# Patient Record
Sex: MALE | Race: WHITE | NOT HISPANIC OR LATINO
[De-identification: names, ages, dates, MRNs, and addresses within clinical notes are randomized per-mention and may not be internally consistent; named-entity substitution may affect disease eponyms.]

---

## 2017-07-13 ENCOUNTER — RX RENEWAL (OUTPATIENT)
Age: 57
End: 2017-07-13

## 2017-10-17 ENCOUNTER — APPOINTMENT (OUTPATIENT)
Dept: ENDOCRINOLOGY | Facility: CLINIC | Age: 57
End: 2017-10-17
Payer: MEDICAID

## 2017-10-17 VITALS
HEIGHT: 65.25 IN | HEART RATE: 68 BPM | DIASTOLIC BLOOD PRESSURE: 80 MMHG | OXYGEN SATURATION: 98 % | BODY MASS INDEX: 28.31 KG/M2 | SYSTOLIC BLOOD PRESSURE: 118 MMHG | WEIGHT: 172 LBS

## 2017-10-17 PROCEDURE — 99214 OFFICE O/P EST MOD 30 MIN: CPT | Mod: 25

## 2017-10-17 PROCEDURE — 77080 DXA BONE DENSITY AXIAL: CPT

## 2018-08-22 ENCOUNTER — MEDICATION RENEWAL (OUTPATIENT)
Age: 58
End: 2018-08-22

## 2018-10-08 ENCOUNTER — APPOINTMENT (OUTPATIENT)
Dept: ENDOCRINOLOGY | Facility: CLINIC | Age: 58
End: 2018-10-08

## 2018-10-15 ENCOUNTER — APPOINTMENT (OUTPATIENT)
Dept: ENDOCRINOLOGY | Facility: CLINIC | Age: 58
End: 2018-10-15
Payer: MEDICAID

## 2018-10-15 ENCOUNTER — LABORATORY RESULT (OUTPATIENT)
Age: 58
End: 2018-10-15

## 2018-10-15 VITALS
SYSTOLIC BLOOD PRESSURE: 120 MMHG | BODY MASS INDEX: 32.89 KG/M2 | HEIGHT: 62.25 IN | WEIGHT: 181 LBS | DIASTOLIC BLOOD PRESSURE: 66 MMHG | HEART RATE: 69 BPM | OXYGEN SATURATION: 97 %

## 2018-10-15 PROCEDURE — 99214 OFFICE O/P EST MOD 30 MIN: CPT

## 2018-10-17 LAB
T3RU NFR SERPL: 1.03 INDEX
T4 SERPL-MCNC: 7.6 UG/DL
THYROGLOB AB SERPL-ACNC: <20 IU/ML
THYROGLOB SERPL-MCNC: <0.2 NG/ML
TSH SERPL-ACNC: 1.93 UIU/ML

## 2019-10-10 ENCOUNTER — APPOINTMENT (OUTPATIENT)
Dept: ENDOCRINOLOGY | Facility: CLINIC | Age: 59
End: 2019-10-10
Payer: MEDICAID

## 2019-10-10 VITALS — OXYGEN SATURATION: 98 % | HEART RATE: 68 BPM | SYSTOLIC BLOOD PRESSURE: 132 MMHG | DIASTOLIC BLOOD PRESSURE: 76 MMHG

## 2019-10-10 VITALS — HEIGHT: 64.9 IN | WEIGHT: 177 LBS | BODY MASS INDEX: 29.49 KG/M2

## 2019-10-10 PROCEDURE — ZZZZZ: CPT

## 2019-10-10 PROCEDURE — 99214 OFFICE O/P EST MOD 30 MIN: CPT | Mod: 25

## 2019-10-10 PROCEDURE — 77080 DXA BONE DENSITY AXIAL: CPT

## 2019-10-10 NOTE — PHYSICAL EXAM
[Alert] : alert [No Acute Distress] : no acute distress [Well Nourished] : well nourished [Normal Sclera/Conjunctiva] : normal sclera/conjunctiva [Well Developed] : well developed [No Neck Mass] : no neck mass was observed [No Proptosis] : no proptosis [No LAD] : no lymphadenopathy [Clear to Auscultation] : lungs were clear to auscultation bilaterally [Well Healed Scar] : well healed scar [No Respiratory Distress] : no respiratory distress [No Edema] : there was no peripheral edema [Regular Rhythm] : with a regular rhythm [Normal S1, S2] : normal S1 and S2 [Not Tender] : non-tender [Normal Bowel Sounds] : normal bowel sounds [Soft] : abdomen soft [Normal Reflexes] : deep tendon reflexes were 2+ and symmetric [No Spinal Tenderness] : no spinal tenderness [Normal Strength/Tone] : muscle strength and tone were normal [Normal Mood] : the mood was normal [No Tremors] : no tremors [Normal Affect] : the affect was normal

## 2019-10-10 NOTE — ASSESSMENT
[FreeTextEntry1] : 59 year old man with papillary thyroid cancer (well differentiated, 1.6 cm, isthmus, with positive margins) post thyroidectomy in 2010 and radioactive iodine in 2011, with excellent response to tx.\par   -TSH at goal, check  Tg; continue currentt levothyroxine dose for now\par   -  Thyroid US showed no evidence of disease in 7/2018. \par \par  Osteopenia - \par   - DXA performed today, no decrease,(increase at hip, with artifactual elevations in spine, borderline low) continue to monitor, repeat in 2 years.  \par  - Continue calcium and vitamin D supplementation.\par \par \par Prediabetes -hba1c has remained normal, encouraged pt to continue with diet/exercise\par \par Hypogonadism - does not want testosterone tx\par \par f/u  1 year

## 2019-10-10 NOTE — DATA REVIEWED
[FreeTextEntry1] : DXa today: lowest T scire -2.19/2019\par cr 0.92\par LDL 78\par FT4 1.3\par TSH 1.03\par Tg abs neg\par Hba1c 5.5\par vit d 36\par \par \par 7/2018\par \par thyroid ultrasound - benign appearing lymph nodes\par \par 8/2018\par TSH 0.21\par Hba1c 5.4\par VIt D 36\par \par DEXA 10/31/16\par Lumbar Spine T Score -2.2 osteopenia (-1.4% decrease from 2014)\par Femoral Neck  T Score -2.3 osteopenia (-7.3% decrease from 2014)\par Total Hip T Score -1.9 osteopenia (-4.0% decrease from 2014)\par \par \par 9/28/16\par \par Ca 9.8\par Albumin 5.0\par Cr 0.99\par TSH QNS\par FT4 1.4\par TT4 9.7\par T3 uptake 32%\par TG 0.1\par TG Abs <1\par HbA1c 5.5\par 25OH Vit D 35

## 2019-10-10 NOTE — HISTORY OF PRESENT ILLNESS
[FreeTextEntry1] : cc: thyroid cancer\par \par 59 year old man with papillary thyroid cancer (well differentiated, 1.6 cm, isthmus, with positive margins) post thyroidectomy in 2010 and radioactive iodine in 2011, on levothyroxine. He has been feeling well.   He reports no change in his neck; no dyspnea or dysphagia.  Taking levothyroxine\par \par \par Osteopenia:  No recent falls or fractures, taking supplemental calcium and vitamin D\par \par Also with hypogonadism, has declined testosterone replacement in the past\par \par Prediabetes: - hba1c has remained  normal range; has with exercise, lost some weight\par \par \par \par \par \par

## 2019-10-11 LAB
THYROGLOB AB SERPL-ACNC: <20 IU/ML
THYROGLOB SERPL-MCNC: <0.2 NG/ML

## 2020-10-05 ENCOUNTER — APPOINTMENT (OUTPATIENT)
Dept: ENDOCRINOLOGY | Facility: CLINIC | Age: 60
End: 2020-10-05

## 2020-10-12 ENCOUNTER — APPOINTMENT (OUTPATIENT)
Dept: ENDOCRINOLOGY | Facility: CLINIC | Age: 60
End: 2020-10-12

## 2020-11-02 ENCOUNTER — APPOINTMENT (OUTPATIENT)
Dept: ENDOCRINOLOGY | Facility: CLINIC | Age: 60
End: 2020-11-02
Payer: MEDICAID

## 2020-11-02 VITALS
OXYGEN SATURATION: 97 % | DIASTOLIC BLOOD PRESSURE: 70 MMHG | HEART RATE: 90 BPM | WEIGHT: 192 LBS | TEMPERATURE: 98 F | BODY MASS INDEX: 31.99 KG/M2 | SYSTOLIC BLOOD PRESSURE: 128 MMHG | HEIGHT: 64.9 IN

## 2020-11-02 PROCEDURE — 99072 ADDL SUPL MATRL&STAF TM PHE: CPT

## 2020-11-02 PROCEDURE — 99214 OFFICE O/P EST MOD 30 MIN: CPT | Mod: 25

## 2020-11-02 NOTE — PHYSICAL EXAM
[Alert] : alert [Healthy Appearance] : healthy appearance [No Acute Distress] : no acute distress [Normal Sclera/Conjunctiva] : normal sclera/conjunctiva [No Proptosis] : no proptosis [No Neck Mass] : no neck mass was observed [No LAD] : no lymphadenopathy [Well Healed Scar] : well healed scar [No Respiratory Distress] : no respiratory distress [Clear to Auscultation] : lungs were clear to auscultation bilaterally [Normal S1, S2] : normal S1 and S2 [Regular Rhythm] : with a regular rhythm [No Edema] : no peripheral edema [Normal Bowel Sounds] : normal bowel sounds [Not Tender] : non-tender [Soft] : abdomen soft [No Spinal Tenderness] : no spinal tenderness [Spine Straight] : spine straight [No Involuntary Movements] : no involuntary movements were seen [Normal Strength/Tone] : muscle strength and tone were normal [Normal Reflexes] : deep tendon reflexes were 2+ and symmetric [No Tremors] : no tremors [Normal Affect] : the affect was normal [Normal Mood] : the mood was normal

## 2020-11-02 NOTE — DATA REVIEWED
[FreeTextEntry1] : DXa today: lowest T score -2.19 - 2019\par cr 0.92\par LDL 78\par FT4 1.3\par TSH 1.03\par Tg abs neg\par Hba1c 5.5\par vit d 36\par \par \par 7/2018\par \par thyroid ultrasound - benign appearing lymph nodes\par \par 8/2018\par TSH 0.21\par Hba1c 5.4\par VIt D 36\par \par DEXA 10/31/16\par Lumbar Spine T Score -2.2 osteopenia (-1.4% decrease from 2014)\par Femoral Neck  T Score -2.3 osteopenia (-7.3% decrease from 2014)\par Total Hip T Score -1.9 osteopenia (-4.0% decrease from 2014)\par \par \par 9/28/16\par \par Ca 9.8\par Albumin 5.0\par Cr 0.99\par TSH QNS\par FT4 1.4\par TT4 9.7\par T3 uptake 32%\par TG 0.1\par TG Abs <1\par HbA1c 5.5\par 25OH Vit D 35

## 2020-11-02 NOTE — HISTORY OF PRESENT ILLNESS
[FreeTextEntry1] : cc: thyroid cancer\par \par 60 year old man with papillary thyroid cancer (well differentiated, 1.6 cm, isthmus, with positive margins) post thyroidectomy in 2010 and radioactive iodine in 2011, with excellent response to tx. He has been feeling well.   He has not noted any change in his neck; reports no dyspnea or dysphagia.  Taking levothyroxine daily separate from food and vitamins.  He has been feeling a little down due to Covid-related situations (no SI, HI, reports symptoms are mild)\par \par Osteopenia:  No recent falls or fractures, he takes supplemental calcium and vitamin D.  \par \par Had lower back pain, muscle spasms in June/July.  Had xrays with no findings.  Pain relieved with steroid injection.  September hurt back while working on lawn, has had mild pain, mostly at night, interfering with sleep, has appt schedule.\par \par Also with hypogonadism, has declined testosterone replacement \par \par Prediabetes: - Has gained weight, now feels motivated to start exercising more.  Has been riding bicycle.  Not monitoring diet.\par \par \par \par \par \par

## 2020-11-02 NOTE — ASSESSMENT
[FreeTextEntry1] : 60 year old man with papillary thyroid cancer (well differentiated, 1.6 cm, isthmus, with positive margins) post thyroidectomy in 2010 and radioactive iodine in 2011, with excellent response to tx.\par    - check TFTs, tg, adjust  levothyroxine dose as needed\par   - Last  Thyroid US showed no evidence of disease. \par \par  Osteopenia - \par   - DXA stable one year ago repeat in 1-2 years.  \par  - Continue calcium and vitamin D supplementation.\par \par \par Prediabetes - Hba1c has increased, pt intends to resume diet and increase exercise\par \par Hypogonadism - does not want testosterone tx\par \par f/u  1 year

## 2020-11-03 LAB
THYROGLOB AB SERPL-ACNC: <20 IU/ML
THYROGLOB SERPL-MCNC: 0.75 NG/ML
TSH SERPL-ACNC: 1.6 UIU/ML

## 2020-11-04 ENCOUNTER — NON-APPOINTMENT (OUTPATIENT)
Age: 60
End: 2020-11-04

## 2021-03-22 ENCOUNTER — APPOINTMENT (OUTPATIENT)
Dept: NUCLEAR MEDICINE | Facility: HOSPITAL | Age: 61
End: 2021-03-22

## 2021-03-22 ENCOUNTER — OUTPATIENT (OUTPATIENT)
Dept: OUTPATIENT SERVICES | Facility: HOSPITAL | Age: 61
LOS: 1 days | End: 2021-03-22
Payer: MEDICAID

## 2021-03-22 DIAGNOSIS — C73 MALIGNANT NEOPLASM OF THYROID GLAND: ICD-10-CM

## 2021-03-23 ENCOUNTER — APPOINTMENT (OUTPATIENT)
Dept: NUCLEAR MEDICINE | Facility: HOSPITAL | Age: 61
End: 2021-03-23

## 2021-03-24 ENCOUNTER — APPOINTMENT (OUTPATIENT)
Dept: NUCLEAR MEDICINE | Facility: HOSPITAL | Age: 61
End: 2021-03-24

## 2021-03-24 ENCOUNTER — RESULT REVIEW (OUTPATIENT)
Age: 61
End: 2021-03-24

## 2021-03-25 ENCOUNTER — TRANSCRIPTION ENCOUNTER (OUTPATIENT)
Age: 61
End: 2021-03-25

## 2021-03-26 ENCOUNTER — RESULT REVIEW (OUTPATIENT)
Age: 61
End: 2021-03-26

## 2021-03-26 ENCOUNTER — APPOINTMENT (OUTPATIENT)
Dept: NUCLEAR MEDICINE | Facility: HOSPITAL | Age: 61
End: 2021-03-26

## 2021-03-26 PROCEDURE — 78018 THYROID MET IMAGING BODY: CPT

## 2021-03-26 PROCEDURE — 78020 THYROID MET UPTAKE: CPT | Mod: 26

## 2021-03-26 PROCEDURE — 96372 THER/PROPH/DIAG INJ SC/IM: CPT

## 2021-03-26 PROCEDURE — 78018 THYROID MET IMAGING BODY: CPT | Mod: 26

## 2021-03-26 PROCEDURE — A9528: CPT

## 2021-03-26 PROCEDURE — 78020 THYROID MET UPTAKE: CPT

## 2021-05-12 ENCOUNTER — NON-APPOINTMENT (OUTPATIENT)
Age: 61
End: 2021-05-12

## 2021-05-12 ENCOUNTER — TRANSCRIPTION ENCOUNTER (OUTPATIENT)
Age: 61
End: 2021-05-12

## 2021-05-13 ENCOUNTER — APPOINTMENT (OUTPATIENT)
Dept: NEUROSURGERY | Facility: CLINIC | Age: 61
End: 2021-05-13
Payer: MEDICAID

## 2021-05-13 ENCOUNTER — LABORATORY RESULT (OUTPATIENT)
Age: 61
End: 2021-05-13

## 2021-05-13 ENCOUNTER — RESULT REVIEW (OUTPATIENT)
Age: 61
End: 2021-05-13

## 2021-05-13 ENCOUNTER — APPOINTMENT (OUTPATIENT)
Dept: NEUROSURGERY | Facility: CLINIC | Age: 61
End: 2021-05-13

## 2021-05-13 VITALS
HEIGHT: 64 IN | WEIGHT: 175 LBS | DIASTOLIC BLOOD PRESSURE: 84 MMHG | HEART RATE: 77 BPM | SYSTOLIC BLOOD PRESSURE: 161 MMHG | OXYGEN SATURATION: 96 % | TEMPERATURE: 98.2 F | BODY MASS INDEX: 29.88 KG/M2

## 2021-05-13 VITALS — DIASTOLIC BLOOD PRESSURE: 84 MMHG | SYSTOLIC BLOOD PRESSURE: 150 MMHG

## 2021-05-13 DIAGNOSIS — Z80.0 FAMILY HISTORY OF MALIGNANT NEOPLASM OF DIGESTIVE ORGANS: ICD-10-CM

## 2021-05-13 DIAGNOSIS — Z82.49 FAMILY HISTORY OF ISCHEMIC HEART DISEASE AND OTHER DISEASES OF THE CIRCULATORY SYSTEM: ICD-10-CM

## 2021-05-13 DIAGNOSIS — Z83.1 FAMILY HISTORY OF OTHER INFECTIOUS AND PARASITIC DISEASES: ICD-10-CM

## 2021-05-13 DIAGNOSIS — Z78.9 OTHER SPECIFIED HEALTH STATUS: ICD-10-CM

## 2021-05-13 PROCEDURE — 99204 OFFICE O/P NEW MOD 45 MIN: CPT

## 2021-05-13 NOTE — REVIEW OF SYSTEMS
[Negative] : Heme/Lymph [Constipation] : constipation [Testicular Pain] : testicular pain [de-identified] : lower back pain [FreeTextEntry9] : lower back pain

## 2021-05-13 NOTE — REASON FOR VISIT
[New Patient Visit] : a new patient visit [Referred By: _________] : Patient was referred by [facility] [Other: _____] : [unfilled]

## 2021-05-13 NOTE — ASSESSMENT
[FreeTextEntry1] : IMPRESSION:\par 1. Sacral mass with extension into the presacral space. \par \par \par \par PLAN:\par 1. Explained to patient that sexual, bowel, bladder function as well as mobility in lower extremities will be affected with surgery.  Explained possibility of urine or bladder diversion pre or post surgery.\par 2. CT CAP w/wo IV and PO contrast.\par 3. CT of lumbar spine w/o contrast.\par 4. CT chest w/wo contrast.\par 5. CT guided sacral tumor biopsy.\par 6. Explained that spinal instability post surgery will possibly need spinal fusion surgery.\par 7. Explained that due to the location of surgical incision that Plastic Surgery will be involved in surgery for wound closure to optimize wound healing.\par 8. Risks related to surgery discussed to include but are not limited to infection, vascular damage, hardware failure, wound breakdown, tumor recurrence, among others.

## 2021-05-13 NOTE — HISTORY OF PRESENT ILLNESS
[> 3 months] : more  than 3 months [FreeTextEntry1] : "Sacral tumor" [de-identified] : Luis Jackson is a pleasant right handed 61 year old gentleman who presents for consultation regarding recently diagnosed sacral tumor.  Pt has a long standing history of lower back pain that started approximately 8/2020 after raking his lawn.  He was treated by his PCP Dr. Octaviano Hernandez, [address] [place] [address], [place] [address], \par , with oral steroids and muscle relaxants.  He reports that he  had some relief however pain lingered. He was referred to an Orthopedic Surgeon Dr. Arpit Bowen, [address] [place] [id number], [phone] who ordered MRI LS which revealed a sacral tumor.\par \par Pain is 2-3/10 in lower back and is present mostly at night time and when right leg is in certain positions.  Pain is worsened with heat application and improved with increased physical activity and Aleve prn.\par \par Reports that he suffers from constipation, urine stream is weaker than before, right buttock and right scrotal pain with some numbness. Pt denies problems walking. \par \par Pt is single and lives with his father.  He has a history of thyroid cancer with total thyroidectomy in 2010. He takes Levothyroxine 125 mcg daily.  He is under the care of Endocrinologist Dr. Kayla Neely, [facility], [phone]. Respiratory

## 2021-05-13 NOTE — PHYSICAL EXAM
[General Appearance - Alert] : alert [General Appearance - In No Acute Distress] : in no acute distress [General Appearance - Well Nourished] : well nourished [General Appearance - Well Developed] : well developed [General Appearance - Well-Appearing] : healthy appearing [Oriented To Time, Place, And Person] : oriented to person, place, and time [Impaired Insight] : insight and judgment were intact [Affect] : the affect was normal [Memory Recent] : recent memory was not impaired [Person] : oriented to person [Place] : oriented to place [Time] : oriented to time [Cranial Nerves Optic (II)] : visual acuity intact bilaterally,  pupils equal round and reactive to light [Cranial Nerves Oculomotor (III)] : extraocular motion intact [Cranial Nerves Trigeminal (V)] : facial sensation intact symmetrically [Cranial Nerves Facial (VII)] : face symmetrical [Cranial Nerves Vestibulocochlear (VIII)] : hearing was intact bilaterally [Cranial Nerves Glossopharyngeal (IX)] : tongue and palate midline [Cranial Nerves Accessory (XI - Cranial And Spinal)] : head turning and shoulder shrug symmetric [Cranial Nerves Hypoglossal (XII)] : there was no tongue deviation with protrusion [Motor Handedness Right-Handed] : the patient is right hand dominant [Balance] : balance was intact [Sclera] : the sclera and conjunctiva were normal [PERRL With Normal Accommodation] : pupils were equal in size, round, reactive to light, with normal accommodation [Extraocular Movements] : extraocular movements were intact [Outer Ear] : the ears and nose were normal in appearance [Hearing Threshold Finger Rub Not Storey] : hearing was normal [Oropharynx] : the oropharynx was normal [Neck Appearance] : the appearance of the neck was normal [Respiration, Rhythm And Depth] : normal respiratory rhythm and effort [Exaggerated Use Of Accessory Muscles For Inspiration] : no accessory muscle use [Abnormal Walk] : normal gait [Involuntary Movements] : no involuntary movements were seen [Motor Tone] : muscle strength and tone were normal [Skin Color & Pigmentation] : normal skin color and pigmentation [] : no rash [5] : S1 toe walking 5/5 [FreeTextEntry8] : Pt is able to tandem walk

## 2021-05-14 ENCOUNTER — TRANSCRIPTION ENCOUNTER (OUTPATIENT)
Age: 61
End: 2021-05-14

## 2021-05-14 LAB
ALBUMIN SERPL ELPH-MCNC: 4.9 G/DL
ALP BLD-CCNC: 62 U/L
ALT SERPL-CCNC: 18 U/L
ANION GAP SERPL CALC-SCNC: 11 MMOL/L
APTT BLD: 26.7 SEC
AST SERPL-CCNC: 19 U/L
BASOPHILS # BLD AUTO: 0.08 K/UL
BASOPHILS NFR BLD AUTO: 0.7 %
BILIRUB SERPL-MCNC: 0.3 MG/DL
BUN SERPL-MCNC: 21 MG/DL
CALCIUM SERPL-MCNC: 10 MG/DL
CHLORIDE SERPL-SCNC: 101 MMOL/L
CO2 SERPL-SCNC: 27 MMOL/L
CREAT SERPL-MCNC: 0.97 MG/DL
EOSINOPHIL # BLD AUTO: 0.14 K/UL
EOSINOPHIL NFR BLD AUTO: 1.2 %
GLUCOSE SERPL-MCNC: 89 MG/DL
HCT VFR BLD CALC: 48.9 %
HGB BLD-MCNC: 15.6 G/DL
IMM GRANULOCYTES NFR BLD AUTO: 0.3 %
LYMPHOCYTES # BLD AUTO: 2.68 K/UL
LYMPHOCYTES NFR BLD AUTO: 22.3 %
MAN DIFF?: NORMAL
MCHC RBC-ENTMCNC: 27.6 PG
MCHC RBC-ENTMCNC: 31.9 GM/DL
MCV RBC AUTO: 86.4 FL
MONOCYTES # BLD AUTO: 1.07 K/UL
MONOCYTES NFR BLD AUTO: 8.9 %
NEUTROPHILS # BLD AUTO: 7.99 K/UL
NEUTROPHILS NFR BLD AUTO: 66.6 %
PLATELET # BLD AUTO: 309 K/UL
POTASSIUM SERPL-SCNC: 4.3 MMOL/L
PROT SERPL-MCNC: 7.5 G/DL
RBC # BLD: 5.66 M/UL
RBC # FLD: 13.1 %
SODIUM SERPL-SCNC: 140 MMOL/L
WBC # FLD AUTO: 12 K/UL

## 2021-05-17 PROBLEM — R93.89 ABNORMAL FINDING ON IMAGING: Status: ACTIVE | Noted: 2021-05-17

## 2021-05-18 ENCOUNTER — APPOINTMENT (OUTPATIENT)
Dept: INTERVENTIONAL RADIOLOGY/VASCULAR | Facility: CLINIC | Age: 61
End: 2021-05-18
Payer: MEDICAID

## 2021-05-18 VITALS — HEIGHT: 64 IN | WEIGHT: 175 LBS | BODY MASS INDEX: 29.88 KG/M2

## 2021-05-18 DIAGNOSIS — R93.89 ABNORMAL FINDINGS ON DIAGNOSTIC IMAGING OF OTHER SPECIFIED BODY STRUCTURES: ICD-10-CM

## 2021-05-18 DIAGNOSIS — H35.713 CENTRAL SEROUS CHORIORETINOPATHY, BILATERAL: ICD-10-CM

## 2021-05-18 DIAGNOSIS — Z56.0 UNEMPLOYMENT, UNSPECIFIED: ICD-10-CM

## 2021-05-18 DIAGNOSIS — Z92.29 PERSONAL HISTORY OF OTHER DRUG THERAPY: ICD-10-CM

## 2021-05-18 DIAGNOSIS — K59.00 CONSTIPATION, UNSPECIFIED: ICD-10-CM

## 2021-05-18 PROCEDURE — 99204 OFFICE O/P NEW MOD 45 MIN: CPT | Mod: 95

## 2021-05-18 SDOH — ECONOMIC STABILITY - INCOME SECURITY: UNEMPLOYMENT, UNSPECIFIED: Z56.0

## 2021-05-22 ENCOUNTER — OUTPATIENT (OUTPATIENT)
Dept: OUTPATIENT SERVICES | Facility: HOSPITAL | Age: 61
LOS: 1 days | End: 2021-05-22
Payer: MEDICAID

## 2021-05-22 DIAGNOSIS — Z11.52 ENCOUNTER FOR SCREENING FOR COVID-19: ICD-10-CM

## 2021-05-22 LAB — SARS-COV-2 RNA SPEC QL NAA+PROBE: SIGNIFICANT CHANGE UP

## 2021-05-22 PROCEDURE — U0003: CPT

## 2021-05-22 PROCEDURE — C9803: CPT

## 2021-05-22 PROCEDURE — U0005: CPT

## 2021-05-24 ENCOUNTER — RESULT REVIEW (OUTPATIENT)
Age: 61
End: 2021-05-24

## 2021-05-24 ENCOUNTER — OUTPATIENT (OUTPATIENT)
Dept: OUTPATIENT SERVICES | Facility: HOSPITAL | Age: 61
LOS: 1 days | End: 2021-05-24
Payer: MEDICAID

## 2021-05-24 VITALS
HEIGHT: 64 IN | DIASTOLIC BLOOD PRESSURE: 76 MMHG | RESPIRATION RATE: 16 BRPM | OXYGEN SATURATION: 98 % | WEIGHT: 175.05 LBS | SYSTOLIC BLOOD PRESSURE: 142 MMHG

## 2021-05-24 VITALS
TEMPERATURE: 98 F | SYSTOLIC BLOOD PRESSURE: 132 MMHG | DIASTOLIC BLOOD PRESSURE: 68 MMHG | OXYGEN SATURATION: 99 % | HEART RATE: 66 BPM | RESPIRATION RATE: 18 BRPM

## 2021-05-24 DIAGNOSIS — M54.5 LOW BACK PAIN: ICD-10-CM

## 2021-05-24 PROCEDURE — 77012 CT SCAN FOR NEEDLE BIOPSY: CPT | Mod: 26

## 2021-05-24 PROCEDURE — 88341 IMHCHEM/IMCYTCHM EA ADD ANTB: CPT

## 2021-05-24 PROCEDURE — 88305 TISSUE EXAM BY PATHOLOGIST: CPT

## 2021-05-24 PROCEDURE — 88173 CYTOPATH EVAL FNA REPORT: CPT

## 2021-05-24 PROCEDURE — 88173 CYTOPATH EVAL FNA REPORT: CPT | Mod: 26

## 2021-05-24 PROCEDURE — 88172 CYTP DX EVAL FNA 1ST EA SITE: CPT

## 2021-05-24 PROCEDURE — 88342 IMHCHEM/IMCYTCHM 1ST ANTB: CPT

## 2021-05-24 PROCEDURE — 77012 CT SCAN FOR NEEDLE BIOPSY: CPT

## 2021-05-24 PROCEDURE — 88342 IMHCHEM/IMCYTCHM 1ST ANTB: CPT | Mod: 26

## 2021-05-24 PROCEDURE — 20225 BONE BIOPSY TROCAR/NDL DEEP: CPT

## 2021-05-24 PROCEDURE — 88305 TISSUE EXAM BY PATHOLOGIST: CPT | Mod: 26

## 2021-05-24 PROCEDURE — 88341 IMHCHEM/IMCYTCHM EA ADD ANTB: CPT | Mod: 26

## 2021-05-24 RX ORDER — FENTANYL CITRATE 50 UG/ML
25 INJECTION INTRAVENOUS
Refills: 0 | Status: DISCONTINUED | OUTPATIENT
Start: 2021-05-24 | End: 2021-05-24

## 2021-05-24 RX ORDER — ONDANSETRON 8 MG/1
4 TABLET, FILM COATED ORAL ONCE
Refills: 0 | Status: DISCONTINUED | OUTPATIENT
Start: 2021-05-24 | End: 2021-06-07

## 2021-05-24 NOTE — PRE PROCEDURE NOTE - PRE PROCEDURE EVALUATION
Pt seen and evaluated in holding area. Consent obtained. Sacral mass to be biopsied w CT guidance via midline approach Interventional Radiology Pre-Procedure Note    Procedure: Sacral ladarius bx    HPI: 60yo Male with sacral mass presents for bx    Exam:  T(F): 97.9, Max: 97.9 (05-24-21)  HR: 66 (61 - 68)  BP: 132/68 (125/79 - 142/76)  RR: 18  SpO2: 99%  General: AO x 3, NAD, Well- Groomed, Well- Nourished.  Lungs: CTA Bilaterally.  Cardiovascular: Regular, S1, S2.  Abdomen:  Soft, NT/ND    Imaging:      Pertinent Imaging Reviewed    Plan:   - 60yo Male presents for sacral mass bx  - Risks/Benefits/Alternatives explained with the patient and/or healthcare proxy and witnessed informed consent obtained.  - Mass to be biopsied w CT guidance via midline approach

## 2021-05-24 NOTE — PRE PROCEDURE NOTE - NSPROCASSESMENT_GEN_ALL_CORE
Pt seen and evaluated in holding area. Consent obtained. Sacral mass to be biopsied w CT guidance via midline approach

## 2021-05-24 NOTE — PROGRESS NOTE ADULT - SUBJECTIVE AND OBJECTIVE BOX
IR Post Procedure Note    Diagnosis: Sacral Mass    Procedure: Sacral mass bx    : Ronak Tineo MD    Contrast: None    Anesthesia: 1% Lidocaine Subcutaneous, Sedation administered by Anesthesiology    Estimated Blood Loss: Less than 10cc    Specimens: Identified, Labeled, Confirmed and Sent to Lab. Adequacy of Specimen Confirmed by Cytopathology    Complications: No Immediate Complications    Anticoagulation: Resume in 24 Hours    Findings & Plan: 3 18g core bx of sacral mass obtained w CT guidance via paramidline approach. Pt tolerated procedure well. Adequac confirmed w cytopathology      Please call Interventional Radiology with any questions, concerns, or issues.

## 2021-06-03 ENCOUNTER — APPOINTMENT (OUTPATIENT)
Dept: CT IMAGING | Facility: CLINIC | Age: 61
End: 2021-06-03
Payer: MEDICAID

## 2021-06-03 ENCOUNTER — OUTPATIENT (OUTPATIENT)
Dept: OUTPATIENT SERVICES | Facility: HOSPITAL | Age: 61
LOS: 1 days | End: 2021-06-03
Payer: MEDICAID

## 2021-06-03 DIAGNOSIS — Z00.8 ENCOUNTER FOR OTHER GENERAL EXAMINATION: ICD-10-CM

## 2021-06-03 PROCEDURE — 71260 CT THORAX DX C+: CPT | Mod: 26

## 2021-06-03 PROCEDURE — 74177 CT ABD & PELVIS W/CONTRAST: CPT

## 2021-06-03 PROCEDURE — 71260 CT THORAX DX C+: CPT

## 2021-06-03 PROCEDURE — 74177 CT ABD & PELVIS W/CONTRAST: CPT | Mod: 26

## 2021-06-03 PROCEDURE — 72131 CT LUMBAR SPINE W/O DYE: CPT | Mod: 26

## 2021-06-03 PROCEDURE — 72131 CT LUMBAR SPINE W/O DYE: CPT

## 2021-06-04 LAB — NON-GYNECOLOGICAL CYTOLOGY STUDY: SIGNIFICANT CHANGE UP

## 2021-06-07 DIAGNOSIS — M89.9 DISORDER OF BONE, UNSPECIFIED: ICD-10-CM

## 2021-06-10 ENCOUNTER — TRANSCRIPTION ENCOUNTER (OUTPATIENT)
Age: 61
End: 2021-06-10

## 2021-06-21 ENCOUNTER — APPOINTMENT (OUTPATIENT)
Dept: NEUROSURGERY | Facility: CLINIC | Age: 61
End: 2021-06-21
Payer: MEDICAID

## 2021-06-21 VITALS
WEIGHT: 175 LBS | BODY MASS INDEX: 29.88 KG/M2 | HEIGHT: 64 IN | HEART RATE: 84 BPM | SYSTOLIC BLOOD PRESSURE: 157 MMHG | OXYGEN SATURATION: 97 % | DIASTOLIC BLOOD PRESSURE: 81 MMHG | TEMPERATURE: 98.2 F

## 2021-06-21 PROCEDURE — 99215 OFFICE O/P EST HI 40 MIN: CPT

## 2021-06-21 NOTE — RESULTS/DATA
[FreeTextEntry1] : EXAM: CT LUMBAR SPINE\par \par \par PROCEDURE DATE: 06/03/2021\par \par \par \par INTERPRETATION: CT LUMBAR SPINE dated 6/3/2021 4:23 PM\par \par INDICATION: Preoperative evaluation. Sacral mass. Pain with walking\par \par COMPARISON: Lumbar spine MRI dated 5/6/2021 from an outside institution\par \par TECHNIQUE: CT imaging of the lumbosacral spine was performed with contrast. The data was reformatted in the axial, coronal, and sagittal planes.\par Contrast: Omnipaque 300. Administered: 90 cc. Discarded: 10 cc.\par \par FINDINGS:\par \par DISC LEVEL EVALUATION:\par \par T12/L1: No central canal or foraminal narrowing.\par L1/L2: No central canal or foraminal narrowing.\par L2/L3: No central canal or foraminal narrowing.\par L3/L4: Mild disc bulging effacing the ventral thecal sac. No central canal or foraminal narrowing.\par L4/L5: No central canal or foraminal narrowing.\par L5/S1: Central disc osteophyte complex effaces the ventral thecal sac. No central canal or foraminal narrowing.\par \par SPINAL ALIGNMENT: Mild straightening and levocurvature of the lumbar spine. Mild varying levels of disc space narrowing.\par SI JOINTS: No articular abnormality.\par OSSEOUS STRUCTURES: There is a destructive/lytic mass involving the sacrum. The mass is centered in the S2-3 vertebra with extension towards the right sacroiliac joint. There is extension of the mass beyond the vertebral confines and into the central spinal canal posteriorly and anteriorly into the pelvis. The mass measures approximately 8.4 x 7.3 x 6.2 cm. There are calcification/focal areas of contrast enhancement centered in the mass. The mass extends into the right S1-S3 sacral foramen compressing the exiting nerves within this region and within the pelvis.\par PARASPINAL MUSCLE AND SOFT TISSUES: Symmetric appearance of paraspinal musculature.\par INTRAABDOMINAL/INTRAPELVIC SOFT TISSUES: No abnormality.\par \par \par IMPRESSION:\par \par Destructive mass centered around the sacrum with extension into the presacral fat and epidural space similar in appearance to prior MRI given differences in technique.\par \par \par

## 2021-06-21 NOTE — REVIEW OF SYSTEMS
[Numbness] : numbness [Constipation] : constipation [As Noted in HPI] : as noted in HPI [Hesitancy] : urinary hesitancy [de-identified] : low back pain

## 2021-06-21 NOTE — REASON FOR VISIT
[Follow-Up: _____] : a [unfilled] follow-up visit [FreeTextEntry1] : Duration of Symptom: more than 3 months \par Luis Jackson is a pleasant right handed 61 year old gentleman who presents for consultation regarding recently diagnosed sacral tumor. Pt has a long standing history of lower back pain that started approximately 8/2020 after raking his lawn. He was treated by his PCP Dr. Octaviano Hernandez, [address] [place] [address], [place] [address], \par , with oral steroids and muscle relaxants. He reports that he had some relief however pain lingered. He was referred to an Orthopedic Surgeon Dr. Arpit Bowen, [address] [place] [address], [place] [address], [phone] who ordered MRI LS which revealed a sacral tumor.\par \par Pain is 2-3/10 in lower back and is present mostly at night time and when right leg is in certain positions. Pain is worsened with heat application and improved with increased physical activity and Aleve prn.\par \par Reports that he suffers from constipation, urine stream is weaker than before, right buttock and right scrotal pain with some numbness. Pt denies problems walking. \par \par Pt is single and lives with his father. He has a history of thyroid cancer with total thyroidectomy in 2010. He takes Levothyroxine 125 mcg daily. He is under the care of Endocrinologist Dr. Kayla Neely, [facility], [phone]. \par \par Patient presents today with his father to review the pathology report. Today he has no new complaints.\par

## 2021-06-21 NOTE — PHYSICAL EXAM
[General Appearance - Alert] : alert [General Appearance - In No Acute Distress] : in no acute distress [Person] : oriented to person [Place] : oriented to place [Time] : oriented to time [Abnormal Walk] : normal gait [Balance] : balance was intact [FreeTextEntry6] : Right: L4/5 heel walking 5/5, S1 toe walking 5/5. \par Left: L4/5 heel walking 5/5, S1 toe walking 5/5.

## 2021-06-21 NOTE — ASSESSMENT
[FreeTextEntry1] : IMPRESSION:\par 1. Sacral mass with extension into the presacral space. \par 2. 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 Chest abdomen pelvic CT shows no other lesions\par \par PLAN:\par 1. Explained to patient that sexual, bowel, bladder function as well as mobility in lower extremities will be affected with surgery. Explained possibility of urine or bladder diversion pre or post surgery. \par 2. Recommend anterior and posterior approach\par 3. Explained that spinal instability post surgery will possibly need spinal fusion surgery.\par 4. Explained that due to the location of surgical incision that Plastic Surgery will be involved in surgery for wound closure to optimize wound healing.\par 5. Risks related to surgery discussed to include but are not limited to infection, vascular damage, hardware failure, wound breakdown, tumor recurrence, among others.

## 2021-06-22 ENCOUNTER — TRANSCRIPTION ENCOUNTER (OUTPATIENT)
Age: 61
End: 2021-06-22

## 2021-06-23 ENCOUNTER — TRANSCRIPTION ENCOUNTER (OUTPATIENT)
Age: 61
End: 2021-06-23

## 2021-06-25 ENCOUNTER — NON-APPOINTMENT (OUTPATIENT)
Age: 61
End: 2021-06-25

## 2021-06-25 ENCOUNTER — OUTPATIENT (OUTPATIENT)
Dept: OUTPATIENT SERVICES | Facility: HOSPITAL | Age: 61
LOS: 1 days | End: 2021-06-25
Payer: COMMERCIAL

## 2021-06-25 VITALS
RESPIRATION RATE: 18 BRPM | TEMPERATURE: 98 F | HEART RATE: 79 BPM | HEIGHT: 65 IN | SYSTOLIC BLOOD PRESSURE: 136 MMHG | DIASTOLIC BLOOD PRESSURE: 73 MMHG | OXYGEN SATURATION: 98 % | WEIGHT: 179.02 LBS

## 2021-06-25 DIAGNOSIS — C41.2 MALIGNANT NEOPLASM OF VERTEBRAL COLUMN: ICD-10-CM

## 2021-06-25 DIAGNOSIS — Z29.9 ENCOUNTER FOR PROPHYLACTIC MEASURES, UNSPECIFIED: ICD-10-CM

## 2021-06-25 DIAGNOSIS — Z98.890 OTHER SPECIFIED POSTPROCEDURAL STATES: Chronic | ICD-10-CM

## 2021-06-25 DIAGNOSIS — Z01.818 ENCOUNTER FOR OTHER PREPROCEDURAL EXAMINATION: ICD-10-CM

## 2021-06-25 DIAGNOSIS — E89.0 POSTPROCEDURAL HYPOTHYROIDISM: Chronic | ICD-10-CM

## 2021-06-25 LAB
ANION GAP SERPL CALC-SCNC: 16 MMOL/L — SIGNIFICANT CHANGE UP (ref 5–17)
BLD GP AB SCN SERPL QL: NEGATIVE — SIGNIFICANT CHANGE UP
BUN SERPL-MCNC: 16 MG/DL — SIGNIFICANT CHANGE UP (ref 7–23)
CALCIUM SERPL-MCNC: 9.6 MG/DL — SIGNIFICANT CHANGE UP (ref 8.4–10.5)
CHLORIDE SERPL-SCNC: 103 MMOL/L — SIGNIFICANT CHANGE UP (ref 96–108)
CO2 SERPL-SCNC: 20 MMOL/L — LOW (ref 22–31)
CREAT SERPL-MCNC: 0.84 MG/DL — SIGNIFICANT CHANGE UP (ref 0.5–1.3)
GLUCOSE SERPL-MCNC: 63 MG/DL — LOW (ref 70–99)
HCT VFR BLD CALC: 45.2 % — SIGNIFICANT CHANGE UP (ref 39–50)
HGB BLD-MCNC: 14.9 G/DL — SIGNIFICANT CHANGE UP (ref 13–17)
MCHC RBC-ENTMCNC: 28.5 PG — SIGNIFICANT CHANGE UP (ref 27–34)
MCHC RBC-ENTMCNC: 33 GM/DL — SIGNIFICANT CHANGE UP (ref 32–36)
MCV RBC AUTO: 86.4 FL — SIGNIFICANT CHANGE UP (ref 80–100)
NRBC # BLD: 0 /100 WBCS — SIGNIFICANT CHANGE UP (ref 0–0)
PLATELET # BLD AUTO: 289 K/UL — SIGNIFICANT CHANGE UP (ref 150–400)
POTASSIUM SERPL-MCNC: 4.2 MMOL/L — SIGNIFICANT CHANGE UP (ref 3.5–5.3)
POTASSIUM SERPL-SCNC: 4.2 MMOL/L — SIGNIFICANT CHANGE UP (ref 3.5–5.3)
RBC # BLD: 5.23 M/UL — SIGNIFICANT CHANGE UP (ref 4.2–5.8)
RBC # FLD: 13.1 % — SIGNIFICANT CHANGE UP (ref 10.3–14.5)
RH IG SCN BLD-IMP: POSITIVE — SIGNIFICANT CHANGE UP
SODIUM SERPL-SCNC: 139 MMOL/L — SIGNIFICANT CHANGE UP (ref 135–145)
WBC # BLD: 10.79 K/UL — HIGH (ref 3.8–10.5)
WBC # FLD AUTO: 10.79 K/UL — HIGH (ref 3.8–10.5)

## 2021-06-25 PROCEDURE — 86900 BLOOD TYPING SEROLOGIC ABO: CPT

## 2021-06-25 PROCEDURE — 80048 BASIC METABOLIC PNL TOTAL CA: CPT

## 2021-06-25 PROCEDURE — 85027 COMPLETE CBC AUTOMATED: CPT

## 2021-06-25 PROCEDURE — G0463: CPT

## 2021-06-25 PROCEDURE — 86850 RBC ANTIBODY SCREEN: CPT

## 2021-06-25 PROCEDURE — 86901 BLOOD TYPING SEROLOGIC RH(D): CPT

## 2021-06-25 RX ORDER — CEFAZOLIN SODIUM 1 G
2000 VIAL (EA) INJECTION ONCE
Refills: 0 | Status: DISCONTINUED | OUTPATIENT
Start: 2021-07-07 | End: 2021-07-09

## 2021-06-25 NOTE — H&P PST ADULT - MUSCULOSKELETAL
detailed exam ROM intact/no joint swelling/no joint warmth/no calf tenderness/normal strength details…

## 2021-06-25 NOTE — H&P PST ADULT - OTHER CARE PROVIDERS
[facility] [phone]// Endocrinologist--dr. marco leija, [address], [phone]

## 2021-06-25 NOTE — H&P PST ADULT - HISTORY OF PRESENT ILLNESS
61 year old male with PMH of Thyroid Cancer, s/p Total Thyroidectomy 2010 and Radioactive Iodine 2011, Hypogonadism, Vitamin D Deficiency, Osteopenia with recently diagnosed Sacral Tumor. Patient endorses back pain that started in August 2020, he reports that he was treat by PCP with steroids and muscle relaxants. He reports that he had some relief, however the pain lingered. He was subsequently referred to an Orthopedic Specialist who ordered an MRI of Lumbar Spine, which revealed a Sacral Tumor with extension into the Presacral space. CT guided Sacral Mass Biopsy was performed in May 2021; pathology confirmed Chordoma. He presents to PST today for evaluation prior to scheduled Removal of Sacral Tumor on 7/6/2021.    fully covid vaccinated; proof of vaccination placed on chart 61 year old male with PMH of Thyroid Cancer, s/p Total Thyroidectomy 2010 and Radioactive Iodine 2011, Hypogonadism, Vitamin D Deficiency, Osteopenia with recently diagnosed Sacral Tumor. Patient endorses back pain that started in August 2020, he reports that he was treat by PCP with steroids and muscle relaxants. He reports that he had some relief, however the pain lingered. He was subsequently referred to an Orthopedic Specialist who ordered an MRI of Lumbar Spine, which revealed a Sacral Tumor with extension into the Presacral space. 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 Patient reports that he suffers from chronic constipation, he has noted that his urine stream is weaker than before, he reports right buttock and right scrotal pain and numbness. He denies decreased strength or problems walking. He presents to PST today for evaluation prior to scheduled Removal of Sacral Tumor on 7/6/2021.    fully covid vaccinated; proof of vaccination placed on chart

## 2021-06-25 NOTE — H&P PST ADULT - NSICDXPASTMEDICALHX_GEN_ALL_CORE_FT
PAST MEDICAL HISTORY:  H/O hypogonadism     H/O osteopenia     H/O vitamin D deficiency     History of central serous retinopathy     HLD (hyperlipidemia)     Thyroid cancer 2010

## 2021-06-25 NOTE — H&P PST ADULT - CONSTITUTIONAL DETAILS
"Care Transition Initial Assessment -   Reason For Consult: discharge planning  Met with: Patient called daughter.  Active Problems:    Pancytopenia (H)    Generalized weakness    Diarrhea       DATA  Lives With: alone  Living Arrangements: house  Description of Support System: Supportive, Involved  Who is your support system?: Children  Support Assessment: Lacks adequate physical care, Lacks necessary supervision and assistance, Caregiver experiencing high stress, Caregiver difficulty providing physical care/safety. PT is fearful of losing her independence.   Identified issues/concerns regarding health management: Pt admitted due to increases weakness and falls.       Resources List: Skilled Nursing Facility  Recommendation are for TCU vs LTC.    SWS will discuss TCU and with family perhaps [facility] for pt following.      Quality Of Family Relationships: supportive  Transportation Available: family or friend will provide    ASSESSMENT  Cognitive Status:  awake and alert- pt is tearful about her situation. She is concerned about her cat while she is gone.. Pt is afraid if she agrees to \" one of those TCU\" that she will not be allowed to return to home.   Concerns to be addressed: had long conversation with pt to address need to be independent with her mobility. Pt is an assist of 1 with the stacey steady at this time  Pt does have infusion at [facility] for Pancytopenia.  PT believes she was due to one and has this done EOW  called daughter and spoke with her spouse. Daughter will be here later today seeing pt.  .  Daughter helps with cleaning, shopping, driving and meals.   Pt has a life line and uses a walker at home.    PLAN  Will try and speak with daughter about TCU options for dc planning.       CM:   Met with pt's daughter Jazmine about dc planning. She has been trying to keep pt in her home as long. They have been working with pt' KYRA Adams [phone] about looking into support at the evening for ptGraciela Ferris and her " daughter are both focused on trying to get pt home if possible.   SWS will call [facility] and speak with her about plan for her infusion. PT did not need her last infusion with her HGB level above 8.  Will send referrals for TCU for private room for placement.   . [facility] as they have a friend who live , ZANDER Otero and [facility]  PT was approved for 6 hours week for PCA support.    well-groomed/no distress

## 2021-06-25 NOTE — H&P PST ADULT - NEGATIVE NEUROLOGICAL SYMPTOMS
no transient paralysis/no weakness/no paresthesias/no generalized seizures/no focal seizures/no syncope/no vertigo

## 2021-06-25 NOTE — H&P PST ADULT - NSICDXPROBLEM_GEN_ALL_CORE_FT
PROBLEM DIAGNOSES  Problem: Malignant neoplasm of vertebral column  Assessment and Plan: Removal of Sacral Tumor  -cbc, bmp, type and screen done PST  -medical clearance scheduled 6/30  -preop instructions  -ABO DOS    Problem: Need for prophylactic measure  Assessment and Plan: .The Caprini score indicates that this patient is at high risk for a VTE event (score 6 or greater). Surgical patients in this group will benefit from both pharmacologic prophylaxis and intermittent compression devices.  The surgical team will determine the balance between VTE risk and bleeding risk, and other clinical considerations

## 2021-06-25 NOTE — H&P PST ADULT - ASSESSMENT
KARLOI VTE 2.0 SCORE [CLOT updated 2019]    AGE RELATED RISK FACTORS                                                       MOBILITY RELATED FACTORS  [x] Age 41-60 years                                            (1 Point)                    [ ] Bed rest                                                        (1 Point)  [ ] Age: 61-74 years                                           (2 Points)                  [ ] Plaster cast                                                   (2 Points)  [ ] Age= 75 years                                              (3 Points)                    [ ] Bed bound for more than 72 hours                 (2 Points)    DISEASE RELATED RISK FACTORS                                               GENDER SPECIFIC FACTORS  [ ] Edema in the lower extremities                       (1 Point)              [ ] Pregnancy                                                     (1 Point)  [ ] Varicose veins                                               (1 Point)                     [ ] Post-partum < 6 weeks                                   (1 Point)             [x] BMI > 25 Kg/m2                                            (1 Point)                     [ ] Hormonal therapy  or oral contraception          (1 Point)                 [ ] Sepsis (in the previous month)                        (1 Point)               [ ] History of pregnancy complications                 (1 point)  [ ] Pneumonia or serious lung disease                                               [ ] Unexplained or recurrent                     (1 Point)           (in the previous month)                               (1 Point)  [ ] Abnormal pulmonary function test                     (1 Point)                 SURGERY RELATED RISK FACTORS  [ ] Acute myocardial infarction                              (1 Point)               [ ]  Section                                             (1 Point)  [ ] Congestive heart failure (in the previous month)  (1 Point)      [ ] Minor surgery                                                  (1 Point)   [ ] Inflammatory bowel disease                             (1 Point)               [ ] Arthroscopic surgery                                        (2 Points)  [ ] Central venous access                                      (2 Points)                [x] General surgery lasting more than 45 minutes (2 points)  [x] Malignancy- Present or previous                   (2 Points)                [ ] Elective arthroplasty                                         (5 points)    [ ] Stroke (in the previous month)                          (5 Points)                                                                                                                                                           HEMATOLOGY RELATED FACTORS                                                 TRAUMA RELATED RISK FACTORS  [ ] Prior episodes of VTE                                     (3 Points)                [ ] Fracture of the hip, pelvis, or leg                       (5 Points)  [ ] Positive family history for VTE                         (3 Points)             [ ] Acute spinal cord injury (in the previous month)  (5 Points)  [ ] Prothrombin 63748 A                                     (3 Points)               [ ] Paralysis  (less than 1 month)                             (5 Points)  [ ] Factor V Leiden                                             (3 Points)                  [ ] Multiple Trauma within 1 month                        (5 Points)  [ ] Lupus anticoagulants                                     (3 Points)                                                           [ ] Anticardiolipin antibodies                               (3 Points)                                                       [ ] High homocysteine in the blood                      (3 Points)                                             [ ] Other congenital or acquired thrombophilia      (3 Points)                                                [ ] Heparin induced thrombocytopenia                  (3 Points)                                     Total Score [6]

## 2021-06-26 PROBLEM — Z86.39 PERSONAL HISTORY OF OTHER ENDOCRINE, NUTRITIONAL AND METABOLIC DISEASE: Chronic | Status: ACTIVE | Noted: 2021-06-25

## 2021-06-26 PROBLEM — C73 MALIGNANT NEOPLASM OF THYROID GLAND: Chronic | Status: ACTIVE | Noted: 2021-06-25

## 2021-06-26 PROBLEM — Z87.39 PERSONAL HISTORY OF OTHER DISEASES OF THE MUSCULOSKELETAL SYSTEM AND CONNECTIVE TISSUE: Chronic | Status: ACTIVE | Noted: 2021-06-25

## 2021-06-26 PROBLEM — E78.5 HYPERLIPIDEMIA, UNSPECIFIED: Chronic | Status: ACTIVE | Noted: 2021-06-25

## 2021-06-26 PROBLEM — Z86.69 PERSONAL HISTORY OF OTHER DISEASES OF THE NERVOUS SYSTEM AND SENSE ORGANS: Chronic | Status: ACTIVE | Noted: 2021-06-25

## 2021-07-02 ENCOUNTER — APPOINTMENT (OUTPATIENT)
Dept: GERIATRICS | Facility: CLINIC | Age: 61
End: 2021-07-02
Payer: MEDICAID

## 2021-07-02 VITALS
SYSTOLIC BLOOD PRESSURE: 120 MMHG | RESPIRATION RATE: 15 BRPM | HEIGHT: 64 IN | OXYGEN SATURATION: 97 % | DIASTOLIC BLOOD PRESSURE: 80 MMHG | HEART RATE: 86 BPM | BODY MASS INDEX: 31.65 KG/M2 | WEIGHT: 185.38 LBS

## 2021-07-02 DIAGNOSIS — F41.8 OTHER SPECIFIED ANXIETY DISORDERS: ICD-10-CM

## 2021-07-02 DIAGNOSIS — M54.5 LOW BACK PAIN: ICD-10-CM

## 2021-07-02 DIAGNOSIS — Z51.5 ENCOUNTER FOR PALLIATIVE CARE: ICD-10-CM

## 2021-07-02 PROCEDURE — 99205 OFFICE O/P NEW HI 60 MIN: CPT

## 2021-07-06 ENCOUNTER — TRANSCRIPTION ENCOUNTER (OUTPATIENT)
Age: 61
End: 2021-07-06

## 2021-07-06 PROBLEM — M54.5 LOWER BACK PAIN: Status: ACTIVE | Noted: 2021-05-13

## 2021-07-06 PROBLEM — Z51.5 ENCOUNTER FOR PALLIATIVE CARE: Status: ACTIVE | Noted: 2021-07-06

## 2021-07-06 PROBLEM — F41.8 ANXIETY ABOUT HEALTH: Status: ACTIVE | Noted: 2021-07-06

## 2021-07-06 NOTE — PHYSICAL EXAM
[General Appearance - Alert] : alert [General Appearance - In No Acute Distress] : in no acute distress [Sclera] : the sclera and conjunctiva were normal [Normal Oral Mucosa] : normal oral mucosa [Neck Appearance] : the appearance of the neck was normal [] : no respiratory distress [Heart Sounds] : normal S1 and S2 [Edema] : there was no peripheral edema [Bowel Sounds] : normal bowel sounds [Involuntary Movements] : no involuntary movements were seen [Skin Color & Pigmentation] : normal skin color and pigmentation [No Focal Deficits] : no focal deficits [Oriented To Time, Place, And Person] : oriented to person, place, and time [Affect] : the affect was normal [Mood] : the mood was normal [FreeTextEntry1] : tearful

## 2021-07-06 NOTE — HISTORY OF PRESENT ILLNESS
[FreeTextEntry1] : Patient is a 62 y/o M w/ sig hx of papillary thyroid ca papillary thyroid cancer post thyroidectomy in 2010 and radioactive iodine in 2011, with excellent response to tx, new dx of chondroma/ sacral tumor here to establish care with palliative medicine prior to surgical treatment. \par \par Patient stated he started having back pain last year. He encountered failed steriod injections and finally through endocrine surveillance, it was noted he had inc thyroglobulin levels. Through imaging it was discovered patient has S2/S3 tumor involvement. Symptoms that accompany this have been:\par \par #Back Pain\par tolerable pain \par improves with physical activity and Aleve prn \par \par # Right buttock and groin pain \par neuropathic/ numbness \par \par #Anxiety/ panic attacks\par d/t anticipation of what life will be like post surgery \par has joined FB groups and has peer guides on other patients w/ same diagnosis post surgery \par understands surgery may take away his ability to walk, urinate, and have normal BM and that it would be a life style adj to self care for these functions. \par worries for his fathers well being \par Refused benzo when offered by PCP\par takes walks and meditates on walks which he stated help \par \par Sohx:\par unmarries, no kids, youngest of 3 children \par has 2 brothers (1 passed from [facility], other resides in [place] and is sick w/ DM and cataracts ([facility]))\par HCP/NOK is father whom is 92 years old and apart of the [facility] [place] [phone]/ [phone]\par

## 2021-07-07 ENCOUNTER — APPOINTMENT (OUTPATIENT)
Dept: SURGICAL ONCOLOGY | Facility: HOSPITAL | Age: 61
End: 2021-07-07

## 2021-07-07 ENCOUNTER — INPATIENT (INPATIENT)
Facility: HOSPITAL | Age: 61
LOS: 25 days | Discharge: NURSING, REHAB OR LONG-TERM CARE | DRG: 459 | End: 2021-08-02
Attending: NEUROLOGICAL SURGERY | Admitting: NEUROLOGICAL SURGERY
Payer: MEDICAID

## 2021-07-07 ENCOUNTER — APPOINTMENT (OUTPATIENT)
Dept: NEUROSURGERY | Facility: CLINIC | Age: 61
End: 2021-07-07
Payer: MEDICAID

## 2021-07-07 ENCOUNTER — TRANSCRIPTION ENCOUNTER (OUTPATIENT)
Age: 61
End: 2021-07-07

## 2021-07-07 ENCOUNTER — APPOINTMENT (OUTPATIENT)
Dept: NEUROSURGERY | Facility: HOSPITAL | Age: 61
End: 2021-07-07
Payer: MEDICAID

## 2021-07-07 ENCOUNTER — APPOINTMENT (OUTPATIENT)
Dept: PLASTIC SURGERY | Facility: HOSPITAL | Age: 61
End: 2021-07-07
Payer: MEDICAID

## 2021-07-07 VITALS
SYSTOLIC BLOOD PRESSURE: 144 MMHG | HEIGHT: 65 IN | TEMPERATURE: 98 F | OXYGEN SATURATION: 96 % | DIASTOLIC BLOOD PRESSURE: 80 MMHG | RESPIRATION RATE: 16 BRPM | HEART RATE: 70 BPM | WEIGHT: 181 LBS

## 2021-07-07 DIAGNOSIS — C41.2 MALIGNANT NEOPLASM OF VERTEBRAL COLUMN: ICD-10-CM

## 2021-07-07 DIAGNOSIS — E89.0 POSTPROCEDURAL HYPOTHYROIDISM: Chronic | ICD-10-CM

## 2021-07-07 DIAGNOSIS — Z98.890 OTHER SPECIFIED POSTPROCEDURAL STATES: Chronic | ICD-10-CM

## 2021-07-07 LAB
ANION GAP SERPL CALC-SCNC: 17 MMOL/L — SIGNIFICANT CHANGE UP (ref 5–17)
APTT BLD: 18.8 SEC — LOW (ref 27.5–35.5)
BASOPHILS # BLD AUTO: 0 K/UL — SIGNIFICANT CHANGE UP (ref 0–0.2)
BASOPHILS NFR BLD AUTO: 0 % — SIGNIFICANT CHANGE UP (ref 0–2)
BUN SERPL-MCNC: 21 MG/DL — SIGNIFICANT CHANGE UP (ref 7–23)
CALCIUM SERPL-MCNC: 8.3 MG/DL — LOW (ref 8.4–10.5)
CHLORIDE SERPL-SCNC: 103 MMOL/L — SIGNIFICANT CHANGE UP (ref 96–108)
CO2 SERPL-SCNC: 19 MMOL/L — LOW (ref 22–31)
CREAT SERPL-MCNC: 0.88 MG/DL — SIGNIFICANT CHANGE UP (ref 0.5–1.3)
EOSINOPHIL # BLD AUTO: 0 K/UL — SIGNIFICANT CHANGE UP (ref 0–0.5)
EOSINOPHIL NFR BLD AUTO: 0 % — SIGNIFICANT CHANGE UP (ref 0–6)
GAS PNL BLDA: SIGNIFICANT CHANGE UP
GAS PNL BLDA: SIGNIFICANT CHANGE UP
GLUCOSE SERPL-MCNC: 190 MG/DL — HIGH (ref 70–99)
HCT VFR BLD CALC: 43.2 % — SIGNIFICANT CHANGE UP (ref 39–50)
HGB BLD-MCNC: 14 G/DL — SIGNIFICANT CHANGE UP (ref 13–17)
INR BLD: 1.04 RATIO — SIGNIFICANT CHANGE UP (ref 0.88–1.16)
LYMPHOCYTES # BLD AUTO: 0.87 K/UL — LOW (ref 1–3.3)
LYMPHOCYTES # BLD AUTO: 4.3 % — LOW (ref 13–44)
MAGNESIUM SERPL-MCNC: 2 MG/DL — SIGNIFICANT CHANGE UP (ref 1.6–2.6)
MCHC RBC-ENTMCNC: 28.5 PG — SIGNIFICANT CHANGE UP (ref 27–34)
MCHC RBC-ENTMCNC: 32.4 GM/DL — SIGNIFICANT CHANGE UP (ref 32–36)
MCV RBC AUTO: 87.8 FL — SIGNIFICANT CHANGE UP (ref 80–100)
MONOCYTES # BLD AUTO: 1.58 K/UL — HIGH (ref 0–0.9)
MONOCYTES NFR BLD AUTO: 7.8 % — SIGNIFICANT CHANGE UP (ref 2–14)
NEUTROPHILS # BLD AUTO: 17.57 K/UL — HIGH (ref 1.8–7.4)
NEUTROPHILS NFR BLD AUTO: 83.5 % — HIGH (ref 43–77)
PHOSPHATE SERPL-MCNC: 4.6 MG/DL — HIGH (ref 2.5–4.5)
PLATELET # BLD AUTO: 259 K/UL — SIGNIFICANT CHANGE UP (ref 150–400)
POTASSIUM SERPL-MCNC: 4.6 MMOL/L — SIGNIFICANT CHANGE UP (ref 3.5–5.3)
POTASSIUM SERPL-SCNC: 4.6 MMOL/L — SIGNIFICANT CHANGE UP (ref 3.5–5.3)
PROTHROM AB SERPL-ACNC: 12.4 SEC — SIGNIFICANT CHANGE UP (ref 10.6–13.6)
RBC # BLD: 4.92 M/UL — SIGNIFICANT CHANGE UP (ref 4.2–5.8)
RBC # FLD: 13.3 % — SIGNIFICANT CHANGE UP (ref 10.3–14.5)
SARS-COV-2 RNA SPEC QL NAA+PROBE: SIGNIFICANT CHANGE UP
SODIUM SERPL-SCNC: 139 MMOL/L — SIGNIFICANT CHANGE UP (ref 135–145)
WBC # BLD: 20.2 K/UL — HIGH (ref 3.8–10.5)
WBC # FLD AUTO: 20.2 K/UL — HIGH (ref 3.8–10.5)

## 2021-07-07 PROCEDURE — 22612 ARTHRD PST TQ 1NTRSPC LUMBAR: CPT | Mod: 82

## 2021-07-07 PROCEDURE — 22848 INSERT PELV FIXATION DEVICE: CPT | Mod: 59

## 2021-07-07 PROCEDURE — 64999H: CUSTOM

## 2021-07-07 PROCEDURE — 99291 CRITICAL CARE FIRST HOUR: CPT

## 2021-07-07 PROCEDURE — 22848 INSERT PELV FIXATION DEVICE: CPT | Mod: 82,59

## 2021-07-07 PROCEDURE — 99291 CRITICAL CARE FIRST HOUR: CPT | Mod: 95

## 2021-07-07 PROCEDURE — 22842 INSERT SPINE FIXATION DEVICE: CPT

## 2021-07-07 PROCEDURE — 64999H: CUSTOM | Mod: 82

## 2021-07-07 PROCEDURE — 61783 SCAN PROC SPINAL: CPT | Mod: 82,59

## 2021-07-07 PROCEDURE — 61783 SCAN PROC SPINAL: CPT | Mod: 59

## 2021-07-07 PROCEDURE — 22614 ARTHRD PST TQ 1NTRSPC EA ADD: CPT

## 2021-07-07 PROCEDURE — 27280 ARTHR SI JT OPN B1GRF INSTRM: CPT | Mod: 50

## 2021-07-07 PROCEDURE — 15734 MUSCLE-SKIN GRAFT TRUNK: CPT

## 2021-07-07 PROCEDURE — 44050 REDUCE BOWEL OBSTRUCTION: CPT

## 2021-07-07 PROCEDURE — 50715 RELEASE OF URETER: CPT

## 2021-07-07 PROCEDURE — 22842 INSERT SPINE FIXATION DEVICE: CPT | Mod: 82

## 2021-07-07 PROCEDURE — 49999 UNLISTED PX ABD PERTM&OMN: CPT

## 2021-07-07 PROCEDURE — 22614 ARTHRD PST TQ 1NTRSPC EA ADD: CPT | Mod: 82

## 2021-07-07 PROCEDURE — 27280 ARTHR SI JT OPN B1GRF INSTRM: CPT | Mod: 82,50

## 2021-07-07 PROCEDURE — 72020 X-RAY EXAM OF SPINE 1 VIEW: CPT | Mod: 26

## 2021-07-07 PROCEDURE — 22612 ARTHRD PST TQ 1NTRSPC LUMBAR: CPT

## 2021-07-07 RX ORDER — CHLORHEXIDINE GLUCONATE 213 G/1000ML
1 SOLUTION TOPICAL
Refills: 0 | Status: DISCONTINUED | OUTPATIENT
Start: 2021-07-07 | End: 2021-07-08

## 2021-07-07 RX ORDER — HYDROMORPHONE HYDROCHLORIDE 2 MG/ML
1 INJECTION INTRAMUSCULAR; INTRAVENOUS; SUBCUTANEOUS
Refills: 0 | Status: DISCONTINUED | OUTPATIENT
Start: 2021-07-07 | End: 2021-07-08

## 2021-07-07 RX ORDER — ONDANSETRON 8 MG/1
4 TABLET, FILM COATED ORAL ONCE
Refills: 0 | Status: DISCONTINUED | OUTPATIENT
Start: 2021-07-07 | End: 2021-07-08

## 2021-07-07 RX ORDER — SENNA PLUS 8.6 MG/1
2 TABLET ORAL AT BEDTIME
Refills: 0 | Status: DISCONTINUED | OUTPATIENT
Start: 2021-07-07 | End: 2021-07-08

## 2021-07-07 RX ORDER — HYDROMORPHONE HYDROCHLORIDE 2 MG/ML
1 INJECTION INTRAMUSCULAR; INTRAVENOUS; SUBCUTANEOUS ONCE
Refills: 0 | Status: DISCONTINUED | OUTPATIENT
Start: 2021-07-07 | End: 2021-07-07

## 2021-07-07 RX ORDER — ACETAMINOPHEN 500 MG
650 TABLET ORAL EVERY 6 HOURS
Refills: 0 | Status: DISCONTINUED | OUTPATIENT
Start: 2021-07-07 | End: 2021-07-08

## 2021-07-07 RX ORDER — CHOLECALCIFEROL (VITAMIN D3) 125 MCG
2000 CAPSULE ORAL DAILY
Refills: 0 | Status: DISCONTINUED | OUTPATIENT
Start: 2021-07-07 | End: 2021-07-08

## 2021-07-07 RX ORDER — ONDANSETRON 8 MG/1
4 TABLET, FILM COATED ORAL EVERY 6 HOURS
Refills: 0 | Status: DISCONTINUED | OUTPATIENT
Start: 2021-07-07 | End: 2021-07-07

## 2021-07-07 RX ORDER — HYDROMORPHONE HYDROCHLORIDE 2 MG/ML
0.5 INJECTION INTRAMUSCULAR; INTRAVENOUS; SUBCUTANEOUS
Refills: 0 | Status: DISCONTINUED | OUTPATIENT
Start: 2021-07-07 | End: 2021-07-08

## 2021-07-07 RX ORDER — LEVOTHYROXINE SODIUM 125 MCG
125 TABLET ORAL DAILY
Refills: 0 | Status: DISCONTINUED | OUTPATIENT
Start: 2021-07-07 | End: 2021-07-08

## 2021-07-07 RX ORDER — HYDROMORPHONE HYDROCHLORIDE 2 MG/ML
30 INJECTION INTRAMUSCULAR; INTRAVENOUS; SUBCUTANEOUS
Refills: 0 | Status: DISCONTINUED | OUTPATIENT
Start: 2021-07-07 | End: 2021-07-08

## 2021-07-07 RX ORDER — CEFAZOLIN SODIUM 1 G
2000 VIAL (EA) INJECTION EVERY 8 HOURS
Refills: 0 | Status: COMPLETED | OUTPATIENT
Start: 2021-07-07 | End: 2021-07-08

## 2021-07-07 RX ORDER — FENOFIBRATE,MICRONIZED 130 MG
145 CAPSULE ORAL DAILY
Refills: 0 | Status: DISCONTINUED | OUTPATIENT
Start: 2021-07-07 | End: 2021-07-08

## 2021-07-07 RX ORDER — MAGNESIUM HYDROXIDE 400 MG/1
30 TABLET, CHEWABLE ORAL EVERY 12 HOURS
Refills: 0 | Status: DISCONTINUED | OUTPATIENT
Start: 2021-07-07 | End: 2021-07-08

## 2021-07-07 RX ORDER — LIDOCAINE HCL 20 MG/ML
0.2 VIAL (ML) INJECTION ONCE
Refills: 0 | Status: DISCONTINUED | OUTPATIENT
Start: 2021-07-07 | End: 2021-07-07

## 2021-07-07 RX ORDER — ONDANSETRON 8 MG/1
4 TABLET, FILM COATED ORAL EVERY 6 HOURS
Refills: 0 | Status: DISCONTINUED | OUTPATIENT
Start: 2021-07-07 | End: 2021-07-08

## 2021-07-07 RX ORDER — SODIUM CHLORIDE 9 MG/ML
1000 INJECTION INTRAMUSCULAR; INTRAVENOUS; SUBCUTANEOUS
Refills: 0 | Status: DISCONTINUED | OUTPATIENT
Start: 2021-07-07 | End: 2021-07-08

## 2021-07-07 RX ORDER — ACETAMINOPHEN 500 MG
1000 TABLET ORAL
Refills: 0 | Status: COMPLETED | OUTPATIENT
Start: 2021-07-07 | End: 2021-07-07

## 2021-07-07 RX ORDER — DEXTROSE MONOHYDRATE, SODIUM CHLORIDE, AND POTASSIUM CHLORIDE 50; .745; 4.5 G/1000ML; G/1000ML; G/1000ML
1000 INJECTION, SOLUTION INTRAVENOUS
Refills: 0 | Status: DISCONTINUED | OUTPATIENT
Start: 2021-07-07 | End: 2021-07-07

## 2021-07-07 RX ORDER — NALOXONE HYDROCHLORIDE 4 MG/.1ML
0.1 SPRAY NASAL
Refills: 0 | Status: DISCONTINUED | OUTPATIENT
Start: 2021-07-07 | End: 2021-07-08

## 2021-07-07 RX ORDER — CHLORHEXIDINE GLUCONATE 213 G/1000ML
1 SOLUTION TOPICAL ONCE
Refills: 0 | Status: DISCONTINUED | OUTPATIENT
Start: 2021-07-07 | End: 2021-07-07

## 2021-07-07 RX ORDER — SODIUM CHLORIDE 9 MG/ML
3 INJECTION INTRAMUSCULAR; INTRAVENOUS; SUBCUTANEOUS EVERY 8 HOURS
Refills: 0 | Status: DISCONTINUED | OUTPATIENT
Start: 2021-07-07 | End: 2021-07-07

## 2021-07-07 RX ADMIN — HYDROMORPHONE HYDROCHLORIDE 1 MILLIGRAM(S): 2 INJECTION INTRAMUSCULAR; INTRAVENOUS; SUBCUTANEOUS at 17:45

## 2021-07-07 RX ADMIN — SODIUM CHLORIDE 75 MILLILITER(S): 9 INJECTION INTRAMUSCULAR; INTRAVENOUS; SUBCUTANEOUS at 19:20

## 2021-07-07 RX ADMIN — HYDROMORPHONE HYDROCHLORIDE 0.5 MILLIGRAM(S): 2 INJECTION INTRAMUSCULAR; INTRAVENOUS; SUBCUTANEOUS at 17:03

## 2021-07-07 RX ADMIN — Medication 100 MILLIGRAM(S): at 22:41

## 2021-07-07 RX ADMIN — HYDROMORPHONE HYDROCHLORIDE 0.5 MILLIGRAM(S): 2 INJECTION INTRAMUSCULAR; INTRAVENOUS; SUBCUTANEOUS at 17:23

## 2021-07-07 RX ADMIN — Medication 400 MILLIGRAM(S): at 19:19

## 2021-07-07 RX ADMIN — HYDROMORPHONE HYDROCHLORIDE 0.5 MILLIGRAM(S): 2 INJECTION INTRAMUSCULAR; INTRAVENOUS; SUBCUTANEOUS at 17:29

## 2021-07-07 RX ADMIN — Medication 1000 MILLIGRAM(S): at 19:19

## 2021-07-07 RX ADMIN — CHLORHEXIDINE GLUCONATE 1 APPLICATION(S): 213 SOLUTION TOPICAL at 22:49

## 2021-07-07 RX ADMIN — HYDROMORPHONE HYDROCHLORIDE 1 MILLIGRAM(S): 2 INJECTION INTRAMUSCULAR; INTRAVENOUS; SUBCUTANEOUS at 17:31

## 2021-07-07 RX ADMIN — HYDROMORPHONE HYDROCHLORIDE 30 MILLILITER(S): 2 INJECTION INTRAMUSCULAR; INTRAVENOUS; SUBCUTANEOUS at 23:15

## 2021-07-07 NOTE — CHART NOTE - NSCHARTNOTEFT_GEN_A_CORE
Post Operative Note  Patient: LISBET DEGROOT 61y (1960) Male   MRN: [id number]  Location: [facility] PACU 14  Visit: 07-07-21 Inpatient  Date: 07-07-21 @ 20:22    Procedure: S/P Reconstruction of abdominoperineal defect using vertical rectus abdominis myocutaneous (VRAM) flap    Subjective:   Seen and examined 4 hrs postop. Reportedly, had severe pain immediately postop for which received IV dilaudid x3. No other acute events. Sleepy, but arousable and responsive.     Objective:  Vitals: T(F): 98.1 (07-07-21 @ 20:00), Max: 98.1 (07-07-21 @ 20:00)  HR: 80 (07-07-21 @ 19:45)  BP: 143/79 (07-07-21 @ 20:00) (111/68 - 148/78)  RR: 16 (07-07-21 @ 20:00)  SpO2: 100% (07-07-21 @ 20:00)  Vent Settings:     In:   07-07-21 @ 07:01  -  07-07-21 @ 20:22  --------------------------------------------------------  IN: 225 mL      IV Fluids: calcium carbonate 1250 mG  + Vitamin D (OsCal 500 + D) 1 Tablet(s) Oral daily  cholecalciferol 2000 Unit(s) Oral daily  sodium chloride 0.9%. 1000 milliLiter(s) (75 mL/Hr) IV Continuous <Continuous>      Out:   07-07-21 @ 07:01  -  07-07-21 @ 20:22  --------------------------------------------------------  OUT: 260 mL      EBL: 50cc    Voided Urine:   07-07-21 @ 07:01  -  07-07-21 @ 20:22  --------------------------------------------------------  OUT: 260 mL      Barnett Catheter: yes    Drains:   SEDRICK:   07-07-21 @ 07:01  -  07-07-21 @ 20:22  --------------------------------------------------------  OUT: 30 mL      Physical Examination:  General: NAD, resting comfortably in bed  HEENT: Normocephalic atraumatic  Respiratory: Nonlabored respirations, normal CW expansion.  Cardio: regular rate and rhythm.  Abdomen: softly distended, appropriately tender, surgical incisions are c/d/i. SEDRICK w/ minimal dark red output   : Barnett with clear, yellow urine   Vascular: extremities are warm and well perfused.     Imaging:  No post-op imaging studies    Assessment:  61yMale patient S/P Reconstruction of abdominoperineal defect using vertical rectus abdominis myocutaneous (VRAM) flap for chordoma    Plan:  - IV Abx: periop  - Pain control PRN  - Diet: NPO, except meds  - Activity: as tolerated   - DVT ppx: SCDs     Date/Time: 07-07-21 @ 20:22    [facility]  [id number]

## 2021-07-07 NOTE — PROGRESS NOTE ADULT - ASSESSMENT
ASSESSMENT/PLAN: Sacral chordoma, status post Stage 1 VRAM harvest and iliac artery and vein ligation    NEURO:  Pain control; start PCA  OR in AM for en bloc sacrectomy  Detailed neuro exam once pain better controlled  Activity: [] mobilize as tolerated [x] Bedrest [] PT [] OT [] PMNR    PULM:  Incentive spirometry    CV:  SBP goal 100-140mmHg; MAP >65mmHg  Continue home fenofibrate    RENAL:  Fluids: IVF as NPO  Barnett for expected urinary retenion    GI:  Diet: NPO for OR  Bowel regimen; monitor for ileus    ENDO:   Goal euglycemia (-180)  Acquired hypothyroidism: levothyroxine  Osteopenia/vitamin D deficiency: vit D, calcium    HEME/ONC:  VTE prophylaxis: [x] SCDs [] chemoprophylaxis [x] hold chemoprophylaxis due to: OR in AM [x] high risk of DVT/PE on admission due to: malignancy     ID:  Aurelia-op antibiotics     SOCIAL/FAMILY:  [] awaiting [] updated at bedside [] family meeting    CODE STATUS:  [x] Full Code [] DNR [] DNI [] Palliative/Comfort Care    DISPOSITION:  [x] ICU/PACU ok per Dr. Watters [] Stroke Unit [] Floor [] EMU [] RCU [] PCU    [x] Patient is at high risk of neurologic deterioration/death due to: acute blood loss anemia, severe pain     Contact: [phone]

## 2021-07-07 NOTE — PROGRESS NOTE ADULT - SUBJECTIVE AND OBJECTIVE BOX
INTERVAL HISTORY: HPI:  61 year old male with PMH of Thyroid Cancer, s/p Total Thyroidectomy 2010 and Radioactive Iodine 2011, Hypogonadism, Vitamin D Deficiency, Osteopenia with recently diagnosed Sacral Tumor. Patient endorses back pain that started in August 2020, he reports that he was treat by PCP with steroids and muscle relaxants. He reports that he had some relief, however the pain lingered. He was subsequently referred to an Orthopedic Specialist who ordered an MRI of Lumbar Spine, which revealed a Sacral Tumor with extension into the Presacral space. 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 Patient reports that he suffers from chronic constipation, he has noted that his urine stream is weaker than before, he reports right buttock and right scrotal pain and numbness. He denies decreased strength or problems walking. He presents to PST today for evaluation prior to scheduled Removal of Sacral Tumor on 7/6/2021.    fully covid vaccinated; proof of vaccination placed on chart (25 Jun 2021 11:47)      MEDICATIONS  (STANDING):  calcium carbonate 1250 mG  + Vitamin D (OsCal 500 + D) 1 Tablet(s) Oral daily  ceFAZolin   IVPB 2000 milliGRAM(s) IV Intermittent once  ceFAZolin   IVPB 2000 milliGRAM(s) IV Intermittent every 8 hours  chlorhexidine 4% Liquid 1 Application(s) Topical <User Schedule>  cholecalciferol 2000 Unit(s) Oral daily  fenofibrate Tablet 145 milliGRAM(s) Oral daily  HYDROmorphone  Injectable 1 milliGRAM(s) IV Push once  HYDROmorphone PCA (1 mG/mL) 30 milliLiter(s) PCA Continuous PCA Continuous  levothyroxine 125 MICROGram(s) Oral daily  senna 2 Tablet(s) Oral at bedtime  sodium chloride 0.9%. 1000 milliLiter(s) (75 mL/Hr) IV Continuous <Continuous>    MEDICATIONS  (PRN):  acetaminophen   Tablet .. 650 milliGRAM(s) Oral every 6 hours PRN Temp greater or equal to 38C (100.4F), Mild Pain (1 - 3)  HYDROmorphone  Injectable 0.5 milliGRAM(s) IV Push every 10 minutes PRN Moderate Pain (4 - 6)  HYDROmorphone  Injectable 1 milliGRAM(s) IV Push every 10 minutes PRN Severe Pain (7 - 10)  HYDROmorphone PCA (1 mG/mL) Rescue Clinician Bolus 0.5 milliGRAM(s) IV Push every 15 minutes PRN for Pain Scale GREATER THAN 6  magnesium hydroxide Suspension 30 milliLiter(s) Oral every 12 hours PRN Constipation  naloxone Injectable 0.1 milliGRAM(s) IV Push every 3 minutes PRN For ANY of the following changes in patient status:  A. RR LESS THAN 10 breaths per minute, B. Oxygen saturation LESS THAN 90%, C. Sedation score of 6  ondansetron Injectable 4 milliGRAM(s) IV Push every 6 hours PRN Nausea  ondansetron Injectable 4 milliGRAM(s) IV Push once PRN Nausea and/or Vomiting      Drug Dosing Weight  Height (cm): 165.1 (07 Jul 2021 07:10)  Weight (kg): 82.1 (07 Jul 2021 07:10)  BMI (kg/m2): 30.1 (07 Jul 2021 07:10)  BSA (m2): 1.9 (07 Jul 2021 07:10)    PAST MEDICAL & SURGICAL HISTORY:  HLD (hyperlipidemia)    Thyroid cancer  2010    History of central serous retinopathy    H/O hypogonadism    H/O vitamin D deficiency    H/O osteopenia    H/O thyroidectomy  Total --2010    H/O colonoscopy        REVIEW OF SYSTEMS: [ ] Unable to Assess due to neurologic exam   [x ] All ROS addressed below are non-contributory, except:  Neuro: [ ] Headache [ ] Back pain [ ] Numbness [ ] Weakness [ ] Ataxia [ ] Dizziness [ ] Aphasia [ ] Dysarthria [ ] Visual disturbance  Resp: [ ] Shortness of breath/dyspnea, [ ] Orthopnea [ ] Cough  CV: [ ] Chest pain [ ] Palpitation [ ] Lightheadedness [ ] Syncope  Renal: [ ] Thirst [ ] Edema  GI: [ ] Nausea [ ] Emesis [ ] Abdominal pain [ ] Constipation [ ] Diarrhea  Hem: [ ] Hematemesis [ ] bright red blood per rectum  ID: [ ] Fever [ ] Chills [ ] Dysuria  ENT: [ ] Rhinorrhea    PHYSICAL EXAM:    General: No Acute Distress     Neurological: Awake, alert oriented to person, place and time, Following Commands, PERRL, EOMI, Face Symmetrical, Speech Fluent, Moving all extremities, Muscle Strength normal in all four extremities, No Drift, Sensation to Light Touch Intact    Pulmonary: Clear to Auscultation, No Rales, No Rhonchi, No Wheezes     Cardiovascular: S1, S2, Regular Rate and Rhythm     Gastrointestinal: Soft, Nontender, Nondistended     Extremities: No calf tenderness     Incision:     ICU Vital Signs Last 24 Hrs  T(C): 36.7 (07 Jul 2021 20:00), Max: 36.7 (07 Jul 2021 20:00)  T(F): 98.1 (07 Jul 2021 20:00), Max: 98.1 (07 Jul 2021 20:00)  HR: 88 (07 Jul 2021 21:00) (70 - 88)  BP: 147/73 (07 Jul 2021 21:00) (111/68 - 148/78)  BP(mean): 102 (07 Jul 2021 21:00) (84 - 111)  ABP: 108/77 (07 Jul 2021 21:00) (94/67 - 193/179)  ABP(mean): 91 (07 Jul 2021 21:00) (79 - 182)  RR: 14 (07 Jul 2021 21:00) (13 - 18)  SpO2: 100% (07 Jul 2021 21:00) (93% - 100%)    ABG - ( 07 Jul 2021 14:02 )  pH, Arterial: 7.36  pH, Blood: x     /  pCO2: 39    /  pO2: 222   / HCO3: 22    / Base Excess: -2.9  /  SaO2: 100               I&O's Detail    07 Jul 2021 07:01  -  07 Jul 2021 23:02  --------------------------------------------------------  IN:    sodium chloride 0.9%: 450 mL  Total IN: 450 mL    OUT:    Bulb (mL): 30 mL    Indwelling Catheter - Urethral (mL): 430 mL  Total OUT: 460 mL    Total NET: -10 mL              LABS:  CBC Full  -  ( 07 Jul 2021 17:01 )  WBC Count : 20.20 K/uL  RBC Count : 4.92 M/uL  Hemoglobin : 14.0 g/dL  Hematocrit : 43.2 %  Platelet Count - Automated : 259 K/uL  Mean Cell Volume : 87.8 fl  Mean Cell Hemoglobin : 28.5 pg  Mean Cell Hemoglobin Concentration : 32.4 gm/dL  Auto Neutrophil # : 17.57 K/uL  Auto Lymphocyte # : 0.87 K/uL  Auto Monocyte # : 1.58 K/uL  Auto Eosinophil # : 0.00 K/uL  Auto Basophil # : 0.00 K/uL  Auto Neutrophil % : 83.5 %  Auto Lymphocyte % : 4.3 %  Auto Monocyte % : 7.8 %  Auto Eosinophil % : 0.0 %  Auto Basophil % : 0.0 %    07-07    139  |  103  |  21  ----------------------------<  190<H>  4.6   |  19<L>  |  0.88    Ca    8.3<L>      07 Jul 2021 17:01  Phos  4.6     07-07  Mg     2.0     07-07      PT/INR - ( 07 Jul 2021 17:37 )   PT: 12.4 sec;   INR: 1.04 ratio         PTT - ( 07 Jul 2021 17:37 )  PTT:18.8 sec      RADIOLOGY & ADDITIONAL STUDIES:   INTERVAL HISTORY: HPI:  61 year old male with PMH of Thyroid Cancer, s/p Total Thyroidectomy 2010 and Radioactive Iodine 2011, Hypogonadism, Vitamin D Deficiency, Osteopenia with recently diagnosed Sacral Tumor. Patient endorses back pain that started in August 2020, he reports that he was treat by PCP with steroids and muscle relaxants. He reports that he had some relief, however the pain lingered. He was subsequently referred to an Orthopedic Specialist who ordered an MRI of Lumbar Spine, which revealed a Sacral Tumor with extension into the Presacral space. 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 Patient reports that he suffers from chronic constipation, he has noted that his urine stream is weaker than before, he reports right buttock and right scrotal pain and numbness. He denies decreased strength or problems walking. He presents to PST today for evaluation prior to scheduled Removal of Sacral Tumor on 7/6/2021.    fully covid vaccinated; proof of vaccination placed on chart (25 Jun 2021 11:47)      MEDICATIONS  (STANDING):  calcium carbonate 1250 mG  + Vitamin D (OsCal 500 + D) 1 Tablet(s) Oral daily  ceFAZolin   IVPB 2000 milliGRAM(s) IV Intermittent once  ceFAZolin   IVPB 2000 milliGRAM(s) IV Intermittent every 8 hours  chlorhexidine 4% Liquid 1 Application(s) Topical <User Schedule>  cholecalciferol 2000 Unit(s) Oral daily  fenofibrate Tablet 145 milliGRAM(s) Oral daily  HYDROmorphone  Injectable 1 milliGRAM(s) IV Push once  HYDROmorphone PCA (1 mG/mL) 30 milliLiter(s) PCA Continuous PCA Continuous  levothyroxine 125 MICROGram(s) Oral daily  senna 2 Tablet(s) Oral at bedtime  sodium chloride 0.9%. 1000 milliLiter(s) (75 mL/Hr) IV Continuous <Continuous>    MEDICATIONS  (PRN):  acetaminophen   Tablet .. 650 milliGRAM(s) Oral every 6 hours PRN Temp greater or equal to 38C (100.4F), Mild Pain (1 - 3)  HYDROmorphone  Injectable 0.5 milliGRAM(s) IV Push every 10 minutes PRN Moderate Pain (4 - 6)  HYDROmorphone  Injectable 1 milliGRAM(s) IV Push every 10 minutes PRN Severe Pain (7 - 10)  HYDROmorphone PCA (1 mG/mL) Rescue Clinician Bolus 0.5 milliGRAM(s) IV Push every 15 minutes PRN for Pain Scale GREATER THAN 6  magnesium hydroxide Suspension 30 milliLiter(s) Oral every 12 hours PRN Constipation  naloxone Injectable 0.1 milliGRAM(s) IV Push every 3 minutes PRN For ANY of the following changes in patient status:  A. RR LESS THAN 10 breaths per minute, B. Oxygen saturation LESS THAN 90%, C. Sedation score of 6  ondansetron Injectable 4 milliGRAM(s) IV Push every 6 hours PRN Nausea  ondansetron Injectable 4 milliGRAM(s) IV Push once PRN Nausea and/or Vomiting      Drug Dosing Weight  Height (cm): 165.1 (07 Jul 2021 07:10)  Weight (kg): 82.1 (07 Jul 2021 07:10)  BMI (kg/m2): 30.1 (07 Jul 2021 07:10)  BSA (m2): 1.9 (07 Jul 2021 07:10)    PAST MEDICAL & SURGICAL HISTORY:  HLD (hyperlipidemia)  Thyroid cancer  2010  History of central serous retinopathy  H/O hypogonadism  H/O vitamin D deficiency  H/O osteopenia  H/O thyroidectomy  Total --2010  H/O colonoscopy        REVIEW OF SYSTEMS: [ ] Unable to Assess due to neurologic exam   [x ] All ROS addressed below are non-contributory, except:  Neuro: [ ] Headache [x ] Back pain [ ] Numbness [ ] Weakness [ ] Ataxia [ ] Dizziness [ ] Aphasia [ ] Dysarthria [ ] Visual disturbance  Resp: [ ] Shortness of breath/dyspnea, [ ] Orthopnea [ ] Cough  CV: [ ] Chest pain [ ] Palpitation [ ] Lightheadedness [ ] Syncope  Renal: [ ] Thirst [ ] Edema  GI: [ ] Nausea [ ] Emesis [ ] Abdominal pain [ ] Constipation [ ] Diarrhea  Hem: [ ] Hematemesis [ ] bright red blood per rectum  ID: [ ] Fever [ ] Chills [ ] Dysuria  ENT: [ ] Rhinorrhea    PHYSICAL EXAM:    General: No Acute Distress     Neurological: Awake, alert oriented to person, place and time, Following Commands, PERRL, EOMI, Face Symmetrical, Speech Fluent, Moving all extremities, Muscle Strength normal in all four extremities, No Drift, Sensation to Light Touch Intact  Pulmonary: Clear to Auscultation, No Rales, No Rhonchi, No Wheezes   Cardiovascular: S1, S2, Regular Rate and Rhythm   Gastrointestinal: Soft, Nontender, Nondistended   Extremities: No calf tenderness   Incision: CDI    ICU Vital Signs Last 24 Hrs  T(C): 36.7 (07 Jul 2021 20:00), Max: 36.7 (07 Jul 2021 20:00)  T(F): 98.1 (07 Jul 2021 20:00), Max: 98.1 (07 Jul 2021 20:00)  HR: 88 (07 Jul 2021 21:00) (70 - 88)  BP: 147/73 (07 Jul 2021 21:00) (111/68 - 148/78)  BP(mean): 102 (07 Jul 2021 21:00) (84 - 111)  ABP: 108/77 (07 Jul 2021 21:00) (94/67 - 193/179)  ABP(mean): 91 (07 Jul 2021 21:00) (79 - 182)  RR: 14 (07 Jul 2021 21:00) (13 - 18)  SpO2: 100% (07 Jul 2021 21:00) (93% - 100%)    ABG - ( 07 Jul 2021 14:02 )  pH, Arterial: 7.36  pH, Blood: x     /  pCO2: 39    /  pO2: 222   / HCO3: 22    / Base Excess: -2.9  /  SaO2: 100               I&O's Detail    07 Jul 2021 07:01  -  07 Jul 2021 23:02  --------------------------------------------------------  IN:    sodium chloride 0.9%: 450 mL  Total IN: 450 mL    OUT:    Bulb (mL): 30 mL    Indwelling Catheter - Urethral (mL): 430 mL  Total OUT: 460 mL    Total NET: -10 mL    LABS:  CBC Full  -  ( 07 Jul 2021 17:01 )  WBC Count : 20.20 K/uL  RBC Count : 4.92 M/uL  Hemoglobin : 14.0 g/dL  Hematocrit : 43.2 %  Platelet Count - Automated : 259 K/uL  Mean Cell Volume : 87.8 fl  Mean Cell Hemoglobin : 28.5 pg  Mean Cell Hemoglobin Concentration : 32.4 gm/dL  Auto Neutrophil # : 17.57 K/uL  Auto Lymphocyte # : 0.87 K/uL  Auto Monocyte # : 1.58 K/uL  Auto Eosinophil # : 0.00 K/uL  Auto Basophil # : 0.00 K/uL  Auto Neutrophil % : 83.5 %  Auto Lymphocyte % : 4.3 %  Auto Monocyte % : 7.8 %  Auto Eosinophil % : 0.0 %  Auto Basophil % : 0.0 %    07-07    139  |  103  |  21  ----------------------------<  190<H>  4.6   |  19<L>  |  0.88    Ca    8.3<L>      07 Jul 2021 17:01  Phos  4.6     07-07  Mg     2.0     07-07      PT/INR - ( 07 Jul 2021 17:37 )   PT: 12.4 sec;   INR: 1.04 ratio         PTT - ( 07 Jul 2021 17:37 )  PTT:18.8 sec      RADIOLOGY & ADDITIONAL STUDIES:

## 2021-07-07 NOTE — PROGRESS NOTE ADULT - ASSESSMENT
61M Thyroid Cancer, s/p Total Thyroidectomy 2010 and Radioactive Iodine 2011, Hypogonadism, Vitamin D Deficiency, Osteopenia status post Stage 1 VRAM harvest and iliac artery and vein ligation    -c/w pain management w/ PCA pump   -SBP goal 100-160  -c/w chavo c/w kemi   -NPO after midnight for stage 2   -c/w IVF while NPO   -DVT ppx on hold in setting of recent operation  61M Thyroid Cancer, s/p Total Thyroidectomy 2010 and Radioactive Iodine 2011, Hypogonadism, Vitamin D Deficiency, Osteopenia status post Stage 1 VRAM harvest and iliac artery and vein ligation    -c/w pain management w/ PCA pump   -SBP goal 100-160  -c/w chavo c/w kemi   -NPO after midnight for stage 2 sacrectomy   -c/w IVF while NPO   -DVT ppx on hold in setting of recent operation

## 2021-07-07 NOTE — PROGRESS NOTE ADULT - SUBJECTIVE AND OBJECTIVE BOX
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 The chordoma resulted in perineal numbness and overflow incontinence and he was admitted 7/7/21 for staged resection. On 7/7, he underwent Plastic Surgery and General Surgery assisted vertical rectus abdominal myocutaneous flap harvest with transperitoneal ligation of internal iliac artery and vein in preparation for en bloc sacrectomy on 7/8.     HOSPITAL COURSE:  7/7: Stage 1 - VRAM harvest, iliac artery and vein ligation    24 HOUR EVENTS:  Post-op pain. Given breakthrough IV analgesia and ordered for PCA.    REVIEW OF SYSTEMS: [] Unable to Assess due to neurologic exam   [x] All ROS addressed below are non-contributory, except:  Neuro: [ ] Headache [ ] Back pain [ ] Numbness [ ] Weakness [ ] Ataxia [ ] Dizziness [ ] Aphasia [ ] Dysarthria [ ] Visual disturbance  Resp: [ ] Shortness of breath/dyspnea [ ] Orthopnea [ ] Cough  CV: [ ] Chest pain [ ] Palpitation [ ] Lightheadedness [ ] Syncope  Renal: [ ] Thirst [ ] Edema  GI: [ ] Nausea [ ] Emesis [x] Abdominal/incisional pain [ ] Constipation [ ] Diarrhea  Hem: [ ] Hematemesis [ ] Bright red blood per rectum  ID: [ ] Fever [ ] Chills [ ] Dysuria  ENT: [ ] Rhinorrhea    VITALS/DATA/ORDER: [x] Reviewed    EXAMINATION: limited to due pain  Gen: Moderate distress from pain - IV breath through medication requested  HEENT: Moist mucosa  CV: Mild tachy but regular  Lungs: Decreased BS at bases but good air entry, no stridor  Abd: Incision dressed, c/d/i - no erythema or discharge  Ext: No overt edema  Neuro: Awake, alert, follows, pain limited exam but does not endorse weakness and is at least 4+/5 throughout; rest of exam deferred given significant pain

## 2021-07-07 NOTE — CHART NOTE - NSCHARTNOTEFT_GEN_A_CORE
CAPRINI SCORE [CLOT] Score on Admission for     AGE RELATED RISK FACTORS                                                       MOBILITY RELATED FACTORS  [x ] Age 41-60 years                                            (1 Point)                  [ ] Bed rest                                                        (1 Point)  [ ] Age: 61-74 years                                           (2 Points)                 [ ] Plaster cast                                                   (2 Points)  [ ] Age= 75 years                                              (3 Points)                 [ ] Bed bound for more than 72 hours                 (2 Points)    DISEASE RELATED RISK FACTORS                                               GENDER SPECIFIC FACTORS  [ ] Edema in the lower extremities                       (1 Point)                  [ ] Pregnancy                                                     (1 Point)  [ ] Varicose veins                                               (1 Point)                  [ ] Post-partum < 6 weeks                                   (1 Point)             [x ] BMI > 25 Kg/m2                                            (1 Point)                  [ ] Hormonal therapy  or oral contraception          (1 Point)                 [ ] Sepsis (in the previous month)                        (1 Point)                  [ ] History of pregnancy complications                 (1 point)  [ ] Pneumonia or serious lung disease                                               [ ] Unexplained or recurrent                     (1 Point)           (in the previous month)                               (1 Point)  [ ] Abnormal pulmonary function test                     (1 Point)                 SURGERY RELATED RISK FACTORS (include planned surgeries)  [ ] Acute myocardial infarction                              (1 Point)                 [ ]  Section                                             (1 Point)  [ ] Congestive heart failure (in the previous month)  (1 Point)         [ ] Minor surgery                                                  (1 Point)   [ ] Inflammatory bowel disease                             (1 Point)                 [ ] Arthroscopic surgery                                        (2 Points)  [ ] Central venous access                                      (2 Points)                [ x] General surgery lasting more than 45 minutes   (2 Points)       [ ] Stroke (in the previous month)                          (5 Points)               [ ] Elective arthroplasty                                         (5 Points)            [x ] current or past malignancy                              (2 Points)                                                                                                       HEMATOLOGY RELATED FACTORS                                                 TRAUMA RELATED RISK FACTORS  [ ] Prior episodes of VTE                                     (3 Points)                [ ] Fracture of the hip, pelvis, or leg                       (5 Points)  [ ] Positive family history for VTE                         (3 Points)                 [ ] Acute spinal cord injury (in the previous month)  (5 Points)  [ ] Prothrombin 22651 A                                     (3 Points)                 [ ] Paralysis  (less than 1 month)                             (5 Points)  [ ] Factor V Leiden                                             (3 Points)                  [ ] Multiple Trauma within 1 month                        (5 Points)  [ ] Lupus anticoagulants                                     (3 Points)                                                           [ ] Anticardiolipin antibodies                               (3 Points)                                                       [ ] High homocysteine in the blood                      (3 Points)                                             [ ] Other congenital or acquired thrombophilia      (3 Points)                                                [ ] Heparin induced thrombocytopenia                  (3 Points)                                          Total Score [       6   ]    Risk:  Very low 0   Low 1 to 2   Moderate 3 to 4   High =5       VTE Prophylasix Recommednations:  [x ] mechanical pneumatic compression devices                                      [ ] contraindicated: _____________________  [ ] chemo prophylasix                                                                                   [ x] contraindicated _____________________    **** HIGH LIKELIHOOD DVT PRESENT ON ADMISSION  [ x] (please order LE dopplers within 24 hours of admission)

## 2021-07-07 NOTE — PRE-ANESTHESIA EVALUATION ADULT - NSANTHVITALSIGNSFT_GEN_ALL_CORE
Vital Signs Last 24 Hrs  T(C): 36.6 (07 Jul 2021 06:45), Max: 36.6 (07 Jul 2021 06:45)  T(F): 97.9 (07 Jul 2021 06:45), Max: 97.9 (07 Jul 2021 06:45)  HR: 70 (07 Jul 2021 06:45) (70 - 70)  BP: 144/80 (07 Jul 2021 06:45) (144/80 - 144/80)  BP(mean): --  RR: 16 (07 Jul 2021 06:45) (16 - 16)  SpO2: 96% (07 Jul 2021 06:45) (96% - 96%)

## 2021-07-08 ENCOUNTER — APPOINTMENT (OUTPATIENT)
Dept: NEUROSURGERY | Facility: HOSPITAL | Age: 61
End: 2021-07-08
Payer: MEDICAID

## 2021-07-08 ENCOUNTER — APPOINTMENT (OUTPATIENT)
Dept: PLASTIC SURGERY | Facility: HOSPITAL | Age: 61
End: 2021-07-08
Payer: MEDICAID

## 2021-07-08 ENCOUNTER — RESULT REVIEW (OUTPATIENT)
Age: 61
End: 2021-07-08

## 2021-07-08 ENCOUNTER — APPOINTMENT (OUTPATIENT)
Dept: NEUROSURGERY | Facility: CLINIC | Age: 61
End: 2021-07-08
Payer: MEDICAID

## 2021-07-08 LAB
ANION GAP SERPL CALC-SCNC: 14 MMOL/L — SIGNIFICANT CHANGE UP (ref 5–17)
ANION GAP SERPL CALC-SCNC: 17 MMOL/L — SIGNIFICANT CHANGE UP (ref 5–17)
BASE EXCESS BLDV CALC-SCNC: -13.1 MMOL/L — LOW (ref -2–2)
BASE EXCESS BLDV CALC-SCNC: -5.1 MMOL/L — LOW (ref -2–2)
BLOOD GAS VENOUS - CREATININE: SIGNIFICANT CHANGE UP MG/DL (ref 0.5–1.3)
BLOOD GAS VENOUS - CREATININE: SIGNIFICANT CHANGE UP MG/DL (ref 0.5–1.3)
BUN SERPL-MCNC: 19 MG/DL — SIGNIFICANT CHANGE UP (ref 7–23)
BUN SERPL-MCNC: 23 MG/DL — SIGNIFICANT CHANGE UP (ref 7–23)
CA-I SERPL-SCNC: 0.63 MMOL/L — CRITICAL LOW (ref 1.12–1.3)
CA-I SERPL-SCNC: 1.1 MMOL/L — LOW (ref 1.12–1.3)
CALCIUM SERPL-MCNC: 8.4 MG/DL — SIGNIFICANT CHANGE UP (ref 8.4–10.5)
CALCIUM SERPL-MCNC: 8.5 MG/DL — SIGNIFICANT CHANGE UP (ref 8.4–10.5)
CHLORIDE BLDV-SCNC: SIGNIFICANT CHANGE UP MMOL/L (ref 96–108)
CHLORIDE BLDV-SCNC: SIGNIFICANT CHANGE UP MMOL/L (ref 96–108)
CHLORIDE SERPL-SCNC: 106 MMOL/L — SIGNIFICANT CHANGE UP (ref 96–108)
CHLORIDE SERPL-SCNC: 110 MMOL/L — HIGH (ref 96–108)
CO2 SERPL-SCNC: 17 MMOL/L — LOW (ref 22–31)
CO2 SERPL-SCNC: 17 MMOL/L — LOW (ref 22–31)
COVID-19 SPIKE DOMAIN AB INTERP: POSITIVE
COVID-19 SPIKE DOMAIN ANTIBODY RESULT: >250 U/ML — HIGH
CREAT SERPL-MCNC: 0.75 MG/DL — SIGNIFICANT CHANGE UP (ref 0.5–1.3)
CREAT SERPL-MCNC: 0.76 MG/DL — SIGNIFICANT CHANGE UP (ref 0.5–1.3)
GAS PNL BLDA: SIGNIFICANT CHANGE UP
GAS PNL BLDV: 129 MMOL/L — LOW (ref 135–145)
GAS PNL BLDV: 138 MMOL/L — SIGNIFICANT CHANGE UP (ref 135–145)
GAS PNL BLDV: SIGNIFICANT CHANGE UP
GLUCOSE BLDV-MCNC: 134 MG/DL — HIGH (ref 70–99)
GLUCOSE BLDV-MCNC: 77 MG/DL — SIGNIFICANT CHANGE UP (ref 70–99)
GLUCOSE SERPL-MCNC: 128 MG/DL — HIGH (ref 70–99)
GLUCOSE SERPL-MCNC: 134 MG/DL — HIGH (ref 70–99)
HCO3 BLDV-SCNC: 13 MMOL/L — LOW (ref 21–29)
HCO3 BLDV-SCNC: 23 MMOL/L — SIGNIFICANT CHANGE UP (ref 21–29)
HCT VFR BLD CALC: 35.7 % — LOW (ref 39–50)
HCT VFR BLD CALC: 40.7 % — SIGNIFICANT CHANGE UP (ref 39–50)
HCT VFR BLDA CALC: 23 % — LOW (ref 39–50)
HCT VFR BLDA CALC: 37 % — LOW (ref 39–50)
HGB BLD CALC-MCNC: 11.9 G/DL — LOW (ref 13–17)
HGB BLD CALC-MCNC: 7.2 G/DL — LOW (ref 13–17)
HGB BLD-MCNC: 12.1 G/DL — LOW (ref 13–17)
HGB BLD-MCNC: 13.5 G/DL — SIGNIFICANT CHANGE UP (ref 13–17)
HOROWITZ INDEX BLDV+IHG-RTO: 0 — SIGNIFICANT CHANGE UP
HOROWITZ INDEX BLDV+IHG-RTO: 0 — SIGNIFICANT CHANGE UP
LACTATE BLDV-MCNC: 2.1 MMOL/L — HIGH (ref 0.7–2)
LACTATE BLDV-MCNC: 3.7 MMOL/L — HIGH (ref 0.7–2)
LACTATE SERPL-SCNC: 5 MMOL/L — CRITICAL HIGH (ref 0.7–2)
MAGNESIUM SERPL-MCNC: 2 MG/DL — SIGNIFICANT CHANGE UP (ref 1.6–2.6)
MAGNESIUM SERPL-MCNC: 2 MG/DL — SIGNIFICANT CHANGE UP (ref 1.6–2.6)
MCHC RBC-ENTMCNC: 29.5 PG — SIGNIFICANT CHANGE UP (ref 27–34)
MCHC RBC-ENTMCNC: 30.2 PG — SIGNIFICANT CHANGE UP (ref 27–34)
MCHC RBC-ENTMCNC: 33.2 GM/DL — SIGNIFICANT CHANGE UP (ref 32–36)
MCHC RBC-ENTMCNC: 33.9 GM/DL — SIGNIFICANT CHANGE UP (ref 32–36)
MCV RBC AUTO: 88.9 FL — SIGNIFICANT CHANGE UP (ref 80–100)
MCV RBC AUTO: 89 FL — SIGNIFICANT CHANGE UP (ref 80–100)
MRSA PCR RESULT.: DETECTED
NRBC # BLD: 0 /100 WBCS — SIGNIFICANT CHANGE UP (ref 0–0)
NRBC # BLD: 0 /100 WBCS — SIGNIFICANT CHANGE UP (ref 0–0)
PCO2 BLDV: 35 MMHG — SIGNIFICANT CHANGE UP (ref 35–50)
PCO2 BLDV: 57 MMHG — HIGH (ref 35–50)
PH BLDV: 7.21 — LOW (ref 7.35–7.45)
PH BLDV: 7.22 — LOW (ref 7.35–7.45)
PHOSPHATE SERPL-MCNC: 3.9 MG/DL — SIGNIFICANT CHANGE UP (ref 2.5–4.5)
PHOSPHATE SERPL-MCNC: 4.3 MG/DL — SIGNIFICANT CHANGE UP (ref 2.5–4.5)
PLATELET # BLD AUTO: 237 K/UL — SIGNIFICANT CHANGE UP (ref 150–400)
PO2 BLDV: 20 MMHG — LOW (ref 25–45)
PO2 BLDV: 31 MMHG — SIGNIFICANT CHANGE UP (ref 25–45)
POTASSIUM BLDV-SCNC: 4.3 MMOL/L — SIGNIFICANT CHANGE UP (ref 3.5–5)
POTASSIUM BLDV-SCNC: 4.8 MMOL/L — SIGNIFICANT CHANGE UP (ref 3.5–5)
POTASSIUM SERPL-MCNC: 4.5 MMOL/L — SIGNIFICANT CHANGE UP (ref 3.5–5.3)
POTASSIUM SERPL-MCNC: 4.6 MMOL/L — SIGNIFICANT CHANGE UP (ref 3.5–5.3)
POTASSIUM SERPL-SCNC: 4.5 MMOL/L — SIGNIFICANT CHANGE UP (ref 3.5–5.3)
POTASSIUM SERPL-SCNC: 4.6 MMOL/L — SIGNIFICANT CHANGE UP (ref 3.5–5.3)
RBC # BLD: 4.01 M/UL — LOW (ref 4.2–5.8)
RBC # BLD: 4.58 M/UL — SIGNIFICANT CHANGE UP (ref 4.2–5.8)
RBC # FLD: 13.3 % — SIGNIFICANT CHANGE UP (ref 10.3–14.5)
RBC # FLD: 14.2 % — SIGNIFICANT CHANGE UP (ref 10.3–14.5)
S AUREUS DNA NOSE QL NAA+PROBE: DETECTED
SAO2 % BLDV: 21 % — LOW (ref 67–88)
SAO2 % BLDV: 56 % — LOW (ref 67–88)
SARS-COV-2 IGG+IGM SERPL QL IA: >250 U/ML — HIGH
SARS-COV-2 IGG+IGM SERPL QL IA: POSITIVE
SODIUM SERPL-SCNC: 140 MMOL/L — SIGNIFICANT CHANGE UP (ref 135–145)
SODIUM SERPL-SCNC: 141 MMOL/L — SIGNIFICANT CHANGE UP (ref 135–145)
WBC # BLD: 10.62 K/UL — HIGH (ref 3.8–10.5)
WBC # BLD: 16.48 K/UL — HIGH (ref 3.8–10.5)
WBC # FLD AUTO: 10.62 K/UL — HIGH (ref 3.8–10.5)
WBC # FLD AUTO: 16.48 K/UL — HIGH (ref 3.8–10.5)

## 2021-07-08 PROCEDURE — 22614 ARTHRD PST TQ 1NTRSPC EA ADD: CPT | Mod: 82

## 2021-07-08 PROCEDURE — 27280 ARTHR SI JT OPN B1GRF INSTRM: CPT | Mod: 82,50

## 2021-07-08 PROCEDURE — 22612 ARTHRD PST TQ 1NTRSPC LUMBAR: CPT

## 2021-07-08 PROCEDURE — 64999H: CUSTOM | Mod: 82

## 2021-07-08 PROCEDURE — 22842 INSERT SPINE FIXATION DEVICE: CPT

## 2021-07-08 PROCEDURE — 14301 TIS TRNFR ANY 30.1-60 SQ CM: CPT | Mod: 58

## 2021-07-08 PROCEDURE — 15734 MUSCLE-SKIN GRAFT TRUNK: CPT | Mod: 58,59

## 2021-07-08 PROCEDURE — 88304 TISSUE EXAM BY PATHOLOGIST: CPT | Mod: 26

## 2021-07-08 PROCEDURE — 22842 INSERT SPINE FIXATION DEVICE: CPT | Mod: 82

## 2021-07-08 PROCEDURE — 49999 UNLISTED PX ABD PERTM&OMN: CPT | Mod: 58

## 2021-07-08 PROCEDURE — 72020 X-RAY EXAM OF SPINE 1 VIEW: CPT | Mod: 26,77

## 2021-07-08 PROCEDURE — 22612 ARTHRD PST TQ 1NTRSPC LUMBAR: CPT | Mod: 82

## 2021-07-08 PROCEDURE — ZZZZZ: CPT

## 2021-07-08 PROCEDURE — 72020 X-RAY EXAM OF SPINE 1 VIEW: CPT | Mod: 26

## 2021-07-08 PROCEDURE — 22848 INSERT PELV FIXATION DEVICE: CPT | Mod: 82,59

## 2021-07-08 PROCEDURE — 71045 X-RAY EXAM CHEST 1 VIEW: CPT | Mod: 26

## 2021-07-08 PROCEDURE — 61783 SCAN PROC SPINAL: CPT | Mod: 59

## 2021-07-08 PROCEDURE — 99291 CRITICAL CARE FIRST HOUR: CPT

## 2021-07-08 PROCEDURE — 14302 TIS TRNFR ADDL 30 SQ CM: CPT | Mod: 59

## 2021-07-08 PROCEDURE — 64999X: CUSTOM | Mod: NC

## 2021-07-08 PROCEDURE — 14302 TIS TRNFR ADDL 30 SQ CM: CPT

## 2021-07-08 PROCEDURE — 64999H: CUSTOM

## 2021-07-08 PROCEDURE — 22848 INSERT PELV FIXATION DEVICE: CPT | Mod: 59

## 2021-07-08 PROCEDURE — 22614 ARTHRD PST TQ 1NTRSPC EA ADD: CPT

## 2021-07-08 PROCEDURE — 27280 ARTHR SI JT OPN B1GRF INSTRM: CPT | Mod: 50

## 2021-07-08 PROCEDURE — 61783 SCAN PROC SPINAL: CPT | Mod: 82,59

## 2021-07-08 RX ORDER — ACETAMINOPHEN 500 MG
650 TABLET ORAL EVERY 6 HOURS
Refills: 0 | Status: DISCONTINUED | OUTPATIENT
Start: 2021-07-08 | End: 2021-07-23

## 2021-07-08 RX ORDER — PROPOFOL 10 MG/ML
10 INJECTION, EMULSION INTRAVENOUS
Qty: 1000 | Refills: 0 | Status: DISCONTINUED | OUTPATIENT
Start: 2021-07-08 | End: 2021-07-09

## 2021-07-08 RX ORDER — SODIUM CHLORIDE 9 MG/ML
1000 INJECTION INTRAMUSCULAR; INTRAVENOUS; SUBCUTANEOUS
Refills: 0 | Status: DISCONTINUED | OUTPATIENT
Start: 2021-07-08 | End: 2021-07-09

## 2021-07-08 RX ORDER — CEFAZOLIN SODIUM 1 G
2000 VIAL (EA) INJECTION EVERY 8 HOURS
Refills: 0 | Status: COMPLETED | OUTPATIENT
Start: 2021-07-08 | End: 2021-07-09

## 2021-07-08 RX ORDER — DEXMEDETOMIDINE HYDROCHLORIDE IN 0.9% SODIUM CHLORIDE 4 UG/ML
0.2 INJECTION INTRAVENOUS
Qty: 200 | Refills: 0 | Status: DISCONTINUED | OUTPATIENT
Start: 2021-07-08 | End: 2021-07-09

## 2021-07-08 RX ORDER — SODIUM CHLORIDE 9 MG/ML
1000 INJECTION, SOLUTION INTRAVENOUS ONCE
Refills: 0 | Status: COMPLETED | OUTPATIENT
Start: 2021-07-08 | End: 2021-07-08

## 2021-07-08 RX ORDER — DEXTROSE MONOHYDRATE, SODIUM CHLORIDE, AND POTASSIUM CHLORIDE 50; .745; 4.5 G/1000ML; G/1000ML; G/1000ML
1000 INJECTION, SOLUTION INTRAVENOUS
Refills: 0 | Status: DISCONTINUED | OUTPATIENT
Start: 2021-07-08 | End: 2021-07-08

## 2021-07-08 RX ORDER — CHLORHEXIDINE GLUCONATE 213 G/1000ML
15 SOLUTION TOPICAL EVERY 12 HOURS
Refills: 0 | Status: DISCONTINUED | OUTPATIENT
Start: 2021-07-08 | End: 2021-07-09

## 2021-07-08 RX ORDER — SENNA PLUS 8.6 MG/1
2 TABLET ORAL AT BEDTIME
Refills: 0 | Status: DISCONTINUED | OUTPATIENT
Start: 2021-07-08 | End: 2021-07-23

## 2021-07-08 RX ORDER — LEVOTHYROXINE SODIUM 125 MCG
60 TABLET ORAL AT BEDTIME
Refills: 0 | Status: DISCONTINUED | OUTPATIENT
Start: 2021-07-09 | End: 2021-07-09

## 2021-07-08 RX ORDER — SODIUM CHLORIDE 9 MG/ML
500 INJECTION, SOLUTION INTRAVENOUS ONCE
Refills: 0 | Status: DISCONTINUED | OUTPATIENT
Start: 2021-07-08 | End: 2021-07-08

## 2021-07-08 RX ORDER — FENTANYL CITRATE 50 UG/ML
0.5 INJECTION INTRAVENOUS
Qty: 2500 | Refills: 0 | Status: DISCONTINUED | OUTPATIENT
Start: 2021-07-08 | End: 2021-07-08

## 2021-07-08 RX ORDER — ONDANSETRON 8 MG/1
4 TABLET, FILM COATED ORAL EVERY 6 HOURS
Refills: 0 | Status: DISCONTINUED | OUTPATIENT
Start: 2021-07-08 | End: 2021-07-23

## 2021-07-08 RX ORDER — MAGNESIUM HYDROXIDE 400 MG/1
30 TABLET, CHEWABLE ORAL EVERY 12 HOURS
Refills: 0 | Status: DISCONTINUED | OUTPATIENT
Start: 2021-07-08 | End: 2021-07-09

## 2021-07-08 RX ORDER — FENTANYL CITRATE 50 UG/ML
50 INJECTION INTRAVENOUS ONCE
Refills: 0 | Status: DISCONTINUED | OUTPATIENT
Start: 2021-07-08 | End: 2021-07-08

## 2021-07-08 RX ORDER — FENTANYL CITRATE 50 UG/ML
1 INJECTION INTRAVENOUS
Qty: 5000 | Refills: 0 | Status: DISCONTINUED | OUTPATIENT
Start: 2021-07-08 | End: 2021-07-09

## 2021-07-08 RX ADMIN — Medication 100 MILLIGRAM(S): at 22:46

## 2021-07-08 RX ADMIN — Medication 125 MICROGRAM(S): at 05:48

## 2021-07-08 RX ADMIN — DEXMEDETOMIDINE HYDROCHLORIDE IN 0.9% SODIUM CHLORIDE 4.11 MICROGRAM(S)/KG/HR: 4 INJECTION INTRAVENOUS at 22:45

## 2021-07-08 RX ADMIN — SODIUM CHLORIDE 75 MILLILITER(S): 9 INJECTION INTRAMUSCULAR; INTRAVENOUS; SUBCUTANEOUS at 01:09

## 2021-07-08 RX ADMIN — PROPOFOL 4.93 MICROGRAM(S)/KG/MIN: 10 INJECTION, EMULSION INTRAVENOUS at 22:45

## 2021-07-08 RX ADMIN — FENTANYL CITRATE 50 MICROGRAM(S): 50 INJECTION INTRAVENOUS at 22:25

## 2021-07-08 RX ADMIN — FENTANYL CITRATE 50 MICROGRAM(S): 50 INJECTION INTRAVENOUS at 22:40

## 2021-07-08 RX ADMIN — SODIUM CHLORIDE 1000 MILLILITER(S): 9 INJECTION, SOLUTION INTRAVENOUS at 22:47

## 2021-07-08 RX ADMIN — HYDROMORPHONE HYDROCHLORIDE 30 MILLILITER(S): 2 INJECTION INTRAMUSCULAR; INTRAVENOUS; SUBCUTANEOUS at 07:10

## 2021-07-08 RX ADMIN — FENTANYL CITRATE 4.11 MICROGRAM(S)/KG/HR: 50 INJECTION INTRAVENOUS at 22:45

## 2021-07-08 RX ADMIN — ONDANSETRON 4 MILLIGRAM(S): 8 TABLET, FILM COATED ORAL at 01:00

## 2021-07-08 RX ADMIN — Medication 100 MILLIGRAM(S): at 05:49

## 2021-07-08 RX ADMIN — HYDROMORPHONE HYDROCHLORIDE 30 MILLILITER(S): 2 INJECTION INTRAMUSCULAR; INTRAVENOUS; SUBCUTANEOUS at 01:05

## 2021-07-08 RX ADMIN — SODIUM CHLORIDE 100 MILLILITER(S): 9 INJECTION INTRAMUSCULAR; INTRAVENOUS; SUBCUTANEOUS at 22:55

## 2021-07-08 NOTE — PROGRESS NOTE ADULT - SUBJECTIVE AND OBJECTIVE BOX
Day 1 of Anesthesia Pain Management Service    SUBJECTIVE: The medicine is helping    Pain Scale Score:	[X] Refer to charted pain scores    THERAPY:    [ ] IV PCA Morphine		[ ] 5 mg/mL	[ ] 1 mg/mL  [X] IV PCA Hydromorphone	[ ] 5 mg/mL	[X] 1 mg/mL  [ ] IV PCA Fentanyl		[ ] 50 micrograms/mL    Demand dose: 0.2 mg     Lockout: 6 minutes   Continuous Rate: 0 mg/hr  4 Hour Limit: 4 mg    MEDICATIONS  (STANDING):  ceFAZolin   IVPB 2000 milliGRAM(s) IV Intermittent once    MEDICATIONS  (PRN):      OBJECTIVE:    Sedation Score:	[ X] Alert 	[ ] Drowsy 	[ ] Arousable	[ ] Asleep	[ ] Unresponsive    Side Effects:	[ ] None	[ ] Nausea	[ ] Vomiting	[X ] Pruritus  		[ ] Other:    Vital Signs Last 24 Hrs  T(C): 36.5 (08 Jul 2021 08:08), Max: 36.7 (07 Jul 2021 20:00)  T(F): 97.7 (08 Jul 2021 07:00), Max: 98.1 (07 Jul 2021 20:00)  HR: 89 (08 Jul 2021 08:08) (78 - 89)  BP: 118/74 (08 Jul 2021 02:00) (111/68 - 148/78)  BP(mean): 84 (08 Jul 2021 02:00) (84 - 111)  RR: 21 (08 Jul 2021 08:08) (12 - 23)  SpO2: 95% (08 Jul 2021 08:08) (93% - 100%)    ASSESSMENT/ PLAN    Therapy to  be:               [X] Continued   [ ] Discontinued   [ ] Changed to PRN Analgesics    Documentation and Verification of current medications:   [X] Done	[ ] Not done, not eligible    Comments: PCA to be d\c'd prior to OR

## 2021-07-08 NOTE — PROGRESS NOTE ADULT - SUBJECTIVE AND OBJECTIVE BOX
Plastic Surgery Progress Note    Subjective:     Patient seen and examined at bedside. No acute events overnight. Patient lying in bed, without complaints.  Denies N/V/F/C.     OBJECTIVE:     T(C): 36.5 (07-08-21 @ 07:00), Max: 36.7 (07-07-21 @ 20:00)  HR: 82 (07-08-21 @ 07:00) (78 - 88)  BP: 118/74 (07-08-21 @ 02:00) (111/68 - 148/78)  RR: 14 (07-08-21 @ 07:00) (12 - 23)  SpO2: 96% (07-08-21 @ 07:00) (93% - 100%)  Wt(kg): --    I&O's Detail    07 Jul 2021 07:01  -  08 Jul 2021 07:00  --------------------------------------------------------  IN:    sodium chloride 0.9%: 1050 mL  Total IN: 1050 mL    OUT:    Bulb (mL): 35 mL    Indwelling Catheter - Urethral (mL): 860 mL  Total OUT: 895 mL    Total NET: 155 mL      PHYSICAL EXAM:    GENERAL: NAD, lying in bed   HEAD:  Atraumatic, Normocephalic  CHEST/LUNG: Unlabored respirations  ABDOMEN: Soft, tender, moderately distended, LLQ with fullness, midline incision w dressing in place clean and dry, drain with 30cc SS output  NERVOUS SYSTEM: Alert, Oriented X3, speech clear.   SKIN: No rashes or lesions    MEDICATIONS  (STANDING):  calcium carbonate 1250 mG  + Vitamin D (OsCal 500 + D) 1 Tablet(s) Oral daily  ceFAZolin   IVPB 2000 milliGRAM(s) IV Intermittent once  chlorhexidine 4% Liquid 1 Application(s) Topical <User Schedule>  cholecalciferol 2000 Unit(s) Oral daily  fenofibrate Tablet 145 milliGRAM(s) Oral daily  HYDROmorphone PCA (1 mG/mL) 30 milliLiter(s) PCA Continuous PCA Continuous  levothyroxine 125 MICROGram(s) Oral daily  senna 2 Tablet(s) Oral at bedtime  sodium chloride 0.9%. 1000 milliLiter(s) (75 mL/Hr) IV Continuous <Continuous>    MEDICATIONS  (PRN):  acetaminophen   Tablet .. 650 milliGRAM(s) Oral every 6 hours PRN Temp greater or equal to 38C (100.4F), Mild Pain (1 - 3)  HYDROmorphone PCA (1 mG/mL) Rescue Clinician Bolus 0.5 milliGRAM(s) IV Push every 15 minutes PRN for Pain Scale GREATER THAN 6  magnesium hydroxide Suspension 30 milliLiter(s) Oral every 12 hours PRN Constipation  naloxone Injectable 0.1 milliGRAM(s) IV Push every 3 minutes PRN For ANY of the following changes in patient status:  A. RR LESS THAN 10 breaths per minute, B. Oxygen saturation LESS THAN 90%, C. Sedation score of 6  ondansetron Injectable 4 milliGRAM(s) IV Push every 6 hours PRN Nausea      LABS:                          13.5   16.48 )-----------( 237      ( 08 Jul 2021 05:10 )             40.7     07-08    140  |  106  |  23  ----------------------------<  134<H>  4.6   |  17<L>  |  0.76    Ca    8.5      08 Jul 2021 05:12  Phos  4.3     07-08  Mg     2.0     07-08      PT/INR - ( 07 Jul 2021 17:37 )   PT: 12.4 sec;   INR: 1.04 ratio         PTT - ( 07 Jul 2021 17:37 )  PTT:18.8 sec

## 2021-07-08 NOTE — PROGRESS NOTE ADULT - ASSESSMENT
61M hx thyroid CA s/p thyroidectomy & radioactive iodine tx, hypogonadism, admitted for sacral mass s/p en bloc sacrectomy w/ L4-pelvis fusion w/ plastics closure.     Plan:  - keep sedated overnight, wean propofol transition to Dexmedetomidine & fent ggt  -obtain CXR to verify ET tube placement & obtain ABG; titrate vent settings accordingly   -c/w IVF hydration will increase to 100cc/hr; give additional 1L NS as pt w/ lactate of 5.1 (will repeat ABG and trend)   -monitor drain output, urine output  -change synthroid from PO to IV   -hypothermic on arrival body temp 90F; start warming blanket, likely in setting of acute blood loss anemia and anesthesia. goal normothermia  -NPO for now given abdomen exposure may restart tomorrow w/ residual checks  -trend CBC q8 as pt w/ 2L EBL now s/p 8U PRBC, 6U FFP & 1U platelets  -SCDs for DVT ppx, plan to resume chemical ppx 24hrs post op

## 2021-07-08 NOTE — PROGRESS NOTE ADULT - SUBJECTIVE AND OBJECTIVE BOX
SURGERY PROGRESS NOTE    SUBJECTIVE / 24H EVENTS:  Patient seen and examined on morning rounds. No acute events overnight.       OBJECTIVE:  VITAL SIGNS:  T(C): 36.5 (07-08-21 @ 08:08), Max: 36.7 (07-07-21 @ 20:00)  HR: 89 (07-08-21 @ 08:08) (78 - 89)  BP: 118/74 (07-08-21 @ 02:00) (111/68 - 148/78)  RR: 21 (07-08-21 @ 08:08) (12 - 23)  SpO2: 95% (07-08-21 @ 08:08) (93% - 100%)  Daily Height in cm: 165.1 (08 Jul 2021 08:08)    Daily       PHYSICAL EXAM:  General: NAD, resting comfortably in bed  Respiratory: Nonlabored respirations on RA  Cardio: regular rate and rhythm.  Abdomen: softly distended, appropriately tender, surgical incisions dressed with aquacel c/d/i. SEDRICK w/ minimal dark red output   : Barnett with clear, yellow urine   Vascular: extremities are warm and well perfused.       07-07-21 @ 07:01  -  07-08-21 @ 07:00  --------------------------------------------------------  IN:    sodium chloride 0.9%: 1050 mL  Total IN: 1050 mL    OUT:    Bulb (mL): 35 mL    Indwelling Catheter - Urethral (mL): 860 mL  Total OUT: 895 mL    Total NET: 155 mL      07-08-21 @ 07:01  -  07-08-21 @ 08:29  --------------------------------------------------------  IN:    sodium chloride 0.9%: 75 mL  Total IN: 75 mL    OUT:    Indwelling Catheter - Urethral (mL): 45 mL  Total OUT: 45 mL    Total NET: 30 mL          LAB VALUES:  07-08    140  |  106  |  23  ----------------------------<  134<H>  4.6   |  17<L>  |  0.76    Ca    8.5      08 Jul 2021 05:12  Phos  4.3     07-08  Mg     2.0     07-08                                 13.5   16.48 )-----------( 237      ( 08 Jul 2021 05:10 )             40.7       PT/INR - ( 07 Jul 2021 17:37 )   PT: 12.4 sec;   INR: 1.04 ratio         PTT - ( 07 Jul 2021 17:37 )  PTT:18.8 sec  ABG - ( 07 Jul 2021 14:02 )  pH, Arterial: 7.36  pH, Blood: x     /  pCO2: 39    /  pO2: 222   / HCO3: 22    / Base Excess: -2.9  /  SaO2: 100                       MICROBIOLOGY:      RADIOLOGY:  PACS Image: Image(s) Available (07-07-21 @ 16:24)  PACS Image: Image(s) Available (07-07-21 @ 15:57)        MEDICATIONS  (STANDING):  ceFAZolin   IVPB 2000 milliGRAM(s) IV Intermittent once    MEDICATIONS  (PRN):

## 2021-07-08 NOTE — PROGRESS NOTE ADULT - ASSESSMENT
Mr. Jackson is a 60 yo M with PMHx of thyroid cancer s/p total thyroidectomy (2010), HLD, Vit D deficiency, Osteopenia, and chordoma, who is now s/p Stage 1 VRAM flap harvest (07/07). Patient is POD 1, currently being managed in NSICU, with scheduled RTOR for Stage 2 Sacral sacrectomy today (07/08).     -abdominal fullness on PE AM of 07/08 likely due to skin laxity and patient positioning   -plan to RTOR today    Jomar Mendez MD   General Surgery Resident, PGY1  # [id number]

## 2021-07-08 NOTE — PROGRESS NOTE ADULT - ASSESSMENT
ASSESSMENT/PLAN: Sacral chordoma, status post Stage 1 VRAM harvest and iliac artery and vein ligation; awaiting Stage 2 surgery/en block sacrectomy      NEURO:  Pain control; PCA  OR for en bloc sacrectomy  Activity: [] mobilize as tolerated [x] Bedrest [] PT [] OT [] PMNR    PULM:  Incentive spirometry    CV:  SBP goal 100-140mmHg; MAP >65mmHg  Continue home fenofibrate    RENAL:  Fluids: IVF as NPO  Barnett for expected urinary retenion    GI:  Diet: NPO for OR  Bowel regimen; monitor for ileus    ENDO:   Goal euglycemia (-180)  Acquired hypothyroidism: levothyroxine  Osteopenia/vitamin D deficiency: vit D, calcium    HEME/ONC:  VTE prophylaxis: [x] SCDs [] chemoprophylaxis [x] hold chemoprophylaxis due to: OR in AM [x] high risk of DVT/PE on admission due to: malignancy     ID:  Aurelia-op antibiotics     SOCIAL/FAMILY:  [x] awaiting [] updated at bedside [] family meeting    CODE STATUS:  [x] Full Code [] DNR [] DNI [] Palliative/Comfort Care    DISPOSITION:  [x] ICU/ [] Stroke Unit [] Floor [] EMU [] RCU [] PCU    [x] Patient is at high risk of neurologic deterioration/death due to: acute blood loss anemia, severe pain     Contact: [phone]

## 2021-07-08 NOTE — PROGRESS NOTE ADULT - SUBJECTIVE AND OBJECTIVE BOX
Patient seen and examined s/p VRAM Flap harvest.     Doing well. Awake, alert, oriented x 3. ALARCON strongly. SILT. mild parasthesias 1st-3rd digit left hand    T(C): 36.7 (07-08-21 @ 00:30), Max: 36.7 (07-07-21 @ 20:00)  HR: 80 (07-08-21 @ 02:00) (70 - 88)  BP: 118/74 (07-08-21 @ 02:00) (111/68 - 148/78)  RR: 12 (07-08-21 @ 02:00) (12 - 18)  SpO2: 96% (07-08-21 @ 02:00) (93% - 100%)                          14.0   20.20 )-----------( 259      ( 07 Jul 2021 17:01 )             43.2     07-07    139  |  103  |  21  ----------------------------<  190<H>  4.6   |  19<L>  |  0.88    Ca    8.3<L>      07 Jul 2021 17:01  Phos  4.6     07-07  Mg     2.0     07-07      PT/INR - ( 07 Jul 2021 17:37 )   PT: 12.4 sec;   INR: 1.04 ratio         PTT - ( 07 Jul 2021 17:37 )  PTT:18.8 sec        CAPILLARY BLOOD GLUCOSE

## 2021-07-08 NOTE — PROGRESS NOTE ADULT - ASSESSMENT
Assessment:  61yMale patient S/P Reconstruction of abdominoperineal defect using vertical rectus abdominis myocutaneous (VRAM) flap for chordoma on 07/07    Plan:  - RTOR for stage 2 of operation  - Care per NSGY primary  - Please re-page if clinical condition warrants    Red Surgery  [id number]

## 2021-07-08 NOTE — PROGRESS NOTE ADULT - SUBJECTIVE AND OBJECTIVE BOX
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 The chordoma resulted in perineal numbness and overflow incontinence and he was admitted 7/7/21 for staged resection. On 7/7, he underwent Plastic Surgery and General Surgery assisted vertical rectus abdominal myocutaneous flap harvest with transperitoneal ligation of internal iliac artery and vein in preparation for en bloc sacrectomy on 7/8.     HOSPITAL COURSE:  7/7: Stage 1 - VRAM harvest, iliac artery and vein ligation    24 HOUR EVENTS:  Pain better controlled on PCA.     REVIEW OF SYSTEMS: [] Unable to Assess due to neurologic exam   [x] All ROS addressed below are non-contributory, except:  Neuro: [ ] Headache [ ] Back pain [ ] Numbness [ ] Weakness [ ] Ataxia [ ] Dizziness [ ] Aphasia [ ] Dysarthria [ ] Visual disturbance  Resp: [ ] Shortness of breath/dyspnea [ ] Orthopnea [ ] Cough  CV: [ ] Chest pain [ ] Palpitation [ ] Lightheadedness [ ] Syncope  Renal: [ ] Thirst [ ] Edema  GI: [ ] Nausea [ ] Emesis [x] Abdominal/incisional pain [ ] Constipation [ ] Diarrhea  Hem: [ ] Hematemesis [ ] Bright red blood per rectum  ID: [ ] Fever [ ] Chills [ ] Dysuria  ENT: [ ] Rhinorrhea    VITALS/DATA/ORDER: [x] Reviewed    EXAMINATION: limited to due pain  Gen: Cooperative, pleasant, anxious - requested Team pray for him; we assured him we would  HEENT: Moist mucosa  CV: Mild tachy but regular  Lungs: Decreased BS at bases but good air entry, no stridor  Abd: Incision dressed, c/d/i - no erythema or discharge  Ext: No overt edema  Neuro: Awake, alert, follows, no drift, full strength

## 2021-07-08 NOTE — PROGRESS NOTE ADULT - SUBJECTIVE AND OBJECTIVE BOX
INTERVAL HISTORY: HPI:  61 year old male with PMH of Thyroid Cancer, s/p Total Thyroidectomy 2010 and Radioactive Iodine 2011, Hypogonadism, Vitamin D Deficiency, Osteopenia with recently diagnosed Sacral Tumor. Patient endorses back pain that started in August 2020, he reports that he was treat by PCP with steroids and muscle relaxants. He reports that he had some relief, however the pain lingered. He was subsequently referred to an Orthopedic Specialist who ordered an MRI of Lumbar Spine, which revealed a Sacral Tumor with extension into the Presacral space. 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 Patient reports that he suffers from chronic constipation, he has noted that his urine stream is weaker than before, he reports right buttock and right scrotal pain and numbness. He denies decreased strength or problems walking. He presents to PST today for evaluation prior to scheduled Removal of Sacral Tumor on 7/6/2021.    fully covid vaccinated; proof of vaccination placed on chart (25 Jun 2021 11:47)      MEDICATIONS  (STANDING):  ceFAZolin   IVPB 2000 milliGRAM(s) IV Intermittent once  ceFAZolin   IVPB 2000 milliGRAM(s) IV Intermittent every 8 hours  chlorhexidine 0.12% Liquid 15 milliLiter(s) Oral Mucosa every 12 hours  dexMEDEtomidine Infusion 0.2 MICROgram(s)/kG/Hr (4.11 mL/Hr) IV Continuous <Continuous>  fentaNYL   Infusion... 1 MICROgram(s)/kG/Hr (4.11 mL/Hr) IV Continuous <Continuous>  levothyroxine Injectable 60 MICROGram(s) IV Push at bedtime  propofol Infusion 10 MICROgram(s)/kG/Min (4.93 mL/Hr) IV Continuous <Continuous>  senna 2 Tablet(s) Oral at bedtime  sodium chloride 0.9%. 1000 milliLiter(s) (100 mL/Hr) IV Continuous <Continuous>    MEDICATIONS  (PRN):  acetaminophen   Tablet .. 650 milliGRAM(s) Oral every 6 hours PRN Temp greater or equal to 38C (100.4F), Mild Pain (1 - 3)  magnesium hydroxide Suspension 30 milliLiter(s) Oral every 12 hours PRN Constipation  ondansetron Injectable 4 milliGRAM(s) IV Push every 6 hours PRN Nausea      Drug Dosing Weight  Height (cm): 165.1 (08 Jul 2021 08:08)  Weight (kg): 82.1 (08 Jul 2021 08:08)  BMI (kg/m2): 30.1 (08 Jul 2021 08:08)  BSA (m2): 1.9 (08 Jul 2021 08:08)    PAST MEDICAL & SURGICAL HISTORY:  HLD (hyperlipidemia)    Thyroid cancer  2010    History of central serous retinopathy    H/O hypogonadism    H/O vitamin D deficiency    H/O osteopenia    H/O thyroidectomy  Total --2010    H/O colonoscopy        REVIEW OF SYSTEMS: [x] Unable to Assess due to neurologic exam   [ ] All ROS addressed below are non-contributory, except:  Neuro: [ ] Headache [ ] Back pain [ ] Numbness [ ] Weakness [ ] Ataxia [ ] Dizziness [ ] Aphasia [ ] Dysarthria [ ] Visual disturbance  Resp: [ ] Shortness of breath/dyspnea, [ ] Orthopnea [ ] Cough  CV: [ ] Chest pain [ ] Palpitation [ ] Lightheadedness [ ] Syncope  Renal: [ ] Thirst [ ] Edema  GI: [ ] Nausea [ ] Emesis [ ] Abdominal pain [ ] Constipation [ ] Diarrhea  Hem: [ ] Hematemesis [ ] bright red blood per rectum  ID: [ ] Fever [ ] Chills [ ] Dysuria  ENT: [ ] Rhinorrhea    PHYSICAL EXAM:  General: sedated, intubated  Neurological: sedated, pupils 3mm reactive b/l, +cough +gag, +dolls,  Pulmonary: Clear to Auscultation, No Rales, No Rhonchi, No Wheezes   Cardiovascular: S1, S2, Regular Rate and Rhythm   Gastrointestinal: abdomen dressing, rectus muscle displacement   Extremities: No calf tenderness   Incision: CDI 3 drains in place    ICU Vital Signs Last 24 Hrs  T(C): 37.7 (08 Jul 2021 23:00), Max: 37.7 (08 Jul 2021 23:00)  T(F): 99.9 (08 Jul 2021 23:00), Max: 99.9 (08 Jul 2021 23:00)  HR: 91 (08 Jul 2021 23:00) (78 - 100)  BP: 118/74 (08 Jul 2021 02:00) (118/74 - 123/78)  BP(mean): 84 (08 Jul 2021 02:00) (84 - 96)  ABP: 160/70 (08 Jul 2021 23:00) (104/72 - 160/70)  ABP(mean): 95 (08 Jul 2021 23:00) (71 - 112)  RR: 14 (08 Jul 2021 23:00) (12 - 23)  SpO2: 98% (08 Jul 2021 23:00) (95% - 100%)    ABG - ( 08 Jul 2021 22:31 )  pH, Arterial: 7.32  pH, Blood: x     /  pCO2: 40    /  pO2: 142   / HCO3: 20    / Base Excess: -5.3  /  SaO2: 99                I&O's Detail    07 Jul 2021 07:01  -  08 Jul 2021 07:00  --------------------------------------------------------  IN:    sodium chloride 0.9%: 1050 mL  Total IN: 1050 mL    OUT:    Bulb (mL): 35 mL    Indwelling Catheter - Urethral (mL): 860 mL  Total OUT: 895 mL    Total NET: 155 mL      08 Jul 2021 07:01  -  08 Jul 2021 23:14  --------------------------------------------------------  IN:    sodium chloride 0.9%: 75 mL  Total IN: 75 mL    OUT:    Indwelling Catheter - Urethral (mL): 45 mL  Total OUT: 45 mL    Total NET: 30 mL    Mode: AC/ CMV (Assist Control/ Continuous Mandatory Ventilation)  RR (machine): 16  TV (machine): 500  FiO2: 50  PEEP: 5  ITime: 1  MAP: 10  PIP: 37    LABS:  CBC Full  -  ( 08 Jul 2021 05:10 )  WBC Count : 16.48 K/uL  RBC Count : 4.58 M/uL  Hemoglobin : 13.5 g/dL  Hematocrit : 40.7 %  Platelet Count - Automated : 237 K/uL  Mean Cell Volume : 88.9 fl  Mean Cell Hemoglobin : 29.5 pg  Mean Cell Hemoglobin Concentration : 33.2 gm/dL  Auto Neutrophil # : x  Auto Lymphocyte # : x  Auto Monocyte # : x  Auto Eosinophil # : x  Auto Basophil # : x  Auto Neutrophil % : x  Auto Lymphocyte % : x  Auto Monocyte % : x  Auto Eosinophil % : x  Auto Basophil % : x    07-08    140  |  106  |  23  ----------------------------<  134<H>  4.6   |  17<L>  |  0.76    Ca    8.5      08 Jul 2021 05:12  Phos  4.3     07-08  Mg     2.0     07-08      PT/INR - ( 07 Jul 2021 17:37 )   PT: 12.4 sec;   INR: 1.04 ratio         PTT - ( 07 Jul 2021 17:37 )  PTT:18.8 sec      RADIOLOGY & ADDITIONAL STUDIES:

## 2021-07-08 NOTE — PROGRESS NOTE ADULT - ASSESSMENT
Patient seen and examined at bedside s/p VRAM flap harvest. Doing well. Awake, alert, oriented x 3. ALARCON strongly. SILT. mild parasthesias 1st-3rd digit left hand  -NSCU pending sacrectomy  -neurochecks  -vitals  -Pain control  -NPO  -PT/OT  -Preop for AM

## 2021-07-09 LAB
ANION GAP SERPL CALC-SCNC: 13 MMOL/L — SIGNIFICANT CHANGE UP (ref 5–17)
BUN SERPL-MCNC: 15 MG/DL — SIGNIFICANT CHANGE UP (ref 7–23)
CALCIUM SERPL-MCNC: 8.1 MG/DL — LOW (ref 8.4–10.5)
CHLORIDE SERPL-SCNC: 108 MMOL/L — SIGNIFICANT CHANGE UP (ref 96–108)
CO2 SERPL-SCNC: 21 MMOL/L — LOW (ref 22–31)
CREAT SERPL-MCNC: 0.59 MG/DL — SIGNIFICANT CHANGE UP (ref 0.5–1.3)
GAS PNL BLDA: SIGNIFICANT CHANGE UP
GLUCOSE SERPL-MCNC: 143 MG/DL — HIGH (ref 70–99)
HCT VFR BLD CALC: 28.5 % — LOW (ref 39–50)
HCT VFR BLD CALC: 29.3 % — LOW (ref 39–50)
HCT VFR BLD CALC: 29.7 % — LOW (ref 39–50)
HCT VFR BLD CALC: 31.4 % — LOW (ref 39–50)
HGB BLD-MCNC: 10.1 G/DL — LOW (ref 13–17)
HGB BLD-MCNC: 10.1 G/DL — LOW (ref 13–17)
HGB BLD-MCNC: 10.6 G/DL — LOW (ref 13–17)
HGB BLD-MCNC: 9.6 G/DL — LOW (ref 13–17)
LACTATE SERPL-SCNC: 1.5 MMOL/L — SIGNIFICANT CHANGE UP (ref 0.7–2)
LACTATE SERPL-SCNC: 1.9 MMOL/L — SIGNIFICANT CHANGE UP (ref 0.7–2)
LACTATE SERPL-SCNC: 1.9 MMOL/L — SIGNIFICANT CHANGE UP (ref 0.7–2)
MAGNESIUM SERPL-MCNC: 1.9 MG/DL — SIGNIFICANT CHANGE UP (ref 1.6–2.6)
MCHC RBC-ENTMCNC: 29.4 PG — SIGNIFICANT CHANGE UP (ref 27–34)
MCHC RBC-ENTMCNC: 29.4 PG — SIGNIFICANT CHANGE UP (ref 27–34)
MCHC RBC-ENTMCNC: 29.7 PG — SIGNIFICANT CHANGE UP (ref 27–34)
MCHC RBC-ENTMCNC: 30 PG — SIGNIFICANT CHANGE UP (ref 27–34)
MCHC RBC-ENTMCNC: 33.7 GM/DL — SIGNIFICANT CHANGE UP (ref 32–36)
MCHC RBC-ENTMCNC: 33.8 GM/DL — SIGNIFICANT CHANGE UP (ref 32–36)
MCHC RBC-ENTMCNC: 34 GM/DL — SIGNIFICANT CHANGE UP (ref 32–36)
MCHC RBC-ENTMCNC: 34.5 GM/DL — SIGNIFICANT CHANGE UP (ref 32–36)
MCV RBC AUTO: 86.6 FL — SIGNIFICANT CHANGE UP (ref 80–100)
MCV RBC AUTO: 86.9 FL — SIGNIFICANT CHANGE UP (ref 80–100)
MCV RBC AUTO: 87.4 FL — SIGNIFICANT CHANGE UP (ref 80–100)
MCV RBC AUTO: 88 FL — SIGNIFICANT CHANGE UP (ref 80–100)
NRBC # BLD: 0 /100 WBCS — SIGNIFICANT CHANGE UP (ref 0–0)
PHOSPHATE SERPL-MCNC: 1.9 MG/DL — LOW (ref 2.5–4.5)
PLATELET # BLD AUTO: 102 K/UL — LOW (ref 150–400)
PLATELET # BLD AUTO: 104 K/UL — LOW (ref 150–400)
PLATELET # BLD AUTO: 118 K/UL — LOW (ref 150–400)
PLATELET # BLD AUTO: 130 K/UL — LOW (ref 150–400)
PLATELET # BLD AUTO: 89 K/UL — LOW (ref 150–400)
POTASSIUM SERPL-MCNC: 3.7 MMOL/L — SIGNIFICANT CHANGE UP (ref 3.5–5.3)
POTASSIUM SERPL-SCNC: 3.7 MMOL/L — SIGNIFICANT CHANGE UP (ref 3.5–5.3)
RBC # BLD: 3.26 M/UL — LOW (ref 4.2–5.8)
RBC # BLD: 3.37 M/UL — LOW (ref 4.2–5.8)
RBC # BLD: 3.43 M/UL — LOW (ref 4.2–5.8)
RBC # BLD: 3.57 M/UL — LOW (ref 4.2–5.8)
RBC # FLD: 14.1 % — SIGNIFICANT CHANGE UP (ref 10.3–14.5)
RBC # FLD: 14.3 % — SIGNIFICANT CHANGE UP (ref 10.3–14.5)
RBC # FLD: 14.4 % — SIGNIFICANT CHANGE UP (ref 10.3–14.5)
RBC # FLD: 14.5 % — SIGNIFICANT CHANGE UP (ref 10.3–14.5)
SODIUM SERPL-SCNC: 142 MMOL/L — SIGNIFICANT CHANGE UP (ref 135–145)
WBC # BLD: 10.5 K/UL — SIGNIFICANT CHANGE UP (ref 3.8–10.5)
WBC # BLD: 12.16 K/UL — HIGH (ref 3.8–10.5)
WBC # BLD: 13.56 K/UL — HIGH (ref 3.8–10.5)
WBC # BLD: 15.08 K/UL — HIGH (ref 3.8–10.5)
WBC # FLD AUTO: 10.5 K/UL — SIGNIFICANT CHANGE UP (ref 3.8–10.5)
WBC # FLD AUTO: 12.16 K/UL — HIGH (ref 3.8–10.5)
WBC # FLD AUTO: 13.56 K/UL — HIGH (ref 3.8–10.5)
WBC # FLD AUTO: 15.08 K/UL — HIGH (ref 3.8–10.5)

## 2021-07-09 PROCEDURE — 71045 X-RAY EXAM CHEST 1 VIEW: CPT | Mod: 26

## 2021-07-09 PROCEDURE — 99291 CRITICAL CARE FIRST HOUR: CPT

## 2021-07-09 PROCEDURE — 72131 CT LUMBAR SPINE W/O DYE: CPT | Mod: 26

## 2021-07-09 PROCEDURE — 72192 CT PELVIS W/O DYE: CPT | Mod: 26

## 2021-07-09 RX ORDER — CALCIUM GLUCONATE 100 MG/ML
2 VIAL (ML) INTRAVENOUS ONCE
Refills: 0 | Status: COMPLETED | OUTPATIENT
Start: 2021-07-09 | End: 2021-07-09

## 2021-07-09 RX ORDER — FENOFIBRATE,MICRONIZED 130 MG
145 CAPSULE ORAL DAILY
Refills: 0 | Status: DISCONTINUED | OUTPATIENT
Start: 2021-07-09 | End: 2021-07-11

## 2021-07-09 RX ORDER — NALOXONE HYDROCHLORIDE 4 MG/.1ML
0.1 SPRAY NASAL
Refills: 0 | Status: DISCONTINUED | OUTPATIENT
Start: 2021-07-09 | End: 2021-07-19

## 2021-07-09 RX ORDER — SODIUM CHLORIDE 9 MG/ML
500 INJECTION, SOLUTION INTRAVENOUS ONCE
Refills: 0 | Status: COMPLETED | OUTPATIENT
Start: 2021-07-09 | End: 2021-07-09

## 2021-07-09 RX ORDER — FUROSEMIDE 40 MG
10 TABLET ORAL ONCE
Refills: 0 | Status: COMPLETED | OUTPATIENT
Start: 2021-07-09 | End: 2021-07-09

## 2021-07-09 RX ORDER — LEVOTHYROXINE SODIUM 125 MCG
125 TABLET ORAL DAILY
Refills: 0 | Status: DISCONTINUED | OUTPATIENT
Start: 2021-07-09 | End: 2021-07-11

## 2021-07-09 RX ORDER — HYDROMORPHONE HYDROCHLORIDE 2 MG/ML
0.5 INJECTION INTRAMUSCULAR; INTRAVENOUS; SUBCUTANEOUS
Refills: 0 | Status: DISCONTINUED | OUTPATIENT
Start: 2021-07-09 | End: 2021-07-11

## 2021-07-09 RX ORDER — POLYETHYLENE GLYCOL 3350 17 G/17G
17 POWDER, FOR SOLUTION ORAL EVERY 12 HOURS
Refills: 0 | Status: DISCONTINUED | OUTPATIENT
Start: 2021-07-09 | End: 2021-07-23

## 2021-07-09 RX ORDER — CHLORHEXIDINE GLUCONATE 213 G/1000ML
1 SOLUTION TOPICAL
Refills: 0 | Status: DISCONTINUED | OUTPATIENT
Start: 2021-07-09 | End: 2021-07-20

## 2021-07-09 RX ORDER — HYDROMORPHONE HYDROCHLORIDE 2 MG/ML
30 INJECTION INTRAMUSCULAR; INTRAVENOUS; SUBCUTANEOUS
Refills: 0 | Status: DISCONTINUED | OUTPATIENT
Start: 2021-07-09 | End: 2021-07-11

## 2021-07-09 RX ORDER — ENOXAPARIN SODIUM 100 MG/ML
40 INJECTION SUBCUTANEOUS
Refills: 0 | Status: DISCONTINUED | OUTPATIENT
Start: 2021-07-09 | End: 2021-07-10

## 2021-07-09 RX ADMIN — CHLORHEXIDINE GLUCONATE 1 APPLICATION(S): 213 SOLUTION TOPICAL at 22:19

## 2021-07-09 RX ADMIN — SENNA PLUS 2 TABLET(S): 8.6 TABLET ORAL at 21:40

## 2021-07-09 RX ADMIN — Medication 200 GRAM(S): at 11:53

## 2021-07-09 RX ADMIN — ENOXAPARIN SODIUM 40 MILLIGRAM(S): 100 INJECTION SUBCUTANEOUS at 18:26

## 2021-07-09 RX ADMIN — Medication 10 MILLIGRAM(S): at 17:15

## 2021-07-09 RX ADMIN — POLYETHYLENE GLYCOL 3350 17 GRAM(S): 17 POWDER, FOR SOLUTION ORAL at 17:08

## 2021-07-09 RX ADMIN — SODIUM CHLORIDE 100 MILLILITER(S): 9 INJECTION INTRAMUSCULAR; INTRAVENOUS; SUBCUTANEOUS at 09:59

## 2021-07-09 RX ADMIN — HYDROMORPHONE HYDROCHLORIDE 30 MILLILITER(S): 2 INJECTION INTRAMUSCULAR; INTRAVENOUS; SUBCUTANEOUS at 13:40

## 2021-07-09 RX ADMIN — Medication 125 MICROGRAM(S): at 11:53

## 2021-07-09 RX ADMIN — Medication 100 MILLIGRAM(S): at 05:18

## 2021-07-09 RX ADMIN — Medication 30 MILLILITER(S): at 22:19

## 2021-07-09 RX ADMIN — SODIUM CHLORIDE 500 MILLILITER(S): 9 INJECTION, SOLUTION INTRAVENOUS at 01:00

## 2021-07-09 RX ADMIN — CHLORHEXIDINE GLUCONATE 15 MILLILITER(S): 213 SOLUTION TOPICAL at 05:18

## 2021-07-09 RX ADMIN — Medication 5 MILLIGRAM(S): at 21:40

## 2021-07-09 RX ADMIN — HYDROMORPHONE HYDROCHLORIDE 30 MILLILITER(S): 2 INJECTION INTRAMUSCULAR; INTRAVENOUS; SUBCUTANEOUS at 16:26

## 2021-07-09 RX ADMIN — FENTANYL CITRATE 4.11 MICROGRAM(S)/KG/HR: 50 INJECTION INTRAVENOUS at 10:12

## 2021-07-09 RX ADMIN — DEXMEDETOMIDINE HYDROCHLORIDE IN 0.9% SODIUM CHLORIDE 4.11 MICROGRAM(S)/KG/HR: 4 INJECTION INTRAVENOUS at 08:00

## 2021-07-09 NOTE — OCCUPATIONAL THERAPY INITIAL EVALUATION ADULT - LIVES WITH, PROFILE
Lives in house with father, 4 steps to enter with bilateral handrail, bed down two steps with rail +walk in shower stall/spouse

## 2021-07-09 NOTE — PHYSICAL THERAPY INITIAL EVALUATION ADULT - DID THE PATIENT HAVE SURGERY?
s/p Trans-peritoneal anterior exposure, Ligation of R hypogastric artery, Mobilization of rectum, [place] of vertical rectus abdominis myocutaneous flap with transperitoneal ligation of internal iliac artery& vein 7/7. s/p Reconstruction of abdominoperineal defect using vertical rectus abdominis myocutaneous (VRAM) flap & en bloc sacrectomy for resection of chordoma with L4-pelvis instrumentation and fusion and plastics closure 7/8/yes

## 2021-07-09 NOTE — OCCUPATIONAL THERAPY INITIAL EVALUATION ADULT - PERTINENT HX OF CURRENT PROBLEM, REHAB EVAL
Pt c/o back pain that started in August 2020.  MRI of Lumbar Spine: Sacral Tumor with extension into the Presacral space. s/p CT guided Sacral Mass Biopsy May 2021; pathology: Chordoma. Pt suffers from chronic constipation, his urine stream is weaker than before, c/o R buttock & R scrotal pain & numbness. c

## 2021-07-09 NOTE — PHYSICAL THERAPY INITIAL EVALUATION ADULT - ADDITIONAL COMMENTS
lives with dad in private house with 4 steps to enter with bilateral rails, right hand dominant, wears glasses, was driving, not working, small shower stall without grab bars, pt with delay in answering questions and at times looking to dad for the answers

## 2021-07-09 NOTE — AIRWAY REMOVAL NOTE  ADULT & PEDS - ARTIFICAL AIRWAY REMOVAL COMMENTS
Written order for extubation verified. The patient was identified by full name and birth date compared to the identification band. Present during the procedure was  Dr Ventura.

## 2021-07-09 NOTE — PHYSICAL THERAPY INITIAL EVALUATION ADULT - PLANNED THERAPY INTERVENTIONS, PT EVAL
stair training: GOAL: (to be met in 4 wks) negotiate 1 flight of stairs with 1 rail and appropriate assistive device with step to step pattern independently/balance training/bed mobility training/gait training/strengthening/transfer training

## 2021-07-09 NOTE — PHYSICAL THERAPY INITIAL EVALUATION ADULT - PERTINENT HX OF CURRENT PROBLEM, REHAB EVAL
PMHx: Thyroid Ca, s/p Total Thyroidectomy 2010 & Radioactive Iodine 2011, Hypogonadism, Vitamin D Deficiency, Osteopenia with recently diagnosed Sacral Tumor. Pt c/o back pain that started in August 2020.  MRI of Lumbar Spine: Sacral Tumor with extension into the Presacral space. s/p CT guided Sacral Mass Biopsy May 2021; pathology: Chordoma. Pt suffers from chronic constipation, his urine stream is weaker than before, c/o R buttock & R scrotal pain & numbness. cont....

## 2021-07-09 NOTE — PHYSICAL THERAPY INITIAL EVALUATION ADULT - GENERAL OBSERVATIONS, REHAB EVAL
received supine with head of bed elevated +hmv x2, +abdullahi, +O2 via nc, +tony, +a-line, +iv, +pca, +bilateral sequential compression devices, father at bedside

## 2021-07-09 NOTE — OCCUPATIONAL THERAPY INITIAL EVALUATION ADULT - PRECAUTIONS/LIMITATIONS, REHAB EVAL
s/p Trans-peritoneal anterior exposure, Ligation of R hypogastric artery, Mobilization of rectum, [place] of vertical rectus abdominis myocutaneous flap with transperitoneal ligation of internal iliac artery& vein 7/7. s/p Reconstruction of abdominoperineal defect using vertical rectus abdominis myocutaneous (VRAM) flap & en bloc sacrectomy for resection of chordoma with L4-pelvis instrumentation and fusion and plastics closure 7/8/fall precautions/spinal precautions

## 2021-07-09 NOTE — PROGRESS NOTE ADULT - ASSESSMENT
Patient seen and examined status post en bloc sacrectomy, Stage 2 w/ L4-pelvix fusion w/ plastic closure.   Exam:  Intubated, pupils 3R b/l, minimal movement R foot to stim.   Plan:  -Admit NSCU  -Q1 hour neurochecks  -Post-op imaging: CT pelvis  -Drain management  -Pain control  -Post-op Abx  -continue tony  -Wean to extubate  -Wean sedation for exam

## 2021-07-09 NOTE — PROGRESS NOTE ADULT - SUBJECTIVE AND OBJECTIVE BOX
INTERVAL HISTORY: HPI:  61 year old male with PMH of Thyroid Cancer, s/p Total Thyroidectomy 2010 and Radioactive Iodine 2011, Hypogonadism, Vitamin D Deficiency, Osteopenia with recently diagnosed Sacral Tumor. Patient endorses back pain that started in August 2020, he reports that he was treat by PCP with steroids and muscle relaxants. He reports that he had some relief, however the pain lingered. He was subsequently referred to an Orthopedic Specialist who ordered an MRI of Lumbar Spine, which revealed a Sacral Tumor with extension into the Presacral space. 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 Patient reports that he suffers from chronic constipation, he has noted that his urine stream is weaker than before, he reports right buttock and right scrotal pain and numbness. He denies decreased strength or problems walking. He presents to PST today for evaluation prior to scheduled Removal of Sacral Tumor on 7/6/2021.    fully covid vaccinated; proof of vaccination placed on chart (25 Jun 2021 11:47)      MEDICATIONS  (STANDING):  ceFAZolin   IVPB 2000 milliGRAM(s) IV Intermittent once  ceFAZolin   IVPB 2000 milliGRAM(s) IV Intermittent every 8 hours  chlorhexidine 0.12% Liquid 15 milliLiter(s) Oral Mucosa every 12 hours  dexMEDEtomidine Infusion 0.2 MICROgram(s)/kG/Hr (4.11 mL/Hr) IV Continuous <Continuous>  fentaNYL   Infusion... 1 MICROgram(s)/kG/Hr (4.11 mL/Hr) IV Continuous <Continuous>  levothyroxine Injectable 60 MICROGram(s) IV Push at bedtime  propofol Infusion 10 MICROgram(s)/kG/Min (4.93 mL/Hr) IV Continuous <Continuous>  senna 2 Tablet(s) Oral at bedtime  sodium chloride 0.9%. 1000 milliLiter(s) (100 mL/Hr) IV Continuous <Continuous>    MEDICATIONS  (PRN):  acetaminophen   Tablet .. 650 milliGRAM(s) Oral every 6 hours PRN Temp greater or equal to 38C (100.4F), Mild Pain (1 - 3)  magnesium hydroxide Suspension 30 milliLiter(s) Oral every 12 hours PRN Constipation  ondansetron Injectable 4 milliGRAM(s) IV Push every 6 hours PRN Nausea      Drug Dosing Weight  Height (cm): 165.1 (08 Jul 2021 08:08)  Weight (kg): 82.1 (08 Jul 2021 08:08)  BMI (kg/m2): 30.1 (08 Jul 2021 08:08)  BSA (m2): 1.9 (08 Jul 2021 08:08)    PAST MEDICAL & SURGICAL HISTORY:  HLD (hyperlipidemia)    Thyroid cancer  2010    History of central serous retinopathy    H/O hypogonadism    H/O vitamin D deficiency    H/O osteopenia    H/O thyroidectomy  Total --2010    H/O colonoscopy        REVIEW OF SYSTEMS: [x] Unable to Assess due to neurologic exam   [ ] All ROS addressed below are non-contributory, except:  Neuro: [ ] Headache [ ] Back pain [ ] Numbness [ ] Weakness [ ] Ataxia [ ] Dizziness [ ] Aphasia [ ] Dysarthria [ ] Visual disturbance  Resp: [ ] Shortness of breath/dyspnea, [ ] Orthopnea [ ] Cough  CV: [ ] Chest pain [ ] Palpitation [ ] Lightheadedness [ ] Syncope  Renal: [ ] Thirst [ ] Edema  GI: [ ] Nausea [ ] Emesis [ ] Abdominal pain [ ] Constipation [ ] Diarrhea  Hem: [ ] Hematemesis [ ] bright red blood per rectum  ID: [ ] Fever [ ] Chills [ ] Dysuria  ENT: [ ] Rhinorrhea    PHYSICAL EXAM:  General: sedated, intubated  Neurological: sedated, pupils 3mm reactive b/l, +cough +gag, +dolls,  Pulmonary: Clear to Auscultation, No Rales, No Rhonchi, No Wheezes   Cardiovascular: S1, S2, Regular Rate and Rhythm   Gastrointestinal: abdomen dressing, rectus muscle displacement   Extremities: No calf tenderness   Incision: CDI 3 drains in place    ICU Vital Signs Last 24 Hrs  T(C): 37.7 (08 Jul 2021 23:00), Max: 37.7 (08 Jul 2021 23:00)  T(F): 99.9 (08 Jul 2021 23:00), Max: 99.9 (08 Jul 2021 23:00)  HR: 91 (08 Jul 2021 23:00) (78 - 100)  BP: 118/74 (08 Jul 2021 02:00) (118/74 - 123/78)  BP(mean): 84 (08 Jul 2021 02:00) (84 - 96)  ABP: 160/70 (08 Jul 2021 23:00) (104/72 - 160/70)  ABP(mean): 95 (08 Jul 2021 23:00) (71 - 112)  RR: 14 (08 Jul 2021 23:00) (12 - 23)  SpO2: 98% (08 Jul 2021 23:00) (95% - 100%)    ABG - ( 08 Jul 2021 22:31 )  pH, Arterial: 7.32  pH, Blood: x     /  pCO2: 40    /  pO2: 142   / HCO3: 20    / Base Excess: -5.3  /  SaO2: 99                I&O's Detail    07 Jul 2021 07:01  -  08 Jul 2021 07:00  --------------------------------------------------------  IN:    sodium chloride 0.9%: 1050 mL  Total IN: 1050 mL    OUT:    Bulb (mL): 35 mL    Indwelling Catheter - Urethral (mL): 860 mL  Total OUT: 895 mL    Total NET: 155 mL      08 Jul 2021 07:01  -  08 Jul 2021 23:14  --------------------------------------------------------  IN:    sodium chloride 0.9%: 75 mL  Total IN: 75 mL    OUT:    Indwelling Catheter - Urethral (mL): 45 mL  Total OUT: 45 mL    Total NET: 30 mL    Mode: AC/ CMV (Assist Control/ Continuous Mandatory Ventilation)  RR (machine): 16  TV (machine): 500  FiO2: 50  PEEP: 5  ITime: 1  MAP: 10  PIP: 37    LABS:  CBC Full  -  ( 08 Jul 2021 05:10 )  WBC Count : 16.48 K/uL  RBC Count : 4.58 M/uL  Hemoglobin : 13.5 g/dL  Hematocrit : 40.7 %  Platelet Count - Automated : 237 K/uL  Mean Cell Volume : 88.9 fl  Mean Cell Hemoglobin : 29.5 pg  Mean Cell Hemoglobin Concentration : 33.2 gm/dL  Auto Neutrophil # : x  Auto Lymphocyte # : x  Auto Monocyte # : x  Auto Eosinophil # : x  Auto Basophil # : x  Auto Neutrophil % : x  Auto Lymphocyte % : x  Auto Monocyte % : x  Auto Eosinophil % : x  Auto Basophil % : x    07-08    140  |  106  |  23  ----------------------------<  134<H>  4.6   |  17<L>  |  0.76    Ca    8.5      08 Jul 2021 05:12  Phos  4.3     07-08  Mg     2.0     07-08      PT/INR - ( 07 Jul 2021 17:37 )   PT: 12.4 sec;   INR: 1.04 ratio         PTT - ( 07 Jul 2021 17:37 )  PTT:18.8 sec      RADIOLOGY & ADDITIONAL STUDIES:   INTERVAL HISTORY: HPI:  61 year old male with PMH of Thyroid Cancer, s/p Total Thyroidectomy 2010 and Radioactive Iodine 2011, Hypogonadism, Vitamin D Deficiency, Osteopenia with recently diagnosed Sacral Tumor. Patient endorses back pain that started in August 2020, he reports that he was treat by PCP with steroids and muscle relaxants. He reports that he had some relief, however the pain lingered. He was subsequently referred to an Orthopedic Specialist who ordered an MRI of Lumbar Spine, which revealed a Sacral Tumor with extension into the Presacral space. 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 Patient reports that he suffers from chronic constipation, he has noted that his urine stream is weaker than before, he reports right buttock and right scrotal pain and numbness. He denies decreased strength or problems walking. He presents to PST today for evaluation prior to scheduled Removal of Sacral Tumor on 7/6/2021.  fully covid vaccinated; proof of vaccination placed on chart (25 Jun 2021 11:47)      MEDICATIONS  (STANDING):  ceFAZolin   IVPB 2000 milliGRAM(s) IV Intermittent once  ceFAZolin   IVPB 2000 milliGRAM(s) IV Intermittent every 8 hours  chlorhexidine 0.12% Liquid 15 milliLiter(s) Oral Mucosa every 12 hours  dexMEDEtomidine Infusion 0.2 MICROgram(s)/kG/Hr (4.11 mL/Hr) IV Continuous <Continuous>  fentaNYL   Infusion... 1 MICROgram(s)/kG/Hr (4.11 mL/Hr) IV Continuous <Continuous>  levothyroxine Injectable 60 MICROGram(s) IV Push at bedtime  propofol Infusion 10 MICROgram(s)/kG/Min (4.93 mL/Hr) IV Continuous <Continuous>  senna 2 Tablet(s) Oral at bedtime  sodium chloride 0.9%. 1000 milliLiter(s) (100 mL/Hr) IV Continuous <Continuous>    MEDICATIONS  (PRN):  acetaminophen   Tablet .. 650 milliGRAM(s) Oral every 6 hours PRN Temp greater or equal to 38C (100.4F), Mild Pain (1 - 3)  magnesium hydroxide Suspension 30 milliLiter(s) Oral every 12 hours PRN Constipation  ondansetron Injectable 4 milliGRAM(s) IV Push every 6 hours PRN Nausea      Drug Dosing Weight  Height (cm): 165.1 (08 Jul 2021 08:08)  Weight (kg): 82.1 (08 Jul 2021 08:08)  BMI (kg/m2): 30.1 (08 Jul 2021 08:08)  BSA (m2): 1.9 (08 Jul 2021 08:08)    PAST MEDICAL & SURGICAL HISTORY:  HLD (hyperlipidemia)  Thyroid cancer  2010  History of central serous retinopathy  H/O hypogonadism  H/O vitamin D deficiency  H/O osteopenia  H/O thyroidectomy Total --2010  H/O colonoscopy        REVIEW OF SYSTEMS: [x] Unable to Assess due to neurologic exam   [ ] All ROS addressed below are non-contributory, except:  Neuro: [ ] Headache [ ] Back pain [ ] Numbness [ ] Weakness [ ] Ataxia [ ] Dizziness [ ] Aphasia [ ] Dysarthria [ ] Visual disturbance  Resp: [ ] Shortness of breath/dyspnea, [ ] Orthopnea [ ] Cough  CV: [ ] Chest pain [ ] Palpitation [ ] Lightheadedness [ ] Syncope  Renal: [ ] Thirst [ ] Edema  GI: [ ] Nausea [ ] Emesis [ ] Abdominal pain [ ] Constipation [ ] Diarrhea  Hem: [ ] Hematemesis [ ] bright red blood per rectum  ID: [ ] Fever [ ] Chills [ ] Dysuria  ENT: [ ] Rhinorrhea    PHYSICAL EXAM:  General: sedated, intubated  Neurological: sedated, pupils 3mm reactive b/l, +cough +gag, +dolls,  Pulmonary: Clear to Auscultation, No Rales, No Rhonchi, No Wheezes   Cardiovascular: S1, S2, Regular Rate and Rhythm   Gastrointestinal: abdomen dressing, rectus muscle displacement, +bowel sounds   Extremities: No calf tenderness   Incision: CDI 3 drains in place    ICU Vital Signs Last 24 Hrs  T(C): 37.7 (08 Jul 2021 23:00), Max: 37.7 (08 Jul 2021 23:00)  T(F): 99.9 (08 Jul 2021 23:00), Max: 99.9 (08 Jul 2021 23:00)  HR: 91 (08 Jul 2021 23:00) (78 - 100)  BP: 118/74 (08 Jul 2021 02:00) (118/74 - 123/78)  BP(mean): 84 (08 Jul 2021 02:00) (84 - 96)  ABP: 160/70 (08 Jul 2021 23:00) (104/72 - 160/70)  ABP(mean): 95 (08 Jul 2021 23:00) (71 - 112)  RR: 14 (08 Jul 2021 23:00) (12 - 23)  SpO2: 98% (08 Jul 2021 23:00) (95% - 100%)    ABG - ( 08 Jul 2021 22:31 )  pH, Arterial: 7.32  pH, Blood: x     /  pCO2: 40    /  pO2: 142   / HCO3: 20    / Base Excess: -5.3  /  SaO2: 99        I&O's Detail    07 Jul 2021 07:01  -  08 Jul 2021 07:00  --------------------------------------------------------  IN:    sodium chloride 0.9%: 1050 mL  Total IN: 1050 mL    OUT:    Bulb (mL): 35 mL    Indwelling Catheter - Urethral (mL): 860 mL  Total OUT: 895 mL    Total NET: 155 mL      08 Jul 2021 07:01  -  08 Jul 2021 23:14  --------------------------------------------------------  IN:    sodium chloride 0.9%: 75 mL  Total IN: 75 mL    OUT:    Indwelling Catheter - Urethral (mL): 45 mL  Total OUT: 45 mL    Total NET: 30 mL    Mode: AC/ CMV (Assist Control/ Continuous Mandatory Ventilation)  RR (machine): 16  TV (machine): 500  FiO2: 50  PEEP: 5  ITime: 1  MAP: 10  PIP: 37    LABS:  CBC Full  -  ( 08 Jul 2021 05:10 )  WBC Count : 16.48 K/uL  RBC Count : 4.58 M/uL  Hemoglobin : 13.5 g/dL  Hematocrit : 40.7 %  Platelet Count - Automated : 237 K/uL  Mean Cell Volume : 88.9 fl  Mean Cell Hemoglobin : 29.5 pg  Mean Cell Hemoglobin Concentration : 33.2 gm/dL  Auto Neutrophil # : x  Auto Lymphocyte # : x  Auto Monocyte # : x  Auto Eosinophil # : x  Auto Basophil # : x  Auto Neutrophil % : x  Auto Lymphocyte % : x  Auto Monocyte % : x  Auto Eosinophil % : x  Auto Basophil % : x    07-08    140  |  106  |  23  ----------------------------<  134<H>  4.6   |  17<L>  |  0.76    Ca    8.5      08 Jul 2021 05:12  Phos  4.3     07-08  Mg     2.0     07-08      PT/INR - ( 07 Jul 2021 17:37 )   PT: 12.4 sec;   INR: 1.04 ratio         PTT - ( 07 Jul 2021 17:37 )  PTT:18.8 sec      RADIOLOGY & ADDITIONAL STUDIES:

## 2021-07-09 NOTE — OCCUPATIONAL THERAPY INITIAL EVALUATION ADULT - PATIENT PROFILE REVIEW, REHAB EVAL
s/p Trans-peritoneal anterior exposure, Ligation of R hypogastric artery, Mobilization of rectum, [place] of vertical rectus abdominis myocutaneous flap with transperitoneal ligation of internal iliac artery& vein 7/7. s/p Reconstruction of abdominoperineal defect using vertical rectus abdominis myocutaneous (VRAM) flap & en bloc sacrectomy for resection of chordoma with L4-pelvis instrumentation and fusion and plastics closure 7/8/yes

## 2021-07-09 NOTE — PHYSICAL THERAPY INITIAL EVALUATION ADULT - PRECAUTIONS/LIMITATIONS, REHAB EVAL
s/p Trans-peritoneal anterior exposure, Ligation of R hypogastric artery, Mobilization of rectum, [place] of vertical rectus abdominis myocutaneous flap with transperitoneal ligation of internal iliac artery& vein 7/7. s/p Reconstruction of abdominoperineal defect using vertical rectus abdominis myocutaneous (VRAM) flap & en bloc sacrectomy for resection of chordoma with L4-pelvis instrumentation and fusion and plastics closure 7/8

## 2021-07-09 NOTE — PROGRESS NOTE ADULT - SUBJECTIVE AND OBJECTIVE BOX
Patient seen and examined at bedside.    --Anticoagulation--    T(C): 37.7 (07-08-21 @ 23:00), Max: 37.7 (07-08-21 @ 23:00)  HR: 84 (07-09-21 @ 00:00) (78 - 100)  BP: 118/74 (07-08-21 @ 02:00) (118/74 - 118/74)  RR: 16 (07-09-21 @ 00:00) (12 - 23)  SpO2: 96% (07-09-21 @ 00:00) (95% - 100%)  Wt(kg): --    Exam:  Intubated, pupils 3R b/l, minimal movement R foot to stim.  Patient seen and examined at bedside.    --Anticoagulation--    T(C): 37.7 (07-08-21 @ 23:00), Max: 37.7 (07-08-21 @ 23:00)  HR: 84 (07-09-21 @ 00:00) (78 - 100)  BP: 118/74 (07-08-21 @ 02:00) (118/74 - 118/74)  RR: 16 (07-09-21 @ 00:00) (12 - 23)  SpO2: 96% (07-09-21 @ 00:00) (95% - 100%)  Wt(kg): --    Exam:  Intubated, pupils 3R b/l, minimal movement R foot to stim.     On re-eval, opens eyes, FC x 4 extremities, nods

## 2021-07-09 NOTE — PROGRESS NOTE ADULT - SUBJECTIVE AND OBJECTIVE BOX
Plastic Surgery Progress Note    Subjective:     Patient seen and examined at bedside. No acute events overnight. Patient intubated and lying in bed, unable to verbalize any complaints but denies acute complaints by nodding/shaking head when asked.  Denies N/V/F/C.     OBJECTIVE:     T(C): 37.7 (07-09-21 @ 07:00), Max: 37.9 (07-09-21 @ 03:00)  HR: 91 (07-09-21 @ 07:05) (82 - 100)  BP: --  RR: 14 (07-09-21 @ 07:05) (13 - 19)  SpO2: 100% (07-09-21 @ 07:05) (96% - 100%)  Wt(kg): --    I&O's Detail    08 Jul 2021 07:01  -  09 Jul 2021 07:00  --------------------------------------------------------  IN:    Dexmedetomidine: 63.5 mL    FentaNYL: 74.1 mL    IV PiggyBack: 100 mL    Lactated Ringers Bolus: 1500 mL    Platelets - Single Donor: 225 mL    Propofol: 90.9 mL    sodium chloride 0.9%: 75 mL    sodium chloride 0.9%: 900 mL    sodium chloride 0.9% w/ Additives: 70 mL  Total IN: 3098.5 mL    OUT:    Bulb (mL): 550 mL    Drain (mL): 25 mL    Drain (mL): 400 mL    Indwelling Catheter - Urethral (mL): 685 mL  Total OUT: 1660 mL    Total NET: 1438.5 mL          PHYSICAL EXAM:    GENERAL: NAD, lying in bed   HEAD:  Atraumatic, Normocephalic  ENT: Moist mucous membranes, ET tube in place, facial edema   CHEST/LUNG: Unlabored respirations  BACK: Sterile dressing in place over sacral incision   ABDOMEN: Soft, tender, dressing over incision clean and dry, no discharge or surrounding erythema   NERVOUS SYSTEM:  Alert & Oriented X3, speech clear.   SKIN: No rashes or lesions    MEDICATIONS  (STANDING):  bisacodyl 5 milliGRAM(s) Oral at bedtime  chlorhexidine 0.12% Liquid 15 milliLiter(s) Oral Mucosa every 12 hours  chlorhexidine 4% Liquid 1 Application(s) Topical <User Schedule>  dexMEDEtomidine Infusion 0.2 MICROgram(s)/kG/Hr (4.11 mL/Hr) IV Continuous <Continuous>  fenofibrate Tablet 145 milliGRAM(s) Oral daily  fentaNYL   Infusion... 1 MICROgram(s)/kG/Hr (4.11 mL/Hr) IV Continuous <Continuous>  levothyroxine Injectable 60 MICROGram(s) IV Push at bedtime  polyethylene glycol 3350 17 Gram(s) Oral every 12 hours  senna 2 Tablet(s) Oral at bedtime  sodium chloride 0.9%. 1000 milliLiter(s) (100 mL/Hr) IV Continuous <Continuous>    MEDICATIONS  (PRN):  acetaminophen   Tablet .. 650 milliGRAM(s) Oral every 6 hours PRN Temp greater or equal to 38C (100.4F), Mild Pain (1 - 3)  bisacodyl Suppository 10 milliGRAM(s) Rectal daily PRN Constipation  ondansetron Injectable 4 milliGRAM(s) IV Push every 6 hours PRN Nausea      LABS:                          9.6    10.50 )-----------( 104      ( 09 Jul 2021 05:19 )             28.5     07-08    141  |  110<H>  |  19  ----------------------------<  128<H>  4.5   |  17<L>  |  0.75    Ca    8.4      08 Jul 2021 22:48  Phos  3.9     07-08  Mg     2.0     07-08      PT/INR - ( 07 Jul 2021 17:37 )   PT: 12.4 sec;   INR: 1.04 ratio         PTT - ( 07 Jul 2021 17:37 )  PTT:18.8 sec           Plastic Surgery Progress Note    Subjective:     Patient seen and examined at bedside. No acute events overnight. Patient intubated and lying in bed, unable to verbalize any complaints but denies acute complaints by nodding/shaking head when asked.  Denies N/V/F/C.     OBJECTIVE:     T(C): 37.7 (07-09-21 @ 07:00), Max: 37.9 (07-09-21 @ 03:00)  HR: 91 (07-09-21 @ 07:05) (82 - 100)  BP: --  RR: 14 (07-09-21 @ 07:05) (13 - 19)  SpO2: 100% (07-09-21 @ 07:05) (96% - 100%)  Wt(kg): --    I&O's Detail    08 Jul 2021 07:01  -  09 Jul 2021 07:00  --------------------------------------------------------  IN:    Dexmedetomidine: 63.5 mL    FentaNYL: 74.1 mL    IV PiggyBack: 100 mL    Lactated Ringers Bolus: 1500 mL    Platelets - Single Donor: 225 mL    Propofol: 90.9 mL    sodium chloride 0.9%: 75 mL    sodium chloride 0.9%: 900 mL    sodium chloride 0.9% w/ Additives: 70 mL  Total IN: 3098.5 mL    OUT:    Bulb (mL): 550 mL    Drain (mL): 25 mL    Drain (mL): 400 mL    Indwelling Catheter - Urethral (mL): 685 mL  Total OUT: 1660 mL    Total NET: 1438.5 mL          PHYSICAL EXAM:    GENERAL: NAD, lying in bed   HEAD:  Atraumatic, Normocephalic  ENT: Moist mucous membranes, ET tube in place, facial edema   CHEST/LUNG: Unlabored respirations  BACK: Sterile dressing in place, clean, dry, and intact, no palpable collections, drain w SS output   ABDOMEN: Soft, tender, dressing over incision clean and dry, no discharge or surrounding erythema   NERVOUS SYSTEM:  Alert & Oriented X3, speech clear.   SKIN: No rashes or lesions    MEDICATIONS  (STANDING):  bisacodyl 5 milliGRAM(s) Oral at bedtime  chlorhexidine 0.12% Liquid 15 milliLiter(s) Oral Mucosa every 12 hours  chlorhexidine 4% Liquid 1 Application(s) Topical <User Schedule>  dexMEDEtomidine Infusion 0.2 MICROgram(s)/kG/Hr (4.11 mL/Hr) IV Continuous <Continuous>  fenofibrate Tablet 145 milliGRAM(s) Oral daily  fentaNYL   Infusion... 1 MICROgram(s)/kG/Hr (4.11 mL/Hr) IV Continuous <Continuous>  levothyroxine Injectable 60 MICROGram(s) IV Push at bedtime  polyethylene glycol 3350 17 Gram(s) Oral every 12 hours  senna 2 Tablet(s) Oral at bedtime  sodium chloride 0.9%. 1000 milliLiter(s) (100 mL/Hr) IV Continuous <Continuous>    MEDICATIONS  (PRN):  acetaminophen   Tablet .. 650 milliGRAM(s) Oral every 6 hours PRN Temp greater or equal to 38C (100.4F), Mild Pain (1 - 3)  bisacodyl Suppository 10 milliGRAM(s) Rectal daily PRN Constipation  ondansetron Injectable 4 milliGRAM(s) IV Push every 6 hours PRN Nausea      LABS:                          9.6    10.50 )-----------( 104      ( 09 Jul 2021 05:19 )             28.5     07-08    141  |  110<H>  |  19  ----------------------------<  128<H>  4.5   |  17<L>  |  0.75    Ca    8.4      08 Jul 2021 22:48  Phos  3.9     07-08  Mg     2.0     07-08      PT/INR - ( 07 Jul 2021 17:37 )   PT: 12.4 sec;   INR: 1.04 ratio         PTT - ( 07 Jul 2021 17:37 )  PTT:18.8 sec

## 2021-07-09 NOTE — PROGRESS NOTE ADULT - SUBJECTIVE AND OBJECTIVE BOX
HPI:  61 year old male with PMH of Thyroid Cancer, s/p Total Thyroidectomy 2010 and Radioactive Iodine 2011, Hypogonadism, Vitamin D Deficiency, Osteopenia with recently diagnosed Sacral Tumor. Patient endorses back pain that started in August 2020, he reports that he was treat by PCP with steroids and muscle relaxants. He reports that he had some relief, however the pain lingered. He was subsequently referred to an Orthopedic Specialist who ordered an MRI of Lumbar Spine, which revealed a Sacral Tumor with extension into the Presacral space. 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 Patient reports that he suffers from chronic constipation, he has noted that his urine stream is weaker than before, he reports right buttock and right scrotal pain and numbness. He denies decreased strength or problems walking. He presents to PST today for evaluation prior to scheduled Removal of Sacral Tumor on 7/6/2021.    fully covid vaccinated; proof of vaccination placed on chart (25 Jun 2021 11:47)    SURGERY: Reconstruction of abdominoperineal defect using vertical rectus abdominis myocutaneous (VRAM) flap    Sacrectomy          EVENTS:   remained intubated       ICU Vital Signs Last 24 Hrs  T(C): 37.7 (09 Jul 2021 07:00), Max: 37.9 (09 Jul 2021 03:00)  T(F): 99.9 (09 Jul 2021 07:00), Max: 100.2 (09 Jul 2021 03:00)  HR: 97 (09 Jul 2021 09:00) (82 - 100)  BP: --  BP(mean): --  ABP: 143/67 (09 Jul 2021 09:00) (110/59 - 160/72)  ABP(mean): 90 (09 Jul 2021 09:00) (71 - 99)  RR: 24 (09 Jul 2021 09:00) (13 - 24)  SpO2: 92% (09 Jul 2021 09:00) (92% - 100%)     07-08 @ 07:01  -  07-09 @ 07:00  --------------------------------------------------------  IN: 3098.5 mL / OUT: 1760 mL / NET: 1338.5 mL    07-09 @ 07:01  -  07-09 @ 11:11  --------------------------------------------------------  IN: 204.2 mL / OUT: 100 mL / NET: 104.2 mL                             9.6    10.50 )-----------( 104      ( 09 Jul 2021 05:19 )             28.5    07-08    141  |  110<H>  |  19  ----------------------------<  128<H>  4.5   |  17<L>  |  0.75    Ca    8.4      08 Jul 2021 22:48  Phos  3.9     07-08  Mg     2.0     07-08     ABG - ( 09 Jul 2021 06:04 )  pH, Arterial: 7.37  pH, Blood: x     /  pCO2: 40    /  pO2: 110   / HCO3: 22    / Base Excess: -2.0  /  SaO2: 98                       PHYSICAL EXAM:    General: No Acute Distress     Neurological: Awake, alert oriented to person, place and time, Following Commands, PERRL, EOMI, Face Symmetrical, Speech Fluent, Moving all extremities, Muscle Strength normal in UE, slightly weaker in the right LE, pain limited, left LE 5/5   Pulmonary: Clear to Auscultation, No Rales, No Rhonchi, No Wheezes     Cardiovascular: S1, S2, Regular Rate and Rhythm     Gastrointestinal: Soft, Nontender, Nondistended     Extremities: No calf tenderness     Incision:       MEDICATIONS:  Antibiotics:      Neurological:   acetaminophen   Tablet .. 650 milliGRAM(s) Oral every 6 hours PRN  dexMEDEtomidine Infusion 0.2 MICROgram(s)/kG/Hr IV Continuous <Continuous>  fentaNYL   Infusion... 1 MICROgram(s)/kG/Hr IV Continuous <Continuous>  ondansetron Injectable 4 milliGRAM(s) IV Push every 6 hours PRN    Cardiac:     Pulm:    Heme:     Other:   bisacodyl 5 milliGRAM(s) Oral at bedtime  bisacodyl Suppository 10 milliGRAM(s) Rectal daily PRN Constipation  chlorhexidine 4% Liquid 1 Application(s) Topical <User Schedule>  fenofibrate Tablet 145 milliGRAM(s) Oral daily  levothyroxine Injectable 60 MICROGram(s) IV Push at bedtime  polyethylene glycol 3350 17 Gram(s) Oral every 12 hours  senna 2 Tablet(s) Oral at bedtime  sodium chloride 0.9%. 1000 milliLiter(s) IV Continuous <Continuous>       DEVICES: [] Restraints [] SEDRICK/HMV []LD [] ET tube [] Trach [] Chest Tube [] A-line [] Barnett [] NGT [] Rectal Tube   Mode: CPAP with PS  FiO2: 40  PEEP: 5  PS: 5  MAP: 9  PIP: 12      A/P:  61 year old male with PMH of Thyroid Cancer, s/p Total Thyroidectomy 2010 and Radioactive Iodine 2011, Hypogonadism, Vitamin D Deficiency, Osteopenia with recently diagnosed Sacral Tumor. Reconstruction of abdominoperineal defect using vertical rectus abdominis myocutaneous (VRAM) flap  hemorrhagic shocks 5.5 L EBL, s/p 8 packed RBC , 6 FFP, 1 plt intra-op, 1 plt in the ICU       Neuro:   neuro checks q 4 hr   follow CT report   2 hemovac right 25 cc. left 400 cc   abd SEDRICK 550 cc output   d/c precedex   d/c fentanyl drip   now on PCA dilaudid pump   PT/OT/OBC   Respiratory: was placed today on PS, and was pulling good TV, was extubated safely  he is now NC   IS   CV: MAP> 65 mmhg, he is HD stable , not on pressers   has an a line   SBP> 100 mmhg   Endocrine: hypothyroidism on synthroid   Heme/Onc:   hemorrhagic shock post-op due to intra-op bleeding  now HD stable  will continue to trend cbc, cbc q 6 hr             DVT ppx: SCD, hold chemoprophylaxis until hgb stable    Renal:  ml/hr   ID: stephy-op ABX   GI: passed swallow eval, will start feeds   senna and miralax   Social/Family: updated   Discharge planning:     Code Status: [x] Full Code [] DNR [] DNI [] Goals of Care:   Disposition: [x] ICU [] Stroke Unit [] RCU []PCU []Floor [] Discharge Home     Patient at high risk for neurologic deterioration, critical care time, excluding procedures: 40 minutes

## 2021-07-09 NOTE — PROVIDER CONTACT NOTE (CHANGE IN STATUS NOTIFICATION) - ASSESSMENT
Pt. extubated in the AM to 40% face tent, then weaned to 2L nasal cannula, pt. now requiring 6L w/ an SpO2 92%. Pt. tachycardic

## 2021-07-09 NOTE — PHYSICAL THERAPY INITIAL EVALUATION ADULT - MD ORDER
Upon extubation, please place pt in lateral position and put as little pressure on Sacral wound as possible Per plastics: please place pt in lateral position and put as little pressure on Sacral wound as possible. off load pelvis when seated

## 2021-07-09 NOTE — PROGRESS NOTE ADULT - ASSESSMENT
61M hx thyroid CA s/p thyroidectomy & radioactive iodine tx, hypogonadism, admitted for sacral mass s/p en bloc sacrectomy w/ L4-pelvis fusion w/ plastics closure.     Plan:  -neurochecks q1  -extubated today, on 6L NC, concern for atelectasis vs effusion s/p IV lasix repeat ABG; low threshold for CTA r/o PE  -pain management w/ PCA pump ; bowel regimen  -Lovenox sq for DVT ppx  -keep tony   61M hx thyroid CA s/p thyroidectomy & radioactive iodine tx, hypogonadism, admitted for sacral mass s/p en bloc sacrectomy w/ L4-pelvis fusion w/ plastics closure.     Plan:  -neurochecks q4  -extubated today, on 6L NC, concern for atelectasis vs effusion s/p IV lasix repeat ABG; low threshold for CTA r/o PE; encourage incentive spirometer use likely splinting from abdomen tenderness.   -pain management w/ PCA pump; bowel regimen  -Lovenox sq for DVT ppx  -keep tony

## 2021-07-10 LAB
ANION GAP SERPL CALC-SCNC: 11 MMOL/L — SIGNIFICANT CHANGE UP (ref 5–17)
APPEARANCE UR: ABNORMAL
APTT BLD: 36.6 SEC — HIGH (ref 27.5–35.5)
BACTERIA # UR AUTO: NEGATIVE — SIGNIFICANT CHANGE UP
BILIRUB UR-MCNC: NEGATIVE — SIGNIFICANT CHANGE UP
BUN SERPL-MCNC: 21 MG/DL — SIGNIFICANT CHANGE UP (ref 7–23)
CALCIUM SERPL-MCNC: 8.6 MG/DL — SIGNIFICANT CHANGE UP (ref 8.4–10.5)
CHLORIDE SERPL-SCNC: 105 MMOL/L — SIGNIFICANT CHANGE UP (ref 96–108)
CO2 SERPL-SCNC: 24 MMOL/L — SIGNIFICANT CHANGE UP (ref 22–31)
COLOR SPEC: YELLOW — SIGNIFICANT CHANGE UP
COMMENT - URINE: SIGNIFICANT CHANGE UP
CREAT ?TM UR-MCNC: 191 MG/DL — SIGNIFICANT CHANGE UP
CREAT SERPL-MCNC: 0.59 MG/DL — SIGNIFICANT CHANGE UP (ref 0.5–1.3)
DIFF PNL FLD: ABNORMAL
EPI CELLS # UR: 1 /HPF — SIGNIFICANT CHANGE UP
GAS PNL BLDA: SIGNIFICANT CHANGE UP
GLUCOSE SERPL-MCNC: 160 MG/DL — HIGH (ref 70–99)
GLUCOSE UR QL: ABNORMAL
HCT VFR BLD CALC: 33.1 % — LOW (ref 39–50)
HGB BLD-MCNC: 11.1 G/DL — LOW (ref 13–17)
HYALINE CASTS # UR AUTO: 1 /LPF — SIGNIFICANT CHANGE UP (ref 0–2)
KETONES UR-MCNC: ABNORMAL
LACTATE SERPL-SCNC: 1.7 MMOL/L — SIGNIFICANT CHANGE UP (ref 0.7–2)
LEUKOCYTE ESTERASE UR-ACNC: NEGATIVE — SIGNIFICANT CHANGE UP
MAGNESIUM SERPL-MCNC: 2.1 MG/DL — SIGNIFICANT CHANGE UP (ref 1.6–2.6)
MCHC RBC-ENTMCNC: 29.7 PG — SIGNIFICANT CHANGE UP (ref 27–34)
MCHC RBC-ENTMCNC: 33.5 GM/DL — SIGNIFICANT CHANGE UP (ref 32–36)
MCV RBC AUTO: 88.5 FL — SIGNIFICANT CHANGE UP (ref 80–100)
NITRITE UR-MCNC: NEGATIVE — SIGNIFICANT CHANGE UP
NRBC # BLD: 0 /100 WBCS — SIGNIFICANT CHANGE UP (ref 0–0)
OSMOLALITY UR: 854 MOS/KG — SIGNIFICANT CHANGE UP (ref 300–900)
PH UR: 6 — SIGNIFICANT CHANGE UP (ref 5–8)
PHOSPHATE SERPL-MCNC: 2.2 MG/DL — LOW (ref 2.5–4.5)
PLATELET # BLD AUTO: 160 K/UL — SIGNIFICANT CHANGE UP (ref 150–400)
POTASSIUM SERPL-MCNC: 4 MMOL/L — SIGNIFICANT CHANGE UP (ref 3.5–5.3)
POTASSIUM SERPL-SCNC: 4 MMOL/L — SIGNIFICANT CHANGE UP (ref 3.5–5.3)
PROT UR-MCNC: ABNORMAL
RBC # BLD: 3.74 M/UL — LOW (ref 4.2–5.8)
RBC # FLD: 14.2 % — SIGNIFICANT CHANGE UP (ref 10.3–14.5)
RBC CASTS # UR COMP ASSIST: 233 /HPF — HIGH (ref 0–4)
SODIUM SERPL-SCNC: 140 MMOL/L — SIGNIFICANT CHANGE UP (ref 135–145)
SODIUM UR-SCNC: 10 MMOL/L — SIGNIFICANT CHANGE UP
SP GR SPEC: 1.03 — HIGH (ref 1.01–1.02)
TROPONIN T, HIGH SENSITIVITY RESULT: 15 NG/L — SIGNIFICANT CHANGE UP (ref 0–51)
UROBILINOGEN FLD QL: NEGATIVE — SIGNIFICANT CHANGE UP
UUN UR-MCNC: 1159 MG/DL — SIGNIFICANT CHANGE UP
WBC # BLD: 18.55 K/UL — HIGH (ref 3.8–10.5)
WBC # FLD AUTO: 18.55 K/UL — HIGH (ref 3.8–10.5)
WBC UR QL: 3 /HPF — SIGNIFICANT CHANGE UP (ref 0–5)

## 2021-07-10 PROCEDURE — 99291 CRITICAL CARE FIRST HOUR: CPT

## 2021-07-10 PROCEDURE — 71275 CT ANGIOGRAPHY CHEST: CPT | Mod: 26

## 2021-07-10 PROCEDURE — 93970 EXTREMITY STUDY: CPT | Mod: 26

## 2021-07-10 RX ORDER — DEXTROSE MONOHYDRATE, SODIUM CHLORIDE, AND POTASSIUM CHLORIDE 50; .745; 4.5 G/1000ML; G/1000ML; G/1000ML
1000 INJECTION, SOLUTION INTRAVENOUS
Refills: 0 | Status: DISCONTINUED | OUTPATIENT
Start: 2021-07-10 | End: 2021-07-10

## 2021-07-10 RX ORDER — LABETALOL HCL 100 MG
10 TABLET ORAL ONCE
Refills: 0 | Status: COMPLETED | OUTPATIENT
Start: 2021-07-10 | End: 2021-07-10

## 2021-07-10 RX ORDER — ACETAMINOPHEN 500 MG
1000 TABLET ORAL ONCE
Refills: 0 | Status: COMPLETED | OUTPATIENT
Start: 2021-07-10 | End: 2021-07-10

## 2021-07-10 RX ORDER — GABAPENTIN 400 MG/1
100 CAPSULE ORAL EVERY 8 HOURS
Refills: 0 | Status: DISCONTINUED | OUTPATIENT
Start: 2021-07-10 | End: 2021-07-11

## 2021-07-10 RX ORDER — CYCLOBENZAPRINE HYDROCHLORIDE 10 MG/1
5 TABLET, FILM COATED ORAL THREE TIMES A DAY
Refills: 0 | Status: DISCONTINUED | OUTPATIENT
Start: 2021-07-10 | End: 2021-07-11

## 2021-07-10 RX ORDER — LABETALOL HCL 100 MG
100 TABLET ORAL EVERY 8 HOURS
Refills: 0 | Status: DISCONTINUED | OUTPATIENT
Start: 2021-07-10 | End: 2021-07-20

## 2021-07-10 RX ORDER — PROCHLORPERAZINE MALEATE 5 MG
10 TABLET ORAL ONCE
Refills: 0 | Status: COMPLETED | OUTPATIENT
Start: 2021-07-10 | End: 2021-07-10

## 2021-07-10 RX ORDER — POTASSIUM PHOSPHATE, MONOBASIC POTASSIUM PHOSPHATE, DIBASIC 236; 224 MG/ML; MG/ML
15 INJECTION, SOLUTION INTRAVENOUS ONCE
Refills: 0 | Status: COMPLETED | OUTPATIENT
Start: 2021-07-10 | End: 2021-07-10

## 2021-07-10 RX ORDER — SODIUM,POTASSIUM PHOSPHATES 278-250MG
1 POWDER IN PACKET (EA) ORAL THREE TIMES A DAY
Refills: 0 | Status: COMPLETED | OUTPATIENT
Start: 2021-07-10 | End: 2021-07-10

## 2021-07-10 RX ORDER — SODIUM CHLORIDE 9 MG/ML
1000 INJECTION, SOLUTION INTRAVENOUS
Refills: 0 | Status: DISCONTINUED | OUTPATIENT
Start: 2021-07-10 | End: 2021-07-12

## 2021-07-10 RX ORDER — HEPARIN SODIUM 5000 [USP'U]/ML
10 INJECTION INTRAVENOUS; SUBCUTANEOUS
Qty: 25000 | Refills: 0 | Status: DISCONTINUED | OUTPATIENT
Start: 2021-07-10 | End: 2021-07-10

## 2021-07-10 RX ORDER — HEPARIN SODIUM 5000 [USP'U]/ML
1000 INJECTION INTRAVENOUS; SUBCUTANEOUS
Qty: 25000 | Refills: 0 | Status: DISCONTINUED | OUTPATIENT
Start: 2021-07-10 | End: 2021-07-23

## 2021-07-10 RX ORDER — CYCLOBENZAPRINE HYDROCHLORIDE 10 MG/1
5 TABLET, FILM COATED ORAL ONCE
Refills: 0 | Status: COMPLETED | OUTPATIENT
Start: 2021-07-10 | End: 2021-07-10

## 2021-07-10 RX ORDER — METOCLOPRAMIDE HCL 10 MG
10 TABLET ORAL ONCE
Refills: 0 | Status: COMPLETED | OUTPATIENT
Start: 2021-07-10 | End: 2021-07-10

## 2021-07-10 RX ORDER — SODIUM CHLORIDE 9 MG/ML
500 INJECTION, SOLUTION INTRAVENOUS ONCE
Refills: 0 | Status: COMPLETED | OUTPATIENT
Start: 2021-07-10 | End: 2021-07-10

## 2021-07-10 RX ORDER — ONDANSETRON 8 MG/1
4 TABLET, FILM COATED ORAL ONCE
Refills: 0 | Status: COMPLETED | OUTPATIENT
Start: 2021-07-10 | End: 2021-07-10

## 2021-07-10 RX ORDER — SODIUM CHLORIDE 9 MG/ML
500 INJECTION INTRAMUSCULAR; INTRAVENOUS; SUBCUTANEOUS ONCE
Refills: 0 | Status: COMPLETED | OUTPATIENT
Start: 2021-07-10 | End: 2021-07-10

## 2021-07-10 RX ADMIN — POTASSIUM PHOSPHATE, MONOBASIC POTASSIUM PHOSPHATE, DIBASIC 62.5 MILLIMOLE(S): 236; 224 INJECTION, SOLUTION INTRAVENOUS at 21:31

## 2021-07-10 RX ADMIN — Medication 5 MILLIGRAM(S): at 22:24

## 2021-07-10 RX ADMIN — ONDANSETRON 4 MILLIGRAM(S): 8 TABLET, FILM COATED ORAL at 12:43

## 2021-07-10 RX ADMIN — Medication 10 MILLIGRAM(S): at 13:04

## 2021-07-10 RX ADMIN — CYCLOBENZAPRINE HYDROCHLORIDE 5 MILLIGRAM(S): 10 TABLET, FILM COATED ORAL at 13:04

## 2021-07-10 RX ADMIN — ONDANSETRON 4 MILLIGRAM(S): 8 TABLET, FILM COATED ORAL at 02:35

## 2021-07-10 RX ADMIN — Medication 10 MILLIGRAM(S): at 14:00

## 2021-07-10 RX ADMIN — POLYETHYLENE GLYCOL 3350 17 GRAM(S): 17 POWDER, FOR SOLUTION ORAL at 17:22

## 2021-07-10 RX ADMIN — Medication 10 MILLIGRAM(S): at 17:05

## 2021-07-10 RX ADMIN — Medication 10 MILLIGRAM(S): at 22:23

## 2021-07-10 RX ADMIN — POLYETHYLENE GLYCOL 3350 17 GRAM(S): 17 POWDER, FOR SOLUTION ORAL at 05:15

## 2021-07-10 RX ADMIN — Medication 1 TABLET(S): at 17:22

## 2021-07-10 RX ADMIN — GABAPENTIN 100 MILLIGRAM(S): 400 CAPSULE ORAL at 10:58

## 2021-07-10 RX ADMIN — CHLORHEXIDINE GLUCONATE 1 APPLICATION(S): 213 SOLUTION TOPICAL at 22:25

## 2021-07-10 RX ADMIN — HYDROMORPHONE HYDROCHLORIDE 30 MILLILITER(S): 2 INJECTION INTRAMUSCULAR; INTRAVENOUS; SUBCUTANEOUS at 07:04

## 2021-07-10 RX ADMIN — CYCLOBENZAPRINE HYDROCHLORIDE 5 MILLIGRAM(S): 10 TABLET, FILM COATED ORAL at 22:24

## 2021-07-10 RX ADMIN — HYDROMORPHONE HYDROCHLORIDE 30 MILLILITER(S): 2 INJECTION INTRAMUSCULAR; INTRAVENOUS; SUBCUTANEOUS at 19:15

## 2021-07-10 RX ADMIN — Medication 1 TABLET(S): at 11:07

## 2021-07-10 RX ADMIN — GABAPENTIN 100 MILLIGRAM(S): 400 CAPSULE ORAL at 22:25

## 2021-07-10 RX ADMIN — SENNA PLUS 2 TABLET(S): 8.6 TABLET ORAL at 22:24

## 2021-07-10 RX ADMIN — Medication 400 MILLIGRAM(S): at 21:31

## 2021-07-10 RX ADMIN — DEXTROSE MONOHYDRATE, SODIUM CHLORIDE, AND POTASSIUM CHLORIDE 50 MILLILITER(S): 50; .745; 4.5 INJECTION, SOLUTION INTRAVENOUS at 15:22

## 2021-07-10 RX ADMIN — CYCLOBENZAPRINE HYDROCHLORIDE 5 MILLIGRAM(S): 10 TABLET, FILM COATED ORAL at 02:35

## 2021-07-10 RX ADMIN — SODIUM CHLORIDE 1000 MILLILITER(S): 9 INJECTION, SOLUTION INTRAVENOUS at 05:15

## 2021-07-10 RX ADMIN — Medication 125 MICROGRAM(S): at 05:14

## 2021-07-10 RX ADMIN — Medication 1000 MILLIGRAM(S): at 22:01

## 2021-07-10 RX ADMIN — Medication 100 MILLIGRAM(S): at 22:24

## 2021-07-10 RX ADMIN — HEPARIN SODIUM 10 UNIT(S)/HR: 5000 INJECTION INTRAVENOUS; SUBCUTANEOUS at 14:31

## 2021-07-10 RX ADMIN — Medication 1 TABLET(S): at 02:24

## 2021-07-10 RX ADMIN — Medication 100 MILLIGRAM(S): at 15:07

## 2021-07-10 RX ADMIN — SODIUM CHLORIDE 1000 MILLILITER(S): 9 INJECTION INTRAMUSCULAR; INTRAVENOUS; SUBCUTANEOUS at 02:00

## 2021-07-10 NOTE — PROGRESS NOTE ADULT - SUBJECTIVE AND OBJECTIVE BOX
Patient seen and examined at bedside.    --Anticoagulation--    T(C): 37.7 (07-08-21 @ 23:00), Max: 37.7 (07-08-21 @ 23:00)  HR: 84 (07-09-21 @ 00:00) (78 - 100)  BP: 118/74 (07-08-21 @ 02:00) (118/74 - 118/74)  RR: 16 (07-09-21 @ 00:00) (12 - 23)  SpO2: 96% (07-09-21 @ 00:00) (95% - 100%)  Wt(kg): --    Exam:  AAOx3, UE 5/5, LLE 5/5, RLE 5/5 except DF 4/5, has sensation to touch on inner thighs, no sensation when tony tugged on.

## 2021-07-10 NOTE — PROGRESS NOTE ADULT - ASSESSMENT
Patient seen and examined status post en bloc sacrectomy, Stage 2 w/ L4-pelvix fusion w/ plastic closure.   Exam:  AAOx3, UE 5/5, LLE 5/5, RLE 5/5 except DF 4/5, has sensation to touch on inner thighs, no sensation when tony tugged on.  Plan:  -Admit NSCU  -Post-op imaging: CT pelvis hardware in good position  -Drain management  -Pain control  -continue tony  -monitor Hb  -Monitor respiratory status  -Incentive spirometry  -Pressure offloading incision

## 2021-07-10 NOTE — PROGRESS NOTE ADULT - SUBJECTIVE AND OBJECTIVE BOX
INTERVAL HISTORY: HPI:  61 year old male with PMH of Thyroid Cancer, s/p Total Thyroidectomy 2010 and Radioactive Iodine 2011, Hypogonadism, Vitamin D Deficiency, Osteopenia with recently diagnosed Sacral Tumor. Patient endorses back pain that started in August 2020, he reports that he was treat by PCP with steroids and muscle relaxants. He reports that he had some relief, however the pain lingered. He was subsequently referred to an Orthopedic Specialist who ordered an MRI of Lumbar Spine, which revealed a Sacral Tumor with extension into the Presacral space. 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 Patient reports that he suffers from chronic constipation, he has noted that his urine stream is weaker than before, he reports right buttock and right scrotal pain and numbness. He denies decreased strength or problems walking. He presents to PST today for evaluation prior to scheduled Removal of Sacral Tumor on 7/6/2021.  fully covid vaccinated; proof of vaccination placed on chart (25 Jun 2021 11:47)      REVIEW OF SYSTEMS: [] Unable to Assess due to neurologic exam   [x ] All ROS addressed below are non-contributory, except:  Neuro: [ ] Headache [ x] Back pain [ ] Numbness [ ] Weakness [ ] Ataxia [ ] Dizziness [ ] Aphasia [ ] Dysarthria [ ] Visual disturbance  Resp: [x ] Shortness of breath/dyspnea, [ ] Orthopnea [ ] Cough  CV: [ ] Chest pain [ ] Palpitation [ ] Lightheadedness [ ] Syncope  Renal: [ ] Thirst [ ] Edema  GI: [ ] Nausea [ ] Emesis [ x] Abdominal pain [ ] Constipation [ ] Diarrhea  Hem: [ ] Hematemesis [ ] bright red blood per rectum  ID: [ ] Fever [ ] Chills [ ] Dysuria  ENT: [ ] Rhinorrhea    PHYSICAL EXAM:  General: NAD  Neurological:   Pulmonary: Clear to Auscultation, No Rales, No Rhonchi, No Wheezes   Cardiovascular: S1, S2, Regular Rate and Rhythm   Gastrointestinal: abdomen dressing, rectus muscle displacement, +bowel sounds   Extremities: No calf tenderness   Incision: CDI 3 drains in place      ICU Vital Signs Last 24 Hrs  T(C): 37.4 (10 Jul 2021 15:00), Max: 37.4 (10 Jul 2021 15:00)  T(F): 99.3 (10 Jul 2021 15:00), Max: 99.3 (10 Jul 2021 15:00)  HR: 89 (10 Jul 2021 18:00) (82 - 105)  BP: 161/85 (10 Jul 2021 18:00) (131/69 - 187/88)  BP(mean): 105 (10 Jul 2021 18:00) (89 - 123)  ABP: --  ABP(mean): --  RR: 18 (10 Jul 2021 18:00) (13 - 26)  SpO2: 94% (10 Jul 2021 18:00) (90% - 98%)      07-09-21 @ 07:01  -  07-10-21 @ 07:00  --------------------------------------------------------  IN: 2555 mL / OUT: 2553 mL / NET: 2 mL    07-10-21 @ 07:01  -  07-10-21 @ 19:11  --------------------------------------------------------  IN: 940 mL / OUT: 815 mL / NET: 125 mL    acetaminophen   Tablet .. 650 milliGRAM(s) Oral every 6 hours PRN  aluminum hydroxide/magnesium hydroxide/simethicone Suspension 30 milliLiter(s) Oral every 4 hours PRN  bisacodyl 5 milliGRAM(s) Oral at bedtime  bisacodyl Suppository 10 milliGRAM(s) Rectal daily PRN  calcium carbonate 1250 mG  + Vitamin D (OsCal 500 + D) 1 Tablet(s) Oral daily  chlorhexidine 4% Liquid 1 Application(s) Topical <User Schedule>  cyclobenzaprine 5 milliGRAM(s) Oral three times a day  fenofibrate Tablet 145 milliGRAM(s) Oral daily  gabapentin 100 milliGRAM(s) Oral every 8 hours  heparin  Infusion 1000 Unit(s)/Hr (10 mL/Hr) IV Continuous <Continuous>  HYDROmorphone PCA (1 mG/mL) 30 milliLiter(s) PCA Continuous PCA Continuous  HYDROmorphone PCA (1 mG/mL) Rescue Clinician Bolus 0.5 milliGRAM(s) IV Push every 15 minutes PRN  labetalol 100 milliGRAM(s) Oral every 8 hours  labetalol Injectable 10 milliGRAM(s) IV Push once  levothyroxine 125 MICROGram(s) Oral daily  naloxone Injectable 0.1 milliGRAM(s) IV Push every 3 minutes PRN  ondansetron Injectable 4 milliGRAM(s) IV Push every 6 hours PRN  polyethylene glycol 3350 17 Gram(s) Oral every 12 hours  senna 2 Tablet(s) Oral at bedtime  sodium chloride 0.9% with potassium chloride 20 mEq/L 1000 milliLiter(s) (50 mL/Hr) IV Continuous <Continuous>      LABS:  Na: 142 (07-09 @ 23:25), 141 (07-08 @ 22:48), 140 (07-08 @ 05:12)  K: 3.7 (07-09 @ 23:25), 4.5 (07-08 @ 22:48), 4.6 (07-08 @ 05:12)  Cl: 108 (07-09 @ 23:25), 110 (07-08 @ 22:48), 106 (07-08 @ 05:12)  CO2: 21 (07-09 @ 23:25), 17 (07-08 @ 22:48), 17 (07-08 @ 05:12)  BUN: 15 (07-09 @ 23:25), 19 (07-08 @ 22:48), 23 (07-08 @ 05:12)  Cr: 0.59 (07-09 @ 23:25), 0.75 (07-08 @ 22:48), 0.76 (07-08 @ 05:12)  Glu: 143(07-09 @ 23:25), 128(07-08 @ 22:48), 134(07-08 @ 05:12)    Hgb: 10.6 (07-09 @ 23:25), 10.1 (07-09 @ 17:25), 10.1 (07-09 @ 11:22), 9.6 (07-09 @ 05:19), 12.1 (07-08 @ 22:47), 13.5 (07-08 @ 05:10)  Hct: 31.4 (07-09 @ 23:25), 29.7 (07-09 @ 17:25), 29.3 (07-09 @ 11:22), 28.5 (07-09 @ 05:19), 35.7 (07-08 @ 22:47), 40.7 (07-08 @ 05:10)  WBC: 15.08 (07-09 @ 23:25), 13.56 (07-09 @ 17:25), 12.16 (07-09 @ 11:22), 10.50 (07-09 @ 05:19), 10.62 (07-08 @ 22:47), 16.48 (07-08 @ 05:10)  Plt: 130 (07-09 @ 23:25), 118 (07-09 @ 17:25), 102 (07-09 @ 11:22), 104 (07-09 @ 05:19), 89 (07-08 @ 22:47), 237 (07-08 @ 05:10      ABG - ( 10 Jul 2021 06:50 )  pH, Arterial: 7.48  pH, Blood: x     /  pCO2: 35    /  pO2: 71    / HCO3: 26    / Base Excess: 2.8   /  SaO2: 95                   INTERVAL HISTORY: HPI:  61 year old male with PMH of Thyroid Cancer, s/p Total Thyroidectomy 2010 and Radioactive Iodine 2011, Hypogonadism, Vitamin D Deficiency, Osteopenia with recently diagnosed Sacral Tumor. Patient endorses back pain that started in August 2020, he reports that he was treat by PCP with steroids and muscle relaxants. He reports that he had some relief, however the pain lingered. He was subsequently referred to an Orthopedic Specialist who ordered an MRI of Lumbar Spine, which revealed a Sacral Tumor with extension into the Presacral space. 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 Patient reports that he suffers from chronic constipation, he has noted that his urine stream is weaker than before, he reports right buttock and right scrotal pain and numbness. He denies decreased strength or problems walking. He presents to PST today for evaluation prior to scheduled Removal of Sacral Tumor on 7/6/2021.  fully covid vaccinated; proof of vaccination placed on chart (25 Jun 2021 11:47)      REVIEW OF SYSTEMS: [] Unable to Assess due to neurologic exam   [x ] All ROS addressed below are non-contributory, except:  Neuro: [ ] Headache [ x] Back pain [ ] Numbness [ ] Weakness [ ] Ataxia [ ] Dizziness [ ] Aphasia [ ] Dysarthria [ ] Visual disturbance  Resp: [x ] Shortness of breath/dyspnea, [ ] Orthopnea [ ] Cough  CV: [ ] Chest pain [ ] Palpitation [ ] Lightheadedness [ ] Syncope  Renal: [ ] Thirst [ ] Edema  GI: [ ] Nausea [ ] Emesis [ x] Abdominal pain [ ] Constipation [ ] Diarrhea  Hem: [ ] Hematemesis [ ] bright red blood per rectum  ID: [ ] Fever [ ] Chills [ ] Dysuria  ENT: [ ] Rhinorrhea    PHYSICAL EXAM:  General: NAD  Neurological: AOx3, b/u 5/5, RLE 4/5, LLE 5/5, sensation intact   Pulmonary: Clear to Auscultation, No Rales, No Rhonchi, No Wheezes   Cardiovascular: S1, S2, Regular Rate and Rhythm   Gastrointestinal: abdomen dressing, rectus muscle displacement, +bowel sounds   Extremities: No calf tenderness   Incision: CDI 3 drains in place      ICU Vital Signs Last 24 Hrs  T(C): 37.4 (10 Jul 2021 15:00), Max: 37.4 (10 Jul 2021 15:00)  T(F): 99.3 (10 Jul 2021 15:00), Max: 99.3 (10 Jul 2021 15:00)  HR: 89 (10 Jul 2021 18:00) (82 - 105)  BP: 161/85 (10 Jul 2021 18:00) (131/69 - 187/88)  BP(mean): 105 (10 Jul 2021 18:00) (89 - 123)  ABP: --  ABP(mean): --  RR: 18 (10 Jul 2021 18:00) (13 - 26)  SpO2: 94% (10 Jul 2021 18:00) (90% - 98%)      07-09-21 @ 07:01  -  07-10-21 @ 07:00  --------------------------------------------------------  IN: 2555 mL / OUT: 2553 mL / NET: 2 mL    07-10-21 @ 07:01  -  07-10-21 @ 19:11  --------------------------------------------------------  IN: 940 mL / OUT: 815 mL / NET: 125 mL    acetaminophen   Tablet .. 650 milliGRAM(s) Oral every 6 hours PRN  aluminum hydroxide/magnesium hydroxide/simethicone Suspension 30 milliLiter(s) Oral every 4 hours PRN  bisacodyl 5 milliGRAM(s) Oral at bedtime  bisacodyl Suppository 10 milliGRAM(s) Rectal daily PRN  calcium carbonate 1250 mG  + Vitamin D (OsCal 500 + D) 1 Tablet(s) Oral daily  chlorhexidine 4% Liquid 1 Application(s) Topical <User Schedule>  cyclobenzaprine 5 milliGRAM(s) Oral three times a day  fenofibrate Tablet 145 milliGRAM(s) Oral daily  gabapentin 100 milliGRAM(s) Oral every 8 hours  heparin  Infusion 1000 Unit(s)/Hr (10 mL/Hr) IV Continuous <Continuous>  HYDROmorphone PCA (1 mG/mL) 30 milliLiter(s) PCA Continuous PCA Continuous  HYDROmorphone PCA (1 mG/mL) Rescue Clinician Bolus 0.5 milliGRAM(s) IV Push every 15 minutes PRN  labetalol 100 milliGRAM(s) Oral every 8 hours  labetalol Injectable 10 milliGRAM(s) IV Push once  levothyroxine 125 MICROGram(s) Oral daily  naloxone Injectable 0.1 milliGRAM(s) IV Push every 3 minutes PRN  ondansetron Injectable 4 milliGRAM(s) IV Push every 6 hours PRN  polyethylene glycol 3350 17 Gram(s) Oral every 12 hours  senna 2 Tablet(s) Oral at bedtime  sodium chloride 0.9% with potassium chloride 20 mEq/L 1000 milliLiter(s) (50 mL/Hr) IV Continuous <Continuous>      LABS:  Na: 142 (07-09 @ 23:25), 141 (07-08 @ 22:48), 140 (07-08 @ 05:12)  K: 3.7 (07-09 @ 23:25), 4.5 (07-08 @ 22:48), 4.6 (07-08 @ 05:12)  Cl: 108 (07-09 @ 23:25), 110 (07-08 @ 22:48), 106 (07-08 @ 05:12)  CO2: 21 (07-09 @ 23:25), 17 (07-08 @ 22:48), 17 (07-08 @ 05:12)  BUN: 15 (07-09 @ 23:25), 19 (07-08 @ 22:48), 23 (07-08 @ 05:12)  Cr: 0.59 (07-09 @ 23:25), 0.75 (07-08 @ 22:48), 0.76 (07-08 @ 05:12)  Glu: 143(07-09 @ 23:25), 128(07-08 @ 22:48), 134(07-08 @ 05:12)    Hgb: 10.6 (07-09 @ 23:25), 10.1 (07-09 @ 17:25), 10.1 (07-09 @ 11:22), 9.6 (07-09 @ 05:19), 12.1 (07-08 @ 22:47), 13.5 (07-08 @ 05:10)  Hct: 31.4 (07-09 @ 23:25), 29.7 (07-09 @ 17:25), 29.3 (07-09 @ 11:22), 28.5 (07-09 @ 05:19), 35.7 (07-08 @ 22:47), 40.7 (07-08 @ 05:10)  WBC: 15.08 (07-09 @ 23:25), 13.56 (07-09 @ 17:25), 12.16 (07-09 @ 11:22), 10.50 (07-09 @ 05:19), 10.62 (07-08 @ 22:47), 16.48 (07-08 @ 05:10)  Plt: 130 (07-09 @ 23:25), 118 (07-09 @ 17:25), 102 (07-09 @ 11:22), 104 (07-09 @ 05:19), 89 (07-08 @ 22:47), 237 (07-08 @ 05:10      ABG - ( 10 Jul 2021 06:50 )  pH, Arterial: 7.48  pH, Blood: x     /  pCO2: 35    /  pO2: 71    / HCO3: 26    / Base Excess: 2.8   /  SaO2: 95

## 2021-07-10 NOTE — PROGRESS NOTE ADULT - SUBJECTIVE AND OBJECTIVE BOX
Day 2 of Anesthesia Pain Management Service    SUBJECTIVE: Patient is doing well with IV PCA    Pain Scale Score:	[X] Refer to charted pain scores    THERAPY:    [ ] IV PCA Morphine		[ ] 5 mg/mL	[ ] 1 mg/mL  [X] IV PCA Hydromorphone	[ ] 5 mg/mL	[X] 1 mg/mL  [ ] IV PCA Fentanyl		[ ] 50 micrograms/mL    Demand dose: 0.2 mg     Lockout: 6 minutes   Continuous Rate: 0 mg/hr  4 Hour Limit: 4 mg    MEDICATIONS  (STANDING):  bisacodyl 5 milliGRAM(s) Oral at bedtime  calcium carbonate 1250 mG  + Vitamin D (OsCal 500 + D) 1 Tablet(s) Oral daily  chlorhexidine 4% Liquid 1 Application(s) Topical <User Schedule>  cyclobenzaprine 5 milliGRAM(s) Oral three times a day  enoxaparin Injectable 40 milliGRAM(s) SubCutaneous <User Schedule>  fenofibrate Tablet 145 milliGRAM(s) Oral daily  gabapentin 100 milliGRAM(s) Oral every 8 hours  HYDROmorphone PCA (1 mG/mL) 30 milliLiter(s) PCA Continuous PCA Continuous  levothyroxine 125 MICROGram(s) Oral daily  polyethylene glycol 3350 17 Gram(s) Oral every 12 hours  potassium phosphate / sodium phosphate Tablet (K-PHOS No. 2) 1 Tablet(s) Oral three times a day  senna 2 Tablet(s) Oral at bedtime  sodium chloride 0.9% with potassium chloride 20 mEq/L 1000 milliLiter(s) (50 mL/Hr) IV Continuous <Continuous>    MEDICATIONS  (PRN):  acetaminophen   Tablet .. 650 milliGRAM(s) Oral every 6 hours PRN Temp greater or equal to 38C (100.4F), Mild Pain (1 - 3)  aluminum hydroxide/magnesium hydroxide/simethicone Suspension 30 milliLiter(s) Oral every 4 hours PRN Dyspepsia  bisacodyl Suppository 10 milliGRAM(s) Rectal daily PRN Constipation  HYDROmorphone PCA (1 mG/mL) Rescue Clinician Bolus 0.5 milliGRAM(s) IV Push every 15 minutes PRN for Pain Scale GREATER THAN 6  naloxone Injectable 0.1 milliGRAM(s) IV Push every 3 minutes PRN For ANY of the following changes in patient status:  A. RR LESS THAN 10 breaths per minute, B. Oxygen saturation LESS THAN 90%, C. Sedation score of 6  ondansetron Injectable 4 milliGRAM(s) IV Push every 6 hours PRN Nausea      OBJECTIVE:    Sedation Score:	[ X] Alert	[ ] Drowsy 	[ ] Arousable	[ ] Asleep	[ ] Unresponsive    Side Effects:	[X ] None	[ ] Nausea	[ ] Vomiting	[ ] Pruritus  		[ ] Other:    Vital Signs Last 24 Hrs  T(C): 37.1 (10 Jul 2021 11:00), Max: 37.1 (09 Jul 2021 15:00)  T(F): 98.8 (10 Jul 2021 11:00), Max: 98.8 (09 Jul 2021 15:00)  HR: 100 (10 Jul 2021 12:00) (88 - 112)  BP: 171/89 (10 Jul 2021 12:00) (131/69 - 178/89)  BP(mean): 106 (10 Jul 2021 12:00) (89 - 115)  RR: 23 (10 Jul 2021 12:00) (13 - 29)  SpO2: 94% (10 Jul 2021 12:00) (90% - 98%)    ASSESSMENT/ PLAN    Therapy to  be:               [X] Continued   [ ] Discontinued   [ ] Changed to PRN Analgesics    Documentation and Verification of current medications:   [X] Done	[ ] Not done, not eligible    Comments:

## 2021-07-10 NOTE — PROGRESS NOTE ADULT - SUBJECTIVE AND OBJECTIVE BOX
· Subjective and Objective:   HPI:  61 year old male with PMH of Thyroid Cancer, s/p Total Thyroidectomy 2010 and Radioactive Iodine 2011, Hypogonadism, Vitamin D Deficiency, Osteopenia with recently diagnosed Sacral Tumor. Patient endorses back pain that started in August 2020, he reports that he was treat by PCP with steroids and muscle relaxants. He reports that he had some relief, however the pain lingered. He was subsequently referred to an Orthopedic Specialist who ordered an MRI of Lumbar Spine, which revealed a Sacral Tumor with extension into the Presacral space. 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 Patient reports that he suffers from chronic constipation, he has noted that his urine stream is weaker than before, he reports right buttock and right scrotal pain and numbness. He denies decreased strength or problems walking. He presents to PST today for evaluation prior to scheduled Removal of Sacral Tumor on 7/6/2021.    fully covid vaccinated; proof of vaccination placed on chart (25 Jun 2021 11:47)    SURGERY: Reconstruction of abdominoperineal defect using vertical rectus abdominis myocutaneous (VRAM) flap    Sacrectomy          EVENTS:   remained intubated       ICU Vital Signs Last 24 Hrs  T(C): 37.7 (09 Jul 2021 07:00), Max: 37.9 (09 Jul 2021 03:00)  T(F): 99.9 (09 Jul 2021 07:00), Max: 100.2 (09 Jul 2021 03:00)  HR: 97 (09 Jul 2021 09:00) (82 - 100)  BP: --  BP(mean): --  ABP: 143/67 (09 Jul 2021 09:00) (110/59 - 160/72)  ABP(mean): 90 (09 Jul 2021 09:00) (71 - 99)  RR: 24 (09 Jul 2021 09:00) (13 - 24)  SpO2: 92% (09 Jul 2021 09:00) (92% - 100%)     07-08 @ 07:01  -  07-09 @ 07:00  --------------------------------------------------------  IN: 3098.5 mL / OUT: 1760 mL / NET: 1338.5 mL    07-09 @ 07:01  -  07-09 @ 11:11  --------------------------------------------------------  IN: 204.2 mL / OUT: 100 mL / NET: 104.2 mL                             9.6    10.50 )-----------( 104      ( 09 Jul 2021 05:19 )             28.5    07-08    141  |  110<H>  |  19  ----------------------------<  128<H>  4.5   |  17<L>  |  0.75    Ca    8.4      08 Jul 2021 22:48  Phos  3.9     07-08  Mg     2.0     07-08     ABG - ( 09 Jul 2021 06:04 )  pH, Arterial: 7.37  pH, Blood: x     /  pCO2: 40    /  pO2: 110   / HCO3: 22    / Base Excess: -2.0  /  SaO2: 98                       PHYSICAL EXAM:    General: No Acute Distress     Neurological: Awake, alert oriented to person, place and time, Following Commands, PERRL, EOMI, Face Symmetrical, Speech Fluent, Moving all extremities, Muscle Strength normal in UE, slightly weaker in the right LE, pain limited, left LE 5/5   Pulmonary: Clear to Auscultation, No Rales, No Rhonchi, No Wheezes     Cardiovascular: S1, S2, Regular Rate and Rhythm     Gastrointestinal: Soft, Nontender, Nondistended     Extremities: No calf tenderness     Incision:       MEDICATIONS:  Antibiotics:      Neurological:   acetaminophen   Tablet .. 650 milliGRAM(s) Oral every 6 hours PRN  dexMEDEtomidine Infusion 0.2 MICROgram(s)/kG/Hr IV Continuous <Continuous>  fentaNYL   Infusion... 1 MICROgram(s)/kG/Hr IV Continuous <Continuous>  ondansetron Injectable 4 milliGRAM(s) IV Push every 6 hours PRN    Cardiac:     Pulm:    Heme:     Other:   bisacodyl 5 milliGRAM(s) Oral at bedtime  bisacodyl Suppository 10 milliGRAM(s) Rectal daily PRN Constipation  chlorhexidine 4% Liquid 1 Application(s) Topical <User Schedule>  fenofibrate Tablet 145 milliGRAM(s) Oral daily  levothyroxine Injectable 60 MICROGram(s) IV Push at bedtime  polyethylene glycol 3350 17 Gram(s) Oral every 12 hours  senna 2 Tablet(s) Oral at bedtime  sodium chloride 0.9%. 1000 milliLiter(s) IV Continuous <Continuous>       DEVICES: [] Restraints [] SEDRICK/HMV []LD [] ET tube [] Trach [] Chest Tube [] A-line [] Barnett [] NGT [] Rectal Tube   Mode: CPAP with PS  FiO2: 40  PEEP: 5  PS: 5  MAP: 9  PIP: 12      A/P:  61 year old male with PMH of Thyroid Cancer, s/p Total Thyroidectomy 2010 and Radioactive Iodine 2011, Hypogonadism, Vitamin D Deficiency, Osteopenia with recently diagnosed Sacral Tumor. Reconstruction of abdominoperineal defect using vertical rectus abdominis myocutaneous (VRAM) flap  hemorrhagic shocks 5.5 L EBL, s/p 8 packed RBC , 6 FFP, 1 plt intra-op, 1 plt in the ICU       Neuro:   neuro checks q 4 hr   follow CT report   2 hemovac right 25 cc. left 400 cc   abd SEDRICK 550 cc output   d/c precedex   d/c fentanyl drip   now on PCA dilaudid pump   PT/OT/OBC   Respiratory: was placed today on PS, and was pulling good TV, was extubated safely  he is now NC   IS   CV: MAP> 65 mmhg, he is HD stable , not on pressers   has an a line   SBP> 100 mmhg   Endocrine: hypothyroidism on synthroid   Heme/Onc:   hemorrhagic shock post-op due to intra-op bleeding  now HD stable  will continue to trend cbc, cbc q 6 hr             DVT ppx: SCD, hold chemoprophylaxis until hgb stable    Renal:  ml/hr   ID: stephy-op ABX   GI: passed swallow eval, will start feeds   senna and miralax   Social/Family: updated   Discharge planning:     Code Status: [x] Full Code [] DNR [] DNI [] Goals of Care:   Disposition: [x] ICU [] Stroke Unit [] RCU []PCU []Floor [] Discharge Home     Patient at high risk for neurologic deterioration, critical care time, excluding procedures: 40 minutes                  · Subjective and Objective:   HPI:  61 year old male with PMH of Thyroid Cancer, s/p Total Thyroidectomy 2010 and Radioactive Iodine 2011, Hypogonadism, Vitamin D Deficiency, Osteopenia with recently diagnosed Sacral Tumor. Patient endorses back pain that started in August 2020, he reports that he was treat by PCP with steroids and muscle relaxants. He reports that he had some relief, however the pain lingered. He was subsequently referred to an Orthopedic Specialist who ordered an MRI of Lumbar Spine, which revealed a Sacral Tumor with extension into the Presacral space. 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 Patient reports that he suffers from chronic constipation, he has noted that his urine stream is weaker than before, he reports right buttock and right scrotal pain and numbness. He denies decreased strength or problems walking. He presents to PST today for evaluation prior to scheduled Removal of Sacral Tumor on 7/6/2021.    fully covid vaccinated; proof of vaccination placed on chart (25 Jun 2021 11:47)    SURGERY: Reconstruction of abdominoperineal defect using vertical rectus abdominis myocutaneous (VRAM) flap    Sacrectomy    PAST MEDICAL & SURGICAL HISTORY:  HLD (hyperlipidemia)    Thyroid cancer  2010    History of central serous retinopathy          REVIEW OF SYSTEMS: [ ] Unable to Assess due to neurologic exam   [ ] All ROS addressed below are non-contributory, except:  Neuro: [ ] Headache [x ] Back pain [ ] Numbness [ ] Weakness [ ] Ataxia [ ] Dizziness [ ] Aphasia [ ] Dysarthria [ ] Visual disturbance  Resp: [ ] Shortness of breath/dyspnea, [ ] Orthopnea [ ] Cough  CV: [ ] Chest pain [ ] Palpitation [ ] Lightheadedness [ ] Syncope  Renal: [ ] Thirst [ ] Edema  GI: [ ] Nausea [ ] Emesis [ ] Abdominal pain [ ] Constipation [ ] Diarrhea  Hem: [ ] Hematemesis [ ] bright red blood per rectum  ID: [ ] Fever [ ] Chills [ ] Dysuria  ENT: [ ] Rhinorrhea        H/O hypogonadism    H/O vitamin D deficiency    H/O osteopenia    H/O thyroidectomy  Total --2010    H/O colonoscopy    Allergies    No Known Allergies    Intolerances          EVENTS:   desaturation overnight, CPAP at night     T(C): 37 (07-10-21 @ 07:00), Max: 37.1 (07-09-21 @ 11:00)  HR: 91 (07-10-21 @ 09:53) (88 - 112)  BP: 154/77 (07-10-21 @ 09:00) (131/69 - 173/79)  RR: 13 (07-10-21 @ 09:00) (13 - 31)  SpO2: 96% (07-10-21 @ 09:53) (90% - 100%)  07-09-21 @ 07:01  -  07-10-21 @ 07:00  --------------------------------------------------------  IN: 2555 mL / OUT: 2553 mL / NET: 2 mL    07-10-21 @ 07:01  -  07-10-21 @ 09:59  --------------------------------------------------------  IN: 0 mL / OUT: 65 mL / NET: -65 mL    acetaminophen   Tablet .. 650 milliGRAM(s) Oral every 6 hours PRN  aluminum hydroxide/magnesium hydroxide/simethicone Suspension 30 milliLiter(s) Oral every 4 hours PRN  bisacodyl 5 milliGRAM(s) Oral at bedtime  bisacodyl Suppository 10 milliGRAM(s) Rectal daily PRN  calcium carbonate 1250 mG  + Vitamin D (OsCal 500 + D) 1 Tablet(s) Oral daily  chlorhexidine 4% Liquid 1 Application(s) Topical <User Schedule>  enoxaparin Injectable 40 milliGRAM(s) SubCutaneous <User Schedule>  fenofibrate Tablet 145 milliGRAM(s) Oral daily  HYDROmorphone PCA (1 mG/mL) 30 milliLiter(s) PCA Continuous PCA Continuous  HYDROmorphone PCA (1 mG/mL) Rescue Clinician Bolus 0.5 milliGRAM(s) IV Push every 15 minutes PRN  levothyroxine 125 MICROGram(s) Oral daily  naloxone Injectable 0.1 milliGRAM(s) IV Push every 3 minutes PRN  ondansetron Injectable 4 milliGRAM(s) IV Push every 6 hours PRN  polyethylene glycol 3350 17 Gram(s) Oral every 12 hours  potassium phosphate / sodium phosphate Tablet (K-PHOS No. 2) 1 Tablet(s) Oral three times a day  senna 2 Tablet(s) Oral at bedtime           PHYSICAL EXAM:    General: No Acute Distress     Neurological: Awake, alert oriented to person, place and time, Following Commands, PERRL, EOMI, Face Symmetrical, Speech Fluent, Moving all extremities, Muscle Strength normal in UE,  LE limited by pain, right LE 4/5, left LE 4+      Pulmonary: Clear to Auscultation, No Rales, No Rhonchi, No Wheezes     Cardiovascular: S1, S2, Regular Rate and Rhythm     Gastrointestinal: Soft, Nontender, Nondistended     Extremities: No calf tenderness     Incision:       LABS:  Na: 142 (07-09 @ 23:25), 141 (07-08 @ 22:48), 140 (07-08 @ 05:12), 139 (07-07 @ 17:01)  K: 3.7 (07-09 @ 23:25), 4.5 (07-08 @ 22:48), 4.6 (07-08 @ 05:12), 4.6 (07-07 @ 17:01)  Cl: 108 (07-09 @ 23:25), 110 (07-08 @ 22:48), 106 (07-08 @ 05:12), 103 (07-07 @ 17:01)  CO2: 21 (07-09 @ 23:25), 17 (07-08 @ 22:48), 17 (07-08 @ 05:12), 19 (07-07 @ 17:01)  BUN: 15 (07-09 @ 23:25), 19 (07-08 @ 22:48), 23 (07-08 @ 05:12), 21 (07-07 @ 17:01)  Cr: 0.59 (07-09 @ 23:25), 0.75 (07-08 @ 22:48), 0.76 (07-08 @ 05:12), 0.88 (07-07 @ 17:01)  Glu: 143(07-09 @ 23:25), 128(07-08 @ 22:48), 134(07-08 @ 05:12), 190(07-07 @ 17:01)    Hgb: 10.6 (07-09 @ 23:25), 10.1 (07-09 @ 17:25), 10.1 (07-09 @ 11:22), 9.6 (07-09 @ 05:19), 12.1 (07-08 @ 22:47), 13.5 (07-08 @ 05:10), 14.0 (07-07 @ 17:01)  Hct: 31.4 (07-09 @ 23:25), 29.7 (07-09 @ 17:25), 29.3 (07-09 @ 11:22), 28.5 (07-09 @ 05:19), 35.7 (07-08 @ 22:47), 40.7 (07-08 @ 05:10), 43.2 (07-07 @ 17:01)  WBC: 15.08 (07-09 @ 23:25), 13.56 (07-09 @ 17:25), 12.16 (07-09 @ 11:22), 10.50 (07-09 @ 05:19), 10.62 (07-08 @ 22:47), 16.48 (07-08 @ 05:10), 20.20 (07-07 @ 17:01)  Plt: 130 (07-09 @ 23:25), 118 (07-09 @ 17:25), 102 (07-09 @ 11:22), 104 (07-09 @ 05:19), 89 (07-08 @ 22:47), 237 (07-08 @ 05:10), 259 (07-07 @ 17:01)    INR: 1.04 07-07-21 @ 17:37  PTT: 18.8 07-07-21 @ 17:37            A/P:  61 year old male with  Sacral Tumor. status post en bloc sacrectomy, Stage 2 w/ L4-pelvix fusion w/ plastic closure.      Neuro:   neuro checks q 4 hr   CT lumbar and pelvis reviewed   2 hemovac right 50 cc. left 100 cc   abd SEDRICK 150 cc output    PCA dilaudid pump   flexeril   neurontin 100 mg q 8 hr   PT/OT/OBC   Respiratory: hypoxic , respiratory distress, desaturation CAPA at night, he is now on NC  CT chest PE protocol now  r/o PE, r/o atelectasis   encourage IS   CV: MAP> 65 mmhg, he is HD stable , not on pressers   has an a line   SBP> 100 mmhg   Endocrine: hypothyroidism on synthroid   Heme/Onc:   hemorrhagic shock post-op now stable, hgb stable    s/p 2 L NS bolus overnight for decreased urine output   lovenox 40 mg sc qhs  dopplers pending   phosph repleted   NS 50 ml/hr as he will get iodine contrast   tony   ID: stephy-op ABX   GI: regular diet   senna and miralax   no BM since yesterday   Social/Family: updated   Discharge planning:     Code Status: [x] Full Code [] DNR [] DNI [] Goals of Care:   Disposition: [x] ICU [] Stroke Unit [] RCU []PCU []Floor [] Discharge Home     Patient at high risk for neurologic deterioration, critical care time, excluding procedures: 40 minutes

## 2021-07-10 NOTE — PROGRESS NOTE ADULT - ASSESSMENT
61M hx thyroid CA s/p thyroidectomy & radioactive iodine tx, hypogonadism, admitted for sacral mass s/p en bloc sacrectomy w/ L4-pelvis fusion w/ plastics closure.     Plan:  -neurochecks q4  -b/l segmantal PE w/ atelectasis c/w heparin ggt goal PTT 50-70  -pain management w/ PCA pump; bowel regimen  -Lovenox sq for DVT ppx  -keep tony  -c/w IVF   -c/w labetalol 100mg qd 61M hx thyroid CA s/p thyroidectomy & radioactive iodine tx, hypogonadism, admitted for sacral mass s/p en bloc sacrectomy w/ L4-pelvis fusion w/ plastics closure.     Plan:  -neurochecks q4  -b/l segmental PE w/ atelectasis c/w heparin ggt goal PTT 50-70; repeat PPT q6 hrs,   -pain management w/ PCA pump; bowel regimen  -keep tony  -c/w IVF change to LR @75cc/hr   -c/w labetalol 100mg qd  -IV Tylenol   -will change room to one w/  to prevent delirium   -Zofran PRN nausea

## 2021-07-11 LAB
ALBUMIN SERPL ELPH-MCNC: 2.5 G/DL — LOW (ref 3.3–5)
ALP SERPL-CCNC: 61 U/L — SIGNIFICANT CHANGE UP (ref 40–120)
ALT FLD-CCNC: 37 U/L — SIGNIFICANT CHANGE UP (ref 10–45)
ANION GAP SERPL CALC-SCNC: 13 MMOL/L — SIGNIFICANT CHANGE UP (ref 5–17)
APTT BLD: 37.9 SEC — HIGH (ref 27.5–35.5)
APTT BLD: 41.3 SEC — HIGH (ref 27.5–35.5)
APTT BLD: 42.6 SEC — HIGH (ref 27.5–35.5)
APTT BLD: 46.2 SEC — HIGH (ref 27.5–35.5)
AST SERPL-CCNC: 50 U/L — HIGH (ref 10–40)
BILIRUB DIRECT SERPL-MCNC: 0.2 MG/DL — SIGNIFICANT CHANGE UP (ref 0–0.2)
BILIRUB INDIRECT FLD-MCNC: 0.3 MG/DL — SIGNIFICANT CHANGE UP (ref 0.2–1)
BILIRUB SERPL-MCNC: 0.5 MG/DL — SIGNIFICANT CHANGE UP (ref 0.2–1.2)
BUN SERPL-MCNC: 29 MG/DL — HIGH (ref 7–23)
CALCIUM SERPL-MCNC: 8.5 MG/DL — SIGNIFICANT CHANGE UP (ref 8.4–10.5)
CHLORIDE SERPL-SCNC: 102 MMOL/L — SIGNIFICANT CHANGE UP (ref 96–108)
CO2 SERPL-SCNC: 27 MMOL/L — SIGNIFICANT CHANGE UP (ref 22–31)
CREAT SERPL-MCNC: 0.67 MG/DL — SIGNIFICANT CHANGE UP (ref 0.5–1.3)
GAS PNL BLDA: SIGNIFICANT CHANGE UP
GLUCOSE SERPL-MCNC: 128 MG/DL — HIGH (ref 70–99)
HCT VFR BLD CALC: 30.6 % — LOW (ref 39–50)
HGB BLD-MCNC: 10.3 G/DL — LOW (ref 13–17)
LACTATE SERPL-SCNC: 1.5 MMOL/L — SIGNIFICANT CHANGE UP (ref 0.7–2)
LIDOCAIN IGE QN: 42 U/L — SIGNIFICANT CHANGE UP (ref 7–60)
LIDOCAIN IGE QN: 46 U/L — SIGNIFICANT CHANGE UP (ref 7–60)
MAGNESIUM SERPL-MCNC: 2 MG/DL — SIGNIFICANT CHANGE UP (ref 1.6–2.6)
MCHC RBC-ENTMCNC: 29.6 PG — SIGNIFICANT CHANGE UP (ref 27–34)
MCHC RBC-ENTMCNC: 33.7 GM/DL — SIGNIFICANT CHANGE UP (ref 32–36)
MCV RBC AUTO: 87.9 FL — SIGNIFICANT CHANGE UP (ref 80–100)
NRBC # BLD: 0 /100 WBCS — SIGNIFICANT CHANGE UP (ref 0–0)
PHOSPHATE SERPL-MCNC: 1.9 MG/DL — LOW (ref 2.5–4.5)
PLATELET # BLD AUTO: 183 K/UL — SIGNIFICANT CHANGE UP (ref 150–400)
POTASSIUM SERPL-MCNC: 3.6 MMOL/L — SIGNIFICANT CHANGE UP (ref 3.5–5.3)
POTASSIUM SERPL-SCNC: 3.6 MMOL/L — SIGNIFICANT CHANGE UP (ref 3.5–5.3)
PROT SERPL-MCNC: 5.8 G/DL — LOW (ref 6–8.3)
RBC # BLD: 3.48 M/UL — LOW (ref 4.2–5.8)
RBC # FLD: 14.2 % — SIGNIFICANT CHANGE UP (ref 10.3–14.5)
SODIUM SERPL-SCNC: 142 MMOL/L — SIGNIFICANT CHANGE UP (ref 135–145)
WBC # BLD: 10.67 K/UL — HIGH (ref 3.8–10.5)
WBC # FLD AUTO: 10.67 K/UL — HIGH (ref 3.8–10.5)

## 2021-07-11 PROCEDURE — 99232 SBSQ HOSP IP/OBS MODERATE 35: CPT

## 2021-07-11 PROCEDURE — 74018 RADEX ABDOMEN 1 VIEW: CPT | Mod: 26

## 2021-07-11 RX ORDER — POTASSIUM CHLORIDE 20 MEQ
10 PACKET (EA) ORAL
Refills: 0 | Status: COMPLETED | OUTPATIENT
Start: 2021-07-11 | End: 2021-07-12

## 2021-07-11 RX ORDER — ALBUMIN HUMAN 25 %
250 VIAL (ML) INTRAVENOUS ONCE
Refills: 0 | Status: COMPLETED | OUTPATIENT
Start: 2021-07-11 | End: 2021-07-11

## 2021-07-11 RX ORDER — DIAZEPAM 5 MG
2 TABLET ORAL EVERY 6 HOURS
Refills: 0 | Status: DISCONTINUED | OUTPATIENT
Start: 2021-07-11 | End: 2021-07-15

## 2021-07-11 RX ORDER — METOCLOPRAMIDE HCL 10 MG
10 TABLET ORAL ONCE
Refills: 0 | Status: COMPLETED | OUTPATIENT
Start: 2021-07-11 | End: 2021-07-11

## 2021-07-11 RX ORDER — CALCIUM GLUCONATE 100 MG/ML
1 VIAL (ML) INTRAVENOUS ONCE
Refills: 0 | Status: COMPLETED | OUTPATIENT
Start: 2021-07-11 | End: 2021-07-11

## 2021-07-11 RX ORDER — SODIUM CHLORIDE 9 MG/ML
1000 INJECTION, SOLUTION INTRAVENOUS
Refills: 0 | Status: DISCONTINUED | OUTPATIENT
Start: 2021-07-11 | End: 2021-07-11

## 2021-07-11 RX ORDER — METOCLOPRAMIDE HCL 10 MG
5 TABLET ORAL EVERY 8 HOURS
Refills: 0 | Status: DISCONTINUED | OUTPATIENT
Start: 2021-07-11 | End: 2021-07-12

## 2021-07-11 RX ORDER — SODIUM CHLORIDE 9 MG/ML
1000 INJECTION INTRAMUSCULAR; INTRAVENOUS; SUBCUTANEOUS ONCE
Refills: 0 | Status: DISCONTINUED | OUTPATIENT
Start: 2021-07-11 | End: 2021-07-11

## 2021-07-11 RX ORDER — POTASSIUM PHOSPHATE, MONOBASIC POTASSIUM PHOSPHATE, DIBASIC 236; 224 MG/ML; MG/ML
30 INJECTION, SOLUTION INTRAVENOUS ONCE
Refills: 0 | Status: COMPLETED | OUTPATIENT
Start: 2021-07-11 | End: 2021-07-11

## 2021-07-11 RX ORDER — LEVOTHYROXINE SODIUM 125 MCG
63 TABLET ORAL AT BEDTIME
Refills: 0 | Status: DISCONTINUED | OUTPATIENT
Start: 2021-07-11 | End: 2021-07-18

## 2021-07-11 RX ORDER — MULTIVIT WITH MIN/MFOLATE/K2 340-15/3 G
1 POWDER (GRAM) ORAL ONCE
Refills: 0 | Status: COMPLETED | OUTPATIENT
Start: 2021-07-11 | End: 2021-07-11

## 2021-07-11 RX ORDER — ACETAMINOPHEN 500 MG
1000 TABLET ORAL ONCE
Refills: 0 | Status: COMPLETED | OUTPATIENT
Start: 2021-07-11 | End: 2021-07-11

## 2021-07-11 RX ORDER — LABETALOL HCL 100 MG
10 TABLET ORAL ONCE
Refills: 0 | Status: COMPLETED | OUTPATIENT
Start: 2021-07-11 | End: 2021-07-11

## 2021-07-11 RX ORDER — GABAPENTIN 400 MG/1
200 CAPSULE ORAL EVERY 8 HOURS
Refills: 0 | Status: DISCONTINUED | OUTPATIENT
Start: 2021-07-11 | End: 2021-07-23

## 2021-07-11 RX ORDER — OXYCODONE HYDROCHLORIDE 5 MG/1
5 TABLET ORAL EVERY 6 HOURS
Refills: 0 | Status: DISCONTINUED | OUTPATIENT
Start: 2021-07-11 | End: 2021-07-11

## 2021-07-11 RX ORDER — OXYCODONE HYDROCHLORIDE 5 MG/1
10 TABLET ORAL EVERY 4 HOURS
Refills: 0 | Status: DISCONTINUED | OUTPATIENT
Start: 2021-07-11 | End: 2021-07-13

## 2021-07-11 RX ORDER — TETRACAINE/BENZOCAINE/BUTAMBEN 2%-14%-2%
1 OINTMENT (GRAM) TOPICAL THREE TIMES A DAY
Refills: 0 | Status: DISCONTINUED | OUTPATIENT
Start: 2021-07-11 | End: 2021-07-15

## 2021-07-11 RX ADMIN — Medication 63 MICROGRAM(S): at 21:21

## 2021-07-11 RX ADMIN — Medication 1 SPRAY(S): at 21:21

## 2021-07-11 RX ADMIN — Medication 100 MILLIGRAM(S): at 06:08

## 2021-07-11 RX ADMIN — Medication 10 MILLIGRAM(S): at 00:07

## 2021-07-11 RX ADMIN — Medication 125 MICROGRAM(S): at 06:09

## 2021-07-11 RX ADMIN — Medication 1000 MILLIGRAM(S): at 20:00

## 2021-07-11 RX ADMIN — SODIUM CHLORIDE 300 MILLILITER(S): 9 INJECTION, SOLUTION INTRAVENOUS at 10:06

## 2021-07-11 RX ADMIN — POTASSIUM PHOSPHATE, MONOBASIC POTASSIUM PHOSPHATE, DIBASIC 83.33 MILLIMOLE(S): 236; 224 INJECTION, SOLUTION INTRAVENOUS at 23:11

## 2021-07-11 RX ADMIN — Medication 100 MILLIEQUIVALENT(S): at 23:11

## 2021-07-11 RX ADMIN — HEPARIN SODIUM 14 UNIT(S)/HR: 5000 INJECTION INTRAVENOUS; SUBCUTANEOUS at 11:26

## 2021-07-11 RX ADMIN — ONDANSETRON 4 MILLIGRAM(S): 8 TABLET, FILM COATED ORAL at 17:52

## 2021-07-11 RX ADMIN — POLYETHYLENE GLYCOL 3350 17 GRAM(S): 17 POWDER, FOR SOLUTION ORAL at 06:08

## 2021-07-11 RX ADMIN — CYCLOBENZAPRINE HYDROCHLORIDE 5 MILLIGRAM(S): 10 TABLET, FILM COATED ORAL at 06:08

## 2021-07-11 RX ADMIN — Medication 10 MILLIGRAM(S): at 20:00

## 2021-07-11 RX ADMIN — SODIUM CHLORIDE 75 MILLILITER(S): 9 INJECTION, SOLUTION INTRAVENOUS at 18:06

## 2021-07-11 RX ADMIN — HEPARIN SODIUM 12 UNIT(S)/HR: 5000 INJECTION INTRAVENOUS; SUBCUTANEOUS at 06:07

## 2021-07-11 RX ADMIN — HYDROMORPHONE HYDROCHLORIDE 30 MILLILITER(S): 2 INJECTION INTRAMUSCULAR; INTRAVENOUS; SUBCUTANEOUS at 07:26

## 2021-07-11 RX ADMIN — Medication 1 TABLET(S): at 11:27

## 2021-07-11 RX ADMIN — Medication 100 GRAM(S): at 22:34

## 2021-07-11 RX ADMIN — CHLORHEXIDINE GLUCONATE 1 APPLICATION(S): 213 SOLUTION TOPICAL at 21:21

## 2021-07-11 RX ADMIN — ONDANSETRON 4 MILLIGRAM(S): 8 TABLET, FILM COATED ORAL at 06:08

## 2021-07-11 RX ADMIN — Medication 5 MILLIGRAM(S): at 17:52

## 2021-07-11 RX ADMIN — GABAPENTIN 100 MILLIGRAM(S): 400 CAPSULE ORAL at 06:08

## 2021-07-11 RX ADMIN — Medication 10 MILLIGRAM(S): at 13:10

## 2021-07-11 RX ADMIN — Medication 125 MILLILITER(S): at 20:20

## 2021-07-11 RX ADMIN — Medication 400 MILLIGRAM(S): at 19:30

## 2021-07-11 NOTE — PROGRESS NOTE ADULT - ASSESSMENT
A/P:  61 year old male with  Sacral Tumor. status post en bloc sacrectomy, Stage 2 w/ L4-pelvix fusion w/ plastic closure.    Neuro:   neuro checks q 4 hr   CT lumbar and pelvis reviewed   2 hemovac right 50 cc. left 100 cc   abd SEDRICK 150 cc output   PCA dilaudid pump   flexeril   neurontin 100 mg q 8 hr   PT/OT/OBC     Respiratory: hypoxic , respiratory distress, desaturation CAPA at night, he is now on NC  CT chest PE protocol now  r/o PE, r/o atelectasis   encourage IS     CV: MAP> 65 mmhg, he is HD stable , not on pressers   has an a line   SBP> 100 mmhg     Endocrine: hypothyroidism on synthroid     Heme/Onc:   hemorrhagic shock post-op now stable, hgb stable    s/p 2 L NS bolus overnight for decreased urine output   lovenox 40 mg sc qhs  dopplers pending   phosph repleted   NS 50 ml/hr as he will get iodine contrast   tony     ID: stephy-op ABX     GI: regular diet   senna and miralax   no BM since yesterday     Social/Family: updated   Discharge planning:     Code Status: [x] Full Code [] DNR [] DNI [] Goals of Care:   Disposition: [x] ICU [] Stroke Unit [] RCU []PCU []Floor [] Discharge Home     Patient at high risk for neurologic deterioration, critical care time, excluding procedures: 40 minutes                A/P:  61 year old male with  Sacral Tumor. status post en bloc sacrectomy, Stage 2 w/ L4-pelvix fusion w/ plastic closure.    Neuro:   neuro checks q 4 hr   2 hemovac right 125 cc. left  310 cc   abd SEDRICK 230 cc output   d/c  dilaudid pump , making him nauseous and is not pressing it  tylenol , oxy PRN   flexeril   increase neurontin 200 mg q 8 hr   PRN zofran for nausea   PT/OT/OBC     Respiratory: hypoxic ,  NC 5 L SAT 95   bilateral subsegmental PE and atelectasis on heparin drip, cleared by NS goal 50-70   encourage IS     CV: MAP> 65 mmhg, he is HD stable    SBP> 100 mmhg   no a line   HTN on labetolol     renal   has tony  urine output decreased   LR 75 ml/hr   plasmalyte bolus 1 L over 3 hrs    monitor urine output     Endocrine: hypothyroidism on synthroid   target euglycemia     Heme/Onc:   hemorrhagic shock resolved , hgb stable    heparin drip for PE   dopplers negative     ID: afebrile     GI: regular diet   senna and miralax   no BM and distended abdomen   mag citrate   zofran for nausea   LFT tonight , lipase     Social/Family: updated   Discharge planning:     Code Status: [x] Full Code [] DNR [] DNI [] Goals of Care:   Disposition: [x] ICU [] Stroke Unit [] RCU []PCU []Floor [] Discharge Home     moderate complex medical decision

## 2021-07-11 NOTE — PROGRESS NOTE ADULT - SUBJECTIVE AND OBJECTIVE BOX
SURGERY PROGRESS NOTE    SUBJECTIVE / 24H EVENTS:  Surgery re-paged by primary team due to concern for post-op ileus. Patient advanced to regular diet yesterday but has had nausea since advancement and had one episode of bilious emesis today. Previously tolerated sips of clears without issues. Patient has not had BM post-op, he is unsure if he has passed flatus. Endorses nausea but denies pain outside of incisional soreness.     OBJECTIVE:  VITAL SIGNS:  T(C): 36.8 (21 @ 15:00), Max: 37.4 (07-10-21 @ 19:00)  HR: 104 (21 @ 18:00) (79 - 104)  BP: 150/90 (21 @ 18:00) (135/80 - 170/93)  RR: 28 (21 @ 18:00) (13 - 28)  SpO2: 92% (21 @ 18:00) (90% - 100%)  Daily     Daily       PHYSICAL EXAM:  Gen: NAD  LS: Respirations unlabored, on HFNC  GI: Soft. Distended. Mildly tender to palpation in RLQ > LLQ. No rebound tenderness or guarding. Midline incision with Aquacel dressing c/d/i.  Ext: Warm, well perfused. Palpable PT and DP pulses bilaterally. 1+ pitting edema to midshins bilaterally      07-10-21 @ 07:  -  21 @ 07:00  --------------------------------------------------------  IN:    Heparin: 184 mL    IV PiggyBack: 350 mL    Lactated Ringers: 750 mL    Oral Fluid: 860 mL    sodium chloride 0.9% w/ Additives: 550 mL  Total IN: 2694 mL    OUT:    Bulb (mL): 230 mL    Drain (mL): 310 mL    Drain (mL): 125 mL    Indwelling Catheter - Urethral (mL): 905 mL  Total OUT: 1570 mL    Total NET: 1124 mL      21 @ 07:01  -  21 @ 18:05  --------------------------------------------------------  IN:    Heparin: 136 mL    Lactated Ringers: 750 mL    multiple electrolytes Injection Type 1.: 1000 mL    Oral Fluid: 270 mL  Total IN: 2156 mL    OUT:    Bulb (mL): 100 mL    Drain (mL): 100 mL    Drain (mL): 50 mL    Indwelling Catheter - Urethral (mL): 400 mL  Total OUT: 650 mL    Total NET: 1506 mL          LAB VALUES:  07-10    140  |  105  |  21  ----------------------------<  160<H>  4.0   |  24  |  0.59    Ca    8.6      10 Jul 2021 20:34  Phos  2.2     07-10  Mg     2.1     07-10    TPro  5.8<L>  /  Alb  2.5<L>  /  TBili  0.5  /  DBili  0.2  /  AST  50<H>  /  ALT  37  /  AlkPhos  61                                 11.1   18.55 )-----------( 160      ( 10 Jul 2021 20:34 )             33.1     LIVER FUNCTIONS - ( 2021 16:15 )  Alb: 2.5 g/dL / Pro: 5.8 g/dL / ALK PHOS: 61 U/L / ALT: 37 U/L / AST: 50 U/L / GGT: x           PTT - ( 2021 16:15 )  PTT:41.3 sec  ABG - ( 10 Jul 2021 06:50 )  pH, Arterial: 7.48  pH, Blood: x     /  pCO2: 35    /  pO2: 71    / HCO3: 26    / Base Excess: 2.8   /  SaO2: 95                    Urinalysis Basic - ( 10 Jul 2021 05:07 )    Color: Yellow / Appearance: Slightly Turbid / S.033 / pH: x  Gluc: x / Ketone: Small  / Bili: Negative / Urobili: Negative   Blood: x / Protein: 30 mg/dL / Nitrite: Negative   Leuk Esterase: Negative / RBC: 233 /hpf / WBC 3 /HPF   Sq Epi: x / Non Sq Epi: 1 /hpf / Bacteria: Negative        MICROBIOLOGY:      RADIOLOGY:        MEDICATIONS  (STANDING):  bisacodyl 5 milliGRAM(s) Oral at bedtime  calcium carbonate 1250 mG  + Vitamin D (OsCal 500 + D) 1 Tablet(s) Oral daily  chlorhexidine 4% Liquid 1 Application(s) Topical <User Schedule>  cyclobenzaprine 5 milliGRAM(s) Oral three times a day  gabapentin 200 milliGRAM(s) Oral every 8 hours  heparin  Infusion 1000 Unit(s)/Hr (16 mL/Hr) IV Continuous <Continuous>  labetalol 100 milliGRAM(s) Oral every 8 hours  lactated ringers. 1000 milliLiter(s) (75 mL/Hr) IV Continuous <Continuous>  levothyroxine 125 MICROGram(s) Oral daily  metoclopramide Injectable 5 milliGRAM(s) IV Push every 8 hours  multiple electrolytes Injection Type 1 1000 milliLiter(s) (300 mL/Hr) IV Continuous <Continuous>  polyethylene glycol 3350 17 Gram(s) Oral every 12 hours  senna 2 Tablet(s) Oral at bedtime    MEDICATIONS  (PRN):  acetaminophen   Tablet .. 650 milliGRAM(s) Oral every 6 hours PRN Temp greater or equal to 38C (100.4F), Mild Pain (1 - 3)  aluminum hydroxide/magnesium hydroxide/simethicone Suspension 30 milliLiter(s) Oral every 4 hours PRN Dyspepsia  bisacodyl Suppository 10 milliGRAM(s) Rectal daily PRN Constipation  naloxone Injectable 0.1 milliGRAM(s) IV Push every 3 minutes PRN For ANY of the following changes in patient status:  A. RR LESS THAN 10 breaths per minute, B. Oxygen saturation LESS THAN 90%, C. Sedation score of 6  ondansetron Injectable 4 milliGRAM(s) IV Push every 6 hours PRN Nausea  oxyCODONE    IR 5 milliGRAM(s) Oral every 6 hours PRN Mild Pain (1 - 3)  oxyCODONE    IR 10 milliGRAM(s) Oral every 4 hours PRN Moderate Pain (4 - 6)

## 2021-07-11 NOTE — PROGRESS NOTE ADULT - SUBJECTIVE AND OBJECTIVE BOX
Pain Management Attending Addendum    SUBJECTIVE: Patient doing well with IV PCA    Therapy:    [X] IV PCA         [ ] PRN Analgesics    OBJECTIVE:   [X] Pain appropriately controlled    [ ] Other:    Side Effects:  [X] None	             [ ] Nausea              [ ] Pruritis                	[ ] Other:    ASSESSMENT/PLAN:  Therapy changed to PRN analgesics

## 2021-07-11 NOTE — PROGRESS NOTE ADULT - ASSESSMENT
61yMale patient s/p en bloc sacrectomy for chordoma w/ L4-pelvis fusion w/ plastics closure vertical rectus abdominis myocutaneous (VRAM) flap for chordoma on 07/07 and 07/08, post-op course c/b bilateral PEs, now on hep gtt. Surgery reconsulted for concern for post-op ileus.     Plan:  - Strict NPO  - Consider NGT placement if further episodes of vomiting  - f/u AXR, consider CT scan if extremely dilated loops of bowel on XR  - Care per NSGY primary  - Will continue to follow     [facility] Surgery  [id number]

## 2021-07-11 NOTE — PROGRESS NOTE ADULT - SUBJECTIVE AND OBJECTIVE BOX
· Subjective and Objective:   HPI:  61 year old male with PMH of Thyroid Cancer, s/p Total Thyroidectomy 2010 and Radioactive Iodine 2011, Hypogonadism, Vitamin D Deficiency, Osteopenia with recently diagnosed Sacral Tumor. Patient endorses back pain that started in August 2020, he reports that he was treat by PCP with steroids and muscle relaxants. He reports that he had some relief, however the pain lingered. He was subsequently referred to an Orthopedic Specialist who ordered an MRI of Lumbar Spine, which revealed a Sacral Tumor with extension into the Presacral space. 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 Patient reports that he suffers from chronic constipation, he has noted that his urine stream is weaker than before, he reports right buttock and right scrotal pain and numbness. He denies decreased strength or problems walking. He presents to PST today for evaluation prior to scheduled Removal of Sacral Tumor on 7/6/2021.    fully covid vaccinated; proof of vaccination placed on chart (25 Jun 2021 11:47)    SURGERY: Reconstruction of abdominoperineal defect using vertical rectus abdominis myocutaneous (VRAM) flap    Sacrectomy    PAST MEDICAL & SURGICAL HISTORY:  HLD (hyperlipidemia)  Thyroid cancer  2010  History of central serous retinopathy      REVIEW OF SYSTEMS: [ ] Unable to Assess due to neurologic exam   [ ] All ROS addressed below are non-contributory, except:  Neuro: [ ] Headache [x ] Back pain [ ] Numbness [ ] Weakness [ ] Ataxia [ ] Dizziness [ ] Aphasia [ ] Dysarthria [ ] Visual disturbance  Resp: [ ] Shortness of breath/dyspnea, [ ] Orthopnea [ ] Cough  CV: [ ] Chest pain [ ] Palpitation [ ] Lightheadedness [ ] Syncope  Renal: [ ] Thirst [ ] Edema  GI: [ ] Nausea [ ] Emesis [ ] Abdominal pain [ ] Constipation [ ] Diarrhea  Hem: [ ] Hematemesis [ ] bright red blood per rectum  ID: [ ] Fever [ ] Chills [ ] Dysuria  ENT: [ ] Rhinorrhea    H/O hypogonadism  H/O vitamin D deficiency  H/O osteopenia  H/O thyroidectomy Total --2010  H/O colonoscopy    Allergies  No Known Allergies  Intolerances    EVENTS:   no acute events overnight     PHYSICAL EXAM:    General: No Acute Distress   Neurological: Awake, alert oriented to person, place and time, Following Commands, PERRL, EOMI, Face Symmetrical, Speech Fluent, Moving all extremities, Muscle Strength normal in UE,  LE limited by pain, right LE 4/5, left LE 4+   Pulmonary: Clear to Auscultation, No Rales, No Rhonchi, No Wheezes   Cardiovascular: S1, S2, Regular Rate and Rhythm   Gastrointestinal: Soft, Nontender, Nondistended   Extremities: No calf tenderness                · Subjective and Objective:   HPI:  61 year old male with PMH of Thyroid Cancer, s/p Total Thyroidectomy 2010 and Radioactive Iodine 2011, Hypogonadism, Vitamin D Deficiency, Osteopenia with recently diagnosed Sacral Tumor. Patient endorses back pain that started in August 2020, he reports that he was treat by PCP with steroids and muscle relaxants. He reports that he had some relief, however the pain lingered. He was subsequently referred to an Orthopedic Specialist who ordered an MRI of Lumbar Spine, which revealed a Sacral Tumor with extension into the Presacral space. 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 Patient reports that he suffers from chronic constipation, he has noted that his urine stream is weaker than before, he reports right buttock and right scrotal pain and numbness. He denies decreased strength or problems walking. He presents to PST today for evaluation prior to scheduled Removal of Sacral Tumor on 7/6/2021.    fully covid vaccinated; proof of vaccination placed on chart (25 Jun 2021 11:47)    SURGERY: Reconstruction of abdominoperineal defect using vertical rectus abdominis myocutaneous (VRAM) flap    Sacrectomy    PAST MEDICAL & SURGICAL HISTORY:  HLD (hyperlipidemia)  Thyroid cancer  2010  History of central serous retinopathy      REVIEW OF SYSTEMS: [ ] Unable to Assess due to neurologic exam   [ ] All ROS addressed below are non-contributory, except:  Neuro: [ ] Headache [x ] Back pain [ ] Numbness [ ] Weakness [ ] Ataxia [ ] Dizziness [ ] Aphasia [ ] Dysarthria [ ] Visual disturbance  Resp: [ ] Shortness of breath/dyspnea, [ ] Orthopnea [ ] Cough  CV: [ ] Chest pain [ ] Palpitation [ ] Lightheadedness [ ] Syncope  Renal: [ ] Thirst [ ] Edema  GI: [ ] Nausea [ ] Emesis [ ] Abdominal pain [ ] Constipation [ ] Diarrhea  Hem: [ ] Hematemesis [ ] bright red blood per rectum  ID: [ ] Fever [ ] Chills [ ] Dysuria  ENT: [ ] Rhinorrhea    H/O hypogonadism  H/O vitamin D deficiency  H/O osteopenia  H/O thyroidectomy Total --2010  H/O colonoscopy    Allergies  No Known Allergies  Intolerances    EVENTS:   nausea, vomited     T(C): 37 (07-11-21 @ 07:00), Max: 37.4 (07-10-21 @ 15:00)  HR: 90 (07-11-21 @ 08:00) (79 - 104)  BP: 158/104 (07-11-21 @ 08:00) (137/93 - 187/88)  RR: 17 (07-11-21 @ 08:00) (13 - 25)  SpO2: 98% (07-11-21 @ 08:00) (93% - 100%)  07-10-21 @ 07:01  -  07-11-21 @ 07:00  --------------------------------------------------------  IN: 2694 mL / OUT: 1570 mL / NET: 1124 mL    07-11-21 @ 07:01  -  07-11-21 @ 09:53  --------------------------------------------------------  IN: 174 mL / OUT: 25 mL / NET: 149 mL    acetaminophen   Tablet .. 650 milliGRAM(s) Oral every 6 hours PRN  aluminum hydroxide/magnesium hydroxide/simethicone Suspension 30 milliLiter(s) Oral every 4 hours PRN  bisacodyl 5 milliGRAM(s) Oral at bedtime  bisacodyl Suppository 10 milliGRAM(s) Rectal daily PRN  calcium carbonate 1250 mG  + Vitamin D (OsCal 500 + D) 1 Tablet(s) Oral daily  chlorhexidine 4% Liquid 1 Application(s) Topical <User Schedule>  cyclobenzaprine 5 milliGRAM(s) Oral three times a day  fenofibrate Tablet 145 milliGRAM(s) Oral daily  gabapentin 100 milliGRAM(s) Oral every 8 hours  heparin  Infusion 1000 Unit(s)/Hr IV Continuous <Continuous>  HYDROmorphone PCA (1 mG/mL) 30 milliLiter(s) PCA Continuous PCA Continuous  HYDROmorphone PCA (1 mG/mL) Rescue Clinician Bolus 0.5 milliGRAM(s) IV Push every 15 minutes PRN  labetalol 100 milliGRAM(s) Oral every 8 hours  lactated ringers. 1000 milliLiter(s) IV Continuous <Continuous>  levothyroxine 125 MICROGram(s) Oral daily  naloxone Injectable 0.1 milliGRAM(s) IV Push every 3 minutes PRN  ondansetron Injectable 4 milliGRAM(s) IV Push every 6 hours PRN  polyethylene glycol 3350 17 Gram(s) Oral every 12 hours  senna 2 Tablet(s) Oral at bedtime      PHYSICAL EXAM:    General: No Acute Distress   Neurological: Awake, alert oriented to person, place and time, Following Commands, PERRL, EOMI, Face Symmetrical, Speech Fluent, Moving all extremities, Muscle Strength normal in UE,  LE limited by pain, right LE 4/5, left LE 4+   Pulmonary: Clear to Auscultation, No Rales, No Rhonchi, No Wheezes   Cardiovascular: S1, S2, Regular Rate and Rhythm   Gastrointestinal: Soft, distended , tender  Extremities: No calf tenderness        LABS:  Na: 140 (07-10 @ 20:34), 142 (07-09 @ 23:25), 141 (07-08 @ 22:48)  K: 4.0 (07-10 @ 20:34), 3.7 (07-09 @ 23:25), 4.5 (07-08 @ 22:48)  Cl: 105 (07-10 @ 20:34), 108 (07-09 @ 23:25), 110 (07-08 @ 22:48)  CO2: 24 (07-10 @ 20:34), 21 (07-09 @ 23:25), 17 (07-08 @ 22:48)  BUN: 21 (07-10 @ 20:34), 15 (07-09 @ 23:25), 19 (07-08 @ 22:48)  Cr: 0.59 (07-10 @ 20:34), 0.59 (07-09 @ 23:25), 0.75 (07-08 @ 22:48)  Glu: 160(07-10 @ 20:34), 143(07-09 @ 23:25), 128(07-08 @ 22:48)    Hgb: 11.1 (07-10 @ 20:34), 10.6 (07-09 @ 23:25), 10.1 (07-09 @ 17:25), 10.1 (07-09 @ 11:22), 9.6 (07-09 @ 05:19), 12.1 (07-08 @ 22:47)  Hct: 33.1 (07-10 @ 20:34), 31.4 (07-09 @ 23:25), 29.7 (07-09 @ 17:25), 29.3 (07-09 @ 11:22), 28.5 (07-09 @ 05:19), 35.7 (07-08 @ 22:47)  WBC: 18.55 (07-10 @ 20:34), 15.08 (07-09 @ 23:25), 13.56 (07-09 @ 17:25), 12.16 (07-09 @ 11:22), 10.50 (07-09 @ 05:19), 10.62 (07-08 @ 22:47)  Plt: 160 (07-10 @ 20:34), 130 (07-09 @ 23:25), 118 (07-09 @ 17:25), 102 (07-09 @ 11:22), 104 (07-09 @ 05:19), 89 (07-08 @ 22:47)    INR:   PTT: 42.6 07-11-21 @ 08:28, 37.9 07-11-21 @ 02:54, 36.6 07-10-21 @ 20:34

## 2021-07-11 NOTE — PROGRESS NOTE ADULT - SUBJECTIVE AND OBJECTIVE BOX
Patient seen and examined    T(C): 37.4 (07-10-21 @ 19:00), Max: 37.4 (07-10-21 @ 15:00)  HR: 100 (07-11-21 @ 00:00) (82 - 105)  BP: 170/93 (07-11-21 @ 00:00) (131/69 - 187/88)  RR: 25 (07-11-21 @ 00:00) (13 - 26)  SpO2: 100% (07-11-21 @ 00:00) (92% - 100%)    Overnight events:  noted to have sub seg PEs on low dose heparin drip no bolus, SOB improving  Exam:  AOx3 pupils R b/l, FC, BUE 5/5, LLE 5/5, RLE 4+/5, groin sensation intact, tony sensation intact.

## 2021-07-12 LAB
ANION GAP SERPL CALC-SCNC: 14 MMOL/L — SIGNIFICANT CHANGE UP (ref 5–17)
ANION GAP SERPL CALC-SCNC: 14 MMOL/L — SIGNIFICANT CHANGE UP (ref 5–17)
APTT BLD: 56 SEC — HIGH (ref 27.5–35.5)
APTT BLD: 65.6 SEC — HIGH (ref 27.5–35.5)
APTT BLD: 66.4 SEC — HIGH (ref 27.5–35.5)
BUN SERPL-MCNC: 28 MG/DL — HIGH (ref 7–23)
BUN SERPL-MCNC: 28 MG/DL — HIGH (ref 7–23)
CALCIUM SERPL-MCNC: 8.8 MG/DL — SIGNIFICANT CHANGE UP (ref 8.4–10.5)
CALCIUM SERPL-MCNC: 8.8 MG/DL — SIGNIFICANT CHANGE UP (ref 8.4–10.5)
CHLORIDE SERPL-SCNC: 103 MMOL/L — SIGNIFICANT CHANGE UP (ref 96–108)
CHLORIDE SERPL-SCNC: 103 MMOL/L — SIGNIFICANT CHANGE UP (ref 96–108)
CO2 SERPL-SCNC: 26 MMOL/L — SIGNIFICANT CHANGE UP (ref 22–31)
CO2 SERPL-SCNC: 27 MMOL/L — SIGNIFICANT CHANGE UP (ref 22–31)
CREAT SERPL-MCNC: 0.73 MG/DL — SIGNIFICANT CHANGE UP (ref 0.5–1.3)
CREAT SERPL-MCNC: 0.75 MG/DL — SIGNIFICANT CHANGE UP (ref 0.5–1.3)
GAS PNL BLDA: SIGNIFICANT CHANGE UP
GLUCOSE SERPL-MCNC: 122 MG/DL — HIGH (ref 70–99)
GLUCOSE SERPL-MCNC: 127 MG/DL — HIGH (ref 70–99)
GRAM STN FLD: SIGNIFICANT CHANGE UP
MAGNESIUM SERPL-MCNC: 2 MG/DL — SIGNIFICANT CHANGE UP (ref 1.6–2.6)
MAGNESIUM SERPL-MCNC: 2.1 MG/DL — SIGNIFICANT CHANGE UP (ref 1.6–2.6)
PHOSPHATE SERPL-MCNC: 2.8 MG/DL — SIGNIFICANT CHANGE UP (ref 2.5–4.5)
PHOSPHATE SERPL-MCNC: 3.1 MG/DL — SIGNIFICANT CHANGE UP (ref 2.5–4.5)
POTASSIUM SERPL-MCNC: 3.2 MMOL/L — LOW (ref 3.5–5.3)
POTASSIUM SERPL-MCNC: 3.8 MMOL/L — SIGNIFICANT CHANGE UP (ref 3.5–5.3)
POTASSIUM SERPL-SCNC: 3.2 MMOL/L — LOW (ref 3.5–5.3)
POTASSIUM SERPL-SCNC: 3.8 MMOL/L — SIGNIFICANT CHANGE UP (ref 3.5–5.3)
SODIUM SERPL-SCNC: 143 MMOL/L — SIGNIFICANT CHANGE UP (ref 135–145)
SODIUM SERPL-SCNC: 144 MMOL/L — SIGNIFICANT CHANGE UP (ref 135–145)
SPECIMEN SOURCE: SIGNIFICANT CHANGE UP

## 2021-07-12 PROCEDURE — 99233 SBSQ HOSP IP/OBS HIGH 50: CPT

## 2021-07-12 PROCEDURE — 99291 CRITICAL CARE FIRST HOUR: CPT

## 2021-07-12 PROCEDURE — 99232 SBSQ HOSP IP/OBS MODERATE 35: CPT

## 2021-07-12 PROCEDURE — 71045 X-RAY EXAM CHEST 1 VIEW: CPT | Mod: 26

## 2021-07-12 PROCEDURE — 99222 1ST HOSP IP/OBS MODERATE 55: CPT

## 2021-07-12 RX ORDER — METOCLOPRAMIDE HCL 10 MG
10 TABLET ORAL EVERY 8 HOURS
Refills: 0 | Status: DISCONTINUED | OUTPATIENT
Start: 2021-07-12 | End: 2021-07-15

## 2021-07-12 RX ORDER — POTASSIUM CHLORIDE 20 MEQ
40 PACKET (EA) ORAL ONCE
Refills: 0 | Status: COMPLETED | OUTPATIENT
Start: 2021-07-12 | End: 2021-07-12

## 2021-07-12 RX ORDER — POTASSIUM PHOSPHATE, MONOBASIC POTASSIUM PHOSPHATE, DIBASIC 236; 224 MG/ML; MG/ML
30 INJECTION, SOLUTION INTRAVENOUS ONCE
Refills: 0 | Status: COMPLETED | OUTPATIENT
Start: 2021-07-12 | End: 2021-07-12

## 2021-07-12 RX ORDER — PANTOPRAZOLE SODIUM 20 MG/1
40 TABLET, DELAYED RELEASE ORAL DAILY
Refills: 0 | Status: DISCONTINUED | OUTPATIENT
Start: 2021-07-12 | End: 2021-07-19

## 2021-07-12 RX ORDER — ACETAMINOPHEN 500 MG
1000 TABLET ORAL ONCE
Refills: 0 | Status: COMPLETED | OUTPATIENT
Start: 2021-07-12 | End: 2021-07-12

## 2021-07-12 RX ORDER — POTASSIUM CHLORIDE 20 MEQ
10 PACKET (EA) ORAL
Refills: 0 | Status: COMPLETED | OUTPATIENT
Start: 2021-07-12 | End: 2021-07-12

## 2021-07-12 RX ORDER — CALCIUM GLUCONATE 100 MG/ML
2 VIAL (ML) INTRAVENOUS ONCE
Refills: 0 | Status: COMPLETED | OUTPATIENT
Start: 2021-07-12 | End: 2021-07-12

## 2021-07-12 RX ORDER — SODIUM CHLORIDE 9 MG/ML
1000 INJECTION, SOLUTION INTRAVENOUS
Refills: 0 | Status: DISCONTINUED | OUTPATIENT
Start: 2021-07-12 | End: 2021-07-12

## 2021-07-12 RX ORDER — METHYLNALTREXONE BROMIDE 12 MG/.6ML
12 INJECTION, SOLUTION SUBCUTANEOUS EVERY OTHER DAY
Refills: 0 | Status: DISCONTINUED | OUTPATIENT
Start: 2021-07-12 | End: 2021-07-15

## 2021-07-12 RX ORDER — SODIUM CHLORIDE 9 MG/ML
1000 INJECTION, SOLUTION INTRAVENOUS
Refills: 0 | Status: DISCONTINUED | OUTPATIENT
Start: 2021-07-12 | End: 2021-07-17

## 2021-07-12 RX ADMIN — PANTOPRAZOLE SODIUM 40 MILLIGRAM(S): 20 TABLET, DELAYED RELEASE ORAL at 18:07

## 2021-07-12 RX ADMIN — Medication 100 MILLIEQUIVALENT(S): at 01:52

## 2021-07-12 RX ADMIN — Medication 10 MILLIGRAM(S): at 10:20

## 2021-07-12 RX ADMIN — Medication 100 MILLIEQUIVALENT(S): at 00:16

## 2021-07-12 RX ADMIN — POTASSIUM PHOSPHATE, MONOBASIC POTASSIUM PHOSPHATE, DIBASIC 83.33 MILLIMOLE(S): 236; 224 INJECTION, SOLUTION INTRAVENOUS at 20:41

## 2021-07-12 RX ADMIN — CHLORHEXIDINE GLUCONATE 1 APPLICATION(S): 213 SOLUTION TOPICAL at 21:02

## 2021-07-12 RX ADMIN — Medication 100 MILLIEQUIVALENT(S): at 17:04

## 2021-07-12 RX ADMIN — Medication 100 MILLIGRAM(S): at 21:04

## 2021-07-12 RX ADMIN — Medication 200 GRAM(S): at 14:11

## 2021-07-12 RX ADMIN — Medication 100 MILLIEQUIVALENT(S): at 18:07

## 2021-07-12 RX ADMIN — Medication 1 ENEMA: at 01:15

## 2021-07-12 RX ADMIN — Medication 2 MILLIGRAM(S): at 05:03

## 2021-07-12 RX ADMIN — Medication 100 MILLIEQUIVALENT(S): at 18:46

## 2021-07-12 RX ADMIN — Medication 10 MILLIGRAM(S): at 13:54

## 2021-07-12 RX ADMIN — Medication 1 SPRAY(S): at 05:03

## 2021-07-12 RX ADMIN — Medication 400 MILLIGRAM(S): at 13:56

## 2021-07-12 RX ADMIN — HEPARIN SODIUM 18 UNIT(S)/HR: 5000 INJECTION INTRAVENOUS; SUBCUTANEOUS at 11:25

## 2021-07-12 RX ADMIN — Medication 5 MILLIGRAM(S): at 21:01

## 2021-07-12 RX ADMIN — Medication 1000 MILLIGRAM(S): at 13:45

## 2021-07-12 RX ADMIN — GABAPENTIN 200 MILLIGRAM(S): 400 CAPSULE ORAL at 21:02

## 2021-07-12 RX ADMIN — Medication 1 SPRAY(S): at 13:55

## 2021-07-12 RX ADMIN — Medication 1 SPRAY(S): at 23:00

## 2021-07-12 RX ADMIN — Medication 40 MILLIEQUIVALENT(S): at 21:01

## 2021-07-12 RX ADMIN — Medication 1 ENEMA: at 18:06

## 2021-07-12 RX ADMIN — SODIUM CHLORIDE 100 MILLILITER(S): 9 INJECTION, SOLUTION INTRAVENOUS at 13:55

## 2021-07-12 RX ADMIN — SENNA PLUS 2 TABLET(S): 8.6 TABLET ORAL at 21:01

## 2021-07-12 RX ADMIN — Medication 63 MICROGRAM(S): at 21:02

## 2021-07-12 RX ADMIN — METHYLNALTREXONE BROMIDE 12 MILLIGRAM(S): 12 INJECTION, SOLUTION SUBCUTANEOUS at 06:31

## 2021-07-12 RX ADMIN — Medication 10 MILLIGRAM(S): at 21:02

## 2021-07-12 RX ADMIN — Medication 5 MILLIGRAM(S): at 05:02

## 2021-07-12 NOTE — DIETITIAN INITIAL EVALUATION ADULT. - CHIEF COMPLAINT
Pt is a 60 yo M with PMH: thyroid cancer s/p total thyroidectomy 2010, radioactive treatment 2011, hypogonadism, vitamin D deficiency, osteopenia. 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 Admitted 7/7 for staged resection. On 7/7, pt underwent plastic surgery and general surgery assisted vertical rectus abdominal myocutaneous flap harvest with transperitoneal ligation of internal iliac artery and vein. He underwent en bloc sacrectomy on 7/8. Pt extubated 7/9. Hospital course c/b: CTA chest, b/c upper lobe segmental PEs; post-op ileus with NGT in place for low wall suction.

## 2021-07-12 NOTE — DIETITIAN INITIAL EVALUATION ADULT. - ADD RECOMMEND
1) Defer initiation of PO diet to medical team. 2) As able, recommend low fiber diet. Defer consistency to medical team, SLP. 3) Monitor wt trends/nutrition related labs/skin/hydration status/bowel regularity. 4) Consider multivitamin to aid in prevention of micronutrient deficiencies (if no medication contraindications noted). 5. Malnutrition sticker placed.

## 2021-07-12 NOTE — CONSULT NOTE ADULT - ASSESSMENT
61 year old male with PMH of Thyroid Cancer, s/p Total Thyroidectomy 2010 and Radioactive Iodine 2011, Hypogonadism, Vitamin D Deficiency, Osteopenia with recently diagnosed Sacral Tumor. Admitted on 7/7, he underwent Plastic Surgery and General Surgery assisted vertical rectus abdominal myocutaneous flap harvest with transperitoneal ligation of internal iliac artery and vein. He underwent en bloc sacrectomy on 7/8, with L4-pelvis fusion, transfused 8 units PRBC, 6 units FFP, 1 pack platelets and 5L crystalloid; post-op status post 1 pack platelets, CTA 7/10 with bilateral upper lobe segmental PEs,  now with post op ileus      #Abdominal Distension with post-op ileus. no cecal distension on imaging, ileus involving SB, no rectal distension on exam  -continue NGT decompression (LIWS)  -Dulcolax VT x 1 today  -Continue Reglan as ordered  -no GI objection to laxatives as ordered via NGT (clamp for 30 minutes after administration)  -OOB/PT; bed mobility/turning  -check TSH (s/p thyroidectomy on IV synthroid)  -monitor/replete lytes PRN  goal K 4-4.5 and Mg>2  -will add IV PPI for GI prophylaxis  -Surgery following    Further care per primary team  [place] [facility]    Connor Camp PA-C    [facility]  [phone]  After hours and weekend coverage [phone]

## 2021-07-12 NOTE — CONSULT NOTE ADULT - SUBJECTIVE AND OBJECTIVE BOX
HPI:  61 year old male with PMH of Thyroid Cancer, s/p Total Thyroidectomy 2010 and Radioactive Iodine 2011, Hypogonadism, Vitamin D Deficiency, Osteopenia with recently diagnosed Sacral Tumor. Patient endorses back pain that started in August 2020, he reports that he was treat by PCP with steroids and muscle relaxants. He reports that he had some relief, however the pain lingered. He was subsequently referred to an Orthopedic Specialist who ordered an MRI of Lumbar Spine, which revealed a Sacral Tumor with extension into the Presacral space. 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 Patient reports that he suffers from chronic constipation, he has noted that his urine stream is weaker than before, he reports right buttock and right scrotal pain and numbness. He denies decreased strength or problems walking. He presents to PST today for evaluation prior to scheduled Removal of Sacral Tumor on 7/6/2021.    fully covid vaccinated; proof of vaccination placed on chart (25 Jun 2021 11:47)    Patient was admitted on 7/7, he underwent Plastic Surgery and General Surgery assisted vertical rectus abdominal myocutaneous flap harvest with transperitoneal ligation of internal iliac artery and vein. He underwent en bloc sacrectomy on 7/8, with L4-pelvis fusion, transfused 8 units PRBC, 6 units FFP, 1 pack platelets and 5L crystalloid; post-op status post 1 pack platelets, CTA 7/10 with bilateral upper lobe segmental PEs, post op ileus/NGT. Patient seen today in NSCU, denies weakness, pain controlled.       REVIEW OF SYSTEMS  Constitutional - No fever, No weight loss, No fatigue  HEENT - No eye pain, No visual disturbances, No difficulty hearing, No tinnitus, No vertigo, No neck pain  Respiratory - No cough, No wheezing, No shortness of breath  Cardiovascular - No chest pain, No palpitations  Gastrointestinal - No abdominal pain, No nausea, No vomiting, No diarrhea, No constipation  Genitourinary - No dysuria, No frequency, No hematuria, No incontinence  Neurological - No headaches, No memory loss, No loss of strength, No numbness, No tremors  Psychiatric - No depression, No anxiety    VITALS  T(C): 37.7 (07-12-21 @ 07:00), Max: 37.8 (07-12-21 @ 03:00)  HR: 103 (07-12-21 @ 10:31) (86 - 135)  BP: 153/80 (07-12-21 @ 08:00) (124/69 - 166/97)  RR: 22 (07-12-21 @ 10:31) (16 - 38)  SpO2: 93% (07-12-21 @ 10:31) (84% - 98%)  Wt(kg): --    PAST MEDICAL & SURGICAL HISTORY  Hypothyroidism    HLD (hyperlipidemia)    Thyroid cancer    History of central serous retinopathy    H/O hypogonadism    H/O vitamin D deficiency    H/O osteopenia    H/O thyroidectomy    H/O colonoscopy        SOCIAL HISTORY  Smoking - Denied  EtOH - Denied   Drugs - Denied    FUNCTIONAL HISTORY  Lives with father, private home, 4 RODRIGO  Independent AMB and ADLs, PTA     CURRENT FUNCTIONAL STATUS  7/11 PT  bed mobility mod assist x 2  transfers mod assist x 2, RW  gait min assist x 2, 3 feet with RW    7/9 OT  bed mobility mod to max assist x2  transfers mod assist x 2      FAMILY HISTORY   no pertinent history in first degree relatives     RECENT LABS/IMAGING  CBC Full  -  ( 11 Jul 2021 21:58 )  WBC Count : 10.67 K/uL  RBC Count : 3.48 M/uL  Hemoglobin : 10.3 g/dL  Hematocrit : 30.6 %  Platelet Count - Automated : 183 K/uL  Mean Cell Volume : 87.9 fl  Mean Cell Hemoglobin : 29.6 pg  Mean Cell Hemoglobin Concentration : 33.7 gm/dL  Auto Neutrophil # : x  Auto Lymphocyte # : x  Auto Monocyte # : x  Auto Eosinophil # : x  Auto Basophil # : x  Auto Neutrophil % : x  Auto Lymphocyte % : x  Auto Monocyte % : x  Auto Eosinophil % : x  Auto Basophil % : x    07-12    143  |  103  |  28<H>  ----------------------------<  127<H>  3.8   |  26  |  0.73    Ca    8.8      12 Jul 2021 04:48  Phos  3.1     07-12  Mg     2.0     07-12    TPro  5.8<L>  /  Alb  2.5<L>  /  TBili  0.5  /  DBili  0.2  /  AST  50<H>  /  ALT  37  /  AlkPhos  61  07-11    < from: CT Angio Chest PE Protocol w/ IV Cont (07.10.21 @ 11:43) >    IMPRESSION:    Small bilateral upper lobe subsegmental pulmonary emboli. No right heart strain.    Bibasilar and left lingular consolidative opacities with associated volume loss likely represent atelectasis.      < end of copied text >    < from: CT Pelvis No Cont (07.09.21 @ 10:04) >    IMPRESSION:  1. Postsurgical changes are present from sacral resection and lumbo-iliac spinal fusion.  2. Fine linear hyperdensity is present extending along the right margin of the L4 vertebral body and slightly into the adjacent inferior vena cava, possibly reflecting surgical material or venous cement.      < end of copied text >      ALLERGIES  No Known Allergies      MEDICATIONS   acetaminophen   Tablet .. 650 milliGRAM(s) Oral every 6 hours PRN  aluminum hydroxide/magnesium hydroxide/simethicone Suspension 30 milliLiter(s) Oral every 4 hours PRN  bisacodyl 5 milliGRAM(s) Oral at bedtime  bisacodyl Suppository 10 milliGRAM(s) Rectal daily PRN  bisacodyl Suppository 10 milliGRAM(s) Rectal once  calcium carbonate 1250 mG  + Vitamin D (OsCal 500 + D) 1 Tablet(s) Oral daily  chlorhexidine 4% Liquid 1 Application(s) Topical <User Schedule>  diazepam  Injectable 2 milliGRAM(s) IV Push every 6 hours PRN  gabapentin 200 milliGRAM(s) Oral every 8 hours  heparin  Infusion 1000 Unit(s)/Hr IV Continuous <Continuous>  labetalol 100 milliGRAM(s) Oral every 8 hours  lactated ringers. 1000 milliLiter(s) IV Continuous <Continuous>  levothyroxine Injectable 63 MICROGram(s) IV Push at bedtime  methylnaltrexone Injectable 12 milliGRAM(s) SubCutaneous every other day  metoclopramide Injectable 10 milliGRAM(s) IV Push every 8 hours  naloxone Injectable 0.1 milliGRAM(s) IV Push every 3 minutes PRN  ondansetron Injectable 4 milliGRAM(s) IV Push every 6 hours PRN  oxyCODONE    IR 5 milliGRAM(s) Oral every 6 hours PRN  oxyCODONE    IR 10 milliGRAM(s) Oral every 4 hours PRN  polyethylene glycol 3350 17 Gram(s) Oral every 12 hours  senna 2 Tablet(s) Oral at bedtime  tetracaine/benzocaine/butamben Spray 1 Spray(s) Topical three times a day      ----------------------------------------------------------------------------------------  PHYSICAL EXAM  Constitutional - NAD, Comfortable, in bed, +drains    HEENT - NGT  Chest - Breathing comfortably, on hi-flow oxygen   Cardiovascular - S1S2   Abdomen - Soft   Extremities - No C/C/E, No calf tenderness   Neurologic Exam -                    Cognitive - Awake, Alert, AAO to self, place, date, year, situation     Communication - Fluent, No dysarthria     Cranial Nerves - CN 2-12 intact     Motor - No focal deficits                    LEFT    UE - ShAB 5/5, EF 5/5, EE 5/5, WE 5/5,  5/5                    RIGHT UE - ShAB 5/5, EF 5/5, EE 5/5, WE 5/5,  5/5                    LEFT    LE - HF 5/5, KE 5/5, DF 5/5, PF 5/5                    RIGHT LE - HF 5/5, KE 5/5, DF 5/5, PF 5/5        Sensory - Intact to LT     Psychiatric - Mood stable, Affect WNL  ----------------------------------------------------------------------------------------  ASSESSMENT/PLAN  61yMale with functional deficits after sacrectomy, L4-pelvis fusion for chordoma  post op PE on high flow, heparin drip   ileus, NPO with NGT to suction, reglan, GI, surgery consulted   urinary retention, tony  Pain - Tylenol, diazepam prn, oxycodone, gabapentin   DVT PPX - SCDs  Rehab - Will continue to follow for ongoing rehab needs and recommendations.   continue bedside PT/OT  out of bed to chair daily   Recommend ACUTE inpatient rehabilitation for the functional deficits consisting of 3 hours of therapy/day & 24 hour RN/daily PMR physician for comorbid medical management. Patient will be able to tolerate 3 hours a day.

## 2021-07-12 NOTE — DIETITIAN INITIAL EVALUATION ADULT. - SIGNS/SYMPTOMS
pt s/p sacrectomy pt NPO (day 2) with PO intake </= 50% of baseline since admission, 2+ dependent edema

## 2021-07-12 NOTE — DIETITIAN INITIAL EVALUATION ADULT. - PERTINENT MEDS FT
Heparin, Hibiclens, Lactated Ringers, Dulcolax, Relistor, Reglan, Valium, Neurontin, Synthroid, Oxycodone, Maalox,  Dulcolax, Calcium Carbonate + Vit D, Miralax, Zofran, Senna

## 2021-07-12 NOTE — PROGRESS NOTE ADULT - SUBJECTIVE AND OBJECTIVE BOX
Patient seen and examined    Vital Signs Last 24 Hrs  T(C): 37 (11 Jul 2021 19:00), Max: 37 (11 Jul 2021 07:00)  T(F): 98.6 (11 Jul 2021 19:00), Max: 98.6 (11 Jul 2021 07:00)  HR: 122 (11 Jul 2021 23:00) (82 - 135)  BP: 158/78 (11 Jul 2021 23:00) (124/69 - 166/97)  BP(mean): 102 (11 Jul 2021 23:00) (83 - 121)  RR: 37 (11 Jul 2021 23:00) (13 - 37)  SpO2: 92% (11 Jul 2021 23:00) (84% - 98%)    Overnight events:  NGT placed for vomiting 2/2 ileus, 1200 cc's removed from NGT on suction, on hep GTT  Exam:  AOx3 pupils R b/l, FC, BUE 5/5, LLE 5/5, RLE 4+/5, groin sensation intact, tony sensation intact today.

## 2021-07-12 NOTE — PROGRESS NOTE ADULT - SUBJECTIVE AND OBJECTIVE BOX
SURGERY PROGRESS NOTE    SUBJECTIVE / 24H EVENTS:  Patient seen and examined on morning rounds. AXR with dilated loops of small and large bowel, NGT placed by NSICU overnight. 2.2L output recorded in chart. Patient reports improved pain and nausea this morning,       OBJECTIVE:  VITAL SIGNS:  T(C): 37.7 (07-12-21 @ 07:00), Max: 37.8 (07-12-21 @ 03:00)  HR: 105 (07-12-21 @ 08:40) (86 - 135)  BP: 153/80 (07-12-21 @ 08:00) (124/69 - 166/97)  RR: 24 (07-12-21 @ 08:40) (16 - 38)  SpO2: 96% (07-12-21 @ 08:40) (84% - 98%)  Daily     Daily       PHYSICAL EXAM:  Gen: NAD  LS: Respirations unlabored, on HFNC  GI: NGT in place to High intermittent wall suction with dark brown/green output. Soft. Distended. Mildly tender to palpation in RLQ > LLQ. No rebound tenderness or guarding. Midline incision with Aquacel dressing c/d/i.      07-11-21 @ 07:01  -  07-12-21 @ 07:00  --------------------------------------------------------  IN:    Albumin 5%  - 250 mL: 250 mL    Heparin: 380 mL    IV PiggyBack: 649.8 mL    IV PiggyBack: 400 mL    Lactated Ringers: 1800 mL    multiple electrolytes Injection Type 1.: 1000 mL    Oral Fluid: 270 mL  Total IN: 4749.8 mL    OUT:    Bulb (mL): 205 mL    Drain (mL): 250 mL    Drain (mL): 350 mL    Indwelling Catheter - Urethral (mL): 1280 mL    Nasogastric/Oral tube (mL): 2200 mL  Total OUT: 4285 mL    Total NET: 464.8 mL      07-12-21 @ 07:01  -  07-12-21 @ 09:17  --------------------------------------------------------  IN:    Heparin: 18 mL    Lactated Ringers: 100 mL    Lactated Ringers: 75 mL  Total IN: 193 mL    OUT:    Indwelling Catheter - Urethral (mL): 35 mL  Total OUT: 35 mL    Total NET: 158 mL          LAB VALUES:  07-12    143  |  103  |  28<H>  ----------------------------<  127<H>  3.8   |  26  |  0.73    Ca    8.8      12 Jul 2021 04:48  Phos  3.1     07-12  Mg     2.0     07-12    TPro  5.8<L>  /  Alb  2.5<L>  /  TBili  0.5  /  DBili  0.2  /  AST  50<H>  /  ALT  37  /  AlkPhos  61  07-11                               10.3   10.67 )-----------( 183      ( 11 Jul 2021 21:58 )             30.6     LIVER FUNCTIONS - ( 11 Jul 2021 16:15 )  Alb: 2.5 g/dL / Pro: 5.8 g/dL / ALK PHOS: 61 U/L / ALT: 37 U/L / AST: 50 U/L / GGT: x           PTT - ( 12 Jul 2021 04:48 )  PTT:56.0 sec  ABG - ( 12 Jul 2021 01:51 )  pH, Arterial: 7.55  pH, Blood: x     /  pCO2: 36    /  pO2: 51    / HCO3: 32    / Base Excess: 9.0   /  SaO2: 89                        MICROBIOLOGY:      RADIOLOGY:  PACS Image: Image(s) Available (07-12-21 @ 02:50)  PACS Image: Image(s) Available (07-11-21 @ 19:12)        MEDICATIONS  (STANDING):  bisacodyl 5 milliGRAM(s) Oral at bedtime  calcium carbonate 1250 mG  + Vitamin D (OsCal 500 + D) 1 Tablet(s) Oral daily  chlorhexidine 4% Liquid 1 Application(s) Topical <User Schedule>  gabapentin 200 milliGRAM(s) Oral every 8 hours  heparin  Infusion 1000 Unit(s)/Hr (18 mL/Hr) IV Continuous <Continuous>  labetalol 100 milliGRAM(s) Oral every 8 hours  lactated ringers. 1000 milliLiter(s) (100 mL/Hr) IV Continuous <Continuous>  levothyroxine Injectable 63 MICROGram(s) IV Push at bedtime  methylnaltrexone Injectable 12 milliGRAM(s) SubCutaneous every other day  metoclopramide Injectable 5 milliGRAM(s) IV Push every 8 hours  polyethylene glycol 3350 17 Gram(s) Oral every 12 hours  senna 2 Tablet(s) Oral at bedtime  tetracaine/benzocaine/butamben Spray 1 Spray(s) Topical three times a day    MEDICATIONS  (PRN):  acetaminophen   Tablet .. 650 milliGRAM(s) Oral every 6 hours PRN Temp greater or equal to 38C (100.4F), Mild Pain (1 - 3)  aluminum hydroxide/magnesium hydroxide/simethicone Suspension 30 milliLiter(s) Oral every 4 hours PRN Dyspepsia  bisacodyl Suppository 10 milliGRAM(s) Rectal daily PRN Constipation  diazepam  Injectable 2 milliGRAM(s) IV Push every 6 hours PRN muscle spasm  naloxone Injectable 0.1 milliGRAM(s) IV Push every 3 minutes PRN For ANY of the following changes in patient status:  A. RR LESS THAN 10 breaths per minute, B. Oxygen saturation LESS THAN 90%, C. Sedation score of 6  ondansetron Injectable 4 milliGRAM(s) IV Push every 6 hours PRN Nausea  oxyCODONE    IR 5 milliGRAM(s) Oral every 6 hours PRN Mild Pain (1 - 3)  oxyCODONE    IR 10 milliGRAM(s) Oral every 4 hours PRN Moderate Pain (4 - 6)

## 2021-07-12 NOTE — PROGRESS NOTE ADULT - ASSESSMENT
61yMale patient s/p en bloc sacrectomy for chordoma w/ L4-pelvis fusion w/ plastics closure vertical rectus abdominis myocutaneous (VRAM) flap for chordoma on 07/07 and 07/08, post-op course c/b bilateral PEs, now on hep gtt. Surgery reconsulted for concern for post-op ileus.     Plan:  - Strict NPO  - Await return of bowel function (AROBF)  - Continue NGT to low continuous wall suction  - Consider 0.5:1 IVF replacement for NGT output >1L  - Consider protonix GI PPx for dark NGT output  - Would not recommend miralax, senna, or other promotility agents while AROBF  - Care per NSGY primary  - Will continue to follow     [facility]  [id number]

## 2021-07-12 NOTE — PROGRESS NOTE ADULT - ASSESSMENT
A/P: chordoma    Neuro:   monitor drain output  pain control with acetaminophen and prn diazepam  PT/OT/OBC as tolerated    Resp: hypoxic/multifactorial - PEs, atelectasis, splinting from pain, abdominal distension/ileus  bilateral subsegmental PE and atelectasis on heparin drip, cleared by NS goal 50-70   encourage IS   hi-bianca for PEEP effect and hypoxia    CV: MAP> 65 mmHg  SBP> 100 mmHg   HTN on labetolol - hold for hypotension/bradycardia    Renal:   has tony for urinary retension  urine output decreased - responded to fluid bolus  keep euvolemic; IVF and bolus as needed    GI: ileus - NPO  NGT to low wall suction, may need decompression from below  aggressive bowel regimen    Endo:   hypothyroidism on synthroid   target euglycemia     Heme/Onc:   hemorrhagic shock resolved , hgb stable    heparin drip for PE   dopplers negative     ID: afebrile     Code Status: [x] Full Code [] DNR [] DNI [] Goals of Care:     Disposition: [x] ICU [] Stroke Unit [] RCU []PCU []Floor [] Discharge Home     moderate complex medical decision

## 2021-07-12 NOTE — PROGRESS NOTE ADULT - SUBJECTIVE AND OBJECTIVE BOX
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 The chordoma resulted in perineal numbness and overflow incontinence and he was admitted 7/7/21 for staged resection. On 7/7, he underwent Plastic Surgery and General Surgery assisted vertical rectus abdominal myocutaneous flap harvest with transperitoneal ligation of internal iliac artery and vein. He underwent en bloc sacrectomy on 7/8.     HOSPITAL COURSE:  7/7: Stage 1 - VRAM harvest, iliac artery and vein ligation  7/8: Stage 2 - sacrectomy with L4-pelvis fusion; EBL 4.5L status post 8 units PRBC, 6 units FFP, 1 pack platelets and 5L crystalloid; post-op status post 1 pack platelets  7/10: CTA chest: b/l upper lobe segmental PEs  7/11: Nausea/vomiting. NGT placed to low wall suction with immediate 1.2L bilious output.    Hypoxia, acute respiratory distress, started on high flow   was started on heparin gtt yesterday, now with therapeutic PTT    REVIEW OF SYSTEMS: [] Unable to Assess due to neurologic exam   [x] All ROS addressed below are non-contributory, except:  Neuro: [ ] Headache [ ] Back pain [ ] Numbness [ ] Weakness [ ] Ataxia [ ] Dizziness [ ] Aphasia [ ] Dysarthria [ ] Visual disturbance  Resp: [x] Shortness of breath/dyspnea [ ] Orthopnea [ ] Cough  CV: [ ] Chest pain [ ] Palpitation [ ] Lightheadedness [ ] Syncope  Renal: [ ] Thirst [ ] Edema  GI: [ ] Nausea [ ] Emesis [x] Abdominal/incisional pain [ ] Constipation [ ] Diarrhea  Hem: [ ] Hematemesis [ ] Bright red blood per rectum  ID: [ ] Fever [ ] Chills [ ] Dysuria  ENT: [ ] Rhinorrhea    VITALS/DATA/ORDER: [x] Reviewed    EXAMINATION:   Gen: Cooperative  HEENT: Moist mucosa  CV: Mild tachy but regular  Lungs: Decreased BS at bases but good air entry, no stridor  Abd: hypoactive bowel sounds, distended  Ext: Diffuse edema; RLE edema>>>>LLE  Neuro: Awake, alert, fully oriented, follows, BUE no drift full strength, BLE full strength   SUMMARY:   61 year-old man with history of thyroid cancer status post total thyroidectomy in 2010 and radioactive iodine treatment in 2011, hypogonadism, vitamin D deficiency, and osteopenia diagnosed with sacral  mass found on work-up for back pain since August 2020 which was 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 The chordoma resulted in perineal numbness and overflow incontinence and he was admitted 7/7/21 for staged resection. On 7/7, he underwent Plastic Surgery and General Surgery assisted vertical rectus abdominal myocutaneous flap harvest with transperitoneal ligation of internal iliac artery and vein. He underwent en bloc sacrectomy on 7/8.     HOSPITAL COURSE:  7/7: Stage 1 - VRAM harvest, iliac artery and vein ligation  7/8: Stage 2 - sacrectomy with L4-pelvis fusion; EBL 4.5L status post 8 units PRBC, 6 units FFP, 1 pack platelets and 5L crystalloid; post-op status post 1 pack platelets  7/10: CTA chest: b/l upper lobe segmental PEs  7/11: Nausea/vomiting. NGT placed to low wall suction with immediate 1.2L bilious output.    Hypoxia, acute respiratory distress, started on high flow   was started on heparin gtt yesterday, now with therapeutic PTT    REVIEW OF SYSTEMS: [] Unable to Assess due to neurologic exam   [x] All ROS addressed below are non-contributory, except:  Neuro: [ ] Headache [ ] Back pain [ ] Numbness [ ] Weakness [ ] Ataxia [ ] Dizziness [ ] Aphasia [ ] Dysarthria [ ] Visual disturbance  Resp: [x] Shortness of breath/dyspnea [ ] Orthopnea [ ] Cough  CV: [ ] Chest pain [ ] Palpitation [ ] Lightheadedness [ ] Syncope  Renal: [ ] Thirst [ ] Edema  GI: [ ] Nausea [ ] Emesis [x] Abdominal/incisional pain [ ] Constipation [ ] Diarrhea  Hem: [ ] Hematemesis [ ] Bright red blood per rectum  ID: [ ] Fever [ ] Chills [ ] Dysuria  ENT: [ ] Rhinorrhea    VITALS/DATA/ORDER: [x] Reviewed    EXAMINATION:   Gen: Cooperative  HEENT: Moist mucosa  CV: Mild tachy but regular  Lungs: Decreased BS at bases but good air entry, no stridor  Abd: hypoactive bowel sounds, distended  Ext: Diffuse edema; RLE edema>>>>LLE  Neuro: Awake, alert, fully oriented, follows, BUE no drift full strength, BLE full strength except for R DF/PF 4+

## 2021-07-12 NOTE — DIETITIAN INITIAL EVALUATION ADULT. - OTHER INFO
Dosing wt (7/8): 180.6 lbs.     Pt currently NPO in context of post-op ileus. NGT placed for LWS. Output thus far: (7/12): 1200ml; (7/11): 1000ml noted. No documented BM's recorded thus far; on bowel regimen (Dulcolax, Miralax, senna).     Skin: surgical incision abdomen 7/7, surgical incision mid back.  Edema: 2+ dependent Dosing wt (7/8): 180.6 lbs. Per pt, UBW: 181 lbs. Appears consistent, will monitor.     Pt currently NPO in context of post-op ileus. NGT placed for LWS. Output thus far: (7/12): 1200ml; (7/11): 1000ml noted. No documented BM's recorded thus far; on bowel regimen (Dulcolax, Miralax, senna). In-house, pt endorsed decreased appetite/PO intake. States he last ate (at baseline) Monday night (7/5). Continues on IVF (Lactated Ringers) to aid in hydration.     Skin: surgical incision abdomen 7/7, surgical incision mid back.  Edema: 2+ dependent

## 2021-07-12 NOTE — CONSULT NOTE ADULT - SUBJECTIVE AND OBJECTIVE BOX
Patient is a 61y old  Male who presented with a chief complaint of Sacral Chordoma (12 Jul 2021 10:26)      HPI:  61 year old male with PMH of Thyroid Cancer, s/p Total Thyroidectomy 2010 and Radioactive Iodine 2011, Hypogonadism, Vitamin D Deficiency, Osteopenia with recently diagnosed Sacral Tumor. Patient endorses back pain that started in August 2020, he reports that he was treat by PCP with steroids and muscle relaxants. He reports that he had some relief, however the pain lingered. He was subsequently referred to an Orthopedic Specialist who ordered an MRI of Lumbar Spine, which revealed a Sacral Tumor with extension into the Presacral space. 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 Patient reports that he suffers from chronic constipation, he has noted that his urine stream is weaker than before, he reports right buttock and right scrotal pain and numbness. He denies decreased strength or problems walking. He presents to PST today for evaluation prior to scheduled Removal of Sacral Tumor on 7/6/2021.    fully covid vaccinated; proof of vaccination placed on chart (25 Jun 2021 11:47)      Patient was admitted on 7/7, he underwent Plastic Surgery and General Surgery assisted vertical rectus abdominal myocutaneous flap harvest with transperitoneal ligation of internal iliac artery and vein. He underwent en bloc sacrectomy on 7/8, with L4-pelvis fusion, transfused 8 units PRBC, 6 units FFP, 1 pack platelets and 5L crystalloid; post-op status post 1 pack platelets, CTA 7/10 with bilateral upper lobe segmental PEs, post op ileus/NGT. Patient seen today in NSCU, denies weakness, pain controlled.     Pt states last BM on day prior to surgery. Takes laxatives PRN at home prior to admission  episodes of emesis post-op, seen by Surgery team yesterday  Found to have ileus and NGT placed with >2L green bilious output reported    s/p Dulcolax supp yesterday x 1 - with no BM  decreased abdominal distension today and decreased nausea +NGT discomfort  c/o incisional pain    no prior abdominal surgery, no Hx ileus or SBO previously  no hx IBD or GI malignancy   last colonoscopy 2 years ago - negative per pt  +FH colon CA - brother      PAST MEDICAL & SURGICAL HISTORY:  HLD (hyperlipidemia)    Thyroid cancer  2010    History of central serous retinopathy    H/O hypogonadism    H/O vitamin D deficiency    H/O osteopenia    H/O thyroidectomy  Total --2010    H/O colonoscopy        Allergies  No Known Allergies      MEDICATIONS  (STANDING):  bisacodyl 5 milliGRAM(s) Oral at bedtime  calcium carbonate 1250 mG  + Vitamin D (OsCal 500 + D) 1 Tablet(s) Oral daily  calcium gluconate IVPB 2 Gram(s) IV Intermittent once  chlorhexidine 4% Liquid 1 Application(s) Topical <User Schedule>  gabapentin 200 milliGRAM(s) Oral every 8 hours  heparin  Infusion 1000 Unit(s)/Hr (18 mL/Hr) IV Continuous <Continuous>  labetalol 100 milliGRAM(s) Oral every 8 hours  lactated ringers. 1000 milliLiter(s) (100 mL/Hr) IV Continuous <Continuous>  levothyroxine Injectable 63 MICROGram(s) IV Push at bedtime  methylnaltrexone Injectable 12 milliGRAM(s) SubCutaneous every other day  metoclopramide Injectable 10 milliGRAM(s) IV Push every 8 hours  polyethylene glycol 3350 17 Gram(s) Oral every 12 hours  senna 2 Tablet(s) Oral at bedtime  tetracaine/benzocaine/butamben Spray 1 Spray(s) Topical three times a day    MEDICATIONS  (PRN):  acetaminophen   Tablet .. 650 milliGRAM(s) Oral every 6 hours PRN Temp greater or equal to 38C (100.4F), Mild Pain (1 - 3)  aluminum hydroxide/magnesium hydroxide/simethicone Suspension 30 milliLiter(s) Oral every 4 hours PRN Dyspepsia  bisacodyl Suppository 10 milliGRAM(s) Rectal daily PRN Constipation  diazepam  Injectable 2 milliGRAM(s) IV Push every 6 hours PRN muscle spasm  naloxone Injectable 0.1 milliGRAM(s) IV Push every 3 minutes PRN For ANY of the following changes in patient status:  A. RR LESS THAN 10 breaths per minute, B. Oxygen saturation LESS THAN 90%, C. Sedation score of 6  ondansetron Injectable 4 milliGRAM(s) IV Push every 6 hours PRN Nausea  oxyCODONE    IR 5 milliGRAM(s) Oral every 6 hours PRN Mild Pain (1 - 3)  oxyCODONE    IR 10 milliGRAM(s) Oral every 4 hours PRN Moderate Pain (4 - 6)      Social History:  lives with father  no tobacco, ETOH or Drug use      Family History   IBD (  ) Yes   (X  ) No  GI Malignancy ( X )  Yes BROTHER +COLON CA   (  ) No    Health Management     Last Colonoscopy - 2 years; negative per pt      Advanced Directives: ( X    ) None    (      ) DNR    (     ) DNI    (     ) Health Care Proxy:     Review of Systems:    General:  No wt loss, fevers, chills, night sweats,fatigue,   CV:  No pain, palpitations, hypo/hypertension  Resp:  No dyspnea, cough, tachypnea, wheezing  GI: see HPI  :  No pain, bleeding, incontinence, nocturia  Muscle:  +LE weakness  Neuro:  see HPI  Psych:  No fatigue, insomnia, mood problems, depression  Endocrine:  No polyuria, polydypsia, cold/heat intolerance  Heme:  No petechiae, ecchymosis, easy bruisability +hx thyroid CA s/p resection  Skin:  No rash, tattoos, scars, edema      Vital Signs Last 24 Hrs  T(C): 36.8 (12 Jul 2021 11:00), Max: 37.8 (12 Jul 2021 03:00)  T(F): 98.2 (12 Jul 2021 11:00), Max: 100 (12 Jul 2021 03:00)  HR: 88 (12 Jul 2021 12:33) (88 - 135)  BP: 125/99 (12 Jul 2021 11:00) (124/69 - 164/102)  BP(mean): 104 (12 Jul 2021 11:00) (83 - 119)  RR: 26 (12 Jul 2021 12:33) (22 - 38)  SpO2: 100% (12 Jul 2021 12:33) (84% - 100%)    PHYSICAL EXAM:    Constitutional: NAD, well-developed on high flow nasal cannula; alert and responsive   +NGT dark green bilious output  Neck: No LAD, supple WH surgical scar  Respiratory: clear b/l  Cardiovascular: S1 and S2, RRR, no M  Gastrointestinal: BS+, softly distended NT  +midline incision with aquacell dressing clean and dry  RLQ drain (bulb) site clean and dry  b/l Hemovac sites clean and dry  +tony clear  Rectal: decreased rectal tone small pieces of formed brown stool in vault. Dulcolax supp 10 mg x1 placed  rectal vault non distended  Extremities: No peripheral edema, neg clubing, cyanosis  Vascular: 2+ peripheral pulses  Neurological: A/O x 3, decreased sensation medial aspect of left calf  Psychiatric: Normal mood, normal affect  Skin: No rashes        LABS:                        10.3   10.67 )-----------( 183      ( 11 Jul 2021 21:58 )             30.6     07-12    143  |  103  |  28<H>  ----------------------------<  127<H>  3.8   |  26  |  0.73    Ca    8.8      12 Jul 2021 04:48  Phos  3.1     07-12  Mg     2.0     07-12    TPro  5.8<L>  /  Alb  2.5<L>  /  TBili  0.5  /  DBili  0.2  /  AST  50<H>  /  ALT  37  /  AlkPhos  61  07-11    PTT - ( 12 Jul 2021 11:48 )  PTT:66.4 sec    Blood Gas Arterial, Lactate: 1.8 mmol/L (07.12.21 @ 13:41)   Lipase, Serum: 46 U/L (07.11.21 @ 21:58)       RADIOLOGY & ADDITIONAL TESTS:  EXAM:  XR ABDOMEN PORTABLE ROUTINE 1V                        PROCEDURE DATE:  07/11/2021        INTERPRETATION:  A single x-ray of the abdomen was obtained on July 11, 2021.    INDICATION: Abdominal distention.    IMPRESSION:    Multiple loops of small bowel appear to be distended. Air is seen in the transverse colon and the left colon changes compatible with ileus and or partial small bowel obstruction. Orthopedic hardware is seen in the lower lumbar spine.    --- End of Report ---      EXAM:  CT [facility] CHEST [facility]                        PROCEDURE DATE:  07/10/2021        INTERPRETATION:  CLINICAL INFORMATION: Hypoxia.    COMPARISON: Chest CT from 6/3/2021.    CONTRAST/COMPLICATIONS:  IV Contrast: Omnipaque 350  68 cc administered   32 cc discarded  Oral Contrast: None  Complications: None    PROCEDURE:  CT Angiography of the Chest.  Sagittal and coronal reformats were performed as well as 3D (MIP) reconstructions.    FINDINGS:    LUNGS AND AIRWAYS: Patent central airways.  Left lingular and bibasilar consolidative opacities with associated volume loss.  PLEURA: Trace bilateral pleural effusion.  MEDIASTINUM AND BATSHEVA: 1.7 x 1.5 cm right perihilar lymph node.  VESSELS: Coronary arterial calcifications. Small bilateral upper lobe subsegmental pulmonary emboli: Left upper lobe (6, 153) right upper lobe (6, 173)  HEART: Heart size is normal. No pericardial effusion.  CHEST WALL AND LOWER NECK: Within normal limits.  VISUALIZED UPPER ABDOMEN: Gallbladder stones and or debris  BONES: Degenerative changes of the visualized spine.    IMPRESSION:    Small bilateral upper lobe subsegmental pulmonary emboli. No right heart strain.    Bibasilar and left lingular consolidative opacities with associated volume loss likely represent atelectasis.    --- End of Report ---

## 2021-07-12 NOTE — DIETITIAN INITIAL EVALUATION ADULT. - ETIOLOGY
increased demand for nutrient in the setting of neurosurgical intervention inadequate energy/protein intake in context of altered GI function (post-op ileus)

## 2021-07-12 NOTE — PROGRESS NOTE ADULT - SUBJECTIVE AND OBJECTIVE BOX
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 The chordoma resulted in perineal numbness and overflow incontinence and he was admitted 7/7/21 for staged resection. On 7/7, he underwent Plastic Surgery and General Surgery assisted vertical rectus abdominal myocutaneous flap harvest with transperitoneal ligation of internal iliac artery and vein. He underwent en bloc sacrectomy on 7/8.     HOSPITAL COURSE:  7/7: Stage 1 - VRAM harvest, iliac artery and vein ligation  7/8: Stage 2 - sacrectomy with L4-pelvis fusion; EBL 4.5L status post 8 units PRBC, 6 units FFP, 1 pack platelets and 5L crystalloid; post-op status post 1 pack platelets  7/10: CTA chest: b/l upper lobe segmental PEs    24 HOUR EVENTS:  Nausea/vomiting. NGT placed to low wall suction with immediate 1.2L bilious output.    REVIEW OF SYSTEMS: [] Unable to Assess due to neurologic exam   [x] All ROS addressed below are non-contributory, except:  Neuro: [ ] Headache [ ] Back pain [ ] Numbness [ ] Weakness [ ] Ataxia [ ] Dizziness [ ] Aphasia [ ] Dysarthria [ ] Visual disturbance  Resp: [x] Shortness of breath/dyspnea [ ] Orthopnea [ ] Cough  CV: [ ] Chest pain [ ] Palpitation [ ] Lightheadedness [ ] Syncope  Renal: [ ] Thirst [ ] Edema  GI: [ ] Nausea [ ] Emesis [x] Abdominal/incisional pain [ ] Constipation [ ] Diarrhea  Hem: [ ] Hematemesis [ ] Bright red blood per rectum  ID: [ ] Fever [ ] Chills [ ] Dysuria  ENT: [ ] Rhinorrhea    VITALS/DATA/ORDER: [x] Reviewed    EXAMINATION: limited to due pain  Gen: Cooperative  HEENT: Moist mucosa  CV: Mild tachy but regular  Lungs: Decreased BS at bases but good air entry, no stridor  Abd: hypoactive bowel sounds, distended  Ext: Diffuse edema  Neuro: Awake, alert, follows, no drift, good strength (pain limited)

## 2021-07-12 NOTE — PROGRESS NOTE ADULT - SUBJECTIVE AND OBJECTIVE BOX
O: OVERNIGHT NG TUBE PLACED , WITH HIGH OUTPUT     T(C): 37.7 (07-12-21 @ 07:00), Max: 37.8 (07-12-21 @ 03:00)  HR: 105 (07-12-21 @ 08:40) (86 - 135)  BP: 153/80 (07-12-21 @ 08:00) (124/69 - 166/97)  RR: 24 (07-12-21 @ 08:40) (16 - 38)  SpO2: 96% (07-12-21 @ 08:40) (84% - 98%)  07-11-21 @ 07:01  -  07-12-21 @ 07:00  --------------------------------------------------------  IN: 4749.8 mL / OUT: 4285 mL / NET: 464.8 mL    07-12-21 @ 07:01  -  07-12-21 @ 10:13  --------------------------------------------------------  IN: 193 mL / OUT: 35 mL / NET: 158 mL    acetaminophen   Tablet .. 650 milliGRAM(s) Oral every 6 hours PRN  aluminum hydroxide/magnesium hydroxide/simethicone Suspension 30 milliLiter(s) Oral every 4 hours PRN  bisacodyl 5 milliGRAM(s) Oral at bedtime  bisacodyl Suppository 10 milliGRAM(s) Rectal daily PRN  bisacodyl Suppository 10 milliGRAM(s) Rectal once  calcium carbonate 1250 mG  + Vitamin D (OsCal 500 + D) 1 Tablet(s) Oral daily  chlorhexidine 4% Liquid 1 Application(s) Topical <User Schedule>  diazepam  Injectable 2 milliGRAM(s) IV Push every 6 hours PRN  gabapentin 200 milliGRAM(s) Oral every 8 hours  heparin  Infusion 1000 Unit(s)/Hr IV Continuous <Continuous>  labetalol 100 milliGRAM(s) Oral every 8 hours  lactated ringers. 1000 milliLiter(s) IV Continuous <Continuous>  levothyroxine Injectable 63 MICROGram(s) IV Push at bedtime  methylnaltrexone Injectable 12 milliGRAM(s) SubCutaneous every other day  metoclopramide Injectable 5 milliGRAM(s) IV Push every 8 hours  naloxone Injectable 0.1 milliGRAM(s) IV Push every 3 minutes PRN  ondansetron Injectable 4 milliGRAM(s) IV Push every 6 hours PRN  oxyCODONE    IR 5 milliGRAM(s) Oral every 6 hours PRN  oxyCODONE    IR 10 milliGRAM(s) Oral every 4 hours PRN  polyethylene glycol 3350 17 Gram(s) Oral every 12 hours  senna 2 Tablet(s) Oral at bedtime  tetracaine/benzocaine/butamben Spray 1 Spray(s) Topical three times a day        Exam stable  Neurological: Awake, alert oriented to person, place and time, Following Commands, PERRL, EOMI, Face Symmetrical, Speech Fluent, Moving all extremities, Muscle Strength normal in UE,  LE limited by pain, right LE 4/5, left LE 4+   Pulmonary: crackles   Cardiovascular: S1, S2, Regular Rate and Rhythm   Gastrointestinal: Soft, distended , not tender  Extremities: No calf tenderness          LABS:  Na: 143 (07-12 @ 04:48), 142 (07-11 @ 21:58), 140 (07-10 @ 20:34), 142 (07-09 @ 23:25)  K: 3.8 (07-12 @ 04:48), 3.6 (07-11 @ 21:58), 4.0 (07-10 @ 20:34), 3.7 (07-09 @ 23:25)  Cl: 103 (07-12 @ 04:48), 102 (07-11 @ 21:58), 105 (07-10 @ 20:34), 108 (07-09 @ 23:25)  CO2: 26 (07-12 @ 04:48), 27 (07-11 @ 21:58), 24 (07-10 @ 20:34), 21 (07-09 @ 23:25)  BUN: 28 (07-12 @ 04:48), 29 (07-11 @ 21:58), 21 (07-10 @ 20:34), 15 (07-09 @ 23:25)  Cr: 0.73 (07-12 @ 04:48), 0.67 (07-11 @ 21:58), 0.59 (07-10 @ 20:34), 0.59 (07-09 @ 23:25)  Glu: 127(07-12 @ 04:48), 128(07-11 @ 21:58), 160(07-10 @ 20:34), 143(07-09 @ 23:25)    Hgb: 10.3 (07-11 @ 21:58), 11.1 (07-10 @ 20:34), 10.6 (07-09 @ 23:25), 10.1 (07-09 @ 17:25), 10.1 (07-09 @ 11:22)  Hct: 30.6 (07-11 @ 21:58), 33.1 (07-10 @ 20:34), 31.4 (07-09 @ 23:25), 29.7 (07-09 @ 17:25), 29.3 (07-09 @ 11:22)  WBC: 10.67 (07-11 @ 21:58), 18.55 (07-10 @ 20:34), 15.08 (07-09 @ 23:25), 13.56 (07-09 @ 17:25), 12.16 (07-09 @ 11:22)  Plt: 183 (07-11 @ 21:58), 160 (07-10 @ 20:34), 130 (07-09 @ 23:25), 118 (07-09 @ 17:25), 102 (07-09 @ 11:22)    INR:   PTT: 56.0 07-12-21 @ 04:48, 46.2 07-11-21 @ 21:58, 41.3 07-11-21 @ 16:15, 42.6 07-11-21 @ 08:28, 37.9 07-11-21 @ 02:54, 36.6 07-10-21 @ 20:34        61 year old male with  Sacral Tumor. status post en bloc sacrectomy, Stage 2 w/ L4-pelvix fusion w/ plastic closure.  Plan  neuro checks q 4hr   pain control  PT/OT  3 drains, monitor output 200 cc/300 cc output   acute hypoxic respiratory failure on high flow 80 %, LPM 40   atelectasis and PE on heparin drip   coughing , thin secretions  IS every hour   might  change high flow to  BIPAP  given high oxygen requirement and shallow breathing and atelectasis   will get ABG   chest xray atelectasis   cardiology qtc 385 , SBP goal 100-160 mmhg   GI: abdominal distention ileus   keep NG tube   keep NPO   increase reglan to 10 mg q 8 hr   s/p methylnaltrexone  glycerine suppository  no BM since a day before surgery    gen surgery consulted yesterday   GI consulted   renal: increase LR to 100 ml/hr as he is NPO and dehydrated   tony   endo: synthroid for hypothyroidism  hem: heparin drip, hgb  stable     30 critical care time, hypoxic respiratory failure requiring non invasive ventilation , high risk for getting intubated     Deloris Conklin NSCU attending

## 2021-07-12 NOTE — DIETITIAN INITIAL EVALUATION ADULT. - ORAL INTAKE PTA/DIET HISTORY
Per discussion with pt, reports good appetite PTA. Denies food allergies, denies intolerance to chewing/swallowing. Reports occasionally he would increase fiber intake to aid in bowel regularity. Pt reports hx of taking vitamin D, calcium, synthroid. Pt endorsed feeling hungry in context of NPO status. Aware of post-op ileus.

## 2021-07-12 NOTE — PROGRESS NOTE ADULT - ASSESSMENT
A/P: chordoma  s/p en bloc sacrectomy and L4-pelvis fusion POD 5 from stage 1 and POD 4 from stage 2    Neuro:   monitor drain output  pain control with acetaminophen and prn diazepam  PT/OT/OBC as tolerated    Resp: hypoxic/multifactorial - PEs, atelectasis, splinting from pain, abdominal distension/ileus  bilateral subsegmental PE and atelectasis on heparin drip, cleared by NS goal 50-70   encourage IS, Acapella device use  hi-bianca for PEEP effect and hypoxia-->now FiO2 to 60%, wean as able    CV: MAP> 65 mmHg  -160   HTN on labetolol restarted 100Q8    Renal:   has tony for urinary retension  urine output decreased - responded to fluid bolus  keep euvolemic; IVF     GI: ileus - NPO  NGT to low wall suction  GI following  aggressive bowel regimen    Endo:   hypothyroidism on synthroid   target euglycemia     Heme/Onc:   hemorrhagic shock resolved , hgb stable    heparin drip for PE; monitor H/H  dopplers negative on 7/10; monitor RLE edema    ID: afebrile     Code Status: [x] Full Code [] DNR [] DNI [] Goals of Care:     Disposition: [x] ICU [] Stroke Unit [] RCU []PCU []Floor [] Discharge Home

## 2021-07-12 NOTE — PROGRESS NOTE ADULT - ASSESSMENT
Patient seen and examined at bedside.  AOx3 pupils R b/l, FC, BUE 5/5, LLE 5/5, RLE 4+/5, groin sensation intact, tony sensation intact today  heparin drip low dose  monitor drains  Keep tony in  Monitor PTT  PT   NGT to low wall suction  High flow

## 2021-07-13 LAB
ANION GAP SERPL CALC-SCNC: 11 MMOL/L — SIGNIFICANT CHANGE UP (ref 5–17)
APTT BLD: 51.2 SEC — HIGH (ref 27.5–35.5)
BUN SERPL-MCNC: 28 MG/DL — HIGH (ref 7–23)
CALCIUM SERPL-MCNC: 8.5 MG/DL — SIGNIFICANT CHANGE UP (ref 8.4–10.5)
CHLORIDE SERPL-SCNC: 106 MMOL/L — SIGNIFICANT CHANGE UP (ref 96–108)
CO2 SERPL-SCNC: 26 MMOL/L — SIGNIFICANT CHANGE UP (ref 22–31)
CREAT SERPL-MCNC: 0.69 MG/DL — SIGNIFICANT CHANGE UP (ref 0.5–1.3)
GLUCOSE SERPL-MCNC: 98 MG/DL — SIGNIFICANT CHANGE UP (ref 70–99)
HCT VFR BLD CALC: 26.3 % — LOW (ref 39–50)
HGB BLD-MCNC: 8.3 G/DL — LOW (ref 13–17)
MAGNESIUM SERPL-MCNC: 2.1 MG/DL — SIGNIFICANT CHANGE UP (ref 1.6–2.6)
MCHC RBC-ENTMCNC: 29 PG — SIGNIFICANT CHANGE UP (ref 27–34)
MCHC RBC-ENTMCNC: 31.6 GM/DL — LOW (ref 32–36)
MCV RBC AUTO: 92 FL — SIGNIFICANT CHANGE UP (ref 80–100)
NRBC # BLD: 0 /100 WBCS — SIGNIFICANT CHANGE UP (ref 0–0)
PHOSPHATE SERPL-MCNC: 3.4 MG/DL — SIGNIFICANT CHANGE UP (ref 2.5–4.5)
PLATELET # BLD AUTO: 162 K/UL — SIGNIFICANT CHANGE UP (ref 150–400)
POTASSIUM SERPL-MCNC: 3.7 MMOL/L — SIGNIFICANT CHANGE UP (ref 3.5–5.3)
POTASSIUM SERPL-SCNC: 3.7 MMOL/L — SIGNIFICANT CHANGE UP (ref 3.5–5.3)
RBC # BLD: 2.86 M/UL — LOW (ref 4.2–5.8)
RBC # FLD: 14.5 % — SIGNIFICANT CHANGE UP (ref 10.3–14.5)
SODIUM SERPL-SCNC: 143 MMOL/L — SIGNIFICANT CHANGE UP (ref 135–145)
WBC # BLD: 14.25 K/UL — HIGH (ref 3.8–10.5)
WBC # FLD AUTO: 14.25 K/UL — HIGH (ref 3.8–10.5)

## 2021-07-13 PROCEDURE — 99291 CRITICAL CARE FIRST HOUR: CPT

## 2021-07-13 RX ORDER — POTASSIUM CHLORIDE 20 MEQ
10 PACKET (EA) ORAL
Refills: 0 | Status: COMPLETED | OUTPATIENT
Start: 2021-07-13 | End: 2021-07-13

## 2021-07-13 RX ADMIN — Medication 10 MILLIGRAM(S): at 05:44

## 2021-07-13 RX ADMIN — Medication 1 SPRAY(S): at 21:14

## 2021-07-13 RX ADMIN — Medication 63 MICROGRAM(S): at 21:12

## 2021-07-13 RX ADMIN — POLYETHYLENE GLYCOL 3350 17 GRAM(S): 17 POWDER, FOR SOLUTION ORAL at 17:32

## 2021-07-13 RX ADMIN — Medication 5 MILLIGRAM(S): at 21:13

## 2021-07-13 RX ADMIN — Medication 100 MILLIGRAM(S): at 05:43

## 2021-07-13 RX ADMIN — Medication 100 MILLIEQUIVALENT(S): at 11:05

## 2021-07-13 RX ADMIN — Medication 10 MILLIGRAM(S): at 21:13

## 2021-07-13 RX ADMIN — OXYCODONE HYDROCHLORIDE 10 MILLIGRAM(S): 5 TABLET ORAL at 13:52

## 2021-07-13 RX ADMIN — SENNA PLUS 2 TABLET(S): 8.6 TABLET ORAL at 21:13

## 2021-07-13 RX ADMIN — Medication 100 MILLIGRAM(S): at 21:12

## 2021-07-13 RX ADMIN — GABAPENTIN 200 MILLIGRAM(S): 400 CAPSULE ORAL at 05:46

## 2021-07-13 RX ADMIN — HEPARIN SODIUM 18 UNIT(S)/HR: 5000 INJECTION INTRAVENOUS; SUBCUTANEOUS at 17:30

## 2021-07-13 RX ADMIN — GABAPENTIN 200 MILLIGRAM(S): 400 CAPSULE ORAL at 21:12

## 2021-07-13 RX ADMIN — SODIUM CHLORIDE 75 MILLILITER(S): 9 INJECTION, SOLUTION INTRAVENOUS at 17:30

## 2021-07-13 RX ADMIN — CHLORHEXIDINE GLUCONATE 1 APPLICATION(S): 213 SOLUTION TOPICAL at 21:14

## 2021-07-13 RX ADMIN — Medication 1 SPRAY(S): at 05:44

## 2021-07-13 RX ADMIN — GABAPENTIN 200 MILLIGRAM(S): 400 CAPSULE ORAL at 13:51

## 2021-07-13 RX ADMIN — Medication 1 TABLET(S): at 11:05

## 2021-07-13 RX ADMIN — Medication 100 MILLIEQUIVALENT(S): at 05:46

## 2021-07-13 RX ADMIN — POLYETHYLENE GLYCOL 3350 17 GRAM(S): 17 POWDER, FOR SOLUTION ORAL at 05:43

## 2021-07-13 RX ADMIN — Medication 1 SPRAY(S): at 13:52

## 2021-07-13 RX ADMIN — OXYCODONE HYDROCHLORIDE 10 MILLIGRAM(S): 5 TABLET ORAL at 13:50

## 2021-07-13 RX ADMIN — Medication 10 MILLIGRAM(S): at 13:51

## 2021-07-13 RX ADMIN — PANTOPRAZOLE SODIUM 40 MILLIGRAM(S): 20 TABLET, DELAYED RELEASE ORAL at 11:05

## 2021-07-13 RX ADMIN — Medication 100 MILLIGRAM(S): at 13:51

## 2021-07-13 RX ADMIN — SODIUM CHLORIDE 75 MILLILITER(S): 9 INJECTION, SOLUTION INTRAVENOUS at 19:27

## 2021-07-13 NOTE — PROGRESS NOTE ADULT - ASSESSMENT
61 year old male with PMH of Thyroid Cancer, s/p Total Thyroidectomy 2010 and Radioactive Iodine 2011, Hypogonadism, Vitamin D Deficiency, Osteopenia with recently diagnosed Sacral Tumor. Admitted on 7/7, he underwent Plastic Surgery and General Surgery assisted vertical rectus abdominal myocutaneous flap harvest with transperitoneal ligation of internal iliac artery and vein. He underwent en bloc sacrectomy on 7/8, with L4-pelvis fusion, transfused 8 units PRBC, 6 units FFP, 1 pack platelets and 5L crystalloid; post-op status post 1 pack platelets, CTA 7/10 with bilateral upper lobe segmental PEs,  now with post op ileus      #Abdominal Distension (improving) with post-op ileus. no cecal distension on imaging, ileus involving SB, no rectal distension on exam  -consider NGT clamp trial later today (d/w Surgery team; will keep NGT to LIWS and possible clamp trial if ok with Surgery team re-assessment on PM rounds)  -Continue Reglan as ordered  -no GI objection to laxatives as ordered via NGT (clamp for 30 minutes after administration)  -OOB/PT; bed mobility/turning  -check TSH (s/p thyroidectomy on IV synthroid)  -monitor/replete lytes PRN  goal K 4-4.5 and Mg>2  -IV PPI for GI prophylaxis  -Surgery following    Low grade Fever with +sputum gram stain  -defer management to NSCU team    Further care per primary team  Discussed with NSCU team    Connor Camp PA-C    [facility]  [phone]  After hours and weekend coverage [phone]

## 2021-07-13 NOTE — PROGRESS NOTE ADULT - SUBJECTIVE AND OBJECTIVE BOX
Patient seen and examined    Vital Signs Last 24 Hrs  T(C): 36.7 (12 Jul 2021 23:00), Max: 37.8 (12 Jul 2021 03:00)  T(F): 98.1 (12 Jul 2021 23:00), Max: 100 (12 Jul 2021 03:00)  HR: 90 (12 Jul 2021 23:00) (84 - 114)  BP: 142/77 (12 Jul 2021 23:00) (125/99 - 167/84)  BP(mean): 95 (12 Jul 2021 23:00) (91 - 114)  RR: 19 (12 Jul 2021 23:00) (13 - 38)  SpO2: 100% (12 Jul 2021 23:00) (87% - 100%)    Overnight events:  Exam:  AOx3 pupils R b/l, FC, BUE 5/5, LLE 5/5, RLE 4+/5, groin sensation intact, no tony sensation today

## 2021-07-13 NOTE — PROGRESS NOTE ADULT - SUBJECTIVE AND OBJECTIVE BOX
HPI:  61 year old male with PMH of Thyroid Cancer, s/p Total Thyroidectomy 2010 and Radioactive Iodine 2011, Hypogonadism, Vitamin D Deficiency, Osteopenia with recently diagnosed Sacral Tumor. Patient endorses back pain that started in August 2020, he reports that he was treat by PCP with steroids and muscle relaxants. He reports that he had some relief, however the pain lingered. He was subsequently referred to an Orthopedic Specialist who ordered an MRI of Lumbar Spine, which revealed a Sacral Tumor with extension into the Presacral space. 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 Patient reports that he suffers from chronic constipation, he has noted that his urine stream is weaker than before, he reports right buttock and right scrotal pain and numbness. He denies decreased strength or problems walking. He presents to PST today for evaluation prior to scheduled Removal of Sacral Tumor on 7/6/2021.    fully covid vaccinated; proof of vaccination placed on chart (25 Jun 2021 11:47)    OVERNIGHT EVENTS:   Hi-flow improved    VITALS:  T(C): , Max: 38 (07-13-21 @ 07:00)  HR:  (78 - 108)  BP:  (125/99 - 167/84)  ABP: --  RR:  (13 - 35)  SpO2:  (91% - 100%)  Wt(kg): --      07-12-21 @ 07:01  -  07-13-21 @ 07:00  --------------------------------------------------------  IN: 3556.8 mL / OUT: 3075 mL / NET: 481.8 mL      LABS:  Na: 143 (07-13 @ 04:20), 144 (07-12 @ 13:50), 143 (07-12 @ 04:48), 142 (07-11 @ 21:58), 140 (07-10 @ 20:34)  K: 3.7 (07-13 @ 04:20), 3.2 (07-12 @ 13:50), 3.8 (07-12 @ 04:48), 3.6 (07-11 @ 21:58), 4.0 (07-10 @ 20:34)  Cl: 106 (07-13 @ 04:20), 103 (07-12 @ 13:50), 103 (07-12 @ 04:48), 102 (07-11 @ 21:58), 105 (07-10 @ 20:34)  CO2: 26 (07-13 @ 04:20), 27 (07-12 @ 13:50), 26 (07-12 @ 04:48), 27 (07-11 @ 21:58), 24 (07-10 @ 20:34)  BUN: 28 (07-13 @ 04:20), 28 (07-12 @ 13:50), 28 (07-12 @ 04:48), 29 (07-11 @ 21:58), 21 (07-10 @ 20:34)  Cr: 0.69 (07-13 @ 04:20), 0.75 (07-12 @ 13:50), 0.73 (07-12 @ 04:48), 0.67 (07-11 @ 21:58), 0.59 (07-10 @ 20:34)  Glu: 98(07-13 @ 04:20), 122(07-12 @ 13:50), 127(07-12 @ 04:48), 128(07-11 @ 21:58), 160(07-10 @ 20:34)    Hgb: 8.3 (07-13 @ 04:20), 10.3 (07-11 @ 21:58), 11.1 (07-10 @ 20:34)  Hct: 26.3 (07-13 @ 04:20), 30.6 (07-11 @ 21:58), 33.1 (07-10 @ 20:34)  WBC: 14.25 (07-13 @ 04:20), 10.67 (07-11 @ 21:58), 18.55 (07-10 @ 20:34)  Plt: 162 (07-13 @ 04:20), 183 (07-11 @ 21:58), 160 (07-10 @ 20:34)    INR:   PTT: 65.6 07-12-21 @ 18:31, 66.4 07-12-21 @ 11:48, 56.0 07-12-21 @ 04:48, 46.2 07-11-21 @ 21:58, 41.3 07-11-21 @ 16:15, 42.6 07-11-21 @ 08:28, 37.9 07-11-21 @ 02:54, 36.6 07-10-21 @ 20:34    IMAGING:   Recent imaging studies were reviewed.    MEDICATIONS:  acetaminophen   Tablet .. 650 milliGRAM(s) Oral every 6 hours PRN  aluminum hydroxide/magnesium hydroxide/simethicone Suspension 30 milliLiter(s) Oral every 4 hours PRN  bisacodyl 5 milliGRAM(s) Oral at bedtime  bisacodyl Suppository 10 milliGRAM(s) Rectal daily PRN  calcium carbonate 1250 mG  + Vitamin D (OsCal 500 + D) 1 Tablet(s) Oral daily  chlorhexidine 4% Liquid 1 Application(s) Topical <User Schedule>  diazepam  Injectable 2 milliGRAM(s) IV Push every 6 hours PRN  gabapentin 200 milliGRAM(s) Oral every 8 hours  heparin  Infusion 1000 Unit(s)/Hr IV Continuous <Continuous>  labetalol 100 milliGRAM(s) Oral every 8 hours  lactated ringers. 1000 milliLiter(s) IV Continuous <Continuous>  levothyroxine Injectable 63 MICROGram(s) IV Push at bedtime  methylnaltrexone Injectable 12 milliGRAM(s) SubCutaneous every other day  metoclopramide Injectable 10 milliGRAM(s) IV Push every 8 hours  naloxone Injectable 0.1 milliGRAM(s) IV Push every 3 minutes PRN  ondansetron Injectable 4 milliGRAM(s) IV Push every 6 hours PRN  oxyCODONE    IR 10 milliGRAM(s) Oral every 4 hours PRN  oxyCODONE    IR 5 milliGRAM(s) Oral every 6 hours PRN  pantoprazole  Injectable 40 milliGRAM(s) IV Push daily  polyethylene glycol 3350 17 Gram(s) Oral every 12 hours  potassium chloride  10 mEq/100 mL IVPB 10 milliEquivalent(s) IV Intermittent every 2 hours  senna 2 Tablet(s) Oral at bedtime  tetracaine/benzocaine/butamben Spray 1 Spray(s) Topical three times a day    EXAMINATION:  General:  calm  HEENT:  MMM  Neuro:  awake, alert, oriented x 3, follows commands, moves all extremities  Cards:  RRR  Respiratory:  no respiratory distress  Adomen:  soft  Extremities:  no edema  Skin:  warm/dry

## 2021-07-13 NOTE — PROGRESS NOTE ADULT - ASSESSMENT
61yMale patient s/p en bloc sacrectomy for chordoma w/ L4-pelvis fusion w/ plastics closure vertical rectus abdominis myocutaneous (VRAM) flap for chordoma on 07/07 and 07/08, post-op course c/b bilateral PEs, now on hep gtt. Surgery reconsulted for concern for post-op ileus.     Plan:  - Strict NPO  - Recommend adding D5 to IVF while NPO  - Await return of bowel function (AROBF)  - Continue NGT to low continuous wall suction  - Consider 0.5:1 IVF replacement for NGT output >1L  - Consider protonix GI PPx for dark NGT output  - Would not recommend miralax, senna, or other promotility agents while AROBF  - Care per NSGY primary  - Will continue to follow     [facility]  [id number]

## 2021-07-13 NOTE — PROGRESS NOTE ADULT - SUBJECTIVE AND OBJECTIVE BOX
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 The chordoma resulted in perineal numbness and overflow incontinence and he was admitted 7/7/21 for staged resection. On 7/7, he underwent Plastic Surgery and General Surgery assisted vertical rectus abdominal myocutaneous flap harvest with transperitoneal ligation of internal iliac artery and vein. He underwent en bloc sacrectomy on 7/8.     HOSPITAL COURSE:  7/7: Stage 1 - VRAM harvest, iliac artery and vein ligation  7/8: Stage 2 - sacrectomy with L4-pelvis fusion; EBL 4.5L status post 8 units PRBC, 6 units FFP, 1 pack platelets and 5L crystalloid; post-op status post 1 pack platelets  7/10: CTA chest: b/l upper lobe segmental PEs  7/11: Nausea/vomiting. NGT placed to low wall suction with immediate 1.2L bilious output.    weaned off high flow  acutely downtrending H/H    REVIEW OF SYSTEMS: [] Unable to Assess due to neurologic exam   [x] All ROS addressed below are non-contributory, except:  Neuro: [ ] Headache [ x Back pain [ ] Numbness [ ] Weakness [ ] Ataxia [ ] Dizziness [ ] Aphasia [ ] Dysarthria [ ] Visual disturbance  Resp: [x] Shortness of breath/dyspnea [ ] Orthopnea [ ] Cough  CV: [ ] Chest pain [ ] Palpitation [ ] Lightheadedness [ ] Syncope  Renal: [ ] Thirst [ ] Edema  GI: [ ] Nausea [ ] Emesis [x] Abdominal/incisional pain [ ] Constipation [ ] Diarrhea  Hem: [ ] Hematemesis [ ] Bright red blood per rectum  ID: [ ] Fever [ ] Chills [ ] Dysuria  ENT: [ ] Rhinorrhea    VITALS/DATA/ORDER: [x] Reviewed    EXAMINATION:   Gen: Cooperative  HEENT: Moist mucosa  CV: Mild tachy but regular  Lungs: Decreased BS at bases but good air entry, no stridor  Abd: hypoactive bowel sounds, distended  Ext: Diffuse edema; RLE edema>>>>LLE  Neuro: Awake, alert, fully oriented, follows, BUE no drift full strength, BLE full strength   SUMMARY:   61 year-old man with history of thyroid cancer status post total thyroidectomy in 2010 and radioactive iodine treatment in 2011, hypogonadism, vitamin D deficiency, and osteopenia diagnosed with sacral  mass found on work-up for back pain since August 2020 which was 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 The chordoma resulted in perineal numbness and overflow incontinence and he was admitted 7/7/21 for staged resection. On 7/7, he underwent Plastic Surgery and General Surgery assisted vertical rectus abdominal myocutaneous flap harvest with transperitoneal ligation of internal iliac artery and vein. He underwent en bloc sacrectomy on 7/8.     HOSPITAL COURSE:  7/7: Stage 1 - VRAM harvest, iliac artery and vein ligation  7/8: Stage 2 - sacrectomy with L4-pelvis fusion; EBL 4.5L status post 8 units PRBC, 6 units FFP, 1 pack platelets and 5L crystalloid; post-op status post 1 pack platelets  7/10: CTA chest: b/l upper lobe segmental PEs  7/11: Nausea/vomiting. NGT placed to low wall suction with immediate 1.2L bilious output.    weaned off high flow  acutely downtrending H/H    REVIEW OF SYSTEMS: [] Unable to Assess due to neurologic exam   [x] All ROS addressed below are non-contributory, except:  Neuro: [ ] Headache [ x Back pain [ ] Numbness [ ] Weakness [ ] Ataxia [ ] Dizziness [ ] Aphasia [ ] Dysarthria [ ] Visual disturbance  Resp: [x] Shortness of breath/dyspnea [ ] Orthopnea [ ] Cough  CV: [ ] Chest pain [ ] Palpitation [ ] Lightheadedness [ ] Syncope  Renal: [ ] Thirst [ ] Edema  GI: [ ] Nausea [ ] Emesis [x] Abdominal/incisional pain [ ] Constipation [ ] Diarrhea  Hem: [ ] Hematemesis [ ] Bright red blood per rectum  ID: [ ] Fever [ ] Chills [ ] Dysuria  ENT: [ ] Rhinorrhea    VITALS/DATA/ORDER: [x] Reviewed    EXAMINATION:   Gen: Cooperative  HEENT: Moist mucosa  CV: Mild tachy but regular  Lungs: Decreased BS at bases but good air entry, no stridor  Abd: hypoactive bowel sounds, distended  Ext: Diffuse edema; RLE edema>>>>LLE  Neuro: Awake, alert, fully oriented, follows, BUE no drift full strength, BLE full strength except for DF/PF on R 4+/5

## 2021-07-13 NOTE — PROGRESS NOTE ADULT - SUBJECTIVE AND OBJECTIVE BOX
SURGERY PROGRESS NOTE    SUBJECTIVE / 24H EVENTS:  Patient seen and examined on morning rounds. No acute events overnight. Patient with "smear of BM" overnight, does not remember passing flatus.       OBJECTIVE:  VITAL SIGNS:  T(C): 38 (07-13-21 @ 07:00), Max: 38 (07-13-21 @ 07:00)  HR: 80 (07-13-21 @ 10:00) (78 - 108)  BP: 148/79 (07-13-21 @ 10:00) (131/88 - 167/84)  RR: 23 (07-13-21 @ 10:00) (13 - 33)  SpO2: 100% (07-13-21 @ 10:00) (93% - 100%)  Daily     Daily       PHYSICAL EXAM:  Gen: NAD  LS: Respirations unlabored, on HFNC  GI: NGT in place to low continuous wall suction with dark brown/green output. Soft. Distended. Mild tenderness to palpation in bilateral lower quadrants, improved from prior exam. No rebound tenderness or guarding. Midline incision with Aquacel dressing c/d/i.      07-12-21 @ 07:01  -  07-13-21 @ 07:00  --------------------------------------------------------  IN:    Enteral Tube Flush: 100 mL    Heparin: 432 mL    IV PiggyBack: 899.8 mL    Lactated Ringers: 75 mL    Lactated Ringers: 1300 mL    Lactated Ringers: 750 mL  Total IN: 3556.8 mL    OUT:    Bulb (mL): 150 mL    Drain (mL): 120 mL    Drain (mL): 135 mL    Indwelling Catheter - Urethral (mL): 1870 mL    Nasogastric/Oral tube (mL): 800 mL  Total OUT: 3075 mL    Total NET: 481.8 mL          LAB VALUES:  07-13    143  |  106  |  28<H>  ----------------------------<  98  3.7   |  26  |  0.69    Ca    8.5      13 Jul 2021 04:20  Phos  3.4     07-13  Mg     2.1     07-13    TPro  5.8<L>  /  Alb  2.5<L>  /  TBili  0.5  /  DBili  0.2  /  AST  50<H>  /  ALT  37  /  AlkPhos  61  07-11                               8.3    14.25 )-----------( 162      ( 13 Jul 2021 04:20 )             26.3     LIVER FUNCTIONS - ( 11 Jul 2021 16:15 )  Alb: 2.5 g/dL / Pro: 5.8 g/dL / ALK PHOS: 61 U/L / ALT: 37 U/L / AST: 50 U/L / GGT: x           PTT - ( 12 Jul 2021 18:31 )  PTT:65.6 sec  ABG - ( 12 Jul 2021 13:41 )  pH, Arterial: 7.61  pH, Blood: x     /  pCO2: 31    /  pO2: 80    / HCO3: 31    / Base Excess: 9.2   /  SaO2: 97                        MICROBIOLOGY:    Culture - Sputum (collected 12 Jul 2021 21:06)  Source: .Sputum Sputum  Gram Stain (12 Jul 2021 22:39):    Numerous polymorphonuclear leukocytes per low power field    No Squamous epithelial cells per low power field    Numerous Gram Negative Rods per oil power field    Moderate Gram positive cocci in pairs, chains and clusters per oil power    field        RADIOLOGY:        MEDICATIONS  (STANDING):  bisacodyl 5 milliGRAM(s) Oral at bedtime  calcium carbonate 1250 mG  + Vitamin D (OsCal 500 + D) 1 Tablet(s) Oral daily  chlorhexidine 4% Liquid 1 Application(s) Topical <User Schedule>  gabapentin 200 milliGRAM(s) Oral every 8 hours  heparin  Infusion 1000 Unit(s)/Hr (18 mL/Hr) IV Continuous <Continuous>  labetalol 100 milliGRAM(s) Oral every 8 hours  lactated ringers. 1000 milliLiter(s) (75 mL/Hr) IV Continuous <Continuous>  levothyroxine Injectable 63 MICROGram(s) IV Push at bedtime  methylnaltrexone Injectable 12 milliGRAM(s) SubCutaneous every other day  metoclopramide Injectable 10 milliGRAM(s) IV Push every 8 hours  pantoprazole  Injectable 40 milliGRAM(s) IV Push daily  polyethylene glycol 3350 17 Gram(s) Oral every 12 hours  senna 2 Tablet(s) Oral at bedtime  tetracaine/benzocaine/butamben Spray 1 Spray(s) Topical three times a day    MEDICATIONS  (PRN):  acetaminophen   Tablet .. 650 milliGRAM(s) Oral every 6 hours PRN Temp greater or equal to 38C (100.4F), Mild Pain (1 - 3)  aluminum hydroxide/magnesium hydroxide/simethicone Suspension 30 milliLiter(s) Oral every 4 hours PRN Dyspepsia  bisacodyl Suppository 10 milliGRAM(s) Rectal daily PRN Constipation  diazepam  Injectable 2 milliGRAM(s) IV Push every 6 hours PRN muscle spasm  naloxone Injectable 0.1 milliGRAM(s) IV Push every 3 minutes PRN For ANY of the following changes in patient status:  A. RR LESS THAN 10 breaths per minute, B. Oxygen saturation LESS THAN 90%, C. Sedation score of 6  ondansetron Injectable 4 milliGRAM(s) IV Push every 6 hours PRN Nausea  oxyCODONE    IR 5 milliGRAM(s) Oral every 6 hours PRN Mild Pain (1 - 3)  oxyCODONE    IR 10 milliGRAM(s) Oral every 4 hours PRN Moderate Pain (4 - 6)

## 2021-07-13 NOTE — PROGRESS NOTE ADULT - SUBJECTIVE AND OBJECTIVE BOX
Patient is a 61y old  Male who presented with a chief complaint of Sacral Chordoma (12 Jul 2021 10:26)      INTERVAL HPI/OVERNIGHT EVENTS:  smear BM reported  minimal NGT output since yesterday PM  feeling "better"  improving abdominal distension  worked with PT yesterday  feeling thirsty and hungry    MEDICATIONS  (STANDING):  bisacodyl 5 milliGRAM(s) Oral at bedtime  calcium carbonate 1250 mG  + Vitamin D (OsCal 500 + D) 1 Tablet(s) Oral daily  chlorhexidine 4% Liquid 1 Application(s) Topical <User Schedule>  gabapentin 200 milliGRAM(s) Oral every 8 hours  heparin  Infusion 1000 Unit(s)/Hr (18 mL/Hr) IV Continuous <Continuous>  labetalol 100 milliGRAM(s) Oral every 8 hours  lactated ringers. 1000 milliLiter(s) (75 mL/Hr) IV Continuous <Continuous>  levothyroxine Injectable 63 MICROGram(s) IV Push at bedtime  methylnaltrexone Injectable 12 milliGRAM(s) SubCutaneous every other day  metoclopramide Injectable 10 milliGRAM(s) IV Push every 8 hours  pantoprazole  Injectable 40 milliGRAM(s) IV Push daily  polyethylene glycol 3350 17 Gram(s) Oral every 12 hours  senna 2 Tablet(s) Oral at bedtime  tetracaine/benzocaine/butamben Spray 1 Spray(s) Topical three times a day    MEDICATIONS  (PRN):  acetaminophen   Tablet .. 650 milliGRAM(s) Oral every 6 hours PRN Temp greater or equal to 38C (100.4F), Mild Pain (1 - 3)  aluminum hydroxide/magnesium hydroxide/simethicone Suspension 30 milliLiter(s) Oral every 4 hours PRN Dyspepsia  bisacodyl Suppository 10 milliGRAM(s) Rectal daily PRN Constipation  diazepam  Injectable 2 milliGRAM(s) IV Push every 6 hours PRN muscle spasm  naloxone Injectable 0.1 milliGRAM(s) IV Push every 3 minutes PRN For ANY of the following changes in patient status:  A. RR LESS THAN 10 breaths per minute, B. Oxygen saturation LESS THAN 90%, C. Sedation score of 6  ondansetron Injectable 4 milliGRAM(s) IV Push every 6 hours PRN Nausea  oxyCODONE    IR 5 milliGRAM(s) Oral every 6 hours PRN Mild Pain (1 - 3)  oxyCODONE    IR 10 milliGRAM(s) Oral every 4 hours PRN Moderate Pain (4 - 6)      Allergies  No Known Allergies      Review of Systems:  General:  No wt loss, fevers, chills, night sweats,fatigue,   CV:  No pain, palpitations, hypo/hypertension  Resp:  No dyspnea, cough, tachypnea, wheezing  GI: see HPI  :  No pain, bleeding, incontinence, nocturia  Muscle:  +LE weakness  Neuro:  see HPI  Psych:  No fatigue, insomnia, mood problems, depression  Endocrine:  No polyuria, polydypsia, cold/heat intolerance  Heme:  No petechiae, ecchymosis, easy bruisability +hx thyroid CA s/p resection  Skin:  No rash, tattoos, scars, edema      Vital Signs Last 24 Hrs  T(C): 38 (13 Jul 2021 07:00), Max: 38 (13 Jul 2021 07:00)  T(F): 100.4 (13 Jul 2021 07:00), Max: 100.4 (13 Jul 2021 07:00)  HR: 90 (13 Jul 2021 14:00) (78 - 100)  BP: 155/76 (13 Jul 2021 14:00) (131/88 - 166/78)  BP(mean): 97 (13 Jul 2021 14:00) (94 - 108)  RR: 22 (13 Jul 2021 14:00) (17 - 33)  SpO2: 95% (13 Jul 2021 14:00) (95% - 100%)    PHYSICAL EXAM:  Constitutional: NAD, well-developed on high flow nasal cannula; alert and responsive   +NGT scant light bilious output in tubing, nothing in canister  Neck: No LAD, supple WH surgical scar  Respiratory: clear b/l  Cardiovascular: S1 and S2, RRR, no M  Gastrointestinal: BS+, softly distended (improved from previous exam, now able to see wrinkle/folds in aquacell dressing) NT  +midline incision with aquacell dressing  clean and dry  RLQ drain (bulb) site clean and dry  b/l Hemovac sites clean and dry  +tony clear  Extremities: No peripheral edema, neg clubbing, cyanosis  Vascular: 2+ peripheral pulses  Neurological: A/O x 3, decreased sensation medial aspect of left calf  Psychiatric: Normal mood, normal affect  Skin: No rashes      LABS:                        8.3    14.25 )-----------( 162      ( 13 Jul 2021 04:20 )             26.3     07-13    143  |  106  |  28<H>  ----------------------------<  98  3.7   |  26  |  0.69    Ca    8.5      13 Jul 2021 04:20  Phos  3.4     07-13  Mg     2.1     07-13    TPro  5.8<L>  /  Alb  2.5<L>  /  TBili  0.5  /  DBili  0.2  /  AST  50<H>  /  ALT  37  /  AlkPhos  61  07-11    PTT - ( 12 Jul 2021 18:31 )  PTT:65.6 sec    Culture - Sputum . (07.12.21 @ 21:06)   Gram Stain:   Numerous polymorphonuclear leukocytes per low power field   No Squamous epithelial cells per low power field   Numerous Gram Negative Rods per oil power field   Moderate Gram positive cocci in pairs, chains and clusters per oil power   field   Specimen Source: .Sputum Sputum         RADIOLOGY & ADDITIONAL TESTS:

## 2021-07-13 NOTE — PROGRESS NOTE ADULT - ASSESSMENT
61 year old male with  Sacral Tumor. status post en bloc sacrectomy, Stage 2 w/ L4-pelvix fusion w/ plastic closure.  Plan  neuro checks q 4hr   pain control  PT/OT  3 drains, monitor output 200 cc/300 cc output   acute hypoxic respiratory failure on high flow 40 %, LPM 50   atelectasis and PE on heparin drip   coughing , thin secretions  IS every hour   might  change high flow to  BIPAP  given high oxygen requirement and shallow breathing and atelectasis   chest xray atelectasis   SBP goal 100-160 mmhg   GI: abdominal distention improved  keep NG tube   keep NPO   reglan to 10 mg q 8 hr   s/p methylnaltrexone  glycerine suppository  no BM since a day before surgery    gen surgery following  GI following  renal: LR @ 100  tony   endo: synthroid for hypothyroidism  hem: heparin drip, hgb  stable

## 2021-07-13 NOTE — PROGRESS NOTE ADULT - ASSESSMENT
A/P: chordoma  s/p en bloc sacrectomy and L4-pelvis fusion POD 5 from stage 1 and POD 4 from stage 2    Neuro:   monitor drain output  pain control with acetaminophen and prn diazepam  PT/OT/OBC as tolerated    Resp: hypoxic/multifactorial - PEs, atelectasis, splinting from pain, abdominal distension/ileus  bilateral subsegmental PE and atelectasis on heparin drip, cleared by NS goal 50-70   encourage IS, Acapella device use  weaned off high flow, check ABG    CV: MAP> 65 mmHg  -160   HTN on labetolol restarted 100Q8    Renal:   has tony for urinary retension  urine output decreased - responded to fluid bolus  keep euvolemic; IVF     GI: ileus - NPO  NGT to low wall suction  GI following  aggressive bowel regimen    Endo:   hypothyroidism on synthroid   target euglycemia     Heme/Onc:   hemorrhagic shock resolved, downtrending H/H, significant drain output, check now, transfuse hgb<8, plt<100  heparin drip for PE; monitor H/H  dopplers negative on 7/10; monitor RLE edema    ID: afebrile     Code Status: [x] Full Code [] DNR [] DNI [] Goals of Care:     Disposition: [x] ICU [] Stroke Unit [] RCU []PCU []Floor [] Discharge Home                     A/P: chordoma  s/p en bloc sacrectomy and L4-pelvis fusion POD 5 from stage 1 and POD 4 from stage 2    Neuro:   monitor drain output  pain control with acetaminophen and prn diazepam  PT/OT/OBC as tolerated    Resp: hypoxic/multifactorial - PEs, atelectasis, splinting from pain, abdominal distension/ileus  bilateral subsegmental PE and atelectasis on heparin drip, cleared by NS goal 50-70   encourage IS, Acapella device use  weaned off high flow, check ABG    CV: MAP> 65 mmHg  -160   HTN on labetolol restarted 100Q8    Renal:   has tony for urinary retension  urine output decreased - responded to fluid bolus  keep euvolemic; IVF     GI: ileus - NPO  NGT to low wall suction  GI and gen surg following  aggressive bowel regimen    Endo:   hypothyroidism on synthroid   target euglycemia     Heme/Onc:   hemorrhagic shock resolved, downtrending H/H, significant drain output, check now, transfuse hgb<8, plt<100  heparin drip for PE; monitor H/H  dopplers negative on 7/10; monitor RLE edema    ID: afebrile     Code Status: [x] Full Code [] DNR [] DNI [] Goals of Care:     Disposition: [x] ICU [] Stroke Unit [] RCU []PCU []Floor [] Discharge Home

## 2021-07-13 NOTE — PROGRESS NOTE ADULT - ASSESSMENT
Patient seen and examined at bedside.  AOx3 pupils R b/l, FC, BUE 5/5, LLE 5/5, RLE 4+/5, groin sensation intact, tony sensation intact today    On highflow, NGT to low wall suction   Try to keep pressure off incision  Keep tony in  LED - P (RLE swelling)

## 2021-07-14 LAB
-  AMIKACIN: SIGNIFICANT CHANGE UP
-  AMOXICILLIN/CLAVULANIC ACID: SIGNIFICANT CHANGE UP
-  AMPICILLIN/SULBACTAM: SIGNIFICANT CHANGE UP
-  AMPICILLIN: SIGNIFICANT CHANGE UP
-  AZTREONAM: SIGNIFICANT CHANGE UP
-  CEFAZOLIN: SIGNIFICANT CHANGE UP
-  CEFEPIME: SIGNIFICANT CHANGE UP
-  CEFOXITIN: SIGNIFICANT CHANGE UP
-  CEFTRIAXONE: SIGNIFICANT CHANGE UP
-  CIPROFLOXACIN: SIGNIFICANT CHANGE UP
-  ERTAPENEM: SIGNIFICANT CHANGE UP
-  GENTAMICIN: SIGNIFICANT CHANGE UP
-  IMIPENEM: SIGNIFICANT CHANGE UP
-  LEVOFLOXACIN: SIGNIFICANT CHANGE UP
-  MEROPENEM: SIGNIFICANT CHANGE UP
-  PIPERACILLIN/TAZOBACTAM: SIGNIFICANT CHANGE UP
-  TOBRAMYCIN: SIGNIFICANT CHANGE UP
-  TRIMETHOPRIM/SULFAMETHOXAZOLE: SIGNIFICANT CHANGE UP
ANION GAP SERPL CALC-SCNC: 12 MMOL/L — SIGNIFICANT CHANGE UP (ref 5–17)
ANION GAP SERPL CALC-SCNC: 13 MMOL/L — SIGNIFICANT CHANGE UP (ref 5–17)
APTT BLD: 53.4 SEC — HIGH (ref 27.5–35.5)
BLD GP AB SCN SERPL QL: NEGATIVE — SIGNIFICANT CHANGE UP
BUN SERPL-MCNC: 23 MG/DL — SIGNIFICANT CHANGE UP (ref 7–23)
BUN SERPL-MCNC: 26 MG/DL — HIGH (ref 7–23)
CALCIUM SERPL-MCNC: 8.2 MG/DL — LOW (ref 8.4–10.5)
CALCIUM SERPL-MCNC: 8.4 MG/DL — SIGNIFICANT CHANGE UP (ref 8.4–10.5)
CHLORIDE SERPL-SCNC: 108 MMOL/L — SIGNIFICANT CHANGE UP (ref 96–108)
CHLORIDE SERPL-SCNC: 108 MMOL/L — SIGNIFICANT CHANGE UP (ref 96–108)
CO2 SERPL-SCNC: 20 MMOL/L — LOW (ref 22–31)
CO2 SERPL-SCNC: 24 MMOL/L — SIGNIFICANT CHANGE UP (ref 22–31)
CREAT SERPL-MCNC: 0.67 MG/DL — SIGNIFICANT CHANGE UP (ref 0.5–1.3)
CREAT SERPL-MCNC: 0.68 MG/DL — SIGNIFICANT CHANGE UP (ref 0.5–1.3)
CULTURE RESULTS: SIGNIFICANT CHANGE UP
GAS PNL BLDA: SIGNIFICANT CHANGE UP
GLUCOSE SERPL-MCNC: 78 MG/DL — SIGNIFICANT CHANGE UP (ref 70–99)
GLUCOSE SERPL-MCNC: 85 MG/DL — SIGNIFICANT CHANGE UP (ref 70–99)
HCT VFR BLD CALC: 24.8 % — LOW (ref 39–50)
HCT VFR BLD CALC: 28.2 % — LOW (ref 39–50)
HGB BLD-MCNC: 7.8 G/DL — LOW (ref 13–17)
HGB BLD-MCNC: 9.1 G/DL — LOW (ref 13–17)
MAGNESIUM SERPL-MCNC: 1.9 MG/DL — SIGNIFICANT CHANGE UP (ref 1.6–2.6)
MAGNESIUM SERPL-MCNC: 2.1 MG/DL — SIGNIFICANT CHANGE UP (ref 1.6–2.6)
MCHC RBC-ENTMCNC: 29 PG — SIGNIFICANT CHANGE UP (ref 27–34)
MCHC RBC-ENTMCNC: 29.2 PG — SIGNIFICANT CHANGE UP (ref 27–34)
MCHC RBC-ENTMCNC: 31.5 GM/DL — LOW (ref 32–36)
MCHC RBC-ENTMCNC: 32.3 GM/DL — SIGNIFICANT CHANGE UP (ref 32–36)
MCV RBC AUTO: 89.8 FL — SIGNIFICANT CHANGE UP (ref 80–100)
MCV RBC AUTO: 92.9 FL — SIGNIFICANT CHANGE UP (ref 80–100)
METHOD TYPE: SIGNIFICANT CHANGE UP
NRBC # BLD: 0 /100 WBCS — SIGNIFICANT CHANGE UP (ref 0–0)
NRBC # BLD: 0 /100 WBCS — SIGNIFICANT CHANGE UP (ref 0–0)
ORGANISM # SPEC MICROSCOPIC CNT: SIGNIFICANT CHANGE UP
ORGANISM # SPEC MICROSCOPIC CNT: SIGNIFICANT CHANGE UP
PHOSPHATE SERPL-MCNC: 3 MG/DL — SIGNIFICANT CHANGE UP (ref 2.5–4.5)
PHOSPHATE SERPL-MCNC: 3.3 MG/DL — SIGNIFICANT CHANGE UP (ref 2.5–4.5)
PLATELET # BLD AUTO: 197 K/UL — SIGNIFICANT CHANGE UP (ref 150–400)
PLATELET # BLD AUTO: 207 K/UL — SIGNIFICANT CHANGE UP (ref 150–400)
POTASSIUM SERPL-MCNC: 3.6 MMOL/L — SIGNIFICANT CHANGE UP (ref 3.5–5.3)
POTASSIUM SERPL-MCNC: 3.8 MMOL/L — SIGNIFICANT CHANGE UP (ref 3.5–5.3)
POTASSIUM SERPL-SCNC: 3.6 MMOL/L — SIGNIFICANT CHANGE UP (ref 3.5–5.3)
POTASSIUM SERPL-SCNC: 3.8 MMOL/L — SIGNIFICANT CHANGE UP (ref 3.5–5.3)
RBC # BLD: 2.67 M/UL — LOW (ref 4.2–5.8)
RBC # BLD: 3.14 M/UL — LOW (ref 4.2–5.8)
RBC # FLD: 14.4 % — SIGNIFICANT CHANGE UP (ref 10.3–14.5)
RBC # FLD: 15.4 % — HIGH (ref 10.3–14.5)
RH IG SCN BLD-IMP: POSITIVE — SIGNIFICANT CHANGE UP
SODIUM SERPL-SCNC: 141 MMOL/L — SIGNIFICANT CHANGE UP (ref 135–145)
SODIUM SERPL-SCNC: 144 MMOL/L — SIGNIFICANT CHANGE UP (ref 135–145)
SPECIMEN SOURCE: SIGNIFICANT CHANGE UP
WBC # BLD: 13.91 K/UL — HIGH (ref 3.8–10.5)
WBC # BLD: 14.08 K/UL — HIGH (ref 3.8–10.5)
WBC # FLD AUTO: 13.91 K/UL — HIGH (ref 3.8–10.5)
WBC # FLD AUTO: 14.08 K/UL — HIGH (ref 3.8–10.5)

## 2021-07-14 PROCEDURE — 99291 CRITICAL CARE FIRST HOUR: CPT

## 2021-07-14 PROCEDURE — 99232 SBSQ HOSP IP/OBS MODERATE 35: CPT

## 2021-07-14 PROCEDURE — 74018 RADEX ABDOMEN 1 VIEW: CPT | Mod: 26

## 2021-07-14 RX ORDER — POTASSIUM CHLORIDE 20 MEQ
40 PACKET (EA) ORAL ONCE
Refills: 0 | Status: COMPLETED | OUTPATIENT
Start: 2021-07-14 | End: 2021-07-14

## 2021-07-14 RX ADMIN — Medication 1 TABLET(S): at 12:12

## 2021-07-14 RX ADMIN — METHYLNALTREXONE BROMIDE 12 MILLIGRAM(S): 12 INJECTION, SOLUTION SUBCUTANEOUS at 15:01

## 2021-07-14 RX ADMIN — Medication 1 SPRAY(S): at 21:27

## 2021-07-14 RX ADMIN — Medication 10 MILLIGRAM(S): at 05:49

## 2021-07-14 RX ADMIN — SODIUM CHLORIDE 75 MILLILITER(S): 9 INJECTION, SOLUTION INTRAVENOUS at 12:13

## 2021-07-14 RX ADMIN — HEPARIN SODIUM 18 UNIT(S)/HR: 5000 INJECTION INTRAVENOUS; SUBCUTANEOUS at 12:13

## 2021-07-14 RX ADMIN — Medication 5 MILLIGRAM(S): at 21:26

## 2021-07-14 RX ADMIN — Medication 10 MILLIGRAM(S): at 21:26

## 2021-07-14 RX ADMIN — Medication 63 MICROGRAM(S): at 21:26

## 2021-07-14 RX ADMIN — SODIUM CHLORIDE 75 MILLILITER(S): 9 INJECTION, SOLUTION INTRAVENOUS at 19:39

## 2021-07-14 RX ADMIN — GABAPENTIN 200 MILLIGRAM(S): 400 CAPSULE ORAL at 05:48

## 2021-07-14 RX ADMIN — Medication 100 MILLIGRAM(S): at 21:26

## 2021-07-14 RX ADMIN — Medication 40 MILLIEQUIVALENT(S): at 01:46

## 2021-07-14 RX ADMIN — CHLORHEXIDINE GLUCONATE 1 APPLICATION(S): 213 SOLUTION TOPICAL at 21:27

## 2021-07-14 RX ADMIN — POLYETHYLENE GLYCOL 3350 17 GRAM(S): 17 POWDER, FOR SOLUTION ORAL at 17:21

## 2021-07-14 RX ADMIN — GABAPENTIN 200 MILLIGRAM(S): 400 CAPSULE ORAL at 21:25

## 2021-07-14 RX ADMIN — Medication 10 MILLIGRAM(S): at 15:01

## 2021-07-14 RX ADMIN — Medication 1 SPRAY(S): at 05:49

## 2021-07-14 RX ADMIN — SENNA PLUS 2 TABLET(S): 8.6 TABLET ORAL at 21:26

## 2021-07-14 RX ADMIN — POLYETHYLENE GLYCOL 3350 17 GRAM(S): 17 POWDER, FOR SOLUTION ORAL at 05:48

## 2021-07-14 RX ADMIN — Medication 100 MILLIGRAM(S): at 15:01

## 2021-07-14 RX ADMIN — Medication 100 MILLIGRAM(S): at 05:49

## 2021-07-14 RX ADMIN — GABAPENTIN 200 MILLIGRAM(S): 400 CAPSULE ORAL at 15:00

## 2021-07-14 RX ADMIN — PANTOPRAZOLE SODIUM 40 MILLIGRAM(S): 20 TABLET, DELAYED RELEASE ORAL at 12:13

## 2021-07-14 RX ADMIN — Medication 1 ENEMA: at 16:49

## 2021-07-14 NOTE — PROGRESS NOTE ADULT - SUBJECTIVE AND OBJECTIVE BOX
SURGERY PROGRESS NOTE    SUBJECTIVE / 24H EVENTS:  Patient seen and examined on morning rounds. No acute events overnight. RN reports another "smear" BM. Patient reporting improved abdominal pain, denies flatus      OBJECTIVE:  VITAL SIGNS:  T(C): 36.9 (07-14-21 @ 11:00), Max: 37.4 (07-14-21 @ 03:00)  HR: 72 (07-14-21 @ 11:00) (72 - 90)  BP: 158/76 (07-14-21 @ 11:00) (116/57 - 164/76)  RR: 22 (07-14-21 @ 11:00) (16 - 31)  SpO2: 97% (07-14-21 @ 11:00) (86% - 100%)  Daily     Daily       PHYSICAL EXAM:  Gen: NAD  LS: Respirations unlabored, on HFNC  GI: NGT in place to low continuous wall suction with minimal output. Soft. Distended. Nontender. improved from prior exam. No rebound tenderness or guarding. Midline incision with Aquacel dressing c/d/i.      07-13-21 @ 07:01  -  07-14-21 @ 07:00  --------------------------------------------------------  IN:    Enteral Tube Flush: 240 mL    Heparin: 432 mL    Lactated Ringers: 1800 mL    PRBCs (Packed Red Blood Cells): 300 mL  Total IN: 2772 mL    OUT:    Bulb (mL): 220 mL    Drain (mL): 170 mL    Drain (mL): 130 mL    Indwelling Catheter - Urethral (mL): 1885 mL    Nasogastric/Oral tube (mL): 5 mL  Total OUT: 2410 mL    Total NET: 362 mL      07-14-21 @ 07:01  -  07-14-21 @ 11:31  --------------------------------------------------------  IN:    Heparin: 90 mL    Lactated Ringers: 375 mL  Total IN: 465 mL    OUT:    Indwelling Catheter - Urethral (mL): 250 mL  Total OUT: 250 mL    Total NET: 215 mL          LAB VALUES:  07-14    144  |  108  |  26<H>  ----------------------------<  85  3.6   |  24  |  0.68    Ca    8.2<L>      14 Jul 2021 00:09  Phos  3.0     07-14  Mg     2.1     07-14                                 9.1    14.08 )-----------( 207      ( 14 Jul 2021 10:52 )             28.2       PTT - ( 14 Jul 2021 10:52 )  PTT:53.4 sec  ABG - ( 14 Jul 2021 00:05 )  pH, Arterial: 7.51  pH, Blood: x     /  pCO2: 36    /  pO2: 55    / HCO3: 28    / Base Excess: 5.3   /  SaO2: 90                        MICROBIOLOGY:    Culture - Sputum (collected 12 Jul 2021 21:06)  Source: .Sputum Sputum  Gram Stain (12 Jul 2021 22:39):    Numerous polymorphonuclear leukocytes per low power field    No Squamous epithelial cells per low power field    Numerous Gram Negative Rods per oil power field    Moderate Gram positive cocci in pairs, chains and clusters per oil power    field  Preliminary Report (13 Jul 2021 17:05):    Numerous Enterobacter aerogenes    Normal Respiratory Carlie present        RADIOLOGY:        MEDICATIONS  (STANDING):  bisacodyl 5 milliGRAM(s) Oral at bedtime  calcium carbonate 1250 mG  + Vitamin D (OsCal 500 + D) 1 Tablet(s) Oral daily  chlorhexidine 4% Liquid 1 Application(s) Topical <User Schedule>  gabapentin 200 milliGRAM(s) Oral every 8 hours  heparin  Infusion 1000 Unit(s)/Hr (18 mL/Hr) IV Continuous <Continuous>  labetalol 100 milliGRAM(s) Oral every 8 hours  lactated ringers. 1000 milliLiter(s) (75 mL/Hr) IV Continuous <Continuous>  levothyroxine Injectable 63 MICROGram(s) IV Push at bedtime  methylnaltrexone Injectable 12 milliGRAM(s) SubCutaneous every other day  metoclopramide Injectable 10 milliGRAM(s) IV Push every 8 hours  pantoprazole  Injectable 40 milliGRAM(s) IV Push daily  polyethylene glycol 3350 17 Gram(s) Oral every 12 hours  senna 2 Tablet(s) Oral at bedtime  tetracaine/benzocaine/butamben Spray 1 Spray(s) Topical three times a day    MEDICATIONS  (PRN):  acetaminophen   Tablet .. 650 milliGRAM(s) Oral every 6 hours PRN Temp greater or equal to 38C (100.4F), Mild Pain (1 - 3)  aluminum hydroxide/magnesium hydroxide/simethicone Suspension 30 milliLiter(s) Oral every 4 hours PRN Dyspepsia  bisacodyl Suppository 10 milliGRAM(s) Rectal daily PRN Constipation  diazepam  Injectable 2 milliGRAM(s) IV Push every 6 hours PRN muscle spasm  naloxone Injectable 0.1 milliGRAM(s) IV Push every 3 minutes PRN For ANY of the following changes in patient status:  A. RR LESS THAN 10 breaths per minute, B. Oxygen saturation LESS THAN 90%, C. Sedation score of 6  ondansetron Injectable 4 milliGRAM(s) IV Push every 6 hours PRN Nausea  oxyCODONE    IR 5 milliGRAM(s) Oral every 6 hours PRN Mild Pain (1 - 3)  oxyCODONE    IR 10 milliGRAM(s) Oral every 4 hours PRN Moderate Pain (4 - 6)

## 2021-07-14 NOTE — PROGRESS NOTE ADULT - ASSESSMENT
61 year old male with  Sacral Tumor. status post en bloc sacrectomy, Stage 2 w/ L4-pelvix fusion w/ plastic closure.  Plan  neuro checks q 4hr   pain control  PT/OT  3 drains, monitor output  acute hypoxic respiratory failure on high flow 40 %, LPM 50   atelectasis and PE on heparin drip   coughing , thin secretions  IS every hour   chest xray atelectasis   SBP goal 100-160 mmhg   GI: abdominal distention improved  keep NG tube   keep NPO   reglan to 10 mg q 8 hr   s/p methylnaltrexone  glycerine suppository  no BM since a day before surgery    gen surgery following  GI following  renal: LR @ 100  tony   endo: synthroid for hypothyroidism  hem: f/u post-transfusion CBC

## 2021-07-14 NOTE — PROGRESS NOTE ADULT - ASSESSMENT
Patient seen and examined at bedside.  AOx3 pupils R b/l, FC, BUE 5/5, LLE 5/5, RLE 4+/5, groin sensation intact, tony sensation intact today    Hi flow, NGTclamped  heparin GTT until drains out  Try to keep pressure off incision  Keep tony in  LED - P (RLE swelling)  Patient seen and examined at bedside.  AOx3 pupils R b/l, FC, BUE 5/5, LLE 5/5, RLE 4+/5, groin sensation intact, tony sensation intact today    Hi flow, NGTclamped  heparin GTT until drains out  Try to keep pressure off incision  Keep tony in  LED - P (RLE swelling)   1U PRBC given for drop in H&H

## 2021-07-14 NOTE — PROGRESS NOTE ADULT - ASSESSMENT
A/P: chordoma  s/p en bloc sacrectomy and L4-pelvis fusion POD 7 from stage 1 and POD 6 from stage 2    Neuro:   monitor drain output  pain control with acetaminophen and prn diazepam  PT/OT/OBC as tolerated    Resp: hypoxic/multifactorial - PEs, atelectasis, splinting from pain, abdominal distension/ileus  bilateral subsegmental PE and atelectasis on heparin drip, cleared by NS goal 50-70   encourage IS, Acapella device use  high flow, wean as able  concern for PNA, start levaquin x7days    CV: MAP> 65 mmHg  -160   HTN on labetolol restarted 100Q8    Renal:   has tony for urinary retension  urine output decreased - responded to fluid bolus  keep euvolemic; IVF     GI: ileus   NGT clamped  start clears as tolerated  GI and gen surg following  aggressive bowel regimen    Endo:   hypothyroidism on synthroid   target euglycemia     Heme/Onc:   hemorrhagic shock resolved, transfuse hgb<8, plt<100  heparin drip for PE; monitor H/H  dopplers negative on 7/10; monitor RLE edema    ID: sputum culture growing enterobacter  +secretions, hypoxia  treat for pna with levaquin x7days    Code Status: [x] Full Code [] DNR [] DNI [] Goals of Care:     Disposition: [x] ICU [] Stroke Unit [] RCU []PCU []Floor [] Discharge Home

## 2021-07-14 NOTE — PROVIDER CONTACT NOTE (OTHER) - ACTION/TREATMENT ORDERED:
Provider ordered high flow and respiratory put patient on high flow nasal canula at 40L. Patient sating at %. continues to use incentive spirometer.

## 2021-07-14 NOTE — PROGRESS NOTE ADULT - ASSESSMENT
61 year old male with PMH of Thyroid Cancer, s/p Total Thyroidectomy 2010 and Radioactive Iodine 2011, Hypogonadism, Vitamin D Deficiency, Osteopenia with recently diagnosed Sacral Tumor. Admitted on 7/7, he underwent Plastic Surgery and General Surgery assisted vertical rectus abdominal myocutaneous flap harvest with transperitoneal ligation of internal iliac artery and vein. He underwent en bloc sacrectomy on 7/8, with L4-pelvis fusion, transfused 8 units PRBC, 6 units FFP, 1 pack platelets and 5L crystalloid; post-op status post 1 pack platelets, CTA 7/10 with bilateral upper lobe segmental PEs,  now with post op ileus      #Abdominal Distension (improving) with post-op ileus. no cecal distension on imaging, ileus involving SB, no rectal distension on exam  -d/w Surgery team; will check AXR today to evaluate ileus  -continue NGT for now; will re-assess after AXR  -Continue Reglan as ordered  -no GI objection to laxatives as ordered via NGT (clamp for 30 minutes after administration). Will require chronic laxatives for bowel regimen (may require supp or enemas regularly) given decreased rectal tone and sensation post-sacrectomy  -OOB/PT; bed mobility/turning  -check TSH (s/p thyroidectomy on IV synthroid)  -monitor/replete lytes PRN  goal K 4-4.5 and Mg>2  -IV PPI for GI prophylaxis  -Surgery following    Further care per primary team  [facility]    Connor Camp PA-C    [facility]  [phone]  After hours and weekend coverage [phone]   61 year old male with PMH of Thyroid Cancer, s/p Total Thyroidectomy 2010 and Radioactive Iodine 2011, Hypogonadism, Vitamin D Deficiency, Osteopenia with recently diagnosed Sacral Tumor. Admitted on 7/7, he underwent Plastic Surgery and General Surgery assisted vertical rectus abdominal myocutaneous flap harvest with transperitoneal ligation of internal iliac artery and vein. He underwent en bloc sacrectomy on 7/8, with L4-pelvis fusion, transfused 8 units PRBC, 6 units FFP, 1 pack platelets and 5L crystalloid; post-op status post 1 pack platelets, CTA 7/10 with bilateral upper lobe segmental PEs,  now with post op ileus      #Abdominal Distension (improving) with post-op ileus. no cecal distension on imaging, ileus involving SB, no rectal distension on exam  -d/w Surgery team; will check AXR today to evaluate ileus  -continue NGT for now; will re-assess after AXR  -Continue Reglan as ordered, no obstruction seen  -no GI objection to laxatives as ordered via NGT (clamp for 30 minutes after administration). Will require chronic laxatives for bowel regimen (may require supp or enemas regularly) given decreased rectal tone and sensation post-sacrectomy  -OOB/PT; bed mobility/turning  -check TSH (s/p thyroidectomy on IV synthroid)  -monitor/replete lytes PRN  goal K 4-4.5 and Mg>2  -IV PPI for GI prophylaxis  -Surgery following    Further care per primary team  [facility]    Connor Camp PA-C    [facility]  [phone]  After hours and weekend coverage [phone]

## 2021-07-14 NOTE — PROGRESS NOTE ADULT - SUBJECTIVE AND OBJECTIVE BOX
Patient is a 61y old  Male who presented with a chief complaint of Sacral tumor removal (14 Jul 2021 07:23)      INTERVAL HPI/OVERNIGHT EVENTS:  no nausea or vomiting  no reported BM, pt cannot feel passage of flatus or BM  NGT in place - no significant output    MEDICATIONS  (STANDING):  bisacodyl 5 milliGRAM(s) Oral at bedtime  calcium carbonate 1250 mG  + Vitamin D (OsCal 500 + D) 1 Tablet(s) Oral daily  chlorhexidine 4% Liquid 1 Application(s) Topical <User Schedule>  gabapentin 200 milliGRAM(s) Oral every 8 hours  heparin  Infusion 1000 Unit(s)/Hr (18 mL/Hr) IV Continuous <Continuous>  labetalol 100 milliGRAM(s) Oral every 8 hours  lactated ringers. 1000 milliLiter(s) (75 mL/Hr) IV Continuous <Continuous>  levothyroxine Injectable 63 MICROGram(s) IV Push at bedtime  methylnaltrexone Injectable 12 milliGRAM(s) SubCutaneous every other day  metoclopramide Injectable 10 milliGRAM(s) IV Push every 8 hours  pantoprazole  Injectable 40 milliGRAM(s) IV Push daily  polyethylene glycol 3350 17 Gram(s) Oral every 12 hours  senna 2 Tablet(s) Oral at bedtime  tetracaine/benzocaine/butamben Spray 1 Spray(s) Topical three times a day    MEDICATIONS  (PRN):  acetaminophen   Tablet .. 650 milliGRAM(s) Oral every 6 hours PRN Temp greater or equal to 38C (100.4F), Mild Pain (1 - 3)  aluminum hydroxide/magnesium hydroxide/simethicone Suspension 30 milliLiter(s) Oral every 4 hours PRN Dyspepsia  bisacodyl Suppository 10 milliGRAM(s) Rectal daily PRN Constipation  diazepam  Injectable 2 milliGRAM(s) IV Push every 6 hours PRN muscle spasm  naloxone Injectable 0.1 milliGRAM(s) IV Push every 3 minutes PRN For ANY of the following changes in patient status:  A. RR LESS THAN 10 breaths per minute, B. Oxygen saturation LESS THAN 90%, C. Sedation score of 6  ondansetron Injectable 4 milliGRAM(s) IV Push every 6 hours PRN Nausea  oxyCODONE    IR 5 milliGRAM(s) Oral every 6 hours PRN Mild Pain (1 - 3)  oxyCODONE    IR 10 milliGRAM(s) Oral every 4 hours PRN Moderate Pain (4 - 6)      Allergies  No Known Allergies      Review of Systems:  General:  No wt loss, fevers, chills, night sweats,fatigue,   CV:  No pain, palpitations, hypo/hypertension  Resp:  No dyspnea, cough, tachypnea, wheezing +PEs  GI: see HPI  :  No pain, bleeding, incontinence, nocturia  Muscle:  +LE weakness  Neuro:  see HPI  Psych:  No fatigue, insomnia, mood problems, depression  Endocrine:  No polyuria, polydypsia, cold/heat intolerance  Heme:  No petechiae, ecchymosis, easy bruisability +hx thyroid CA s/p resection  Skin:  No rash, tattoos, scars, edema        Vital Signs Last 24 Hrs  T(C): 36.8 (14 Jul 2021 07:00), Max: 37.4 (14 Jul 2021 03:00)  T(F): 98.2 (14 Jul 2021 07:00), Max: 99.4 (14 Jul 2021 03:00)  HR: 72 (14 Jul 2021 09:21) (72 - 90)  BP: 139/71 (14 Jul 2021 07:00) (116/57 - 164/81)  BP(mean): 92 (14 Jul 2021 07:00) (72 - 111)  RR: 22 (14 Jul 2021 09:21) (16 - 31)  SpO2: 93% (14 Jul 2021 09:21) (86% - 100%)    PHYSICAL EXAM:  Constitutional: NAD, well-developed on high flow nasal cannula; alert and responsive   +NGT, nothing in canister  Neck: No LAD, supple WH surgical scar  Respiratory: clear b/l  Cardiovascular: S1 and S2, RRR, no M  Gastrointestinal: BS+, softly distended ( able to see wrinkle/folds in aquacell dressing) NT  +midline incision with aquacell dressing  clean and dry  RLQ drain (bulb) site clean and dry  b/l Hemovac sites clean and dry  +tony clear  Extremities: No peripheral edema, neg clubbing, cyanosis  Vascular: 2+ peripheral pulses  Neurological: A/O x 3, decreased sensation medial aspect of left calf  Psychiatric: Normal mood, normal affect  Skin: No rashes      LABS:                        7.8    13.91 )-----------( 197      ( 14 Jul 2021 00:09 )             24.8     07-14    144  |  108  |  26<H>  ----------------------------<  85  3.6   |  24  |  0.68    Ca    8.2<L>      14 Jul 2021 00:09  Phos  3.0     07-14  Mg     2.1     07-14      PTT - ( 13 Jul 2021 17:30 )  PTT:51.2 sec    LIVER FUNCTIONS - ( 11 Jul 2021 16:15 )  Alb: 2.5 g/dL / Pro: 5.8 g/dL / ALK PHOS: 61 U/L / ALT: 37 U/L / AST: 50 U/L / GGT: x             RADIOLOGY & ADDITIONAL TESTS:

## 2021-07-14 NOTE — PROGRESS NOTE ADULT - SUBJECTIVE AND OBJECTIVE BOX
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 The chordoma resulted in perineal numbness and overflow incontinence and he was admitted 7/7/21 for staged resection. On 7/7, he underwent Plastic Surgery and General Surgery assisted vertical rectus abdominal myocutaneous flap harvest with transperitoneal ligation of internal iliac artery and vein. He underwent en bloc sacrectomy on 7/8.     HOSPITAL COURSE:  7/7: Stage 1 - VRAM harvest, iliac artery and vein ligation  7/8: Stage 2 - sacrectomy with L4-pelvis fusion; EBL 4.5L status post 8 units PRBC, 6 units FFP, 1 pack platelets and 5L crystalloid; post-op status post 1 pack platelets  7/10: CTA chest: b/l upper lobe segmental PEs  7/11: Nausea/vomiting. NGT placed to low wall suction with immediate 1.2L bilious output.    hypoxic overnight, back on high flow  1U pRBC transfusion overnight  sputum cx +enterobacter    REVIEW OF SYSTEMS: [] Unable to Assess due to neurologic exam   [x] All ROS addressed below are non-contributory, except:  Neuro: [ ] Headache [ x Back pain [ ] Numbness [ ] Weakness [ ] Ataxia [ ] Dizziness [ ] Aphasia [ ] Dysarthria [ ] Visual disturbance  Resp: [x] Shortness of breath/dyspnea [ ] Orthopnea [ ] Cough  CV: [ ] Chest pain [ ] Palpitation [ ] Lightheadedness [ ] Syncope  Renal: [ ] Thirst [ ] Edema  GI: [ ] Nausea [ ] Emesis [x] Abdominal/incisional pain [ ] Constipation [ ] Diarrhea  Hem: [ ] Hematemesis [ ] Bright red blood per rectum  ID: [ ] Fever [ ] Chills [ ] Dysuria  ENT: [ ] Rhinorrhea    VITALS/DATA/ORDER: [x] Reviewed    EXAMINATION:   Gen: Cooperative  HEENT: Moist mucosa  CV: Mild tachy but regular  Lungs: Decreased BS at bases but good air entry, no stridor  Abd: hypoactive bowel sounds, distended  Ext: Diffuse edema; RLE edema>LLE  Neuro: Awake, alert, fully oriented, follows, BUE no drift full strength, BLE full strength except for DF/PF on R 4+/5

## 2021-07-14 NOTE — PROGRESS NOTE ADULT - SUBJECTIVE AND OBJECTIVE BOX
Plastic Surgery Progress Note    Subjective:     Patient seen and examined at bedside. Overnight, pt transfused 1U PRBCs i/s/o a drop in H/H 8.3/26.3-->7.8/24/8. Patient without complaints this AM. Abdominal and Sacral dressings changed by PRSJeannette Coleman N/V/F/C.     OBJECTIVE:     T(C): 37.4 (07-14-21 @ 03:00), Max: 37.4 (07-14-21 @ 03:00)  HR: 79 (07-14-21 @ 06:00) (74 - 90)  BP: 152/74 (07-14-21 @ 06:00) (116/57 - 164/81)  RR: 22 (07-14-21 @ 06:00) (16 - 31)  SpO2: 91% (07-14-21 @ 06:00) (86% - 100%)  Wt(kg): --    I&O's Detail    13 Jul 2021 07:01  -  14 Jul 2021 07:00  --------------------------------------------------------  IN:    Enteral Tube Flush: 240 mL    Heparin: 414 mL    Lactated Ringers: 1725 mL    PRBCs (Packed Red Blood Cells): 300 mL  Total IN: 2679 mL    OUT:    Bulb (mL): 220 mL    Drain (mL): 170 mL    Drain (mL): 130 mL    Indwelling Catheter - Urethral (mL): 1855 mL    Nasogastric/Oral tube (mL): 5 mL  Total OUT: 2380 mL    Total NET: 299 mL      PHYSICAL EXAM:    GENERAL: NAD, lying in bed comfortably  HEAD:  Atraumatic, Normocephalic  ENT: NC, NGT  CHEST/LUNG: Unlabored respirations  ABDOMEN: Soft, midline Aquacel dressing CDI, underlying midline incision CDI, RJP drain w SS output  BACK: Midline incision CDI, surrounding skin soft with no palpable fluid collections   NERVOUS SYSTEM:  Alert & Oriented X3, speech clear.   SKIN: No rashes or lesions    MEDICATIONS  (STANDING):  bisacodyl 5 milliGRAM(s) Oral at bedtime  calcium carbonate 1250 mG  + Vitamin D (OsCal 500 + D) 1 Tablet(s) Oral daily  chlorhexidine 4% Liquid 1 Application(s) Topical <User Schedule>  gabapentin 200 milliGRAM(s) Oral every 8 hours  heparin  Infusion 1000 Unit(s)/Hr (18 mL/Hr) IV Continuous <Continuous>  labetalol 100 milliGRAM(s) Oral every 8 hours  lactated ringers. 1000 milliLiter(s) (75 mL/Hr) IV Continuous <Continuous>  levothyroxine Injectable 63 MICROGram(s) IV Push at bedtime  methylnaltrexone Injectable 12 milliGRAM(s) SubCutaneous every other day  metoclopramide Injectable 10 milliGRAM(s) IV Push every 8 hours  pantoprazole  Injectable 40 milliGRAM(s) IV Push daily  polyethylene glycol 3350 17 Gram(s) Oral every 12 hours  senna 2 Tablet(s) Oral at bedtime  tetracaine/benzocaine/butamben Spray 1 Spray(s) Topical three times a day    MEDICATIONS  (PRN):  acetaminophen   Tablet .. 650 milliGRAM(s) Oral every 6 hours PRN Temp greater or equal to 38C (100.4F), Mild Pain (1 - 3)  aluminum hydroxide/magnesium hydroxide/simethicone Suspension 30 milliLiter(s) Oral every 4 hours PRN Dyspepsia  bisacodyl Suppository 10 milliGRAM(s) Rectal daily PRN Constipation  diazepam  Injectable 2 milliGRAM(s) IV Push every 6 hours PRN muscle spasm  naloxone Injectable 0.1 milliGRAM(s) IV Push every 3 minutes PRN For ANY of the following changes in patient status:  A. RR LESS THAN 10 breaths per minute, B. Oxygen saturation LESS THAN 90%, C. Sedation score of 6  ondansetron Injectable 4 milliGRAM(s) IV Push every 6 hours PRN Nausea  oxyCODONE    IR 5 milliGRAM(s) Oral every 6 hours PRN Mild Pain (1 - 3)  oxyCODONE    IR 10 milliGRAM(s) Oral every 4 hours PRN Moderate Pain (4 - 6)      PTT - ( 13 Jul 2021 17:30 )  PTT:51.2 sec

## 2021-07-14 NOTE — PROVIDER CONTACT NOTE (OTHER) - ASSESSMENT
Patient alert and oriented, no weakness of dizziness reported, no bleeding noted. Vitals within defined limits

## 2021-07-14 NOTE — PROGRESS NOTE ADULT - SUBJECTIVE AND OBJECTIVE BOX
Patient seen and examined    Vital Signs Last 24 Hrs  T(C): 37.1 (13 Jul 2021 23:00), Max: 38 (13 Jul 2021 07:00)  T(F): 98.8 (13 Jul 2021 23:00), Max: 100.4 (13 Jul 2021 07:00)  HR: 78 (13 Jul 2021 23:00) (78 - 93)  BP: 125/63 (13 Jul 2021 23:00) (116/57 - 164/81)  BP(mean): 81 (13 Jul 2021 23:00) (72 - 111)  RR: 25 (13 Jul 2021 23:00) (16 - 33)  SpO2: 95% (13 Jul 2021 23:00) (86% - 100%)    Overnight events:  Exam:  AOx3 pupils R b/l, FC, BUE 5/5, LLE 5/5, RLE 4+/5, groin sensation intact, no tony sensation

## 2021-07-14 NOTE — PROGRESS NOTE ADULT - ASSESSMENT
Mr. Jackson is a 62 yo M with PMHx of thyroid cancer s/p total thyroidectomy (2010), HLD, Vit D deficiency, Osteopenia, and chordoma, who is now s/p Sacral sacrectomy and VRAM flap harvest (07/07, 07/08). Currently being managed in NSICU.     -Please place pt in lateral position and put as little pressure on Sacral wound as possible   -Keep superficial drain in place until output is <30cc for 24 hr, prior to d/c hemovac will be switched to regular bulb   -Abdominal and sacral dressings changed by PRS AM of 07/14  -L Hemovac clogged, PRS will replace later today   -rest of care per primary team   -PRS will continue to follow       Jomar Mendez MD   General Surgery Resident, PGY1  # [id number]

## 2021-07-14 NOTE — PROGRESS NOTE ADULT - SUBJECTIVE AND OBJECTIVE BOX
HPI:  61 year old male with PMH of Thyroid Cancer, s/p Total Thyroidectomy 2010 and Radioactive Iodine 2011, Hypogonadism, Vitamin D Deficiency, Osteopenia with recently diagnosed Sacral Tumor. Patient endorses back pain that started in August 2020, he reports that he was treat by PCP with steroids and muscle relaxants. He reports that he had some relief, however the pain lingered. He was subsequently referred to an Orthopedic Specialist who ordered an MRI of Lumbar Spine, which revealed a Sacral Tumor with extension into the Presacral space. 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 Patient reports that he suffers from chronic constipation, he has noted that his urine stream is weaker than before, he reports right buttock and right scrotal pain and numbness. He denies decreased strength or problems walking. He presents to PST today for evaluation prior to scheduled Removal of Sacral Tumor on 7/6/2021.    fully covid vaccinated; proof of vaccination placed on chart (25 Jun 2021 11:47)    OVERNIGHT EVENTS:   1U PRBC    VITALS:  T(C): , Max: 37.4 (07-14-21 @ 03:00)  HR:  (72 - 90)  BP:  (116/57 - 164/81)  ABP: --  RR:  (16 - 31)  SpO2:  (86% - 100%)  Wt(kg): --      07-13-21 @ 07:01  -  07-14-21 @ 07:00  --------------------------------------------------------  IN: 2772 mL / OUT: 2410 mL / NET: 362 mL    07-14-21 @ 07:01  -  07-14-21 @ 10:05  --------------------------------------------------------  IN: 93 mL / OUT: 0 mL / NET: 93 mL      LABS:  Na: 144 (07-14 @ 00:09), 143 (07-13 @ 04:20), 144 (07-12 @ 13:50), 143 (07-12 @ 04:48), 142 (07-11 @ 21:58)  K: 3.6 (07-14 @ 00:09), 3.7 (07-13 @ 04:20), 3.2 (07-12 @ 13:50), 3.8 (07-12 @ 04:48), 3.6 (07-11 @ 21:58)  Cl: 108 (07-14 @ 00:09), 106 (07-13 @ 04:20), 103 (07-12 @ 13:50), 103 (07-12 @ 04:48), 102 (07-11 @ 21:58)  CO2: 24 (07-14 @ 00:09), 26 (07-13 @ 04:20), 27 (07-12 @ 13:50), 26 (07-12 @ 04:48), 27 (07-11 @ 21:58)  BUN: 26 (07-14 @ 00:09), 28 (07-13 @ 04:20), 28 (07-12 @ 13:50), 28 (07-12 @ 04:48), 29 (07-11 @ 21:58)  Cr: 0.68 (07-14 @ 00:09), 0.69 (07-13 @ 04:20), 0.75 (07-12 @ 13:50), 0.73 (07-12 @ 04:48), 0.67 (07-11 @ 21:58)  Glu: 85(07-14 @ 00:09), 98(07-13 @ 04:20), 122(07-12 @ 13:50), 127(07-12 @ 04:48), 128(07-11 @ 21:58)    Hgb: 7.8 (07-14 @ 00:09), 8.3 (07-13 @ 04:20), 10.3 (07-11 @ 21:58)  Hct: 24.8 (07-14 @ 00:09), 26.3 (07-13 @ 04:20), 30.6 (07-11 @ 21:58)  WBC: 13.91 (07-14 @ 00:09), 14.25 (07-13 @ 04:20), 10.67 (07-11 @ 21:58)  Plt: 197 (07-14 @ 00:09), 162 (07-13 @ 04:20), 183 (07-11 @ 21:58)    INR:   PTT: 51.2 07-13-21 @ 17:30, 65.6 07-12-21 @ 18:31, 66.4 07-12-21 @ 11:48, 56.0 07-12-21 @ 04:48, 46.2 07-11-21 @ 21:58, 41.3 07-11-21 @ 16:15    IMAGING:   Recent imaging studies were reviewed.    MEDICATIONS:  acetaminophen   Tablet .. 650 milliGRAM(s) Oral every 6 hours PRN  aluminum hydroxide/magnesium hydroxide/simethicone Suspension 30 milliLiter(s) Oral every 4 hours PRN  bisacodyl 5 milliGRAM(s) Oral at bedtime  bisacodyl Suppository 10 milliGRAM(s) Rectal daily PRN  calcium carbonate 1250 mG  + Vitamin D (OsCal 500 + D) 1 Tablet(s) Oral daily  chlorhexidine 4% Liquid 1 Application(s) Topical <User Schedule>  diazepam  Injectable 2 milliGRAM(s) IV Push every 6 hours PRN  gabapentin 200 milliGRAM(s) Oral every 8 hours  heparin  Infusion 1000 Unit(s)/Hr IV Continuous <Continuous>  labetalol 100 milliGRAM(s) Oral every 8 hours  lactated ringers. 1000 milliLiter(s) IV Continuous <Continuous>  levothyroxine Injectable 63 MICROGram(s) IV Push at bedtime  methylnaltrexone Injectable 12 milliGRAM(s) SubCutaneous every other day  metoclopramide Injectable 10 milliGRAM(s) IV Push every 8 hours  naloxone Injectable 0.1 milliGRAM(s) IV Push every 3 minutes PRN  ondansetron Injectable 4 milliGRAM(s) IV Push every 6 hours PRN  oxyCODONE    IR 5 milliGRAM(s) Oral every 6 hours PRN  oxyCODONE    IR 10 milliGRAM(s) Oral every 4 hours PRN  pantoprazole  Injectable 40 milliGRAM(s) IV Push daily  polyethylene glycol 3350 17 Gram(s) Oral every 12 hours  senna 2 Tablet(s) Oral at bedtime  tetracaine/benzocaine/butamben Spray 1 Spray(s) Topical three times a day    EXAMINATION:  General:  calm  HEENT:  MMM  Neuro:  awake, alert, oriented x 3, follows commands, moves all extremities  Cards:  RRR  Respiratory:  no respiratory distress  Adomen:  soft  Extremities:  no edema  Skin:  warm/dry

## 2021-07-15 LAB
APTT BLD: 55.6 SEC — HIGH (ref 27.5–35.5)
TSH SERPL-MCNC: 4.73 UIU/ML — HIGH (ref 0.27–4.2)

## 2021-07-15 PROCEDURE — 99233 SBSQ HOSP IP/OBS HIGH 50: CPT

## 2021-07-15 PROCEDURE — 99291 CRITICAL CARE FIRST HOUR: CPT

## 2021-07-15 RX ORDER — MULTIVIT WITH MIN/MFOLATE/K2 340-15/3 G
1 POWDER (GRAM) ORAL ONCE
Refills: 0 | Status: DISCONTINUED | OUTPATIENT
Start: 2021-07-15 | End: 2021-07-15

## 2021-07-15 RX ORDER — LABETALOL HCL 100 MG
10 TABLET ORAL ONCE
Refills: 0 | Status: COMPLETED | OUTPATIENT
Start: 2021-07-15 | End: 2021-07-15

## 2021-07-15 RX ADMIN — Medication 63 MICROGRAM(S): at 21:58

## 2021-07-15 RX ADMIN — Medication 100 MILLIGRAM(S): at 21:58

## 2021-07-15 RX ADMIN — Medication 10 MILLIGRAM(S): at 05:37

## 2021-07-15 RX ADMIN — Medication 10 MILLIGRAM(S): at 01:26

## 2021-07-15 RX ADMIN — Medication 1 TABLET(S): at 13:08

## 2021-07-15 RX ADMIN — Medication 100 MILLIGRAM(S): at 05:36

## 2021-07-15 RX ADMIN — Medication 5 MILLIGRAM(S): at 21:59

## 2021-07-15 RX ADMIN — Medication 1 SPRAY(S): at 13:08

## 2021-07-15 RX ADMIN — GABAPENTIN 200 MILLIGRAM(S): 400 CAPSULE ORAL at 05:36

## 2021-07-15 RX ADMIN — POLYETHYLENE GLYCOL 3350 17 GRAM(S): 17 POWDER, FOR SOLUTION ORAL at 05:36

## 2021-07-15 RX ADMIN — Medication 10 MILLIGRAM(S): at 13:09

## 2021-07-15 RX ADMIN — Medication 100 MILLIGRAM(S): at 13:08

## 2021-07-15 RX ADMIN — GABAPENTIN 200 MILLIGRAM(S): 400 CAPSULE ORAL at 13:09

## 2021-07-15 RX ADMIN — Medication 1 TABLET(S): at 13:09

## 2021-07-15 RX ADMIN — Medication 1 SPRAY(S): at 05:37

## 2021-07-15 RX ADMIN — PANTOPRAZOLE SODIUM 40 MILLIGRAM(S): 20 TABLET, DELAYED RELEASE ORAL at 13:09

## 2021-07-15 RX ADMIN — GABAPENTIN 200 MILLIGRAM(S): 400 CAPSULE ORAL at 21:59

## 2021-07-15 NOTE — PROGRESS NOTE ADULT - SUBJECTIVE AND OBJECTIVE BOX
HPI:  61 year old male with PMH of Thyroid Cancer, s/p Total Thyroidectomy 2010 and Radioactive Iodine 2011, Hypogonadism, Vitamin D Deficiency, Osteopenia with recently diagnosed Sacral Tumor. Patient endorses back pain that started in August 2020, he reports that he was treat by PCP with steroids and muscle relaxants. He reports that he had some relief, however the pain lingered. He was subsequently referred to an Orthopedic Specialist who ordered an MRI of Lumbar Spine, which revealed a Sacral Tumor with extension into the Presacral space. 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 Patient reports that he suffers from chronic constipation, he has noted that his urine stream is weaker than before, he reports right buttock and right scrotal pain and numbness. He denies decreased strength or problems walking. He presents to PST today for evaluation prior to scheduled Removal of Sacral Tumor on 7/6/2021.    fully covid vaccinated; proof of vaccination placed on chart (25 Jun 2021 11:47)    OVERNIGHT EVENTS:   No acute events overnight.    VITALS:  T(C): , Max: 37.4 (07-14-21 @ 15:00)  HR:  (66 - 81)  BP:  (138/82 - 171/83)  ABP: --  RR:  (19 - 38)  SpO2:  (88% - 98%)  Wt(kg): --      07-14-21 @ 07:01  -  07-15-21 @ 07:00  --------------------------------------------------------  IN: 2292 mL / OUT: 2245 mL / NET: 47 mL    07-15-21 @ 07:01  -  07-15-21 @ 11:35  --------------------------------------------------------  IN: 372 mL / OUT: 240 mL / NET: 132 mL      LABS:  Na: 141 (07-14 @ 22:00), 144 (07-14 @ 00:09), 143 (07-13 @ 04:20), 144 (07-12 @ 13:50)  K: 3.8 (07-14 @ 22:00), 3.6 (07-14 @ 00:09), 3.7 (07-13 @ 04:20), 3.2 (07-12 @ 13:50)  Cl: 108 (07-14 @ 22:00), 108 (07-14 @ 00:09), 106 (07-13 @ 04:20), 103 (07-12 @ 13:50)  CO2: 20 (07-14 @ 22:00), 24 (07-14 @ 00:09), 26 (07-13 @ 04:20), 27 (07-12 @ 13:50)  BUN: 23 (07-14 @ 22:00), 26 (07-14 @ 00:09), 28 (07-13 @ 04:20), 28 (07-12 @ 13:50)  Cr: 0.67 (07-14 @ 22:00), 0.68 (07-14 @ 00:09), 0.69 (07-13 @ 04:20), 0.75 (07-12 @ 13:50)  Glu: 78(07-14 @ 22:00), 85(07-14 @ 00:09), 98(07-13 @ 04:20), 122(07-12 @ 13:50)    Hgb: 9.1 (07-14 @ 10:52), 7.8 (07-14 @ 00:09), 8.3 (07-13 @ 04:20)  Hct: 28.2 (07-14 @ 10:52), 24.8 (07-14 @ 00:09), 26.3 (07-13 @ 04:20)  WBC: 14.08 (07-14 @ 10:52), 13.91 (07-14 @ 00:09), 14.25 (07-13 @ 04:20)  Plt: 207 (07-14 @ 10:52), 197 (07-14 @ 00:09), 162 (07-13 @ 04:20)    INR:   PTT: 53.4 07-14-21 @ 10:52, 51.2 07-13-21 @ 17:30, 65.6 07-12-21 @ 18:31, 66.4 07-12-21 @ 11:48    IMAGING:   Recent imaging studies were reviewed.    MEDICATIONS:  acetaminophen   Tablet .. 650 milliGRAM(s) Oral every 6 hours PRN  aluminum hydroxide/magnesium hydroxide/simethicone Suspension 30 milliLiter(s) Oral every 4 hours PRN  bisacodyl 5 milliGRAM(s) Oral at bedtime  bisacodyl Suppository 10 milliGRAM(s) Rectal daily PRN  calcium carbonate 1250 mG  + Vitamin D (OsCal 500 + D) 1 Tablet(s) Oral daily  chlorhexidine 4% Liquid 1 Application(s) Topical <User Schedule>  gabapentin 200 milliGRAM(s) Oral every 8 hours  heparin  Infusion 1000 Unit(s)/Hr IV Continuous <Continuous>  labetalol 100 milliGRAM(s) Oral every 8 hours  lactated ringers. 1000 milliLiter(s) IV Continuous <Continuous>  levoFLOXacin  Tablet 750 milliGRAM(s) Oral every 24 hours  levothyroxine Injectable 63 MICROGram(s) IV Push at bedtime  metoclopramide Injectable 10 milliGRAM(s) IV Push every 8 hours  multivitamin 1 Tablet(s) Oral daily  naloxone Injectable 0.1 milliGRAM(s) IV Push every 3 minutes PRN  ondansetron Injectable 4 milliGRAM(s) IV Push every 6 hours PRN  oxyCODONE    IR 5 milliGRAM(s) Oral every 6 hours PRN  oxyCODONE    IR 10 milliGRAM(s) Oral every 4 hours PRN  pantoprazole  Injectable 40 milliGRAM(s) IV Push daily  polyethylene glycol 3350 17 Gram(s) Oral every 12 hours  senna 2 Tablet(s) Oral at bedtime  tetracaine/benzocaine/butamben Spray 1 Spray(s) Topical three times a day    EXAMINATION:  General:  calm  HEENT:  MMM  Neuro:  awake, alert, oriented x 3, follows commands, moves all extremities  Cards:  RRR  Respiratory:  no respiratory distress  Adomen:  soft  Extremities:  no edema  Skin:  warm/dry

## 2021-07-15 NOTE — CHART NOTE - NSCHARTNOTEFT_GEN_A_CORE
Nutrition Follow Up Note  Patient seen for: Malnutrition follow up    Chart reviewed, events noted. Pt is a 60 yo M with PMH: thyroid cancer s/p total thyroidectomy 2010, radioactive treatment 2011, hypogonadism, vitamin D deficiency, osteopenia. 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 Admitted 7/7 for staged resection. On 7/7, pt underwent plastic surgery and general surgery assisted vertical rectus abdominal myocutaneous flap harvest with transperitoneal ligation of internal iliac artery and vein. He underwent en bloc sacrectomy on 7/8. Pt extubated 7/9. Hospital course c/b: CTA chest, b/c upper lobe segmental PEs; post-op ileus with NGT in place for low wall suction. Interim events: NGT now clamped, PO (clear liquid diet) started. GI an surgery team following.     Source: [x] Patient       [x] EMR        [x] RN        [] Family at bedside       [x] Other: interdisciplinary medical team    Diet Order:   Diet, Clear Liquid (07-15-21)    Is current diet order appropriate/adequate? [] Yes  [x]  No:     Nutrition-related concerns:  -Diet advanced  from NPO to clear liquid diet this AM. Had previously been NPO since 7/11 (4 days). Diagnosed with protein-calorie malnutrition in context of inadequate energy-protein intake since admission (>1 week).  -NGT clamped; previously noted with output: (7/14): 0ml; (7/13): 5ml; (7/12): 2L; (7/11): 1L  -R abdominal SEDRICK drain output: (7/15): 75ml (thus far); (7/14): 80ml; (7/13): 210ml.   -R hemovac output: (7/15): 30ml (thus far); (7/14): 120ml; (7/13): 110ml   -L hemovac output: (7/15): 90ml (thus far); (7/14): 150ml; (7/13): 110ml   -Stool (7/15): x 1; (7/14): x 1. On bowel regimen as ordered (bisacodyl, senna, Miralax).   -Continues on OsCal 500 + D as ordered.     Weights:   Daily     MEDICATIONS  (STANDING):  bisacodyl  calcium carbonate 1250 mG  + Vitamin D (OsCal 500 + D)  labetalol  lactated ringers.  levoFLOXacin  Tablet  levothyroxine Injectable  methylnaltrexone Injectable  pantoprazole  Injectable  polyethylene glycol 3350  senna    Pertinent Labs: 07-14 @ 22:00: Na 141, BUN 23, Cr 0.67, BG 78, K+ 3.8, Phos 3.3, Mg 1.9, Alk Phos --, ALT/SGPT --, AST/SGOT --, HbA1c --    Finger Sticks:    Skin per nursing documentation:   Edema:     (based on dosing wt 81.6kg):   Estimated Energy Needs: (25-30kcal/kg): 2040-2448kcal  Estimated Protein Needs: (1.2-1.4g protein/kg): 98-114g protein    Previous Nutrition Diagnosis: 1) increased nutrient needs 2) malnutrition (moderate)  Nutrition Diagnosis is: [x] ongoing    Nutrition Care Plan:  [x] In Progress  [] Achieved  [] Not applicable    Nutrition Interventions:     Education Provided:       [] Yes:  [x] No:        Recommendations:          Monitoring and Evaluation:   Continue to monitor nutritional intake, tolerance to diet prescription, weights, labs, skin integrity      RD remains available upon request and will follow up per protocol Nutrition Follow Up Note  Patient seen for: Malnutrition follow up    Chart reviewed, events noted. Pt is a 62 yo M with PMH: thyroid cancer s/p total thyroidectomy 2010, radioactive treatment 2011, hypogonadism, vitamin D deficiency, osteopenia. 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 Admitted 7/7 for staged resection. On 7/7, pt underwent plastic surgery and general surgery assisted vertical rectus abdominal myocutaneous flap harvest with transperitoneal ligation of internal iliac artery and vein. He underwent en bloc sacrectomy on 7/8. Pt extubated 7/9. Hospital course c/b: CTA chest, b/c upper lobe segmental PEs; post-op ileus with NGT in place for low wall suction. Interim events: NGT now clamped, PO (clear liquid diet) started. GI an surgery team following.     Source: [x] Patient       [x] EMR        [x] RN        [] Family at bedside       [x] Other: interdisciplinary medical team    Diet Order:   Diet, Clear Liquid (07-15-21)    Is current diet order appropriate/adequate? [] Yes  [x]  No:     Nutrition-related concerns:  -Diet advanced  from NPO to clear liquid diet this AM. Had previously been NPO since 7/11 (4 days). Diagnosed with protein-calorie malnutrition in context of inadequate energy-protein intake since admission (>1 week). Pt observed with breakfast tray; consumed a few bites of jello and a few sips of Gingerale thus far. Pt aware to increase quantity of PO diet slowly in order to monitor for signs of altered GI tolerance. Pt denies feeling nausea at this time.    -NGT clamped; previously noted with output: (7/14): 0ml; (7/13): 5ml; (7/12): 2L; (7/11): 1L  -R abdominal SEDRICK drain output: (7/15): 75ml (thus far); (7/14): 80ml; (7/13): 210ml.   -R hemovac output: (7/15): 30ml (thus far); (7/14): 120ml; (7/13): 110ml   -L hemovac output: (7/15): 90ml (thus far); (7/14): 150ml; (7/13): 110ml   -Stool (7/15): x 1; (7/14): x 1. On bowel regimen as ordered (bisacodyl, senna, Miralax).   -Continues on OsCal 500 + D as ordered.     Weights:   Daily     MEDICATIONS  (STANDING):  bisacodyl  calcium carbonate 1250 mG  + Vitamin D (OsCal 500 + D)  labetalol  lactated ringers.  levoFLOXacin  Tablet  levothyroxine Injectable  methylnaltrexone Injectable  pantoprazole  Injectable  polyethylene glycol 3350  senna    Pertinent Labs: 07-14 @ 22:00: Na 141, BUN 23, Cr 0.67, BG 78, K+ 3.8, Phos 3.3, Mg 1.9, Alk Phos --, ALT/SGPT --, AST/SGOT --, HbA1c --    Finger Sticks:    Skin per nursing documentation:   Edema:     (based on dosing wt 81.6kg):   Estimated Energy Needs: (25-30kcal/kg): 2040-2448kcal  Estimated Protein Needs: (1.2-1.4g protein/kg): 98-114g protein    Previous Nutrition Diagnosis: 1) increased nutrient needs 2) malnutrition (moderate)  Nutrition Diagnosis is: [x] ongoing    Nutrition Care Plan:  [x] In Progress  [] Achieved  [] Not applicable    Nutrition Interventions:     Education Provided:       [] Yes:  [x] No:        Recommendations:      1. Defer advancement of PO diet to medical team. As able, advance to full liquids --> low fiber diet. Defer consistency to medical team, SLP.   2. Monitor and encourage PO intake as able. Monitor GI symptoms.   3. Monitor wt trends, nutrition related labs, skin integrity, hydration status and bowel regularity.   4. Consider multivitamin to aid in prevention of micronutrient deficiencies.     Monitoring and Evaluation:   Continue to monitor nutritional intake, tolerance to diet prescription, weights, labs, skin integrity      RD remains available upon request and will follow up per protocol Nutrition Follow Up Note  Patient seen for: Malnutrition follow up    Chart reviewed, events noted. Pt is a 60 yo M with PMH: thyroid cancer s/p total thyroidectomy 2010, radioactive treatment 2011, hypogonadism, vitamin D deficiency, osteopenia. 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 Admitted 7/7 for staged resection. On 7/7, pt underwent plastic surgery and general surgery assisted vertical rectus abdominal myocutaneous flap harvest with transperitoneal ligation of internal iliac artery and vein. He underwent en bloc sacrectomy on 7/8. Pt extubated 7/9. Hospital course c/b: CTA chest, b/c upper lobe segmental PEs; post-op ileus with NGT in place for low wall suction. Interim events: NGT now clamped, PO (clear liquid diet) started. GI an surgery team following.     Source: [x] Patient       [x] EMR        [x] RN        [] Family at bedside       [x] Other: interdisciplinary medical team    Diet Order:   Diet, Clear Liquid (07-15-21)    Is current diet order appropriate/adequate? [] Yes  [x]  No:     Nutrition-related concerns:  -Diet advanced  from NPO to clear liquid diet this AM. Had previously been NPO since 7/11 (4 days). Diagnosed with protein-calorie malnutrition in context of inadequate energy-protein intake since admission (>1 week). Pt observed with breakfast tray; consumed a few bites of jello and a few sips of Gingerale thus far. Pt aware to increase quantity of PO diet slowly in order to monitor for signs of altered GI tolerance. Pt denies feeling nausea at this time.    -NGT clamped; previously noted with output: (7/14): 0ml; (7/13): 5ml; (7/12): 2L; (7/11): 1L  -R abdominal SEDRICK drain output: (7/15): 75ml (thus far); (7/14): 80ml; (7/13): 210ml.   -R hemovac output: (7/15): 30ml (thus far); (7/14): 120ml; (7/13): 110ml   -L hemovac output: (7/15): 90ml (thus far); (7/14): 150ml; (7/13): 110ml   -Stool (7/15): x 1; (7/14): x 1. On bowel regimen as ordered (bisacodyl, senna, Miralax).   -Continues on OsCal 500 + D as ordered.     Weights:   Daily     MEDICATIONS  (STANDING):  bisacodyl  calcium carbonate 1250 mG  + Vitamin D (OsCal 500 + D)  labetalol  lactated ringers.  levoFLOXacin  Tablet  levothyroxine Injectable  methylnaltrexone Injectable  pantoprazole  Injectable  polyethylene glycol 3350  senna    Pertinent Labs: 07-14 @ 22:00: Na 141, BUN 23, Cr 0.67, BG 78, K+ 3.8, Phos 3.3, Mg 1.9, Alk Phos --, ALT/SGPT --, AST/SGOT --, HbA1c --    Finger Sticks:    Skin per nursing documentation: surgical incision abdomen 7/7/21, midback  Edema: 1+ left foot; right foot; left hand; right hand    (based on dosing wt 81.6kg):   Estimated Energy Needs: (25-30kcal/kg): 2040-2448kcal  Estimated Protein Needs: (1.2-1.4g protein/kg): 98-114g protein    Previous Nutrition Diagnosis: 1) increased nutrient needs 2) malnutrition (moderate)  Nutrition Diagnosis is: [x] ongoing    Nutrition Care Plan:  [x] In Progress  [] Achieved  [] Not applicable    Nutrition Interventions:     Education Provided:       [] Yes:  [x] No:        Recommendations:      1. Defer advancement of PO diet to medical team. As able, advance to full liquids --> low fiber diet. Defer consistency to medical team, SLP.   2. Monitor and encourage PO intake as able. Monitor GI symptoms.   3. Monitor wt trends, nutrition related labs, skin integrity, hydration status and bowel regularity.   4. Consider multivitamin to aid in prevention of micronutrient deficiencies.     Monitoring and Evaluation:   Continue to monitor nutritional intake, tolerance to diet prescription, weights, labs, skin integrity      RD remains available upon request and will follow up per protocol

## 2021-07-15 NOTE — PROGRESS NOTE ADULT - ASSESSMENT
61 year old male with PMH of Thyroid Cancer, s/p Total Thyroidectomy 2010 and Radioactive Iodine 2011, Hypogonadism, Vitamin D Deficiency, Osteopenia with recently diagnosed Sacral Tumor. Admitted on 7/7, he underwent Plastic Surgery and General Surgery assisted vertical rectus abdominal myocutaneous flap harvest with transperitoneal ligation of internal iliac artery and vein. He underwent en bloc sacrectomy on 7/8, with L4-pelvis fusion, transfused 8 units PRBC, 6 units FFP, 1 pack platelets and 5L crystalloid; post-op status post 1 pack platelets, CTA 7/10 with bilateral upper lobe segmental PEs,  now with post op ileus      #Ileus - IMPROVED  -Ok for PO clears; if tolerates can advance as tolerated  -Continue Reglan as ordered, no obstruction seen  -laxatives as ordered. Given post-sacrectomy with decreased rectal tone and sensation, will likely require enema or supp to help pass BMs  -OOB/PT; bed mobility/turning  -monitor/replete lytes PRN  goal K 4-4.5 and Mg>2  -PPI for GI prophylaxis  -Synthroid dosing per primary team    Further care per primary team  Discussed with NSCU team    Connor Camp PA-C    [facility]  [phone]  After hours and weekend coverage [phone]

## 2021-07-15 NOTE — PROGRESS NOTE ADULT - ASSESSMENT
61 year old male with  Sacral Tumor. status post en bloc sacrectomy, Stage 2 w/ L4-pelvix fusion w/ plastic closure.  Plan  neuro checks q 4hr   pain control  PT/OT  3 drains, monitor output  acute hypoxic respiratory failure on high flow 40 %, LPM 40  atelectasis and PE on heparin drip   coughing , thin secretions  IS every hour   chest xray atelectasis   SBP goal 100-160 mmhg   GI: abdominal distention improved, having BMs, NG tube out  reglan to 10 mg q 8 hr   s/p methylnaltrexone  glycerine suppository  GI following  IVL when tolerating full PO  tony   endo: synthroid for hypothyroidism  hem: CBC stable

## 2021-07-15 NOTE — PROGRESS NOTE ADULT - ASSESSMENT
Patient seen and examined at bedside.  AOx3 pupils R b/l, FC, BUE 5/5, LLE 5/5, RLE 4+/5, groin sensation intact, tony sensation intact today    Hi flow, NGTclamped  heparin GTT until drains put out less than 100/day  then switch to lovenox potentially  Try to keep pressure off incision  Keep tony in  LED -neg

## 2021-07-15 NOTE — PROGRESS NOTE ADULT - ASSESSMENT
A/P: chordoma  s/p en bloc sacrectomy and L4-pelvis fusion POD 7 from stage 1 and POD 6 from stage 2    Neuro:   monitor drain output  pain control with acetaminophen and prn diazepam  PT/OT/OBC as tolerated    Resp: hypoxic/multifactorial - PEs, atelectasis, splinting from pain, abdominal distension/ileus  bilateral subsegmental PE and atelectasis on heparin drip, cleared by NS goal 50-70   encourage IS, Acapella device use  high flow, wean as able  concern for PNA, start levaquin x7days    CV: MAP> 65 mmHg  -160   HTN on labetolol restarted 100Q8    Renal:   has tony for urinary retension  urine output decreased - responded to fluid bolus  keep euvolemic; IVF     GI: ileus   NGT clamped  start clears as tolerated  GI and gen surg following  aggressive bowel regimen    Endo:   hypothyroidism on synthroid; elevated TSH, check free T4, consider endo consult if low for optimal synthroid dowing  target euglycemia     Heme/Onc:   hemorrhagic shock resolved, transfuse hgb<8, plt<100  heparin drip for PE; monitor H/H  dopplers negative on 7/10; monitor RLE edema    ID: sputum culture growing enterobacter  +secretions, hypoxia  treat for pna with levaquin x7days    Code Status: [x] Full Code [] DNR [] DNI [] Goals of Care:     Disposition: [x] ICU [] Stroke Unit [] RCU []PCU []Floor [] Discharge Home                     A/P: chordoma  s/p en bloc sacrectomy and L4-pelvis fusion POD 7 from stage 1 and POD 6 from stage 2    Neuro:   monitor drain output  pain control with acetaminophen and prn diazepam  PT/OT/OBC as tolerated    Resp: hypoxic/multifactorial - PEs, atelectasis, splinting from pain, abdominal distension/ileus  bilateral subsegmental PE and atelectasis on heparin drip, cleared by NS goal 50-70   encourage IS, Acapella device use  high flow, wean as able  concern for PNA, start levaquin x7days    CV: MAP> 65 mmHg  -160   HTN on labetolol restarted 100Q8    Renal:   has tony for urinary retension  urine output decreased - responded to fluid bolus  keep euvolemic; IVF     GI: ileus now resolving   clears as tolerated - advance as tolerated  GI following   aggressive bowel regimen    Endo:   hypothyroidism on synthroid; elevated TSH, check free T4, consider endo consult if low for optimal synthroid dowing  target euglycemia     Heme/Onc:   hemorrhagic shock resolved, transfuse hgb<8, plt<100  heparin drip for PE; monitor H/H  dopplers negative on 7/10; monitor RLE edema    ID: sputum culture growing enterobacter  +secretions, hypoxia  treat for pna with levaquin x7days    Code Status: [x] Full Code [] DNR [] DNI [] Goals of Care:     Disposition: [x] ICU [] Stroke Unit [] RCU []PCU []Floor [] Discharge Home

## 2021-07-15 NOTE — PROGRESS NOTE ADULT - SUBJECTIVE AND OBJECTIVE BOX
Patient is a 61y old  Male who presented with a chief complaint of Sacral tumor removal (14 Jul 2021 07:23)      INTERVAL HPI/OVERNIGHT EVENTS:  BM x 2   NGT out  tolerating PO clears    MEDICATIONS  (STANDING):  bisacodyl 5 milliGRAM(s) Oral at bedtime  calcium carbonate 1250 mG  + Vitamin D (OsCal 500 + D) 1 Tablet(s) Oral daily  chlorhexidine 4% Liquid 1 Application(s) Topical <User Schedule>  gabapentin 200 milliGRAM(s) Oral every 8 hours  heparin  Infusion 1000 Unit(s)/Hr (18 mL/Hr) IV Continuous <Continuous>  labetalol 100 milliGRAM(s) Oral every 8 hours  lactated ringers. 1000 milliLiter(s) (75 mL/Hr) IV Continuous <Continuous>  levoFLOXacin  Tablet 750 milliGRAM(s) Oral every 24 hours  levothyroxine Injectable 63 MICROGram(s) IV Push at bedtime  metoclopramide Injectable 10 milliGRAM(s) IV Push every 8 hours  pantoprazole  Injectable 40 milliGRAM(s) IV Push daily  polyethylene glycol 3350 17 Gram(s) Oral every 12 hours  senna 2 Tablet(s) Oral at bedtime  tetracaine/benzocaine/butamben Spray 1 Spray(s) Topical three times a day    MEDICATIONS  (PRN):  acetaminophen   Tablet .. 650 milliGRAM(s) Oral every 6 hours PRN Temp greater or equal to 38C (100.4F), Mild Pain (1 - 3)  aluminum hydroxide/magnesium hydroxide/simethicone Suspension 30 milliLiter(s) Oral every 4 hours PRN Dyspepsia  bisacodyl Suppository 10 milliGRAM(s) Rectal daily PRN Constipation  naloxone Injectable 0.1 milliGRAM(s) IV Push every 3 minutes PRN For ANY of the following changes in patient status:  A. RR LESS THAN 10 breaths per minute, B. Oxygen saturation LESS THAN 90%, C. Sedation score of 6  ondansetron Injectable 4 milliGRAM(s) IV Push every 6 hours PRN Nausea  oxyCODONE    IR 5 milliGRAM(s) Oral every 6 hours PRN Mild Pain (1 - 3)  oxyCODONE    IR 10 milliGRAM(s) Oral every 4 hours PRN Moderate Pain (4 - 6)      Allergies  No Known Allergies      Review of Systems:  General:  No wt loss, fevers, chills, night sweats,fatigue,   CV:  No pain, palpitations, hypo/hypertension  Resp:  No dyspnea, cough, tachypnea, wheezing +PEs  GI: see HPI  :  No pain, bleeding, incontinence, nocturia  Muscle:  +LE weakness  Neuro:  see HPI  Psych:  No fatigue, insomnia, mood problems, depression  Endocrine:  No polyuria, polydypsia, cold/heat intolerance  Heme:  No petechiae, ecchymosis, easy bruisability +hx thyroid CA s/p resection  Skin:  No rash, tattoos, scars, edema      Vital Signs Last 24 Hrs  T(C): 37.4 (15 Jul 2021 07:00), Max: 37.4 (14 Jul 2021 15:00)  T(F): 99.3 (15 Jul 2021 07:00), Max: 99.3 (14 Jul 2021 15:00)  HR: 76 (15 Jul 2021 09:52) (66 - 81)  BP: 140/80 (15 Jul 2021 07:00) (140/80 - 171/83)  BP(mean): 98 (15 Jul 2021 07:00) (87 - 109)  RR: 25 (15 Jul 2021 09:52) (19 - 38)  SpO2: 96% (15 Jul 2021 09:52) (88% - 98%)    PHYSICAL EXAM:  Constitutional: NAD, well-developed on high flow nasal cannula; alert and responsive     Neck: No LAD, supple WH surgical scar  Respiratory: clear b/l  Cardiovascular: S1 and S2, RRR, no M  Gastrointestinal: BS+, soft sl distended ( able to see wrinkle/folds in aquacell dressing) NT  +midline incision with aquacell dressing  clean and dry  RLQ drain (bulb) site clean and dry  b/l Hemovac sites clean and dry  +tony clear  Extremities: No peripheral edema, neg clubbing, cyanosis  Vascular: 2+ peripheral pulses  Neurological: A/O x 3, decreased sensation medial aspect of left calf  Psychiatric: Normal mood, normal affect  Skin: No rashes      LABS:                        9.1    14.08 )-----------( 207      ( 14 Jul 2021 10:52 )             28.2     07-14    141  |  108  |  23  ----------------------------<  78  3.8   |  20<L>  |  0.67    Ca    8.4      14 Jul 2021 22:00  Phos  3.3     07-14  Mg     1.9     07-14      PTT - ( 14 Jul 2021 10:52 )  PTT:53.4 sec    Thyroid Stimulating Hormone, Serum: 4.73 uIU/mL (07.15.21 @ 02:47)         RADIOLOGY & ADDITIONAL TESTS:

## 2021-07-15 NOTE — PROGRESS NOTE ADULT - SUBJECTIVE AND OBJECTIVE BOX
Patient seen and examined    Patient seen and examined at bedside.    --Anticoagulation--  heparin  Infusion 1000 Unit(s)/Hr IV Continuous <Continuous>    T(C): 37.2 (07-14-21 @ 23:00), Max: 37.4 (07-14-21 @ 03:00)  HR: 71 (07-14-21 @ 23:00) (70 - 79)  BP: 164/79 (07-14-21 @ 23:00) (139/71 - 164/79)  RR: 33 (07-14-21 @ 23:00) (18 - 34)  SpO2: 93% (07-14-21 @ 23:00) (88% - 100%)  Wt(kg): --      Overnight events: none  Exam:  AOx3 pupils R b/l, FC, BUE 5/5, LLE 5/5, RLE 4+/5, groin sensation intact, no tony sensation

## 2021-07-15 NOTE — PROGRESS NOTE ADULT - SUBJECTIVE AND OBJECTIVE BOX
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 The chordoma resulted in perineal numbness and overflow incontinence and he was admitted 7/7/21 for staged resection. On 7/7, he underwent Plastic Surgery and General Surgery assisted vertical rectus abdominal myocutaneous flap harvest with transperitoneal ligation of internal iliac artery and vein. He underwent en bloc sacrectomy on 7/8.     HOSPITAL COURSE:  7/7: Stage 1 - VRAM harvest, iliac artery and vein ligation  7/8: Stage 2 - sacrectomy with L4-pelvis fusion; EBL 4.5L status post 8 units PRBC, 6 units FFP, 1 pack platelets and 5L crystalloid; post-op status post 1 pack platelets  7/10: CTA chest: b/l upper lobe segmental PEs  7/11: Nausea/vomiting. NGT placed to low wall suction with immediate 1.2L bilious output.    ambulated, but got tired and became hypoxic, back on high flow  multiple BMs  tolerated oral intake    REVIEW OF SYSTEMS: [] Unable to Assess due to neurologic exam   [x] All ROS addressed below are non-contributory, except:  Neuro: [ ] Headache [ x Back pain [ ] Numbness [ ] Weakness [ ] Ataxia [ ] Dizziness [ ] Aphasia [ ] Dysarthria [ ] Visual disturbance  Resp: [x] Shortness of breath/dyspnea [ ] Orthopnea [ ] Cough  CV: [ ] Chest pain [ ] Palpitation [ ] Lightheadedness [ ] Syncope  Renal: [ ] Thirst [ ] Edema  GI: [ ] Nausea [ ] Emesis [x] Abdominal/incisional pain [ ] Constipation [ ] Diarrhea  Hem: [ ] Hematemesis [ ] Bright red blood per rectum  ID: [ ] Fever [ ] Chills [ ] Dysuria  ENT: [ ] Rhinorrhea    VITALS/DATA/ORDER: [x] Reviewed    EXAMINATION:   Gen: Cooperative  HEENT: Moist mucosa  CV: Mild tachy but regular  Lungs: Decreased BS at bases but good air entry, no stridor  Abd: hypoactive bowel sounds, distended  Ext: Diffuse edema; RLE edema>LLE  Neuro: Awake, alert, fully oriented, follows, BUE no drift full strength, BLE full strength except for DF/PF on R 4+/5

## 2021-07-16 LAB
ANION GAP SERPL CALC-SCNC: 13 MMOL/L — SIGNIFICANT CHANGE UP (ref 5–17)
APTT BLD: 40.2 SEC — HIGH (ref 27.5–35.5)
APTT BLD: 69.8 SEC — HIGH (ref 27.5–35.5)
BUN SERPL-MCNC: 17 MG/DL — SIGNIFICANT CHANGE UP (ref 7–23)
CALCIUM SERPL-MCNC: 7.8 MG/DL — LOW (ref 8.4–10.5)
CHLORIDE SERPL-SCNC: 103 MMOL/L — SIGNIFICANT CHANGE UP (ref 96–108)
CO2 SERPL-SCNC: 20 MMOL/L — LOW (ref 22–31)
CREAT SERPL-MCNC: 0.69 MG/DL — SIGNIFICANT CHANGE UP (ref 0.5–1.3)
GLUCOSE SERPL-MCNC: 97 MG/DL — SIGNIFICANT CHANGE UP (ref 70–99)
HCT VFR BLD CALC: 28.7 % — LOW (ref 39–50)
HGB BLD-MCNC: 9.5 G/DL — LOW (ref 13–17)
MAGNESIUM SERPL-MCNC: 1.7 MG/DL — SIGNIFICANT CHANGE UP (ref 1.6–2.6)
MCHC RBC-ENTMCNC: 29.6 PG — SIGNIFICANT CHANGE UP (ref 27–34)
MCHC RBC-ENTMCNC: 33.1 GM/DL — SIGNIFICANT CHANGE UP (ref 32–36)
MCV RBC AUTO: 89.4 FL — SIGNIFICANT CHANGE UP (ref 80–100)
NRBC # BLD: 0 /100 WBCS — SIGNIFICANT CHANGE UP (ref 0–0)
PHOSPHATE SERPL-MCNC: 2.4 MG/DL — LOW (ref 2.5–4.5)
PLATELET # BLD AUTO: 284 K/UL — SIGNIFICANT CHANGE UP (ref 150–400)
POTASSIUM SERPL-MCNC: 3.6 MMOL/L — SIGNIFICANT CHANGE UP (ref 3.5–5.3)
POTASSIUM SERPL-SCNC: 3.6 MMOL/L — SIGNIFICANT CHANGE UP (ref 3.5–5.3)
RBC # BLD: 3.21 M/UL — LOW (ref 4.2–5.8)
RBC # FLD: 14.4 % — SIGNIFICANT CHANGE UP (ref 10.3–14.5)
SODIUM SERPL-SCNC: 136 MMOL/L — SIGNIFICANT CHANGE UP (ref 135–145)
WBC # BLD: 19.7 K/UL — HIGH (ref 3.8–10.5)
WBC # FLD AUTO: 19.7 K/UL — HIGH (ref 3.8–10.5)

## 2021-07-16 PROCEDURE — 99231 SBSQ HOSP IP/OBS SF/LOW 25: CPT

## 2021-07-16 PROCEDURE — 99291 CRITICAL CARE FIRST HOUR: CPT

## 2021-07-16 RX ORDER — POTASSIUM PHOSPHATE, MONOBASIC POTASSIUM PHOSPHATE, DIBASIC 236; 224 MG/ML; MG/ML
15 INJECTION, SOLUTION INTRAVENOUS ONCE
Refills: 0 | Status: COMPLETED | OUTPATIENT
Start: 2021-07-16 | End: 2021-07-16

## 2021-07-16 RX ORDER — MAGNESIUM SULFATE 500 MG/ML
1 VIAL (ML) INJECTION ONCE
Refills: 0 | Status: COMPLETED | OUTPATIENT
Start: 2021-07-16 | End: 2021-07-16

## 2021-07-16 RX ORDER — POTASSIUM CHLORIDE 20 MEQ
20 PACKET (EA) ORAL ONCE
Refills: 0 | Status: COMPLETED | OUTPATIENT
Start: 2021-07-16 | End: 2021-07-16

## 2021-07-16 RX ADMIN — Medication 1 TABLET(S): at 13:46

## 2021-07-16 RX ADMIN — Medication 100 MILLIGRAM(S): at 05:08

## 2021-07-16 RX ADMIN — GABAPENTIN 200 MILLIGRAM(S): 400 CAPSULE ORAL at 05:08

## 2021-07-16 RX ADMIN — Medication 100 MILLIGRAM(S): at 13:46

## 2021-07-16 RX ADMIN — SENNA PLUS 2 TABLET(S): 8.6 TABLET ORAL at 21:42

## 2021-07-16 RX ADMIN — GABAPENTIN 200 MILLIGRAM(S): 400 CAPSULE ORAL at 21:41

## 2021-07-16 RX ADMIN — POLYETHYLENE GLYCOL 3350 17 GRAM(S): 17 POWDER, FOR SOLUTION ORAL at 17:07

## 2021-07-16 RX ADMIN — PANTOPRAZOLE SODIUM 40 MILLIGRAM(S): 20 TABLET, DELAYED RELEASE ORAL at 13:46

## 2021-07-16 RX ADMIN — POTASSIUM PHOSPHATE, MONOBASIC POTASSIUM PHOSPHATE, DIBASIC 62.5 MILLIMOLE(S): 236; 224 INJECTION, SOLUTION INTRAVENOUS at 16:57

## 2021-07-16 RX ADMIN — CHLORHEXIDINE GLUCONATE 1 APPLICATION(S): 213 SOLUTION TOPICAL at 21:42

## 2021-07-16 RX ADMIN — GABAPENTIN 200 MILLIGRAM(S): 400 CAPSULE ORAL at 13:45

## 2021-07-16 RX ADMIN — POLYETHYLENE GLYCOL 3350 17 GRAM(S): 17 POWDER, FOR SOLUTION ORAL at 05:09

## 2021-07-16 RX ADMIN — Medication 102 GRAM(S): at 15:44

## 2021-07-16 RX ADMIN — Medication 20 MILLIEQUIVALENT(S): at 17:07

## 2021-07-16 RX ADMIN — HEPARIN SODIUM 20 UNIT(S)/HR: 5000 INJECTION INTRAVENOUS; SUBCUTANEOUS at 15:07

## 2021-07-16 RX ADMIN — Medication 63 MICROGRAM(S): at 21:41

## 2021-07-16 RX ADMIN — Medication 100 MILLIGRAM(S): at 21:41

## 2021-07-16 RX ADMIN — Medication 5 MILLIGRAM(S): at 21:41

## 2021-07-16 RX ADMIN — CHLORHEXIDINE GLUCONATE 1 APPLICATION(S): 213 SOLUTION TOPICAL at 04:15

## 2021-07-16 NOTE — PROGRESS NOTE ADULT - ASSESSMENT
ASSESSMENT:61 year old male with PMH of Thyroid Cancer, s/p Total Thyroidectomy 2010 and Radioactive Iodine 2011, Hypogonadism, Vitamin D Deficiency, Osteopenia s/p en bloc sacrectomy, Stage 2 w/ L4-pelvix fusion w/ plastic closure c/b PE and post op ilus & PNA      PLAN:  q4 neurochecks  on high bianca wean Fio2  monitor drain output   SBP goal 100-160  c/w levofloxacin for PNA  Head of Bed/Activity: [] 30 degrees [] mobilize as tolerated [x] PT [x] OT [] PMNR  Ulcer prophylaxis: [] not indicated [] PPI [] other:  Glucose Control: Goal -180 [] RISS [] other:    [x] Patient critically ill due to: hypoxia, PE, hemorrhage     Time Seen:  Time Spent: 35 critical care minutes    Contact: [phone]    PLAN:  Feeding: [] diet [] tube feeds  Analgesia/Sedation:   Seizure prophylaxis: [] not indicated  Thromboprophylaxis: [] SCDs [] chemoprophylaxis [] hold chemoprophylaxis due to: [] high risk of DVT/PE on admission due to:  Head of Bed/Activity: [] 30 degrees [] mobilize as tolerated [] PT [] OT [] PMNR  Ulcer prophylaxis: [] not indicated [] PPI [] other:  Glucose Control: Goal -180 [] RISS [] other:    [] Patient critically ill due to:    Time Seen:  Time Spent: __ [] critical care minutes    Contact: [phone]    PLAN:  Feeding: [] diet [] tube feeds  Analgesia/Sedation:   Seizure prophylaxis: [] not indicated  Thromboprophylaxis: [] SCDs [] chemoprophylaxis [] hold chemoprophylaxis due to: [] high risk of DVT/PE on admission due to:  Head of Bed/Activity: [] 30 degrees [] mobilize as tolerated [] PT [] OT [] PMNR  Ulcer prophylaxis: [] not indicated [] PPI [] other:  Glucose Control: Goal -180 [] RISS [] other:    [] Patient critically ill due to:    Time Seen:  Time Spent: __ [] critical care minutes    Contact: [phone] ASSESSMENT:61 year old male with PMH of Thyroid Cancer, s/p Total Thyroidectomy 2010 and Radioactive Iodine 2011, Hypogonadism, Vitamin D Deficiency, Osteopenia s/p en bloc sacrectomy, Stage 2 w/ L4-pelvix fusion w/ plastic closure c/b PE and post op ilus & PNA      PLAN:  q4 neurochecks  on high bianca wean Fio2  monitor drain output   SBP goal 100-160  c/w levofloxacin for PNA  Head of Bed/Activity: [] 30 degrees [] mobilize as tolerated [x] PT [x] OT [] PMNR  Ulcer prophylaxis: [] not indicated [] PPI [] other:  Glucose Control: Goal -180 [] RISS [] other:    [x] Patient critically ill due to: hypoxia, PE, hemorrhage     Time Seen:  Time Spent: 35 critical care minutes    Contact: [phone]   ASSESSMENT:61 year old male with PMH of Thyroid Cancer, s/p Total Thyroidectomy 2010 and Radioactive Iodine 2011, Hypogonadism, Vitamin D Deficiency, Osteopenia s/p en bloc sacrectomy, Stage 2 w/ L4-pelvix fusion w/ plastic closure c/b PE and post op ilus & PNA      PLAN:  q4 vitals, protective sleep time   on high bianca wean Fio2  monitor drain output   SBP goal 100-160  c/w levofloxacin for PNA  Head of Bed/Activity: [] 30 degrees [] mobilize as tolerated [x] PT [x] OT [] PMNR  Ulcer prophylaxis: [] not indicated [] PPI [] other:  Glucose Control: Goal -180 [] RISS [] other:    [x] Patient critically ill due to: hypoxia, PE, hemorrhage     Time Seen:  Time Spent: 35 critical care minutes    Contact: [phone]   ASSESSMENT:61 year old male with PMH of Thyroid Cancer, s/p Total Thyroidectomy 2010 and Radioactive Iodine 2011, Hypogonadism, Vitamin D Deficiency, Osteopenia s/p VRAM harvest with iliac ligation and en bloc sacrectomy w/ L4-pelvix c/b PE, post op ileus & PNA    PLAN:  on high bianca wean Fio2  monitor drain output   SBP goal 100-160  c/w levofloxacin for PNA  Head of Bed/Activity: [] 30 degrees [] mobilize as tolerated [x] PT [x] OT [] PMNR  Glucose Control: Goal -180 [] RISS [] other:  bowel regimen   pain control    [x] Patient critically ill due to: hypoxia, PE, hemorrhage     Contact: [phone]

## 2021-07-16 NOTE — PROGRESS NOTE ADULT - SUBJECTIVE AND OBJECTIVE BOX
Patient seen and examined    Patient seen and examined at bedside.    --Anticoagulation--  heparin  Infusion 1000 Unit(s)/Hr IV Continuous <Continuous>    Vital Signs Last 24 Hrs  T(C): 37.1 (15 Jul 2021 23:00), Max: 37.4 (15 Jul 2021 07:00)  T(F): 98.8 (15 Jul 2021 23:00), Max: 99.3 (15 Jul 2021 07:00)  HR: 65 (16 Jul 2021 04:01) (65 - 80)  BP: 134/77 (15 Jul 2021 23:00) (110/59 - 161/78)  BP(mean): 93 (15 Jul 2021 23:00) (71 - 104)  RR: 34 (16 Jul 2021 04:01) (18 - 34)  SpO2: 93% (16 Jul 2021 04:01) (92% - 100%)    Overnight events: none  Exam:  AOx3 pupils R b/l, FC, BUE 5/5, LLE 5/5, RLE 4+/5, groin sensation intact, no tony sensation

## 2021-07-16 NOTE — PROGRESS NOTE ADULT - SUBJECTIVE AND OBJECTIVE BOX
NEUROCRITICAL CARE ATTENDING EVENING NOTE    BRIEF SUMMARY:  61yMale presented with Patient is a 61y old  Male who presents with a chief complaint of Sacral Chordoma post resection (16 Jul 2021 11:43)      DAY EVENTS: leakage from drain sites, replaced reservoir by plastics no further leakage      VITALS/IMAGING/DATA/IVF FLUIDS/MEDICATIONS: [x] Reviewed    ALLERGIES: Allergies    No Known Allergies    Intolerances        EXAMINATION:  General: No acute distress  HEENT: Anicteric sclerae  Cardiac: [id number]  Lungs: Clear  Abdomen: Soft, non-tender, +BS  Extremities: No c/c/e  Skin/Incision Site: Clean, dry and intact  Neurologic: Awake, alert, fully oriented, follows commands, PERRL, VFFtc, EOMI, face symmetric, tongue midline, no drift, full strength   NEUROCRITICAL CARE ATTENDING EVENING NOTE    BRIEF SUMMARY:  61yMale presented with Patient is a 61y old  Male who presents with a chief complaint of Sacral Chordoma post resection (16 Jul 2021 11:43)      DAY EVENTS: leakage from drain sites, replaced reservoir by plastics no further leakage      VITALS/IMAGING/DATA/IVF FLUIDS/MEDICATIONS: [x] Reviewed    ALLERGIES: Allergies  No Known Allergies  Intolerances        EXAMINATION:  General: No acute distress  HEENT: Anicteric sclerae  Cardiac: [id number]  Lungs: Clear  Abdomen: Soft, LLQ tender, +BS upper quadrants > lower   Extremities: No c/c/e  Skin/Incision Site: Clean, dry and intact  Neurologic: Awake, alert, fully oriented, follows commands, PERRL, EOMI, face symmetric, tongue midline, no drift, RLUE 4+/5    NEUROCRITICAL CARE ATTENDING EVENING NOTE    BRIEF SUMMARY:  61 year-old man with sacral chordoma status post staged resection with course notable for significant EBL requiring multiple transfusions, ileus, PE and PNA.     DAY EVENTS: leakage from drain sites, replaced reservoir by plastics no further leakage      VITALS/IMAGING/DATA/IVF FLUIDS/MEDICATIONS: [x] Reviewed    ALLERGIES: Allergies  No Known Allergies    EXAMINATION:  General: Cooperative  HEENT: Anicteric sclerae  Cardiac: [id number]  Lungs: Clear  Abdomen: Soft, LLQ tender, +BS upper quadrants > lower   Extremities: No c/c/e  Skin/Incision Site: Clean, dry and intact  Neurologic: Awake, alert, fully oriented, follows commands, PERRL, EOMI, face symmetric, tongue midline, no drift, full strength except RLE DF 4+/5

## 2021-07-16 NOTE — PROGRESS NOTE ADULT - SUBJECTIVE AND OBJECTIVE BOX
Patient is a 61y old  Male who presented with a chief complaint of Sacral Chordoma post resection (16 Jul 2021 11:43)      INTERVAL HPI/OVERNIGHT EVENTS:  BM x 2 / 24 hours  tolerating clears  ambulated with PT   still requiring high flow oxygen support    no nausea or vomiting  feeling more hungry    MEDICATIONS  (STANDING):  bisacodyl 5 milliGRAM(s) Oral at bedtime  calcium carbonate 1250 mG  + Vitamin D (OsCal 500 + D) 1 Tablet(s) Oral daily  chlorhexidine 4% Liquid 1 Application(s) Topical <User Schedule>  gabapentin 200 milliGRAM(s) Oral every 8 hours  heparin  Infusion 1000 Unit(s)/Hr (20 mL/Hr) IV Continuous <Continuous>  labetalol 100 milliGRAM(s) Oral every 8 hours  lactated ringers. 1000 milliLiter(s) (75 mL/Hr) IV Continuous <Continuous>  levoFLOXacin  Tablet 750 milliGRAM(s) Oral every 24 hours  levothyroxine Injectable 63 MICROGram(s) IV Push at bedtime  multivitamin 1 Tablet(s) Oral daily  pantoprazole  Injectable 40 milliGRAM(s) IV Push daily  polyethylene glycol 3350 17 Gram(s) Oral every 12 hours  senna 2 Tablet(s) Oral at bedtime    MEDICATIONS  (PRN):  acetaminophen   Tablet .. 650 milliGRAM(s) Oral every 6 hours PRN Temp greater or equal to 38C (100.4F), Mild Pain (1 - 3)  aluminum hydroxide/magnesium hydroxide/simethicone Suspension 30 milliLiter(s) Oral every 4 hours PRN Dyspepsia  bisacodyl Suppository 10 milliGRAM(s) Rectal daily PRN Constipation  naloxone Injectable 0.1 milliGRAM(s) IV Push every 3 minutes PRN For ANY of the following changes in patient status:  A. RR LESS THAN 10 breaths per minute, B. Oxygen saturation LESS THAN 90%, C. Sedation score of 6  ondansetron Injectable 4 milliGRAM(s) IV Push every 6 hours PRN Nausea  oxyCODONE    IR 5 milliGRAM(s) Oral every 6 hours PRN Mild Pain (1 - 3)  oxyCODONE    IR 10 milliGRAM(s) Oral every 4 hours PRN Moderate Pain (4 - 6)      Allergies  No Known Allergies      Review of Systems:  General:  No wt loss, fevers, chills, night sweats,fatigue,   CV:  No pain, palpitations, hypo/hypertension  Resp:  No dyspnea, cough, tachypnea, wheezing +PEs  GI: see HPI  :  No pain, bleeding, incontinence, nocturia  Muscle:  +LE weakness  Neuro:  s/p sacrectomy (en bloc) with L4-pelvis fusion  +hx hypogonadism  Psych:  No fatigue, insomnia, mood problems, depression  Endocrine:  No polyuria, polydypsia, cold/heat intolerance  Heme:  No petechiae, ecchymosis, easy bruisability +hx thyroid CA s/p resection  Skin:  No rash, tattoos, scars, edema        Vital Signs Last 24 Hrs  T(C): 37.2 (16 Jul 2021 11:00), Max: 37.4 (16 Jul 2021 07:00)  T(F): 99 (16 Jul 2021 11:00), Max: 99.3 (16 Jul 2021 07:00)  HR: 83 (16 Jul 2021 11:00) (65 - 83)  BP: 123/59 (16 Jul 2021 11:00) (123/59 - 154/82)  BP(mean): 78 (16 Jul 2021 11:00) (78 - 104)  RR: 23 (16 Jul 2021 11:00) (18 - 34)  SpO2: 95% (16 Jul 2021 11:00) (91% - 100%)    PHYSICAL EXAM:  Constitutional: NAD, well-developed on high flow nasal cannula; alert and responsive     Neck: No LAD, supple WH surgical scar  Respiratory: clear b/l  Cardiovascular: S1 and S2, RRR, no M  Gastrointestinal: BS+, softly distended ( able to see wrinkle/folds in aquacell dressing) NT  +midline incision with aquacell dressing  clean and dry  RLQ drain (bulb) site clean and dry  b/l Hemovac sites clean and dry  +tony clear  Extremities: No peripheral edema, neg clubbing, cyanosis  Vascular: 2+ peripheral pulses  Neurological: A/O x 3, decreased sensation medial aspect of left calf  Psychiatric: Normal mood, normal affect  Skin: No rashes      LABS:                        9.5    19.70 )-----------( 284      ( 16 Jul 2021 12:15 )             28.7     07-16    136  |  103  |  17  ----------------------------<  97  3.6   |  20<L>  |  0.69    Ca    7.8<L>      16 Jul 2021 12:15  Phos  2.4     07-16  Mg     1.7     07-16      PTT - ( 16 Jul 2021 12:15 )  PTT:40.2 sec    LIVER FUNCTIONS - ( 11 Jul 2021 16:15 )  Alb: 2.5 g/dL / Pro: 5.8 g/dL / ALK PHOS: 61 U/L / ALT: 37 U/L / AST: 50 U/L / GGT: x             RADIOLOGY & ADDITIONAL TESTS:

## 2021-07-16 NOTE — PROGRESS NOTE ADULT - ASSESSMENT
Summary:     NEURO:  q2h neuro checks    CARDS:  -150    PULM:  sat > 92%  High flow oxygen [place]     RENAL:  IVL    GASTRO:  advance as tolerated  ---> Stress ulcer prophylaxis:  PPI    HEME:  monitor H/H    ---> DVT prophylaxis: SCDs, On Heparin infusion     ENDO:  euglycemia    ID:  febrile    Code status:  Full code  Disposition:  ICU     61 year old male with  Sacral Tumor. status post en bloc sacrectomy, Stage 2 w/ L4-pelvix fusion w/ plastic closure.  Plan  neuro checks q 4hr   pain control  PT/OT  3 drains, monitor output  acute hypoxic respiratory failure on high flow 60 %, LPM 40  atelectasis and PE on heparin drip   coughing , thin secretions  IS every hour   chest xray atelectasis   SBP goal 100-160 mmhg   GI: abdominal distention improved, having BMs, NG tube out  reglan d/c'ed 2/2 QTc  s/p methylnaltrexone  GI following  IVL  tony   endo: synthroid for hypothyroidism  hem: CBC stable

## 2021-07-16 NOTE — PROGRESS NOTE ADULT - SUBJECTIVE AND OBJECTIVE BOX
HPI:  61 year old male with PMH of Thyroid Cancer, s/p Total Thyroidectomy 2010 and Radioactive Iodine 2011, Hypogonadism, Vitamin D Deficiency, Osteopenia with recently diagnosed Sacral Tumor. Patient endorses back pain that started in August 2020, he reports that he was treat by PCP with steroids and muscle relaxants. He reports that he had some relief, however the pain lingered. He was subsequently referred to an Orthopedic Specialist who ordered an MRI of Lumbar Spine, which revealed a Sacral Tumor with extension into the Presacral space. 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 Patient reports that he suffers from chronic constipation, he has noted that his urine stream is weaker than before, he reports right buttock and right scrotal pain and numbness. He denies decreased strength or problems walking. He presents to PST today for evaluation prior to scheduled Removal of Sacral Tumor on 7/6/2021.    fully covid vaccinated; proof of vaccination placed on chart (25 Jun 2021 11:47)    OVERNIGHT EVENTS:   Febrile     VITALS:  T(C): , Max: 37.4 (07-16-21 @ 07:00)  HR:  (65 - 83)  BP:  (110/59 - 154/82)  ABP: --  RR:  (18 - 34)  SpO2:  (91% - 100%)  Wt(kg): --      07-15-21 @ 07:01  -  07-16-21 @ 07:00  --------------------------------------------------------  IN: 3062 mL / OUT: 1195 mL / NET: 1867 mL      LABS:  Na: 141 (07-14 @ 22:00), 144 (07-14 @ 00:09)  K: 3.8 (07-14 @ 22:00), 3.6 (07-14 @ 00:09)  Cl: 108 (07-14 @ 22:00), 108 (07-14 @ 00:09)  CO2: 20 (07-14 @ 22:00), 24 (07-14 @ 00:09)  BUN: 23 (07-14 @ 22:00), 26 (07-14 @ 00:09)  Cr: 0.67 (07-14 @ 22:00), 0.68 (07-14 @ 00:09)  Glu: 78(07-14 @ 22:00), 85(07-14 @ 00:09)    Hgb: 9.1 (07-14 @ 10:52), 7.8 (07-14 @ 00:09)  Hct: 28.2 (07-14 @ 10:52), 24.8 (07-14 @ 00:09)  WBC: 14.08 (07-14 @ 10:52), 13.91 (07-14 @ 00:09)  Plt: 207 (07-14 @ 10:52), 197 (07-14 @ 00:09)    INR:   PTT: 55.6 07-15-21 @ 11:22, 53.4 07-14-21 @ 10:52, 51.2 07-13-21 @ 17:30    IMAGING:   Recent imaging studies were reviewed.    MEDICATIONS:  acetaminophen   Tablet .. 650 milliGRAM(s) Oral every 6 hours PRN  aluminum hydroxide/magnesium hydroxide/simethicone Suspension 30 milliLiter(s) Oral every 4 hours PRN  bisacodyl 5 milliGRAM(s) Oral at bedtime  bisacodyl Suppository 10 milliGRAM(s) Rectal daily PRN  calcium carbonate 1250 mG  + Vitamin D (OsCal 500 + D) 1 Tablet(s) Oral daily  chlorhexidine 4% Liquid 1 Application(s) Topical <User Schedule>  gabapentin 200 milliGRAM(s) Oral every 8 hours  heparin  Infusion 1000 Unit(s)/Hr IV Continuous <Continuous>  labetalol 100 milliGRAM(s) Oral every 8 hours  lactated ringers. 1000 milliLiter(s) IV Continuous <Continuous>  levoFLOXacin  Tablet 750 milliGRAM(s) Oral every 24 hours  levothyroxine Injectable 63 MICROGram(s) IV Push at bedtime  multivitamin 1 Tablet(s) Oral daily  naloxone Injectable 0.1 milliGRAM(s) IV Push every 3 minutes PRN  ondansetron Injectable 4 milliGRAM(s) IV Push every 6 hours PRN  oxyCODONE    IR 5 milliGRAM(s) Oral every 6 hours PRN  oxyCODONE    IR 10 milliGRAM(s) Oral every 4 hours PRN  pantoprazole  Injectable 40 milliGRAM(s) IV Push daily  polyethylene glycol 3350 17 Gram(s) Oral every 12 hours  saline laxative (FLEET) Rectal Enema 1 Enema Rectal once  senna 2 Tablet(s) Oral at bedtime    EXAMINATION:  General:  calm  HEENT:  MMM  Neuro:  awake, alert, oriented x 3, follows commands, moves all extremities  Drains in place   Cards:  RRR  Respiratory:  no respiratory distress  Adomen:  soft  Extremities:  no edema  Skin:  warm/dry

## 2021-07-16 NOTE — PROGRESS NOTE ADULT - ASSESSMENT
Mr. Jackson is a 60 yo M with PMHx of thyroid cancer s/p total thyroidectomy (2010), HLD, Vit D deficiency, Osteopenia, and chordoma, who is now s/p Sacral sacrectomy and VRAM flap harvest (07/07, 07/08). Currently being managed in NSICU.     -Please place pt in lateral position and put as little pressure on Sacral wound as possible   -Keep superficial drain in place until output is <30cc for 24 hr, prior to d/c hemovac will be switched to regular bulb   -Abdominal and sacral dressings changed by PRS AM of 07/14, and again by [facility] 07[facility]15   -Minimal SS leakage surrounding drain insertion sites at back, gauze placed under tegaderm by PRS, will continue to monitor  -rest of care per primary team   -PRS will continue to follow       Jomar Mendez MD   General Surgery Resident, PGY1  # [id number]

## 2021-07-16 NOTE — PROGRESS NOTE ADULT - ASSESSMENT
61 year old male with PMH of Thyroid Cancer, s/p Total Thyroidectomy 2010 and Radioactive Iodine 2011, Hypogonadism, Vitamin D Deficiency, Osteopenia with recently diagnosed Sacral Tumor. Admitted on 7/7, he underwent Plastic Surgery and General Surgery assisted vertical rectus abdominal myocutaneous flap harvest with transperitoneal ligation of internal iliac artery and vein. He underwent en bloc sacrectomy on 7/8, with L4-pelvis fusion, transfused 8 units PRBC, 6 units FFP, 1 pack platelets and 5L crystalloid; post-op status post 1 pack platelets, CTA 7/10 with bilateral upper lobe segmental PEs,  now with post op ileus      #Ileus - IMPROVED  -OK for advance diet, monitor tolerance  -laxatives as ordered; plan enema x 1 today Given post-sacrectomy with decreased rectal tone and sensation, will likely require enema or supp for rectal stimulation to help pass BMs  -OOB/PT; bed mobility/turning  -monitor/replete lytes PRN  goal K 4-4.5 and Mg>2  -PPI for GI prophylaxis  -Synthroid dosing per primary team    Further care per primary team  Discussed with NSCU team/attending    Please call over weekend prn with acute GI concerns   GI service : [phone]      Connor Camp PA-C    [facility]  [phone]  After hours and weekend coverage [phone]

## 2021-07-16 NOTE — PROGRESS NOTE ADULT - ASSESSMENT
Patient seen and examined at bedside.  AOx3 pupils R b/l, FC, BUE 5/5, LLE 5/5, RLE 4+/5, groin sensation intact, tony sensation intact today    Hi flow  heparin GTT until drains put out less than 100/day  then switch to lovenox potentially  Dressing changed yesterday  Keep tony in  LED -neg

## 2021-07-16 NOTE — PROGRESS NOTE ADULT - SUBJECTIVE AND OBJECTIVE BOX
Plastic Surgery Progress Note    Subjective:     Patient seen and examined at bedside. No acute events overnight. Patient without complaints this AM. Denies N/V/F/C.     OBJECTIVE:     T(C): 37.4 (07-16-21 @ 07:00), Max: 37.4 (07-16-21 @ 07:00)  HR: 67 (07-16-21 @ 07:00) (65 - 80)  BP: 135/77 (07-16-21 @ 07:00) (110/59 - 161/76)  RR: 29 (07-16-21 @ 07:00) (18 - 34)  SpO2: 97% (07-16-21 @ 07:00) (92% - 100%)  Wt(kg): --    I&O's Detail    15 Jul 2021 07:01  -  16 Jul 2021 07:00  --------------------------------------------------------  IN:    Heparin: 432 mL    Lactated Ringers: 1800 mL    Oral Fluid: 830 mL  Total IN: 3062 mL    OUT:    Bulb (mL): 150 mL    Drain (mL): 45 mL    Drain (mL): 80 mL    Indwelling Catheter - Urethral (mL): 920 mL  Total OUT: 1195 mL    Total NET: 1867 mL      PHYSICAL EXAM:    GENERAL: NAD, lying in bed comfortably  HEAD:  Atraumatic, Normocephalic  ENT: NC  CHEST/LUNG: Unlabored respirations  BACK: Midline incision with Aquacel dressing c/d/i, hemovacs b/l with SS output, minimal SS leakage around drain insertion sites under tegaderm,    NERVOUS SYSTEM:  Alert & Oriented X3, speech clear.   SKIN: No rashes or lesions

## 2021-07-17 LAB
ANION GAP SERPL CALC-SCNC: 12 MMOL/L — SIGNIFICANT CHANGE UP (ref 5–17)
APTT BLD: 64 SEC — HIGH (ref 27.5–35.5)
APTT BLD: 74.9 SEC — HIGH (ref 27.5–35.5)
APTT BLD: 87.5 SEC — HIGH (ref 27.5–35.5)
APTT BLD: 90.7 SEC — HIGH (ref 27.5–35.5)
BASOPHILS # BLD AUTO: 0.25 K/UL — HIGH (ref 0–0.2)
BASOPHILS NFR BLD AUTO: 1.7 % — SIGNIFICANT CHANGE UP (ref 0–2)
BUN SERPL-MCNC: 10 MG/DL — SIGNIFICANT CHANGE UP (ref 7–23)
CALCIUM SERPL-MCNC: 8.3 MG/DL — LOW (ref 8.4–10.5)
CHLORIDE SERPL-SCNC: 102 MMOL/L — SIGNIFICANT CHANGE UP (ref 96–108)
CO2 SERPL-SCNC: 21 MMOL/L — LOW (ref 22–31)
CREAT SERPL-MCNC: 0.71 MG/DL — SIGNIFICANT CHANGE UP (ref 0.5–1.3)
EOSINOPHIL # BLD AUTO: 0.13 K/UL — SIGNIFICANT CHANGE UP (ref 0–0.5)
EOSINOPHIL NFR BLD AUTO: 0.9 % — SIGNIFICANT CHANGE UP (ref 0–6)
GIANT PLATELETS BLD QL SMEAR: PRESENT — SIGNIFICANT CHANGE UP
GLUCOSE SERPL-MCNC: 87 MG/DL — SIGNIFICANT CHANGE UP (ref 70–99)
HCT VFR BLD CALC: 30.3 % — LOW (ref 39–50)
HGB BLD-MCNC: 9.7 G/DL — LOW (ref 13–17)
LYMPHOCYTES # BLD AUTO: 1.67 K/UL — SIGNIFICANT CHANGE UP (ref 1–3.3)
LYMPHOCYTES # BLD AUTO: 11.4 % — LOW (ref 13–44)
MAGNESIUM SERPL-MCNC: 1.9 MG/DL — SIGNIFICANT CHANGE UP (ref 1.6–2.6)
MANUAL SMEAR VERIFICATION: SIGNIFICANT CHANGE UP
MCHC RBC-ENTMCNC: 28.6 PG — SIGNIFICANT CHANGE UP (ref 27–34)
MCHC RBC-ENTMCNC: 32 GM/DL — SIGNIFICANT CHANGE UP (ref 32–36)
MCV RBC AUTO: 89.4 FL — SIGNIFICANT CHANGE UP (ref 80–100)
METAMYELOCYTES # FLD: 1.8 % — HIGH (ref 0–0)
MONOCYTES # BLD AUTO: 1.54 K/UL — HIGH (ref 0–0.9)
MONOCYTES NFR BLD AUTO: 10.5 % — SIGNIFICANT CHANGE UP (ref 2–14)
MYELOCYTES NFR BLD: 1.8 % — HIGH (ref 0–0)
NEUTROPHILS # BLD AUTO: 10.42 K/UL — HIGH (ref 1.8–7.4)
NEUTROPHILS NFR BLD AUTO: 71 % — SIGNIFICANT CHANGE UP (ref 43–77)
PHOSPHATE SERPL-MCNC: 2.9 MG/DL — SIGNIFICANT CHANGE UP (ref 2.5–4.5)
PLAT MORPH BLD: NORMAL — SIGNIFICANT CHANGE UP
PLATELET # BLD AUTO: 376 K/UL — SIGNIFICANT CHANGE UP (ref 150–400)
POLYCHROMASIA BLD QL SMEAR: SLIGHT — SIGNIFICANT CHANGE UP
POTASSIUM SERPL-MCNC: 4.1 MMOL/L — SIGNIFICANT CHANGE UP (ref 3.5–5.3)
POTASSIUM SERPL-SCNC: 4.1 MMOL/L — SIGNIFICANT CHANGE UP (ref 3.5–5.3)
RBC # BLD: 3.39 M/UL — LOW (ref 4.2–5.8)
RBC # FLD: 14.4 % — SIGNIFICANT CHANGE UP (ref 10.3–14.5)
RBC BLD AUTO: SIGNIFICANT CHANGE UP
SODIUM SERPL-SCNC: 135 MMOL/L — SIGNIFICANT CHANGE UP (ref 135–145)
VARIANT LYMPHS # BLD: 0.9 % — SIGNIFICANT CHANGE UP (ref 0–6)
WBC # BLD: 14.67 K/UL — HIGH (ref 3.8–10.5)
WBC # FLD AUTO: 14.67 K/UL — HIGH (ref 3.8–10.5)

## 2021-07-17 PROCEDURE — 99291 CRITICAL CARE FIRST HOUR: CPT

## 2021-07-17 RX ADMIN — POLYETHYLENE GLYCOL 3350 17 GRAM(S): 17 POWDER, FOR SOLUTION ORAL at 06:22

## 2021-07-17 RX ADMIN — Medication 100 MILLIGRAM(S): at 13:03

## 2021-07-17 RX ADMIN — GABAPENTIN 200 MILLIGRAM(S): 400 CAPSULE ORAL at 21:18

## 2021-07-17 RX ADMIN — HEPARIN SODIUM 20 UNIT(S)/HR: 5000 INJECTION INTRAVENOUS; SUBCUTANEOUS at 00:19

## 2021-07-17 RX ADMIN — GABAPENTIN 200 MILLIGRAM(S): 400 CAPSULE ORAL at 06:22

## 2021-07-17 RX ADMIN — GABAPENTIN 200 MILLIGRAM(S): 400 CAPSULE ORAL at 13:03

## 2021-07-17 RX ADMIN — Medication 63 MICROGRAM(S): at 21:17

## 2021-07-17 RX ADMIN — Medication 1 TABLET(S): at 12:05

## 2021-07-17 RX ADMIN — CHLORHEXIDINE GLUCONATE 1 APPLICATION(S): 213 SOLUTION TOPICAL at 21:18

## 2021-07-17 RX ADMIN — SENNA PLUS 2 TABLET(S): 8.6 TABLET ORAL at 21:19

## 2021-07-17 RX ADMIN — SODIUM CHLORIDE 75 MILLILITER(S): 9 INJECTION, SOLUTION INTRAVENOUS at 00:19

## 2021-07-17 RX ADMIN — HEPARIN SODIUM 17 UNIT(S)/HR: 5000 INJECTION INTRAVENOUS; SUBCUTANEOUS at 23:16

## 2021-07-17 RX ADMIN — Medication 100 MILLIGRAM(S): at 22:32

## 2021-07-17 RX ADMIN — HEPARIN SODIUM 19 UNIT(S)/HR: 5000 INJECTION INTRAVENOUS; SUBCUTANEOUS at 03:00

## 2021-07-17 RX ADMIN — PANTOPRAZOLE SODIUM 40 MILLIGRAM(S): 20 TABLET, DELAYED RELEASE ORAL at 12:05

## 2021-07-17 RX ADMIN — Medication 1 TABLET(S): at 12:04

## 2021-07-17 RX ADMIN — Medication 5 MILLIGRAM(S): at 21:19

## 2021-07-17 RX ADMIN — Medication 100 MILLIGRAM(S): at 06:22

## 2021-07-17 NOTE — PROGRESS NOTE ADULT - ASSESSMENT
A/P: chordoma  s/p en bloc sacrectomy and L4-pelvis fusion POD 9 from stage 1 and POD 8 from stage 2    Neuro:   monitor drain output  pain control with acetaminophen and prn diazepam  PT/OT/OBC as tolerated    Resp: hypoxic/multifactorial - PEs, atelectasis, splinting from pain, abdominal distension/ileus  bilateral subsegmental PE and atelectasis on heparin drip, cleared by NS goal 50-70   encourage IS, Acapella device use  high flow, wean as able  concern for PNA, start levaquin x7days    CV: MAP> 65 mmHg  -160   HTN on labetolol restarted 100Q8    Renal:   has tony for urinary retention  urine output decreased - responded to fluid bolus  keep euvolemic; IVF     GI: ileus now resolving   clears as tolerated - advance as tolerated  GI following   2BMs 7/17    Endo:   hypothyroidism on synthroid; elevated TSH, check free T4, consider endo consult if low for optimal synthroid dowing  target euglycemia     Heme/Onc:   hemorrhagic shock resolved, transfuse hgb<8, plt<100  heparin drip for PE PTT 50-70; monitor H/H  dopplers negative on 7/10; monitor RLE edema    ID: sputum culture growing enterobacter  +secretions, hypoxia  treat for pna with levaquin x7days    Code Status: [x] Full Code [] DNR [] DNI [] Goals of Care:     Disposition: [x] ICU [] Stroke Unit [] RCU []PCU []Floor [] Discharge Home

## 2021-07-17 NOTE — PROGRESS NOTE ADULT - SUBJECTIVE AND OBJECTIVE BOX
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 The chordoma resulted in perineal numbness and overflow incontinence and he was admitted 7/7/21 for staged resection. On 7/7, he underwent Plastic Surgery and General Surgery assisted vertical rectus abdominal myocutaneous flap harvest with transperitoneal ligation of internal iliac artery and vein. He underwent en bloc sacrectomy on 7/8.     HOSPITAL COURSE:  7/7: Stage 1 - VRAM harvest, iliac artery and vein ligation  7/8: Stage 2 - sacrectomy with L4-pelvis fusion; EBL 4.5L status post 8 units PRBC, 6 units FFP, 1 pack platelets and 5L crystalloid; post-op status post 1 pack platelets  7/10: CTA chest: b/l upper lobe segmental PEs  7/11: Nausea/vomiting. NGT placed to low wall suction with immediate 1.2L bilious output.    high flow overnight  supratherapeutic PTT    REVIEW OF SYSTEMS: [] Unable to Assess due to neurologic exam   [x] All ROS addressed below are non-contributory, except:  Neuro: [ ] Headache [ x Back pain [x ] Numbness [ ] Weakness [ ] Ataxia [ ] Dizziness [ ] Aphasia [ ] Dysarthria [ ] Visual disturbance  Resp: [x] Shortness of breath/dyspnea [ ] Orthopnea [ ] Cough  CV: [ ] Chest pain [ ] Palpitation [ ] Lightheadedness [ ] Syncope  Renal: [ ] Thirst [ ] Edema  GI: [ ] Nausea [ ] Emesis [x] Abdominal/incisional pain [ ] Constipation [ ] Diarrhea  Hem: [ ] Hematemesis [ ] Bright red blood per rectum  ID: [ ] Fever [ ] Chills [ ] Dysuria  ENT: [ ] Rhinorrhea    VITALS/DATA/ORDER: [x] Reviewed    EXAMINATION:   Gen: Cooperative  HEENT: Moist mucosa  CV: Mild tachy but regular  Lungs: Decreased BS at bases but good air entry, no stridor  Abd: hypoactive bowel sounds, distended  Ext: Diffuse edema; RLE edema>LLE  Neuro: Awake, alert, fully oriented, follows, BUE no drift full strength, BLE full strength except for R PF 4/5 DF 3/5

## 2021-07-17 NOTE — PROGRESS NOTE ADULT - ASSESSMENT
ASSESSMENT:61 year old male with PMH of Thyroid Cancer, s/p Total Thyroidectomy 2010 and Radioactive Iodine 2011, Hypogonadism, Vitamin D Deficiency, Osteopenia s/p VRAM harvest with iliac ligation and en bloc sacrectomy w/ L4-pelvix c/b PE, post op ileus & PNA    PLAN:  on high bianca wean Fio2  monitor drain output   SBP goal 100-160  c/w levofloxacin for PNA  Head of Bed/Activity: [] 30 degrees [] mobilize as tolerated [x] PT [x] OT [] PMNR  Glucose Control: Goal -180 [] RISS [] other:  bowel regimen   pain control    [x] Patient critically ill due to: hypoxia, PE, hemorrhage     Contact: [phone]   ASSESSMENT:61 year old male with PMH of Thyroid Cancer, s/p Total Thyroidectomy 2010 and Radioactive Iodine 2011, Hypogonadism, Vitamin D Deficiency, Osteopenia s/p VRAM harvest with iliac ligation and en bloc sacrectomy w/ L4-pelvix c/b PE, post op ileus & PNA    PLAN:  off hi-bianca, can consider CPAP qhs for recruitment  monitor drain output   SBP goal 100-160  c/w levofloxacin for PNA; repeat cbc w/ diff given elevated WBC  Head of Bed/Activity: [] 30 degrees [] mobilize as tolerated [x] PT [x] OT [] PMNR  Glucose Control: Goal -180 [] RISS [] other:  bowel regimen LMB this AM; full liquid diet w/ ensure  pain control    [x] Patient critically ill due to: hypoxia, PE, hemorrhage     Contact: [phone]

## 2021-07-17 NOTE — PROGRESS NOTE ADULT - ASSESSMENT
Patient seen and examined at bedside.  AOx3 pupils R b/l, FC, BUE 5/5, LLE 5/5, RLE 4+/5, groin sensation intact, tony sensation intact today    Hi flow  heparin GTT until drains put out less than 100/day  then switch to lovenox potentially  Plastics following for drains/incision  Dressing changed   Keep tony in  LED -neg

## 2021-07-17 NOTE — PROGRESS NOTE ADULT - CRITICAL CARE SERVICES PROVIDED
Critical care services provided

## 2021-07-17 NOTE — PROGRESS NOTE ADULT - SUBJECTIVE AND OBJECTIVE BOX
Patient seen and examined at bedside.    --Anticoagulation--    T(C): 37.2 (07-16-21 @ 23:00), Max: 37.4 (07-16-21 @ 07:00)  HR: 66 (07-16-21 @ 23:00) (65 - 83)  BP: 139/65 (07-16-21 @ 23:00) (115/60 - 139/75)  RR: 24 (07-16-21 @ 23:00) (15 - 37)  SpO2: 97% (07-16-21 @ 23:00) (91% - 97%)  Wt(kg): --    Exam:    AOx3 pupils R b/l, FC, BUE 5/5, LLE 5/5, RLE 4+/5, groin sensation intact, no tony sensation

## 2021-07-17 NOTE — PROGRESS NOTE ADULT - SUBJECTIVE AND OBJECTIVE BOX
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     DAY EVENTS: adjusted heparin ggt, weaned off hi-bianca to NC    VITALS/IMAGING/DATA/IVF FLUIDS/MEDICATIONS: [x] Reviewed    ALLERGIES: Allergies  No Known Allergies    EXAMINATION:  General: Cooperative  HEENT: Anicteric sclerae  Cardiac: [id number]  Lungs: Clear  Abdomen: Soft, LLQ tender, +BS upper quadrants > lower   Extremities: No c/c/e  Skin/Incision Site: Clean, dry and intact  Neurologic: Awake, alert, fully oriented, follows commands, PERRL, EOMI, face symmetric, tongue midline, no drift, full strength except RLE DF 4+/5

## 2021-07-18 LAB
APTT BLD: 34.5 SEC — SIGNIFICANT CHANGE UP (ref 27.5–35.5)
APTT BLD: 52.1 SEC — HIGH (ref 27.5–35.5)
APTT BLD: 84.2 SEC — HIGH (ref 27.5–35.5)
APTT BLD: 85.2 SEC — HIGH (ref 27.5–35.5)
HCT VFR BLD CALC: 31 % — LOW (ref 39–50)
HGB BLD-MCNC: 9.9 G/DL — LOW (ref 13–17)
MCHC RBC-ENTMCNC: 28.6 PG — SIGNIFICANT CHANGE UP (ref 27–34)
MCHC RBC-ENTMCNC: 31.9 GM/DL — LOW (ref 32–36)
MCV RBC AUTO: 89.6 FL — SIGNIFICANT CHANGE UP (ref 80–100)
NRBC # BLD: 0 /100 WBCS — SIGNIFICANT CHANGE UP (ref 0–0)
PLATELET # BLD AUTO: 431 K/UL — HIGH (ref 150–400)
RBC # BLD: 3.46 M/UL — LOW (ref 4.2–5.8)
RBC # FLD: 14.3 % — SIGNIFICANT CHANGE UP (ref 10.3–14.5)
T4 FREE SERPL-MCNC: 0.8 NG/DL — LOW (ref 0.9–1.8)
WBC # BLD: 12.76 K/UL — HIGH (ref 3.8–10.5)
WBC # FLD AUTO: 12.76 K/UL — HIGH (ref 3.8–10.5)

## 2021-07-18 PROCEDURE — 99233 SBSQ HOSP IP/OBS HIGH 50: CPT

## 2021-07-18 RX ORDER — PIPERACILLIN AND TAZOBACTAM 4; .5 G/20ML; G/20ML
3.38 INJECTION, POWDER, LYOPHILIZED, FOR SOLUTION INTRAVENOUS ONCE
Refills: 0 | Status: COMPLETED | OUTPATIENT
Start: 2021-07-18 | End: 2021-07-18

## 2021-07-18 RX ORDER — LEVOTHYROXINE SODIUM 125 MCG
137 TABLET ORAL DAILY
Refills: 0 | Status: DISCONTINUED | OUTPATIENT
Start: 2021-07-18 | End: 2021-07-23

## 2021-07-18 RX ADMIN — Medication 1 TABLET(S): at 11:55

## 2021-07-18 RX ADMIN — HEPARIN SODIUM 17 UNIT(S)/HR: 5000 INJECTION INTRAVENOUS; SUBCUTANEOUS at 20:35

## 2021-07-18 RX ADMIN — CHLORHEXIDINE GLUCONATE 1 APPLICATION(S): 213 SOLUTION TOPICAL at 23:31

## 2021-07-18 RX ADMIN — GABAPENTIN 200 MILLIGRAM(S): 400 CAPSULE ORAL at 22:09

## 2021-07-18 RX ADMIN — PANTOPRAZOLE SODIUM 40 MILLIGRAM(S): 20 TABLET, DELAYED RELEASE ORAL at 11:56

## 2021-07-18 RX ADMIN — GABAPENTIN 200 MILLIGRAM(S): 400 CAPSULE ORAL at 13:04

## 2021-07-18 RX ADMIN — HEPARIN SODIUM 17 UNIT(S)/HR: 5000 INJECTION INTRAVENOUS; SUBCUTANEOUS at 17:18

## 2021-07-18 RX ADMIN — GABAPENTIN 200 MILLIGRAM(S): 400 CAPSULE ORAL at 05:51

## 2021-07-18 RX ADMIN — PIPERACILLIN AND TAZOBACTAM 200 GRAM(S): 4; .5 INJECTION, POWDER, LYOPHILIZED, FOR SOLUTION INTRAVENOUS at 00:16

## 2021-07-18 RX ADMIN — HEPARIN SODIUM 17.5 UNIT(S)/HR: 5000 INJECTION INTRAVENOUS; SUBCUTANEOUS at 09:15

## 2021-07-18 RX ADMIN — Medication 100 MILLIGRAM(S): at 05:52

## 2021-07-18 RX ADMIN — Medication 100 MILLIGRAM(S): at 22:11

## 2021-07-18 NOTE — PROGRESS NOTE ADULT - ASSESSMENT
61 year-old man with sacral chordoma status post staged resection with course notable for significant EBL requiring multiple transfusions, ileus, PE and PNA.  -On Hep gtt  - Continue drains  - On Day 4 of Levaquin for PNA.  - Weaning off high flow   - Dressing changed overnight due soiling.  Discussed potential need for rectal tube

## 2021-07-18 NOTE — PROGRESS NOTE ADULT - ASSESSMENT
A/P: chordoma  s/p en bloc sacrectomy and L4-pelvis fusion POD 10 from stage 1 and POD 9 from stage 2    Neuro:   monitor drain output  pain control with acetaminophen and prn diazepam  PT/OT/OBC as tolerated    Resp: hypoxic/multifactorial - PEs, atelectasis, splinting from pain, abdominal distension/ileus  bilateral subsegmental PE and atelectasis on heparin drip, cleared by NS goal 50-70   encourage IS, Acapella device use  high flow, wean as able  concern for PNA, start levaquin x7days    CV: MAP> 65 mmHg  -160   HTN on labetolol restarted 100Q8    Renal:   has tony for urinary retention  urine output decreased - responded to fluid bolus  keep euvolemic; IVF     GI: ileus now resolving   clears as tolerated - advance as tolerated  GI following   LBM 7/18    Endo:   hypothyroidism on synthroid; elevated TSH, check free T4, consider endo consult if low for optimal synthroid dowing  target euglycemia     Heme/Onc:   hemorrhagic shock resolved, transfuse hgb<8, plt<100  heparin drip for PE PTT 50-70; monitor H/H  dopplers negative on 7/10; monitor RLE edema    ID: sputum culture growing enterobacter  +secretions, hypoxia  treat for pna with levaquin x7days  received x1 dose zosyn as surgical site exposed to stool    Code Status: [x] Full Code [] DNR [] DNI [] Goals of Care:     Disposition: [x] ICU [] Stroke Unit [] RCU []PCU []Floor [] Discharge Home                     A/P: chordoma  s/p en bloc sacrectomy and L4-pelvis fusion POD 11 from stage 1 and POD 10 from stage 2    Neuro:   monitor drain output  pain control with acetaminophen and prn diazepam  PT/OT/OBC as tolerated    Resp: hypoxic/multifactorial - PEs, atelectasis, splinting from pain, abdominal distension/ileus  bilateral subsegmental PE and atelectasis on heparin drip, cleared by NS goal 50-70   encourage IS, Acapella device use  high flow, wean as able  concern for PNA, start levaquin x7days    CV: MAP> 65 mmHg  -160   HTN on labetolol restarted 100Q8    Renal:   has tony for urinary retention  urine output decreased - responded to fluid bolus  keep euvolemic; IVF     GI: ileus now resolving   clears as tolerated - advance as tolerated  GI following   LBM 7/18    Endo:   hypothyroidism on synthroid; elevated TSH, check free T4, consider endo consult if low for optimal synthroid dowing  target euglycemia     Heme/Onc:   hemorrhagic shock resolved, transfuse hgb<8, plt<100  heparin drip for PE PTT 50-70; monitor H/H  dopplers negative on 7/10; monitor RLE edema    ID: sputum culture growing enterobacter  +secretions, hypoxia  treat for pna with levaquin x7days  received x1 dose zosyn as surgical site exposed to stool    Code Status: [x] Full Code [] DNR [] DNI [] Goals of Care:     Disposition: [] ICU [] Stroke Unit [] RCU []PCU [x]Floor with continuous pulse ox [] Discharge Home

## 2021-07-18 NOTE — PROGRESS NOTE ADULT - SUBJECTIVE AND OBJECTIVE BOX
Patient seen and examined at bedside.    --Anticoagulation--    T(C): 36.8 (07-17-21 @ 23:00), Max: 36.9 (07-17-21 @ 19:00)  HR: 72 (07-17-21 @ 23:00) (59 - 73)  BP: 132/69 (07-17-21 @ 23:00) (119/56 - 140/75)  RR: 23 (07-17-21 @ 23:00) (12 - 23)  SpO2: 92% (07-17-21 @ 23:00) (92% - 100%)  Wt(kg): --    Exam:  AOx3 pupils R b/l, FC, b/l UE 5/5, RLE DF/PF 4+/5, LLE 5/5   Patient seen and examined at bedside.    --Anticoagulation--    T(C): 36.8 (07-17-21 @ 23:00), Max: 36.9 (07-17-21 @ 19:00)  HR: 72 (07-17-21 @ 23:00) (59 - 73)  BP: 132/69 (07-17-21 @ 23:00) (119/56 - 140/75)  RR: 23 (07-17-21 @ 23:00) (12 - 23)  SpO2: 92% (07-17-21 @ 23:00) (92% - 100%)  Wt(kg): --    Exam:  AOx3 pupils R b/l, FC, b/l UE 5/5, RLE DF/PF 4/5, LLE 5/5

## 2021-07-18 NOTE — PROGRESS NOTE ADULT - SUBJECTIVE AND OBJECTIVE BOX
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 The chordoma resulted in perineal numbness and overflow incontinence and he was admitted 7/7/21 for staged resection. On 7/7, he underwent Plastic Surgery and General Surgery assisted vertical rectus abdominal myocutaneous flap harvest with transperitoneal ligation of internal iliac artery and vein. He underwent en bloc sacrectomy on 7/8.     HOSPITAL COURSE:  7/7: Stage 1 - VRAM harvest, iliac artery and vein ligation  7/8: Stage 2 - sacrectomy with L4-pelvis fusion; EBL 4.5L status post 8 units PRBC, 6 units FFP, 1 pack platelets and 5L crystalloid; post-op status post 1 pack platelets  7/10: CTA chest: b/l upper lobe segmental PEs  7/11: Nausea/vomiting. NGT placed to low wall suction with immediate 1.2L bilious output.  7/17: advancing diet to full liquid    OVERNIGHT EVENTS: right Hemovac w/ leaking, drain stripped seems to have resolved, patient w/ loose stool saturated dressing w/ compromised into wound, thoroughly cleaned and administered zosyn x1 dose.    REVIEW OF SYSTEMS: [] Unable to Assess due to neurologic exam   [x] All ROS addressed below are non-contributory, except:  Neuro: [ ] Headache [ x Back pain [x ] Numbness [ ] Weakness [ ] Ataxia [ ] Dizziness [ ] Aphasia [ ] Dysarthria [ ] Visual disturbance  Resp: [x] Shortness of breath/dyspnea [ ] Orthopnea [ ] Cough  CV: [ ] Chest pain [ ] Palpitation [ ] Lightheadedness [ ] Syncope  Renal: [ ] Thirst [ ] Edema  GI: [ ] Nausea [ ] Emesis [x] Abdominal/incisional pain [ ] Constipation [ ] Diarrhea  Hem: [ ] Hematemesis [ ] Bright red blood per rectum  ID: [ ] Fever [ ] Chills [ ] Dysuria  ENT: [ ] Rhinorrhea    VITALS/DATA/ORDER: [x] Reviewed    EXAMINATION:   Gen: Cooperative  HEENT: Moist mucosa  CV: Mild tachy but regular  Lungs: Decreased BS at bases but good air entry, no stridor  Abd: hypoactive bowel sounds, distended  Ext: Diffuse edema; RLE edema>LLE  Neuro: Awake, alert, fully oriented, follows, BUE no drift full strength, BLE full strength except for R PF 4/5 DF 3/5    SUMMARY:   61 year-old man with history of thyroid cancer status post total thyroidectomy in 2010 and radioactive iodine treatment in 2011, hypogonadism, vitamin D deficiency, and osteopenia diagnosed with sacral  mass found on work-up for back pain since August 2020 which was 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 The chordoma resulted in perineal numbness and overflow incontinence and he was admitted 7/7/21 for staged resection. On 7/7, he underwent Plastic Surgery and General Surgery assisted vertical rectus abdominal myocutaneous flap harvest with transperitoneal ligation of internal iliac artery and vein. He underwent en bloc sacrectomy on 7/8.     HOSPITAL COURSE:  7/7: Stage 1 - VRAM harvest, iliac artery and vein ligation  7/8: Stage 2 - sacrectomy with L4-pelvis fusion; EBL 4.5L status post 8 units PRBC, 6 units FFP, 1 pack platelets and 5L crystalloid; post-op status post 1 pack platelets  7/10: CTA chest: b/l upper lobe segmental PEs  7/11: Nausea/vomiting. NGT placed to low wall suction with immediate 1.2L bilious output.  7/17: advancing diet to full liquid    OVERNIGHT EVENTS: right Hemovac w/ leaking, drain stripped seems to have resolved, patient w/ loose stool saturated dressing w/ compromised into wound, thoroughly cleaned and administered zosyn x1 dose.    REVIEW OF SYSTEMS: [] Unable to Assess due to neurologic exam   [x] All ROS addressed below are non-contributory, except:  Neuro: [ ] Headache [ x Back pain [x ] Numbness [ ] Weakness [ ] Ataxia [ ] Dizziness [ ] Aphasia [ ] Dysarthria [ ] Visual disturbance  Resp: [x] Shortness of breath/dyspnea [ ] Orthopnea [ ] Cough  CV: [ ] Chest pain [ ] Palpitation [ ] Lightheadedness [ ] Syncope  Renal: [ ] Thirst [ ] Edema  GI: [ ] Nausea [ ] Emesis [x] Abdominal/incisional pain [ ] Constipation [ ] Diarrhea  Hem: [ ] Hematemesis [ ] Bright red blood per rectum  ID: [ ] Fever [ ] Chills [ ] Dysuria  ENT: [ ] Rhinorrhea    VITALS/DATA/ORDER: [x] Reviewed    EXAMINATION:   Gen: Cooperative  HEENT: Moist mucosa  CV: Mild tachy but regular  Lungs: Decreased BS at bases but good air entry, no stridor  Abd: hypoactive bowel sounds, distended  Ext: Diffuse edema; RLE edema>LLE  Neuro: Awake, alert, fully oriented, follows, BUE no drift full strength, BLE full strength except for R PF 4/5 DF 4-/5

## 2021-07-19 LAB
ANION GAP SERPL CALC-SCNC: 12 MMOL/L — SIGNIFICANT CHANGE UP (ref 5–17)
APTT BLD: 70 SEC — HIGH (ref 27.5–35.5)
APTT BLD: 71 SEC — HIGH (ref 27.5–35.5)
APTT BLD: 83.8 SEC — HIGH (ref 27.5–35.5)
BUN SERPL-MCNC: 13 MG/DL — SIGNIFICANT CHANGE UP (ref 7–23)
CALCIUM SERPL-MCNC: 8.4 MG/DL — SIGNIFICANT CHANGE UP (ref 8.4–10.5)
CHLORIDE SERPL-SCNC: 103 MMOL/L — SIGNIFICANT CHANGE UP (ref 96–108)
CO2 SERPL-SCNC: 20 MMOL/L — LOW (ref 22–31)
CREAT SERPL-MCNC: 0.78 MG/DL — SIGNIFICANT CHANGE UP (ref 0.5–1.3)
GLUCOSE SERPL-MCNC: 117 MG/DL — HIGH (ref 70–99)
MAGNESIUM SERPL-MCNC: 1.9 MG/DL — SIGNIFICANT CHANGE UP (ref 1.6–2.6)
PHOSPHATE SERPL-MCNC: 3.5 MG/DL — SIGNIFICANT CHANGE UP (ref 2.5–4.5)
POTASSIUM SERPL-MCNC: 4 MMOL/L — SIGNIFICANT CHANGE UP (ref 3.5–5.3)
POTASSIUM SERPL-SCNC: 4 MMOL/L — SIGNIFICANT CHANGE UP (ref 3.5–5.3)
SODIUM SERPL-SCNC: 135 MMOL/L — SIGNIFICANT CHANGE UP (ref 135–145)

## 2021-07-19 PROCEDURE — 99233 SBSQ HOSP IP/OBS HIGH 50: CPT

## 2021-07-19 RX ORDER — SODIUM CHLORIDE 9 MG/ML
500 INJECTION INTRAMUSCULAR; INTRAVENOUS; SUBCUTANEOUS ONCE
Refills: 0 | Status: COMPLETED | OUTPATIENT
Start: 2021-07-19 | End: 2021-07-19

## 2021-07-19 RX ORDER — PANTOPRAZOLE SODIUM 20 MG/1
40 TABLET, DELAYED RELEASE ORAL
Refills: 0 | Status: DISCONTINUED | OUTPATIENT
Start: 2021-07-19 | End: 2021-07-23

## 2021-07-19 RX ADMIN — GABAPENTIN 200 MILLIGRAM(S): 400 CAPSULE ORAL at 21:22

## 2021-07-19 RX ADMIN — PANTOPRAZOLE SODIUM 40 MILLIGRAM(S): 20 TABLET, DELAYED RELEASE ORAL at 09:10

## 2021-07-19 RX ADMIN — Medication 100 MILLIGRAM(S): at 06:10

## 2021-07-19 RX ADMIN — HEPARIN SODIUM 16 UNIT(S)/HR: 5000 INJECTION INTRAVENOUS; SUBCUTANEOUS at 00:32

## 2021-07-19 RX ADMIN — HEPARIN SODIUM 15.5 UNIT(S)/HR: 5000 INJECTION INTRAVENOUS; SUBCUTANEOUS at 13:00

## 2021-07-19 RX ADMIN — GABAPENTIN 200 MILLIGRAM(S): 400 CAPSULE ORAL at 14:49

## 2021-07-19 RX ADMIN — GABAPENTIN 200 MILLIGRAM(S): 400 CAPSULE ORAL at 06:10

## 2021-07-19 RX ADMIN — Medication 1 TABLET(S): at 09:10

## 2021-07-19 RX ADMIN — SENNA PLUS 2 TABLET(S): 8.6 TABLET ORAL at 21:23

## 2021-07-19 RX ADMIN — SODIUM CHLORIDE 1000 MILLILITER(S): 9 INJECTION INTRAMUSCULAR; INTRAVENOUS; SUBCUTANEOUS at 12:51

## 2021-07-19 RX ADMIN — Medication 137 MICROGRAM(S): at 06:10

## 2021-07-19 RX ADMIN — Medication 1 TABLET(S): at 14:48

## 2021-07-19 RX ADMIN — CHLORHEXIDINE GLUCONATE 1 APPLICATION(S): 213 SOLUTION TOPICAL at 21:23

## 2021-07-19 NOTE — PROGRESS NOTE ADULT - ASSESSMENT
A/P: chordoma  s/p en bloc sacrectomy and L4-pelvis fusion POD 11 from stage 1 and POD 10 from stage 2    Neuro:   monitor drain output  pain control with acetaminophen and prn diazepam  PT/OT/OBC as tolerated    Resp: hypoxic/multifactorial - PEs, atelectasis, splinting from pain, abdominal distension/ileus  bilateral subsegmental PE and atelectasis on heparin drip, cleared by NS goal 50-70   encourage IS, Acapella device use  high flow, wean as able  concern for PNA, start levaquin x7days    CV: MAP> 65 mmHg  -160   HTN on labetolol restarted 100Q8    Renal:   has tony for urinary retention  urine output decreased - responded to fluid bolus  keep euvolemic; IVF     GI: ileus now resolving   clears as tolerated - advance as tolerated  GI following   LBM 7/18    Endo:   hypothyroidism on synthroid; elevated TSH, check free T4, consider endo consult if low for optimal synthroid dowing  target euglycemia     Heme/Onc:   hemorrhagic shock resolved, transfuse hgb<8, plt<100  heparin drip for PE PTT 50-70; monitor H/H  dopplers negative on 7/10; monitor RLE edema    ID: sputum culture growing enterobacter  +secretions, hypoxia  treat for pna with levaquin x7days  received x1 dose zosyn as surgical site exposed to stool    Code Status: [x] Full Code [] DNR [] DNI [] Goals of Care:     Disposition: [] ICU [] Stroke Unit [] RCU []PCU [x]Floor with continuous pulse ox [] Discharge Home                     A/P: chordoma  s/p en bloc sacrectomy and L4-pelvis fusion POD 12 from stage 1 and POD 11 from stage 2    Neuro:   monitor drain output  pain control with acetaminophen and prn diazepam  PT/OT/OBC as tolerated    Resp: hypoxic/multifactorial - PEs, atelectasis, splinting from pain, abdominal distension/ileus  bilateral subsegmental PE and atelectasis on heparin drip, cleared by NS goal PTT 50-70 -> transition when cleared by NSGY  encourage IS, Acapella device use  PNA: levaquin x7days    CV: MAP> 65 mmHg  -160 mmHg  HTN on labetolol     Renal:   has tony for urinary retention  keep euvolemic; IVF     GI: ileus now resolving   clears as tolerated - advance as tolerated  GI following   LBM 7/18    Endo:   hypothyroidism on synthroid  target euglycemia     Heme/Onc:   hemorrhagic shock resolved, transfuse hgb<8, plt<100  heparin drip for PE PTT 50-70; monitor H/H  dopplers negative on 7/10; monitor RLE edema    ID: sputum culture growing enterobacter  +secretions, hypoxia  treat for pna with levaquin x7days    Code Status: [x] Full Code [] DNR [] DNI [] Goals of Care:     Disposition: [] ICU [] Stroke Unit [] RCU []PCU [x]Floor with continuous pulse ox [] Discharge Home     Medically complex

## 2021-07-19 NOTE — PROGRESS NOTE ADULT - ASSESSMENT
Mr. Jackson is a 62 yo M with PMHx of thyroid cancer s/p total thyroidectomy (2010), HLD, Vit D deficiency, Osteopenia, and chordoma, who is now s/p Sacral sacrectomy and VRAM flap harvest (07/07, 07/08). Currently being managed in NSICU.     -Please place pt in lateral position and put as little pressure on Sacral wound as possible   -Keep superficial drain in place until output is <30cc for 24 hr, prior to d/c hemovac will be switched to regular bulb   -Abdominal and sacral dressings changed by PRS AM of 07/14, and again by [facility] 07/15   -Minimal SS leakage surrounding drain insertion sites at back, gauze placed under tegaderm by PRS, will continue to monitor  -rest of care per primary team   -PRS will continue to follow       Jomar Mendez MD   General Surgery Resident, PGY1  # [id number]

## 2021-07-19 NOTE — PROGRESS NOTE ADULT - ASSESSMENT
Patient seen and examined at bedside.  Off hiflow  Declining rectal tube for fecal incontinence  At this time does not want to pursue colostomy, will d/w patient further

## 2021-07-19 NOTE — PROGRESS NOTE ADULT - ASSESSMENT
61 male with sacral tumor, followed by Plastic Surgery . Ileus improved , patient having bowel movements/fecal incontinence. A rectal tube if not contraindicated  may be helpful to divert liquid stool and keep wound and dressings clean. Per chart review patient has not yet made that decision regarding rectal tube placement     Recommendations:  Monitor bowel frequency on current bowel regimen.   Monitor tolerance of current diet.     Geetha Acuna, SINDY-BC  Covering [facility]  [address] [place] [address]  [phone]

## 2021-07-19 NOTE — PROGRESS NOTE ADULT - SUBJECTIVE AND OBJECTIVE BOX
61y Male patient s/p en bloc sacrectomy for chordoma w/ L4-pelvis fusion w/ plastics closure vertical rectus abdominis myocutaneous (VRAM) flap for chordoma on 07/07 and 07/08, post-op course c/b bilateral PEs, now on hep gtt and post-op ileus. Patient now incontinent of stool and soiling his posterior dressing, surgery re-consulted for possible colostomy. Patient adamantly refusing surgical intervention at this time. Reports being incontinent of stool but that his body "needs time to adjust" after not having eaten in so long. Also declining rectal tube despite explaining risks of wound contamination and healing issues if wound continues to be soiled with stool.     Red Team Surgery  [id number]

## 2021-07-19 NOTE — PROGRESS NOTE ADULT - SUBJECTIVE AND OBJECTIVE BOX
Plastic Surgery Progress Note    Subjective:     Patient seen and examined at bedside. Weaned off NC overnight, no other acute events overnight. Patient without complaints this AM. Denies N/V/F/C.     OBJECTIVE:     T(C): 36.6 (07-19-21 @ 03:00), Max: 36.9 (07-18-21 @ 15:00)  HR: 61 (07-19-21 @ 03:00) (61 - 80)  BP: 118/69 (07-19-21 @ 03:00) (104/54 - 143/74)  RR: 16 (07-19-21 @ 03:00) (16 - 28)  SpO2: 100% (07-19-21 @ 03:00) (89% - 100%)  Wt(kg): --    I&O's Detail    18 Jul 2021 07:01  -  19 Jul 2021 07:00  --------------------------------------------------------  IN:    Heparin: 389 mL    Oral Fluid: 1260 mL  Total IN: 1649 mL    OUT:    Bulb (mL): 105 mL    Drain (mL): 80 mL    Drain (mL): 80 mL    Indwelling Catheter - Urethral (mL): 5325 mL  Total OUT: 5590 mL    Total NET: -3941 mL        PHYSICAL EXAM:    GENERAL: NAD, lying in bed comfortably  HEAD:  Atraumatic, Normocephalic  ENT: NC  CHEST/LUNG: Unlabored respirations  BACK: sacral incision with aquacel dressing in place c/d, b/l hemovac drains with SS output, holding suction, tegaderm/gauze dressings at drain insertion sites c/d  NERVOUS SYSTEM:  Alert & Oriented X3, speech clear.   SKIN: No rashes or lesions    LABS:                          9.9    12.76 )-----------( 431      ( 18 Jul 2021 23:38 )             31.0     07-18    135  |  103  |  13  ----------------------------<  117<H>  4.0   |  20<L>  |  0.78    Ca    8.4      18 Jul 2021 23:38  Phos  3.5     07-18  Mg     1.9     07-18      PTT - ( 19 Jul 2021 06:17 )  PTT:70.0 sec

## 2021-07-19 NOTE — CHART NOTE - NSCHARTNOTEFT_GEN_A_CORE
Nutrition Follow Up Note  Patient seen for: Malnutrition follow up     Chart reviewed, events noted. Pt is a 62 yo M with PMH: thyroid cancer s/p total thyroidectomy 2010, radioactive treatment 2011, hypogonadism, vitamin D deficiency, osteopenia. 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 Admitted 7/7 for staged resection. On 7/7, pt underwent plastic surgery and general surgery assisted vertical rectus abdominal myocutaneous flap harvest with transperitoneal ligation of internal iliac artery and vein. He underwent en bloc sacrectomy on 7/8. Pt extubated 7/9. Hospital course c/b: CTA chest, b/c upper lobe segmental PEs; post-op ileus with NGT in place for low wall suction. Interim events: Diet advanced. Refusing rectal tube despite decreased sensation of BM and voiding. GI following. Hi-Flow weaned off. Continues on antibiotics as ordered (in context of pneumonia).     Source: [x] Patient       [x] EMR        [x] RN        [] Family at bedside       [x] Other: interdisciplinary medical team    Diet Order:   Diet, Soft:   Supplement Feeding Modality:  Oral  Ensure Enlive Cans or Servings Per Day:  1       Frequency:  Three Times a day (07-18-21)  Diet, Regular (07-15-21)    Is current diet order adequate? [x] Yes      PO intake :   [] >75%  Adequate    [] 50-75%  Fair       [] <50%  Poor    Nutrition-related concerns:  -Diet advanced from clear liquids to full liquids (7/17) to soft diet with Ensure Enlive 3x daily (7/18).   -Per discussion with pt, reports _____.   -Last BM: (7/18): x 1; (7/17): x 4.   -Continues on bowel regimen (bisacodyl, senna, Miralax).   -Continues on antibiotics as ordered (in context of pneumonia).   -Continues to receive daily multivitamin, calcium carbonate.     Weights:   Daily     MEDICATIONS  (STANDING):  bisacodyl  calcium carbonate 1250 mG  + Vitamin D (OsCal 500 + D)  labetalol  levoFLOXacin  Tablet  levothyroxine  multivitamin  pantoprazole  Injectable  polyethylene glycol 3350  senna    Pertinent Labs: 07-18 @ 23:38: Na 135, BUN 13, Cr 0.78, <H>, K+ 4.0, Phos 3.5, Mg 1.9, Alk Phos --, ALT/SGPT --, AST/SGOT --, HbA1c --    Finger Sticks:    Skin per nursing documentation: surgical incision abdomen 7/7, mid-back  Edema: 1+ left leg; right leg    (based on dosing wt 81.6kg):   Estimated Energy Needs: (25-30kcal/kg): 2040-2448kcal  Estimated Protein Needs: (1.2-1.4g protein/kg): 98-114g protein    Previous Nutrition Diagnosis: 1) increased nutrient needs 2) malnutrition (moderate)  Nutrition Diagnosis is: [x] ongoing    New Nutrition Diagnosis: [] Not applicable    Nutrition Care Plan:  [] In Progress  [] Achieved  [] Not applicable    Nutrition Interventions:     Education Provided:       [] Yes:  [] No:        Recommendations:         [] Continue current diet order            [] Add oral nutrition supplement:     [] Discontinue current diet order. Recommend:      [] Add micronutrient supplementation:      [] Continue current micronutrient supplementation:      [] Other:     Monitoring and Evaluation:   Continue to monitor nutritional intake, tolerance to diet prescription, weights, labs, skin integrity      RD remains available upon request and will follow up per protocol Nutrition Follow Up Note  Patient seen for: Malnutrition follow up     Chart reviewed, events noted. Pt is a 60 yo M with PMH: thyroid cancer s/p total thyroidectomy 2010, radioactive treatment 2011, hypogonadism, vitamin D deficiency, osteopenia. 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 Admitted 7/7 for staged resection. On 7/7, pt underwent plastic surgery and general surgery assisted vertical rectus abdominal myocutaneous flap harvest with transperitoneal ligation of internal iliac artery and vein. He underwent en bloc sacrectomy on 7/8. Pt extubated 7/9. Hospital course c/b: CTA chest, b/c upper lobe segmental PEs; post-op ileus with NGT in place for low wall suction. Interim events: Diet advanced. Refusing rectal tube despite decreased sensation of BM and voiding. GI following. Hi-Flow weaned off. Continues on antibiotics as ordered (in context of pneumonia).     Source: [x] Patient       [x] EMR        [x] RN        [] Family at bedside       [x] Other: interdisciplinary medical team    Diet Order:   Diet, Soft:   Supplement Feeding Modality:  Oral  Ensure Enlive Cans or Servings Per Day:  1       Frequency:  Three Times a day (07-18-21)  Diet, Regular (07-15-21)    Is current diet order adequate? [x] Yes      PO intake :   [] >75%  Adequate    [] 50-75%  Fair       [] <50%  Poor    Nutrition-related concerns:  -Diet advanced from clear liquids to full liquids (7/17) to soft diet with Ensure Enlive 3x daily (7/18). Per discussion with pt, reports intake of eggs, rice krispes, orange juice and Ensure Enlive this AM (prefers vanilla flavor). Denies nausea at this time. Pt endorsed preference for "light foods", expressed feelings of abdominal bloating.   -Last BM: (7/18): x 1; (7/17): x 4. Continues on bowel regimen (bisacodyl, senna, Miralax).   -Continues on antibiotics as ordered (in context of pneumonia).   -Continues to receive daily multivitamin, calcium carbonate.     Weights:   Daily     MEDICATIONS  (STANDING):  bisacodyl  calcium carbonate 1250 mG  + Vitamin D (OsCal 500 + D)  labetalol  levoFLOXacin  Tablet  levothyroxine  multivitamin  pantoprazole  Injectable  polyethylene glycol 3350  senna    Pertinent Labs: 07-18 @ 23:38: Na 135, BUN 13, Cr 0.78, <H>, K+ 4.0, Phos 3.5, Mg 1.9, Alk Phos --, ALT/SGPT --, AST/SGOT --, HbA1c --    Finger Sticks:    Skin per nursing documentation: surgical incision abdomen 7/7, mid-back  Edema: 1+ left leg; right leg    (based on dosing wt 81.6kg):   Estimated Energy Needs: (25-30kcal/kg): 2040-2448kcal  Estimated Protein Needs: (1.2-1.4g protein/kg): 98-114g protein    Previous Nutrition Diagnosis: 1) increased nutrient needs 2) malnutrition (moderate)  Nutrition Diagnosis is: [x] ongoing    New Nutrition Diagnosis: [] Not applicable    Nutrition Care Plan:  [x] In Progress  [] Achieved  [] Not applicable       Recommendations:      1. Continue PO diet as ordered; consider low fiber diet if multiple BM's noted. Defer consistency to medical team, SLP. Continue Ensure Enlive 2x daily.   2. Monitor and encourage PO intake. Encourage use of daily menus. Honor dietary preferences as expressed as able.   3. Monitor wt trends, nutrition related labs, skin integrity, hydration status and bowel regularity. Bowel regimen channing per discretion of medical team.   4. Continue multivitamin, OsCal+D.   5. Recommend Richard Active 2x daily.     Monitoring and Evaluation:   Continue to monitor nutritional intake, tolerance to diet prescription, weights, labs, skin integrity      RD remains available upon request and will follow up per protocol

## 2021-07-19 NOTE — PROGRESS NOTE ADULT - SUBJECTIVE AND OBJECTIVE BOX
INTERVAL HPI/OVERNIGHT EVENTS:  Patient reports he is feeling OK . Tolerating his meals, No abdominal discomfort      MEDICATIONS  (STANDING):  calcium carbonate 1250 mG  + Vitamin D (OsCal 500 + D) 1 Tablet(s) Oral daily  chlorhexidine 4% Liquid 1 Application(s) Topical <User Schedule>  gabapentin 200 milliGRAM(s) Oral every 8 hours  heparin  Infusion 1000 Unit(s)/Hr (15.5 mL/Hr) IV Continuous <Continuous>  labetalol 100 milliGRAM(s) Oral every 8 hours  levoFLOXacin  Tablet 750 milliGRAM(s) Oral every 24 hours  levothyroxine 137 MICROGram(s) Oral daily  multivitamin 1 Tablet(s) Oral daily  pantoprazole    Tablet 40 milliGRAM(s) Oral before breakfast  polyethylene glycol 3350 17 Gram(s) Oral every 12 hours  senna 2 Tablet(s) Oral at bedtime    MEDICATIONS  (PRN):  acetaminophen   Tablet .. 650 milliGRAM(s) Oral every 6 hours PRN Temp greater or equal to 38C (100.4F), Mild Pain (1 - 3)  aluminum hydroxide/magnesium hydroxide/simethicone Suspension 30 milliLiter(s) Oral every 4 hours PRN Dyspepsia  bisacodyl Suppository 10 milliGRAM(s) Rectal daily PRN Constipation  ondansetron Injectable 4 milliGRAM(s) IV Push every 6 hours PRN Nausea      Allergies    No Known Allergies    Intolerances        Review of Systems:    General:  No fever or chills    ENT: no  dysphagia  CV:  No chest pain  Resp:  No  cough   GI: reports BM yesterday , no abdominal pain       Vital Signs Last 24 Hrs  T(C): 36.8 (19 Jul 2021 15:00), Max: 36.9 (18 Jul 2021 23:00)  T(F): 98.2 (19 Jul 2021 15:00), Max: 98.4 (18 Jul 2021 23:00)  HR: 77 (19 Jul 2021 15:00) (59 - 77)  BP: 121/59 (19 Jul 2021 15:00) (88/57 - 143/74)  BP(mean): 75 (19 Jul 2021 15:00) (61 - 96)  RR: 27 (19 Jul 2021 15:00) (16 - 27)  SpO2: 98% (19 Jul 2021 15:00) (83% - 100%)    PHYSICAL EXAM:    Constitutional: non toxic and in NAD  Respiratory: anterior chest wall clear  Cardiovascular: S1 and S2, RRR  Gastrointestinal: +BS soft  Extremities: no edema       LABS:                        9.9    12.76 )-----------( 431      ( 18 Jul 2021 23:38 )             31.0    Hemoglobin: 9.9 g/dL *L* [13.0 - 17.0] (07-18 @ 23:38)  Hemoglobin: 9.7 g/dL *L* [13.0 - 17.0] (07-17 @ 22:54)              07-18    135  |  103  |  13  ----------------------------<  117<H>  4.0   |  20<L>  |  0.78    Ca    8.4      18 Jul 2021 23:38  Phos  3.5     07-18  Mg     1.9     07-18      PTT - ( 19 Jul 2021 11:56 )  PTT:83.8 sec        RADIOLOGY & ADDITIONAL TESTS:

## 2021-07-19 NOTE — PROGRESS NOTE ADULT - SUBJECTIVE AND OBJECTIVE BOX
Patient seen and examined at bedside.    --Anticoagulation--    T(C): 36.9 (07-18-21 @ 23:00), Max: 36.9 (07-18-21 @ 15:00)  HR: 70 (07-18-21 @ 23:00) (60 - 80)  BP: 143/74 (07-18-21 @ 23:00) (104/54 - 143/74)  RR: 22 (07-18-21 @ 23:00) (17 - 28)  SpO2: 99% (07-18-21 @ 23:00) (89% - 100%)  Wt(kg): --    Exam:  AOx3 pupils R b/l, FC, b/l UE 5/5, RLE DF/PF 4+/5, LLE 5/5

## 2021-07-20 DIAGNOSIS — J18.9 PNEUMONIA, UNSPECIFIED ORGANISM: ICD-10-CM

## 2021-07-20 DIAGNOSIS — E87.1 HYPO-OSMOLALITY AND HYPONATREMIA: ICD-10-CM

## 2021-07-20 DIAGNOSIS — K56.7 ILEUS, UNSPECIFIED: ICD-10-CM

## 2021-07-20 DIAGNOSIS — I26.99 OTHER PULMONARY EMBOLISM WITHOUT ACUTE COR PULMONALE: ICD-10-CM

## 2021-07-20 DIAGNOSIS — R09.02 HYPOXEMIA: ICD-10-CM

## 2021-07-20 LAB
ANION GAP SERPL CALC-SCNC: 13 MMOL/L — SIGNIFICANT CHANGE UP (ref 5–17)
APTT BLD: 56.3 SEC — HIGH (ref 27.5–35.5)
APTT BLD: 58.6 SEC — HIGH (ref 27.5–35.5)
APTT BLD: 74.4 SEC — HIGH (ref 27.5–35.5)
BUN SERPL-MCNC: 12 MG/DL — SIGNIFICANT CHANGE UP (ref 7–23)
CALCIUM SERPL-MCNC: 9.2 MG/DL — SIGNIFICANT CHANGE UP (ref 8.4–10.5)
CHLORIDE SERPL-SCNC: 99 MMOL/L — SIGNIFICANT CHANGE UP (ref 96–108)
CO2 SERPL-SCNC: 21 MMOL/L — LOW (ref 22–31)
CREAT SERPL-MCNC: 0.8 MG/DL — SIGNIFICANT CHANGE UP (ref 0.5–1.3)
GLUCOSE SERPL-MCNC: 95 MG/DL — SIGNIFICANT CHANGE UP (ref 70–99)
MAGNESIUM SERPL-MCNC: 2.1 MG/DL — SIGNIFICANT CHANGE UP (ref 1.6–2.6)
PHOSPHATE SERPL-MCNC: 3.9 MG/DL — SIGNIFICANT CHANGE UP (ref 2.5–4.5)
POTASSIUM SERPL-MCNC: 4.2 MMOL/L — SIGNIFICANT CHANGE UP (ref 3.5–5.3)
POTASSIUM SERPL-SCNC: 4.2 MMOL/L — SIGNIFICANT CHANGE UP (ref 3.5–5.3)
SODIUM SERPL-SCNC: 133 MMOL/L — LOW (ref 135–145)

## 2021-07-20 PROCEDURE — 99233 SBSQ HOSP IP/OBS HIGH 50: CPT

## 2021-07-20 PROCEDURE — 99223 1ST HOSP IP/OBS HIGH 75: CPT

## 2021-07-20 RX ADMIN — PANTOPRAZOLE SODIUM 40 MILLIGRAM(S): 20 TABLET, DELAYED RELEASE ORAL at 06:47

## 2021-07-20 RX ADMIN — POLYETHYLENE GLYCOL 3350 17 GRAM(S): 17 POWDER, FOR SOLUTION ORAL at 05:24

## 2021-07-20 RX ADMIN — Medication 1 TABLET(S): at 13:53

## 2021-07-20 RX ADMIN — HEPARIN SODIUM 14.5 UNIT(S)/HR: 5000 INJECTION INTRAVENOUS; SUBCUTANEOUS at 01:10

## 2021-07-20 RX ADMIN — GABAPENTIN 200 MILLIGRAM(S): 400 CAPSULE ORAL at 13:53

## 2021-07-20 RX ADMIN — GABAPENTIN 200 MILLIGRAM(S): 400 CAPSULE ORAL at 05:23

## 2021-07-20 RX ADMIN — GABAPENTIN 200 MILLIGRAM(S): 400 CAPSULE ORAL at 21:43

## 2021-07-20 RX ADMIN — Medication 137 MICROGRAM(S): at 05:23

## 2021-07-20 NOTE — PROGRESS NOTE ADULT - SUBJECTIVE AND OBJECTIVE BOX
INTERVAL HPI/OVERNIGHT EVENTS:  Patient reports he is feeling OK . Tolerating his meals, No abdominal discomfort      MEDICATIONS  (STANDING):  calcium carbonate 1250 mG  + Vitamin D (OsCal 500 + D) 1 Tablet(s) Oral daily  chlorhexidine 4% Liquid 1 Application(s) Topical <User Schedule>  gabapentin 200 milliGRAM(s) Oral every 8 hours  heparin  Infusion 1000 Unit(s)/Hr (15.5 mL/Hr) IV Continuous <Continuous>  labetalol 100 milliGRAM(s) Oral every 8 hours  levoFLOXacin  Tablet 750 milliGRAM(s) Oral every 24 hours  levothyroxine 137 MICROGram(s) Oral daily  multivitamin 1 Tablet(s) Oral daily  pantoprazole    Tablet 40 milliGRAM(s) Oral before breakfast  polyethylene glycol 3350 17 Gram(s) Oral every 12 hours  senna 2 Tablet(s) Oral at bedtime    MEDICATIONS  (PRN):  acetaminophen   Tablet .. 650 milliGRAM(s) Oral every 6 hours PRN Temp greater or equal to 38C (100.4F), Mild Pain (1 - 3)  aluminum hydroxide/magnesium hydroxide/simethicone Suspension 30 milliLiter(s) Oral every 4 hours PRN Dyspepsia  bisacodyl Suppository 10 milliGRAM(s) Rectal daily PRN Constipation  ondansetron Injectable 4 milliGRAM(s) IV Push every 6 hours PRN Nausea      Allergies    No Known Allergies    Intolerances        Review of Systems:    General:  No fever or chills    ENT: no  dysphagia  CV:  No chest pain  Resp:  No  cough   GI: reports BM yesterday , no abdominal pain     T(F): 97.9 (07-20-21 @ 07:00), Max: 98.6 (07-19-21 @ 19:00)  HR: 68 (07-20-21 @ 07:00) (64 - 82)  BP: 119/76 (07-20-21 @ 07:00) (88/57 - 131/63)  RR: 17 (07-20-21 @ 07:00) (17 - 27)  SpO2: 99% (07-20-21 @ 07:00) (83% - 100%)  Wt(kg): --  ,   I&O's Summary    19 Jul 2021 07:01  -  20 Jul 2021 07:00  --------------------------------------------------------  IN: 2294 mL / OUT: 3330 mL / NET: -1036 mL    20 Jul 2021 07:01  -  20 Jul 2021 11:13  --------------------------------------------------------  IN: 269 mL / OUT: 0 mL / NET: 269 mL      PHYSICAL EXAM:    Constitutional: non toxic and in NAD  Respiratory: anterior chest wall clear  Cardiovascular: S1 and S2, RRR  Gastrointestinal: +BS soft  Extremities: no edema       LABS:                        9.9    12.76 )-----------( 431      ( 18 Jul 2021 23:38 )             31.0    Hemoglobin: 9.9 g/dL *L* [13.0 - 17.0] (07-18 @ 23:38)  Hemoglobin: 9.7 g/dL *L* [13.0 - 17.0] (07-17 @ 22:54)              07-18    135  |  103  |  13  ----------------------------<  117<H>  4.0   |  20<L>  |  0.78    Ca    8.4      18 Jul 2021 23:38  Phos  3.5     07-18  Mg     1.9     07-18      PTT - ( 19 Jul 2021 11:56 )  PTT:83.8 sec        RADIOLOGY & ADDITIONAL TESTS:

## 2021-07-20 NOTE — PROGRESS NOTE ADULT - SUBJECTIVE AND OBJECTIVE BOX
Plastic Surgery Progress Note    Subjective:     Patient seen and examined at bedside. [facility] Called by team 2/2 dressing lifting at inferior aspect.     OBJECTIVE:       T(C): 36.6 (07-20-21 @ 07:00), Max: 37 (07-19-21 @ 19:00)  T(F): 97.9 (07-20-21 @ 07:00), Max: 98.6 (07-19-21 @ 19:00)  HR: 68 (07-20-21 @ 07:00) (64 - 82)  BP: 119/76 (07-20-21 @ 07:00) (88/57 - 131/63)  RR: 17 (07-20-21 @ 07:00) (17 - 27)  SpO2: 99% (07-20-21 @ 07:00) (83% - 100%)      19 Jul 2021 07:01  -  20 Jul 2021 07:00  --------------------------------------------------------  IN:    Heparin: 354 mL    Oral Fluid: 1440 mL    Sodium Chloride 0.9% Bolus: 500 mL  Total IN: 2294 mL    OUT:    Bulb (mL): 60 mL    Drain (mL): 45 mL    Drain (mL): 50 mL    Indwelling Catheter - Urethral (mL): 3275 mL  Total OUT: 3430 mL    Total NET: -1136 mL      20 Jul 2021 07:01  -  20 Jul 2021 11:57  --------------------------------------------------------  IN:    Heparin: 43.5 mL    Oral Fluid: 240 mL  Total IN: 283.5 mL    OUT:    Bulb (mL): 10 mL    Drain (mL): 5 mL    Drain (mL): 10 mL    Indwelling Catheter - Urethral (mL): 375 mL  Total OUT: 400 mL    Total NET: -116.5 mL          PHYSICAL EXAM:    GENERAL: NAD, lying in bed comfortably  HEAD:  Atraumatic, Normocephalic  CHEST/LUNG: Unlabored respirations, on nc for supplemental O2  BACK: sacral incision with aquacel dressing in place, lifting inferiorly 2/2 stool soiling, replaced, noted 5cm of inferior incision dehiscence, WTD dressing placed. b/l hemovac drains with SS output, holding suction, tegaderm/gauze dressings at drain insertion sites replaced 2/2 saturation.   NERVOUS SYSTEM:  Alert & Oriented X3, speech clear.   SKIN: No rashes or lesions    LABS:       133    |  99     |  12     ----------------------------<  95         ( 20 Jul 2021 06:49 )  4.2     |  21     |  0.80     Ca    9.2        ( 20 Jul 2021 06:49 )  Phos  3.9       ( 20 Jul 2021 06:49 )  Mg     2.1       ( 20 Jul 2021 06:49 )        PTT -  58.6 sec   ( 20 Jul 2021 06:52 )  CAPILLARY BLOOD GLUCOSE

## 2021-07-20 NOTE — PROGRESS NOTE ADULT - SUBJECTIVE AND OBJECTIVE BOX
no new complaints, pain controlled     REVIEW OF SYSTEMS  Constitutional - No fever,  No fatigue  HEENT - No vertigo, No neck pain  Neurological - No headaches, No memory loss, No loss of strength, No numbness, No tremors  Skin - No rashes, No lesions   Musculoskeletal - No joint pain, No joint swelling, No muscle pain  Psychiatric - No depression, No anxiety    FUNCTIONAL PROGRESS  7/19 PT  bed mobility min assist  transfers contact guard, RW  gait min assist, RW x 8 feet   +THORNTON/SOB     7/16 OT  bed mobility min assist  transfers min assist      VITALS  T(C): 36.6 (07-20-21 @ 07:00), Max: 37 (07-19-21 @ 19:00)  HR: 68 (07-20-21 @ 07:00) (64 - 82)  BP: 119/76 (07-20-21 @ 07:00) (88/57 - 134/80)  RR: 17 (07-20-21 @ 07:00) (17 - 27)  SpO2: 99% (07-20-21 @ 07:00) (83% - 100%)  Wt(kg): --    MEDICATIONS   acetaminophen   Tablet .. 650 milliGRAM(s) every 6 hours PRN  aluminum hydroxide/magnesium hydroxide/simethicone Suspension 30 milliLiter(s) every 4 hours PRN  bisacodyl Suppository 10 milliGRAM(s) daily PRN  calcium carbonate 1250 mG  + Vitamin D (OsCal 500 + D) 1 Tablet(s) daily  gabapentin 200 milliGRAM(s) every 8 hours  heparin  Infusion 1000 Unit(s)/Hr <Continuous>  levoFLOXacin  Tablet 750 milliGRAM(s) every 24 hours  levothyroxine 137 MICROGram(s) daily  multivitamin 1 Tablet(s) daily  ondansetron Injectable 4 milliGRAM(s) every 6 hours PRN  pantoprazole    Tablet 40 milliGRAM(s) before breakfast  polyethylene glycol 3350 17 Gram(s) every 12 hours  senna 2 Tablet(s) at bedtime      RECENT LABS - Reviewed                        9.9    12.76 )-----------( 431      ( 18 Jul 2021 23:38 )             31.0     07-20    133<L>  |  99  |  12  ----------------------------<  95  4.2   |  21<L>  |  0.80    Ca    9.2      20 Jul 2021 06:49  Phos  3.9     07-20  Mg     2.1     07-20      PTT - ( 20 Jul 2021 06:52 )  PTT:58.6 sec    ----------------------------------------------------------------------------------  PHYSICAL EXAM  Constitutional - NAD, Comfortable, in bed    HEENT - NGT  Chest - Breathing comfortably, nasal cannula    Cardiovascular - S1S2   Abdomen - Soft   Extremities - No C/C/E, No calf tenderness   Neurologic Exam -                    Cognitive - Awake, Alert, AAO to self, place, date, year, situation     Communication - Fluent, No dysarthria     Cranial Nerves - CN 2-12 intact     Motor - No focal deficits                    LEFT    UE - ShAB 5/5, EF 5/5, EE 5/5, WE 5/5,  5/5                    RIGHT UE - ShAB 5/5, EF 5/5, EE 5/5, WE 5/5,  5/5                    LEFT    LE - HF 5/5, KE 5/5, DF 5/5, PF 5/5                    RIGHT LE - HF 5/5, KE 5/5, DF 5/5, PF 5/5        Sensory - Intact to LT     Psychiatric - Mood stable, Affect WNL  ----------------------------------------------------------------------------------------  ASSESSMENT/PLAN  61yMale with functional deficits after sacrectomy, L4-pelvis fusion for chordoma  post op PE on high flow, heparin drip   ileus resolving, +BM, patient declining rectal tube or colostomy    urinary retention, tony  bowel program, senna, miralax   levaquin for pna   VRAM flap, sacral wound, needs to lay in lateral position  Pain - Tylenol, oxycodone, gabapentin   DVT PPX - SCDs  Rehab - Will continue to follow for ongoing rehab needs and recommendations.   continue bedside PT/OT  out of bed to chair daily   Recommend ACUTE inpatient rehabilitation for the functional deficits consisting of 3 hours of therapy/day & 24 hour RN/daily PMR physician for comorbid medical management. Patient will be able to tolerate 3 hours a day.

## 2021-07-20 NOTE — PROGRESS NOTE ADULT - ASSESSMENT
Patient seen and examined at bedside.  Off hiflow on NC  Declining rectal tube for fecal incontinence and ostomy at this time  Xfer floor

## 2021-07-20 NOTE — PROGRESS NOTE ADULT - SUBJECTIVE AND OBJECTIVE BOX
Patient seen and examined at bedside.    --Anticoagulation--    Heparin GTT    T(C): 37 (07-19-21 @ 19:00), Max: 37 (07-19-21 @ 19:00)  HR: 78 (07-19-21 @ 19:00) (59 - 82)  BP: 125/60 (07-19-21 @ 19:00) (88/57 - 134/80)  RR: 22 (07-19-21 @ 19:00) (17 - 27)  SpO2: 96% (07-19-21 @ 19:00) (83% - 100%)  Wt(kg): --    Exam:    Exam:  AOx3 pupils R b/l, FC, b/l UE 5/5, RLE DF/PF 4+/5, LLE 5/5

## 2021-07-20 NOTE — PROVIDER CONTACT NOTE (OTHER) - ASSESSMENT
Serosanguinous drainage saturating right back hemovac dressing  Left back hemovac dressing CDI  old drainage on right abdominal dressing   drain output: right abd (25 cc), left back (15 cc), right back (35 cc) Serosanguinous drainage saturating right back hemovac dressing,  Left back hemovac dressing CDI  old drainage on right abdominal dressing   drain output: right abd (25 cc), left back (15 cc), right back (35 cc)

## 2021-07-20 NOTE — CONSULT NOTE ADULT - ASSESSMENT
61 year old male with PMH of Thyroid Cancer, s/p Total Thyroidectomy 2010 and Radioactive Iodine 2011, Hypogonadism, Vitamin D Deficiency, Osteopenia with recently diagnosed Sacral Tumor. On 7/7, he underwent Plastic Surgery and General Surgery assisted vertical rectus abdominal myocutaneous flap harvest with transperitoneal ligation of internal iliac artery and vein. on 7/8 he underwent an en bloc sacrectomy for resection of chordoma w/ L4-pelvis instrumentation and fusion and plastics closure.    HOSPITAL COURSE:  7/7: Stage 1 - VRAM harvest, iliac artery and vein ligation  7/8: Stage 2 - sacrectomy with L4-pelvis fusion; EBL 4.5L status post 8 units PRBC, 6 units FFP, 1 pack platelets and 5L crystalloid; post-op status post 1 pack platelets  7/10: CTA chest: b/l upper lobe segmental PEs requiring high flow nasal cannula  7/11: Nausea/vomiting sec to ileus. NGT placed to low wall suction with immediate 1.2L bilious output.  7/17: started on Levofloxacin for Enterobacter in sputum

## 2021-07-20 NOTE — PROGRESS NOTE ADULT - ASSESSMENT
A/P: chordoma  s/p en bloc sacrectomy and L4-pelvis fusion POD 13 from stage 2 and POD 12 from stage 2    Neuro:   monitor drain outputs  pain control   PT/OT/OBC as tolerated    Resp: hypoxic/multifactorial - PEs, atelectasis, splinting from pain, abdominal distension/ileus  bilateral subsegmental PE and atelectasis on heparin drip, cleared by NS goal PTT 50-70 -> transition when cleared by NSGY  encourage IS, Acapella device use  PNA: levaquin x7days (end date 7/21)    CV: MAP> 65 mmHg  -160 mmHg  HTN on labetolol     Renal:   has tony for urinary retention  keep euvolemic; IVF     GI: ileus now resolving   clears as tolerated - advance as tolerated  GI following   LBM 7/18    Endo:   hypothyroidism on synthroid  target euglycemia     Heme/Onc:   hemorrhagic shock resolved, transfuse hgb<8, plt<100  heparin drip for PE PTT 50-70; monitor H/H  dopplers negative on 7/10; monitor RLE edema    ID: sputum culture growing enterobacter  +secretions, hypoxia  treat for pna with levaquin x7days    Other:  [x] holistic nurse consult    Code Status: [x] Full Code [] DNR [] DNI [] Goals of Care:     Disposition: [] ICU [] Stroke Unit [] RCU []PCU [x]Floor with continuous pulse ox [] Discharge Home     Medically complex

## 2021-07-20 NOTE — PROGRESS NOTE ADULT - ASSESSMENT
61y Male patient s/p en bloc sacrectomy for chordoma w/ L4-pelvis fusion w/ plastics closure vertical rectus abdominis myocutaneous (VRAM) flap for chordoma on 07/07 and 07/08, post-op course c/b bilateral PEs, now on hep gtt and post-op ileus. Patient now incontinent of stool and soiling his posterior dressing. Continues to refuse rectal tube and colostomy    Plan:  - Would recommend attempting rectal tube prior to colostomy  - Post-op ileus resolved  - Care per neurosurgery  - Will continue to be available if patient changes his mind    [facility]  [id number]

## 2021-07-20 NOTE — PROVIDER CONTACT NOTE (OTHER) - ACTION/TREATMENT ORDERED:
per plastics, RN should change right back hemovac dressing at this time. Will continue to monitor and notify team if needed per plastics, RN should change right back hemovac dressing at this time. Drain sutured in place. Will continue to monitor and notify team if needed

## 2021-07-20 NOTE — PROGRESS NOTE ADULT - SUBJECTIVE AND OBJECTIVE BOX
morning rounds performed  refer to progress note from earlier int he day for detail  tolerating PO diet, +BM  vitals/labs/meds reviewed  stable resp, O2 sat on NC  unchanged intact exam  heparin gtt  Levaquin for pna till 7/22  awaiting transfer to floor, continue PT, ambulation

## 2021-07-20 NOTE — PROGRESS NOTE ADULT - ASSESSMENT
61 male with sacral tumor, followed by Plastic Surgery . Ileus improved , patient having bowel movements/fecal incontinence. A rectal tube if not contraindicated  may be helpful to divert liquid stool and keep wound and dressings clean. Per chart review patient has not yet made that decision regarding rectal tube placement     Recommendations:  Monitor bowel frequency on current bowel regimen.   Monitor tolerance of current diet.

## 2021-07-20 NOTE — CONSULT NOTE ADULT - SUBJECTIVE AND OBJECTIVE BOX
Jesse Rodriguez  Pager [phone]  Office [phone]    cc: sacral tumor resection     HPI:  61 year old male with PMH of Thyroid Cancer, s/p Total Thyroidectomy 2010 and Radioactive Iodine 2011, Hypogonadism, Vitamin D Deficiency, Osteopenia with recently diagnosed Sacral Tumor. On 7/7, he underwent Plastic Surgery and General Surgery assisted vertical rectus abdominal myocutaneous flap harvest with transperitoneal ligation of internal iliac artery and vein. He underwent en bloc sacrectomy on 7/8.     HOSPITAL COURSE:  7/7: Stage 1 - VRAM harvest, iliac artery and vein ligation  7/8: Stage 2 - sacrectomy with L4-pelvis fusion; EBL 4.5L status post 8 units PRBC, 6 units FFP, 1 pack platelets and 5L crystalloid; post-op status post 1 pack platelets  7/10: CTA chest: b/l upper lobe segmental PEs requiring high flow nasal cannula  7/11: Nausea/vomiting sec to ileus. NGT placed to low wall suction with immediate 1.2L bilious output.  7/17: started on Levofloxacin for Enterobacter in sputum      PAST MEDICAL & SURGICAL HISTORY:  HLD (hyperlipidemia)    Thyroid cancer  2010    History of central serous retinopathy    H/O hypogonadism    H/O vitamin D deficiency    H/O osteopenia    H/O thyroidectomy  Total --2010    H/O colonoscopy        Review of Systems:   CONSTITUTIONAL: No fever, weight loss, or fatigue  EYES: No eye pain, visual disturbances, or discharge  ENMT:  No difficulty hearing, tinnitus, vertigo; No sinus or throat pain  NECK: No pain or stiffness  BREASTS: No pain, masses, or nipple discharge  RESPIRATORY: No cough, wheezing, chills or hemoptysis; No shortness of breath  CARDIOVASCULAR: No chest pain, palpitations, dizziness, or leg swelling  GASTROINTESTINAL: No abdominal or epigastric pain. No nausea, vomiting, or hematemesis; No diarrhea or constipation. No melena or hematochezia.  GENITOURINARY: No dysuria, frequency, hematuria, or incontinence  NEUROLOGICAL: No headaches, memory loss, loss of strength, numbness, or tremors  SKIN: No itching, burning, rashes, or lesions   LYMPH NODES: No enlarged glands  ENDOCRINE: No heat or cold intolerance; No hair loss  MUSCULOSKELETAL: No joint pain or swelling; No muscle, back, or extremity pain  PSYCHIATRIC: No depression, anxiety, mood swings, or difficulty sleeping  HEME/LYMPH: No easy bruising, or bleeding gums  ALLERY AND IMMUNOLOGIC: No hives or eczema    Allergies    No Known Allergies    Intolerances        Social History:     FAMILY HISTORY:      MEDICATIONS  (STANDING):  calcium carbonate 1250 mG  + Vitamin D (OsCal 500 + D) 1 Tablet(s) Oral daily  gabapentin 200 milliGRAM(s) Oral every 8 hours  heparin  Infusion 1000 Unit(s)/Hr (14.5 mL/Hr) IV Continuous <Continuous>  levoFLOXacin  Tablet 750 milliGRAM(s) Oral every 24 hours  levothyroxine 137 MICROGram(s) Oral daily  multivitamin 1 Tablet(s) Oral daily  pantoprazole    Tablet 40 milliGRAM(s) Oral before breakfast  polyethylene glycol 3350 17 Gram(s) Oral every 12 hours  senna 2 Tablet(s) Oral at bedtime    MEDICATIONS  (PRN):  acetaminophen   Tablet .. 650 milliGRAM(s) Oral every 6 hours PRN Temp greater or equal to 38C (100.4F), Mild Pain (1 - 3)  aluminum hydroxide/magnesium hydroxide/simethicone Suspension 30 milliLiter(s) Oral every 4 hours PRN Dyspepsia  bisacodyl Suppository 10 milliGRAM(s) Rectal daily PRN Constipation  ondansetron Injectable 4 milliGRAM(s) IV Push every 6 hours PRN Nausea      Vital Signs Last 24 Hrs  T(C): 36.8 (20 Jul 2021 15:48), Max: 37 (19 Jul 2021 19:00)  T(F): 98.3 (20 Jul 2021 15:48), Max: 98.6 (19 Jul 2021 19:00)  HR: 85 (20 Jul 2021 16:38) (64 - 88)  BP: 129/68 (20 Jul 2021 15:48) (95/67 - 129/68)  BP(mean): 90 (20 Jul 2021 07:00) (77 - 90)  RR: 18 (20 Jul 2021 15:48) (17 - 22)  SpO2: 88% (20 Jul 2021 16:38) (88% - 99%)  CAPILLARY BLOOD GLUCOSE        I&O's Summary    19 Jul 2021 07:01  -  20 Jul 2021 07:00  --------------------------------------------------------  IN: 2294 mL / OUT: 3430 mL / NET: -1136 mL    20 Jul 2021 07:01  -  20 Jul 2021 18:49  --------------------------------------------------------  IN: 283.5 mL / OUT: 1875 mL / NET: -1591.5 mL        PHYSICAL EXAM:  GENERAL: NAD, well-developed  HEAD:  Atraumatic, Normocephalic  EYES: EOMI, PERRLA, conjunctiva and sclera clear  NECK: Supple, No JVD  CHEST/LUNG: Clear to auscultation bilaterally; No wheeze  HEART: Regular rate and rhythm; No murmurs, rubs, or gallops  ABDOMEN: Soft, Nontender, Nondistended; Bowel sounds present  EXTREMITIES:  2+ Peripheral Pulses, No clubbing, cyanosis, or edema  PSYCH: AAOx3  NEUROLOGY: non-focal  SKIN: No rashes or lesions    LABS:                        9.9    12.76 )-----------( 431      ( 18 Jul 2021 23:38 )             31.0     07-20    133<L>  |  99  |  12  ----------------------------<  95  4.2   |  21<L>  |  0.80    Ca    9.2      20 Jul 2021 06:49  Phos  3.9     07-20  Mg     2.1     07-20      PTT - ( 20 Jul 2021 12:48 )  PTT:56.3 sec          RADIOLOGY & ADDITIONAL TESTS:    Imaging Personally Reviewed:    Consultant(s) Notes Reviewed:      Care Discussed with Consultants/Other Providers:   Jesse Rodriguez  Pager [phone]  Office [phone]    cc: sacral tumor resection     HPI:  61 year old male with PMH of Thyroid Cancer, s/p Total Thyroidectomy  and Radioactive Iodine , Hypogonadism, Vitamin D Deficiency, Osteopenia with recently diagnosed Sacral Tumor. On , he underwent Plastic Surgery and General Surgery assisted vertical rectus abdominal myocutaneous flap harvest with transperitoneal ligation of internal iliac artery and vein. on  he underwent an en bloc sacrectomy for resection of chordoma w/ L4-pelvis instrumentation and fusion and plastics closure.    HOSPITAL COURSE:  : Stage 1 - VRAM harvest, iliac artery and vein ligation  : Stage 2 - sacrectomy with L4-pelvis fusion; EBL 4.5L status post 8 units PRBC, 6 units FFP, 1 pack platelets and 5L crystalloid; post-op status post 1 pack platelets  7/10: CTA chest: b/l upper lobe segmental PEs requiring high flow nasal cannula  : Nausea/vomiting sec to ileus. NGT placed to low wall suction with immediate 1.2L bilious output.  : started on Levofloxacin for Enterobacter in sputum      PAST MEDICAL & SURGICAL HISTORY:  HLD (hyperlipidemia)    Thyroid cancer  2010    History of central serous retinopathy    H/O hypogonadism    H/O vitamin D deficiency    H/O osteopenia    H/O thyroidectomy  Total --    H/O colonoscopy        Review of Systems:   CONSTITUTIONAL: No fever, weight loss, or fatigue  EYES: No eye pain, visual disturbances, or discharge  ENMT:  No difficulty hearing, tinnitus, vertigo; No sinus or throat pain  NECK: No pain or stiffness  BREASTS: No pain, masses, or nipple discharge  RESPIRATORY: No cough, wheezing, chills or hemoptysis; No shortness of breath  CARDIOVASCULAR: No chest pain, palpitations, dizziness, or leg swelling  GASTROINTESTINAL: No abdominal or epigastric pain. No nausea, vomiting, or hematemesis; No diarrhea or constipation. No melena or hematochezia.  GENITOURINARY: No dysuria, frequency, hematuria, or incontinence  NEUROLOGICAL: No headaches, memory loss, loss of strength, numbness, or tremors  SKIN: No itching, burning, rashes, or lesions   LYMPH NODES: No enlarged glands  ENDOCRINE: No heat or cold intolerance; No hair loss  MUSCULOSKELETAL: No joint pain or swelling; No muscle, back, or extremity pain  PSYCHIATRIC: No depression, anxiety, mood swings, or difficulty sleeping  HEME/LYMPH: No easy bruising, or bleeding gums  ALLERY AND IMMUNOLOGIC: No hives or eczema    Allergies    No Known Allergies      Social History:   no smoking, etoh, illegal drug use    FAMILY HISTORY:  Father alive- cabg  mother  from septic shock in her 80s  brother  of colon ca- pt gets screening colonoscopies    home meds:  per preop H&P      MEDICATIONS  (STANDING):  calcium carbonate 1250 mG  + Vitamin D (OsCal 500 + D) 1 Tablet(s) Oral daily  gabapentin 200 milliGRAM(s) Oral every 8 hours  heparin  Infusion 1000 Unit(s)/Hr (14.5 mL/Hr) IV Continuous <Continuous>  levoFLOXacin  Tablet 750 milliGRAM(s) Oral every 24 hours  levothyroxine 137 MICROGram(s) Oral daily  multivitamin 1 Tablet(s) Oral daily  pantoprazole    Tablet 40 milliGRAM(s) Oral before breakfast  polyethylene glycol 3350 17 Gram(s) Oral every 12 hours  senna 2 Tablet(s) Oral at bedtime    MEDICATIONS  (PRN):  acetaminophen   Tablet .. 650 milliGRAM(s) Oral every 6 hours PRN Temp greater or equal to 38C (100.4F), Mild Pain (1 - 3)  aluminum hydroxide/magnesium hydroxide/simethicone Suspension 30 milliLiter(s) Oral every 4 hours PRN Dyspepsia  bisacodyl Suppository 10 milliGRAM(s) Rectal daily PRN Constipation  ondansetron Injectable 4 milliGRAM(s) IV Push every 6 hours PRN Nausea      Vital Signs Last 24 Hrs  T(C): 36.8 (2021 15:48), Max: 37 (2021 19:00)  T(F): 98.3 (2021 15:48), Max: 98.6 (2021 19:00)  HR: 85 (2021 16:38) (64 - 88)  BP: 129/68 (2021 15:48) (95/67 - 129/68)  BP(mean): 90 (2021 07:00) (77 - 90)  RR: 18 (2021 15:48) (17 - 22)  SpO2: 88% (2021 16:38) (88% - 99%)  CAPILLARY BLOOD GLUCOSE        I&O's Summary    2021 07:01  -  2021 07:00  --------------------------------------------------------  IN: 2294 mL / OUT: 3430 mL / NET: -1136 mL    2021 07:01  -  2021 18:49  --------------------------------------------------------  IN: 283.5 mL / OUT: 1875 mL / NET: -1591.5 mL        PHYSICAL EXAM:  GENERAL: NAD, well-developed  HEAD:  Atraumatic, Normocephalic  EYES: EOMI, PERRLA, conjunctiva and sclera clear  NECK: Supple, No JVD  CHEST/LUNG: Clear to auscultation bilaterally; No wheeze  HEART: Regular rate and rhythm; No murmurs, rubs, or gallops  ABDOMEN: Soft, incision covered; Nontender away from surgical area, Nondistended; Bowel sounds present  EXTREMITIES:  2+ Peripheral Pulses, No clubbing, cyanosis, or edema  PSYCH: AAOx3  NEUROLOGY: RLE c 4+/5  SKIN: scattered ecchymoses on arms from blood draws    LABS:                        9.9    12.76 )-----------( 431      ( 2021 23:38 )             31.0     07-20    133<L>  |  99  |  12  ----------------------------<  95  4.2   |  21<L>  |  0.80    Ca    9.2      2021 06:49  Phos  3.9     07-20  Mg     2.1     07-20      PTT - ( 2021 12:48 )  PTT:56.3 sec          RADIOLOGY & ADDITIONAL TESTS:    Imaging Personally Reviewed:    Consultant(s) Notes Reviewed:      Care Discussed with Consultants/Other Providers: neurosurg regard hypoxia

## 2021-07-20 NOTE — PROGRESS NOTE ADULT - SUBJECTIVE AND OBJECTIVE BOX
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 The chordoma resulted in perineal numbness and overflow incontinence and he was admitted 7/7/21 for staged resection. On 7/7, he underwent Plastic Surgery and General Surgery assisted vertical rectus abdominal myocutaneous flap harvest with transperitoneal ligation of internal iliac artery and vein. He underwent en bloc sacrectomy on 7/8.     HOSPITAL COURSE:  7/7: Stage 1 - VRAM harvest, iliac artery and vein ligation  7/8: Stage 2 - sacrectomy with L4-pelvis fusion; EBL 4.5L status post 8 units PRBC, 6 units FFP, 1 pack platelets and 5L crystalloid; post-op status post 1 pack platelets  7/10: CTA chest: b/l upper lobe segmental PEs  7/11: Nausea/vomiting. NGT placed to low wall suction with immediate 1.2L bilious output.  7/17: advancing diet to full liquid    24 HOUR EVENTS:   Spent ~1hr discussing hospital course, options, etc with Mr. Jackson. He was distraught about the possible need for a colostomy. He agreed that he will speak to his supports to help make this difficult decision.     REVIEW OF SYSTEMS: [] Unable to Assess due to neurologic exam   [x] All ROS addressed below are non-contributory, except:  Neuro: [ ] Headache [ x] Back pain [x ] Numbness [ ] Weakness [ ] Ataxia [ ] Dizziness [ ] Aphasia [ ] Dysarthria [ ] Visual disturbance  Resp: [x] Shortness of breath/dyspnea [ ] Orthopnea [ ] Cough  CV: [ ] Chest pain [ ] Palpitation [ ] Lightheadedness [ ] Syncope  Renal: [ ] Thirst [ ] Edema  GI: [ ] Nausea [ ] Emesis [x] Abdominal/incisional pain [ ] Constipation [ ] Diarrhea  Hem: [ ] Hematemesis [ ] Bright red blood per rectum  ID: [ ] Fever [ ] Chills [ ] Dysuria  ENT: [ ] Rhinorrhea    VITALS/DATA/ORDERS: [x] Reviewed    EXAMINATION:   Gen: Cooperative  HEENT: Moist mucosa  CV: Regular  Lungs: Decreased BS at bases but good air entry, no stridor  Abd: Hypoactive bowel sounds, distended - but improved  Ext: Diffuse edema; RLE edema>LLE  Neuro: Awake, alert, fully oriented, follows, BUE no drift full strength, BLE full strength except for R DF/PF 4+/5

## 2021-07-20 NOTE — CONSULT NOTE ADULT - PROBLEM SELECTOR RECOMMENDATION 5
monitor bmp. if continues to decrease, would check urine osm, serum osm, urine na and start salt tabs

## 2021-07-21 LAB
ANION GAP SERPL CALC-SCNC: 14 MMOL/L — SIGNIFICANT CHANGE UP (ref 5–17)
APTT BLD: 40.4 SEC — HIGH (ref 27.5–35.5)
APTT BLD: 44.8 SEC — HIGH (ref 27.5–35.5)
APTT BLD: 51.6 SEC — HIGH (ref 27.5–35.5)
BUN SERPL-MCNC: 13 MG/DL — SIGNIFICANT CHANGE UP (ref 7–23)
CALCIUM SERPL-MCNC: 9.2 MG/DL — SIGNIFICANT CHANGE UP (ref 8.4–10.5)
CHLORIDE SERPL-SCNC: 100 MMOL/L — SIGNIFICANT CHANGE UP (ref 96–108)
CO2 SERPL-SCNC: 21 MMOL/L — LOW (ref 22–31)
CREAT SERPL-MCNC: 0.81 MG/DL — SIGNIFICANT CHANGE UP (ref 0.5–1.3)
GLUCOSE SERPL-MCNC: 91 MG/DL — SIGNIFICANT CHANGE UP (ref 70–99)
HCT VFR BLD CALC: 32.7 % — LOW (ref 39–50)
HCT VFR BLD CALC: 32.8 % — LOW (ref 39–50)
HGB BLD-MCNC: 10.2 G/DL — LOW (ref 13–17)
HGB BLD-MCNC: 10.3 G/DL — LOW (ref 13–17)
INR BLD: 1.24 RATIO — HIGH (ref 0.88–1.16)
INR BLD: 1.28 RATIO — HIGH (ref 0.88–1.16)
MAGNESIUM SERPL-MCNC: 2 MG/DL — SIGNIFICANT CHANGE UP (ref 1.6–2.6)
MCHC RBC-ENTMCNC: 28.1 PG — SIGNIFICANT CHANGE UP (ref 27–34)
MCHC RBC-ENTMCNC: 28.7 PG — SIGNIFICANT CHANGE UP (ref 27–34)
MCHC RBC-ENTMCNC: 31.1 GM/DL — LOW (ref 32–36)
MCHC RBC-ENTMCNC: 31.5 GM/DL — LOW (ref 32–36)
MCV RBC AUTO: 90.4 FL — SIGNIFICANT CHANGE UP (ref 80–100)
MCV RBC AUTO: 91.1 FL — SIGNIFICANT CHANGE UP (ref 80–100)
NRBC # BLD: 0 /100 WBCS — SIGNIFICANT CHANGE UP (ref 0–0)
NRBC # BLD: 0 /100 WBCS — SIGNIFICANT CHANGE UP (ref 0–0)
PHOSPHATE SERPL-MCNC: 4.3 MG/DL — SIGNIFICANT CHANGE UP (ref 2.5–4.5)
PLATELET # BLD AUTO: 592 K/UL — HIGH (ref 150–400)
PLATELET # BLD AUTO: 608 K/UL — HIGH (ref 150–400)
POTASSIUM SERPL-MCNC: 3.9 MMOL/L — SIGNIFICANT CHANGE UP (ref 3.5–5.3)
POTASSIUM SERPL-SCNC: 3.9 MMOL/L — SIGNIFICANT CHANGE UP (ref 3.5–5.3)
PROTHROM AB SERPL-ACNC: 14.7 SEC — HIGH (ref 10.6–13.6)
PROTHROM AB SERPL-ACNC: 15.2 SEC — HIGH (ref 10.6–13.6)
RBC # BLD: 3.59 M/UL — LOW (ref 4.2–5.8)
RBC # BLD: 3.63 M/UL — LOW (ref 4.2–5.8)
RBC # FLD: 14.5 % — SIGNIFICANT CHANGE UP (ref 10.3–14.5)
RBC # FLD: 14.6 % — HIGH (ref 10.3–14.5)
SODIUM SERPL-SCNC: 135 MMOL/L — SIGNIFICANT CHANGE UP (ref 135–145)
WBC # BLD: 14.78 K/UL — HIGH (ref 3.8–10.5)
WBC # BLD: 15.09 K/UL — HIGH (ref 3.8–10.5)
WBC # FLD AUTO: 14.78 K/UL — HIGH (ref 3.8–10.5)
WBC # FLD AUTO: 15.09 K/UL — HIGH (ref 3.8–10.5)

## 2021-07-21 PROCEDURE — 99233 SBSQ HOSP IP/OBS HIGH 50: CPT

## 2021-07-21 PROCEDURE — 93010 ELECTROCARDIOGRAM REPORT: CPT

## 2021-07-21 RX ORDER — GENTAMICIN SULFATE 0.1 %
1 OINTMENT (GRAM) TOPICAL
Refills: 0 | Status: DISCONTINUED | OUTPATIENT
Start: 2021-07-21 | End: 2021-07-23

## 2021-07-21 RX ADMIN — PANTOPRAZOLE SODIUM 40 MILLIGRAM(S): 20 TABLET, DELAYED RELEASE ORAL at 05:09

## 2021-07-21 RX ADMIN — Medication 1 TABLET(S): at 11:55

## 2021-07-21 RX ADMIN — GABAPENTIN 200 MILLIGRAM(S): 400 CAPSULE ORAL at 21:28

## 2021-07-21 RX ADMIN — Medication 137 MICROGRAM(S): at 05:09

## 2021-07-21 RX ADMIN — HEPARIN SODIUM 17 UNIT(S)/HR: 5000 INJECTION INTRAVENOUS; SUBCUTANEOUS at 21:44

## 2021-07-21 RX ADMIN — GABAPENTIN 200 MILLIGRAM(S): 400 CAPSULE ORAL at 13:39

## 2021-07-21 RX ADMIN — Medication 1 APPLICATION(S): at 17:30

## 2021-07-21 RX ADMIN — GABAPENTIN 200 MILLIGRAM(S): 400 CAPSULE ORAL at 05:09

## 2021-07-21 NOTE — PROGRESS NOTE ADULT - ASSESSMENT
Mr. Jackson is a 60 yo M with PMHx of thyroid cancer s/p total thyroidectomy (2010), HLD, Vit D deficiency, Osteopenia, and chordoma, who is now s/p Sacral sacrectomy and VRAM flap harvest (07/07, 07/08).    -Please place pt in lateral position and put as little pressure on Sacral wound as possible   -Please avoid stool softener  -Keep superficial drain in place until output is <30cc for 24 hr, prior to d/c hemovac will be switched to regular bulb   -Abdominal and sacral dressings changed by PRS AM of 07/14, and again by [facility] 07/15, again by PRS on 7/20-  -RN please change dressing at inferior aspect of incision 2x/day   -RN at time of dressing change, please apply topical gentamycin ointment 2x/day to inferior aspect of incision w nylon sutures   -RN may change inferior aspect of incision with dry gauze dressing as needed if soils 2/2 stool incontinence   -Minimal SS leakage surrounding drain insertion sites at back, gauze placed under tegaderm by PRS, will continue to monitor  -rest of care per primary team   -PRS will continue to follow       Jomar Mendez MD   General Surgery Resident, PGY1  # [id number]

## 2021-07-21 NOTE — PROGRESS NOTE ADULT - ASSESSMENT
61 male with sacral tumor, surgery , followed by Plastic Surgery Tolerating his diet and having bowel movements/fecal incontinence. Per staff reports, small and pasty consistency. Unless stools are liquidy he would not benefit from a rectal tube to divert stool away from healing wound area .    Recommendations:  Monitor bowel frequency on current bowel regimen of Miralax and senna   Asked  patient to inform staff if he is at any time aware he may have passed stool     SINDY Talley-BC  Covering [facility]  [address] [place] [address]  [id number] [phone]

## 2021-07-21 NOTE — PROGRESS NOTE ADULT - SUBJECTIVE AND OBJECTIVE BOX
SUBJECTIVE:   SOB with exertion on 4-6L nasal canula. No acute distress  OVERNIGHT EVENTS: none    Vital Signs Last 24 Hrs  T(C): 36.6 (21 Jul 2021 06:57), Max: 36.8 (20 Jul 2021 15:48)  T(F): 97.8 (21 Jul 2021 06:57), Max: 98.3 (20 Jul 2021 15:48)  HR: 68 (21 Jul 2021 06:57) (68 - 88)  BP: 107/68 (21 Jul 2021 06:57) (95/67 - 129/68)  BP(mean): --  RR: 20 (21 Jul 2021 06:57) (18 - 20)  SpO2: 99% (21 Jul 2021 06:57) (88% - 99%)    PHYSICAL EXAM:    Constitutional: No Acute Distress     Neurological: Awake alert Ox3, Speech clear Following Commands, Moving all Extremities 5/5 except R DF/PF 4+/5.     Pulmonary: Clear to Auscultation, On     Cardiovascular: S1, S2, Regular rate and rhythm     Gastrointestinal: Soft, Non-tender, Non-distended     Extremities: No calf tenderness     Incision:   DRAINS:     LABS:                        10.2   15.09 )-----------( 592      ( 21 Jul 2021 05:49 )             32.8    07-21    135  |  100  |  13  ----------------------------<  91  3.9   |  21<L>  |  0.81    Ca    9.2      21 Jul 2021 05:48  Phos  4.3     07-21  Mg     2.0     07-21    PT/INR - ( 21 Jul 2021 05:49 )   PT: 14.7 sec;   INR: 1.24 ratio   PTT:40.4 sec          IMAGING:         MEDICATIONS:    acetaminophen   Tablet .. 650 milliGRAM(s) Oral every 6 hours PRN Temp greater or equal to 38C (100.4F), Mild Pain (1 - 3)  gabapentin 200 milliGRAM(s) Oral every 8 hours  ondansetron Injectable 4 milliGRAM(s) IV Push every 6 hours PRN Nausea  aluminum hydroxide/magnesium hydroxide/simethicone Suspension 30 milliLiter(s) Oral every 4 hours PRN Dyspepsia  bisacodyl Suppository 10 milliGRAM(s) Rectal daily PRN Constipation  pantoprazole    Tablet 40 milliGRAM(s) Oral before breakfast  polyethylene glycol 3350 17 Gram(s) Oral every 12 hours  senna 2 Tablet(s) Oral at bedtime  calcium carbonate 1250 mG  + Vitamin D (OsCal 500 + D) 1 Tablet(s) Oral daily  gentamicin 0.1% Ointment 1 Application(s) Topical two times a day  heparin  Infusion 1000 Unit(s)/Hr IV Continuous <Continuous>  levothyroxine 137 MICROGram(s) Oral daily  multivitamin 1 Tablet(s) Oral daily      DIET:        SUBJECTIVE:   SOB with exertion on 4L nasal canula. No acute distress  OVERNIGHT EVENTS: none    Vital Signs Last 24 Hrs  T(C): 36.6 (21 Jul 2021 06:57), Max: 36.8 (20 Jul 2021 15:48)  T(F): 97.8 (21 Jul 2021 06:57), Max: 98.3 (20 Jul 2021 15:48)  HR: 68 (21 Jul 2021 06:57) (68 - 88)  BP: 107/68 (21 Jul 2021 06:57) (95/67 - 129/68)  BP(mean): --  RR: 20 (21 Jul 2021 06:57) (18 - 20)  SpO2: 99% (21 Jul 2021 06:57) (88% - 99%)    PHYSICAL EXAM:    Constitutional: No Acute Distress     Neurological: Awake alert Ox3, Speech clear Following Commands, Moving all Extremities 5/5 except R DF/PF 4+/5. midline sacral incision w aquacel dressing in place c/d, inferior 6cm of incision w sutures in place, no drainage, no erythema, no swelling, gauze and tegaderm dressing R HMV 95cc L HMV 30cc/24 hrs     Pulmonary: Clear to Auscultation, On     Cardiovascular: S1, S2, Regular rate and rhythm     Gastrointestinal: Soft, Non-tender, Non-distended  midline incision w Aquacel dressing in place c/d Abdominal SEDRICK 55cc/ 24 hrs     Extremities: No calf tenderness    LABS:                        10.2   15.09 )-----------( 592      ( 21 Jul 2021 05:49 )             32.8    07-21    135  |  100  |  13  ----------------------------<  91  3.9   |  21<L>  |  0.81    Ca    9.2      21 Jul 2021 05:48  Phos  4.3     07-21  Mg     2.0     07-21    PT/INR - ( 21 Jul 2021 05:49 )   PT: 14.7 sec;   INR: 1.24 ratio   PTT:40.4 sec          IMAGING:         MEDICATIONS:    acetaminophen   Tablet .. 650 milliGRAM(s) Oral every 6 hours PRN Temp greater or equal to 38C (100.4F), Mild Pain (1 - 3)  gabapentin 200 milliGRAM(s) Oral every 8 hours  ondansetron Injectable 4 milliGRAM(s) IV Push every 6 hours PRN Nausea  aluminum hydroxide/magnesium hydroxide/simethicone Suspension 30 milliLiter(s) Oral every 4 hours PRN Dyspepsia  bisacodyl Suppository 10 milliGRAM(s) Rectal daily PRN Constipation  pantoprazole    Tablet 40 milliGRAM(s) Oral before breakfast  polyethylene glycol 3350 17 Gram(s) Oral every 12 hours  senna 2 Tablet(s) Oral at bedtime  calcium carbonate 1250 mG  + Vitamin D (OsCal 500 + D) 1 Tablet(s) Oral daily  gentamicin 0.1% Ointment 1 Application(s) Topical two times a day  heparin  Infusion 1000 Unit(s)/Hr IV Continuous <Continuous>  levothyroxine 137 MICROGram(s) Oral daily  multivitamin 1 Tablet(s) Oral daily      DIET:

## 2021-07-21 NOTE — PROGRESS NOTE ADULT - SUBJECTIVE AND OBJECTIVE BOX
SURGERY PROGRESS NOTE    SUBJECTIVE / 24H EVENTS:  Patient seen and examined on morning rounds. No acute events overnight. Patient tolerating diet.       OBJECTIVE:  VITAL SIGNS:  T(C): 36.6 (07-21-21 @ 06:57), Max: 36.8 (07-20-21 @ 15:48)  HR: 68 (07-21-21 @ 06:57) (68 - 88)  BP: 107/68 (07-21-21 @ 06:57) (95/67 - 129/68)  RR: 20 (07-21-21 @ 06:57) (18 - 20)  SpO2: 99% (07-21-21 @ 06:57) (88% - 99%)  Daily     Daily       PHYSICAL EXAM:  Gen: NAD  LS: Respirations unlabored   GI: Soft. Nontender. Nondistended. Aquacel dressing in place over midline wound  Ext: Warm, well perfused      07-20-21 @ 07:01  -  07-21-21 @ 07:00  --------------------------------------------------------  IN:    Heparin: 43.5 mL    Oral Fluid: 990 mL  Total IN: 1033.5 mL    OUT:    Bulb (mL): 55 mL    Drain (mL): 30 mL    Drain (mL): 95 mL    Indwelling Catheter - Urethral (mL): 4175 mL  Total OUT: 4355 mL    Total NET: -3321.5 mL          LAB VALUES:  07-21    135  |  100  |  13  ----------------------------<  91  3.9   |  21<L>  |  0.81    Ca    9.2      21 Jul 2021 05:48  Phos  4.3     07-21  Mg     2.0     07-21                                 10.2   15.09 )-----------( 592      ( 21 Jul 2021 05:49 )             32.8       PT/INR - ( 21 Jul 2021 05:49 )   PT: 14.7 sec;   INR: 1.24 ratio         PTT - ( 21 Jul 2021 05:49 )  PTT:40.4 sec            MICROBIOLOGY:      RADIOLOGY:        MEDICATIONS  (STANDING):  calcium carbonate 1250 mG  + Vitamin D (OsCal 500 + D) 1 Tablet(s) Oral daily  gabapentin 200 milliGRAM(s) Oral every 8 hours  heparin  Infusion 1000 Unit(s)/Hr (16 mL/Hr) IV Continuous <Continuous>  levothyroxine 137 MICROGram(s) Oral daily  multivitamin 1 Tablet(s) Oral daily  pantoprazole    Tablet 40 milliGRAM(s) Oral before breakfast  polyethylene glycol 3350 17 Gram(s) Oral every 12 hours  senna 2 Tablet(s) Oral at bedtime    MEDICATIONS  (PRN):  acetaminophen   Tablet .. 650 milliGRAM(s) Oral every 6 hours PRN Temp greater or equal to 38C (100.4F), Mild Pain (1 - 3)  aluminum hydroxide/magnesium hydroxide/simethicone Suspension 30 milliLiter(s) Oral every 4 hours PRN Dyspepsia  bisacodyl Suppository 10 milliGRAM(s) Rectal daily PRN Constipation  ondansetron Injectable 4 milliGRAM(s) IV Push every 6 hours PRN Nausea

## 2021-07-21 NOTE — PROGRESS NOTE ADULT - PROBLEM SELECTOR PLAN 2
cont AC for PE and abx for pneumonia. incentive spirometry emphasized to patient. taper down O2 as tolerated

## 2021-07-21 NOTE — PROGRESS NOTE ADULT - SUBJECTIVE AND OBJECTIVE BOX
Plastic Surgery Progress Note    Subjective:     Patient seen and examined at bedside. Overnight, no acute events. Discussed with nurse need to change inferior sacral dressing if soiled and application of gentamycin topical ointment. Patient without complaints this AM. Denies N/V/F/C.     OBJECTIVE:     T(C): 36.6 (07-21-21 @ 06:57), Max: 36.8 (07-20-21 @ 15:48)  HR: 68 (07-21-21 @ 06:57) (68 - 88)  BP: 107/68 (07-21-21 @ 06:57) (95/67 - 129/68)  RR: 20 (07-21-21 @ 06:57) (18 - 20)  SpO2: 99% (07-21-21 @ 06:57) (88% - 99%)  Wt(kg): --    I&O's Detail    20 Jul 2021 07:01  -  21 Jul 2021 07:00  --------------------------------------------------------  IN:    Heparin: 43.5 mL    Oral Fluid: 990 mL  Total IN: 1033.5 mL    OUT:    Bulb (mL): 55 mL    Drain (mL): 30 mL    Drain (mL): 95 mL    Indwelling Catheter - Urethral (mL): 4175 mL  Total OUT: 4355 mL    Total NET: -3321.5 mL    PHYSICAL EXAM:    GENERAL: NAD, lying in bed comfortably  HEAD:  Atraumatic, Normocephalic  ENT: NC  ABDOMEN: Soft, Nontender, Nondistended, midline incision w Aquacel dressing in place c/d   BACK: midline sacral incision w aquacel dressing in place c/d, inferior 6cm of incision w sutures in place, no drainage, no erythema, no swelling, gauze and tegaderm dressing replaced, rest of back flat, no palpable collections, hemovac accordian drains holding suction w SS output   NERVOUS SYSTEM:  Alert & Oriented X3, speech clear.

## 2021-07-21 NOTE — PROGRESS NOTE ADULT - SUBJECTIVE AND OBJECTIVE BOX
INTERVAL HPI/OVERNIGHT EVENTS:  Patient in bed, tolerating meals, no abdominal discomfort, hoping to establish a bowel regimen      MEDICATIONS  (STANDING):  calcium carbonate 1250 mG  + Vitamin D (OsCal 500 + D) 1 Tablet(s) Oral daily  gabapentin 200 milliGRAM(s) Oral every 8 hours  gentamicin 0.1% Ointment 1 Application(s) Topical two times a day  heparin  Infusion 1000 Unit(s)/Hr (16 mL/Hr) IV Continuous <Continuous>  levothyroxine 137 MICROGram(s) Oral daily  multivitamin 1 Tablet(s) Oral daily  pantoprazole    Tablet 40 milliGRAM(s) Oral before breakfast  polyethylene glycol 3350 17 Gram(s) Oral every 12 hours  senna 2 Tablet(s) Oral at bedtime    MEDICATIONS  (PRN):  acetaminophen   Tablet .. 650 milliGRAM(s) Oral every 6 hours PRN Temp greater or equal to 38C (100.4F), Mild Pain (1 - 3)  aluminum hydroxide/magnesium hydroxide/simethicone Suspension 30 milliLiter(s) Oral every 4 hours PRN Dyspepsia  bisacodyl Suppository 10 milliGRAM(s) Rectal daily PRN Constipation  ondansetron Injectable 4 milliGRAM(s) IV Push every 6 hours PRN Nausea      Allergies    No Known Allergies    Intolerances        Review of Systems:    General:  No fevers or chills   ENT:  No dysphagia  CV:  No chest  pain  Resp:  No  cough or  wheezing  GI: no nausea or vomiting, having small BMs not diarrhea           Vital Signs Last 24 Hrs  T(C): 36.4 (21 Jul 2021 12:04), Max: 36.8 (20 Jul 2021 15:48)  T(F): 97.5 (21 Jul 2021 12:04), Max: 98.3 (20 Jul 2021 15:48)  HR: 77 (21 Jul 2021 12:04) (68 - 194)  BP: 118/68 (21 Jul 2021 12:04) (107/68 - 129/68)  BP(mean): --  RR: 19 (21 Jul 2021 12:04) (18 - 20)  SpO2: 99% (21 Jul 2021 12:04) (88% - 99%)    PHYSICAL EXAM:    Constitutional: non toxic and in NAD  Respiratory: anterior chest wall clear to auscultation   Cardiovascular: S1 and S2, RRR  Gastrointestinal: non distended , surgical dressing in tact , has +BS   Extremities: No peripheral edema  Neurological: A/O x 3, no focal deficits  Psychiatric: Normal mood, normal affect  Skin: No jaundice  or visible rashes      LABS:                        10.2   15.09 )-----------( 592      ( 21 Jul 2021 05:49 )             32.8   Hemoglobin: 10.2 g/dL *L* [13.0 - 17.0] (07-21 @ 05:49)  Hemoglobin: 10.3 g/dL *L* [13.0 - 17.0] (07-21 @ 05:49)  Hemoglobin: 9.9 g/dL *L* [13.0 - 17.0] (07-18 @ 23:38)      07-21    135  |  100  |  13  ----------------------------<  91  3.9   |  21<L>  |  0.81    Ca    9.2      21 Jul 2021 05:48  Phos  4.3     07-21  Mg     2.0     07-21      PT/INR - ( 21 Jul 2021 05:49 )   PT: 14.7 sec;   INR: 1.24 ratio         PTT - ( 21 Jul 2021 05:49 )  PTT:40.4 sec        RADIOLOGY & ADDITIONAL TESTS:

## 2021-07-21 NOTE — PROGRESS NOTE ADULT - ASSESSMENT
61y Male patient s/p en bloc sacrectomy for chordoma w/ L4-pelvis fusion w/ plastics closure vertical rectus abdominis myocutaneous (VRAM) flap for chordoma on 07/07 and 07/08, post-op course c/b bilateral PEs (on hep gtt) and post-op ileus, resolved. Patient now incontinent of stool and soiling his posterior dressing s/p wound debridement with Plastic surgery 07/20. Continues to refuse rectal tube and colostomy    Plan:  - Would recommend attempting rectal tube prior to colostomy  - Post-op ileus resolved  - Care per neurosurgery  - Please re-page surgery if clinical condition warrants    [facility]  [id number]

## 2021-07-21 NOTE — PROGRESS NOTE ADULT - SUBJECTIVE AND OBJECTIVE BOX
Vital Signs Last 24 Hrs  T(C): 36.4 (21 Jul 2021 12:04), Max: 36.8 (20 Jul 2021 15:48)  T(F): 97.5 (21 Jul 2021 12:04), Max: 98.3 (20 Jul 2021 15:48)  HR: 77 (21 Jul 2021 12:04) (68 - 194)  BP: 118/68 (21 Jul 2021 12:04) (107/68 - 129/68)  BP(mean): --  RR: 19 (21 Jul 2021 12:04) (18 - 20)  SpO2: 99% (21 Jul 2021 12:04) (88% - 99%)    I&O's Detail    20 Jul 2021 07:01  -  21 Jul 2021 07:00  --------------------------------------------------------  IN:    Heparin: 43.5 mL    Oral Fluid: 990 mL  Total IN: 1033.5 mL    OUT:    Bulb (mL): 55 mL    Drain (mL): 30 mL    Drain (mL): 95 mL    Indwelling Catheter - Urethral (mL): 4175 mL  Total OUT: 4355 mL    Total NET: -3321.5 mL                                10.2   15.09 )-----------( 592      ( 21 Jul 2021 05:49 )             32.8       07-21    135  |  100  |  13  ----------------------------<  91  3.9   |  21<L>  |  0.81    Ca    9.2      21 Jul 2021 05:48  Phos  4.3     07-21  Mg     2.0     07-21        PT/INR - ( 21 Jul 2021 05:49 )   PT: 14.7 sec;   INR: 1.24 ratio         PTT - ( 21 Jul 2021 05:49 )  PTT:40.4 sec    I spoke with the patient and his father about a diverting colosotmy.  They seem agreeable but will decide and contact me tomorrow    PLAN:  Probable End colostomy when patient decides

## 2021-07-21 NOTE — PROGRESS NOTE ADULT - SUBJECTIVE AND OBJECTIVE BOX
Jesse Rodriguez   Pager [phone]  Office [phone]      CC: Patient is a 61y old  Male who presents with a chief complaint of staged sacral tumor removal (21 Jul 2021 08:29)      SUBJECTIVE / OVERNIGHT EVENTS:    MEDICATIONS  (STANDING):  calcium carbonate 1250 mG  + Vitamin D (OsCal 500 + D) 1 Tablet(s) Oral daily  gabapentin 200 milliGRAM(s) Oral every 8 hours  gentamicin 0.1% Ointment 1 Application(s) Topical two times a day  heparin  Infusion 1000 Unit(s)/Hr (16 mL/Hr) IV Continuous <Continuous>  levothyroxine 137 MICROGram(s) Oral daily  multivitamin 1 Tablet(s) Oral daily  pantoprazole    Tablet 40 milliGRAM(s) Oral before breakfast  polyethylene glycol 3350 17 Gram(s) Oral every 12 hours  senna 2 Tablet(s) Oral at bedtime    MEDICATIONS  (PRN):  acetaminophen   Tablet .. 650 milliGRAM(s) Oral every 6 hours PRN Temp greater or equal to 38C (100.4F), Mild Pain (1 - 3)  aluminum hydroxide/magnesium hydroxide/simethicone Suspension 30 milliLiter(s) Oral every 4 hours PRN Dyspepsia  bisacodyl Suppository 10 milliGRAM(s) Rectal daily PRN Constipation  ondansetron Injectable 4 milliGRAM(s) IV Push every 6 hours PRN Nausea      Vital Signs Last 24 Hrs  T(C): 36.6 (21 Jul 2021 06:57), Max: 36.8 (20 Jul 2021 15:48)  T(F): 97.8 (21 Jul 2021 06:57), Max: 98.3 (20 Jul 2021 15:48)  HR: 68 (21 Jul 2021 06:57) (68 - 88)  BP: 107/68 (21 Jul 2021 06:57) (95/67 - 129/68)  BP(mean): --  RR: 20 (21 Jul 2021 06:57) (18 - 20)  SpO2: 99% (21 Jul 2021 06:57) (88% - 99%)  CAPILLARY BLOOD GLUCOSE        I&O's Summary    20 Jul 2021 07:01  -  21 Jul 2021 07:00  --------------------------------------------------------  IN: 1033.5 mL / OUT: 4355 mL / NET: -3321.5 mL      tele:    PHYSICAL EXAM:    GENERAL: NAD   HEENT: EOMI, PERRL  PULM: Clear to auscultation bilaterally  CV: Regular rate and rhythm; nl S1, S2; No murmurs, rubs, or gallops  ABDOMEN: Soft, Nontender, Nondistended; Bowel sounds present  EXTREMITIES/MSK:  No edema, calf tenderness   PSYCH: AAOx3  NEUROLOGY: non-focal          LABS:                        10.2   15.09 )-----------( 592      ( 21 Jul 2021 05:49 )             32.8     07-21    135  |  100  |  13  ----------------------------<  91  3.9   |  21<L>  |  0.81    Ca    9.2      21 Jul 2021 05:48  Phos  4.3     07-21  Mg     2.0     07-21      PT/INR - ( 21 Jul 2021 05:49 )   PT: 14.7 sec;   INR: 1.24 ratio         PTT - ( 21 Jul 2021 05:49 )  PTT:40.4 sec            RADIOLOGY & ADDITIONAL TESTS:    Imaging Personally Reviewed:    Consultant(s) Notes Reviewed:      Care Discussed with Consultants/Other Providers:   Jesse Rodriguez   Pager [phone]  Office [phone]      CC: Patient is a 61y old  Male who presents with a chief complaint of staged sacral tumor removal (21 Jul 2021 08:29)      SUBJECTIVE / OVERNIGHT EVENTS: denies any cp/sob. no f/c/r. no n/v/abd pain.     MEDICATIONS  (STANDING):  calcium carbonate 1250 mG  + Vitamin D (OsCal 500 + D) 1 Tablet(s) Oral daily  gabapentin 200 milliGRAM(s) Oral every 8 hours  gentamicin 0.1% Ointment 1 Application(s) Topical two times a day  heparin  Infusion 1000 Unit(s)/Hr (16 mL/Hr) IV Continuous <Continuous>  levothyroxine 137 MICROGram(s) Oral daily  multivitamin 1 Tablet(s) Oral daily  pantoprazole    Tablet 40 milliGRAM(s) Oral before breakfast  polyethylene glycol 3350 17 Gram(s) Oral every 12 hours  senna 2 Tablet(s) Oral at bedtime    MEDICATIONS  (PRN):  acetaminophen   Tablet .. 650 milliGRAM(s) Oral every 6 hours PRN Temp greater or equal to 38C (100.4F), Mild Pain (1 - 3)  aluminum hydroxide/magnesium hydroxide/simethicone Suspension 30 milliLiter(s) Oral every 4 hours PRN Dyspepsia  bisacodyl Suppository 10 milliGRAM(s) Rectal daily PRN Constipation  ondansetron Injectable 4 milliGRAM(s) IV Push every 6 hours PRN Nausea      Vital Signs Last 24 Hrs  T(C): 36.6 (21 Jul 2021 06:57), Max: 36.8 (20 Jul 2021 15:48)  T(F): 97.8 (21 Jul 2021 06:57), Max: 98.3 (20 Jul 2021 15:48)  HR: 68 (21 Jul 2021 06:57) (68 - 88)  BP: 107/68 (21 Jul 2021 06:57) (95/67 - 129/68)  BP(mean): --  RR: 20 (21 Jul 2021 06:57) (18 - 20)  SpO2: 99% (21 Jul 2021 06:57) (88% - 99%)  CAPILLARY BLOOD GLUCOSE        I&O's Summary    20 Jul 2021 07:01  -  21 Jul 2021 07:00  --------------------------------------------------------  IN: 1033.5 mL / OUT: 4355 mL / NET: -3321.5 mL          PHYSICAL EXAM:    GENERAL: NAD   HEENT: EOMI, PERRL  PULM: Clear to auscultation bilaterally  CV: Regular rate and rhythm; nl S1, S2; No murmurs, rubs, or gallops  ABDOMEN: Soft, Nontender, Nondistended; Bowel sounds present  EXTREMITIES/MSK:  No edema, calf tenderness   PSYCH: AAOx3  NEUROLOGY: RLE c 4+/5          LABS:                        10.2   15.09 )-----------( 592      ( 21 Jul 2021 05:49 )             32.8     07-21    135  |  100  |  13  ----------------------------<  91  3.9   |  21<L>  |  0.81    Ca    9.2      21 Jul 2021 05:48  Phos  4.3     07-21  Mg     2.0     07-21      PT/INR - ( 21 Jul 2021 05:49 )   PT: 14.7 sec;   INR: 1.24 ratio         PTT - ( 21 Jul 2021 05:49 )  PTT:40.4 sec            RADIOLOGY & ADDITIONAL TESTS:    Imaging Personally Reviewed:    Consultant(s) Notes Reviewed:      Care Discussed with Consultants/Other Providers: neurosurg

## 2021-07-21 NOTE — PROGRESS NOTE ADULT - ASSESSMENT
61 year-old man with history of thyroid cancer status post total thyroidectomy in 2010 and radioactive iodine treatment in 2011, hypogonadism, vitamin D deficiency, and osteopenia, with chronic constipation  right buttock and right scrotal pain and numbness. 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 The chordoma resulted in perineal numbness and overflow incontinence and he was admitted 7/7/21 for staged resection. On 7/7, he underwent Plastic Surgery and General Surgery assisted vertical rectus abdominal myocutaneous flap harvest with transperitoneal ligation of internal iliac artery and vein.  s/p en bloc sacrectomy/   with L4-pelvis fusion; EBL 4.5L status post 8 units PRBC, 6 units FFP, 1 pack platelets and 5L crystalloid; 7/8. Extubated 7/9 with hypoxia respiratory distress, desaturation CPAP  at night CTA revealed bilateral sub  segmental PE No right heart strain. & Bibasilar and left lingular consolidative opacities . Started on Heparin gtt. PTT goal 50-70 Treated with Levaquin for 7 days Course further c/b ileus requiring gastric decompression . Pathology pending s/p inferior sacral wound debridement at bedside 7/20/21       Plan     Neuro stable. Maintan drains as per plastics   Pulm- Hypoxia with exertion likely multifactorial atelectasis, PE deconditioning from laying flat in bed Encouraged incentive spirometry Wean O2 for pulse oximetry 92-(5%. Continue heparin gtt PTT goal 50-70. Will transition to SQL once drains removed   Wound mngt- Plastics f/u appreciated . Maintain drains as per plastics & superficial drain to be switched to SEDRICK prior to discharge Inferior wound care BID Trend WBC. Monitor for signs of wound breakdown

## 2021-07-22 DIAGNOSIS — D72.829 ELEVATED WHITE BLOOD CELL COUNT, UNSPECIFIED: ICD-10-CM

## 2021-07-22 LAB
ALBUMIN SERPL ELPH-MCNC: 2.9 G/DL — LOW (ref 3.3–5)
ALP SERPL-CCNC: 88 U/L — SIGNIFICANT CHANGE UP (ref 40–120)
ALT FLD-CCNC: 26 U/L — SIGNIFICANT CHANGE UP (ref 10–45)
ANION GAP SERPL CALC-SCNC: 13 MMOL/L — SIGNIFICANT CHANGE UP (ref 5–17)
APPEARANCE UR: CLEAR — SIGNIFICANT CHANGE UP
APTT BLD: 38 SEC — HIGH (ref 27.5–35.5)
APTT BLD: 59 SEC — HIGH (ref 27.5–35.5)
APTT BLD: 60.3 SEC — HIGH (ref 27.5–35.5)
APTT BLD: 68.2 SEC — HIGH (ref 27.5–35.5)
AST SERPL-CCNC: 23 U/L — SIGNIFICANT CHANGE UP (ref 10–40)
BILIRUB DIRECT SERPL-MCNC: 0.1 MG/DL — SIGNIFICANT CHANGE UP (ref 0–0.2)
BILIRUB INDIRECT FLD-MCNC: 0.2 MG/DL — SIGNIFICANT CHANGE UP (ref 0.2–1)
BILIRUB SERPL-MCNC: 0.3 MG/DL — SIGNIFICANT CHANGE UP (ref 0.2–1.2)
BILIRUB UR-MCNC: NEGATIVE — SIGNIFICANT CHANGE UP
BUN SERPL-MCNC: 15 MG/DL — SIGNIFICANT CHANGE UP (ref 7–23)
CALCIUM SERPL-MCNC: 8.9 MG/DL — SIGNIFICANT CHANGE UP (ref 8.4–10.5)
CHLORIDE SERPL-SCNC: 103 MMOL/L — SIGNIFICANT CHANGE UP (ref 96–108)
CO2 SERPL-SCNC: 22 MMOL/L — SIGNIFICANT CHANGE UP (ref 22–31)
COLOR SPEC: YELLOW — SIGNIFICANT CHANGE UP
CREAT SERPL-MCNC: 0.76 MG/DL — SIGNIFICANT CHANGE UP (ref 0.5–1.3)
DIFF PNL FLD: NEGATIVE — SIGNIFICANT CHANGE UP
GLUCOSE SERPL-MCNC: 118 MG/DL — HIGH (ref 70–99)
GLUCOSE UR QL: NEGATIVE — SIGNIFICANT CHANGE UP
HCT VFR BLD CALC: 33.4 % — LOW (ref 39–50)
HGB BLD-MCNC: 10.4 G/DL — LOW (ref 13–17)
INR BLD: 1.22 RATIO — HIGH (ref 0.88–1.16)
KETONES UR-MCNC: NEGATIVE — SIGNIFICANT CHANGE UP
LEUKOCYTE ESTERASE UR-ACNC: NEGATIVE — SIGNIFICANT CHANGE UP
MCHC RBC-ENTMCNC: 28.5 PG — SIGNIFICANT CHANGE UP (ref 27–34)
MCHC RBC-ENTMCNC: 31.1 GM/DL — LOW (ref 32–36)
MCV RBC AUTO: 91.5 FL — SIGNIFICANT CHANGE UP (ref 80–100)
NITRITE UR-MCNC: NEGATIVE — SIGNIFICANT CHANGE UP
NRBC # BLD: 0 /100 WBCS — SIGNIFICANT CHANGE UP (ref 0–0)
PH UR: 7 — SIGNIFICANT CHANGE UP (ref 5–8)
PLATELET # BLD AUTO: 595 K/UL — HIGH (ref 150–400)
POTASSIUM SERPL-MCNC: 4.2 MMOL/L — SIGNIFICANT CHANGE UP (ref 3.5–5.3)
POTASSIUM SERPL-SCNC: 4.2 MMOL/L — SIGNIFICANT CHANGE UP (ref 3.5–5.3)
PROT SERPL-MCNC: 6.5 G/DL — SIGNIFICANT CHANGE UP (ref 6–8.3)
PROT UR-MCNC: NEGATIVE — SIGNIFICANT CHANGE UP
PROTHROM AB SERPL-ACNC: 14.5 SEC — HIGH (ref 10.6–13.6)
RBC # BLD: 3.65 M/UL — LOW (ref 4.2–5.8)
RBC # FLD: 14.5 % — SIGNIFICANT CHANGE UP (ref 10.3–14.5)
SODIUM SERPL-SCNC: 138 MMOL/L — SIGNIFICANT CHANGE UP (ref 135–145)
SP GR SPEC: 1.02 — SIGNIFICANT CHANGE UP (ref 1.01–1.02)
UROBILINOGEN FLD QL: NEGATIVE — SIGNIFICANT CHANGE UP
WBC # BLD: 18.07 K/UL — HIGH (ref 3.8–10.5)
WBC # FLD AUTO: 18.07 K/UL — HIGH (ref 3.8–10.5)

## 2021-07-22 PROCEDURE — 99233 SBSQ HOSP IP/OBS HIGH 50: CPT

## 2021-07-22 RX ORDER — SODIUM CHLORIDE 9 MG/ML
1000 INJECTION, SOLUTION INTRAVENOUS
Refills: 0 | Status: DISCONTINUED | OUTPATIENT
Start: 2021-07-22 | End: 2021-07-23

## 2021-07-22 RX ADMIN — Medication 137 MICROGRAM(S): at 05:04

## 2021-07-22 RX ADMIN — PANTOPRAZOLE SODIUM 40 MILLIGRAM(S): 20 TABLET, DELAYED RELEASE ORAL at 05:04

## 2021-07-22 RX ADMIN — Medication 1 APPLICATION(S): at 05:04

## 2021-07-22 RX ADMIN — Medication 1 TABLET(S): at 11:19

## 2021-07-22 RX ADMIN — GABAPENTIN 200 MILLIGRAM(S): 400 CAPSULE ORAL at 14:19

## 2021-07-22 RX ADMIN — GABAPENTIN 200 MILLIGRAM(S): 400 CAPSULE ORAL at 21:29

## 2021-07-22 RX ADMIN — SODIUM CHLORIDE 75 MILLILITER(S): 9 INJECTION, SOLUTION INTRAVENOUS at 17:18

## 2021-07-22 RX ADMIN — Medication 1 APPLICATION(S): at 17:17

## 2021-07-22 RX ADMIN — GABAPENTIN 200 MILLIGRAM(S): 400 CAPSULE ORAL at 05:04

## 2021-07-22 NOTE — PROGRESS NOTE ADULT - SUBJECTIVE AND OBJECTIVE BOX
SUBJECTIVE:   No new complaints . Minimal shortness of breath   OVERNIGHT EVENTS: none    Vital Signs Last 24 Hrs  T(C): 36.6 (22 Jul 2021 08:00), Max: 36.9 (22 Jul 2021 04:46)  T(F): 97.8 (22 Jul 2021 08:00), Max: 98.4 (22 Jul 2021 04:46)  HR: 90 (22 Jul 2021 08:00) (80 - 90)  BP: 133/70 (22 Jul 2021 08:00) (100/62 - 148/74)  RR: 18 (22 Jul 2021 08:00) (18 - 20)  SpO2: 96% (22 Jul 2021 08:00) (95% - 97%)    PHYSICAL EXAM:    Constitutional: No Acute Distress     Neurological: Awake alert Ox3, Speech clear Following Commands, Moving all Extremities 5/5 except R DF/PF 4+/5. midline sacral incision w aquacel dressing in place c/d, inferior 6cm of incision w sutures in place, no drainage, no erythema, no swelling, gauze and tegaderm dressing R HMV 75cc L HMV 20cc/24 hrs     Pulmonary: Clear to Auscultation, On     Cardiovascular: S1, S2, Regular rate and rhythm     Gastrointestinal: Soft, Non-tender, Non-distended  midline incision w Aquacel dressing in place c/d Abdominal SEDRICK 35cc/ 24 hrs     Extremities: No calf tenderness    - Barnett catheter.     LABS:                        10.4   18.07 )-----------( 595      ( 22 Jul 2021 05:28 )             33.4    07-22    138  |  103  |  15  ----------------------------<  118<H>  4.2   |  22  |  0.76    Ca    8.9      22 Jul 2021 05:29  Phos  4.3     07-21  Mg     2.0     07-21    TPro  6.5  /  Alb  2.9<L>  /  TBili  0.3  /  DBili  0.1  /  AST  23  /  ALT  26  /  AlkPhos  88  07-22  PT/INR - ( 22 Jul 2021 01:45 )   PT: 14.5 sec;   INR: 1.22 ratio    PTT:68.2 sec        IMAGING:         MEDICATIONS:    acetaminophen   Tablet .. 650 milliGRAM(s) Oral every 6 hours PRN Temp greater or equal to 38C (100.4F), Mild Pain (1 - 3)  gabapentin 200 milliGRAM(s) Oral every 8 hours  ondansetron Injectable 4 milliGRAM(s) IV Push every 6 hours PRN Nausea  aluminum hydroxide/magnesium hydroxide/simethicone Suspension 30 milliLiter(s) Oral every 4 hours PRN Dyspepsia  bisacodyl Suppository 10 milliGRAM(s) Rectal daily PRN Constipation  pantoprazole    Tablet 40 milliGRAM(s) Oral before breakfast  polyethylene glycol 3350 17 Gram(s) Oral every 12 hours  senna 2 Tablet(s) Oral at bedtime  calcium carbonate 1250 mG  + Vitamin D (OsCal 500 + D) 1 Tablet(s) Oral daily  gentamicin 0.1% Ointment 1 Application(s) Topical two times a day  heparin  Infusion 1000 Unit(s)/Hr IV Continuous <Continuous>  levothyroxine 137 MICROGram(s) Oral daily  multivitamin 1 Tablet(s) Oral daily      DIET:

## 2021-07-22 NOTE — PROGRESS NOTE ADULT - SUBJECTIVE AND OBJECTIVE BOX
Jesse Rodriguez   Pager [phone]  Office [phone]      CC: Patient is a 61y old  Male who presents with a chief complaint of staged sacral tumor removal (21 Jul 2021 08:29)      SUBJECTIVE / OVERNIGHT EVENTS:    MEDICATIONS  (STANDING):  calcium carbonate 1250 mG  + Vitamin D (OsCal 500 + D) 1 Tablet(s) Oral daily  gabapentin 200 milliGRAM(s) Oral every 8 hours  gentamicin 0.1% Ointment 1 Application(s) Topical two times a day  heparin  Infusion 1000 Unit(s)/Hr (17 mL/Hr) IV Continuous <Continuous>  levothyroxine 137 MICROGram(s) Oral daily  multivitamin 1 Tablet(s) Oral daily  pantoprazole    Tablet 40 milliGRAM(s) Oral before breakfast  polyethylene glycol 3350 17 Gram(s) Oral every 12 hours  senna 2 Tablet(s) Oral at bedtime    MEDICATIONS  (PRN):  acetaminophen   Tablet .. 650 milliGRAM(s) Oral every 6 hours PRN Temp greater or equal to 38C (100.4F), Mild Pain (1 - 3)  aluminum hydroxide/magnesium hydroxide/simethicone Suspension 30 milliLiter(s) Oral every 4 hours PRN Dyspepsia  bisacodyl Suppository 10 milliGRAM(s) Rectal daily PRN Constipation  ondansetron Injectable 4 milliGRAM(s) IV Push every 6 hours PRN Nausea      Vital Signs Last 24 Hrs  T(C): 36.9 (22 Jul 2021 04:46), Max: 36.9 (22 Jul 2021 04:46)  T(F): 98.4 (22 Jul 2021 04:46), Max: 98.4 (22 Jul 2021 04:46)  HR: 80 (22 Jul 2021 04:46) (77 - 194)  BP: 124/70 (22 Jul 2021 04:46) (100/62 - 148/74)  BP(mean): --  RR: 18 (22 Jul 2021 04:46) (18 - 20)  SpO2: 97% (22 Jul 2021 04:46) (95% - 99%)  CAPILLARY BLOOD GLUCOSE        I&O's Summary    21 Jul 2021 07:01  -  22 Jul 2021 07:00  --------------------------------------------------------  IN: 687.3 mL / OUT: 2780 mL / NET: -2092.7 mL    22 Jul 2021 07:01  -  22 Jul 2021 10:19  --------------------------------------------------------  IN: 34 mL / OUT: 0 mL / NET: 34 mL      tele:    PHYSICAL EXAM:    GENERAL: NAD   HEENT: EOMI, PERRL  PULM: Clear to auscultation bilaterally  CV: Regular rate and rhythm; nl S1, S2; No murmurs, rubs, or gallops  ABDOMEN: Soft, Nontender, Nondistended; Bowel sounds present  EXTREMITIES/MSK:  No edema, calf tenderness   PSYCH: AAOx3  NEUROLOGY: non-focal          LABS:                        10.4   18.07 )-----------( 595      ( 22 Jul 2021 05:28 )             33.4     07-22    138  |  103  |  15  ----------------------------<  118<H>  4.2   |  22  |  0.76    Ca    8.9      22 Jul 2021 05:29  Phos  4.3     07-21  Mg     2.0     07-21      PT/INR - ( 22 Jul 2021 01:45 )   PT: 14.5 sec;   INR: 1.22 ratio         PTT - ( 22 Jul 2021 08:10 )  PTT:68.2 sec            RADIOLOGY & ADDITIONAL TESTS:    Imaging Personally Reviewed:    Consultant(s) Notes Reviewed:      Care Discussed with Consultants/Other Providers:   Jesse Rodriguez   Pager [phone]  Office [phone]      CC: Patient is a 61y old  Male who presents with a chief complaint of staged sacral tumor removal (21 Jul 2021 08:29)      SUBJECTIVE / OVERNIGHT EVENTS: no cp, sob. no f/c/r. no HAs. tolerating po but feels a slight increase abd gas/distention    MEDICATIONS  (STANDING):  calcium carbonate 1250 mG  + Vitamin D (OsCal 500 + D) 1 Tablet(s) Oral daily  gabapentin 200 milliGRAM(s) Oral every 8 hours  gentamicin 0.1% Ointment 1 Application(s) Topical two times a day  heparin  Infusion 1000 Unit(s)/Hr (17 mL/Hr) IV Continuous <Continuous>  levothyroxine 137 MICROGram(s) Oral daily  multivitamin 1 Tablet(s) Oral daily  pantoprazole    Tablet 40 milliGRAM(s) Oral before breakfast  polyethylene glycol 3350 17 Gram(s) Oral every 12 hours  senna 2 Tablet(s) Oral at bedtime    MEDICATIONS  (PRN):  acetaminophen   Tablet .. 650 milliGRAM(s) Oral every 6 hours PRN Temp greater or equal to 38C (100.4F), Mild Pain (1 - 3)  aluminum hydroxide/magnesium hydroxide/simethicone Suspension 30 milliLiter(s) Oral every 4 hours PRN Dyspepsia  bisacodyl Suppository 10 milliGRAM(s) Rectal daily PRN Constipation  ondansetron Injectable 4 milliGRAM(s) IV Push every 6 hours PRN Nausea      Vital Signs Last 24 Hrs  T(C): 36.9 (22 Jul 2021 04:46), Max: 36.9 (22 Jul 2021 04:46)  T(F): 98.4 (22 Jul 2021 04:46), Max: 98.4 (22 Jul 2021 04:46)  HR: 80 (22 Jul 2021 04:46) (77 - 194)  BP: 124/70 (22 Jul 2021 04:46) (100/62 - 148/74)  BP(mean): --  RR: 18 (22 Jul 2021 04:46) (18 - 20)  SpO2: 97% (22 Jul 2021 04:46) (95% - 99%)  CAPILLARY BLOOD GLUCOSE        I&O's Summary    21 Jul 2021 07:01  -  22 Jul 2021 07:00  --------------------------------------------------------  IN: 687.3 mL / OUT: 2780 mL / NET: -2092.7 mL    22 Jul 2021 07:01  -  22 Jul 2021 10:19  --------------------------------------------------------  IN: 34 mL / OUT: 0 mL / NET: 34 mL          PHYSICAL EXAM:    GENERAL: NAD   HEENT: EOMI, PERRL  PULM: Clear to auscultation bilaterally  CV: Regular rate and rhythm; nl S1, S2; No murmurs, rubs, or gallops  ABDOMEN: Soft, Nontender, Nondistended; Bowel sounds present  EXTREMITIES/MSK:  No edema, calf tenderness   PSYCH: AAOx3  NEUROLOGY: non-focal          LABS:                        10.4   18.07 )-----------( 595      ( 22 Jul 2021 05:28 )             33.4     07-22    138  |  103  |  15  ----------------------------<  118<H>  4.2   |  22  |  0.76    Ca    8.9      22 Jul 2021 05:29  Phos  4.3     07-21  Mg     2.0     07-21      PT/INR - ( 22 Jul 2021 01:45 )   PT: 14.5 sec;   INR: 1.22 ratio         PTT - ( 22 Jul 2021 08:10 )  PTT:68.2 sec            RADIOLOGY & ADDITIONAL TESTS:    Imaging Personally Reviewed:    Consultant(s) Notes Reviewed:      Care Discussed with Consultants/Other Providers: neurosurg   Jesse Rodriguez   Pager [phone]  Office [phone]      CC: Patient is a 61y old  Male who presents with a chief complaint of staged sacral tumor removal (21 Jul 2021 08:29)      SUBJECTIVE / OVERNIGHT EVENTS: no cp, sob. no f/c/r. no cough. no HAs. no dysuria. no malaise. tolerating po but feels a slight increase abd gas/distention    MEDICATIONS  (STANDING):  calcium carbonate 1250 mG  + Vitamin D (OsCal 500 + D) 1 Tablet(s) Oral daily  gabapentin 200 milliGRAM(s) Oral every 8 hours  gentamicin 0.1% Ointment 1 Application(s) Topical two times a day  heparin  Infusion 1000 Unit(s)/Hr (17 mL/Hr) IV Continuous <Continuous>  levothyroxine 137 MICROGram(s) Oral daily  multivitamin 1 Tablet(s) Oral daily  pantoprazole    Tablet 40 milliGRAM(s) Oral before breakfast  polyethylene glycol 3350 17 Gram(s) Oral every 12 hours  senna 2 Tablet(s) Oral at bedtime    MEDICATIONS  (PRN):  acetaminophen   Tablet .. 650 milliGRAM(s) Oral every 6 hours PRN Temp greater or equal to 38C (100.4F), Mild Pain (1 - 3)  aluminum hydroxide/magnesium hydroxide/simethicone Suspension 30 milliLiter(s) Oral every 4 hours PRN Dyspepsia  bisacodyl Suppository 10 milliGRAM(s) Rectal daily PRN Constipation  ondansetron Injectable 4 milliGRAM(s) IV Push every 6 hours PRN Nausea      Vital Signs Last 24 Hrs  T(C): 36.9 (22 Jul 2021 04:46), Max: 36.9 (22 Jul 2021 04:46)  T(F): 98.4 (22 Jul 2021 04:46), Max: 98.4 (22 Jul 2021 04:46)  HR: 80 (22 Jul 2021 04:46) (77 - 194)  BP: 124/70 (22 Jul 2021 04:46) (100/62 - 148/74)  BP(mean): --  RR: 18 (22 Jul 2021 04:46) (18 - 20)  SpO2: 97% (22 Jul 2021 04:46) (95% - 99%)  CAPILLARY BLOOD GLUCOSE        I&O's Summary    21 Jul 2021 07:01  -  22 Jul 2021 07:00  --------------------------------------------------------  IN: 687.3 mL / OUT: 2780 mL / NET: -2092.7 mL    22 Jul 2021 07:01  -  22 Jul 2021 10:19  --------------------------------------------------------  IN: 34 mL / OUT: 0 mL / NET: 34 mL          PHYSICAL EXAM:    GENERAL: NAD   HEENT: EOMI, PERRL  PULM: Clear to auscultation bilaterally  CV: Regular rate and rhythm; nl S1, S2; No murmurs, rubs, or gallops  ABDOMEN: Soft, Nontender, Nondistended; Bowel sounds present  EXTREMITIES/MSK:  No edema, calf tenderness   PSYCH: AAOx3  NEUROLOGY: non-focal          LABS:                        10.4   18.07 )-----------( 595      ( 22 Jul 2021 05:28 )             33.4     07-22    138  |  103  |  15  ----------------------------<  118<H>  4.2   |  22  |  0.76    Ca    8.9      22 Jul 2021 05:29  Phos  4.3     07-21  Mg     2.0     07-21      PT/INR - ( 22 Jul 2021 01:45 )   PT: 14.5 sec;   INR: 1.22 ratio         PTT - ( 22 Jul 2021 08:10 )  PTT:68.2 sec            RADIOLOGY & ADDITIONAL TESTS:    Imaging Personally Reviewed:    Consultant(s) Notes Reviewed:      Care Discussed with Consultants/Other Providers: neurosurg

## 2021-07-22 NOTE — PROGRESS NOTE ADULT - ASSESSMENT
61 male with sacral tumor surgery / sacrectomy for chordoma , followed by Plastic Surgery ongoing wound care.  He is tolerating his diet and having bowel movements/he is fecal incontinence. Per reports, passing small and pasty consistency. Today patient reports he spoke with the surgical service and will be having a colostomy to aid in wound healing .    Recommendations:  Monitor bowel frequency on current bowel regimen of Miralax and Senna   Await  surgical service scheduling  for colostomy     REZA Talley  Covering [facility]  [address] [place] [address]  [address] [phone]

## 2021-07-22 NOTE — PROGRESS NOTE ADULT - SUBJECTIVE AND OBJECTIVE BOX
Vital Signs Last 24 Hrs  T(C): 36.9 (22 Jul 2021 04:46), Max: 36.9 (22 Jul 2021 04:46)  T(F): 98.4 (22 Jul 2021 04:46), Max: 98.4 (22 Jul 2021 04:46)  HR: 80 (22 Jul 2021 04:46) (77 - 194)  BP: 124/70 (22 Jul 2021 04:46) (100/62 - 148/74)  BP(mean): --  RR: 18 (22 Jul 2021 04:46) (18 - 20)  SpO2: 97% (22 Jul 2021 04:46) (95% - 99%)    I&O's Detail    21 Jul 2021 07:01  -  22 Jul 2021 07:00  --------------------------------------------------------  IN:    Heparin: 267.3 mL    Oral Fluid: 420 mL  Total IN: 687.3 mL    OUT:    Bulb (mL): 35 mL    Drain (mL): 20 mL    Drain (mL): 75 mL    Indwelling Catheter - Urethral (mL): 2650 mL  Total OUT: 2780 mL    Total NET: -2092.7 mL      22 Jul 2021 07:01  -  22 Jul 2021 10:22  --------------------------------------------------------  IN:    Heparin: 34 mL  Total IN: 34 mL    OUT:  Total OUT: 0 mL    Total NET: 34 mL                                10.4   18.07 )-----------( 595      ( 22 Jul 2021 05:28 )             33.4       07-22    138  |  103  |  15  ----------------------------<  118<H>  4.2   |  22  |  0.76    Ca    8.9      22 Jul 2021 05:29  Phos  4.3     07-21  Mg     2.0     07-21        PT/INR - ( 22 Jul 2021 01:45 )   PT: 14.5 sec;   INR: 1.22 ratio         PTT - ( 22 Jul 2021 08:10 )  PTT:68.2 sec    Patient agrees to colostomy    PLAN:  will schedule for early next week

## 2021-07-22 NOTE — PROGRESS NOTE ADULT - SUBJECTIVE AND OBJECTIVE BOX
INTERVAL HPI/OVERNIGHT EVENTS:  Patient awake in bed, reports he is OK , states he will be having a surgery, colostomy to aid in his wound healing     MEDICATIONS  (STANDING):  calcium carbonate 1250 mG  + Vitamin D (OsCal 500 + D) 1 Tablet(s) Oral daily  gabapentin 200 milliGRAM(s) Oral every 8 hours  gentamicin 0.1% Ointment 1 Application(s) Topical two times a day  heparin  Infusion 1000 Unit(s)/Hr (17 mL/Hr) IV Continuous <Continuous>  levothyroxine 137 MICROGram(s) Oral daily  multivitamin 1 Tablet(s) Oral daily  pantoprazole    Tablet 40 milliGRAM(s) Oral before breakfast  polyethylene glycol 3350 17 Gram(s) Oral every 12 hours  senna 2 Tablet(s) Oral at bedtime    MEDICATIONS  (PRN):  acetaminophen   Tablet .. 650 milliGRAM(s) Oral every 6 hours PRN Temp greater or equal to 38C (100.4F), Mild Pain (1 - 3)  aluminum hydroxide/magnesium hydroxide/simethicone Suspension 30 milliLiter(s) Oral every 4 hours PRN Dyspepsia  bisacodyl Suppository 10 milliGRAM(s) Rectal daily PRN Constipation  ondansetron Injectable 4 milliGRAM(s) IV Push every 6 hours PRN Nausea      Allergies    No Known Allergies    Intolerances        Review of Systems:    General:  No fevers or chills   ENT:  No  dysphagia tolerating diet   CV:  No chest pain  Resp:  No  cough or wheezing  GI: passing stools , he describes as small/ smears       Vital Signs Last 24 Hrs  T(C): 36.6 (22 Jul 2021 08:00), Max: 36.9 (22 Jul 2021 04:46)  T(F): 97.8 (22 Jul 2021 08:00), Max: 98.4 (22 Jul 2021 04:46)  HR: 90 (22 Jul 2021 08:00) (77 - 90)  BP: 133/70 (22 Jul 2021 08:00) (100/62 - 148/74)  BP(mean): --  RR: 18 (22 Jul 2021 08:00) (18 - 20)  SpO2: 96% (22 Jul 2021 08:00) (95% - 99%)    PHYSICAL EXAM:    Constitutional: non toxic and in  NAD  Respiratory: anterior chest wall clear to auscultation   Cardiovascular: S1 and S2 RRR  Gastrointestinal: non distended +BS   Extremities: No edema or  cyanosis  Neurological: A/O x 3, no focal deficits  Psychiatric: Normal mood, normal affect  Skin: No visible rash      LABS:                        10.4   18.07 )-----------( 595      ( 22 Jul 2021 05:28 )             33.4     07-22    138  |  103  |  15  ----------------------------<  118<H>  4.2   |  22  |  0.76    Ca    8.9      22 Jul 2021 05:29  Phos  4.3     07-21  Mg     2.0     07-21    TPro  6.5  /  Alb  2.9<L>  /  TBili  0.3  /  DBili  0.1  /  AST  23  /  ALT  26  /  AlkPhos  88  07-22    PT/INR - ( 22 Jul 2021 01:45 )   PT: 14.5 sec;   INR: 1.22 ratio         PTT - ( 22 Jul 2021 08:10 )  PTT:68.2 sec    LIVER FUNCTIONS - ( 22 Jul 2021 05:29 )  Alb: 2.9 g/dL / Pro: 6.5 g/dL / ALK PHOS: 88 U/L / ALT: 26 U/L / AST: 23 U/L / GGT: x             RADIOLOGY & ADDITIONAL TESTS:

## 2021-07-22 NOTE — PROGRESS NOTE ADULT - PROBLEM SELECTOR PLAN 3
add LFTs to am labs. consider blood cx/imaging. if symptoms develop, would start Vanco 1g q 12 and Cefepime 2g q 8. add LFTs to am labs. check blood cx/UA/cx/imaging. if symptoms develop, would start Vanco 1g q 12 and Cefepime 2g q 8. added LFTs to am labs. check blood cx/UA/cx/imaging. if symptoms develop, would start Vanco 1g q 12 and Cefepime 2g q 8.

## 2021-07-22 NOTE — PROGRESS NOTE ADULT - ASSESSMENT
61 year-old man with history of thyroid cancer status post total thyroidectomy in 2010 and radioactive iodine treatment in 2011, hypogonadism, vitamin D deficiency, and osteopenia, with chronic constipation  right buttock and right scrotal pain and numbness. 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 The chordoma resulted in perineal numbness and overflow incontinence and he was admitted 7/7/21 for staged resection. On 7/7, he underwent Plastic Surgery and General Surgery assisted vertical rectus abdominal myocutaneous flap harvest with transperitoneal ligation of internal iliac artery and vein.  s/p en bloc sacrectomy/   with L4-pelvis fusion; EBL 4.5L status post 8 units PRBC, 6 units FFP, 1 pack platelets and 5L crystalloid; 7/8. Extubated 7/9 with hypoxia respiratory distress, desaturation CPAP  at night CTA revealed bilateral sub  segmental PE No right heart strain. & Bibasilar and left lingular consolidative opacities . Started on Heparin gtt. PTT goal 50-70 Treated with Levaquin for 7 days Course further c/b ileus requiring gastric decompression . Pathology pending s/p inferior sacral wound debridement at bedside 7/20/21 . Now with leucocytosis    Plan    Neuro stable   Pulm- Hypoxia with exertion likely multifactorial atelectasis, PE deconditioning from laying flat in bed Encouraged incentive spirometry Wean O2 for pulse oximetry 92-(5%. Continue heparin gtt PTT goal 50-70.   Wound mngt- Plastics f/u appreciated . Maintain drains as per plastics & superficial drain to be switched to SEDRICK prior to discharge Inferior wound care BID. Planned for colostomy stool diversion to aid wound healing early next week. Will place rectal tube once patient on liquid diet/ bowel prep   ID- Leucocytosis. Will send UA, Blood cx. Trend WBC . If fevers will start empiric abx.   Medicine f/u appreciated   PE- On Hep gtt at 1700U/ hr. Goal PTT 50-70

## 2021-07-22 NOTE — PROGRESS NOTE ADULT - ASSESSMENT
61y Male patient s/p en bloc sacrectomy for chordoma w/ L4-pelvis fusion w/ plastics closure vertical rectus abdominis myocutaneous (VRAM) flap for chordoma on 07/07 and 07/08, post-op course c/b bilateral PEs (on hep gtt) and post-op ileus, resolved. Patient now incontinent of stool and soiling his posterior dressing s/p wound debridement with Plastic surgery 07/20. Pt now opting to proceed to OR for colostomy    Plan:  - OR Planning for colostomy early next week  - CLD  - Continue excellent care per neurosurgery    [facility]  [id number]   61y Male patient s/p en bloc sacrectomy for chordoma w/ L4-pelvis fusion w/ plastics closure vertical rectus abdominis myocutaneous (VRAM) flap for chordoma on 07/07 and 07/08, post-op course c/b bilateral PEs (on hep gtt) and post-op ileus, resolved. Patient now incontinent of stool and soiling his posterior dressing s/p wound debridement with Plastic surgery 07/20. Pt now opting to proceed to OR for colostomy    Plan:  - OR Planning for colostomy early next week  - CLD over weekend, will bowel prep day before Operating Theatre.   - Continue excellent care per neurosurgery    [facility]  [id number]

## 2021-07-22 NOTE — PROGRESS NOTE ADULT - SUBJECTIVE AND OBJECTIVE BOX
SURGERY PROGRESS NOTE    SUBJECTIVE / 24H EVENTS:  Patient seen and examined on morning rounds. No acute events overnight. After discussion with Dr. Davis and his father, patient would like to proceed with colostomy.       OBJECTIVE:  VITAL SIGNS:  T(C): 36.9 (07-22-21 @ 04:46), Max: 36.9 (07-22-21 @ 04:46)  HR: 80 (07-22-21 @ 04:46) (77 - 194)  BP: 124/70 (07-22-21 @ 04:46) (100/62 - 148/74)  RR: 18 (07-22-21 @ 04:46) (18 - 20)  SpO2: 97% (07-22-21 @ 04:46) (95% - 99%)  Daily     Daily       PHYSICAL EXAM:  Gen: NAD  LS: Respirations unlabored   GI: Soft. Nontender. Nondistended. Aquacel dressing in place over midline wound  Ext: Warm, well perfused      07-21-21 @ 07:01  -  07-22-21 @ 07:00  --------------------------------------------------------  IN:    Heparin: 267.3 mL    Oral Fluid: 420 mL  Total IN: 687.3 mL    OUT:    Bulb (mL): 35 mL    Drain (mL): 20 mL    Drain (mL): 75 mL    Indwelling Catheter - Urethral (mL): 2650 mL  Total OUT: 2780 mL    Total NET: -2092.7 mL          LAB VALUES:  07-22    138  |  103  |  15  ----------------------------<  118<H>  4.2   |  22  |  0.76    Ca    8.9      22 Jul 2021 05:29  Phos  4.3     07-21  Mg     2.0     07-21                                 10.4   18.07 )-----------( 595      ( 22 Jul 2021 05:28 )             33.4       PT/INR - ( 22 Jul 2021 01:45 )   PT: 14.5 sec;   INR: 1.22 ratio         PTT - ( 22 Jul 2021 08:10 )  PTT:68.2 sec            MICROBIOLOGY:      RADIOLOGY:        MEDICATIONS  (STANDING):  calcium carbonate 1250 mG  + Vitamin D (OsCal 500 + D) 1 Tablet(s) Oral daily  gabapentin 200 milliGRAM(s) Oral every 8 hours  gentamicin 0.1% Ointment 1 Application(s) Topical two times a day  heparin  Infusion 1000 Unit(s)/Hr (17 mL/Hr) IV Continuous <Continuous>  levothyroxine 137 MICROGram(s) Oral daily  multivitamin 1 Tablet(s) Oral daily  pantoprazole    Tablet 40 milliGRAM(s) Oral before breakfast  polyethylene glycol 3350 17 Gram(s) Oral every 12 hours  senna 2 Tablet(s) Oral at bedtime    MEDICATIONS  (PRN):  acetaminophen   Tablet .. 650 milliGRAM(s) Oral every 6 hours PRN Temp greater or equal to 38C (100.4F), Mild Pain (1 - 3)  aluminum hydroxide/magnesium hydroxide/simethicone Suspension 30 milliLiter(s) Oral every 4 hours PRN Dyspepsia  bisacodyl Suppository 10 milliGRAM(s) Rectal daily PRN Constipation  ondansetron Injectable 4 milliGRAM(s) IV Push every 6 hours PRN Nausea

## 2021-07-22 NOTE — PROGRESS NOTE ADULT - PROBLEM SELECTOR PLAN 2
cont AC for PE. incentive spirometry emphasized to patient. taper down O2 as tolerated- now down to 3L nc

## 2021-07-23 DIAGNOSIS — E03.9 HYPOTHYROIDISM, UNSPECIFIED: ICD-10-CM

## 2021-07-23 DIAGNOSIS — C41.4 MALIGNANT NEOPLASM OF PELVIC BONES, SACRUM AND COCCYX: ICD-10-CM

## 2021-07-23 LAB
ANION GAP SERPL CALC-SCNC: 17 MMOL/L — SIGNIFICANT CHANGE UP (ref 5–17)
APTT BLD: 52.5 SEC — HIGH (ref 27.5–35.5)
BASOPHILS # BLD AUTO: 0.06 K/UL — SIGNIFICANT CHANGE UP (ref 0–0.2)
BASOPHILS NFR BLD AUTO: 0.3 % — SIGNIFICANT CHANGE UP (ref 0–2)
BLD GP AB SCN SERPL QL: NEGATIVE — SIGNIFICANT CHANGE UP
BUN SERPL-MCNC: 14 MG/DL — SIGNIFICANT CHANGE UP (ref 7–23)
CALCIUM SERPL-MCNC: 8.7 MG/DL — SIGNIFICANT CHANGE UP (ref 8.4–10.5)
CHLORIDE SERPL-SCNC: 101 MMOL/L — SIGNIFICANT CHANGE UP (ref 96–108)
CO2 SERPL-SCNC: 21 MMOL/L — LOW (ref 22–31)
CREAT SERPL-MCNC: 0.66 MG/DL — SIGNIFICANT CHANGE UP (ref 0.5–1.3)
EOSINOPHIL # BLD AUTO: 0.1 K/UL — SIGNIFICANT CHANGE UP (ref 0–0.5)
EOSINOPHIL NFR BLD AUTO: 0.5 % — SIGNIFICANT CHANGE UP (ref 0–6)
GAS PNL BLDA: SIGNIFICANT CHANGE UP
GLUCOSE SERPL-MCNC: 112 MG/DL — HIGH (ref 70–99)
HCT VFR BLD CALC: 33.9 % — LOW (ref 39–50)
HGB BLD-MCNC: 10.7 G/DL — LOW (ref 13–17)
IMM GRANULOCYTES NFR BLD AUTO: 0.9 % — SIGNIFICANT CHANGE UP (ref 0–1.5)
INR BLD: 1.27 RATIO — HIGH (ref 0.88–1.16)
LYMPHOCYTES # BLD AUTO: 10.2 % — LOW (ref 13–44)
LYMPHOCYTES # BLD AUTO: 2.2 K/UL — SIGNIFICANT CHANGE UP (ref 1–3.3)
MCHC RBC-ENTMCNC: 28.4 PG — SIGNIFICANT CHANGE UP (ref 27–34)
MCHC RBC-ENTMCNC: 31.6 GM/DL — LOW (ref 32–36)
MCV RBC AUTO: 89.9 FL — SIGNIFICANT CHANGE UP (ref 80–100)
MONOCYTES # BLD AUTO: 1.86 K/UL — HIGH (ref 0–0.9)
MONOCYTES NFR BLD AUTO: 8.6 % — SIGNIFICANT CHANGE UP (ref 2–14)
NEUTROPHILS # BLD AUTO: 17.19 K/UL — HIGH (ref 1.8–7.4)
NEUTROPHILS NFR BLD AUTO: 79.5 % — HIGH (ref 43–77)
NRBC # BLD: 0 /100 WBCS — SIGNIFICANT CHANGE UP (ref 0–0)
PLATELET # BLD AUTO: 617 K/UL — HIGH (ref 150–400)
POTASSIUM SERPL-MCNC: 3.8 MMOL/L — SIGNIFICANT CHANGE UP (ref 3.5–5.3)
POTASSIUM SERPL-SCNC: 3.8 MMOL/L — SIGNIFICANT CHANGE UP (ref 3.5–5.3)
PROTHROM AB SERPL-ACNC: 15.1 SEC — HIGH (ref 10.6–13.6)
RBC # BLD: 3.77 M/UL — LOW (ref 4.2–5.8)
RBC # FLD: 14.4 % — SIGNIFICANT CHANGE UP (ref 10.3–14.5)
RH IG SCN BLD-IMP: POSITIVE — SIGNIFICANT CHANGE UP
SODIUM SERPL-SCNC: 139 MMOL/L — SIGNIFICANT CHANGE UP (ref 135–145)
T4 FREE SERPL-MCNC: 0.9 NG/DL — SIGNIFICANT CHANGE UP (ref 0.9–1.8)
TSH SERPL-MCNC: 14.1 UIU/ML — HIGH (ref 0.27–4.2)
WBC # BLD: 21.6 K/UL — HIGH (ref 3.8–10.5)
WBC # FLD AUTO: 21.6 K/UL — HIGH (ref 3.8–10.5)

## 2021-07-23 PROCEDURE — 99233 SBSQ HOSP IP/OBS HIGH 50: CPT

## 2021-07-23 PROCEDURE — 74177 CT ABD & PELVIS W/CONTRAST: CPT | Mod: 26

## 2021-07-23 PROCEDURE — 99223 1ST HOSP IP/OBS HIGH 75: CPT | Mod: GC

## 2021-07-23 PROCEDURE — 71045 X-RAY EXAM CHEST 1 VIEW: CPT | Mod: 26,77

## 2021-07-23 PROCEDURE — 99231 SBSQ HOSP IP/OBS SF/LOW 25: CPT

## 2021-07-23 PROCEDURE — 93306 TTE W/DOPPLER COMPLETE: CPT | Mod: 26

## 2021-07-23 PROCEDURE — 76604 US EXAM CHEST: CPT | Mod: 26,GC

## 2021-07-23 PROCEDURE — 71045 X-RAY EXAM CHEST 1 VIEW: CPT | Mod: 26

## 2021-07-23 RX ORDER — POTASSIUM CHLORIDE 20 MEQ
20 PACKET (EA) ORAL ONCE
Refills: 0 | Status: COMPLETED | OUTPATIENT
Start: 2021-07-23 | End: 2021-07-23

## 2021-07-23 RX ORDER — PIPERACILLIN AND TAZOBACTAM 4; .5 G/20ML; G/20ML
3.38 INJECTION, POWDER, LYOPHILIZED, FOR SOLUTION INTRAVENOUS EVERY 8 HOURS
Refills: 0 | Status: DISCONTINUED | OUTPATIENT
Start: 2021-07-23 | End: 2021-07-29

## 2021-07-23 RX ORDER — GABAPENTIN 400 MG/1
200 CAPSULE ORAL EVERY 8 HOURS
Refills: 0 | Status: DISCONTINUED | OUTPATIENT
Start: 2021-07-23 | End: 2021-08-02

## 2021-07-23 RX ORDER — PIPERACILLIN AND TAZOBACTAM 4; .5 G/20ML; G/20ML
3.38 INJECTION, POWDER, LYOPHILIZED, FOR SOLUTION INTRAVENOUS EVERY 8 HOURS
Refills: 0 | Status: DISCONTINUED | OUTPATIENT
Start: 2021-07-23 | End: 2021-07-23

## 2021-07-23 RX ORDER — PANTOPRAZOLE SODIUM 20 MG/1
40 TABLET, DELAYED RELEASE ORAL
Refills: 0 | Status: DISCONTINUED | OUTPATIENT
Start: 2021-07-23 | End: 2021-07-28

## 2021-07-23 RX ORDER — VANCOMYCIN HCL 1 G
1250 VIAL (EA) INTRAVENOUS EVERY 12 HOURS
Refills: 0 | Status: DISCONTINUED | OUTPATIENT
Start: 2021-07-23 | End: 2021-07-25

## 2021-07-23 RX ORDER — HEPARIN SODIUM 5000 [USP'U]/ML
1700 INJECTION INTRAVENOUS; SUBCUTANEOUS
Qty: 25000 | Refills: 0 | Status: DISCONTINUED | OUTPATIENT
Start: 2021-07-23 | End: 2021-07-23

## 2021-07-23 RX ORDER — SODIUM CHLORIDE 9 MG/ML
1000 INJECTION, SOLUTION INTRAVENOUS
Refills: 0 | Status: DISCONTINUED | OUTPATIENT
Start: 2021-07-23 | End: 2021-07-23

## 2021-07-23 RX ORDER — VANCOMYCIN HCL 1 G
1250 VIAL (EA) INTRAVENOUS EVERY 12 HOURS
Refills: 0 | Status: DISCONTINUED | OUTPATIENT
Start: 2021-07-23 | End: 2021-07-23

## 2021-07-23 RX ORDER — ACETAMINOPHEN 500 MG
650 TABLET ORAL EVERY 6 HOURS
Refills: 0 | Status: DISCONTINUED | OUTPATIENT
Start: 2021-07-23 | End: 2021-07-28

## 2021-07-23 RX ORDER — ONDANSETRON 8 MG/1
4 TABLET, FILM COATED ORAL EVERY 6 HOURS
Refills: 0 | Status: DISCONTINUED | OUTPATIENT
Start: 2021-07-23 | End: 2021-08-02

## 2021-07-23 RX ORDER — GENTAMICIN SULFATE 0.1 %
1 OINTMENT (GRAM) TOPICAL
Refills: 0 | Status: DISCONTINUED | OUTPATIENT
Start: 2021-07-23 | End: 2021-07-26

## 2021-07-23 RX ORDER — PIPERACILLIN AND TAZOBACTAM 4; .5 G/20ML; G/20ML
3.38 INJECTION, POWDER, LYOPHILIZED, FOR SOLUTION INTRAVENOUS ONCE
Refills: 0 | Status: COMPLETED | OUTPATIENT
Start: 2021-07-23 | End: 2021-07-23

## 2021-07-23 RX ORDER — SODIUM CHLORIDE 9 MG/ML
1000 INJECTION INTRAMUSCULAR; INTRAVENOUS; SUBCUTANEOUS
Refills: 0 | Status: DISCONTINUED | OUTPATIENT
Start: 2021-07-23 | End: 2021-07-28

## 2021-07-23 RX ORDER — LEVOTHYROXINE SODIUM 125 MCG
137 TABLET ORAL DAILY
Refills: 0 | Status: DISCONTINUED | OUTPATIENT
Start: 2021-07-23 | End: 2021-07-28

## 2021-07-23 RX ORDER — SODIUM CHLORIDE 9 MG/ML
1000 INJECTION INTRAMUSCULAR; INTRAVENOUS; SUBCUTANEOUS
Refills: 0 | Status: DISCONTINUED | OUTPATIENT
Start: 2021-07-23 | End: 2021-07-23

## 2021-07-23 RX ORDER — CALCIUM GLUCONATE 100 MG/ML
2 VIAL (ML) INTRAVENOUS ONCE
Refills: 0 | Status: COMPLETED | OUTPATIENT
Start: 2021-07-23 | End: 2021-07-23

## 2021-07-23 RX ORDER — HEPARIN SODIUM 5000 [USP'U]/ML
1700 INJECTION INTRAVENOUS; SUBCUTANEOUS
Qty: 25000 | Refills: 0 | Status: DISCONTINUED | OUTPATIENT
Start: 2021-07-23 | End: 2021-07-24

## 2021-07-23 RX ADMIN — Medication 200 GRAM(S): at 16:02

## 2021-07-23 RX ADMIN — PIPERACILLIN AND TAZOBACTAM 25 GRAM(S): 4; .5 INJECTION, POWDER, LYOPHILIZED, FOR SOLUTION INTRAVENOUS at 23:55

## 2021-07-23 RX ADMIN — Medication 166.67 MILLIGRAM(S): at 17:29

## 2021-07-23 RX ADMIN — Medication 1 TABLET(S): at 12:19

## 2021-07-23 RX ADMIN — Medication 1 APPLICATION(S): at 05:20

## 2021-07-23 RX ADMIN — GABAPENTIN 200 MILLIGRAM(S): 400 CAPSULE ORAL at 05:20

## 2021-07-23 RX ADMIN — GABAPENTIN 200 MILLIGRAM(S): 400 CAPSULE ORAL at 13:24

## 2021-07-23 RX ADMIN — Medication 20 MILLIEQUIVALENT(S): at 08:06

## 2021-07-23 RX ADMIN — PIPERACILLIN AND TAZOBACTAM 25 GRAM(S): 4; .5 INJECTION, POWDER, LYOPHILIZED, FOR SOLUTION INTRAVENOUS at 13:37

## 2021-07-23 RX ADMIN — PIPERACILLIN AND TAZOBACTAM 200 GRAM(S): 4; .5 INJECTION, POWDER, LYOPHILIZED, FOR SOLUTION INTRAVENOUS at 11:13

## 2021-07-23 RX ADMIN — HEPARIN SODIUM 17 UNIT(S)/HR: 5000 INJECTION INTRAVENOUS; SUBCUTANEOUS at 22:04

## 2021-07-23 RX ADMIN — Medication 137 MICROGRAM(S): at 05:19

## 2021-07-23 RX ADMIN — Medication 1 APPLICATION(S): at 17:22

## 2021-07-23 RX ADMIN — PANTOPRAZOLE SODIUM 40 MILLIGRAM(S): 20 TABLET, DELAYED RELEASE ORAL at 05:19

## 2021-07-23 NOTE — CONSULT NOTE ADULT - ASSESSMENT
61y male with history of thyroid cancer status post total thyroidectomy in 2010 and radioactive iodine treatment in 2011, hypogonadism, vitamin D deficiency, and osteopenia, with chronic constipation  right buttock and right scrotal pain and numbness. 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 The chordoma resulted in perineal numbness and overflow incontinence and he was admitted 7/7/21 for staged resection. On 7/7, he underwent Plastic Surgery and General Surgery assisted vertical rectus abdominal myocutaneous flap harvest with transperitoneal ligation of internal iliac artery and vein.  s/p en bloc sacrectomy/   with L4-pelvis fusion; EBL 4.5L status post 8 units PRBC, 6 units FFP, 1 pack platelets and 5L crystalloid; 7/8. Extubated 7/9 with hypoxia respiratory distress, desaturation CPAP  at night CTA revealed bilateral sub  segmental PE No right heart strain. & Bibasilar and left lingular consolidative opacities . Started on Heparin gtt. PTT goal 50-70 Treated with Levaquin for 7 days Course further c/b ileus requiring gastric decompression . Pathology pending s/p inferior sacral wound debridement at bedside 7/20/21 . He is reported to have leukocytosis. ID consulted.  Plastic surgery note review they write in their note posterior incision noted to have drainage may need posterior washout of wound. For this reason the patient was started on empiric Vanco and Zosyn, Vital reviewed no fevers recorded here, but noted that wbc has continued to trend up to 21K, UA is clear so doubt uti, respiratory cx taken on 7.12 grew Enterobacter. He was treated with IV Levaquin from 7.14-->7/20 as per orders.   explanation for leukocytosis includes reactive from ileus is abdomen is distend feels tight on exam, or could be related to wound in sacrum incision as described by plastic surgery, they are recommending possible washout of wound, so multifactorial reasons for wbc elevation    Plan   continue Vanco and Zosyn IV for now as ordered by the plastics team   Recommend CT A/P for leukocytosis rising and distended abdomen ongoing ileus? vs infection?  follow up the blood cx that were ordered   reviewed case and above recs with Neurosurgery ANTONIO Pino

## 2021-07-23 NOTE — CONSULT NOTE ADULT - SUBJECTIVE AND OBJECTIVE BOX
HPI:   Patient is a 61y male with history of thyroid cancer status post total thyroidectomy in  and radioactive iodine treatment in , hypogonadism, vitamin D deficiency, and osteopenia, with chronic constipation  right buttock and right scrotal pain and numbness. 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 The chordoma resulted in perineal numbness and overflow incontinence and he was admitted 21 for staged resection. On , he underwent Plastic Surgery and General Surgery assisted vertical rectus abdominal myocutaneous flap harvest with transperitoneal ligation of internal iliac artery and vein.  s/p en bloc sacrectomy/   with L4-pelvis fusion; EBL 4.5L status post 8 units PRBC, 6 units FFP, 1 pack platelets and 5L crystalloid; . Extubated  with hypoxia respiratory distress, desaturation CPAP  at night CTA revealed bilateral sub  segmental PE No right heart strain. & Bibasilar and left lingular consolidative opacities . Started on Heparin gtt. PTT goal 50-70 Treated with Levaquin for 7 days Course further c/b ileus requiring gastric decompression . Pathology pending s/p inferior sacral wound debridement at bedside 21 . He is reported to have leukocytosis. ID consulted.    REVIEW OF SYSTEMS:  All other review of systems negative (Comprehensive ROS)    PAST MEDICAL & SURGICAL HISTORY:  HLD (hyperlipidemia)    Thyroid cancer      History of central serous retinopathy    H/O hypogonadism    H/O vitamin D deficiency    H/O osteopenia    H/O thyroidectomy  Total --    H/O colonoscopy        Allergies    No Known Allergies    Intolerances            FAMILY HISTORY:      SOCIAL HISTORY:  Smoking:     ETOH:     Drug Use:     Single     T(F): 98.1 (21 @ 11:00), Max: 98.3 (21 @ 14:22)  HR: 90 (21 @ 11:00)  BP: 129/72 (21 @ 11:00)  RR: 18 (21 @ 11:00)  SpO2: 97% (21 @ 11:00)  Wt(kg): --    PHYSICAL EXAM:  General: alert, no acute distress  Eyes:  anicteric, no conjunctival injection, no discharge  Oropharynx: no lesions or injection 	  Neck: supple, without adenopathy  Lungs: clear to auscultation  Heart: regular rate and rhythm; no murmur, rubs or gallops  Abdomen: soft, distended, nontender, without mass or organomegaly  incision site is clean and dry intact   Skin: no lesions  Extremities: no clubbing, cyanosis, or edema  Neurologic: alert, oriented, he is lying in bed     LAB RESULTS:                        10.7   21.60 )-----------( 617      ( 2021 06:17 )             33.9     07-23    139  |  101  |  14  ----------------------------<  112<H>  3.8   |  21<L>  |  0.66    Ca    8.7      2021 06:15    TPro  6.5  /  Alb  2.9<L>  /  TBili  0.3  /  DBili  0.1  /  AST  23  /  ALT  26  /  AlkPhos  88  07-    LIVER FUNCTIONS - ( 2021 05:29 )  Alb: 2.9 g/dL / Pro: 6.5 g/dL / ALK PHOS: 88 U/L / ALT: 26 U/L / AST: 23 U/L / GGT: x           Urinalysis Basic - ( 2021 14:21 )    Color: Yellow / Appearance: Clear / S.018 / pH: x  Gluc: x / Ketone: Negative  / Bili: Negative / Urobili: Negative   Blood: x / Protein: Negative / Nitrite: Negative   Leuk Esterase: Negative / RBC: x / WBC x   Sq Epi: x / Non Sq Epi: x / Bacteria: x        MICROBIOLOGY:  RECENT CULTURES:        RADIOLOGY REVIEWED:      Antimicrobials Day #    piperacillin/tazobactam IVPB.. 3.375 Gram(s) IV Intermittent every 8 hours  vancomycin  IVPB 1250 milliGRAM(s) IV Intermittent every 12 hours    Other Medications:  acetaminophen   Tablet .. 650 milliGRAM(s) Oral every 6 hours PRN  aluminum hydroxide/magnesium hydroxide/simethicone Suspension 30 milliLiter(s) Oral every 4 hours PRN  bisacodyl Suppository 10 milliGRAM(s) Rectal daily PRN  calcium carbonate 1250 mG  + Vitamin D (OsCal 500 + D) 1 Tablet(s) Oral daily  gabapentin 200 milliGRAM(s) Oral every 8 hours  gentamicin 0.1% Ointment 1 Application(s) Topical two times a day  heparin  Infusion 1000 Unit(s)/Hr IV Continuous <Continuous>  lactated ringers. 1000 milliLiter(s) IV Continuous <Continuous>  levothyroxine 137 MICROGram(s) Oral daily  multivitamin 1 Tablet(s) Oral daily  ondansetron Injectable 4 milliGRAM(s) IV Push every 6 hours PRN  pantoprazole    Tablet 40 milliGRAM(s) Oral before breakfast  polyethylene glycol 3350 17 Gram(s) Oral every 12 hours  senna 2 Tablet(s) Oral at bedtime

## 2021-07-23 NOTE — PROGRESS NOTE ADULT - SUBJECTIVE AND OBJECTIVE BOX
Jesse Rodriguez   Pager [phone]  Office [phone]      CC: Patient is a 61y old  Male who presents with a chief complaint of staged sacral tumor removal (2021 08:29)      SUBJECTIVE / OVERNIGHT EVENTS:    MEDICATIONS  (STANDING):  calcium carbonate 1250 mG  + Vitamin D (OsCal 500 + D) 1 Tablet(s) Oral daily  gabapentin 200 milliGRAM(s) Oral every 8 hours  gentamicin 0.1% Ointment 1 Application(s) Topical two times a day  heparin  Infusion 1000 Unit(s)/Hr (17 mL/Hr) IV Continuous <Continuous>  lactated ringers. 1000 milliLiter(s) (75 mL/Hr) IV Continuous <Continuous>  levothyroxine 137 MICROGram(s) Oral daily  multivitamin 1 Tablet(s) Oral daily  pantoprazole    Tablet 40 milliGRAM(s) Oral before breakfast  polyethylene glycol 3350 17 Gram(s) Oral every 12 hours  senna 2 Tablet(s) Oral at bedtime    MEDICATIONS  (PRN):  acetaminophen   Tablet .. 650 milliGRAM(s) Oral every 6 hours PRN Temp greater or equal to 38C (100.4F), Mild Pain (1 - 3)  aluminum hydroxide/magnesium hydroxide/simethicone Suspension 30 milliLiter(s) Oral every 4 hours PRN Dyspepsia  bisacodyl Suppository 10 milliGRAM(s) Rectal daily PRN Constipation  ondansetron Injectable 4 milliGRAM(s) IV Push every 6 hours PRN Nausea      Vital Signs Last 24 Hrs  T(C): 36.6 (2021 07:20), Max: 36.8 (2021 14:22)  T(F): 97.8 (2021 07:20), Max: 98.3 (2021 14:22)  HR: 88 (2021 07:20) (68 - 100)  BP: 124/72 (2021 07:20) (123/74 - 129/78)  BP(mean): --  RR: 18 (2021 07:20) (18 - 19)  SpO2: 96% (2021 07:20) (93% - 97%)  CAPILLARY BLOOD GLUCOSE        I&O's Summary    2021 07:01  -  2021 07:00  --------------------------------------------------------  IN: 814 mL / OUT: 3240 mL / NET: -2426 mL      tele:    PHYSICAL EXAM:    GENERAL: NAD   HEENT: EOMI, PERRL  PULM: Clear to auscultation bilaterally  CV: Regular rate and rhythm; nl S1, S2; No murmurs, rubs, or gallops  ABDOMEN: Soft, Nontender, Nondistended; Bowel sounds present  EXTREMITIES/MSK:  No edema, calf tenderness   PSYCH: AAOx3  NEUROLOGY: non-focal          LABS:                        10.7   21.60 )-----------( 617      ( 2021 06:17 )             33.9     07-23    139  |  101  |  14  ----------------------------<  112<H>  3.8   |  21<L>  |  0.66    Ca    8.7      2021 06:15    TPro  6.5  /  Alb  2.9<L>  /  TBili  0.3  /  DBili  0.1  /  AST  23  /  ALT  26  /  AlkPhos  88  07-22    PT/INR - ( 2021 01:45 )   PT: 14.5 sec;   INR: 1.22 ratio         PTT - ( 2021 06:17 )  PTT:52.5 sec      Urinalysis Basic - ( 2021 14:21 )    Color: Yellow / Appearance: Clear / S.018 / pH: x  Gluc: x / Ketone: Negative  / Bili: Negative / Urobili: Negative   Blood: x / Protein: Negative / Nitrite: Negative   Leuk Esterase: Negative / RBC: x / WBC x   Sq Epi: x / Non Sq Epi: x / Bacteria: x          RADIOLOGY & ADDITIONAL TESTS:    Imaging Personally Reviewed:    Consultant(s) Notes Reviewed:      Care Discussed with Consultants/Other Providers:   Jesse Rodriguez   Pager [phone]  Office [phone]      CC: Patient is a 61y old  Male who presents with a chief complaint of staged sacral tumor removal (2021 08:29)      SUBJECTIVE / OVERNIGHT EVENTS: denies any f/c/r/cp/sob/n/v/abd pain. Abd bloating after oral intake resolved. No HA. Blood tinged stool overnight per nurse    MEDICATIONS  (STANDING):  calcium carbonate 1250 mG  + Vitamin D (OsCal 500 + D) 1 Tablet(s) Oral daily  gabapentin 200 milliGRAM(s) Oral every 8 hours  gentamicin 0.1% Ointment 1 Application(s) Topical two times a day  heparin  Infusion 1000 Unit(s)/Hr (17 mL/Hr) IV Continuous <Continuous>  lactated ringers. 1000 milliLiter(s) (75 mL/Hr) IV Continuous <Continuous>  levothyroxine 137 MICROGram(s) Oral daily  multivitamin 1 Tablet(s) Oral daily  pantoprazole    Tablet 40 milliGRAM(s) Oral before breakfast  polyethylene glycol 3350 17 Gram(s) Oral every 12 hours  senna 2 Tablet(s) Oral at bedtime    MEDICATIONS  (PRN):  acetaminophen   Tablet .. 650 milliGRAM(s) Oral every 6 hours PRN Temp greater or equal to 38C (100.4F), Mild Pain (1 - 3)  aluminum hydroxide/magnesium hydroxide/simethicone Suspension 30 milliLiter(s) Oral every 4 hours PRN Dyspepsia  bisacodyl Suppository 10 milliGRAM(s) Rectal daily PRN Constipation  ondansetron Injectable 4 milliGRAM(s) IV Push every 6 hours PRN Nausea      Vital Signs Last 24 Hrs  T(C): 36.6 (2021 07:20), Max: 36.8 (2021 14:22)  T(F): 97.8 (2021 07:20), Max: 98.3 (2021 14:22)  HR: 88 (2021 07:20) (68 - 100)  BP: 124/72 (2021 07:20) (123/74 - 129/78)  BP(mean): --  RR: 18 (2021 07:20) (18 - 19)  SpO2: 96% (2021 07:20) (93% - 97%)  CAPILLARY BLOOD GLUCOSE        I&O's Summary    2021 07:01  -  2021 07:00  --------------------------------------------------------  IN: 814 mL / OUT: 3240 mL / NET: -2426 mL          PHYSICAL EXAM:    GENERAL: NAD   HEENT: EOMI, PERRL  PULM: Clear to auscultation bilaterally  CV: Regular rate and rhythm; nl S1, S2; No murmurs, rubs, or gallops  ABDOMEN: Soft, Nontender, Nondistended; Bowel sounds present  EXTREMITIES/MSK:  No edema, calf tenderness   PSYCH: AAOx3  NEUROLOGY: non-focal  SKIN: no erythema/induration          LABS:                        10.7   21.60 )-----------( 617      ( 2021 06:17 )             33.9     07-23    139  |  101  |  14  ----------------------------<  112<H>  3.8   |  21<L>  |  0.66    Ca    8.7      2021 06:15    TPro  6.5  /  Alb  2.9<L>  /  TBili  0.3  /  DBili  0.1  /  AST  23  /  ALT  26  /  AlkPhos  88  07-22    PT/INR - ( 2021 01:45 )   PT: 14.5 sec;   INR: 1.22 ratio         PTT - ( 2021 06:17 )  PTT:52.5 sec      Urinalysis Basic - ( 2021 14:21 )    Color: Yellow / Appearance: Clear / S.018 / pH: x  Gluc: x / Ketone: Negative  / Bili: Negative / Urobili: Negative   Blood: x / Protein: Negative / Nitrite: Negative   Leuk Esterase: Negative / RBC: x / WBC x   Sq Epi: x / Non Sq Epi: x / Bacteria: x          RADIOLOGY & ADDITIONAL TESTS:    Imaging Personally Reviewed:    Consultant(s) Notes Reviewed:      Care Discussed with Consultants/Other Providers: neurosurg regard leukocytosis

## 2021-07-23 NOTE — PROGRESS NOTE ADULT - PROBLEM SELECTOR PLAN 4
resolved. tolerating po f/u blood cx. rec ID consult. may need imaging. if symptoms develop, would start Vanco 1g q 12 and Cefepime 2g q 8.

## 2021-07-23 NOTE — PROGRESS NOTE ADULT - PROBLEM SELECTOR PLAN 2
cont AC for PE. incentive spirometry emphasized to patient. taper down O2 as tolerated- now down to 2L nc by me and saturation is 94% cont AC

## 2021-07-23 NOTE — CONSULT NOTE ADULT - ASSESSMENT
Mr. Jackson is a 61 year old male with PMH of Thyroid Cancer, s/p Total Thyroidectomy 2010 who is post-op day 15 after two major surgeries complicated by major intraoperative blood loss, post-op ileus, and bilateral subsegmental PE. Pulmonology was consulted for pulmonary clearance for colostomy.     Pulmonary Clearance for Non-urgent Procedure  Atelectasis, Pain-Induced   Patient's SOB is most likely secondary to atelectasis in the setting of chronic pain. Pain from his recent abdominal procedure has prevented him from taking deep inspirations, thus causing alveolar collapse represented on CT and Xray. SOB is not likely related to the emboli as they are small and not significant enough to cause profound SOB. Suspicion for Pneumonia is low but cannot be ruled out in the setting of increasing white count without other source. His increasing WBC/platelet count is most likely a reactive leukocytosis/thrombocyctosis. Once TTE is completed to rule out right heart strain, patient will be okay to proceed with procedure.    Plan  - ABG does not show hypercapnia, most recent xray does not show interval change (bilateral atelectais)  - Please encourage incentive spirometry, Acapella use   - Will defer clearance until TTE is done to evaluate for right heart strain in the setting of bilateral pulmonary emboli

## 2021-07-23 NOTE — PROGRESS NOTE ADULT - SUBJECTIVE AND OBJECTIVE BOX
INTERVAL HPI/OVERNIGHT EVENTS:  Patient awake in bed , PT staff bedside , patient indicates he is OK and will going to OR for a "wash-out"    MEDICATIONS  (STANDING):  calcium carbonate 1250 mG  + Vitamin D (OsCal 500 + D) 1 Tablet(s) Oral daily  gabapentin 200 milliGRAM(s) Oral every 8 hours  gentamicin 0.1% Ointment 1 Application(s) Topical two times a day  heparin  Infusion 1700 Unit(s)/Hr (17 mL/Hr) IV Continuous <Continuous>  levothyroxine 137 MICROGram(s) Oral daily  multivitamin 1 Tablet(s) Oral daily  pantoprazole    Tablet 40 milliGRAM(s) Oral before breakfast  piperacillin/tazobactam IVPB.. 3.375 Gram(s) IV Intermittent every 8 hours  sodium chloride 0.9%. 1000 milliLiter(s) (75 mL/Hr) IV Continuous <Continuous>  vancomycin  IVPB 1250 milliGRAM(s) IV Intermittent every 12 hours    MEDICATIONS  (PRN):  acetaminophen   Tablet .. 650 milliGRAM(s) Oral every 6 hours PRN Temp greater or equal to 38C (100.4F), Mild Pain (1 - 3)  aluminum hydroxide/magnesium hydroxide/simethicone Suspension 30 milliLiter(s) Oral every 4 hours PRN Dyspepsia  ondansetron Injectable 4 milliGRAM(s) IV Push every 6 hours PRN Nausea      Allergies    No Known Allergies    Intolerances        Review of Systems:    General:  No fevers or chillsENT:  No sore throat, pain, runny nose, dysphagia  CV:  No chest pain p  Resp:  No cough or wheeze   GI: reports having bowel movements       Vital Signs Last 24 Hrs  T(C): 36.6 (2021 15:23), Max: 36.7 (2021 04:38)  T(F): 97.8 (2021 15:23), Max: 98.1 (2021 11:00)  HR: 86 (2021 15:23) (86 - 100)  BP: 122/74 (2021 15:23) (122/74 - 129/78)  BP(mean): --  RR: 18 (2021 15:23) (18 - 18)  SpO2: 93% (2021 15:23) (93% - 97%)    PHYSICAL EXAM:    Constitutional: non toxic and in NAD  Respiratory: anterior chest wall clear to auscultation   Cardiovascular: S1 and S2, RRR,  Gastrointestinal: soft , with +BS wound noted wound vacs tubing around to surgical site   Extremities: No peripheral edema  Neurological: Alert , no focal deficits  Psychiatric: Normal mood, normal affect  Skin: No visible rash      LABS:                        10.7   21.60 )-----------( 617      ( 2021 06:17 )             33.9     Hemoglobin: 10.7 g/dL *L* [13.0 - 17.0] ( @ 06:17)  Hemoglobin: 10.4 g/dL *L* [13.0 - 17.0] ( 05:28)  Hemoglobin: 10.2 g/dL *L* [13.0 - 17.0] ( @ 05:49)  Hemoglobin: 10.3 g/dL *L* [13.0 - 17.0] ( 05:49)          Bilirubin: Negative ( 14:21)  Albumin, Serum: 2.9 g/dL *L* [3.3 - 5.0] ( @ 05:29)  Aspartate Aminotransferase (AST/SGOT): 23 U/L [10 - 40] ( @ 05:29)  Alanine Aminotransferase (ALT/SGPT): 26 U/L [10 - 45] ( 05:29)          139  |  101  |  14  ----------------------------<  112<H>  3.8   |  21<L>  |  0.66    Ca    8.7      2021 06:15    TPro  6.5  /  Alb  2.9<L>  /  TBili  0.3  /  DBili  0.1  /  AST  23  /  ALT  26  /  AlkPhos  88      PT/INR - ( 2021 01:45 )   PT: 14.5 sec;   INR: 1.22 ratio         PTT - ( 2021 06:17 )  PTT:52.5 sec  Urinalysis Basic - ( 2021 14:21 )    Color: Yellow / Appearance: Clear / S.018 / pH: x  Gluc: x / Ketone: Negative  / Bili: Negative / Urobili: Negative   Blood: x / Protein: Negative / Nitrite: Negative   Leuk Esterase: Negative / RBC: x / WBC x   Sq Epi: x / Non Sq Epi: x / Bacteria: x      LIVER FUNCTIONS - ( 2021 05:29 )  Alb: 2.9 g/dL / Pro: 6.5 g/dL / ALK PHOS: 88 U/L / ALT: 26 U/L / AST: 23 U/L / GGT: x             RADIOLOGY & ADDITIONAL TESTS:  < from: CT Abdomen and Pelvis w/ Oral Cont and w/ IV Cont (21 @ 14:56) >  EXAM:  CT ABDOMEN AND PELVIS OC IC                            PROCEDURE DATE:  2021            INTERPRETATION:  CLINICAL INFORMATION: Leukocytosis, evaluate for intra-abdominal infection.    COMPARISON: CT pelvis 2021. CTA chest 7/10/2021, CT abdomen and pelvis 6/3/2021.    CONTRAST/COMPLICATIONS:  IV Contrast: Omnipaque 350  90 cc administered   10 cc discarded  Oral Contrast: Omnipaque 300  Complications: None reported    PROCEDURE:  CT of the Abdomen and Pelvis was performed.  Sagittal and coronal reformats were performed.    FINDINGS:  LOWER CHEST: Trace right pleural effusion. Unchanged bilateral lower lobe consolidation, likely atelectasis.    LIVER: Within normal limits.  BILE DUCTS: Normal caliber.  GALLBLADDER: Within normal limits.  SPLEEN: Within normal limits.  PANCREAS: Within normal limits.  ADRENALS: Within normal limits.  KIDNEYS/URETERS: No hydronephrosis. Left renal hypodensities too small to characterize.    BLADDER: Barnett catheter.  REPRODUCTIVE ORGANS: Prostate within normal limits.    BOWEL: No bowel obstruction. Appendix is normal. Mild rectal thickening with mild perirectal standing.  PERITONEUM: No ascites.  VESSELS: Within normal limits.  RETROPERITONEUM/LYMPH NODES: No lymphadenopathy.  ABDOMINALWALL: Postsurgical changes in the ventral abdominal wall with drainage catheter. Postsurgical changes from  BONES: Streak artifact from lumbar spinal hardware limits evaluation. However there is ill-defined. Status post partial sacral resection with associated postoperative changes. Increased fluid in the sacral/presacral space, measuring 10.7 x 2.1 cm, previously 6.2 x 0.9 cm.    IMPRESSION:  Status post partial sacral resection and lumbosacral iliac fusion with associated postoperative changes and drainage catheter. Increased sacral/presacral fluid collection. Superimposed infection is not excluded.    Mild rectal thickening with mild perirectal standing, may represent proctitis.    --- End of Report ---              DOMINIC BASILIO MD; Resident Radiology  This document has been electronically signed.  ASHLEY BARBOSA MD; Attending Radiologist  This document has been electronically signed. 2021  3:57PM    < end of copied text >

## 2021-07-23 NOTE — CONSULT NOTE ADULT - SUBJECTIVE AND OBJECTIVE BOX
CHIEF COMPLAINT: back pain 2/2 to bone malignancy     HPI: Mr. Jackson is a 61 year old male with PMH of Thyroid Cancer, s/p Total Thyroidectomy  and Radioactive Iodine , Hypogonadism, Vitamin D Deficiency, Osteopenia with recently diagnosed Sacral Tumor after months of back pain. On , he underwent Surgery assisted vertical rectus abdominal myocutaneous flap harvest with transperitoneal ligation of internal iliac artery and vein (VRAM procedure). On  he underwent an en bloc sacrectomy for resection of chordoma w/ L4-pelvis instrumentation and fusion and plastics closure complicated by large volume blood loss and post-op ileus. Pulmonology was consulted for colostomy procedure clearance. Procedure is indicated because of perianal overflow incontinence 2/2 to chordoma. Patient began to have SOB after the procedure on . CTA was completed on 7/10 which showed small subsegmental bilateral PE's and bibasilar atelectasis. He was started on a 7 day course of Levaquin due to concerns about pneumonia in the setting of a increasing WBC. He denies cough, fever, night sweats, sick contacts, and recent travel. At baseline, he can walk up numerous flights of stairs and has lived an active life. He has never smoked, has no significant work exposures. Prior to this admission, he denies ever having SOB and has never had pulmonary-related medical problems.       PAST MEDICAL & SURGICAL HISTORY:  HLD (hyperlipidemia)    Thyroid cancer      History of central serous retinopathy    H/O hypogonadism    H/O vitamin D deficiency    H/O osteopenia    H/O thyroidectomy  Total --    H/O colonoscopy        FAMILY HISTORY:  Brother - Colon Cancer       SOCIAL HISTORY:  Smoking: [x ] Never Smoked [ ] Former Smoker (__ packs x ___ years) [ ] Current Smoker  (__ packs x ___ years)  Substance Use: [x ] Never Used [ ] Used ____  EtOH Use: occasional (1/week)  Marital Status: NA  Sexual History: NA  Occupation: Retired   Recent Travel: No   Country of Birth:   Advance Directives: NA    Allergies    No Known Allergies    Intolerances        HOME MEDICATIONS:  Home Medications:  Calcium 600+D 600 mg-200 intl units (5 mcg) oral tablet: 1 tab(s) orally once a day (2021 06:31)  Fish Oil 1000 mg oral capsule: 1 cap(s) orally once a day (2021 06:31)  Synthroid 125 mcg (0.125 mg) oral tablet: 1 tab(s) orally once a day (2021 06:31)  TriCor 134 mg oral capsule: 1 cap(s) orally once a day (2021 06:31)  Vitamin D3 2000 intl units (50 mcg) oral tablet: orally once a day (2021 06:31)      REVIEW OF SYSTEMS:  Constitutional: [x ] negative [ ] fevers [ ] chills [ ] weight loss [ ] weight gain  HEENT: [x ] negative [ ] dry eyes [ ] eye irritation [ ] postnasal drip [ ] nasal congestion  CV: [x ] negative  [ ] chest pain [ ] orthopnea [ ] palpitations [ ] murmur  Resp: [ ] negative [ ] cough [x ] shortness of breath [ ] dyspnea [ ] wheezing [ ] sputum [ ] hemoptysis  GI: [x ] negative [ ] nausea [ ] vomiting [ ] diarrhea [ ] constipation [ ] abd pain [ ] dysphagia   : [ x] negative [ ] dysuria [ ] nocturia [ ] hematuria [ ] increased urinary frequency  Musculoskeletal: [ ] negative [x ] back pain [ ] myalgias [ ] arthralgias [ ] fracture  Skin: [x ] negative [ ] rash [ ] itch  Neurological: x[ ] negative [ ] headache [ ] dizziness [ ] syncope [ ] weakness [ ] numbness  Psychiatric: [x ] negative [ ] anxiety [ ] depression  Endocrine: [x ] negative [ ] diabetes [ ] thyroid problem  Hematologic/Lymphatic: [x ] negative [ ] anemia [ ] bleeding problem  Allergic/Immunologic: [x ] negative [ ] itchy eyes [ ] nasal discharge [ ] hives [ ] angioedema  [ ] All other systems negative  [ ] Unable to assess ROS because ________    OBJECTIVE:  Vital Signs Last 24 Hrs  T(C): 36.6 (2021 15:23), Max: 36.7 (2021 04:38)  T(F): 97.8 (2021 15:23), Max: 98.1 (2021 11:00)  HR: 86 (2021 15:23) (86 - 100)  BP: 122/74 (2021 15:23) (122/74 - 129/78)  BP(mean): --  ABP: --  ABP(mean): --  RR: 18 (2021 15:23) (18 - 18)  SpO2: 93% (2021 15:23) (93% - 97%)         @ 07:01  -   @ 07:00  --------------------------------------------------------  IN: 814 mL / OUT: 3240 mL / NET: -2426 mL          PHYSICAL EXAM:  General: Awake, alert, in no acute distress  HEENT: Normocephalic, atraumatic. EOMI. Moist mucus membranes.  Pulmonary: Symmetric chest rise. No extra work of breathing. Lungs clear to auscultation bilaterally.  Cardiovascular: Normal rate and regular rhythm. No murmurs/rubs/gallops  Abdominal: Soft, non-distended, tenderness from recent surgery   Skin: No evident rashes or lesions, large abdominal scar from surgery on   Neurologic: No focal defects  Psychiatric: Mood appropriate        HOSPITAL MEDICATIONS:  Standing Meds:  calcium carbonate 1250 mG  + Vitamin D (OsCal 500 + D) 1 Tablet(s) Oral daily  calcium gluconate IVPB 2 Gram(s) IV Intermittent once  gabapentin 200 milliGRAM(s) Oral every 8 hours  gentamicin 0.1% Ointment 1 Application(s) Topical two times a day  levothyroxine 137 MICROGram(s) Oral daily  multivitamin 1 Tablet(s) Oral daily  pantoprazole    Tablet 40 milliGRAM(s) Oral before breakfast  piperacillin/tazobactam IVPB.. 3.375 Gram(s) IV Intermittent every 8 hours  sodium chloride 0.9%. 1000 milliLiter(s) IV Continuous <Continuous>  vancomycin  IVPB 1250 milliGRAM(s) IV Intermittent every 12 hours      PRN Meds:  acetaminophen   Tablet .. 650 milliGRAM(s) Oral every 6 hours PRN  aluminum hydroxide/magnesium hydroxide/simethicone Suspension 30 milliLiter(s) Oral every 4 hours PRN  ondansetron Injectable 4 milliGRAM(s) IV Push every 6 hours PRN      LABS:                        10.7   21.60 )-----------( 617      ( 2021 06:17 )             33.9     Hgb Trend: 10.7<--, 10.4<--, 10.2<--, 9.9<--, 9.7<--  23    139  |  101  |  14  ----------------------------<  112<H>  3.8   |  21<L>  |  0.66    Ca    8.7      2021 06:15    TPro  6.5  /  Alb  2.9<L>  /  TBili  0.3  /  DBili  0.1  /  AST  23  /  ALT  26  /  AlkPhos  88      Creatinine Trend: 0.66<--, 0.76<--, 0.81<--, 0.80<--, 0.78<--, 0.71<--  PT/INR - ( 2021 01:45 )   PT: 14.5 sec;   INR: 1.22 ratio         PTT - ( 2021 06:17 )  PTT:52.5 sec  Urinalysis Basic - ( 2021 14:21 )    Color: Yellow / Appearance: Clear / S.018 / pH: x  Gluc: x / Ketone: Negative  / Bili: Negative / Urobili: Negative   Blood: x / Protein: Negative / Nitrite: Negative   Leuk Esterase: Negative / RBC: x / WBC x   Sq Epi: x / Non Sq Epi: x / Bacteria: x      Arterial Blood Gas:   @ 13:11  7.48/34/74/25/96/2.1  ABG lactate: --        MICROBIOLOGY:       RADIOLOGY:  [ ] Reviewed and interpreted by me    PULMONARY FUNCTION TESTS:    EKG:

## 2021-07-23 NOTE — PROGRESS NOTE ADULT - SUBJECTIVE AND OBJECTIVE BOX
Plastic Surgery Progress Note    Subjective:     Patient seen and examined at bedside. Patient with concerns regarding colostomy this AM. Addressed concerns and advised patient to ask questions to general surgery team as well, given they will better be able to answer regarding upcoming colostomy.  Abdominal aquacel dressing removed. Denies N/V/F/C.     OBJECTIVE:     T(C): 36.6 (07-23-21 @ 07:20), Max: 36.8 (07-22-21 @ 14:22)  HR: 88 (07-23-21 @ 07:20) (68 - 100)  BP: 124/72 (07-23-21 @ 07:20) (123/74 - 129/78)  RR: 18 (07-23-21 @ 07:20) (18 - 19)  SpO2: 96% (07-23-21 @ 07:20) (93% - 97%)  Wt(kg): --    I&O's Detail    22 Jul 2021 07:01  -  23 Jul 2021 07:00  --------------------------------------------------------  IN:    Heparin: 289 mL    Lactated Ringers: 525 mL  Total IN: 814 mL    OUT:    Bulb (mL): 15 mL    Drain (mL): 100 mL    Drain (mL): 25 mL    Indwelling Catheter - Urethral (mL): 3100 mL  Total OUT: 3240 mL    Total NET: -2426 mL          PHYSICAL EXAM:    GENERAL: NAD, lying in bed comfortably  CHEST/LUNG: Unlabored respirations  ABDOMEN: Soft, Nontender, Nondistended, midline incision w prineo tape in place, underlying incision cdi   NERVOUS SYSTEM:  Alert & Oriented X3, speech clear.   BACK: midline sacral incision w aquacel dressing in place c/d, inferior 6cm of incision w sutures in place, no drainage, no erythema, no swelling, gauze and tegaderm dressing replaced, rest of back flat, no palpable collections, hemovac accordian drains holding suction w SS output     MEDICATIONS  (STANDING):  calcium carbonate 1250 mG  + Vitamin D (OsCal 500 + D) 1 Tablet(s) Oral daily  gabapentin 200 milliGRAM(s) Oral every 8 hours  gentamicin 0.1% Ointment 1 Application(s) Topical two times a day  heparin  Infusion 1000 Unit(s)/Hr (17 mL/Hr) IV Continuous <Continuous>  lactated ringers. 1000 milliLiter(s) (75 mL/Hr) IV Continuous <Continuous>  levothyroxine 137 MICROGram(s) Oral daily  multivitamin 1 Tablet(s) Oral daily  pantoprazole    Tablet 40 milliGRAM(s) Oral before breakfast  polyethylene glycol 3350 17 Gram(s) Oral every 12 hours  senna 2 Tablet(s) Oral at bedtime    MEDICATIONS  (PRN):  acetaminophen   Tablet .. 650 milliGRAM(s) Oral every 6 hours PRN Temp greater or equal to 38C (100.4F), Mild Pain (1 - 3)  aluminum hydroxide/magnesium hydroxide/simethicone Suspension 30 milliLiter(s) Oral every 4 hours PRN Dyspepsia  bisacodyl Suppository 10 milliGRAM(s) Rectal daily PRN Constipation  ondansetron Injectable 4 milliGRAM(s) IV Push every 6 hours PRN Nausea      LABS:                          10.7   21.60 )-----------( 617      ( 23 Jul 2021 06:17 )             33.9     07-23    139  |  101  |  14  ----------------------------<  112<H>  3.8   |  21<L>  |  0.66    Ca    8.7      23 Jul 2021 06:15    TPro  6.5  /  Alb  2.9<L>  /  TBili  0.3  /  DBili  0.1  /  AST  23  /  ALT  26  /  AlkPhos  88  07-22    PT/INR - ( 22 Jul 2021 01:45 )   PT: 14.5 sec;   INR: 1.22 ratio         PTT - ( 23 Jul 2021 06:17 )  PTT:52.5 sec           Plastic Surgery Progress Note    Subjective:     Patient seen and examined at bedside. Patient with concerns regarding colostomy this AM. Addressed concerns and advised patient to ask questions to general surgery team as well, given they will better be able to answer regarding upcoming colostomy.  Abdominal aquacel dressing removed. Denies N/V/F/C.     OBJECTIVE:     T(C): 36.6 (07-23-21 @ 07:20), Max: 36.8 (07-22-21 @ 14:22)  HR: 88 (07-23-21 @ 07:20) (68 - 100)  BP: 124/72 (07-23-21 @ 07:20) (123/74 - 129/78)  RR: 18 (07-23-21 @ 07:20) (18 - 19)  SpO2: 96% (07-23-21 @ 07:20) (93% - 97%)  Wt(kg): --    I&O's Detail    22 Jul 2021 07:01  -  23 Jul 2021 07:00  --------------------------------------------------------  IN:    Heparin: 289 mL    Lactated Ringers: 525 mL  Total IN: 814 mL    OUT:    Bulb (mL): 15 mL    Drain (mL): 100 mL    Drain (mL): 25 mL    Indwelling Catheter - Urethral (mL): 3100 mL  Total OUT: 3240 mL    Total NET: -2426 mL          PHYSICAL EXAM:    GENERAL: NAD, lying in bed comfortably  CHEST/LUNG: Unlabored respirations  ABDOMEN: Soft, Nontender, Nondistended, midline incision w prineo tape in place, underlying incision cdi   NERVOUS SYSTEM:  Alert & Oriented X3, speech clear.   BACK: midline sacral incision w aquacel dressing in place c/d, inferior 6cm of incision w sutures in place, no drainage, no erythema, no swelling, gauze and tegaderm dressing replaced, rest of back flat, no palpable collections, hemovac accordian drains holding suction w SS output from L drain and cloudy/murky serosanguinous output from R drain    MEDICATIONS  (STANDING):  calcium carbonate 1250 mG  + Vitamin D (OsCal 500 + D) 1 Tablet(s) Oral daily  gabapentin 200 milliGRAM(s) Oral every 8 hours  gentamicin 0.1% Ointment 1 Application(s) Topical two times a day  heparin  Infusion 1000 Unit(s)/Hr (17 mL/Hr) IV Continuous <Continuous>  lactated ringers. 1000 milliLiter(s) (75 mL/Hr) IV Continuous <Continuous>  levothyroxine 137 MICROGram(s) Oral daily  multivitamin 1 Tablet(s) Oral daily  pantoprazole    Tablet 40 milliGRAM(s) Oral before breakfast  polyethylene glycol 3350 17 Gram(s) Oral every 12 hours  senna 2 Tablet(s) Oral at bedtime    MEDICATIONS  (PRN):  acetaminophen   Tablet .. 650 milliGRAM(s) Oral every 6 hours PRN Temp greater or equal to 38C (100.4F), Mild Pain (1 - 3)  aluminum hydroxide/magnesium hydroxide/simethicone Suspension 30 milliLiter(s) Oral every 4 hours PRN Dyspepsia  bisacodyl Suppository 10 milliGRAM(s) Rectal daily PRN Constipation  ondansetron Injectable 4 milliGRAM(s) IV Push every 6 hours PRN Nausea      LABS:                          10.7   21.60 )-----------( 617      ( 23 Jul 2021 06:17 )             33.9     07-23    139  |  101  |  14  ----------------------------<  112<H>  3.8   |  21<L>  |  0.66    Ca    8.7      23 Jul 2021 06:15    TPro  6.5  /  Alb  2.9<L>  /  TBili  0.3  /  DBili  0.1  /  AST  23  /  ALT  26  /  AlkPhos  88  07-22    PT/INR - ( 22 Jul 2021 01:45 )   PT: 14.5 sec;   INR: 1.22 ratio         PTT - ( 23 Jul 2021 06:17 )  PTT:52.5 sec

## 2021-07-23 NOTE — PROGRESS NOTE ADULT - SUBJECTIVE AND OBJECTIVE BOX
Patient was seen at bedside this am. Patient said his Rt foot numbness is improving. Denied any cp, sob, n/v, or abd pain.    OVERNIGHT EVENTS: No acute event overnight    Vital Signs Last 24 Hrs  T(C): 36.7 (2021 11:00), Max: 36.8 (2021 14:22)  T(F): 98.1 (2021 11:00), Max: 98.3 (2021 14:22)  HR: 90 (2021 11:00) (68 - 100)  BP: 129/72 (2021 11:00) (123/74 - 129/78)  BP(mean): --  RR: 18 (2021 11:00) (18 - 19)  SpO2: 97% (2021 11:00) (93% - 97%)    I&O's Detail    2021 07:01  -  2021 07:00  --------------------------------------------------------  IN:    Heparin: 289 mL    Lactated Ringers: 525 mL  Total IN: 814 mL    OUT:    Bulb (mL): 15 mL    Drain (mL): 100 mL    Drain (mL): 25 mL    Indwelling Catheter - Urethral (mL): 3100 mL  Total OUT: 3240 mL    Total NET: -2426 mL        I&O's Summary    2021 07:01  -  2021 07:00  --------------------------------------------------------  IN: 814 mL / OUT: 3240 mL / NET: -2426 mL        PHYSICAL EXAM:  Neurological:  awake, alert, oriented to person, place, and date, PERRL, face symmetrical, speech clear and fluent, following commands, moving all extremities 5/5, sensation intact to light touch  Cardiovascular: +s1, s2  Respiratory: clear to auscultation b/l  Gastrointestinal: soft, non-distended, non-tender  Genitourinary: tony  Extremities: warm, dry  Incision/Wound: incision site C/D/I, drain output for past 24 hours: SEDRICK-15ml, Rt HMV-100ml, Lt HMV-25ml    LABS:                        10.7   21.60 )-----------( 617      ( 2021 06:17 )             33.9     07-23    139  |  101  |  14  ----------------------------<  112<H>  3.8   |  21<L>  |  0.66    Ca    8.7      2021 06:15    TPro  6.5  /  Alb  2.9<L>  /  TBili  0.3  /  DBili  0.1  /  AST  23  /  ALT  26  /  AlkPhos  88  07-22    PT/INR - ( 2021 01:45 )   PT: 14.5 sec;   INR: 1.22 ratio         PTT - ( 2021 06:17 )  PTT:52.5 sec  Urinalysis Basic - ( 2021 14:21 )    Color: Yellow / Appearance: Clear / S.018 / pH: x  Gluc: x / Ketone: Negative  / Bili: Negative / Urobili: Negative   Blood: x / Protein: Negative / Nitrite: Negative   Leuk Esterase: Negative / RBC: x / WBC x   Sq Epi: x / Non Sq Epi: x / Bacteria: x          CAPILLARY BLOOD GLUCOSE          Drug Levels: [] N/A    CSF Analysis: [] N/A      Allergies    No Known Allergies    Intolerances      MEDICATIONS:  Antibiotics:  piperacillin/tazobactam IVPB.. 3.375 Gram(s) IV Intermittent every 8 hours  vancomycin  IVPB 1250 milliGRAM(s) IV Intermittent every 12 hours    Neuro:  acetaminophen   Tablet .. 650 milliGRAM(s) Oral every 6 hours PRN  gabapentin 200 milliGRAM(s) Oral every 8 hours  ondansetron Injectable 4 milliGRAM(s) IV Push every 6 hours PRN    Anticoagulation:  heparin  Infusion 1000 Unit(s)/Hr IV Continuous <Continuous>    OTHER:  aluminum hydroxide/magnesium hydroxide/simethicone Suspension 30 milliLiter(s) Oral every 4 hours PRN  bisacodyl Suppository 10 milliGRAM(s) Rectal daily PRN  gentamicin 0.1% Ointment 1 Application(s) Topical two times a day  levothyroxine 137 MICROGram(s) Oral daily  pantoprazole    Tablet 40 milliGRAM(s) Oral before breakfast  polyethylene glycol 3350 17 Gram(s) Oral every 12 hours  senna 2 Tablet(s) Oral at bedtime    IVF:  calcium carbonate 1250 mG  + Vitamin D (OsCal 500 + D) 1 Tablet(s) Oral daily  lactated ringers. 1000 milliLiter(s) IV Continuous <Continuous>  multivitamin 1 Tablet(s) Oral daily    CULTURES:  Culture Results:   Numerous Enterobacter aerogenes  Normal Respiratory Carlie present ( @ 21:06)    RADIOLOGY & ADDITIONAL TESTS:       Patient was seen at bedside this am. Patient said his Rt foot numbness is improving. Denied any cp, sob, n/v, or abd pain.    OVERNIGHT EVENTS: No acute event overnight    Vital Signs Last 24 Hrs  T(C): 36.7 (2021 11:00), Max: 36.8 (2021 14:22)  T(F): 98.1 (2021 11:00), Max: 98.3 (2021 14:22)  HR: 90 (2021 11:00) (68 - 100)  BP: 129/72 (2021 11:00) (123/74 - 129/78)  BP(mean): --  RR: 18 (2021 11:00) (18 - 19)  SpO2: 97% (2021 11:00) (93% - 97%)    I&O's Detail    2021 07:01  -  2021 07:00  --------------------------------------------------------  IN:    Heparin: 289 mL    Lactated Ringers: 525 mL  Total IN: 814 mL    OUT:    Bulb (mL): 15 mL    Drain (mL): 100 mL    Drain (mL): 25 mL    Indwelling Catheter - Urethral (mL): 3100 mL  Total OUT: 3240 mL    Total NET: -2426 mL        I&O's Summary    2021 07:01  -  2021 07:00  --------------------------------------------------------  IN: 814 mL / OUT: 3240 mL / NET: -2426 mL        PHYSICAL EXAM:  Neurological:  awake, alert, oriented to person, place, and date, PERRL, face symmetrical, speech clear and fluent, following commands, moving all extremities 5/5, sensation intact to light touch  Cardiovascular: +s1, s2  Respiratory: clear to auscultation b/l  Gastrointestinal: soft, non-distended, non-tender  Genitourinary: tony  Extremities: warm, dry  Incision/Wound: abd incision site C/D/I, erythema around Rt HMV drain, no bleeding or drainage noted, back incision wound dehiscence s/p sutures, no active drainage noted, but dressing changed multiple times due to stool incontinence;  drain output for past 24 hours: SEDRICK-15ml, Rt HMV-100ml, Lt HMV-25ml    LABS:                        10.7   21.60 )-----------( 617      ( 2021 06:17 )             33.9     07-    139  |  101  |  14  ----------------------------<  112<H>  3.8   |  21<L>  |  0.66    Ca    8.7      2021 06:15    TPro  6.5  /  Alb  2.9<L>  /  TBili  0.3  /  DBili  0.1  /  AST  23  /  ALT  26  /  AlkPhos  88  07-22    PT/INR - ( 2021 01:45 )   PT: 14.5 sec;   INR: 1.22 ratio         PTT - ( 2021 06:17 )  PTT:52.5 sec  Urinalysis Basic - ( 2021 14:21 )    Color: Yellow / Appearance: Clear / S.018 / pH: x  Gluc: x / Ketone: Negative  / Bili: Negative / Urobili: Negative   Blood: x / Protein: Negative / Nitrite: Negative   Leuk Esterase: Negative / RBC: x / WBC x   Sq Epi: x / Non Sq Epi: x / Bacteria: x          CAPILLARY BLOOD GLUCOSE          Drug Levels: [] N/A    CSF Analysis: [] N/A      Allergies    No Known Allergies    Intolerances      MEDICATIONS:  Antibiotics:  piperacillin/tazobactam IVPB.. 3.375 Gram(s) IV Intermittent every 8 hours  vancomycin  IVPB 1250 milliGRAM(s) IV Intermittent every 12 hours    Neuro:  acetaminophen   Tablet .. 650 milliGRAM(s) Oral every 6 hours PRN  gabapentin 200 milliGRAM(s) Oral every 8 hours  ondansetron Injectable 4 milliGRAM(s) IV Push every 6 hours PRN    Anticoagulation:  heparin  Infusion 1000 Unit(s)/Hr IV Continuous <Continuous>    OTHER:  aluminum hydroxide/magnesium hydroxide/simethicone Suspension 30 milliLiter(s) Oral every 4 hours PRN  bisacodyl Suppository 10 milliGRAM(s) Rectal daily PRN  gentamicin 0.1% Ointment 1 Application(s) Topical two times a day  levothyroxine 137 MICROGram(s) Oral daily  pantoprazole    Tablet 40 milliGRAM(s) Oral before breakfast  polyethylene glycol 3350 17 Gram(s) Oral every 12 hours  senna 2 Tablet(s) Oral at bedtime    IVF:  calcium carbonate 1250 mG  + Vitamin D (OsCal 500 + D) 1 Tablet(s) Oral daily  lactated ringers. 1000 milliLiter(s) IV Continuous <Continuous>  multivitamin 1 Tablet(s) Oral daily    CULTURES:  Culture Results:   Numerous Enterobacter aerogenes  Normal Respiratory Carlie present ( @ 21:06)    RADIOLOGY & ADDITIONAL TESTS:

## 2021-07-23 NOTE — CHART NOTE - NSCHARTNOTEFT_GEN_A_CORE
: Nadege Haque      INDICATION:  Shortness of breath      PROCEDURE:    [x ] LIMITED CHEST      FINDINGS:  Bilateral upper lung fields with A lines  Trace left sided pleural effusion  Bilateral lower lung hepatization     INTERPRETATION:  Bilateral lower lung field atelectasis    Images stored on [employer] : Nadege Haque      INDICATION:  Shortness of breath      PROCEDURE:    [x ] LIMITED CHEST      FINDINGS:  Bilateral upper lung fields with A lines  Trace left sided pleural effusion  Bilateral lower lung consolidation.    INTERPRETATION:  Bilateral lower lung field atelectasis    Images stored on [employer]

## 2021-07-23 NOTE — PROGRESS NOTE ADULT - PROBLEM SELECTOR PLAN 3
f/u blood cx. rec ID consult. may need imaging. if symptoms develop, would start Vanco 1g q 12 and Cefepime 2g q 8. cont AC for PE. incentive spirometry emphasized to patient. taper down O2 as tolerated- now down to 2L nc by me and saturation is 94%. would consult pulm for medical optimization prior to colostomy surgery

## 2021-07-23 NOTE — PROGRESS NOTE ADULT - ASSESSMENT
61 year-old man with history of thyroid cancer status post total thyroidectomy in 2010 and radioactive iodine treatment in 2011, hypogonadism, vitamin D deficiency, and osteopenia, with chronic constipation  right buttock and right scrotal pain and numbness. 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 The chordoma resulted in perineal numbness and overflow incontinence and he was admitted 7/7/21 for staged resection. On 7/7, he underwent Plastic Surgery and General Surgery assisted vertical rectus abdominal myocutaneous flap harvest with transperitoneal ligation of internal iliac artery and vein.  s/p en bloc sacrectomy/   with L4-pelvis fusion; EBL 4.5L status post 8 units PRBC, 6 units FFP, 1 pack platelets and 5L crystalloid; 7/8. Extubated 7/9 with hypoxia respiratory distress, desaturation CPAP  at night CTA revealed bilateral sub  segmental PE No right heart strain. & Bibasilar and left lingular consolidative opacities . Started on Heparin gtt. PTT goal 50-70 Treated with Levaquin for 7 days Course further c/b ileus requiring gastric decompression . Pathology pending s/p inferior sacral wound debridement at bedside 7/20/21 . Now with leucocytosis    Plan  - neuro and vital check Q4hrs  - continue to monitor drain output, plastic (Dr. Peraza) team following appreciated, Rt HMV drainage was noted to be cloudy, vanco/zosyn started for concerns of wound infection, will f/u with plastic surgery team regarding needs for wound washout  - pain control with tylenol prn, continue gabapentin  - leukocytosis, but afebrile, ID consult appreciated today, CT A/P pending per rec, UA negative, BCx pending  - stool incontinence, surgery team following, possible colostomy on Monday, will f/u regarding final date  - b/l subsegmental PE, satting well on 4L NC, continue heparin gtt with PTT goal 50-70, last PTT this am 52.5, will f/u daily PTT, pulm consult appreciated today for OR clearance, will f/u ABG/CXR/TTE per rec  - hypothyroidism, continue synthroid  - tolerating soft diet  - educated patient to avoid supine position while in bed to prevent pressure on the incision site, encourage to lay lateral positions, increase activity as tolerated  - incentive spirometer  - DVT ppx: b/l SCDs and SQL  - Dispo: PT/OT/PMR- acute rehab    will discuss above with Dr. Ary ceballos [phone]

## 2021-07-23 NOTE — PROGRESS NOTE ADULT - ASSESSMENT
Mr. Jackson is a 62 yo M with PMHx of thyroid cancer s/p total thyroidectomy (2010), HLD, Vit D deficiency, Osteopenia, and chordoma, who is now s/p Sacral sacrectomy and VRAM flap harvest (07/07, 07/08).    -Please place pt in lateral position and put as little pressure on Sacral wound as possible   -Please avoid stool softener  -Keep superficial drain in place until output is <30cc for 24 hr, prior to d/c hemovac will be switched to regular bulb   -Abdominal dressing removed by PRS 07/23-prineo still in place   -Cw aquacel on superior portion of posterior incision   -RN please change dressing at inferior aspect of incision 2x/day   -RN at time of dressing change, please apply topical gentamycin ointment 2x/day to inferior aspect of incision w nylon sutures   -RN may change inferior aspect of incision with dry gauze dressing as needed if soils 2/2 stool incontinence   -Minimal SS leakage surrounding drain insertion sites at back, gauze placed under tegaderm by PRS  -RN may change gauze/tegaderm at drain insertion sites b/l PRN   -rest of care per primary team   -PRS will continue to follow       Jomar Mendez MD   General Surgery Resident, PGY1  # [id number]    Mr. Jackson is a 60 yo M with PMHx of thyroid cancer s/p total thyroidectomy (2010), HLD, Vit D deficiency, Osteopenia, and chordoma, who is now s/p Sacral sacrectomy and VRAM flap harvest (07/07, 07/08).    -Please place pt in lateral position and put as little pressure on Sacral wound as possible   -Please avoid stool softener  -Zosyn/Vanc started  -will discuss w Nerusurgery need for washout of posterior incision   -Keep superficial drain in place until output is <30cc for 24 hr, prior to d/c hemovac will be switched to regular bulb   -Abdominal dressing removed by PRS 07/23-prineo still in place   -Cw aquacel on superior portion of posterior incision   -RN please change dressing at inferior aspect of incision 2x/day   -RN at time of dressing change, please apply topical gentamycin ointment 2x/day to inferior aspect of incision w nylon sutures   -RN may change inferior aspect of incision with dry gauze dressing as needed if soils 2/2 stool incontinence   -Minimal SS leakage surrounding drain insertion sites at back, gauze placed under tegaderm by PRS  -RN may change gauze/tegaderm at drain insertion sites b/l PRN   -rest of care per primary team   -PRS will continue to follow       Jomar Mendez MD   General Surgery Resident, PGY1  # [id number]

## 2021-07-23 NOTE — PROGRESS NOTE ADULT - ASSESSMENT
61 male with sacral tumor surgery / sacrectomy for chordoma being followed by Plastic Surgery for ongoing wound care.  Having bowel movements/he is fecal incontinence. No ileus pattern reported on recent CT dated 7/23/21   Elevating WBC count noted  Per patient he will be going for   a "wash-out " procedure. He said he will be getting a colostomy to aid in wound healing, scheduling of that surgery  per surgical service     Recommendations:  Monitor bowel frequency   Await surgical service scheduling for colostomy     Call GI Service at [phone] over weekend for any GI issues or concerns     SINDY Talley-BC  Covering [facility]  [address] [place] [address]  [address] [phone]

## 2021-07-23 NOTE — PROGRESS NOTE ADULT - PROBLEM SELECTOR PLAN 1
cont AC 7/8 sacrectomy with L4-pelvis fusion. if pt requires a colostomy, he would be considered medically optimized for planned procedure once he is optimized from an ID and pulm perspective

## 2021-07-24 LAB
ANION GAP SERPL CALC-SCNC: 15 MMOL/L — SIGNIFICANT CHANGE UP (ref 5–17)
APTT BLD: 40.1 SEC — HIGH (ref 27.5–35.5)
APTT BLD: 44.1 SEC — HIGH (ref 27.5–35.5)
APTT BLD: 47.4 SEC — HIGH (ref 27.5–35.5)
BUN SERPL-MCNC: 14 MG/DL — SIGNIFICANT CHANGE UP (ref 7–23)
CALCIUM SERPL-MCNC: 9.2 MG/DL — SIGNIFICANT CHANGE UP (ref 8.4–10.5)
CHLORIDE SERPL-SCNC: 96 MMOL/L — SIGNIFICANT CHANGE UP (ref 96–108)
CO2 SERPL-SCNC: 23 MMOL/L — SIGNIFICANT CHANGE UP (ref 22–31)
CREAT SERPL-MCNC: 0.75 MG/DL — SIGNIFICANT CHANGE UP (ref 0.5–1.3)
GLUCOSE SERPL-MCNC: 105 MG/DL — HIGH (ref 70–99)
HCT VFR BLD CALC: 33.8 % — LOW (ref 39–50)
HGB BLD-MCNC: 10.6 G/DL — LOW (ref 13–17)
INR BLD: 1.32 RATIO — HIGH (ref 0.88–1.16)
INR BLD: 1.34 RATIO — HIGH (ref 0.88–1.16)
INR BLD: 1.41 RATIO — HIGH (ref 0.88–1.16)
LACTATE SERPL-SCNC: 1.4 MMOL/L — SIGNIFICANT CHANGE UP (ref 0.7–2)
MCHC RBC-ENTMCNC: 28.6 PG — SIGNIFICANT CHANGE UP (ref 27–34)
MCHC RBC-ENTMCNC: 31.4 GM/DL — LOW (ref 32–36)
MCV RBC AUTO: 91.1 FL — SIGNIFICANT CHANGE UP (ref 80–100)
NRBC # BLD: 0 /100 WBCS — SIGNIFICANT CHANGE UP (ref 0–0)
PLATELET # BLD AUTO: 560 K/UL — HIGH (ref 150–400)
POTASSIUM SERPL-MCNC: 4.1 MMOL/L — SIGNIFICANT CHANGE UP (ref 3.5–5.3)
POTASSIUM SERPL-SCNC: 4.1 MMOL/L — SIGNIFICANT CHANGE UP (ref 3.5–5.3)
PROTHROM AB SERPL-ACNC: 15.6 SEC — HIGH (ref 10.6–13.6)
PROTHROM AB SERPL-ACNC: 15.9 SEC — HIGH (ref 10.6–13.6)
PROTHROM AB SERPL-ACNC: 16.6 SEC — HIGH (ref 10.6–13.6)
RBC # BLD: 3.71 M/UL — LOW (ref 4.2–5.8)
RBC # FLD: 14.1 % — SIGNIFICANT CHANGE UP (ref 10.3–14.5)
SODIUM SERPL-SCNC: 134 MMOL/L — LOW (ref 135–145)
WBC # BLD: 17.1 K/UL — HIGH (ref 3.8–10.5)
WBC # FLD AUTO: 17.1 K/UL — HIGH (ref 3.8–10.5)

## 2021-07-24 PROCEDURE — 99233 SBSQ HOSP IP/OBS HIGH 50: CPT

## 2021-07-24 PROCEDURE — 99232 SBSQ HOSP IP/OBS MODERATE 35: CPT | Mod: GC

## 2021-07-24 RX ORDER — HEPARIN SODIUM 5000 [USP'U]/ML
2100 INJECTION INTRAVENOUS; SUBCUTANEOUS
Qty: 25000 | Refills: 0 | Status: DISCONTINUED | OUTPATIENT
Start: 2021-07-24 | End: 2021-07-26

## 2021-07-24 RX ADMIN — SODIUM CHLORIDE 75 MILLILITER(S): 9 INJECTION INTRAMUSCULAR; INTRAVENOUS; SUBCUTANEOUS at 00:00

## 2021-07-24 RX ADMIN — PIPERACILLIN AND TAZOBACTAM 25 GRAM(S): 4; .5 INJECTION, POWDER, LYOPHILIZED, FOR SOLUTION INTRAVENOUS at 05:35

## 2021-07-24 RX ADMIN — Medication 166.67 MILLIGRAM(S): at 05:35

## 2021-07-24 RX ADMIN — Medication 1 APPLICATION(S): at 16:30

## 2021-07-24 RX ADMIN — PIPERACILLIN AND TAZOBACTAM 25 GRAM(S): 4; .5 INJECTION, POWDER, LYOPHILIZED, FOR SOLUTION INTRAVENOUS at 13:03

## 2021-07-24 RX ADMIN — Medication 166.67 MILLIGRAM(S): at 16:30

## 2021-07-24 RX ADMIN — GABAPENTIN 200 MILLIGRAM(S): 400 CAPSULE ORAL at 13:03

## 2021-07-24 RX ADMIN — PIPERACILLIN AND TAZOBACTAM 25 GRAM(S): 4; .5 INJECTION, POWDER, LYOPHILIZED, FOR SOLUTION INTRAVENOUS at 22:00

## 2021-07-24 RX ADMIN — HEPARIN SODIUM 19 UNIT(S)/HR: 5000 INJECTION INTRAVENOUS; SUBCUTANEOUS at 16:29

## 2021-07-24 RX ADMIN — GABAPENTIN 200 MILLIGRAM(S): 400 CAPSULE ORAL at 22:00

## 2021-07-24 RX ADMIN — Medication 1 APPLICATION(S): at 05:34

## 2021-07-24 RX ADMIN — HEPARIN SODIUM 21 UNIT(S)/HR: 5000 INJECTION INTRAVENOUS; SUBCUTANEOUS at 22:20

## 2021-07-24 RX ADMIN — PANTOPRAZOLE SODIUM 40 MILLIGRAM(S): 20 TABLET, DELAYED RELEASE ORAL at 05:34

## 2021-07-24 RX ADMIN — Medication 1 TABLET(S): at 10:49

## 2021-07-24 RX ADMIN — GABAPENTIN 200 MILLIGRAM(S): 400 CAPSULE ORAL at 05:33

## 2021-07-24 RX ADMIN — HEPARIN SODIUM 18 UNIT(S)/HR: 5000 INJECTION INTRAVENOUS; SUBCUTANEOUS at 09:52

## 2021-07-24 RX ADMIN — Medication 137 MICROGRAM(S): at 05:34

## 2021-07-24 NOTE — CONSULT NOTE ADULT - CONSULT REQUESTED DATE/TIME
20-Jul-2021
12-Jul-2021 11:16
12-Jul-2021 14:08
23-Jul-2021 15:57
23-Jul-2021 12:24
23-Jul-2021 15:57

## 2021-07-24 NOTE — PROGRESS NOTE ADULT - SUBJECTIVE AND OBJECTIVE BOX
SUBJECTIVE: Pt seen and examined. Pt denies any complaints today.   OVERNIGHT EVENTS: Febrile overnight     Vital Signs Last 24 Hrs  T(C): 36.6 (24 Jul 2021 07:00), Max: 39.4 (23 Jul 2021 21:50)  T(F): 97.9 (24 Jul 2021 07:00), Max: 102.9 (23 Jul 2021 21:50)  HR: 90 (24 Jul 2021 07:00) (73 - 106)  BP: 127/74 (24 Jul 2021 07:00) (104/61 - 134/77)  RR: 18 (24 Jul 2021 07:00) (18 - 20)  SpO2: 94% (24 Jul 2021 07:00) (93% - 97%)                        10.6   17.10 )-----------( 560      ( 24 Jul 2021 06:45 )             33.8    07-24    134<L>  |  96  |  14  ----------------------------<  105<H>  4.1   |  23  |  0.75    Ca    9.2      24 Jul 2021 06:45    PT/INR - ( 24 Jul 2021 06:45 )   PT: 15.6 sec;   INR: 1.32 ratio    PTT - ( 24 Jul 2021 06:45 )  PTT:44.1 sec      DRAIN OUTPUT: RT HMV (15) LT LHV (25) Rt abd SEDRICK (15)    NEUROIMAGING: < from: CT Abdomen and Pelvis w/ Oral Cont and w/ IV Cont (07.23.21 @ 14:56) >  Status post partial sacral resection and lumbosacral iliac fusion with associated postoperative changes and drainage catheter. Increased sacral/presacral fluid collection. Superimposed infection is not excluded.    Mild rectal thickening with mild perirectal standing, may represent proctitis.    PHYSICAL EXAM:    General: No Acute Distress     Neurological: Awake, alert oriented to person, place and time, Following Commands, B/l UE 5/5 RLE DF/PF 4+/5, LLE 5/5. Numbness and tingling on RLE     Pulmonary: Clear to Auscultation, No Rales, No Rhonchi, No Wheezes     Cardiovascular: S1, S2, Regular Rate and Rhythm     Gastrointestinal: Soft, Nontender, Nondistended     Incision: Plastics following and wound redressed     MEDICATIONS:   Antibiotics:  piperacillin/tazobactam IVPB.. 3.375 Gram(s) IV Intermittent every 8 hours  vancomycin  IVPB 1250 milliGRAM(s) IV Intermittent every 12 hours    Neuro:  acetaminophen   Tablet .. 650 milliGRAM(s) Oral every 6 hours PRN Temp greater or equal to 38C (100.4F), Mild Pain (1 - 3)  gabapentin 200 milliGRAM(s) Oral every 8 hours    Anticoagulation:  heparin  Infusion 1700 Unit(s)/Hr IV Continuous <Continuous>    Cardiology:    Endo:   levothyroxine 137 MICROGram(s) Oral daily    Pulm:    GI/:  aluminum hydroxide/magnesium hydroxide/simethicone Suspension 30 milliLiter(s) Oral every 4 hours PRN  pantoprazole    Tablet 40 milliGRAM(s) Oral before breakfast    Other:  calcium carbonate 1250 mG  + Vitamin D (OsCal 500 + D) 1 Tablet(s) Oral daily  gentamicin 0.1% Ointment 1 Application(s) Topical two times a day  multivitamin 1 Tablet(s) Oral daily  sodium chloride 0.9%. 1000 milliLiter(s) IV Continuous <Continuous>

## 2021-07-24 NOTE — PROGRESS NOTE ADULT - PROBLEM SELECTOR PLAN 1
7/8 sacrectomy with L4-pelvis fusion. if pt requires a colostomy (scheduled for OR 7/26), he would be considered medically optimized for planned procedure once he is optimized from an ID and pulm perspective  Awaiting read of TTE to evaluate RV

## 2021-07-24 NOTE — PROGRESS NOTE ADULT - PROBLEM SELECTOR PLAN 4
ID consult appreciated - continue vanc and zosyn for now. bcx NTD, CT abd/pelvis as above w/ increased fluid collection and possible proctitis. Leukocytosis improving, afebrile. Continue to monitor CBC  Obtain vanco trough 7/25 AM prior to 4th dose

## 2021-07-24 NOTE — CONSULT NOTE ADULT - ASSESSMENT
Mr. Jackson is a 61 year old male with PMH of Thyroid Cancer, s/p Total Thyroidectomy 2010 who is post-op day 15 after two major surgeries complicated by major intraoperative blood loss, post-op ileus, and bilateral subsegmental PE. Pulmonology was consulted for pulmonary clearance for colostomy.     Pulmonary Clearance for Non-urgent Procedure  Atelectasis, Pain-Induced   Patient's SOB is most likely secondary to atelectasis in the setting of chronic pain. Pain from his recent abdominal procedure has prevented him from taking deep inspirations, thus causing alveolar collapse represented on CT and Xray. SOB is not likely related to the emboli as they are small and not significant enough to cause profound SOB. Suspicion for Pneumonia is low but cannot be ruled out in the setting of increasing white count without other source. His increasing WBC/platelet count is most likely a reactive leukocytosis/thrombocyctosis. TTE preformed with no signs of right heart strain.   Plan  - Please encourage incentive spirometry, Acapella use   - Cleared for surgery from Pulmonary perspective  - no other testing necessary    Mart Villanueva MD  PGY-4, Pulmonary, Critical Care Medicine Fellow       Mr. Jackson is a 61 year old male with PMH of Thyroid Cancer, s/p Total Thyroidectomy 2010 who is post-op day 15 after two major surgeries complicated by major intraoperative blood loss, post-op ileus, and bilateral subsegmental PE. Pulmonology was consulted for pulmonary clearance for colostomy.     Pulmonary Clearance for Non-urgent Procedure  Atelectasis, Pain-Induced   Patient's SOB is most likely secondary to atelectasis in the setting of chronic pain. Pain from his recent abdominal procedure has prevented him from taking deep inspirations, thus causing alveolar collapse represented on CT and Xray. SOB is not likely related to the emboli as they are small and not significant enough to cause profound SOB. Suspicion for Pneumonia is low but cannot be ruled out in the setting of increasing white count without other source. His increasing WBC/platelet count is most likely a reactive leukocytosis/thrombocyctosis. TTE preformed with no signs of right heart strain.   Plan  - Please encourage incentive spirometry, Acapella use   - No pulmonary contraindications to proceeding with the planned procedure  - no other testing necessary    Mart Villanueva MD  PGY-4, Pulmonary, Critical Care Medicine Fellow

## 2021-07-24 NOTE — CONSULT NOTE ADULT - SUBJECTIVE AND OBJECTIVE BOX
CHIEF COMPLAINT: back pain 2/2 to bone malignancy     HPI: Mr. Jackson is a 61 year old male with PMH of Thyroid Cancer, s/p Total Thyroidectomy 2010 and Radioactive Iodine 2011, Hypogonadism, Vitamin D Deficiency, Osteopenia with recently diagnosed Sacral Tumor after months of back pain. On 7/7, he underwent Surgery assisted vertical rectus abdominal myocutaneous flap harvest with transperitoneal ligation of internal iliac artery and vein (VRAM procedure). On 7/8 he underwent an en bloc sacrectomy for resection of chordoma w/ L4-pelvis instrumentation and fusion and plastics closure complicated by large volume blood loss and post-op ileus. Pulmonology was consulted for colostomy procedure clearance. Procedure is indicated because of perianal overflow incontinence 2/2 to chordoma. Patient began to have SOB after the procedure on 7/8. CTA was completed on 7/10 which showed small subsegmental bilateral PE's and bibasilar atelectasis. He was started on a 7 day course of Levaquin due to concerns about pneumonia in the setting of a increasing WBC. He denies cough, fever, night sweats, sick contacts, and recent travel. At baseline, he can walk up numerous flights of stairs and has lived an active life. He has never smoked, has no significant work exposures. Prior to this admission, he denies ever having SOB and has never had pulmonary-related medical problems.     Events:  - TTE preformed with no signs of right heart strain      PAST MEDICAL & SURGICAL HISTORY:  HLD (hyperlipidemia)    Thyroid cancer  2010    History of central serous retinopathy    H/O hypogonadism    H/O vitamin D deficiency    H/O osteopenia    H/O thyroidectomy  Total --2010    H/O colonoscopy        FAMILY HISTORY:  Brother - Colon Cancer       SOCIAL HISTORY:  Smoking: [x ] Never Smoked [ ] Former Smoker (__ packs x ___ years) [ ] Current Smoker  (__ packs x ___ years)  Substance Use: [x ] Never Used [ ] Used ____  EtOH Use: occasional (1/week)  Marital Status: NA  Sexual History: NA  Occupation: Retired   Recent Travel: No   Country of Birth:   Advance Directives: NA    Allergies    No Known Allergies    Intolerances        HOME MEDICATIONS:  Home Medications:  Calcium 600+D 600 mg-200 intl units (5 mcg) oral tablet: 1 tab(s) orally once a day (07 Jul 2021 06:31)  Fish Oil 1000 mg oral capsule: 1 cap(s) orally once a day (07 Jul 2021 06:31)  Synthroid 125 mcg (0.125 mg) oral tablet: 1 tab(s) orally once a day (07 Jul 2021 06:31)  TriCor 134 mg oral capsule: 1 cap(s) orally once a day (07 Jul 2021 06:31)  Vitamin D3 2000 intl units (50 mcg) oral tablet: orally once a day (07 Jul 2021 06:31)      REVIEW OF SYSTEMS:  Constitutional: [x ] negative [ ] fevers [ ] chills [ ] weight loss [ ] weight gain  HEENT: [x ] negative [ ] dry eyes [ ] eye irritation [ ] postnasal drip [ ] nasal congestion  CV: [x ] negative  [ ] chest pain [ ] orthopnea [ ] palpitations [ ] murmur  Resp: [ ] negative [ ] cough [x ] shortness of breath [ ] dyspnea [ ] wheezing [ ] sputum [ ] hemoptysis  GI: [x ] negative [ ] nausea [ ] vomiting [ ] diarrhea [ ] constipation [ ] abd pain [ ] dysphagia   : [ x] negative [ ] dysuria [ ] nocturia [ ] hematuria [ ] increased urinary frequency  Musculoskeletal: [ ] negative [x ] back pain [ ] myalgias [ ] arthralgias [ ] fracture  Skin: [x ] negative [ ] rash [ ] itch  Neurological: x[ ] negative [ ] headache [ ] dizziness [ ] syncope [ ] weakness [ ] numbness  Psychiatric: [x ] negative [ ] anxiety [ ] depression  Endocrine: [x ] negative [ ] diabetes [ ] thyroid problem  Hematologic/Lymphatic: [x ] negative [ ] anemia [ ] bleeding problem  Allergic/Immunologic: [x ] negative [ ] itchy eyes [ ] nasal discharge [ ] hives [ ] angioedema  [ ] All other systems negative  [ ] Unable to assess ROS because ________    OBJECTIVE:  Vital Signs Last 24 Hrs  T(C): 36.6 (23 Jul 2021 15:23), Max: 36.7 (23 Jul 2021 04:38)  T(F): 97.8 (23 Jul 2021 15:23), Max: 98.1 (23 Jul 2021 11:00)  HR: 86 (23 Jul 2021 15:23) (86 - 100)  BP: 122/74 (23 Jul 2021 15:23) (122/74 - 129/78)  BP(mean): --  ABP: --  ABP(mean): --  RR: 18 (23 Jul 2021 15:23) (18 - 18)  SpO2: 93% (23 Jul 2021 15:23) (93% - 97%)        07-22 @ 07:01  -  07-23 @ 07:00  --------------------------------------------------------  IN: 814 mL / OUT: 3240 mL / NET: -2426 mL          PHYSICAL EXAM:  General: Awake, alert, in no acute distress  HEENT: Normocephalic, atraumatic. EOMI. Moist mucus membranes.  Pulmonary: Symmetric chest rise. No extra work of breathing. Lungs clear to auscultation bilaterally.  Cardiovascular: Normal rate and regular rhythm. No murmurs/rubs/gallops  Abdominal: Soft, non-distended, tenderness from recent surgery   Skin: No evident rashes or lesions, large abdominal scar from surgery on 7/8  Neurologic: No focal defects  Psychiatric: Mood appropriate        HOSPITAL MEDICATIONS:  Standing Meds:  calcium carbonate 1250 mG  + Vitamin D (OsCal 500 + D) 1 Tablet(s) Oral daily  calcium gluconate IVPB 2 Gram(s) IV Intermittent once  gabapentin 200 milliGRAM(s) Oral every 8 hours  gentamicin 0.1% Ointment 1 Application(s) Topical two times a day  levothyroxine 137 MICROGram(s) Oral daily  multivitamin 1 Tablet(s) Oral daily  pantoprazole    Tablet 40 milliGRAM(s) Oral before breakfast  piperacillin/tazobactam IVPB.. 3.375 Gram(s) IV Intermittent every 8 hours  sodium chloride 0.9%. 1000 milliLiter(s) IV Continuous <Continuous>  vancomycin  IVPB 1250 milliGRAM(s) IV Intermittent every 12 hours      PRN Meds:  acetaminophen   Tablet .. 650 milliGRAM(s) Oral every 6 hours PRN  aluminum hydroxide/magnesium hydroxide/simethicone Suspension 30 milliLiter(s) Oral every 4 hours PRN  ondansetron Injectable 4 milliGRAM(s) IV Push every 6 hours PRN      LABS:                 OBJECTIVE:  ICU Vital Signs Last 24 Hrs  T(C): 36.7 (24 Jul 2021 16:00), Max: 39.4 (23 Jul 2021 21:50)  T(F): 98.1 (24 Jul 2021 16:00), Max: 102.9 (23 Jul 2021 21:50)  HR: 85 (24 Jul 2021 16:00) (73 - 106)  BP: 125/75 (24 Jul 2021 16:00) (90/60 - 134/77)  BP(mean): --  ABP: --  ABP(mean): --  RR: 18 (24 Jul 2021 16:00) (18 - 20)  SpO2: 95% (24 Jul 2021 16:00) (93% - 97%)        07-23 @ 07:01  -  07-24 @ 07:00  --------------------------------------------------------  IN: 0 mL / OUT: 3055 mL / NET: -3055 mL      CAPILLARY BLOOD GLUCOSE          LABS:                        10.6   17.10 )-----------( 560      ( 24 Jul 2021 06:45 )             33.8     Hgb Trend: 10.6<--, 10.7<--, 10.4<--, 10.2<--, 9.9<--  07-24    134<L>  |  96  |  14  ----------------------------<  105<H>  4.1   |  23  |  0.75    Ca    9.2      24 Jul 2021 06:45      Creatinine Trend: 0.75<--, 0.66<--, 0.76<--, 0.81<--, 0.80<--, 0.78<--  PT/INR - ( 24 Jul 2021 15:25 )   PT: 15.9 sec;   INR: 1.34 ratio         PTT - ( 24 Jul 2021 15:25 )  PTT:40.1 sec    Arterial Blood Gas:  07-23 @ 13:11  7.48/34/74/25/96/2.1  ABG lactate: --        MICROBIOLOGY:       RADIOLOGY:  [ ] Reviewed and interpreted by me    PULMONARY FUNCTION TESTS:    EKG:    TTE: Dimensions:    Normal Values:  LA:     3.0    2.0 - 4.0 cm  Ao:     2.8    2.0 - 3.8 cm  SEPTUM: 1.1    0.6 - 1.2 cm  PWT:    1.2    0.6 - 1.1 cm  LVIDd:  3.6    3.0 - 5.6 cm  LVIDs:         1.8 - 4.0 cm  Derived variables:  LVMI: 70 g/m2  RWT: 0.66  EF (Visual Estimate): 65 %  Doppler Peak Velocity (m/sec): AoV=1.4  ------------------------------------------------------------------------  Observations:  Mitral Valve: Mitral annular calcification, otherwise  normal mitral valve. Minimal mitral regurgitation.  Aortic Valve/Aorta: Aortic valve not well visualized;  probably normal. Peak transaortic valve gradient equals 8  mm Hg. Minimal aortic regurgitation.  Peak left ventricular  outflow tract gradient equals 8 mm Hg.  Aortic Root: 2.8 cm.  Left Atrium: Normal left atrium.  LA volume index = 7  cc/m2.  Left Ventricle: Normal left ventricular systolic function.  No segmental wall motion abnormalities. Increased relative  wall thickness with normal left ventricular mass index,  consistent with concentric left ventricular remodeling.  Mild diastolic dysfunction (Stage I).  Right Heart: Normal right atrium. The right ventricle is  not well visualized; grossly normal right ventricular  systolic function.  Normal tricuspid valve. Minimal  tricuspid regurgitation. Pulmonic valve not well  visualized.  Pericardium/Pleura: Normal pericardium with no pericardial  effusion.  Hemodynamic: Estimated right atrial pressure is 8 mm Hg.  ------------------------------------------------------------------------  Conclusions:  1. Mitral annular calcification, otherwise normal mitral  valve. Minimal mitral regurgitation.  2. Aortic valve not well visualized; probably normal.  Minimal aortic regurgitation.  3. Normal left ventricular systolic function. No segmental  wall motion abnormalities.  4. Mild diastolic dysfunction (Stage I).  5. The right ventricle is not well visualized; grossly  normal right ventricular systolic function.  ------------------------------------------------------------------------  Confirmed on  7/24/2021 - 16:43:53 by Cricket Hillman M.D.  ------------------------------------------------------------------------

## 2021-07-24 NOTE — PROGRESS NOTE ADULT - ASSESSMENT
Mr. Jackson is a 60 yo M with PMHx of thyroid cancer s/p total thyroidectomy (2010), HLD, Vit D deficiency, Osteopenia, and chordoma, who is now s/p Sacral sacrectomy and VRAM flap harvest (07/07, 07/08).    -Please place pt in lateral position and put as little pressure on Sacral wound as possible   -Please avoid stool softener  -continue Zosyn/Vanc   -needs washout -> needs TTE this weekend in order to get pulm clearance, then hopefully OR Monday  -Abdominal dressing removed by PRS 07/23-prineo still in place   -Cw aquacel on superior portion of posterior incision   -RN please change dressing at inferior aspect of incision 2x/day   -RN at time of dressing change, please apply topical gentamycin ointment 2x/day to inferior aspect of incision w nylon sutures   -RN may change inferior aspect of incision with dry gauze dressing as needed if soils 2/2 stool incontinence   -RN may change gauze/tegaderm at drain insertion sites b/l PRN   -rest of care per primary team   -PRS will continue to follow       # [phone]

## 2021-07-24 NOTE — PROGRESS NOTE ADULT - ASSESSMENT
61 year old male with PMH of Thyroid Cancer, s/p Total Thyroidectomy 2010 and Radioactive Iodine 2011, Hypogonadism, Vitamin D Deficiency, Osteopenia with recently diagnosed Sacral Tumor. On 7/7, he underwent Plastic Surgery and General Surgery assisted vertical rectus abdominal myocutaneous flap harvest with transperitoneal ligation of internal iliac artery and vein. on 7/8 he underwent an en bloc sacrectomy for resection of chordoma w/ L4-pelvis instrumentation and fusion and plastics closure.    HOSPITAL COURSE:  7/7: Stage 1 - VRAM harvest, iliac artery and vein ligation  7/8: Stage 2 - sacrectomy with L4-pelvis fusion; EBL 4.5L status post 8 units PRBC, 6 units FFP, 1 pack platelets and 5L crystalloid; post-op status post 1 pack platelets  7/10: CTA chest: b/l upper lobe segmental PEs requiring high flow nasal cannula  7/11: Nausea/vomiting sec to ileus. NGT placed to low wall suction with immediate 1.2L bilious output.  7/17: started on Levofloxacin for Enterobacter in sputum  7/23: Started on vanc/zosyn - CT abd/pelvis with increased fluid collection, possible proctitis

## 2021-07-24 NOTE — PROGRESS NOTE ADULT - ASSESSMENT
61y male with history of thyroid cancer status post total thyroidectomy in 2010 and radioactive iodine treatment in 2011, hypogonadism, vitamin D deficiency, and osteopenia, with chronic constipation  right buttock and right scrotal pain and numbness. 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 The chordoma resulted in perineal numbness and overflow incontinence and he was admitted 7/7/21 for staged resection. On 7/7, he underwent Plastic Surgery and General Surgery assisted vertical rectus abdominal myocutaneous flap harvest with transperitoneal ligation of internal iliac artery and vein.  s/p en bloc sacrectomy/   with L4-pelvis fusion; EBL 4.5L status post 8 units PRBC, 6 units FFP, 1 pack platelets and 5L crystalloid; 7/8. Extubated 7/9 with hypoxia respiratory distress, desaturation CPAP  at night CTA revealed bilateral sub  segmental PE No right heart strain. & Bibasilar and left lingular consolidative opacities . Started on Heparin gtt. PTT goal 50-70 Treated with Levaquin for 7 days Course further c/b ileus requiring gastric decompression . Pathology pending s/p inferior sacral wound debridement at bedside 7/20/21 . He is reported to have leukocytosis. ID consulted.  Plastic surgery note review they write in their note posterior incision noted to have drainage may need posterior washout of wound. For this reason the patient was started on empiric Vanco and Zosyn, Vital reviewed no fevers recorded here, but noted that wbc has continued to trend up to 21K, UA is clear so doubt uti, respiratory cx taken on 7.12 grew Enterobacter. He was treated with IV Levaquin from 7.14-->7/20 as per orders.   explanation for leukocytosis includes reactive from ileus is abdomen is distend feels tight on exam, or could be related to wound in sacrum incision as described by plastic surgery, they are recommending possible washout of wound  Reviewed CT A/P results:   Status post partial sacral resection and lumbosacral iliac fusion with associated postoperative changes and drainage catheter. Increased sacral/presacral fluid collection. Superimposed infection is not excluded. Mild rectal thickening with mild perirectal standing, may represent proctitis.  wbc reviewed trending down to 17K  vitals reviewed currently afebrile, he had temp of 102  7/22 blood cx are no growth to date       Plan   continue Vanco and Zosyn IV for sacral collection and leukocytosis   Plastic surgery to take pt for washout of sacral collection per notes in chart   reviewed case and above recs with Neurosurgery PA on floor   continue supportive care per neurosurgery, plastic surgery , hospitalist and GI teams

## 2021-07-24 NOTE — CONSULT NOTE ADULT - CONSULT REASON
Rehabilitation consult
Colostomy Procedure Clearance
ileus
leukocytosis and ileus
Colostomy Procedure Clearance
medical management

## 2021-07-24 NOTE — PROGRESS NOTE ADULT - PROBLEM SELECTOR PLAN 3
cont AC for PE. incentive spirometry emphasized to patient. taper down O2 as tolerated  Pulmonary c/s appreciated, awaiting TTE results

## 2021-07-24 NOTE — PROGRESS NOTE ADULT - ASSESSMENT
ASSESSMENT AND PLAN: 61 year-old man with history of thyroid cancer status post total thyroidectomy in 2010 and radioactive iodine treatment in 2011, hypogonadism, vitamin D deficiency, and osteopenia, with chronic constipation  right buttock and right scrotal pain and numbness. 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 The chordoma resulted in perineal numbness and overflow incontinence and he was admitted 7/7/21 for staged resection. On 7/7, he underwent Plastic Surgery and General Surgery assisted vertical rectus abdominal myocutaneous flap harvest with transperitoneal ligation of internal iliac artery and vein.  s/p en bloc sacrectomy/   with L4-pelvis fusion; EBL 4.5L status post 8 units PRBC, 6 units FFP, 1 pack platelets and 5L crystalloid; 7/8. Extubated 7/9 with hypoxia respiratory distress, desaturation CPAP  at night CTA revealed bilateral sub  segmental PE No right heart strain. & Bibasilar and left lingular consolidative opacities . Started on Heparin gtt. PTT goal 50-70 Treated with Levaquin for 7 days Course further c/b ileus requiring gastric decompression. Pt pending echo to check for RV per pulm. Now with leucocytosis and febrile overnight. ID following on Vanco and zosyn for emperic coverage.     NEURO: Continue Tylenaol and gabapentin for pain. Avoiding narcotics due to ileus     PULM: Encouraged incentive spirometer. ON 4L NC will try and wean. Pulm following      CV: TTE -P     ENDO: Continue synthroid     HEME/ONC:                      DVT ppx: PE: Heparin Drip increased to 18.  PTT 44. F/u in 6 hours     RENAL: IVL. Continue [place]     ID: Vanco and zosyn for emperic coverage. Pending surgical washout with plastics, F/u time     GI: Ileus resolving, Abdomen soft. Plan for colostomy after surgical revision    Continue protonix for GI ppx     DISCHARGE PLANNING: PT/OT Acute Rehab   Will D/w Neurosurgeon

## 2021-07-24 NOTE — PROGRESS NOTE ADULT - SUBJECTIVE AND OBJECTIVE BOX
[facility] Division of Hospital Medicine  Moon Holguin DO  Pager (M-F 8A to 5P): [phone]  Spectra: [phone]    Patient is a 61y old  Male who presents with a chief complaint of Chordoma Resection (2021 15:57)      SUBJECTIVE / OVERNIGHT EVENTS:  No acute overnight events - afebrile. Continues to have stool incontinence. With occasional SOB but patient feels overall more alert and improved this AM. No CP, dizziness, HA, fever, chills.     MEDICATIONS  (STANDING):  calcium carbonate 1250 mG  + Vitamin D (OsCal 500 + D) 1 Tablet(s) Oral daily  gabapentin 200 milliGRAM(s) Oral every 8 hours  gentamicin 0.1% Ointment 1 Application(s) Topical two times a day  heparin  Infusion 1700 Unit(s)/Hr (18 mL/Hr) IV Continuous <Continuous>  levothyroxine 137 MICROGram(s) Oral daily  multivitamin 1 Tablet(s) Oral daily  pantoprazole    Tablet 40 milliGRAM(s) Oral before breakfast  piperacillin/tazobactam IVPB.. 3.375 Gram(s) IV Intermittent every 8 hours  sodium chloride 0.9%. 1000 milliLiter(s) (75 mL/Hr) IV Continuous <Continuous>  vancomycin  IVPB 1250 milliGRAM(s) IV Intermittent every 12 hours    MEDICATIONS  (PRN):  acetaminophen   Tablet .. 650 milliGRAM(s) Oral every 6 hours PRN Temp greater or equal to 38C (100.4F), Mild Pain (1 - 3)  aluminum hydroxide/magnesium hydroxide/simethicone Suspension 30 milliLiter(s) Oral every 4 hours PRN Dyspepsia  ondansetron Injectable 4 milliGRAM(s) IV Push every 6 hours PRN Nausea      CAPILLARY BLOOD GLUCOSE        I&O's Summary    2021 07:01  -  2021 07:00  --------------------------------------------------------  IN: 0 mL / OUT: 3055 mL / NET: -3055 mL        PHYSICAL EXAM:  Vital Signs Last 24 Hrs  T(C): 36.6 (2021 07:00), Max: 39.4 (2021 21:50)  T(F): 97.9 (2021 07:00), Max: 102.9 (2021 21:50)  HR: 90 (2021 07:00) (73 - 106)  BP: 127/74 (2021 07:00) (104/61 - 134/77)  BP(mean): --  RR: 18 (2021 07:00) (18 - 20)  SpO2: 94% (2021 07:00) (93% - 97%)    CONSTITUTIONAL: NAD, well-developed, well-groomed, seen eating breakfast   RESPIRATORY: Normal respiratory effort; lungs are clear to auscultation bilaterally  CARDIOVASCULAR: Regular rate and rhythm, normal S1 and S2, no murmur; No lower extremity edema  ABDOMEN: Nontender to palpation, normoactive bowel sounds, no rebound/guarding  MUSCULOSKELETAL:  no clubbing or cyanosis of digits; no joint swelling or tenderness to palpation  PSYCH: A+O to person, place, and time; affect appropriate  NEUROLOGY: CN 2-12 are intact and symmetric; no focal deficits   SKIN: No rashes; no erythema, dressing over incision     LABS:                        10.6   17.10 )-----------( 560      ( 2021 06:45 )             33.8     07-24    134<L>  |  96  |  14  ----------------------------<  105<H>  4.1   |  23  |  0.75    Ca    9.2      2021 06:45      PT/INR - ( 2021 06:45 )   PT: 15.6 sec;   INR: 1.32 ratio         PTT - ( 2021 06:45 )  PTT:44.1 sec      Urinalysis Basic - ( 2021 14:21 )    Color: Yellow / Appearance: Clear / S.018 / pH: x  Gluc: x / Ketone: Negative  / Bili: Negative / Urobili: Negative   Blood: x / Protein: Negative / Nitrite: Negative   Leuk Esterase: Negative / RBC: x / WBC x   Sq Epi: x / Non Sq Epi: x / Bacteria: x        Culture - Blood (collected 2021 19:01)  Source: .Blood Blood-Peripheral  Preliminary Report (2021 20:00):    No growth to date.    Culture - Blood (collected 2021 19:01)  Source: .Blood Blood-Peripheral  Preliminary Report (2021 20:00):    No growth to date.    EXAM:  CT ABDOMEN AND PELVIS OC IC                        FINDINGS:  LOWER CHEST: Trace right pleural effusion. Unchanged bilateral lower lobe consolidation, likely atelectasis.    LIVER: Within normal limits.  BILE DUCTS: Normal caliber.  GALLBLADDER: Within normal limits.  SPLEEN: Within normal limits.  PANCREAS: Within normal limits.  ADRENALS: Within normal limits.  KIDNEYS/URETERS: No hydronephrosis. Left renal hypodensities too small to characterize.    BLADDER: Barnett catheter.  REPRODUCTIVE ORGANS: Prostate within normal limits.    BOWEL: No bowel obstruction. Appendix is normal. Mild rectal thickening with mild perirectal standing.  PERITONEUM: No ascites.  VESSELS: Within normal limits.  RETROPERITONEUM/LYMPH NODES: No lymphadenopathy.  ABDOMINAL WALL: Postsurgical changes in the ventral abdominal wall with drainage catheter. Postsurgical changes from  BONES: Streak artifact from lumbar spinal hardware limits evaluation. However there is ill-defined. Status post partial sacral resection with associated postoperative changes. Increased fluid in the sacral/presacral space, measuring 10.7 x 2.1 cm, previously 6.2 x 0.9 cm.    IMPRESSION:  Status post partial sacral resection and lumbosacral iliac fusion with associated postoperative changes and drainage catheter. Increased sacral/presacral fluid collection. Superimposed infection is not excluded.    Mild rectal thickening with mild perirectal standing, may represent proctitis.      RADIOLOGY & ADDITIONAL TESTS:  Results Reviewed:   Imaging Personally Reviewed:  Electrocardiogram Personally Reviewed:    COORDINATION OF CARE:  Care Discussed with Consultants/Other Providers [Y/N]: Neurosurgery   Prior or Outpatient Records Reviewed [Y/N]:

## 2021-07-24 NOTE — PROGRESS NOTE ADULT - SUBJECTIVE AND OBJECTIVE BOX
Plastic Surgery Progress Note    S: Patient seen and examined.  stable, washout canceled last night due to lack of pulm clearance.    Vital Signs Last 24 Hrs  T(C): 36.6 (24 Jul 2021 07:00), Max: 39.4 (23 Jul 2021 21:50)  T(F): 97.9 (24 Jul 2021 07:00), Max: 102.9 (23 Jul 2021 21:50)  HR: 90 (24 Jul 2021 07:00) (73 - 106)  BP: 127/74 (24 Jul 2021 07:00) (104/61 - 134/77)  BP(mean): --  RR: 18 (24 Jul 2021 07:00) (18 - 20)  SpO2: 94% (24 Jul 2021 07:00) (93% - 97%)  I&O's Detail    23 Jul 2021 07:01  -  24 Jul 2021 07:00  --------------------------------------------------------  IN:  Total IN: 0 mL    OUT:    Bulb (mL): 15 mL    Drain (mL): 25 mL    Drain (mL): 15 mL    Indwelling Catheter - Urethral (mL): 3000 mL  Total OUT: 3055 mL    Total NET: -3055 mL          Physical Exam:  General: Awake and alert, NAD  Lower back: dressing intact.  inferior portion with saturated gauze.  drains cloudy.

## 2021-07-24 NOTE — PROGRESS NOTE ADULT - SUBJECTIVE AND OBJECTIVE BOX
CC: f/u for leukocytosis and fluid collection in sacral collection seen on CT     Patient is awake alert, he is lying in bed, he was noted to have fever last night 102    REVIEW OF SYSTEMS:  All other review of systems negative (Comprehensive ROS)      T(F): 97.9 (21 @ 07:00), Max: 102.9 (21 @ 21:50)  HR: 90 (21 @ 07:00)  BP: 127/74 (21 @ 07:00)  RR: 18 (21 @ 07:00)  SpO2: 94% (21 @ 07:00)  Wt(kg): --    PHYSICAL EXAM:  General: alert, no acute distress  Eyes:  anicteric, no conjunctival injection, no discharge  Oropharynx: no lesions or injection 	  Lungs: clear to auscultation  Heart: regular rate and rhythm; no murmur, rubs or gallops  Abdomen: soft, nondistended, nontender, abdominal incision is cdi   Skin: no lesions  Extremities: no clubbing, cyanosis, or edema  Neurologic: alert, oriented, he is lying in bed     LAB RESULTS:                        10.6   17.10 )-----------( 560      ( 2021 06:45 )             33.8     07-    134<L>  |  96  |  14  ----------------------------<  105<H>  4.1   |  23  |  0.75    Ca    9.2      2021 06:45        Urinalysis Basic - ( 2021 14:21 )    Color: Yellow / Appearance: Clear / S.018 / pH: x  Gluc: x / Ketone: Negative  / Bili: Negative / Urobili: Negative   Blood: x / Protein: Negative / Nitrite: Negative   Leuk Esterase: Negative / RBC: x / WBC x   Sq Epi: x / Non Sq Epi: x / Bacteria: x      MICROBIOLOGY:  RECENT CULTURES:   @ 19:01 .Blood Blood-Peripheral     No growth to date.          RADIOLOGY REVIEWED:        Antimicrobials Day #    piperacillin/tazobactam IVPB.. 3.375 Gram(s) IV Intermittent every 8 hours  vancomycin  IVPB 1250 milliGRAM(s) IV Intermittent every 12 hours    Other Medications Reviewed

## 2021-07-24 NOTE — CONSULT NOTE ADULT - ATTENDING COMMENTS
I have examined pt and agree with above exam and plan above.    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 Pt now for elective colostomy next week. since surgery pt has been dx with bilateral small sub segmental PE. No  lower extremity DVT. Pt has remained 0n 2-4 liters 02. Pt says he has  abdominal pain on deep inspiration or cough.  On exam today pt lying flat on room air with 02 sats 93-94%.ABG today shows normocapnia.  Chest ultrasound shows bilateral atelectasis with very small R pleural effusion. CT abd shows bilateral atelectasis.    A/P 60 yo male s/p L4 chordoma  for elective colostomy next week. Pt with bilateral atelectasias. Encourage pain control and incentive spirometry.  Bilateral PE are  very small.  Still would check TTE to evaluate RV. Likely will be normal. If  RV wnl then ok for surgery.
abdominal distention, due to probable post op ileus, small bowel dilation    plan NPO,            IV PPI          ng tube to low intermittent suction            reglan
I have examined pt and agree with above exam and plan above.    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 Pt now for elective colostomy next week. since surgery pt has been dx with bilateral small sub segmental PE. No  lower extremity DVT. Pt has remained 0n 2-4 liters 02. Pt says he has  abdominal pain on deep inspiration or cough.  On exam today pt lying flat on 2L with 02 sats 93-94%. ABG today shows normocapnia.  Chest ultrasound shows bilateral atelectasis with very small R pleural effusion. CT abd shows bilateral atelectasis.    Patient is low risk for perioperative pulmonary complications. There are no pulmonary contraindications to proceeding with the planned procedure. Patient does not require any further pulmonary testing prior to procedure. Patient should continue to use incentive spirometer and acapella to facilitate breathing. Patient should be maintained on AC for now. He should be resumed on AC postoperatively as soon as it is deemed safe. His O2 saturations should be maintained > 90% in the perioperative period. Aspiration precautions. Echocardiogram noted without acute findings.

## 2021-07-25 ENCOUNTER — TRANSCRIPTION ENCOUNTER (OUTPATIENT)
Age: 61
End: 2021-07-25

## 2021-07-25 LAB
APTT BLD: 44.4 SEC — HIGH (ref 27.5–35.5)
APTT BLD: 45.3 SEC — HIGH (ref 27.5–35.5)
APTT BLD: 49 SEC — HIGH (ref 27.5–35.5)
INR BLD: 1.32 RATIO — HIGH (ref 0.88–1.16)
INR BLD: 1.33 RATIO — HIGH (ref 0.88–1.16)
INR BLD: 1.41 RATIO — HIGH (ref 0.88–1.16)
PROTHROM AB SERPL-ACNC: 15.6 SEC — HIGH (ref 10.6–13.6)
PROTHROM AB SERPL-ACNC: 15.8 SEC — HIGH (ref 10.6–13.6)
PROTHROM AB SERPL-ACNC: 16.7 SEC — HIGH (ref 10.6–13.6)
VANCOMYCIN TROUGH SERPL-MCNC: 6.6 UG/ML — LOW (ref 10–20)

## 2021-07-25 PROCEDURE — 99233 SBSQ HOSP IP/OBS HIGH 50: CPT

## 2021-07-25 RX ORDER — DEXTROSE MONOHYDRATE, SODIUM CHLORIDE, AND POTASSIUM CHLORIDE 50; .745; 4.5 G/1000ML; G/1000ML; G/1000ML
1000 INJECTION, SOLUTION INTRAVENOUS
Refills: 0 | Status: DISCONTINUED | OUTPATIENT
Start: 2021-07-25 | End: 2021-07-26

## 2021-07-25 RX ORDER — VANCOMYCIN HCL 1 G
1000 VIAL (EA) INTRAVENOUS EVERY 8 HOURS
Refills: 0 | Status: COMPLETED | OUTPATIENT
Start: 2021-07-25 | End: 2021-08-01

## 2021-07-25 RX ADMIN — PIPERACILLIN AND TAZOBACTAM 25 GRAM(S): 4; .5 INJECTION, POWDER, LYOPHILIZED, FOR SOLUTION INTRAVENOUS at 22:09

## 2021-07-25 RX ADMIN — Medication 1 APPLICATION(S): at 06:37

## 2021-07-25 RX ADMIN — GABAPENTIN 200 MILLIGRAM(S): 400 CAPSULE ORAL at 22:09

## 2021-07-25 RX ADMIN — PIPERACILLIN AND TAZOBACTAM 25 GRAM(S): 4; .5 INJECTION, POWDER, LYOPHILIZED, FOR SOLUTION INTRAVENOUS at 06:37

## 2021-07-25 RX ADMIN — Medication 1 TABLET(S): at 11:41

## 2021-07-25 RX ADMIN — GABAPENTIN 200 MILLIGRAM(S): 400 CAPSULE ORAL at 13:26

## 2021-07-25 RX ADMIN — Medication 137 MICROGRAM(S): at 06:36

## 2021-07-25 RX ADMIN — Medication 166.67 MILLIGRAM(S): at 07:42

## 2021-07-25 RX ADMIN — Medication 1 APPLICATION(S): at 18:45

## 2021-07-25 RX ADMIN — HEPARIN SODIUM 23 UNIT(S)/HR: 5000 INJECTION INTRAVENOUS; SUBCUTANEOUS at 18:44

## 2021-07-25 RX ADMIN — GABAPENTIN 200 MILLIGRAM(S): 400 CAPSULE ORAL at 06:36

## 2021-07-25 RX ADMIN — PIPERACILLIN AND TAZOBACTAM 25 GRAM(S): 4; .5 INJECTION, POWDER, LYOPHILIZED, FOR SOLUTION INTRAVENOUS at 13:28

## 2021-07-25 RX ADMIN — Medication 250 MILLIGRAM(S): at 13:29

## 2021-07-25 RX ADMIN — Medication 1 TABLET(S): at 11:42

## 2021-07-25 RX ADMIN — HEPARIN SODIUM 22 UNIT(S)/HR: 5000 INJECTION INTRAVENOUS; SUBCUTANEOUS at 13:33

## 2021-07-25 RX ADMIN — Medication 250 MILLIGRAM(S): at 22:09

## 2021-07-25 RX ADMIN — PANTOPRAZOLE SODIUM 40 MILLIGRAM(S): 20 TABLET, DELAYED RELEASE ORAL at 06:36

## 2021-07-25 NOTE — PROGRESS NOTE ADULT - PROBLEM SELECTOR PLAN 4
ID consult appreciated - continue vanc and zosyn for now. bcx NTD, CT abd/pelvis as above w/ increased fluid collection and possible proctitis. Leukocytosis improving, afebrile. Continue to monitor CBC  Vanc trough low, dose adjusted. recheck 7/26 before afternoon dose

## 2021-07-25 NOTE — PROGRESS NOTE ADULT - SUBJECTIVE AND OBJECTIVE BOX
CC: f/u for leukocytosis and fluid collection in sacral collection seen on CT     Patient is awake alert, he is lying in bed, he is afebrile, he reports he feels OK, he reports he ate breakfast this morning     REVIEW OF SYSTEMS:  All other review of systems negative (Comprehensive ROS)      Vital Signs Last 24 Hrs  T(C): 36.9 (2021 07:00), Max: 37 (2021 21:09)  T(F): 98.4 (2021 07:00), Max: 98.6 (2021 21:09)  HR: 84 (2021 07:00) (64 - 98)  BP: 111/70 (2021 07:00) (90/60 - 137/85)  BP(mean): --  RR: 18 (2021 07:00) (18 - 19)  SpO2: 96% (2021 07:00) (94% - 97%)    PHYSICAL EXAM:  General: alert, no acute distress  Eyes:  anicteric, no conjunctival injection, no discharge  Oropharynx: no lesions or injection 	  Lungs: clear to auscultation  Heart: regular rate and rhythm; no murmur, rubs or gallops  Abdomen: soft, nondistended, nontender, abdominal incision is cdi   Skin: no lesions  Extremities: no clubbing, cyanosis, or edema  Neurologic: alert, oriented, he is lying in bed     LAB RESULTS:                                   10.6   17.10 )-----------( 560      ( 2021 06:45 )             33.8     07-24    134<L>  |  96  |  14  ----------------------------<  105<H>  4.1   |  23  |  0.75    Ca    9.2      2021 06:45            Urinalysis Basic - ( 2021 14:21 )    Color: Yellow / Appearance: Clear / S.018 / pH: x  Gluc: x / Ketone: Negative  / Bili: Negative / Urobili: Negative   Blood: x / Protein: Negative / Nitrite: Negative   Leuk Esterase: Negative / RBC: x / WBC x   Sq Epi: x / Non Sq Epi: x / Bacteria: x      MICROBIOLOGY:  RECENT CULTURES:  07-22 @ 19:01 .Blood Blood-Peripheral     No growth to date.          RADIOLOGY REVIEWED:        Antimicrobials Day #    piperacillin/tazobactam IVPB.. 3.375 Gram(s) IV Intermittent every 8 hours  vancomycin  IVPB 1250 milliGRAM(s) IV Intermittent every 12 hours    Other Medications Reviewed

## 2021-07-25 NOTE — PROGRESS NOTE ADULT - ASSESSMENT
Mr. Jackson is a 60 yo M with PMHx of thyroid cancer s/p total thyroidectomy (2010), HLD, Vit D deficiency, Osteopenia, and chordoma, who is now s/p Sacral sacrectomy and VRAM flap harvest (07/07, 07/08).    -Please place pt in lateral position and put as little pressure on Sacral wound as possible   -Please avoid stool softener  -continue Zosyn/Vanc   -Plan for washout in OR tomorrow w Dr. Peraza  -Cleared by Pulmonology for OR   -NPO after midnight for OR  -Pre-Op Labs: CBC, BMP, T/S, Coag  -Abdominal dressing removed by PRS 07/23-prineo still in place   -Cw aquacel on superior portion of posterior incision   -RN please change dressing at inferior aspect of incision 2x/day   -RN at time of dressing change, please apply topical gentamycin ointment 2x/day to inferior aspect of incision w nylon sutures   -RN may change inferior aspect of incision with dry gauze dressing as needed if soils 2/2 stool incontinence   -RN may change gauze/tegaderm at drain insertion sites b/l PRN   -rest of care per primary team   -PRS will continue to follow     Jomar Mendez MD   General Surgery Resident, PGY1  # [id number]  Mr. Jackson is a 60 yo M with PMHx of thyroid cancer s/p total thyroidectomy (2010), HLD, Vit D deficiency, Osteopenia, and chordoma, who is now s/p Sacral sacrectomy and VRAM flap harvest (07/07, 07/08).    -Please place pt in lateral position and put as little pressure on Sacral wound as possible   -Please avoid stool softener  -continue Zosyn/Vanc   -Abdominal drain to be removed tomorrow  -Plan for washout in OR tomorrow w Dr. Peraza  -Cleared by Pulmonology for OR   -NPO after midnight for OR  -Pre-Op Labs: CBC, BMP, T/S, Coag  -Abdominal dressing removed by PRS 07/23-prineo still in place   -Cw aquacel on superior portion of posterior incision   -RN please change dressing at inferior aspect of incision 2x/day   -RN at time of dressing change, please apply topical gentamycin ointment 2x/day to inferior aspect of incision w nylon sutures   -RN may change inferior aspect of incision with dry gauze dressing as needed if soils 2/2 stool incontinence   -RN may change gauze/tegaderm at drain insertion sites b/l PRN   -rest of care per primary team   -PRS will continue to follow     Jomar Mendez MD   General Surgery Resident, PGY1  # [phone]  Mr. Jackson is a 62 yo M with PMHx of thyroid cancer s/p total thyroidectomy (2010), HLD, Vit D deficiency, Osteopenia, and chordoma, who is now s/p Sacral sacrectomy and VRAM flap harvest (07/07, 07/08).    -Abdominal drain removed today 7/26  -Plan for washout in OR today as Add-On w Dr. Peraza  -Cleared by Pulmonology for OR   -Please place pt in lateral position and put as little pressure on Sacral wound as possible   -Please avoid stool softener  -continue Zosyn/Vanc per ID  -Abdominal dressing removed by PRS 07/23-prineo still in place   -Cw aquacel on superior portion of posterior incision   -RN please change dressing at inferior aspect of incision 2x/day   -RN at time of dressing change, please apply topical gentamycin ointment 2x/day to inferior aspect of incision w nylon sutures   -RN may change inferior aspect of incision with dry gauze dressing as needed if soils 2/2 stool incontinence   -RN may change gauze/tegaderm at drain insertion sites b/l PRN   -rest of care per primary team   -PRS will continue to follow     Jomar Mendez MD   General Surgery Resident, PGY1  # [phone]  Mr. Jackson is a 60 yo M with PMHx of thyroid cancer s/p total thyroidectomy (2010), HLD, Vit D deficiency, Osteopenia, and chordoma, who is now s/p Sacral sacrectomy and VRAM flap harvest (07/07, 07/08).    -Abdominal drain removed today 7/26, 10cc SS  -Plan for washout in OR today as Add-On w Dr. Peraza  -Cleared by Pulmonology for OR   -Please place pt in lateral position and put as little pressure on Sacral wound as possible   -Please avoid stool softener  -continue Zosyn/Vanc per ID  -Abdominal dressing removed by PRS 07/23-prineo still in place   -Cw aquacel on superior portion of posterior incision   -RN please change dressing at inferior aspect of incision 2x/day   -RN at time of dressing change, please apply topical gentamycin ointment 2x/day to inferior aspect of incision w nylon sutures   -RN may change inferior aspect of incision with dry gauze dressing as needed if soils 2/2 stool incontinence   -RN may change gauze/tegaderm at drain insertion sites b/l PRN   -rest of care per primary team   -PRS will continue to follow     Jomar Mendez MD   General Surgery Resident, PGY1  # [phone]  Mr. Jackson is a 60 yo M with PMHx of thyroid cancer s/p total thyroidectomy (2010), HLD, Vit D deficiency, Osteopenia, and chordoma, who is now s/p Sacral sacrectomy and VRAM flap harvest (07/07, 07/08).    -Abdominal drain should be removed tomorrow 7/26  -Plan for washout in OR tomorrow as Add-On w Dr. Peraza  -Cleared by Pulmonology for OR   -Please place pt in lateral position and put as little pressure on Sacral wound as possible   -Please avoid stool softener  -continue Zosyn/Vanc per ID  -Abdominal dressing removed by PRS 07/23-prineo still in place   -Cw aquacel on superior portion of posterior incision   -RN please change dressing at inferior aspect of incision 2x/day   -RN at time of dressing change, please apply topical gentamycin ointment 2x/day to inferior aspect of incision w nylon sutures   -RN may change inferior aspect of incision with dry gauze dressing as needed if soils 2/2 stool incontinence   -RN may change gauze/tegaderm at drain insertion sites b/l PRN   -rest of care per primary team   -PRS will continue to follow     Jomar Mendez MD   General Surgery Resident, PGY1  # [phone]

## 2021-07-25 NOTE — PROGRESS NOTE ADULT - ASSESSMENT
61y male with history of thyroid cancer status post total thyroidectomy in 2010 and radioactive iodine treatment in 2011, hypogonadism, vitamin D deficiency, and osteopenia, with chronic constipation  right buttock and right scrotal pain and numbness. 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 The chordoma resulted in perineal numbness and overflow incontinence and he was admitted 7/7/21 for staged resection. On 7/7, he underwent Plastic Surgery and General Surgery assisted vertical rectus abdominal myocutaneous flap harvest with transperitoneal ligation of internal iliac artery and vein.  s/p en bloc sacrectomy/   with L4-pelvis fusion; EBL 4.5L status post 8 units PRBC, 6 units FFP, 1 pack platelets and 5L crystalloid; 7/8. Extubated 7/9 with hypoxia respiratory distress, desaturation CPAP  at night CTA revealed bilateral sub  segmental PE No right heart strain. & Bibasilar and left lingular consolidative opacities . Started on Heparin gtt. PTT goal 50-70 Treated with Levaquin for 7 days Course further c/b ileus requiring gastric decompression . Pathology pending s/p inferior sacral wound debridement at bedside 7/20/21 . He is reported to have leukocytosis. ID consulted.  Plastic surgery note review they write in their note posterior incision noted to have drainage may need posterior washout of wound. For this reason the patient was started on empiric Vanco and Zosyn, Vital reviewed no fevers recorded here, but noted that wbc has continued to trend up to 21K, UA is clear so doubt uti, respiratory cx taken on 7.12 grew Enterobacter. He was treated with IV Levaquin from 7.14-->7/20 as per orders.   explanation for leukocytosis includes reactive from ileus is abdomen is distend feels tight on exam, or could be related to wound in sacrum incision as described by plastic surgery, they are recommending possible washout of wound  Reviewed CT A/P results:   Status post partial sacral resection and lumbosacral iliac fusion with associated postoperative changes and drainage catheter. Increased sacral/presacral fluid collection. Superimposed infection is not excluded. Mild rectal thickening with mild perirectal standing, may represent proctitis.  leukocytosis likely secondary to sacral/presacral fluid collection  vitals reviewed currently afebrile  7/22 blood cx are no growth to date       Plan   day # 3 of antibiotics   continue Vanco and Zosyn IV for sacral collection and leukocytosis   Plastic surgery to take pt for washout of sacral collection per notes in chart   continue supportive care per neurosurgery, plastic surgery , hospitalist and GI teams

## 2021-07-25 NOTE — PROGRESS NOTE ADULT - SUBJECTIVE AND OBJECTIVE BOX
SUBJECTIVE: Pt seen and examined. Pt says he is feeling better, and able to tolerate a diet. Pt denies any other complaints     Vital Signs Last 24 Hrs  T(C): 36.9 (25 Jul 2021 07:00), Max: 37 (24 Jul 2021 21:09)  T(F): 98.4 (25 Jul 2021 07:00), Max: 98.6 (24 Jul 2021 21:09)  HR: 84 (25 Jul 2021 07:00) (64 - 98)  BP: 111/70 (25 Jul 2021 07:00) (90/60 - 137/85)  RR: 18 (25 Jul 2021 07:00) (18 - 19)  SpO2: 96% (25 Jul 2021 07:00) (94% - 97%)                        10.6   17.10 )-----------( 560      ( 24 Jul 2021 06:45 )             33.8    07-24    134<L>  |  96  |  14  ----------------------------<  105<H>  4.1   |  23  |  0.75    Ca    9.2      24 Jul 2021 06:45    PT/INR - ( 25 Jul 2021 04:23 )   PT: 15.6 sec;   INR: 1.32 ratio    PTT - ( 25 Jul 2021 04:23 )  PTT:49.0 sec      DRAIN OUTPUT: Rt abd SEDRICK (10) Rt HMV (125) Lt HMV (50)    PHYSICAL EXAM:    General: No Acute Distress     Neurological: Awake, alert oriented to person, place and time, Following Commands, B/l UE 5/5 RLE DF/PF 4+/5, LLE 5/5. Numbness and tingling on RLE     Pulmonary: Clear to Auscultation, No Rales, No Rhonchi, No Wheezes     Cardiovascular: S1, S2, Regular Rate and Rhythm     Gastrointestinal: Soft, Nontender, minimally distended  after eating     Incision: Plastics following     MEDICATIONS:   Antibiotics:  piperacillin/tazobactam IVPB.. 3.375 Gram(s) IV Intermittent every 8 hours  vancomycin  IVPB 1250 milliGRAM(s) IV Intermittent every 12 hours    Neuro:  acetaminophen   Tablet .. 650 milliGRAM(s) Oral every 6 hours PRN Temp greater or equal to 38C (100.4F), Mild Pain (1 - 3)  gabapentin 200 milliGRAM(s) Oral every 8 hours    Anticoagulation:  heparin  Infusion 2100 Unit(s)/Hr IV Continuous <Continuous>    Cardiology:    Endo:   levothyroxine 137 MICROGram(s) Oral daily    Pulm:    GI/:  aluminum hydroxide/magnesium hydroxide/simethicone Suspension 30 milliLiter(s) Oral every 4 hours PRN  pantoprazole    Tablet 40 milliGRAM(s) Oral before breakfast    Other:  calcium carbonate 1250 mG  + Vitamin D (OsCal 500 + D) 1 Tablet(s) Oral daily  gentamicin 0.1% Ointment 1 Application(s) Topical two times a day  multivitamin 1 Tablet(s) Oral daily  sodium chloride 0.9%. 1000 milliLiter(s) IV Continuous <Continuous>

## 2021-07-25 NOTE — PROGRESS NOTE ADULT - PROBLEM SELECTOR PLAN 3
cont AC for PE. incentive spirometry emphasized. taper down O2 as tolerated  Pulmonary c/s appreciated, hypoxia likely related to atelectasis and not pulm emboli

## 2021-07-25 NOTE — PROGRESS NOTE ADULT - ASSESSMENT
ASSESSMENT AND PLAN: 61 year-old man with history of thyroid cancer status post total thyroidectomy in 2010 and radioactive iodine treatment in 2011, hypogonadism, vitamin D deficiency, and osteopenia, with chronic constipation  right buttock and right scrotal pain and numbness. 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 The chordoma resulted in perineal numbness and overflow incontinence and he was admitted 7/7/21 for staged resection. On 7/7, he underwent Plastic Surgery and General Surgery assisted vertical rectus abdominal myocutaneous flap harvest with transperitoneal ligation of internal iliac artery and vein.  s/p en bloc sacrectomy/   with L4-pelvis fusion; EBL 4.5L status post 8 units PRBC, 6 units FFP, 1 pack platelets and 5L crystalloid; 7/8. Extubated 7/9 with hypoxia respiratory distress, desaturation CPAP  at night CTA revealed bilateral sub  segmental PE No right heart strain. & Bibasilar and left lingular consolidative opacities . Started on Heparin gtt. PTT goal 50-70 Treated with Levaquin for 7 days Course further c/b ileus requiring gastric decompression. Pt pending echo to check for RV per pulm. Now with leucocytosis and febrile overnight. ID following on Vanco and zosyn for emperic coverage.     NEURO: Continue Tylenaol and gabapentin for pain. Avoiding narcotics due to ileus     PULM: Pt is using incentive spirometer and acapella. ON 4L NC. Pulm following and cleared for surgery       CV: TTE -done < from: Transthoracic Echocardiogram (07.23.21 @ 19:15) >  Conclusions:  1. Mitral annular calcification, otherwise normal mitral  valve. Minimal mitral regurgitation.  2. Aortic valve not well visualized; probably normal.  Minimal aortic regurgitation.  3. Normal left ventricular systolic function. No segmental  wall motion abnormalities.  4. Mild diastolic dysfunction (Stage I).  5. The right ventricle is not well visualized; grossly  normal right ventricular systolic function.    < end of copied text >    ENDO: Continue synthroid     HEME/ONC:                      DVT ppx: PE: Heparin Drip increased to 21 overnight PTT 49. F/u PTT at 10am      RENAL: IVL. Continue Barnett     ID: Vanco and zosyn for emperic coverage. Pending surgical washout with plastics, F/u time     GI: Ileus resolving, Pt having bowel movements (4- 7/24) Abdomen soft. Plan for colostomy after surgical revision    Continue protonix for GI ppx     DISCHARGE PLANNING: PT/OT Acute Rehab   Will D/w Neurosurgeon        ASSESSMENT AND PLAN: 61 year-old man with history of thyroid cancer status post total thyroidectomy in 2010 and radioactive iodine treatment in 2011, hypogonadism, vitamin D deficiency, and osteopenia, with chronic constipation  right buttock and right scrotal pain and numbness. 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 The chordoma resulted in perineal numbness and overflow incontinence and he was admitted 7/7/21 for staged resection. On 7/7, he underwent Plastic Surgery and General Surgery assisted vertical rectus abdominal myocutaneous flap harvest with transperitoneal ligation of internal iliac artery and vein.  s/p en bloc sacrectomy/   with L4-pelvis fusion; EBL 4.5L status post 8 units PRBC, 6 units FFP, 1 pack platelets and 5L crystalloid; 7/8. Extubated 7/9 with hypoxia respiratory distress, desaturation CPAP  at night CTA revealed bilateral sub  segmental PE No right heart strain. & Bibasilar and left lingular consolidative opacities . Started on Heparin gtt. PTT goal 50-70 Treated with Levaquin for 7 days Course further c/b ileus requiring gastric decompression. Pt pending echo to check for RV per pulm. Now with leucocytosis and febrile overnight. ID following on Vanco and zosyn for emperic coverage.     NEURO: Continue Tylenaol and gabapentin for pain. Avoiding narcotics due to ileus     PULM: Pt is using incentive spirometer and acapella. ON 4L NC. Pulm following and cleared for surgery       CV: TTE -done < from: Transthoracic Echocardiogram (07.23.21 @ 19:15) >  Conclusions:  1. Mitral annular calcification, otherwise normal mitral  valve. Minimal mitral regurgitation.  2. Aortic valve not well visualized; probably normal.  Minimal aortic regurgitation.  3. Normal left ventricular systolic function. No segmental  wall motion abnormalities.  4. Mild diastolic dysfunction (Stage I).  5. The right ventricle is not well visualized; grossly  normal right ventricular systolic function.    < end of copied text >    ENDO: Continue synthroid     HEME/ONC:                      DVT ppx: PE: Heparin Drip increased to 21 overnight PTT 49. F/u PTT at 10am      RENAL: IVL. Continue Barnett     ID: Vanco and zosyn for emperic coverage. Pending surgical washout with plastics, F/u time   Vanco T 6.6 Vanco increased to 1G TID, F/u troph before 4th dose     GI: Ileus resolving, Pt having bowel movements (4- 7/24) Abdomen soft. Plan for colostomy after surgical revision    Continue protonix for GI ppx     DISCHARGE PLANNING: PT/OT Acute Rehab   Will D/w Neurosurgeon

## 2021-07-25 NOTE — PROGRESS NOTE ADULT - SUBJECTIVE AND OBJECTIVE BOX
Plastic Surgery Progress Note    Subjective:     Patient seen and examined at bedside. Patient without complaints this AM. Denies N/V/F/C.     OBJECTIVE:     T(C): 36.9 (07-25-21 @ 07:00), Max: 37 (07-24-21 @ 21:09)  HR: 84 (07-25-21 @ 07:00) (64 - 98)  BP: 111/70 (07-25-21 @ 07:00) (90/60 - 137/85)  RR: 18 (07-25-21 @ 07:00) (18 - 19)  SpO2: 96% (07-25-21 @ 07:00) (94% - 97%)  Wt(kg): --    I&O's Detail    24 Jul 2021 07:01  -  25 Jul 2021 07:00  --------------------------------------------------------  IN:  Total IN: 0 mL    OUT:    Bulb (mL): 10 mL    Drain (mL): 50 mL    Drain (mL): 125 mL    Indwelling Catheter - Urethral (mL): 1900 mL  Total OUT: 2085 mL    Total NET: -2085 mL          PHYSICAL EXAM:    GENERAL: NAD, lying in bed comfortably  CHEST/LUNG: Unlabored respirations  ABDOMEN:  Soft, Nontender, Nondistended, midline incision w prineo tape in place, underlying incision cdi   NERVOUS SYSTEM:  Alert & Oriented X3, speech clear.   BACK: midline sacral incision w aquacel dressing partially removed, new dressing placed, inferior 6cm of incision w sutures in place, no drainage, no erythema, no swelling, gauze and tape dressing replaced, rest of back flat, no palpable collections, hemovac accordian drains holding suction w SS output from L drain and cloudy/murky serosanguinous output from R drain      MEDICATIONS  (STANDING):  calcium carbonate 1250 mG  + Vitamin D (OsCal 500 + D) 1 Tablet(s) Oral daily  gabapentin 200 milliGRAM(s) Oral every 8 hours  gentamicin 0.1% Ointment 1 Application(s) Topical two times a day  heparin  Infusion 2100 Unit(s)/Hr (21 mL/Hr) IV Continuous <Continuous>  levothyroxine 137 MICROGram(s) Oral daily  multivitamin 1 Tablet(s) Oral daily  pantoprazole    Tablet 40 milliGRAM(s) Oral before breakfast  piperacillin/tazobactam IVPB.. 3.375 Gram(s) IV Intermittent every 8 hours  sodium chloride 0.9%. 1000 milliLiter(s) (75 mL/Hr) IV Continuous <Continuous>  vancomycin  IVPB 1000 milliGRAM(s) IV Intermittent every 8 hours    MEDICATIONS  (PRN):  acetaminophen   Tablet .. 650 milliGRAM(s) Oral every 6 hours PRN Temp greater or equal to 38C (100.4F), Mild Pain (1 - 3)  aluminum hydroxide/magnesium hydroxide/simethicone Suspension 30 milliLiter(s) Oral every 4 hours PRN Dyspepsia  ondansetron Injectable 4 milliGRAM(s) IV Push every 6 hours PRN Nausea      LABS:                          10.6   17.10 )-----------( 560      ( 24 Jul 2021 06:45 )             33.8     07-24    134<L>  |  96  |  14  ----------------------------<  105<H>  4.1   |  23  |  0.75    Ca    9.2      24 Jul 2021 06:45      PT/INR - ( 25 Jul 2021 04:23 )   PT: 15.6 sec;   INR: 1.32 ratio         PTT - ( 25 Jul 2021 04:23 )  PTT:49.0 sec           Plastic Surgery Progress Note    Subjective:     Patient seen and examined at bedside. Patient without complaints this AM. Denies N/V/F/C.     OBJECTIVE:     T(C): 36.9 (07-25-21 @ 07:00), Max: 37 (07-24-21 @ 21:09)  HR: 84 (07-25-21 @ 07:00) (64 - 98)  BP: 111/70 (07-25-21 @ 07:00) (90/60 - 137/85)  RR: 18 (07-25-21 @ 07:00) (18 - 19)  SpO2: 96% (07-25-21 @ 07:00) (94% - 97%)  Wt(kg): --    I&O's Detail    24 Jul 2021 07:01  -  25 Jul 2021 07:00  --------------------------------------------------------  IN:  Total IN: 0 mL    OUT:    Bulb (mL): 10 mL    Drain (mL): 50 mL    Drain (mL): 125 mL    Indwelling Catheter - Urethral (mL): 1900 mL  Total OUT: 2085 mL    Total NET: -2085 mL          PHYSICAL EXAM:    GENERAL: NAD, lying in bed comfortably. AOX4.   CHEST/LUNG: Unlabored respirations  ABDOMEN:  Soft, Nontender, Nondistended, midline incision w prineo tape in place, underlying incision cdi   BACK: midline sacral incision w aquacel dressing partially removed, new dressing placed, inferior 6cm of incision w sutures in place, no drainage, no erythema, no swelling, gauze and tape dressing replaced, rest of back flat, no palpable collections, hemovac accordian drains holding suction w SS output from L drain.       MEDICATIONS  (STANDING):  calcium carbonate 1250 mG  + Vitamin D (OsCal 500 + D) 1 Tablet(s) Oral daily  gabapentin 200 milliGRAM(s) Oral every 8 hours  gentamicin 0.1% Ointment 1 Application(s) Topical two times a day  heparin  Infusion 2100 Unit(s)/Hr (21 mL/Hr) IV Continuous <Continuous>  levothyroxine 137 MICROGram(s) Oral daily  multivitamin 1 Tablet(s) Oral daily  pantoprazole    Tablet 40 milliGRAM(s) Oral before breakfast  piperacillin/tazobactam IVPB.. 3.375 Gram(s) IV Intermittent every 8 hours  sodium chloride 0.9%. 1000 milliLiter(s) (75 mL/Hr) IV Continuous <Continuous>  vancomycin  IVPB 1000 milliGRAM(s) IV Intermittent every 8 hours    MEDICATIONS  (PRN):  acetaminophen   Tablet .. 650 milliGRAM(s) Oral every 6 hours PRN Temp greater or equal to 38C (100.4F), Mild Pain (1 - 3)  aluminum hydroxide/magnesium hydroxide/simethicone Suspension 30 milliLiter(s) Oral every 4 hours PRN Dyspepsia  ondansetron Injectable 4 milliGRAM(s) IV Push every 6 hours PRN Nausea      LABS:                          10.6   17.10 )-----------( 560      ( 24 Jul 2021 06:45 )             33.8     07-24    134<L>  |  96  |  14  ----------------------------<  105<H>  4.1   |  23  |  0.75    Ca    9.2      24 Jul 2021 06:45      PT/INR - ( 25 Jul 2021 04:23 )   PT: 15.6 sec;   INR: 1.32 ratio         PTT - ( 25 Jul 2021 04:23 )  PTT:49.0 sec

## 2021-07-25 NOTE — PROGRESS NOTE ADULT - PROBLEM SELECTOR PLAN 1
7/8 sacrectomy with L4-pelvis fusion. No medical contraindication for the OR for colostomy   EKG reviewed from 7/21, no ischemic changes   Cleared by pulmonary for procedure

## 2021-07-25 NOTE — PROGRESS NOTE ADULT - SUBJECTIVE AND OBJECTIVE BOX
Surgery Progress Note     Subjective/24hour Events:   Patient seen and examined.   No acute events overnight.   Pain controlled. Patient feels some discomfort in the abdomen, patient passing gas and having bowel movements. Awaiting confirmation for OR date.    Vital Signs:  Vital Signs Last 24 Hrs  T(C): 36.9 (25 Jul 2021 07:00), Max: 37 (24 Jul 2021 21:09)  T(F): 98.4 (25 Jul 2021 07:00), Max: 98.6 (24 Jul 2021 21:09)  HR: 84 (25 Jul 2021 07:00) (64 - 98)  BP: 111/70 (25 Jul 2021 07:00) (90/60 - 137/85)  BP(mean): --  RR: 18 (25 Jul 2021 07:00) (18 - 19)  SpO2: 96% (25 Jul 2021 07:00) (94% - 97%)    CAPILLARY BLOOD GLUCOSE      I&O's Detail    24 Jul 2021 07:01  -  25 Jul 2021 07:00  --------------------------------------------------------  IN:  Total IN: 0 mL    OUT:    Bulb (mL): 10 mL    Drain (mL): 50 mL    Drain (mL): 125 mL    Indwelling Catheter - Urethral (mL): 1900 mL  Total OUT: 2085 mL    Total NET: -2085 mL            MEDICATIONS  (STANDING):  calcium carbonate 1250 mG  + Vitamin D (OsCal 500 + D) 1 Tablet(s) Oral daily  gabapentin 200 milliGRAM(s) Oral every 8 hours  gentamicin 0.1% Ointment 1 Application(s) Topical two times a day  heparin  Infusion 2100 Unit(s)/Hr (21 mL/Hr) IV Continuous <Continuous>  levothyroxine 137 MICROGram(s) Oral daily  multivitamin 1 Tablet(s) Oral daily  pantoprazole    Tablet 40 milliGRAM(s) Oral before breakfast  piperacillin/tazobactam IVPB.. 3.375 Gram(s) IV Intermittent every 8 hours  sodium chloride 0.9%. 1000 milliLiter(s) (75 mL/Hr) IV Continuous <Continuous>  vancomycin  IVPB 1000 milliGRAM(s) IV Intermittent every 8 hours    MEDICATIONS  (PRN):  acetaminophen   Tablet .. 650 milliGRAM(s) Oral every 6 hours PRN Temp greater or equal to 38C (100.4F), Mild Pain (1 - 3)  aluminum hydroxide/magnesium hydroxide/simethicone Suspension 30 milliLiter(s) Oral every 4 hours PRN Dyspepsia  ondansetron Injectable 4 milliGRAM(s) IV Push every 6 hours PRN Nausea      Physical Exam:  Gen: NAD.  Lungs: Non labored breathing. on nasal canula  Ab: Soft, nontender, nondistended.   Ext: Moves all 4 spontaneously.       Labs:                       10.6   17.10 )-----------( 560      ( 24 Jul 2021 06:45 )             33.8   07-24    134<L>  |  96  |  14  ----------------------------<  105<H>  4.1   |  23  |  0.75    Ca    9.2      24 Jul 2021 06:45    ABG - ( 23 Jul 2021 13:11 )  pH, Arterial: 7.48  pH, Blood: x     /  pCO2: 34    /  pO2: 74    / HCO3: 25    / Base Excess: 2.1   /  SaO2: 96              PT/INR - ( 25 Jul 2021 04:23 )   PT: 15.6 sec;   INR: 1.32 ratio         PTT - ( 25 Jul 2021 04:23 )  PTT:49.0 sec Surgery Progress Note     Subjective/24hour Events:   Patient seen and examined.   No acute events overnight.   Pain controlled. Patient feels some discomfort in the abdomen, patient passing gas and having bowel movements. Awaiting confirmation for OR date.    Vital Signs:  Vital Signs Last 24 Hrs  T(C): 36.9 (25 Jul 2021 07:00), Max: 37 (24 Jul 2021 21:09)  T(F): 98.4 (25 Jul 2021 07:00), Max: 98.6 (24 Jul 2021 21:09)  HR: 84 (25 Jul 2021 07:00) (64 - 98)  BP: 111/70 (25 Jul 2021 07:00) (90/60 - 137/85)  BP(mean): --  RR: 18 (25 Jul 2021 07:00) (18 - 19)  SpO2: 96% (25 Jul 2021 07:00) (94% - 97%)    CAPILLARY BLOOD GLUCOSE      I&O's Detail    24 Jul 2021 07:01  -  25 Jul 2021 07:00  --------------------------------------------------------  IN:  Total IN: 0 mL    OUT:    Bulb (mL): 10 mL    Drain (mL): 50 mL    Drain (mL): 125 mL    Indwelling Catheter - Urethral (mL): 1900 mL  Total OUT: 2085 mL    Total NET: -2085 mL            MEDICATIONS  (STANDING):  calcium carbonate 1250 mG  + Vitamin D (OsCal 500 + D) 1 Tablet(s) Oral daily  gabapentin 200 milliGRAM(s) Oral every 8 hours  gentamicin 0.1% Ointment 1 Application(s) Topical two times a day  heparin  Infusion 2100 Unit(s)/Hr (21 mL/Hr) IV Continuous <Continuous>  levothyroxine 137 MICROGram(s) Oral daily  multivitamin 1 Tablet(s) Oral daily  pantoprazole    Tablet 40 milliGRAM(s) Oral before breakfast  piperacillin/tazobactam IVPB.. 3.375 Gram(s) IV Intermittent every 8 hours  sodium chloride 0.9%. 1000 milliLiter(s) (75 mL/Hr) IV Continuous <Continuous>  vancomycin  IVPB 1000 milliGRAM(s) IV Intermittent every 8 hours    MEDICATIONS  (PRN):  acetaminophen   Tablet .. 650 milliGRAM(s) Oral every 6 hours PRN Temp greater or equal to 38C (100.4F), Mild Pain (1 - 3)  aluminum hydroxide/magnesium hydroxide/simethicone Suspension 30 milliLiter(s) Oral every 4 hours PRN Dyspepsia  ondansetron Injectable 4 milliGRAM(s) IV Push every 6 hours PRN Nausea      Physical Exam:  Gen: NAD.  Lungs: Non labored breathing. on nasal canula  Ab: Soft, nontender, nondistended.     Labs:                       10.6   17.10 )-----------( 560      ( 24 Jul 2021 06:45 )             33.8   07-24    134<L>  |  96  |  14  ----------------------------<  105<H>  4.1   |  23  |  0.75    Ca    9.2      24 Jul 2021 06:45    ABG - ( 23 Jul 2021 13:11 )  pH, Arterial: 7.48  pH, Blood: x     /  pCO2: 34    /  pO2: 74    / HCO3: 25    / Base Excess: 2.1   /  SaO2: 96              PT/INR - ( 25 Jul 2021 04:23 )   PT: 15.6 sec;   INR: 1.32 ratio         PTT - ( 25 Jul 2021 04:23 )  PTT:49.0 sec

## 2021-07-25 NOTE — PROGRESS NOTE ADULT - ASSESSMENT
61y Male patient s/p en bloc sacrectomy for chordoma w/ L4-pelvis fusion w/ plastics closure vertical rectus abdominis myocutaneous (VRAM) flap for chordoma on 07/07 and 07/08, post-op course c/b bilateral PEs (on hep gtt) and post-op ileus, resolved. Patient now incontinent of stool and soiling his posterior dressing s/p wound debridement with Plastic surgery 07/20. Pt now opting to proceed to OR for colostomy    Plan:  - OR Planning for colostomy this coming week.   - Will plan for CLD prior to OR date once confirmation of scheduling is received, will bowel prep day before Operating Theatre.   - Continue excellent care per neurosurgery    [facility]  [phone]

## 2021-07-26 ENCOUNTER — APPOINTMENT (OUTPATIENT)
Dept: ENDOCRINOLOGY | Facility: CLINIC | Age: 61
End: 2021-07-26

## 2021-07-26 LAB
ANION GAP SERPL CALC-SCNC: 10 MMOL/L — SIGNIFICANT CHANGE UP (ref 5–17)
ANION GAP SERPL CALC-SCNC: 14 MMOL/L — SIGNIFICANT CHANGE UP (ref 5–17)
APTT BLD: 29.3 SEC — SIGNIFICANT CHANGE UP (ref 27.5–35.5)
APTT BLD: 69.7 SEC — HIGH (ref 27.5–35.5)
APTT BLD: 69.9 SEC — HIGH (ref 27.5–35.5)
APTT BLD: 73.3 SEC — HIGH (ref 27.5–35.5)
BASOPHILS # BLD AUTO: 0.13 K/UL — SIGNIFICANT CHANGE UP (ref 0–0.2)
BASOPHILS NFR BLD AUTO: 0.9 % — SIGNIFICANT CHANGE UP (ref 0–2)
BLD GP AB SCN SERPL QL: NEGATIVE — SIGNIFICANT CHANGE UP
BUN SERPL-MCNC: 10 MG/DL — SIGNIFICANT CHANGE UP (ref 7–23)
BUN SERPL-MCNC: 10 MG/DL — SIGNIFICANT CHANGE UP (ref 7–23)
CALCIUM SERPL-MCNC: 8.5 MG/DL — SIGNIFICANT CHANGE UP (ref 8.4–10.5)
CALCIUM SERPL-MCNC: 8.8 MG/DL — SIGNIFICANT CHANGE UP (ref 8.4–10.5)
CHLORIDE SERPL-SCNC: 98 MMOL/L — SIGNIFICANT CHANGE UP (ref 96–108)
CHLORIDE SERPL-SCNC: 99 MMOL/L — SIGNIFICANT CHANGE UP (ref 96–108)
CO2 SERPL-SCNC: 22 MMOL/L — SIGNIFICANT CHANGE UP (ref 22–31)
CO2 SERPL-SCNC: 23 MMOL/L — SIGNIFICANT CHANGE UP (ref 22–31)
CREAT SERPL-MCNC: 0.7 MG/DL — SIGNIFICANT CHANGE UP (ref 0.5–1.3)
CREAT SERPL-MCNC: 0.75 MG/DL — SIGNIFICANT CHANGE UP (ref 0.5–1.3)
EOSINOPHIL # BLD AUTO: 0.25 K/UL — SIGNIFICANT CHANGE UP (ref 0–0.5)
EOSINOPHIL NFR BLD AUTO: 1.7 % — SIGNIFICANT CHANGE UP (ref 0–6)
GLUCOSE SERPL-MCNC: 128 MG/DL — HIGH (ref 70–99)
GLUCOSE SERPL-MCNC: 132 MG/DL — HIGH (ref 70–99)
HCT VFR BLD CALC: 29.7 % — LOW (ref 39–50)
HCT VFR BLD CALC: 32.4 % — LOW (ref 39–50)
HGB BLD-MCNC: 10.1 G/DL — LOW (ref 13–17)
HGB BLD-MCNC: 9.1 G/DL — LOW (ref 13–17)
INR BLD: 1.37 RATIO — HIGH (ref 0.88–1.16)
INR BLD: 1.42 RATIO — HIGH (ref 0.88–1.16)
LYMPHOCYTES # BLD AUTO: 1.79 K/UL — SIGNIFICANT CHANGE UP (ref 1–3.3)
LYMPHOCYTES # BLD AUTO: 12.1 % — LOW (ref 13–44)
MCHC RBC-ENTMCNC: 27.5 PG — SIGNIFICANT CHANGE UP (ref 27–34)
MCHC RBC-ENTMCNC: 28.3 PG — SIGNIFICANT CHANGE UP (ref 27–34)
MCHC RBC-ENTMCNC: 30.6 GM/DL — LOW (ref 32–36)
MCHC RBC-ENTMCNC: 31.2 GM/DL — LOW (ref 32–36)
MCV RBC AUTO: 89.7 FL — SIGNIFICANT CHANGE UP (ref 80–100)
MCV RBC AUTO: 90.8 FL — SIGNIFICANT CHANGE UP (ref 80–100)
MONOCYTES # BLD AUTO: 2.54 K/UL — HIGH (ref 0–0.9)
MONOCYTES NFR BLD AUTO: 17.2 % — HIGH (ref 2–14)
NEUTROPHILS # BLD AUTO: 10.06 K/UL — HIGH (ref 1.8–7.4)
NEUTROPHILS NFR BLD AUTO: 66.4 % — SIGNIFICANT CHANGE UP (ref 43–77)
NRBC # BLD: 0 /100 WBCS — SIGNIFICANT CHANGE UP (ref 0–0)
PLATELET # BLD AUTO: 503 K/UL — HIGH (ref 150–400)
PLATELET # BLD AUTO: 545 K/UL — HIGH (ref 150–400)
POTASSIUM SERPL-MCNC: 4 MMOL/L — SIGNIFICANT CHANGE UP (ref 3.5–5.3)
POTASSIUM SERPL-MCNC: 4.4 MMOL/L — SIGNIFICANT CHANGE UP (ref 3.5–5.3)
POTASSIUM SERPL-SCNC: 4 MMOL/L — SIGNIFICANT CHANGE UP (ref 3.5–5.3)
POTASSIUM SERPL-SCNC: 4.4 MMOL/L — SIGNIFICANT CHANGE UP (ref 3.5–5.3)
PROTHROM AB SERPL-ACNC: 16.2 SEC — HIGH (ref 10.6–13.6)
PROTHROM AB SERPL-ACNC: 16.8 SEC — HIGH (ref 10.6–13.6)
RBC # BLD: 3.31 M/UL — LOW (ref 4.2–5.8)
RBC # BLD: 3.57 M/UL — LOW (ref 4.2–5.8)
RBC # FLD: 13.9 % — SIGNIFICANT CHANGE UP (ref 10.3–14.5)
RBC # FLD: 14 % — SIGNIFICANT CHANGE UP (ref 10.3–14.5)
RH IG SCN BLD-IMP: POSITIVE — SIGNIFICANT CHANGE UP
SODIUM SERPL-SCNC: 131 MMOL/L — LOW (ref 135–145)
SODIUM SERPL-SCNC: 135 MMOL/L — SIGNIFICANT CHANGE UP (ref 135–145)
VANCOMYCIN TROUGH SERPL-MCNC: 15.9 UG/ML — SIGNIFICANT CHANGE UP (ref 10–20)
WBC # BLD: 14.77 K/UL — HIGH (ref 3.8–10.5)
WBC # BLD: 9.9 K/UL — SIGNIFICANT CHANGE UP (ref 3.8–10.5)
WBC # FLD AUTO: 14.77 K/UL — HIGH (ref 3.8–10.5)
WBC # FLD AUTO: 9.9 K/UL — SIGNIFICANT CHANGE UP (ref 3.8–10.5)

## 2021-07-26 PROCEDURE — 99232 SBSQ HOSP IP/OBS MODERATE 35: CPT

## 2021-07-26 PROCEDURE — 99231 SBSQ HOSP IP/OBS SF/LOW 25: CPT

## 2021-07-26 PROCEDURE — 11046 DBRDMT MUSC&/FSCA EA ADDL: CPT | Mod: 59

## 2021-07-26 PROCEDURE — 14302 TIS TRNFR ADDL 30 SQ CM: CPT

## 2021-07-26 PROCEDURE — 11043 DBRDMT MUSC&/FSCA 1ST 20/<: CPT | Mod: 78,59

## 2021-07-26 PROCEDURE — 14301 TIS TRNFR ANY 30.1-60 SQ CM: CPT | Mod: 78

## 2021-07-26 RX ORDER — HEPARIN SODIUM 5000 [USP'U]/ML
2100 INJECTION INTRAVENOUS; SUBCUTANEOUS
Qty: 25000 | Refills: 0 | Status: DISCONTINUED | OUTPATIENT
Start: 2021-07-26 | End: 2021-07-26

## 2021-07-26 RX ORDER — SODIUM CHLORIDE 9 MG/ML
1000 INJECTION INTRAMUSCULAR; INTRAVENOUS; SUBCUTANEOUS
Refills: 0 | Status: DISCONTINUED | OUTPATIENT
Start: 2021-07-26 | End: 2021-07-28

## 2021-07-26 RX ORDER — HEPARIN SODIUM 5000 [USP'U]/ML
2100 INJECTION INTRAVENOUS; SUBCUTANEOUS
Qty: 25000 | Refills: 0 | Status: DISCONTINUED | OUTPATIENT
Start: 2021-07-26 | End: 2021-07-28

## 2021-07-26 RX ORDER — HYDROMORPHONE HYDROCHLORIDE 2 MG/ML
0.25 INJECTION INTRAMUSCULAR; INTRAVENOUS; SUBCUTANEOUS
Refills: 0 | Status: DISCONTINUED | OUTPATIENT
Start: 2021-07-26 | End: 2021-07-27

## 2021-07-26 RX ORDER — ONDANSETRON 8 MG/1
4 TABLET, FILM COATED ORAL EVERY 6 HOURS
Refills: 0 | Status: DISCONTINUED | OUTPATIENT
Start: 2021-07-26 | End: 2021-07-27

## 2021-07-26 RX ADMIN — DEXTROSE MONOHYDRATE, SODIUM CHLORIDE, AND POTASSIUM CHLORIDE 120 MILLILITER(S): 50; .745; 4.5 INJECTION, SOLUTION INTRAVENOUS at 11:08

## 2021-07-26 RX ADMIN — Medication 250 MILLIGRAM(S): at 21:43

## 2021-07-26 RX ADMIN — Medication 250 MILLIGRAM(S): at 13:43

## 2021-07-26 RX ADMIN — HEPARIN SODIUM 21 UNIT(S)/HR: 5000 INJECTION INTRAVENOUS; SUBCUTANEOUS at 21:15

## 2021-07-26 RX ADMIN — GABAPENTIN 200 MILLIGRAM(S): 400 CAPSULE ORAL at 21:34

## 2021-07-26 RX ADMIN — HEPARIN SODIUM 21 UNIT(S)/HR: 5000 INJECTION INTRAVENOUS; SUBCUTANEOUS at 14:13

## 2021-07-26 RX ADMIN — SODIUM CHLORIDE 75 MILLILITER(S): 9 INJECTION INTRAMUSCULAR; INTRAVENOUS; SUBCUTANEOUS at 21:15

## 2021-07-26 RX ADMIN — PIPERACILLIN AND TAZOBACTAM 25 GRAM(S): 4; .5 INJECTION, POWDER, LYOPHILIZED, FOR SOLUTION INTRAVENOUS at 22:58

## 2021-07-26 RX ADMIN — PIPERACILLIN AND TAZOBACTAM 25 GRAM(S): 4; .5 INJECTION, POWDER, LYOPHILIZED, FOR SOLUTION INTRAVENOUS at 05:25

## 2021-07-26 RX ADMIN — Medication 250 MILLIGRAM(S): at 05:24

## 2021-07-26 RX ADMIN — Medication 1 APPLICATION(S): at 05:25

## 2021-07-26 RX ADMIN — Medication 1 TABLET(S): at 11:10

## 2021-07-26 RX ADMIN — GABAPENTIN 200 MILLIGRAM(S): 400 CAPSULE ORAL at 13:44

## 2021-07-26 RX ADMIN — PIPERACILLIN AND TAZOBACTAM 25 GRAM(S): 4; .5 INJECTION, POWDER, LYOPHILIZED, FOR SOLUTION INTRAVENOUS at 13:44

## 2021-07-26 RX ADMIN — PANTOPRAZOLE SODIUM 40 MILLIGRAM(S): 20 TABLET, DELAYED RELEASE ORAL at 05:24

## 2021-07-26 RX ADMIN — GABAPENTIN 200 MILLIGRAM(S): 400 CAPSULE ORAL at 05:25

## 2021-07-26 RX ADMIN — Medication 137 MICROGRAM(S): at 05:24

## 2021-07-26 RX ADMIN — DEXTROSE MONOHYDRATE, SODIUM CHLORIDE, AND POTASSIUM CHLORIDE 120 MILLILITER(S): 50; .745; 4.5 INJECTION, SOLUTION INTRAVENOUS at 00:09

## 2021-07-26 NOTE — PROGRESS NOTE ADULT - ASSESSMENT
ASSESSMENT AND PLAN: 61 year-old man with history of thyroid cancer status post total thyroidectomy in 2010 and radioactive iodine treatment in 2011, hypogonadism, vitamin D deficiency, and osteopenia, with chronic constipation  right buttock and right scrotal pain and numbness. 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 The chordoma resulted in perineal numbness and overflow incontinence and he was admitted 7/7/21 for staged resection. On 7/7, he underwent Plastic Surgery and General Surgery assisted vertical rectus abdominal myocutaneous flap harvest with transperitoneal ligation of internal iliac artery and vein.  s/p en bloc sacrectomy/   with L4-pelvis fusion; EBL 4.5L status post 8 units PRBC, 6 units FFP, 1 pack platelets and 5L crystalloid; 7/8. Extubated 7/9 with hypoxia respiratory distress, desaturation CPAP  at night CTA revealed bilateral sub  segmental PE No right heart strain. & Bibasilar and left lingular consolidative opacities . Started on Heparin gtt. PTT goal 50-70 Treated with Levaquin for 7 days Course further c/b ileus requiring gastric decompression. Pt pending echo to check for RV per pulm. Now with leucocytosis and febrile overnight. ID following on Vanco and zosyn for emperic coverage. Awaiting washout with plastics today. Will stop heparin drip on call to OR for PE.     NEURO: Continue Tylenaol and gabapentin for pain. Avoiding narcotics due to ileus     PULM: Pt is using incentive spirometer and acapella. ON 4L NC. Pulm following and cleared for surgery       CV: TTE -done < from: Transthoracic Echocardiogram (07.23.21 @ 19:15) >  Conclusions:  1. Mitral annular calcification, otherwise normal mitral  valve. Minimal mitral regurgitation.  2. Aortic valve not well visualized; probably normal.  Minimal aortic regurgitation.  3. Normal left ventricular systolic function. No segmental  wall motion abnormalities.  4. Mild diastolic dysfunction (Stage I).  5. The right ventricle is not well visualized; grossly  normal right ventricular systolic function.    < end of copied text >    ENDO: Continue synthroid     HEME/ONC:                      DVT ppx: PE: Heparin Drip decreased to 21cc/hr from 23cc/hr for PTT 73.3      RENAL: IVL. Continue Barnett     ID: Vanco and zosyn for emperic coverage. Pending surgical washout with plastics later today.   Vanco T 6.6 Vanco increased to 1G TID, F/u troph before 4th dose     GI: Ileus resolving, Pt having bowel movements (4- 7/24) Abdomen soft. Plan for colostomy on wed after wound washout today with plastics. appreciate GI follow up.   Continue protonix for GI ppx     DISCHARGE PLANNING: PT/OT Acute Rehab upon d/c, will consult PMR post op    will discuss with Dr Mcallister  [phone]

## 2021-07-26 NOTE — PROGRESS NOTE ADULT - SUBJECTIVE AND OBJECTIVE BOX
Jesse Rodriguez   Pager [phone]  Office [phone]      CC: Patient is a 61y old  Male who presents with a chief complaint of Chordoma Resection (25 Jul 2021 12:33)      SUBJECTIVE / OVERNIGHT EVENTS:    MEDICATIONS  (STANDING):  calcium carbonate 1250 mG  + Vitamin D (OsCal 500 + D) 1 Tablet(s) Oral daily  dextrose 5% + sodium chloride 0.45% with potassium chloride 20 mEq/L 1000 milliLiter(s) (120 mL/Hr) IV Continuous <Continuous>  gabapentin 200 milliGRAM(s) Oral every 8 hours  gentamicin 0.1% Ointment 1 Application(s) Topical two times a day  heparin  Infusion 2100 Unit(s)/Hr (23 mL/Hr) IV Continuous <Continuous>  levothyroxine 137 MICROGram(s) Oral daily  multivitamin 1 Tablet(s) Oral daily  pantoprazole    Tablet 40 milliGRAM(s) Oral before breakfast  piperacillin/tazobactam IVPB.. 3.375 Gram(s) IV Intermittent every 8 hours  sodium chloride 0.9%. 1000 milliLiter(s) (75 mL/Hr) IV Continuous <Continuous>  vancomycin  IVPB 1000 milliGRAM(s) IV Intermittent every 8 hours    MEDICATIONS  (PRN):  acetaminophen   Tablet .. 650 milliGRAM(s) Oral every 6 hours PRN Temp greater or equal to 38C (100.4F), Mild Pain (1 - 3)  aluminum hydroxide/magnesium hydroxide/simethicone Suspension 30 milliLiter(s) Oral every 4 hours PRN Dyspepsia  ondansetron Injectable 4 milliGRAM(s) IV Push every 6 hours PRN Nausea      Vital Signs Last 24 Hrs  T(C): 36.7 (26 Jul 2021 07:00), Max: 37.6 (26 Jul 2021 00:04)  T(F): 98 (26 Jul 2021 07:00), Max: 99.6 (26 Jul 2021 00:04)  HR: 86 (26 Jul 2021 07:00) (76 - 90)  BP: 118/68 (26 Jul 2021 07:00) (110/69 - 133/78)  BP(mean): --  RR: 18 (26 Jul 2021 07:00) (18 - 19)  SpO2: 95% (26 Jul 2021 07:00) (94% - 96%)  CAPILLARY BLOOD GLUCOSE        I&O's Summary    25 Jul 2021 07:01  -  26 Jul 2021 07:00  --------------------------------------------------------  IN: 1695 mL / OUT: 2535 mL / NET: -840 mL      tele:    PHYSICAL EXAM:    GENERAL: NAD   HEENT: EOMI, PERRL  PULM: Clear to auscultation bilaterally  CV: Regular rate and rhythm; nl S1, S2; No murmurs, rubs, or gallops  ABDOMEN: Soft, Nontender, Nondistended; Bowel sounds present  EXTREMITIES/MSK:  No edema, calf tenderness   PSYCH: AAOx3  NEUROLOGY: non-focal          LABS:                        9.1    9.90  )-----------( 503      ( 26 Jul 2021 05:43 )             29.7     07-26    135  |  99  |  10  ----------------------------<  128<H>  4.0   |  22  |  0.70    Ca    8.5      26 Jul 2021 05:43      PT/INR - ( 26 Jul 2021 05:43 )   PT: 16.2 sec;   INR: 1.37 ratio         PTT - ( 26 Jul 2021 05:43 )  PTT:69.7 sec            RADIOLOGY & ADDITIONAL TESTS:    Imaging Personally Reviewed:    Consultant(s) Notes Reviewed:      Care Discussed with Consultants/Other Providers:   Jesse Rodriguez   Pager [phone]  Office [phone]      CC: Patient is a 61y old  Male who presents with a chief complaint of Chordoma Resection (25 Jul 2021 12:33)      SUBJECTIVE / OVERNIGHT EVENTS: no f/c/r. no n/v/abd pain. no cp/sob. no HA.     MEDICATIONS  (STANDING):  calcium carbonate 1250 mG  + Vitamin D (OsCal 500 + D) 1 Tablet(s) Oral daily  dextrose 5% + sodium chloride 0.45% with potassium chloride 20 mEq/L 1000 milliLiter(s) (120 mL/Hr) IV Continuous <Continuous>  gabapentin 200 milliGRAM(s) Oral every 8 hours  gentamicin 0.1% Ointment 1 Application(s) Topical two times a day  heparin  Infusion 2100 Unit(s)/Hr (23 mL/Hr) IV Continuous <Continuous>  levothyroxine 137 MICROGram(s) Oral daily  multivitamin 1 Tablet(s) Oral daily  pantoprazole    Tablet 40 milliGRAM(s) Oral before breakfast  piperacillin/tazobactam IVPB.. 3.375 Gram(s) IV Intermittent every 8 hours  sodium chloride 0.9%. 1000 milliLiter(s) (75 mL/Hr) IV Continuous <Continuous>  vancomycin  IVPB 1000 milliGRAM(s) IV Intermittent every 8 hours    MEDICATIONS  (PRN):  acetaminophen   Tablet .. 650 milliGRAM(s) Oral every 6 hours PRN Temp greater or equal to 38C (100.4F), Mild Pain (1 - 3)  aluminum hydroxide/magnesium hydroxide/simethicone Suspension 30 milliLiter(s) Oral every 4 hours PRN Dyspepsia  ondansetron Injectable 4 milliGRAM(s) IV Push every 6 hours PRN Nausea      Vital Signs Last 24 Hrs  T(C): 36.7 (26 Jul 2021 07:00), Max: 37.6 (26 Jul 2021 00:04)  T(F): 98 (26 Jul 2021 07:00), Max: 99.6 (26 Jul 2021 00:04)  HR: 86 (26 Jul 2021 07:00) (76 - 90)  BP: 118/68 (26 Jul 2021 07:00) (110/69 - 133/78)  BP(mean): --  RR: 18 (26 Jul 2021 07:00) (18 - 19)  SpO2: 95% (26 Jul 2021 07:00) (94% - 96%)  CAPILLARY BLOOD GLUCOSE        I&O's Summary    25 Jul 2021 07:01  -  26 Jul 2021 07:00  --------------------------------------------------------  IN: 1695 mL / OUT: 2535 mL / NET: -840 mL          PHYSICAL EXAM:    GENERAL: NAD   HEENT: EOMI, PERRL  PULM: Clear to auscultation bilaterally  CV: Regular rate and rhythm; nl S1, S2; No murmurs, rubs, or gallops  ABDOMEN: Soft, Nontender, Nondistended; Bowel sounds present  EXTREMITIES/MSK:  No edema, calf tenderness   PSYCH: AAOx3  NEUROLOGY: non-focal          LABS:                        9.1    9.90  )-----------( 503      ( 26 Jul 2021 05:43 )             29.7     07-26    135  |  99  |  10  ----------------------------<  128<H>  4.0   |  22  |  0.70    Ca    8.5      26 Jul 2021 05:43      PT/INR - ( 26 Jul 2021 05:43 )   PT: 16.2 sec;   INR: 1.37 ratio         PTT - ( 26 Jul 2021 05:43 )  PTT:69.7 sec            RADIOLOGY & ADDITIONAL TESTS:    Imaging Personally Reviewed:    Consultant(s) Notes Reviewed:      Care Discussed with Consultants/Other Providers: neurosurg

## 2021-07-26 NOTE — PROGRESS NOTE ADULT - ASSESSMENT
61 year old male with PMH of Thyroid Cancer, s/p Total Thyroidectomy 2010 and Radioactive Iodine 2011, Hypogonadism, Vitamin D Deficiency, Osteopenia with recently diagnosed Sacral Tumor. Admitted on 7/7, he underwent Plastic Surgery and General Surgery assisted vertical rectus abdominal myocutaneous flap harvest with transperitoneal ligation of internal iliac artery and vein. He underwent en bloc sacrectomy on 7/8, with L4-pelvis fusion, transfused 8 units PRBC, 6 units FFP, 1 pack platelets and 5L crystalloid; post-op status post 1 pack platelets, CTA 7/10 with bilateral upper lobe segmental PEs,  post- op course complicated by ileus      #Ileus - RESOLVED  Incontinent of stool and soiling his posterior dressing s/p wound debridement with Plastics 7/20 now planning Diverting Colostomy this week   -Surgery input much appreciated  -bed mobility/turning  -wound care  -monitor/replete lytes PRN  goal K 4-4.5 and Mg>2  -PPI for GI prophylaxis  -Synthroid dosing per primary team    Further care per primary team  Discussed with Neurosurgery team, pt and father at bedside      Connor Camp PA-C    [facility]  [phone]  After hours and weekend coverage [phone]

## 2021-07-26 NOTE — PROGRESS NOTE ADULT - ASSESSMENT
Mr. Jackson is a 60 yo M with PMHx of thyroid cancer s/p total thyroidectomy (2010), HLD, Vit D deficiency, Osteopenia, and chordoma, who is now s/p Sacral sacrectomy and VRAM flap harvest (07/07, 07/08).    -Please place pt in lateral position and put as little pressure on Sacral wound as possible   -Please avoid stool softener  -continue Zosyn/Vanc   -Abdominal drain to be removed tomorrow  -Plan for washout in OR today w/ Dr. Peraza  -Cleared by Pulmonology for OR   -NPO after midnight for OR  -Pre-Op Labs: CBC, BMP, T/S, Coag  -Abdominal dressing removed by PRS 07/23-prineo still in place   -Cw aquacel on superior portion of posterior incision   -RN please change dressing at inferior aspect of incision 2x/day   -RN at time of dressing change, please apply topical gentamycin ointment 2x/day to inferior aspect of incision w nylon sutures   -RN may change inferior aspect of incision with dry gauze dressing as needed if soils 2/2 stool incontinence   -RN may change gauze/tegaderm at drain insertion sites b/l PRN   -rest of care per primary team   -PRS will continue to follow     Ny Oliva MD   General Surgery Resident, PGY1  # [phone] with questions   Mr. Jackson is a 60 yo M with PMHx of thyroid cancer s/p total thyroidectomy (2010), HLD, Vit D deficiency, Osteopenia, and chordoma, who is now s/p Sacral sacrectomy and VRAM flap harvest (07/07, 07/08).    Mr. Jackson is a 60 yo M with PMHx of thyroid cancer s/p total thyroidectomy (2010), HLD, Vit D deficiency, Osteopenia, and chordoma, who is now s/p Sacral sacrectomy and VRAM flap harvest (07/07, 07/08).    -Please place pt in lateral position and put as little pressure on Sacral wound as possible   -Please avoid stool softener  -continue Zosyn/Vanc   -Plan for washout in OR today w/ Dr. Peraza 7/26  -Right SEDRICK removed today 7/26  -Cleared by Pulmonology for OR   -Abdominal dressing removed by PRS 07/23-prineo still in place   -Cw aquacel on superior portion of posterior incision   -RN please change dressing at inferior aspect of incision 2x/day   -RN at time of dressing change, please apply topical gentamycin ointment 2x/day to inferior aspect of incision w nylon sutures   -RN may change inferior aspect of incision with dry gauze dressing as needed if soils 2/2 stool incontinence   -RN may change gauze/tegaderm at drain insertion sites b/l PRN   -rest of care per primary team   -PRS will continue to follow     Ny Oliva MD   General Surgery Resident, PGY1  # [phone] with questions    Ny Oliva MD   General Surgery Resident, PGY1  # [phone] with questions   Mr. Jackson is a 62 yo M with PMHx of thyroid cancer s/p total thyroidectomy (2010), HLD, Vit D deficiency, Osteopenia, and chordoma, who is now s/p Sacral sacrectomy and VRAM flap harvest (07/07, 07/08).    Mr. Jackson is a 62 yo M with PMHx of thyroid cancer s/p total thyroidectomy (2010), HLD, Vit D deficiency, Osteopenia, and chordoma, who is now s/p Sacral sacrectomy and VRAM flap harvest (07/07, 07/08).    -Please place pt in lateral position and put as little pressure on Sacral wound as possible   -Please avoid stool softener  -Plan for washout in OR today w/ Dr. Peraza 7/26  -Right SEDRICK removed today 7/26  -Cleared by Pulmonology for OR   -Continue Zosyn/Vanc IV per ID.  -Abdominal dressing removed by PRS 07/23-prineo still in place   -Cw aquacel on superior portion of posterior incision   -RN please change dressing at inferior aspect of incision 2x/day   -RN at time of dressing change, please apply topical gentamycin ointment 2x/day to inferior aspect of incision w nylon sutures   -RN may change inferior aspect of incision with dry gauze dressing as needed if soils 2/2 stool incontinence   -RN may change gauze/tegaderm at drain insertion sites b/l PRN   -rest of care per primary team   -PRS will continue to follow     Ny Oliva MD   General Surgery Resident, PGY1  # [phone] with questions    Ny Oliva MD   General Surgery Resident, PGY1  # [phone] with questions       Mr. Jackson is a 60 yo M with PMHx of thyroid cancer s/p total thyroidectomy (2010), HLD, Vit D deficiency, Osteopenia, and chordoma, who is now s/p Sacral sacrectomy and VRAM flap harvest (07/07, 07/08).    -Please place pt in lateral position and put as little pressure on Sacral wound as possible   -Please avoid stool softener  -Plan for washout in OR today w/ Dr. Peraza 7/26  -Right SEDRICK removed today 7/26  -Cleared by Pulmonology for OR   -Continue IV abx per ID.  -Abdominal dressing removed by PRS 07/23-prineo still in place   -Cw aquacel on superior portion of posterior incision   -RN please change dressing at inferior aspect of incision 2x/day and apply topical gentamycin ointment 2x/day to inferior aspect of incision w nylon sutures   -RN may change inferior aspect of incision with dry gauze dressing as needed if soils 2/2 stool incontinence   -RN may change gauze/tegaderm at drain insertion sites b/l PRN   -rest of care per primary team   -PRS will continue to follow     Ny Oliva MD   General Surgery Resident, PGY1  Plastic Surgery (pg [facility]: [id number], [place]: [phone])

## 2021-07-26 NOTE — PROGRESS NOTE ADULT - ASSESSMENT
61y Male patient s/p en bloc sacrectomy for chordoma w/ L4-pelvis fusion w/ plastics closure vertical rectus abdominis myocutaneous (VRAM) flap for chordoma on 07/07 and 07/08, post-op course c/b bilateral PEs (on hep gtt) and post-op ileus, resolved. Patient now incontinent of stool and soiling his posterior dressing s/p wound debridement with Plastic surgery 07/20. Pt now opting to proceed to OR for colostomy    Plan:  - OR with Plastic Surgery today for washout  - Continue CLD post-op  - Bowel prep tomorrow  - Plan for colostomy on Wednesday  - Continue excellent care per neurosurgery    [facility]  [id number]

## 2021-07-26 NOTE — PROGRESS NOTE ADULT - SUBJECTIVE AND OBJECTIVE BOX
CC: f/u for draining wound    Patient reports  he is draining fluid from wound  REVIEW OF SYSTEMS:  All other review of systems negative (Comprehensive ROS)    Antimicrobials Day #  :3  piperacillin/tazobactam IVPB.. 3.375 Gram(s) IV Intermittent every 8 hours  vancomycin  IVPB 1000 milliGRAM(s) IV Intermittent every 8 hours    Other Medications Reviewed    T(F): 98.6 (07-26-21 @ 16:34), Max: 100 (07-26-21 @ 15:13)  HR: 88 (07-26-21 @ 16:51)  BP: 133/79 (07-26-21 @ 16:51)  RR: 18 (07-26-21 @ 16:51)  SpO2: 98% (07-26-21 @ 16:51)  Wt(kg): --    PHYSICAL EXAM:  General: alert, no acute distress  Eyes:  anicteric, no conjunctival injection, no discharge  Oropharynx: no lesions or injection 	  Neck: supple, without adenopathy  Lungs: clear to auscultation  Heart: regular rate and rhythm; no murmur, rubs or gallops  Abdomen: soft, mildly distended, nontender, without mass or organomegaly, wound clean intact  Skin: no lesions  Extremities: no clubbing, cyanosis, or edema  Neurologic: alert, oriented, moves all extremities  back wound is draining serosanguinous fluid  LAB RESULTS:                        9.1    9.90  )-----------( 503      ( 26 Jul 2021 05:43 )             29.7     07-26    135  |  99  |  10  ----------------------------<  128<H>  4.0   |  22  |  0.70    Ca    8.5      26 Jul 2021 05:43          MICROBIOLOGY:  RECENT CULTURES:  07-22 @ 19:01 .Blood Blood-Peripheral     No growth to date.          RADIOLOGY REVIEWED:  < from: CT Abdomen and Pelvis w/ Oral Cont and w/ IV Cont (07.23.21 @ 14:56) >  EXAM:  CT ABDOMEN AND PELVIS OC IC                            PROCEDURE DATE:  07/23/2021            INTERPRETATION:  CLINICAL INFORMATION: Leukocytosis, evaluate for intra-abdominal infection.    COMPARISON: CT pelvis 7/9/2021. CTA chest 7/10/2021, CT abdomen and pelvis 6/3/2021.    CONTRAST/COMPLICATIONS:  IV Contrast: Omnipaque 350  90 cc administered   10 cc discarded  Oral Contrast: Omnipaque 300  Complications: None reported    PROCEDURE:  CT of the Abdomen and Pelvis was performed.  Sagittal and coronal reformats were performed.    FINDINGS:  LOWER CHEST: Trace right pleural effusion. Unchanged bilateral lower lobe consolidation, likely atelectasis.    LIVER: Within normal limits.  BILE DUCTS: Normal caliber.  GALLBLADDER: Within normal limits.  SPLEEN: Within normal limits.  PANCREAS: Within normal limits.  ADRENALS: Within normal limits.  KIDNEYS/URETERS: No hydronephrosis. Left renal hypodensities too small to characterize.    BLADDER: Barnett catheter.  REPRODUCTIVE ORGANS: Prostate within normal limits.    BOWEL: No bowel obstruction. Appendix is normal. Mild rectal thickening with mild perirectal standing.  PERITONEUM: No ascites.  VESSELS: Within normal limits.  RETROPERITONEUM/LYMPH NODES: No lymphadenopathy.  ABDOMINALWALL: Postsurgical changes in the ventral abdominal wall with drainage catheter. Postsurgical changes from  BONES: Streak artifact from lumbar spinal hardware limits evaluation. However there is ill-defined. Status post partial sacral resection with associated postoperative changes. Increased fluid in the sacral/presacral space, measuring 10.7 x 2.1 cm, previously 6.2 x 0.9 cm.    IMPRESSION:  Status post partial sacral resection and lumbosacral iliac fusion with associated postoperative changes and drainage catheter. Increased sacral/presacral fluid collection. Superimposed infection is not excluded.    Mild rectal thickening with mild perirectal standing, may represent proctitis.    --- End of Report ---    < end of copied text >          < from: CT Angio Chest PE Protocol w/ IV Cont (07.10.21 @ 11:43) >    EXAM:  CT ANGIO CHEST PULM ART WAWIC                            *** ADDENDUM 07/10/2021  ***    These findings were discussed with Rhianna ALVAREZ at 7/10/2021 1:37 PM by Dr. Montes of Radiology with read back confirmation.    --- End of Report ---    *** END OF ADDENDUM 07/10/2021  ***      PROCEDURE DATE:  07/10/2021            INTERPRETATION:  CLINICAL INFORMATION: Hypoxia.    COMPARISON: Chest CT from 6/3/2021.    CONTRAST/COMPLICATIONS:  IV Contrast: Omnipaque 350  68 cc administered   32 cc discarded  Oral Contrast: None  Complications: None    PROCEDURE:  CT Angiography of the Chest.  Sagittal and coronal reformats were performed as well as 3D (MIP) reconstructions.    FINDINGS:    LUNGS AND AIRWAYS: Patent central airways.  Left lingular and bibasilar consolidative opacities with associated volume loss.  PLEURA: Trace bilateral pleural effusion.  MEDIASTINUM AND BATSHEVA: 1.7 x 1.5 cm right perihilar lymph node.  VESSELS: Coronary arterial calcifications. Small bilateral upper lobe subsegmental pulmonary emboli: Left upper lobe (6, 153) right upper lobe (6, 173)  HEART: Heart size is normal. No pericardial effusion.  CHEST WALL AND LOWER NECK: Within normal limits.  VISUALIZED UPPER ABDOMEN: Gallbladder stones and or debris  BONES: Degenerative changes of the visualized spine.    IMPRESSION:    Small bilateral upper lobe subsegmental pulmonary emboli. No right heart strain.    Bibasilar and left lingular consolidative opacities with associated volume loss likely represent atelectasis.    --- End of Report ---      < end of copied text >      Assessment:  Patient with chordoma of sacrum, admitted about 3 weeks ago and underwent planned resection in 2 stages as planned , including anterior approach, posterior flap and abdomen wall reconstruction with enmasse sacrectomy and internal hemipelvectomy.  He had respiratory failure post op with PE and pneumonia s/p levaquin for a week. He developed an ileus that is better too. He then had some local bedside posterior wound debridement. He developed fever 3 days ago and started on vanco and zosyn. He had a ct showing fluid in presacral space and his wound began to drain so is for wound wash out today. He is now with improved tmax and leukocytosis moderated. He is planned for colostomy for diversion in 2 days  Plan:  continue zosyn and vanco  await planned wound washout today and cultures and operative findings  for diverting colostomy on wed

## 2021-07-26 NOTE — PROGRESS NOTE ADULT - ASSESSMENT
Patient seen and examined at bedside after plastics wound revision  -Rectal tube placed in OR  -Drains removed and replaced with R superficial L deep SEDRICK  -Grossly infected, wound re-opened and washed out  -closed with nylon  -Dressing to remain in place until ostomy  -Restarted heparin

## 2021-07-26 NOTE — PROGRESS NOTE ADULT - SUBJECTIVE AND OBJECTIVE BOX
Vital Signs Last 24 Hrs  T(C): 36.7 (26 Jul 2021 07:00), Max: 37.6 (26 Jul 2021 00:04)  T(F): 98 (26 Jul 2021 07:00), Max: 99.6 (26 Jul 2021 00:04)  HR: 86 (26 Jul 2021 07:00) (76 - 90)  BP: 118/68 (26 Jul 2021 07:00) (110/69 - 133/78)  BP(mean): --  RR: 18 (26 Jul 2021 07:00) (18 - 19)  SpO2: 95% (26 Jul 2021 07:00) (94% - 96%)    I&O's Detail    25 Jul 2021 07:01  -  26 Jul 2021 07:00  --------------------------------------------------------  IN:    dextrose 5% + sodium chloride 0.45% w/ Additives: 600 mL    Heparin: 115 mL    Oral Fluid: 980 mL  Total IN: 1695 mL    OUT:    Bulb (mL): 5 mL    Drain (mL): 30 mL    Drain (mL): 0 mL    Indwelling Catheter - Urethral (mL): 2500 mL  Total OUT: 2535 mL    Total NET: -840 mL                                9.1    9.90  )-----------( 503      ( 26 Jul 2021 05:43 )             29.7       07-26    135  |  99  |  10  ----------------------------<  128<H>  4.0   |  22  |  0.70    Ca    8.5      26 Jul 2021 05:43        PT/INR - ( 26 Jul 2021 05:43 )   PT: 16.2 sec;   INR: 1.37 ratio         PTT - ( 26 Jul 2021 05:43 )  PTT:69.7 sec    for washout of posterior wound today      PLAN:  Bowel prep tomorrow  colostomy formation on Wednesday         Surgery Progress Note     Subjective/24hour Events:   Patient seen and examined.   No acute events overnight.   Pain controlled. Patient still having bowel movements. OR today with Plastic Surgery.     Vital Signs Last 24 Hrs  T(C): 36.7 (26 Jul 2021 07:00), Max: 37.6 (26 Jul 2021 00:04)  T(F): 98 (26 Jul 2021 07:00), Max: 99.6 (26 Jul 2021 00:04)  HR: 86 (26 Jul 2021 07:00) (76 - 90)  BP: 118/68 (26 Jul 2021 07:00) (110/69 - 133/78)  BP(mean): --  RR: 18 (26 Jul 2021 07:00) (18 - 19)  SpO2: 95% (26 Jul 2021 07:00) (94% - 96%)    I&O's Detail    25 Jul 2021 07:01  -  26 Jul 2021 07:00  --------------------------------------------------------  IN:    dextrose 5% + sodium chloride 0.45% w/ Additives: 600 mL    Heparin: 115 mL    Oral Fluid: 980 mL  Total IN: 1695 mL    OUT:    Bulb (mL): 5 mL    Drain (mL): 30 mL    Drain (mL): 0 mL    Indwelling Catheter - Urethral (mL): 2500 mL  Total OUT: 2535 mL    Total NET: -840 mL      Physical Exam:  Gen: NAD  Lungs: Non labored breathing, on nasal canula  Ab: Soft, nontender, nondistended                          9.1    9.90  )-----------( 503      ( 26 Jul 2021 05:43 )             29.7       07-26    135  |  99  |  10  ----------------------------<  128<H>  4.0   |  22  |  0.70    Ca    8.5      26 Jul 2021 05:43        PT/INR - ( 26 Jul 2021 05:43 )   PT: 16.2 sec;   INR: 1.37 ratio         PTT - ( 26 Jul 2021 05:43 )  PTT:69.7 sec    for washout of posterior wound today      PLAN:  Bowel prep tomorrow  colostomy formation on Wednesday

## 2021-07-26 NOTE — PRE-OP CHECKLIST - 1.
Emotional support and pre op teaching porvided to patient with verbalized understanidng,
patient oriented to unit and procedure
Preop teaching and emotional support provided to patient and family

## 2021-07-26 NOTE — PROGRESS NOTE ADULT - SUBJECTIVE AND OBJECTIVE BOX
Patient is a 61y old  Male who presented with a chief complaint of Chordoma Resection (25 Jul 2021 12:33)      INTERVAL HPI/OVERNIGHT EVENTS:  awaiting OR for wound wash out today  no nausea or vomiting  planned colostomy Wednesday per Surgery    MEDICATIONS  (STANDING):  calcium carbonate 1250 mG  + Vitamin D (OsCal 500 + D) 1 Tablet(s) Oral daily  dextrose 5% + sodium chloride 0.45% with potassium chloride 20 mEq/L 1000 milliLiter(s) (120 mL/Hr) IV Continuous <Continuous>  gabapentin 200 milliGRAM(s) Oral every 8 hours  gentamicin 0.1% Ointment 1 Application(s) Topical two times a day  heparin  Infusion 2100 Unit(s)/Hr (23 mL/Hr) IV Continuous <Continuous>  levothyroxine 137 MICROGram(s) Oral daily  multivitamin 1 Tablet(s) Oral daily  pantoprazole    Tablet 40 milliGRAM(s) Oral before breakfast  piperacillin/tazobactam IVPB.. 3.375 Gram(s) IV Intermittent every 8 hours  sodium chloride 0.9%. 1000 milliLiter(s) (75 mL/Hr) IV Continuous <Continuous>  vancomycin  IVPB 1000 milliGRAM(s) IV Intermittent every 8 hours    MEDICATIONS  (PRN):  acetaminophen   Tablet .. 650 milliGRAM(s) Oral every 6 hours PRN Temp greater or equal to 38C (100.4F), Mild Pain (1 - 3)  aluminum hydroxide/magnesium hydroxide/simethicone Suspension 30 milliLiter(s) Oral every 4 hours PRN Dyspepsia  ondansetron Injectable 4 milliGRAM(s) IV Push every 6 hours PRN Nausea      Allergies  No Known Allergies      Review of Systems:  General:  No wt loss, fevers, chills, night sweats,fatigue,   CV:  No pain, palpitations, hypo/hypertension  Resp:  No dyspnea, cough, tachypnea, wheezing  GI: see HPI  :  No pain, bleeding, incontinence, nocturia  Muscle:  +LE weakness  Neuro:  see HPI  Psych:  No fatigue, insomnia, mood problems, depression  Endocrine:  No polyuria, polydypsia, cold/heat intolerance  Heme:  No petechiae, ecchymosis, easy bruisability +hx thyroid CA s/p resection  Skin:  No rash, tattoos, scars, edema      Vital Signs Last 24 Hrs  T(C): 36.6 (26 Jul 2021 11:00), Max: 37.6 (26 Jul 2021 00:04)  T(F): 97.9 (26 Jul 2021 11:00), Max: 99.6 (26 Jul 2021 00:04)  HR: 82 (26 Jul 2021 11:00) (82 - 90)  BP: 117/72 (26 Jul 2021 11:00) (110/69 - 133/78)  BP(mean): --  RR: 18 (26 Jul 2021 11:00) (18 - 19)  SpO2: 97% (26 Jul 2021 11:00) (94% - 97%)    PHYSICAL EXAM:  Constitutional: NAD, on nasal cannula alert and responsive, father at bedside  Neck: No LAD, supple WH surgical scar  Respiratory: clear b/l  Cardiovascular: S1 and S2, RRR, no M  Gastrointestinal: BS+, soft ND NT +BS  midline incision c/d/i +back HMV  Extremities: No peripheral edema, neg clubbing, cyanosis  Vascular: 2+ peripheral pulses  Neurological: A/O x 3, decreased sensation medial aspect of left calf  Psychiatric: Normal mood, normal affect  Skin: No rashes    LABS:                        9.1    9.90  )-----------( 503      ( 26 Jul 2021 05:43 )             29.7     07-26    135  |  99  |  10  ----------------------------<  128<H>  4.0   |  22  |  0.70    Ca    8.5      26 Jul 2021 05:43      PT/INR - ( 26 Jul 2021 05:43 )   PT: 16.2 sec;   INR: 1.37 ratio         PTT - ( 26 Jul 2021 05:43 )  PTT:69.7 sec          RADIOLOGY & ADDITIONAL TESTS:

## 2021-07-26 NOTE — PROGRESS NOTE ADULT - PROBLEM SELECTOR PLAN 4
resolved on abx. ID consult appreciated - continue vanc and zosyn for now. bcx NTD, CT abd/pelvis as above w/ increased fluid collection and possible proctitis. Leukocytosis improving, afebrile. Continue to monitor CBC  monitor vanco trough on new dose

## 2021-07-26 NOTE — PROGRESS NOTE ADULT - SUBJECTIVE AND OBJECTIVE BOX
Plastic Surgery Progress Note    Subjective:     Patient seen and examined at bedside. Patient without complaints this AM. Denies N/V/F/C.     OBJECTIVE:     T(C): 36.7 (07-26-21 @ 07:00), Max: 37.6 (07-26-21 @ 00:04)  HR: 86 (07-26-21 @ 07:00) (76 - 90)  BP: 118/68 (07-26-21 @ 07:00) (110/69 - 133/78)  RR: 18 (07-26-21 @ 07:00) (18 - 19)  SpO2: 95% (07-26-21 @ 07:00) (94% - 96%)  Wt(kg): --    I&O's Detail    25 Jul 2021 07:01  -  26 Jul 2021 07:00  --------------------------------------------------------  IN:    dextrose 5% + sodium chloride 0.45% w/ Additives: 600 mL    Heparin: 115 mL    Oral Fluid: 980 mL  Total IN: 1695 mL    OUT:    Bulb (mL): 5 mL    Drain (mL): 30 mL    Drain (mL): 0 mL    Indwelling Catheter - Urethral (mL): 2500 mL  Total OUT: 2535 mL    Total NET: -840 mL          PHYSICAL EXAM:    GENERAL: NAD, lying in bed comfortably  HEAD:  Atraumatic, Normocephalic  ENT: Moist mucous membranes  CHEST/LUNG: Unlabored respirations  ABDOMEN: Soft, Nontender, Nondistended.   NERVOUS SYSTEM:  Alert & Oriented X3, speech clear.   SKIN: No rashes or lesions    MEDICATIONS  (STANDING):  calcium carbonate 1250 mG  + Vitamin D (OsCal 500 + D) 1 Tablet(s) Oral daily  dextrose 5% + sodium chloride 0.45% with potassium chloride 20 mEq/L 1000 milliLiter(s) (120 mL/Hr) IV Continuous <Continuous>  gabapentin 200 milliGRAM(s) Oral every 8 hours  gentamicin 0.1% Ointment 1 Application(s) Topical two times a day  heparin  Infusion 2100 Unit(s)/Hr (23 mL/Hr) IV Continuous <Continuous>  levothyroxine 137 MICROGram(s) Oral daily  multivitamin 1 Tablet(s) Oral daily  pantoprazole    Tablet 40 milliGRAM(s) Oral before breakfast  piperacillin/tazobactam IVPB.. 3.375 Gram(s) IV Intermittent every 8 hours  sodium chloride 0.9%. 1000 milliLiter(s) (75 mL/Hr) IV Continuous <Continuous>  vancomycin  IVPB 1000 milliGRAM(s) IV Intermittent every 8 hours    MEDICATIONS  (PRN):  acetaminophen   Tablet .. 650 milliGRAM(s) Oral every 6 hours PRN Temp greater or equal to 38C (100.4F), Mild Pain (1 - 3)  aluminum hydroxide/magnesium hydroxide/simethicone Suspension 30 milliLiter(s) Oral every 4 hours PRN Dyspepsia  ondansetron Injectable 4 milliGRAM(s) IV Push every 6 hours PRN Nausea      LABS:                          9.1    9.90  )-----------( 503      ( 26 Jul 2021 05:43 )             29.7     07-26    135  |  99  |  10  ----------------------------<  128<H>  4.0   |  22  |  0.70    Ca    8.5      26 Jul 2021 05:43      PT/INR - ( 26 Jul 2021 05:43 )   PT: 16.2 sec;   INR: 1.37 ratio         PTT - ( 26 Jul 2021 05:43 )  PTT:69.7 sec           Plastic Surgery Progress Note    Subjective:     Patient seen and examined at bedside. Patient without complaints this AM. Denies N/V/F/C. VSS, AF.    OBJECTIVE:     T(C): 36.7 (07-26-21 @ 07:00), Max: 37.6 (07-26-21 @ 00:04)  HR: 86 (07-26-21 @ 07:00) (76 - 90)  BP: 118/68 (07-26-21 @ 07:00) (110/69 - 133/78)  RR: 18 (07-26-21 @ 07:00) (18 - 19)  SpO2: 95% (07-26-21 @ 07:00) (94% - 96%)  Wt(kg): --    I&O's Detail    25 Jul 2021 07:01  -  26 Jul 2021 07:00  --------------------------------------------------------  IN:    dextrose 5% + sodium chloride 0.45% w/ Additives: 600 mL    Heparin: 115 mL    Oral Fluid: 980 mL  Total IN: 1695 mL    OUT:    Bulb (mL): 5 mL    Drain (mL): 30 mL    Drain (mL): 0 mL    Indwelling Catheter - Urethral (mL): 2500 mL  Total OUT: 2535 mL    Total NET: -840 mL          PHYSICAL EXAM:    GENERAL: NAD, lying in bed comfortably  HEAD:  Atraumatic, Normocephalic  ENT: Moist mucous membranes  CHEST/LUNG: Unlabored respirations  ABDOMEN: Soft, Nontender, Nondistended.   NERVOUS SYSTEM:  Alert & Oriented X3, speech clear.   SKIN: No rashes or lesions    MEDICATIONS  (STANDING):  calcium carbonate 1250 mG  + Vitamin D (OsCal 500 + D) 1 Tablet(s) Oral daily  dextrose 5% + sodium chloride 0.45% with potassium chloride 20 mEq/L 1000 milliLiter(s) (120 mL/Hr) IV Continuous <Continuous>  gabapentin 200 milliGRAM(s) Oral every 8 hours  gentamicin 0.1% Ointment 1 Application(s) Topical two times a day  heparin  Infusion 2100 Unit(s)/Hr (23 mL/Hr) IV Continuous <Continuous>  levothyroxine 137 MICROGram(s) Oral daily  multivitamin 1 Tablet(s) Oral daily  pantoprazole    Tablet 40 milliGRAM(s) Oral before breakfast  piperacillin/tazobactam IVPB.. 3.375 Gram(s) IV Intermittent every 8 hours  sodium chloride 0.9%. 1000 milliLiter(s) (75 mL/Hr) IV Continuous <Continuous>  vancomycin  IVPB 1000 milliGRAM(s) IV Intermittent every 8 hours    MEDICATIONS  (PRN):  acetaminophen   Tablet .. 650 milliGRAM(s) Oral every 6 hours PRN Temp greater or equal to 38C (100.4F), Mild Pain (1 - 3)  aluminum hydroxide/magnesium hydroxide/simethicone Suspension 30 milliLiter(s) Oral every 4 hours PRN Dyspepsia  ondansetron Injectable 4 milliGRAM(s) IV Push every 6 hours PRN Nausea      LABS:                          9.1    9.90  )-----------( 503      ( 26 Jul 2021 05:43 )             29.7     07-26    135  |  99  |  10  ----------------------------<  128<H>  4.0   |  22  |  0.70    Ca    8.5      26 Jul 2021 05:43      PT/INR - ( 26 Jul 2021 05:43 )   PT: 16.2 sec;   INR: 1.37 ratio         PTT - ( 26 Jul 2021 05:43 )  PTT:69.7 sec           Plastic Surgery Progress Note    Subjective:     Patient seen and examined at bedside. Patient without complaints this AM. Denies N/V/F/C. VSS, AF.    OBJECTIVE:     T(C): 36.7 (07-26-21 @ 07:00), Max: 37.6 (07-26-21 @ 00:04)  HR: 86 (07-26-21 @ 07:00) (76 - 90)  BP: 118/68 (07-26-21 @ 07:00) (110/69 - 133/78)  RR: 18 (07-26-21 @ 07:00) (18 - 19)  SpO2: 95% (07-26-21 @ 07:00) (94% - 96%)  Wt(kg): --    I&O's Detail    25 Jul 2021 07:01  -  26 Jul 2021 07:00  --------------------------------------------------------  IN:    dextrose 5% + sodium chloride 0.45% w/ Additives: 600 mL    Heparin: 115 mL    Oral Fluid: 980 mL  Total IN: 1695 mL    OUT:    Bulb (mL): 5 mL    Drain (mL): 30 mL    Drain (mL): 0 mL    Indwelling Catheter - Urethral (mL): 2500 mL  Total OUT: 2535 mL    Total NET: -840 mL          PHYSICAL EXAM:    GENERAL: NAD, lying in bed comfortably  HEAD:  Atraumatic, Normocephalic  ABDOMEN:     MEDICATIONS  (STANDING):  calcium carbonate 1250 mG  + Vitamin D (OsCal 500 + D) 1 Tablet(s) Oral daily  dextrose 5% + sodium chloride 0.45% with potassium chloride 20 mEq/L 1000 milliLiter(s) (120 mL/Hr) IV Continuous <Continuous>  gabapentin 200 milliGRAM(s) Oral every 8 hours  gentamicin 0.1% Ointment 1 Application(s) Topical two times a day  heparin  Infusion 2100 Unit(s)/Hr (23 mL/Hr) IV Continuous <Continuous>  levothyroxine 137 MICROGram(s) Oral daily  multivitamin 1 Tablet(s) Oral daily  pantoprazole    Tablet 40 milliGRAM(s) Oral before breakfast  piperacillin/tazobactam IVPB.. 3.375 Gram(s) IV Intermittent every 8 hours  sodium chloride 0.9%. 1000 milliLiter(s) (75 mL/Hr) IV Continuous <Continuous>  vancomycin  IVPB 1000 milliGRAM(s) IV Intermittent every 8 hours    MEDICATIONS  (PRN):  acetaminophen   Tablet .. 650 milliGRAM(s) Oral every 6 hours PRN Temp greater or equal to 38C (100.4F), Mild Pain (1 - 3)  aluminum hydroxide/magnesium hydroxide/simethicone Suspension 30 milliLiter(s) Oral every 4 hours PRN Dyspepsia  ondansetron Injectable 4 milliGRAM(s) IV Push every 6 hours PRN Nausea      LABS:                          9.1    9.90  )-----------( 503      ( 26 Jul 2021 05:43 )             29.7     07-26    135  |  99  |  10  ----------------------------<  128<H>  4.0   |  22  |  0.70    Ca    8.5      26 Jul 2021 05:43      PT/INR - ( 26 Jul 2021 05:43 )   PT: 16.2 sec;   INR: 1.37 ratio         PTT - ( 26 Jul 2021 05:43 )  PTT:69.7 sec           Plastic Surgery Progress Note    Subjective:     Patient seen and examined at bedside. Patient without complaints this AM. Denies N/V/F/C. VSS, AF. Currently NPO for OR. Abdominal SEDRICK removed and tolerated well.    OBJECTIVE:     T(C): 36.7 (07-26-21 @ 07:00), Max: 37.6 (07-26-21 @ 00:04)  HR: 86 (07-26-21 @ 07:00) (76 - 90)  BP: 118/68 (07-26-21 @ 07:00) (110/69 - 133/78)  RR: 18 (07-26-21 @ 07:00) (18 - 19)  SpO2: 95% (07-26-21 @ 07:00) (94% - 96%)  Wt(kg): --    I&O's Detail    25 Jul 2021 07:01  -  26 Jul 2021 07:00  --------------------------------------------------------  IN:    dextrose 5% + sodium chloride 0.45% w/ Additives: 600 mL    Heparin: 115 mL    Oral Fluid: 980 mL  Total IN: 1695 mL    OUT:    Bulb (mL): 5 mL    Drain (mL): 30 mL    Drain (mL): 0 mL    Indwelling Catheter - Urethral (mL): 2500 mL  Total OUT: 2535 mL    Total NET: -840 mL          PHYSICAL EXAM:    GENERAL: NAD, lying in bed comfortably  HEAD:  Atraumatic, Normocephalic  ABDOMEN: Midline incision c/d/i, soft non tender, abdominal SEDRICK removed  BACK:     MEDICATIONS  (STANDING):  calcium carbonate 1250 mG  + Vitamin D (OsCal 500 + D) 1 Tablet(s) Oral daily  dextrose 5% + sodium chloride 0.45% with potassium chloride 20 mEq/L 1000 milliLiter(s) (120 mL/Hr) IV Continuous <Continuous>  gabapentin 200 milliGRAM(s) Oral every 8 hours  gentamicin 0.1% Ointment 1 Application(s) Topical two times a day  heparin  Infusion 2100 Unit(s)/Hr (23 mL/Hr) IV Continuous <Continuous>  levothyroxine 137 MICROGram(s) Oral daily  multivitamin 1 Tablet(s) Oral daily  pantoprazole    Tablet 40 milliGRAM(s) Oral before breakfast  piperacillin/tazobactam IVPB.. 3.375 Gram(s) IV Intermittent every 8 hours  sodium chloride 0.9%. 1000 milliLiter(s) (75 mL/Hr) IV Continuous <Continuous>  vancomycin  IVPB 1000 milliGRAM(s) IV Intermittent every 8 hours    MEDICATIONS  (PRN):  acetaminophen   Tablet .. 650 milliGRAM(s) Oral every 6 hours PRN Temp greater or equal to 38C (100.4F), Mild Pain (1 - 3)  aluminum hydroxide/magnesium hydroxide/simethicone Suspension 30 milliLiter(s) Oral every 4 hours PRN Dyspepsia  ondansetron Injectable 4 milliGRAM(s) IV Push every 6 hours PRN Nausea      LABS:                          9.1    9.90  )-----------( 503      ( 26 Jul 2021 05:43 )             29.7     07-26    135  |  99  |  10  ----------------------------<  128<H>  4.0   |  22  |  0.70    Ca    8.5      26 Jul 2021 05:43      PT/INR - ( 26 Jul 2021 05:43 )   PT: 16.2 sec;   INR: 1.37 ratio         PTT - ( 26 Jul 2021 05:43 )  PTT:69.7 sec           Plastic Surgery Progress Note    Subjective:     Patient seen and examined at bedside. Patient without complaints this AM. Denies N/V/F/C. VSS, AF. Currently NPO for OR. Abdominal SEDRICK removed and tolerated well.    OBJECTIVE:     T(C): 36.7 (07-26-21 @ 07:00), Max: 37.6 (07-26-21 @ 00:04)  HR: 86 (07-26-21 @ 07:00) (76 - 90)  BP: 118/68 (07-26-21 @ 07:00) (110/69 - 133/78)  RR: 18 (07-26-21 @ 07:00) (18 - 19)  SpO2: 95% (07-26-21 @ 07:00) (94% - 96%)  Wt(kg): --    I&O's Detail    25 Jul 2021 07:01  -  26 Jul 2021 07:00  --------------------------------------------------------  IN:    dextrose 5% + sodium chloride 0.45% w/ Additives: 600 mL    Heparin: 115 mL    Oral Fluid: 980 mL  Total IN: 1695 mL    OUT:    Bulb (mL): 5 mL    Drain (mL): 30 mL    Drain (mL): 0 mL    Indwelling Catheter - Urethral (mL): 2500 mL  Total OUT: 2535 mL    Total NET: -840 mL          PHYSICAL EXAM:    GENERAL: NAD, lying in bed comfortably  HEAD:  Atraumatic, Normocephalic  ABDOMEN: Midline incision c/d/i, soft non tender, abdominal SEDRICK removed  BACK: midline sacral incision w aquacel dressing. Inferior 6cm of incision w sutures in place, no drainage, no erythema, no swelling, gauze and tape dressing replaced, rest of back flat, no palpable collections, hemovac accordian drains holding suction w SS output from L drain.     MEDICATIONS  (STANDING):  calcium carbonate 1250 mG  + Vitamin D (OsCal 500 + D) 1 Tablet(s) Oral daily  dextrose 5% + sodium chloride 0.45% with potassium chloride 20 mEq/L 1000 milliLiter(s) (120 mL/Hr) IV Continuous <Continuous>  gabapentin 200 milliGRAM(s) Oral every 8 hours  gentamicin 0.1% Ointment 1 Application(s) Topical two times a day  heparin  Infusion 2100 Unit(s)/Hr (23 mL/Hr) IV Continuous <Continuous>  levothyroxine 137 MICROGram(s) Oral daily  multivitamin 1 Tablet(s) Oral daily  pantoprazole    Tablet 40 milliGRAM(s) Oral before breakfast  piperacillin/tazobactam IVPB.. 3.375 Gram(s) IV Intermittent every 8 hours  sodium chloride 0.9%. 1000 milliLiter(s) (75 mL/Hr) IV Continuous <Continuous>  vancomycin  IVPB 1000 milliGRAM(s) IV Intermittent every 8 hours    MEDICATIONS  (PRN):  acetaminophen   Tablet .. 650 milliGRAM(s) Oral every 6 hours PRN Temp greater or equal to 38C (100.4F), Mild Pain (1 - 3)  aluminum hydroxide/magnesium hydroxide/simethicone Suspension 30 milliLiter(s) Oral every 4 hours PRN Dyspepsia  ondansetron Injectable 4 milliGRAM(s) IV Push every 6 hours PRN Nausea      LABS:                          9.1    9.90  )-----------( 503      ( 26 Jul 2021 05:43 )             29.7     07-26    135  |  99  |  10  ----------------------------<  128<H>  4.0   |  22  |  0.70    Ca    8.5      26 Jul 2021 05:43      PT/INR - ( 26 Jul 2021 05:43 )   PT: 16.2 sec;   INR: 1.37 ratio         PTT - ( 26 Jul 2021 05:43 )  PTT:69.7 sec

## 2021-07-26 NOTE — PROGRESS NOTE ADULT - SUBJECTIVE AND OBJECTIVE BOX
SUBJECTIVE: Pt seen and examined. Pt says he is feeling better, and able to tolerate a diet. Pt denies any other complaints     Vital Signs Last 24 Hrs  T(C): 36.6 (26 Jul 2021 11:00), Max: 37.6 (26 Jul 2021 00:04)  T(F): 97.9 (26 Jul 2021 11:00), Max: 99.6 (26 Jul 2021 00:04)  HR: 82 (26 Jul 2021 11:00) (82 - 90)  BP: 117/72 (26 Jul 2021 11:00) (110/69 - 133/78)  BP(mean): --  RR: 18 (26 Jul 2021 11:00) (18 - 19)  SpO2: 97% (26 Jul 2021 11:00) (94% - 97%)    LABS:                                      9.1    9.90  )-----------( 503      ( 26 Jul 2021 05:43 )             29.7   07-26    135  |  99  |  10  ----------------------------<  128<H>  4.0   |  22  |  0.70    Ca    8.5      26 Jul 2021 05:43    Activated Partial Thromboplastin Time (07.26.21 @ 12:04)    Activated Partial Thromboplastin Time: 73.3 sec    DRAIN OUTPUT: Rt abd SEDRICK (5)- removed by plastic surgery;  Rt HMV (30) Lt HMV (0)    PHYSICAL EXAM:    General: No Acute Distress     Neurological: Awake, alert oriented to person, place and time, Following Commands, B/l UE 5/5 RLE DF/PF 4+/5, LLE 5/5. Numbness and tingling on RLE     Pulmonary: Clear to Auscultation, No Rales, No Rhonchi, No Wheezes     Cardiovascular: S1, S2, Regular Rate and Rhythm     Gastrointestinal: Soft, Nontender, minimally distended  after eating     Incision: Plastics following, abdominal drain removed at bedside by plastics     MEDICATIONS  (STANDING):  calcium carbonate 1250 mG  + Vitamin D (OsCal 500 + D) 1 Tablet(s) Oral daily  dextrose 5% + sodium chloride 0.45% with potassium chloride 20 mEq/L 1000 milliLiter(s) (120 mL/Hr) IV Continuous <Continuous>  gabapentin 200 milliGRAM(s) Oral every 8 hours  gentamicin 0.1% Ointment 1 Application(s) Topical two times a day  heparin  Infusion 2100 Unit(s)/Hr (21 mL/Hr) IV Continuous <Continuous>  levothyroxine 137 MICROGram(s) Oral daily  multivitamin 1 Tablet(s) Oral daily  pantoprazole    Tablet 40 milliGRAM(s) Oral before breakfast  piperacillin/tazobactam IVPB.. 3.375 Gram(s) IV Intermittent every 8 hours  sodium chloride 0.9%. 1000 milliLiter(s) (75 mL/Hr) IV Continuous <Continuous>  vancomycin  IVPB 1000 milliGRAM(s) IV Intermittent every 8 hours    MEDICATIONS  (PRN):  acetaminophen   Tablet .. 650 milliGRAM(s) Oral every 6 hours PRN Temp greater or equal to 38C (100.4F), Mild Pain (1 - 3)  aluminum hydroxide/magnesium hydroxide/simethicone Suspension 30 milliLiter(s) Oral every 4 hours PRN Dyspepsia  ondansetron Injectable 4 milliGRAM(s) IV Push every 6 hours PRN Nausea

## 2021-07-26 NOTE — PROGRESS NOTE ADULT - SUBJECTIVE AND OBJECTIVE BOX
Patient seen and examined at bedside.    --Anticoagulation--  heparin  Infusion 2100 Unit(s)/Hr IV Continuous <Continuous>    T(C): 36.1 (07-26-21 @ 20:00), Max: 37.8 (07-26-21 @ 15:13)  HR: 82 (07-26-21 @ 20:45) (80 - 90)  BP: 118/61 (07-26-21 @ 20:45) (106/59 - 133/79)  RR: 18 (07-26-21 @ 20:45) (18 - 19)  SpO2: 97% (07-26-21 @ 20:45) (95% - 98%)  Wt(kg): --    Exam:  AAOx3, ALARCON strongly

## 2021-07-27 ENCOUNTER — TRANSCRIPTION ENCOUNTER (OUTPATIENT)
Age: 61
End: 2021-07-27

## 2021-07-27 LAB
ANION GAP SERPL CALC-SCNC: 12 MMOL/L — SIGNIFICANT CHANGE UP (ref 5–17)
APTT BLD: 58.7 SEC — HIGH (ref 27.5–35.5)
APTT BLD: 69.1 SEC — HIGH (ref 27.5–35.5)
APTT BLD: 70.6 SEC — HIGH (ref 27.5–35.5)
APTT BLD: 71.7 SEC — HIGH (ref 27.5–35.5)
BUN SERPL-MCNC: 10 MG/DL — SIGNIFICANT CHANGE UP (ref 7–23)
CALCIUM SERPL-MCNC: 8.7 MG/DL — SIGNIFICANT CHANGE UP (ref 8.4–10.5)
CHLORIDE SERPL-SCNC: 96 MMOL/L — SIGNIFICANT CHANGE UP (ref 96–108)
CO2 SERPL-SCNC: 23 MMOL/L — SIGNIFICANT CHANGE UP (ref 22–31)
CREAT SERPL-MCNC: 0.66 MG/DL — SIGNIFICANT CHANGE UP (ref 0.5–1.3)
CULTURE RESULTS: SIGNIFICANT CHANGE UP
CULTURE RESULTS: SIGNIFICANT CHANGE UP
GLUCOSE SERPL-MCNC: 159 MG/DL — HIGH (ref 70–99)
HCT VFR BLD CALC: 32.5 % — LOW (ref 39–50)
HGB BLD-MCNC: 9.7 G/DL — LOW (ref 13–17)
INR BLD: 1.35 RATIO — HIGH (ref 0.88–1.16)
MCHC RBC-ENTMCNC: 27.5 PG — SIGNIFICANT CHANGE UP (ref 27–34)
MCHC RBC-ENTMCNC: 29.8 GM/DL — LOW (ref 32–36)
MCV RBC AUTO: 92.1 FL — SIGNIFICANT CHANGE UP (ref 80–100)
NRBC # BLD: 0 /100 WBCS — SIGNIFICANT CHANGE UP (ref 0–0)
OSMOLALITY UR: 210 MOS/KG — LOW (ref 300–900)
PLATELET # BLD AUTO: 475 K/UL — HIGH (ref 150–400)
POTASSIUM SERPL-MCNC: 4.7 MMOL/L — SIGNIFICANT CHANGE UP (ref 3.5–5.3)
POTASSIUM SERPL-SCNC: 4.7 MMOL/L — SIGNIFICANT CHANGE UP (ref 3.5–5.3)
PROTHROM AB SERPL-ACNC: 16 SEC — HIGH (ref 10.6–13.6)
RBC # BLD: 3.53 M/UL — LOW (ref 4.2–5.8)
RBC # FLD: 14 % — SIGNIFICANT CHANGE UP (ref 10.3–14.5)
SODIUM SERPL-SCNC: 131 MMOL/L — LOW (ref 135–145)
SODIUM UR-SCNC: 32 MMOL/L — SIGNIFICANT CHANGE UP
SPECIMEN SOURCE: SIGNIFICANT CHANGE UP
SPECIMEN SOURCE: SIGNIFICANT CHANGE UP
VANCOMYCIN TROUGH SERPL-MCNC: 19.4 UG/ML — SIGNIFICANT CHANGE UP (ref 10–20)
WBC # BLD: 13.65 K/UL — HIGH (ref 3.8–10.5)
WBC # FLD AUTO: 13.65 K/UL — HIGH (ref 3.8–10.5)

## 2021-07-27 PROCEDURE — 99232 SBSQ HOSP IP/OBS MODERATE 35: CPT | Mod: 57

## 2021-07-27 PROCEDURE — 99232 SBSQ HOSP IP/OBS MODERATE 35: CPT

## 2021-07-27 RX ORDER — OXYCODONE HYDROCHLORIDE 5 MG/1
10 TABLET ORAL EVERY 4 HOURS
Refills: 0 | Status: DISCONTINUED | OUTPATIENT
Start: 2021-07-27 | End: 2021-07-28

## 2021-07-27 RX ORDER — OXYCODONE HYDROCHLORIDE 5 MG/1
5 TABLET ORAL EVERY 6 HOURS
Refills: 0 | Status: DISCONTINUED | OUTPATIENT
Start: 2021-07-27 | End: 2021-07-28

## 2021-07-27 RX ORDER — SODIUM CHLORIDE 9 MG/ML
2 INJECTION INTRAMUSCULAR; INTRAVENOUS; SUBCUTANEOUS
Refills: 0 | Status: COMPLETED | OUTPATIENT
Start: 2021-07-27 | End: 2021-07-27

## 2021-07-27 RX ORDER — MAGNESIUM HYDROXIDE 400 MG/1
30 TABLET, CHEWABLE ORAL
Refills: 0 | Status: COMPLETED | OUTPATIENT
Start: 2021-07-27 | End: 2021-07-28

## 2021-07-27 RX ADMIN — PIPERACILLIN AND TAZOBACTAM 25 GRAM(S): 4; .5 INJECTION, POWDER, LYOPHILIZED, FOR SOLUTION INTRAVENOUS at 05:56

## 2021-07-27 RX ADMIN — PIPERACILLIN AND TAZOBACTAM 25 GRAM(S): 4; .5 INJECTION, POWDER, LYOPHILIZED, FOR SOLUTION INTRAVENOUS at 21:01

## 2021-07-27 RX ADMIN — Medication 137 MICROGRAM(S): at 05:57

## 2021-07-27 RX ADMIN — MAGNESIUM HYDROXIDE 30 MILLILITER(S): 400 TABLET, CHEWABLE ORAL at 17:11

## 2021-07-27 RX ADMIN — SODIUM CHLORIDE 2 GRAM(S): 9 INJECTION INTRAMUSCULAR; INTRAVENOUS; SUBCUTANEOUS at 12:58

## 2021-07-27 RX ADMIN — Medication 650 MILLIGRAM(S): at 21:01

## 2021-07-27 RX ADMIN — PIPERACILLIN AND TAZOBACTAM 25 GRAM(S): 4; .5 INJECTION, POWDER, LYOPHILIZED, FOR SOLUTION INTRAVENOUS at 14:32

## 2021-07-27 RX ADMIN — Medication 1 TABLET(S): at 12:57

## 2021-07-27 RX ADMIN — Medication 250 MILLIGRAM(S): at 21:01

## 2021-07-27 RX ADMIN — Medication 650 MILLIGRAM(S): at 06:01

## 2021-07-27 RX ADMIN — PANTOPRAZOLE SODIUM 40 MILLIGRAM(S): 20 TABLET, DELAYED RELEASE ORAL at 05:57

## 2021-07-27 RX ADMIN — HEPARIN SODIUM 21 UNIT(S)/HR: 5000 INJECTION INTRAVENOUS; SUBCUTANEOUS at 08:39

## 2021-07-27 RX ADMIN — MAGNESIUM HYDROXIDE 30 MILLILITER(S): 400 TABLET, CHEWABLE ORAL at 12:57

## 2021-07-27 RX ADMIN — Medication 650 MILLIGRAM(S): at 06:31

## 2021-07-27 RX ADMIN — SODIUM CHLORIDE 2 GRAM(S): 9 INJECTION INTRAMUSCULAR; INTRAVENOUS; SUBCUTANEOUS at 21:01

## 2021-07-27 RX ADMIN — GABAPENTIN 200 MILLIGRAM(S): 400 CAPSULE ORAL at 12:58

## 2021-07-27 RX ADMIN — Medication 250 MILLIGRAM(S): at 12:58

## 2021-07-27 RX ADMIN — Medication 250 MILLIGRAM(S): at 05:56

## 2021-07-27 RX ADMIN — Medication 1 TABLET(S): at 12:58

## 2021-07-27 RX ADMIN — GABAPENTIN 200 MILLIGRAM(S): 400 CAPSULE ORAL at 05:57

## 2021-07-27 RX ADMIN — GABAPENTIN 200 MILLIGRAM(S): 400 CAPSULE ORAL at 21:01

## 2021-07-27 NOTE — PROGRESS NOTE ADULT - SUBJECTIVE AND OBJECTIVE BOX
SUBJECTIVE: Pt seen and examined. Pt reports little pain, on clears fluid diet today with NPO tonight for colostomy tomorrow;  restarted heparin drip for PEs- will confirm with surgery timing to stop preop colostomy tomorrow.      Vital Signs Last 24 Hrs  T(C): 36.3 (27 Jul 2021 11:08), Max: 37.8 (26 Jul 2021 15:13)  T(F): 97.4 (27 Jul 2021 11:08), Max: 100 (26 Jul 2021 15:13)  HR: 77 (27 Jul 2021 11:08) (71 - 90)  BP: 117/68 (27 Jul 2021 11:08) (103/58 - 133/79)  BP(mean): 85 (26 Jul 2021 23:00) (78 - 96)  RR: 18 (27 Jul 2021 11:08) (18 - 20)  SpO2: 94% (27 Jul 2021 11:08) (94% - 100%)    LABS:                                                   9.7    13.65 )-----------( 475      ( 27 Jul 2021 03:43 )             32.5   07-27    131<L>  |  96  |  10  ----------------------------<  159<H>  4.7   |  23  |  0.66    Ca    8.7      27 Jul 2021 03:43    Activated Partial Thromboplastin Time (07.27.21 @ 10:01)    Activated Partial Thromboplastin Time: 69.1 sec    DRAIN OUTPUT: Rt HMV (150) Lt HMV (120)    PHYSICAL EXAM:    General: No Acute Distress     Neurological: Awake, alert oriented to person, place and time, Following Commands, B/l UE 5/5 RLE DF/PF 4+/5, LLE 5/5. Numbness and tingling on RLE     Pulmonary: Clear to Auscultation, No Rales, No Rhonchi, No Wheezes     Cardiovascular: S1, S2, Regular Rate and Rhythm     Gastrointestinal: Soft, Nontender, minimally distended  after eating     Incision: +dressing c/d/i, drains x2 per plastics      MEDICATIONS  (STANDING):  calcium carbonate 1250 mG  + Vitamin D (OsCal 500 + D) 1 Tablet(s) Oral daily  dextrose 5% + sodium chloride 0.45% with potassium chloride 20 mEq/L 1000 milliLiter(s) (120 mL/Hr) IV Continuous <Continuous>  gabapentin 200 milliGRAM(s) Oral every 8 hours  gentamicin 0.1% Ointment 1 Application(s) Topical two times a day  heparin  Infusion 2100 Unit(s)/Hr (21 mL/Hr) IV Continuous <Continuous>  levothyroxine 137 MICROGram(s) Oral daily  multivitamin 1 Tablet(s) Oral daily  pantoprazole    Tablet 40 milliGRAM(s) Oral before breakfast  piperacillin/tazobactam IVPB.. 3.375 Gram(s) IV Intermittent every 8 hours  sodium chloride 0.9%. 1000 milliLiter(s) (75 mL/Hr) IV Continuous <Continuous>  vancomycin  IVPB 1000 milliGRAM(s) IV Intermittent every 8 hours    MEDICATIONS  (PRN):  acetaminophen   Tablet .. 650 milliGRAM(s) Oral every 6 hours PRN Temp greater or equal to 38C (100.4F), Mild Pain (1 - 3)  aluminum hydroxide/magnesium hydroxide/simethicone Suspension 30 milliLiter(s) Oral every 4 hours PRN Dyspepsia  ondansetron Injectable 4 milliGRAM(s) IV Push every 6 hours PRN Nausea

## 2021-07-27 NOTE — PROGRESS NOTE ADULT - ASSESSMENT
Mr. Jackson is a 60 yo M with PMHx of thyroid cancer s/p total thyroidectomy (2010), HLD, Vit D deficiency, Osteopenia, and chordoma, who is now s/p Sacral sacrectomy and VRAM flap harvest (07/07, 07/08).    -Please place pt in lateral position and put as little pressure on Sacral wound as possible   -Please avoid stool softener  -rectal tube in place  -awaiting OR cultures  -Continue IV abx per ID.  -Abdominal dressing removed by PRS 07/23-prineo still in place   - continue with aquacell dressing  -RN may change gauze/tegaderm at drain insertion sites b/l PRN   -rest of care per primary team   -PRS will continue to follow     Reinier Medrano MD, PhD  Plastic Surgery Resident, PGY3  [facility]: [phone]  [facility]: [id number]  Available on [employer] Teams

## 2021-07-27 NOTE — PROGRESS NOTE ADULT - ASSESSMENT
61 year old male with PMH of Thyroid Cancer, s/p Total Thyroidectomy 2010 and Radioactive Iodine 2011, Hypogonadism, Vitamin D Deficiency, Osteopenia with recently diagnosed Sacral Tumor. On 7/7, he underwent Plastic Surgery and General Surgery assisted vertical rectus abdominal myocutaneous flap harvest with transperitoneal ligation of internal iliac artery and vein. on 7/8 he underwent an en bloc sacrectomy for resection of chordoma w/ L4-pelvis instrumentation and fusion and plastics closure.    HOSPITAL COURSE:  7/7: Stage 1 - VRAM harvest, iliac artery and vein ligation  7/8: Stage 2 - sacrectomy with L4-pelvis fusion; EBL 4.5L status post 8 units PRBC, 6 units FFP, 1 pack platelets and 5L crystalloid; post-op status post 1 pack platelets  7/10: CTA chest: b/l upper lobe segmental PEs requiring high flow nasal cannula  7/11: Nausea/vomiting sec to ileus. NGT placed to low wall suction with immediate 1.2L bilious output.  7/17: started on Levofloxacin for Enterobacter in sputum  7/23: Started on vanc/zosyn - CT abd/pelvis with increased fluid collection, possible proctitis   7/26: Posterior incision opened, duc pus encountered, cultures taken. Necrotic tissue debrided. One exposed screw. Pulse lavage. New drains placed through new incisions. Closed in layers

## 2021-07-27 NOTE — PROGRESS NOTE ADULT - PROBLEM SELECTOR PLAN 1
7/8 sacrectomy with L4-pelvis fusion. c/w postop fluid collection. 7/26 s/p Posterior incision opened, duc pus encountered, cultures taken. Necrotic tissue debrided. One exposed screw. Pulse lavage. New drains placed through new incisions. Closed in layers.

## 2021-07-27 NOTE — PROGRESS NOTE ADULT - ASSESSMENT
61y male with history of thyroid cancer status post total thyroidectomy in 2010 and radioactive iodine treatment in 2011, hypogonadism, vitamin D deficiency, and osteopenia, with chronic constipation  right buttock and right scrotal pain and numbness. 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 The chordoma resulted in perineal numbness and overflow incontinence and he was admitted 7/7/21 for staged resection. On 7/7, he underwent Plastic Surgery and General Surgery assisted vertical rectus abdominal myocutaneous flap harvest with transperitoneal ligation of internal iliac artery and vein.  s/p en bloc sacrectomy/   with L4-pelvis fusion; EBL 4.5L status post 8 units PRBC, 6 units FFP, 1 pack platelets and 5L crystalloid; 7/8. Extubated 7/9 with hypoxia respiratory distress, desaturation CPAP  at night CTA revealed bilateral sub  segmental PE No right heart strain. & Bibasilar and left lingular consolidative opacities . Started on Heparin gtt. PTT goal 50-70 Treated with Levaquin for 7 days Course further c/b ileus requiring gastric decompression . Pathology pending s/p inferior sacral wound debridement at bedside 7/20/21 . He is reported to have leukocytosis. ID consulted.  Plastic surgery note review they write in their note posterior incision noted to have drainage may need posterior washout of wound. For this reason the patient was started on empiric Vanco and Zosyn, Vital reviewed no fevers recorded here, but noted that wbc has continued to trend up to 21K, UA is clear so doubt uti, respiratory cx taken on 7.12 grew Enterobacter. He was treated with IV Levaquin from 7.14-->7/20 as per orders.   Recent fever and leukocytosis likely secondary to infected sacral collection which was drained on 7/26.  7/22 blood cx are no growth to date .  OR cultures are pending.One exposed screw seen, Hopefully source control has been achieved.  Plan   continue Vanco and Zosyn IV for sacral collection and leukocytosis , POD 1, day 4 of this regimen  Supportive care per medicine  Wound care per PRS  Will adjust antibiotics as needed

## 2021-07-27 NOTE — PROGRESS NOTE ADULT - SUBJECTIVE AND OBJECTIVE BOX
Jesse Rodriguez   Pager [phone]  Office [phone]      CC: Patient is a 61y old  Male who presents with a chief complaint of chordoma (26 Jul 2021 17:27)      SUBJECTIVE / OVERNIGHT EVENTS:    MEDICATIONS  (STANDING):  calcium carbonate 1250 mG  + Vitamin D (OsCal 500 + D) 1 Tablet(s) Oral daily  gabapentin 200 milliGRAM(s) Oral every 8 hours  heparin  Infusion 2100 Unit(s)/Hr (21 mL/Hr) IV Continuous <Continuous>  levothyroxine 137 MICROGram(s) Oral daily  magnesium hydroxide Suspension 30 milliLiter(s) Oral two times a day  multivitamin 1 Tablet(s) Oral daily  pantoprazole    Tablet 40 milliGRAM(s) Oral before breakfast  piperacillin/tazobactam IVPB.. 3.375 Gram(s) IV Intermittent every 8 hours  sodium chloride 0.9%. 1000 milliLiter(s) (75 mL/Hr) IV Continuous <Continuous>  sodium chloride 0.9%. 1000 milliLiter(s) (75 mL/Hr) IV Continuous <Continuous>  vancomycin  IVPB 1000 milliGRAM(s) IV Intermittent every 8 hours    MEDICATIONS  (PRN):  acetaminophen   Tablet .. 650 milliGRAM(s) Oral every 6 hours PRN Temp greater or equal to 38C (100.4F), Mild Pain (1 - 3)  aluminum hydroxide/magnesium hydroxide/simethicone Suspension 30 milliLiter(s) Oral every 4 hours PRN Dyspepsia  ondansetron Injectable 4 milliGRAM(s) IV Push every 6 hours PRN Nausea  oxyCODONE    IR 5 milliGRAM(s) Oral every 6 hours PRN Moderate Pain (4 - 6)  oxyCODONE    IR 10 milliGRAM(s) Oral every 4 hours PRN Severe Pain (7 - 10)      Vital Signs Last 24 Hrs  T(C): 36.4 (27 Jul 2021 07:00), Max: 37.8 (26 Jul 2021 15:13)  T(F): 97.6 (27 Jul 2021 07:00), Max: 100 (26 Jul 2021 15:13)  HR: 74 (27 Jul 2021 07:00) (71 - 90)  BP: 122/74 (27 Jul 2021 07:00) (103/58 - 133/79)  BP(mean): 85 (26 Jul 2021 23:00) (78 - 96)  RR: 18 (27 Jul 2021 07:00) (18 - 20)  SpO2: 98% (27 Jul 2021 07:00) (96% - 100%)  CAPILLARY BLOOD GLUCOSE        I&O's Summary    26 Jul 2021 07:01  -  27 Jul 2021 07:00  --------------------------------------------------------  IN: 1611 mL / OUT: 1705 mL / NET: -94 mL      tele:    PHYSICAL EXAM:    GENERAL: NAD   HEENT: EOMI, PERRL  PULM: Clear to auscultation bilaterally  CV: Regular rate and rhythm; nl S1, S2; No murmurs, rubs, or gallops  ABDOMEN: Soft, Nontender, Nondistended; Bowel sounds present  EXTREMITIES/MSK:  No edema, calf tenderness   PSYCH: AAOx3  NEUROLOGY: non-focal          LABS:                        9.7    13.65 )-----------( 475      ( 27 Jul 2021 03:43 )             32.5     07-27    131<L>  |  96  |  10  ----------------------------<  159<H>  4.7   |  23  |  0.66    Ca    8.7      27 Jul 2021 03:43      PT/INR - ( 27 Jul 2021 03:43 )   PT: 16.0 sec;   INR: 1.35 ratio         PTT - ( 27 Jul 2021 03:43 )  PTT:58.7 sec            RADIOLOGY & ADDITIONAL TESTS:    Imaging Personally Reviewed:    Consultant(s) Notes Reviewed:      Care Discussed with Consultants/Other Providers:   Jesse Rodriguez   Pager [phone]  Office [phone]      CC: Patient is a 61y old  Male who presents with a chief complaint of chordoma (26 Jul 2021 17:27)      SUBJECTIVE / OVERNIGHT EVENTS: s/p wound washout yest. pt feels fatigued. bloating c oral intake. no n/v/abd pain.     MEDICATIONS  (STANDING):  calcium carbonate 1250 mG  + Vitamin D (OsCal 500 + D) 1 Tablet(s) Oral daily  gabapentin 200 milliGRAM(s) Oral every 8 hours  heparin  Infusion 2100 Unit(s)/Hr (21 mL/Hr) IV Continuous <Continuous>  levothyroxine 137 MICROGram(s) Oral daily  magnesium hydroxide Suspension 30 milliLiter(s) Oral two times a day  multivitamin 1 Tablet(s) Oral daily  pantoprazole    Tablet 40 milliGRAM(s) Oral before breakfast  piperacillin/tazobactam IVPB.. 3.375 Gram(s) IV Intermittent every 8 hours  sodium chloride 0.9%. 1000 milliLiter(s) (75 mL/Hr) IV Continuous <Continuous>  sodium chloride 0.9%. 1000 milliLiter(s) (75 mL/Hr) IV Continuous <Continuous>  vancomycin  IVPB 1000 milliGRAM(s) IV Intermittent every 8 hours    MEDICATIONS  (PRN):  acetaminophen   Tablet .. 650 milliGRAM(s) Oral every 6 hours PRN Temp greater or equal to 38C (100.4F), Mild Pain (1 - 3)  aluminum hydroxide/magnesium hydroxide/simethicone Suspension 30 milliLiter(s) Oral every 4 hours PRN Dyspepsia  ondansetron Injectable 4 milliGRAM(s) IV Push every 6 hours PRN Nausea  oxyCODONE    IR 5 milliGRAM(s) Oral every 6 hours PRN Moderate Pain (4 - 6)  oxyCODONE    IR 10 milliGRAM(s) Oral every 4 hours PRN Severe Pain (7 - 10)      Vital Signs Last 24 Hrs  T(C): 36.4 (27 Jul 2021 07:00), Max: 37.8 (26 Jul 2021 15:13)  T(F): 97.6 (27 Jul 2021 07:00), Max: 100 (26 Jul 2021 15:13)  HR: 74 (27 Jul 2021 07:00) (71 - 90)  BP: 122/74 (27 Jul 2021 07:00) (103/58 - 133/79)  BP(mean): 85 (26 Jul 2021 23:00) (78 - 96)  RR: 18 (27 Jul 2021 07:00) (18 - 20)  SpO2: 98% (27 Jul 2021 07:00) (96% - 100%)  CAPILLARY BLOOD GLUCOSE        I&O's Summary    26 Jul 2021 07:01  -  27 Jul 2021 07:00  --------------------------------------------------------  IN: 1611 mL / OUT: 1705 mL / NET: -94 mL          PHYSICAL EXAM: 99% RA taken by me    GENERAL: NAD   HEENT: EOMI, PERRL  PULM: Clear to auscultation bilaterally  CV: Regular rate and rhythm; nl S1, S2; No murmurs, rubs, or gallops  ABDOMEN: Soft, Nontender, Nondistended; Bowel sounds present  EXTREMITIES/MSK:  No edema, calf tenderness   PSYCH: AAOx3  NEUROLOGY: non-focal          LABS:                        9.7    13.65 )-----------( 475      ( 27 Jul 2021 03:43 )             32.5     07-27    131<L>  |  96  |  10  ----------------------------<  159<H>  4.7   |  23  |  0.66    Ca    8.7      27 Jul 2021 03:43      PT/INR - ( 27 Jul 2021 03:43 )   PT: 16.0 sec;   INR: 1.35 ratio         PTT - ( 27 Jul 2021 03:43 )  PTT:58.7 sec            RADIOLOGY & ADDITIONAL TESTS:    Imaging Personally Reviewed:    Consultant(s) Notes Reviewed:      Care Discussed with Consultants/Other Providers: neurosurg

## 2021-07-27 NOTE — PROGRESS NOTE ADULT - SUBJECTIVE AND OBJECTIVE BOX
Plastic Surgery Progress Note    Subjective:     Patient seen and examined at bedside. Patient without complaints this AM. Denies N/V/F/C. VSS, AF. patient is post op from spinal wound washout.     OBJECTIVE:       T(C): 36.4 (07-27-21 @ 07:00), Max: 37.8 (07-26-21 @ 15:13)  T(F): 97.6 (07-27-21 @ 07:00), Max: 100 (07-26-21 @ 15:13)  HR: 74 (07-27-21 @ 07:00) (71 - 90)  BP: 122/74 (07-27-21 @ 07:00) (103/58 - 133/79)  RR: 18 (07-27-21 @ 07:00) (18 - 20)  SpO2: 98% (07-27-21 @ 07:00) (96% - 100%)      26 Jul 2021 07:01  -  27 Jul 2021 07:00  --------------------------------------------------------  IN:    Heparin: 231 mL    IV PiggyBack: 275 mL    Oral Fluid: 280 mL    sodium chloride 0.9%: 825 mL  Total IN: 1611 mL    OUT:    Bulb (mL): 150 mL    Bulb (mL): 120 mL    Indwelling Catheter - Urethral (mL): 1435 mL  Total OUT: 1705 mL    Total NET: -94 mL          PHYSICAL EXAM:    GENERAL: NAD, lying in bed comfortably  HEAD:  Atraumatic, Normocephalic  ABDOMEN: Midline incision c/d/i, soft non tender, abdominal SEDRICK removed previously, prineo intact on wound.   BACK: midline aquacell dressing intact, no palpable collections, drains with SS output.                  9.7    13.65  )----------(  475       ( 27 Jul 2021 03:43 )               32.5      131    |  96     |  10     ----------------------------<  159        ( 27 Jul 2021 03:43 )  4.7     |  23     |  0.66     Ca    8.7        ( 27 Jul 2021 03:43 )      PT/INR -  16.0 sec / 1.35 ratio   ( 27 Jul 2021 03:43 )       PTT -  58.7 sec   ( 27 Jul 2021 03:43 )  CAPILLARY BLOOD GLUCOSE        c

## 2021-07-27 NOTE — PROGRESS NOTE ADULT - SUBJECTIVE AND OBJECTIVE BOX
Patient is a 61y old  Male who presented with a chief complaint of chordoma (26 Jul 2021 17:27)      INTERVAL HPI/OVERNIGHT EVENTS:  - OR with PRS for posterior wound washout, encountered duc pus, irrigated with SEDRICK drains x2, necrotic tissue debrided   - Overnight, no acute events    tolerating PO clears  rectal tube in place, no output  no nausea vomiting or abdominal pain    MEDICATIONS  (STANDING):  calcium carbonate 1250 mG  + Vitamin D (OsCal 500 + D) 1 Tablet(s) Oral daily  gabapentin 200 milliGRAM(s) Oral every 8 hours  heparin  Infusion 2100 Unit(s)/Hr (21 mL/Hr) IV Continuous <Continuous>  levothyroxine 137 MICROGram(s) Oral daily  magnesium hydroxide Suspension 30 milliLiter(s) Oral two times a day  multivitamin 1 Tablet(s) Oral daily  pantoprazole    Tablet 40 milliGRAM(s) Oral before breakfast  piperacillin/tazobactam IVPB.. 3.375 Gram(s) IV Intermittent every 8 hours  sodium chloride 2 Gram(s) Oral two times a day  sodium chloride 0.9%. 1000 milliLiter(s) (75 mL/Hr) IV Continuous <Continuous>  sodium chloride 0.9%. 1000 milliLiter(s) (75 mL/Hr) IV Continuous <Continuous>  vancomycin  IVPB 1000 milliGRAM(s) IV Intermittent every 8 hours    MEDICATIONS  (PRN):  acetaminophen   Tablet .. 650 milliGRAM(s) Oral every 6 hours PRN Temp greater or equal to 38C (100.4F), Mild Pain (1 - 3)  aluminum hydroxide/magnesium hydroxide/simethicone Suspension 30 milliLiter(s) Oral every 4 hours PRN Dyspepsia  ondansetron Injectable 4 milliGRAM(s) IV Push every 6 hours PRN Nausea  oxyCODONE    IR 5 milliGRAM(s) Oral every 6 hours PRN Moderate Pain (4 - 6)  oxyCODONE    IR 10 milliGRAM(s) Oral every 4 hours PRN Severe Pain (7 - 10)      Allergies  No Known Allergies      Review of Systems:  General:  No wt loss, fevers, chills, night sweats,fatigue,   CV:  No pain, palpitations, hypo/hypertension  Resp:  No dyspnea, cough, tachypnea, wheezing  GI: see HPI  :  No pain, bleeding, incontinence, nocturia  Muscle:  +LE weakness  Neuro:  see HPI  Psych:  No fatigue, insomnia, mood problems, depression  Endocrine:  No polyuria, polydypsia, cold/heat intolerance  Heme:  No petechiae, ecchymosis, easy bruisability +hx thyroid CA s/p resection  Skin:  No rash, tattoos, scars, edema      Vital Signs Last 24 Hrs  T(C): 36.3 (27 Jul 2021 11:08), Max: 37.8 (26 Jul 2021 15:13)  T(F): 97.4 (27 Jul 2021 11:08), Max: 100 (26 Jul 2021 15:13)  HR: 77 (27 Jul 2021 11:08) (71 - 90)  BP: 117/68 (27 Jul 2021 11:08) (103/58 - 133/79)  BP(mean): 85 (26 Jul 2021 23:00) (78 - 96)  RR: 18 (27 Jul 2021 11:08) (18 - 20)  SpO2: 94% (27 Jul 2021 11:08) (94% - 100%)    PHYSICAL EXAM:  Constitutional: NAD, on nasal cannula alert and responsive, father at bedside  Neck: No LAD, supple WH surgical scar  Respiratory: clear b/l  Cardiovascular: S1 and S2, RRR, no M  Gastrointestinal: BS+, soft ND NT +BS  midline incision c/d/i +back HMV  SEDRICK - serosanguinous  Extremities: No peripheral edema, neg clubbing, cyanosis  Vascular: 2+ peripheral pulses  Neurological: A/O x 3, decreased sensation medial aspect of left calf  Psychiatric: Normal mood, normal affect  Skin: No rashes      LABS:                        9.7    13.65 )-----------( 475      ( 27 Jul 2021 03:43 )             32.5     07-27    131<L>  |  96  |  10  ----------------------------<  159<H>  4.7   |  23  |  0.66    Ca    8.7      27 Jul 2021 03:43      PT/INR - ( 27 Jul 2021 03:43 )   PT: 16.0 sec;   INR: 1.35 ratio         PTT - ( 27 Jul 2021 10:01 )  PTT:69.1 sec        RADIOLOGY & ADDITIONAL TESTS:   EKG

## 2021-07-27 NOTE — PROVIDER CONTACT NOTE (OTHER) - ACTION/TREATMENT ORDERED:
PA states to keep drip at current rate. states to draw again at 10pm and then stop drip at 4am as order and then redraw labs an hour later.

## 2021-07-27 NOTE — PROGRESS NOTE ADULT - SUBJECTIVE AND OBJECTIVE BOX
24h Events:   - OR with PRS for posterior wound washout, encountered duc pus, irrigated with SEDRICK drains x2   - Overnight, no acute events    Subjective:   Patient examined at bedside this AM. Reports feeling well since surgery with PRS. Tolerating clears. Rectal tube in place, no output overnight. Last BM yesterday x2.     Objective:  Vital Signs  T(C): 36.4 (07-27 @ 07:00), Max: 37.8 (07-26 @ 15:13)  HR: 74 (07-27 @ 07:00) (71 - 90)  BP: 122/74 (07-27 @ 07:00) (103/58 - 133/79)  RR: 18 (07-27 @ 07:00) (18 - 20)  SpO2: 98% (07-27 @ 07:00) (96% - 100%)  07-26-21 @ 07:01  -  07-27-21 @ 07:00  --------------------------------------------------------  IN:  Total IN: 0 mL    OUT:    Bulb (mL): 150 mL    Bulb (mL): 120 mL    Indwelling Catheter - Urethral (mL): 1435 mL  Total OUT: 1705 mL    Total NET: -1705 mL      Physical Exam:  General: alert and oriented, NAD  Resp: airway patent, respirations unlabored  CVS: regular rate and rhythm  Abdomen: soft, nontender, nondistended, rectal tube with no output  Extremities: no edema  Skin: warm, dry, appropriate color    Labs:                        9.7    13.65 )-----------( 475      ( 27 Jul 2021 03:43 )             32.5   07-27    131<L>  |  96  |  10  ----------------------------<  159<H>  4.7   |  23  |  0.66    Ca    8.7      27 Jul 2021 03:43      CAPILLARY BLOOD GLUCOSE          Microbiology:    Culture - Blood (collected 22 Jul 2021 19:01)  Source: .Blood Blood-Peripheral  Preliminary Report (23 Jul 2021 20:00):    No growth to date.    Culture - Blood (collected 22 Jul 2021 19:01)  Source: .Blood Blood-Peripheral  Preliminary Report (23 Jul 2021 20:00):    No growth to date.        Medications:   MEDICATIONS  (STANDING):  calcium carbonate 1250 mG  + Vitamin D (OsCal 500 + D) 1 Tablet(s) Oral daily  gabapentin 200 milliGRAM(s) Oral every 8 hours  heparin  Infusion 2100 Unit(s)/Hr (21 mL/Hr) IV Continuous <Continuous>  levothyroxine 137 MICROGram(s) Oral daily  multivitamin 1 Tablet(s) Oral daily  pantoprazole    Tablet 40 milliGRAM(s) Oral before breakfast  piperacillin/tazobactam IVPB.. 3.375 Gram(s) IV Intermittent every 8 hours  sodium chloride 0.9%. 1000 milliLiter(s) (75 mL/Hr) IV Continuous <Continuous>  sodium chloride 0.9%. 1000 milliLiter(s) (75 mL/Hr) IV Continuous <Continuous>  vancomycin  IVPB 1000 milliGRAM(s) IV Intermittent every 8 hours    MEDICATIONS  (PRN):  acetaminophen   Tablet .. 650 milliGRAM(s) Oral every 6 hours PRN Temp greater or equal to 38C (100.4F), Mild Pain (1 - 3)  aluminum hydroxide/magnesium hydroxide/simethicone Suspension 30 milliLiter(s) Oral every 4 hours PRN Dyspepsia  ondansetron Injectable 4 milliGRAM(s) IV Push every 6 hours PRN Nausea

## 2021-07-27 NOTE — PROGRESS NOTE ADULT - PROBLEM SELECTOR PLAN 3
resolved on abx. ID consult appreciated - continue vanc and zosyn for now. bcx NTD, CT abd/pelvis as above w/ increased fluid collection and possible proctitis. Leukocytosis postop expected

## 2021-07-27 NOTE — PROGRESS NOTE ADULT - SUBJECTIVE AND OBJECTIVE BOX
CC: f/u for post op wound infection    Patient reports: he appears comfortable , s/p wound revision 7/26, pus encountered, one screw exposed    REVIEW OF SYSTEMS:  All other review of systems negative (Comprehensive ROS)    Antimicrobials Day #  :day 4, POD 1  piperacillin/tazobactam IVPB.. 3.375 Gram(s) IV Intermittent every 8 hours  vancomycin  IVPB 1000 milliGRAM(s) IV Intermittent every 8 hours    Other Medications Reviewed  MEDICATIONS  (STANDING):  calcium carbonate 1250 mG  + Vitamin D (OsCal 500 + D) 1 Tablet(s) Oral daily  gabapentin 200 milliGRAM(s) Oral every 8 hours  heparin  Infusion 2100 Unit(s)/Hr (21 mL/Hr) IV Continuous <Continuous>  levothyroxine 137 MICROGram(s) Oral daily  magnesium hydroxide Suspension 30 milliLiter(s) Oral two times a day  multivitamin 1 Tablet(s) Oral daily  pantoprazole    Tablet 40 milliGRAM(s) Oral before breakfast  piperacillin/tazobactam IVPB.. 3.375 Gram(s) IV Intermittent every 8 hours  sodium chloride 2 Gram(s) Oral two times a day  sodium chloride 0.9%. 1000 milliLiter(s) (75 mL/Hr) IV Continuous <Continuous>  sodium chloride 0.9%. 1000 milliLiter(s) (75 mL/Hr) IV Continuous <Continuous>  vancomycin  IVPB 1000 milliGRAM(s) IV Intermittent every 8 hours    T(F): 97.4 (07-27-21 @ 11:08), Max: 100 (07-26-21 @ 15:13)  HR: 77 (07-27-21 @ 11:08)  BP: 117/68 (07-27-21 @ 11:08)  RR: 18 (07-27-21 @ 11:08)  SpO2: 94% (07-27-21 @ 11:08)  Wt(kg): --    PHYSICAL EXAM:  General: alert, no acute distress  Eyes:  anicteric, no conjunctival injection, no discharge  Oropharynx: no lesions or injection 	  Neck: supple, without adenopathy  Lungs: clear to auscultation  Heart: regular rate and rhythm; no murmur, rubs or gallops  Abdomen: soft,distended, nontender, without mass or organomegaly, drains with serosanguinous drainage x 2  Skin: no lesions  Extremities: no clubbing, cyanosis, trace edema  Neurologic: alert, oriented, moves all extremities    LAB RESULTS:                        9.7    13.65 )-----------( 475      ( 27 Jul 2021 03:43 )             32.5     07-27    131<L>  |  96  |  10  ----------------------------<  159<H>  4.7   |  23  |  0.66    Ca    8.7      27 Jul 2021 03:43          MICROBIOLOGY:  RECENT CULTURES:  07-22 @ 19:01 .Blood Blood-Peripheral     No growth to date.          RADIOLOGY REVIEWED:    < from: CT Abdomen and Pelvis w/ Oral Cont and w/ IV Cont (07.23.21 @ 14:56) >    IMPRESSION:  Status post partial sacral resection and lumbosacral iliac fusion with associated postoperative changes and drainage catheter. Increased sacral/presacral fluid collection. Superimposed infection is not excluded.    Mild rectal thickening with mild perirectal standing, may represent proctitis.    --- End of Report ---    < end of copied text >

## 2021-07-27 NOTE — PROVIDER CONTACT NOTE (OTHER) - ACTION/TREATMENT ORDERED:
Decrease Heparin gtt to 20cc/hr, recheck PTT in 6 hours Decrease Heparin gtt to 20cc/hr, recheck PTT in 6 hours. Stop Heparin tomorrow at 4am and recheck PTT at 5am. pt planned for OR colostomy in AM

## 2021-07-27 NOTE — CHART NOTE - NSCHARTNOTEFT_GEN_A_CORE
Pre-operative Note    - Pre-operative Diagnosis: incontinence 2/ en bloc sacrectomy for chordoma    - Procedure: exploratory laparotomy for colostomy creation    - Labs:                        9.7    13.65 )-----------( 475      ( 2021 03:43 )             32.5     07-    131<L>  |  96  |  10  ----------------------------<  159<H>  4.7   |  23  |  0.66    Ca    8.7      2021 03:43      PT/INR - ( 2021 03:43 )   PT: 16.0 sec;   INR: 1.35 ratio         PTT - ( 2021 10:01 )  PTT:69.1 sec  Type & Screen #1:   Type & Screen #2:     - CXR: 2021    - [id number]: Diagnosis Line NORMAL SINUS RHYTHM    - AICD/Pacemaker Interrogation Date:     - Blood: Not needed.     - Pregnancy Test: n/a    - Orders:  > NPO at midnight  > IVF at midnight  > Perioperative antibiotics not needed.  > Morning Labs: CBC, BMP, coags    - Permits:  > Consent to be obtained.  > Case scheduled with OR.

## 2021-07-27 NOTE — PROGRESS NOTE ADULT - ASSESSMENT
ASSESSMENT AND PLAN: 61 year-old man with history of thyroid cancer status post total thyroidectomy in 2010 and radioactive iodine treatment in 2011, hypogonadism, vitamin D deficiency, and osteopenia, with chronic constipation  right buttock and right scrotal pain and numbness. 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 The chordoma resulted in perineal numbness and overflow incontinence and he was admitted 7/7/21 for staged resection. On 7/7, he underwent Plastic Surgery and General Surgery assisted vertical rectus abdominal myocutaneous flap harvest with transperitoneal ligation of internal iliac artery and vein.  s/p en bloc sacrectomy/   with L4-pelvis fusion; EBL 4.5L status post 8 units PRBC, 6 units FFP, 1 pack platelets and 5L crystalloid; 7/8. Extubated 7/9 with hypoxia respiratory distress, desaturation CPAP  at night CTA revealed bilateral sub  segmental PE No right heart strain. & Bibasilar and left lingular consolidative opacities . Started on Heparin gtt. PTT goal 50-70 Treated with Levaquin for 7 days Course further c/b ileus requiring gastric decompression. Pt pending echo to check for RV per pulm. Now with leucocytosis and febrile overnight. ID following on Vanco and zosyn for emperic coverage. Wound washout with plastics and duc pus noted 7/26/21 and placement of 2 drains. Cultures testing, rectal tube inserted- without output today. Colostomy planned for tomorrow 7/28/21. Will stop heparin drip for PE on call to OR tomorrow likely per surgery- will confirm.     NEURO: Continue Tylenol and gabapentin for pain. oxycodone PRN moderate/severe pain- not using, will limit given h/o ileus. Continue drains per plastics.     PULM: Pt is using incentive spirometer and acapella. ON 4L NC. Pulm following and cleared for surgery       CV: TTE -done < from: Transthoracic Echocardiogram (07.23.21 @ 19:15) >  Conclusions:  1. Mitral annular calcification, otherwise normal mitral  valve. Minimal mitral regurgitation.  2. Aortic valve not well visualized; probably normal.  Minimal aortic regurgitation.  3. Normal left ventricular systolic function. No segmental  wall motion abnormalities.  4. Mild diastolic dysfunction (Stage I).  5. The right ventricle is not well visualized; grossly  normal right ventricular systolic function.    < end of copied text >    ENDO: Continue synthroid     HEME/ONC:                      DVT ppx: PE: Heparin Drip now @ 21cc/hr; PTT 69.1    RENAL: IVL. Continue Barnett     ID: Vanco and zosyn for emperic coverage. S/P surgical washout with plastics yesterday 7/26/21. ID following. will f/u OR cultures.     GI: Ileus resolving, Pt having bowel movements (4- 7/24) Abdomen soft. Plan for colostomy tomorrow after wound washout yesterday 7/26 with plastics. appreciate GI follow up.   Continue protonix for GI ppx. Bowel prep with MOM and clears diet today and NPO tonight for colostomy.     DISCHARGE PLANNING: PT/OT Acute Rehab upon d/c, PMR asked to see     will discuss with Dr Mcallister  [id number]

## 2021-07-27 NOTE — PROVIDER CONTACT NOTE (OTHER) - ACTION/TREATMENT ORDERED:
PA states even though two therapeutic results to repeat in approx. 6 hours until his surgery tomorrow.

## 2021-07-27 NOTE — PROGRESS NOTE ADULT - ASSESSMENT
61 year old male with PMH of Thyroid Cancer, s/p Total Thyroidectomy 2010 and Radioactive Iodine 2011, Hypogonadism, Vitamin D Deficiency, Osteopenia with recently diagnosed Sacral Tumor. Admitted on 7/7, he underwent Plastic Surgery and General Surgery assisted vertical rectus abdominal myocutaneous flap harvest with transperitoneal ligation of internal iliac artery and vein. He underwent en bloc sacrectomy on 7/8, with L4-pelvis fusion, transfused 8 units PRBC, 6 units FFP, 1 pack platelets and 5L crystalloid; post-op status post 1 pack platelets, CTA 7/10 with bilateral upper lobe segmental PEs,  post- op course complicated by ileus      #Ileus - RESOLVED  Incontinent of stool and soiling his posterior dressing s/p wound debridement with Plastics 7/20 now planning Diverting Colostomy 7/28  -Surgery input much appreciated  -bed mobility/turning  -wound care  -monitor/replete lytes PRN  goal K 4-4.5 and Mg>2  -PPI for GI prophylaxis  -Synthroid dosing per primary team  -Abx per ID    Further care per primary team  Discussed with Neurosurgery team, pt at bedside      Connor Camp PA-C    [facility]  [phone]  After hours and weekend coverage [phone]

## 2021-07-27 NOTE — CHART NOTE - NSCHARTNOTEFT_GEN_A_CORE
Nutrition Follow Up Note  Patient seen for: malnutrition follow up.    Chart reviewed, events noted. Pt is a 61 year old male with PMH of Thyroid Cancer, s/p Total Thyroidectomy 2010 and Radioactive Iodine 2011, Hypogonadism, Vitamin D Deficiency, Osteopenia with recently diagnosed Sacral Tumor. On 7/7, he underwent Plastic Surgery and General Surgery assisted vertical rectus abdominal myocutaneous flap harvest with transperitoneal ligation of internal iliac artery and vein. on 7/8 he underwent an en bloc sacrectomy for resection of chordoma w/ L4-pelvis instrumentation and fusion and plastics closure. 7/26 s/p Posterior incision opened, duc pus encountered, cultures taken. Necrotic tissue debrided.  -- Pt now incontinent of stool and soiling his posterior dressing s/p wound debridement with Plastics 7/20 now planning Diverting Colostomy 7/28    Source: [x] Patient       [x] EMR        [] RN        [] Family at bedside       [] Other:    -If unable to interview patient: [] Trach/Vent/BiPAP  [] Disoriented/confused/inappropriate to interview    Diet Order:   Diet, NPO after Midnight:      NPO Start Date: 27-Jul-2021,   NPO Start Time: 23:59  Except Medications (07-27-21)  Diet, Clear Liquid (07-26-21)    - Is current order appropriate/adequate? [] Yes  [x]  No:     - PO intake :   [] >75%  Adequate    [] 50-75%  Fair       [x] <50%  Poor    - Nutrition-related concerns: Pt reports fair tolerance to PO diet, however, notes in previous days had gas/abdominal discomfort after meals which is impacting PO intake. Pt previously ordered for Ensure Enlive x 1 and Danactive x 2, tolerated both. Plan for colostomy tomorrow, pt anxious at visit.     GI: +rectal tube; Last BM 7/26, output not noted.   Bowel Regimen? [] Yes   [x] No    Weights:   no updated weights available at this time, will continue to monitor weights for changes if any     Nutritionally Pertinent MEDICATIONS  (STANDING):  calcium carbonate 1250 mG  + Vitamin D (OsCal 500 + D)  levothyroxine  magnesium hydroxide Suspension  multivitamin  pantoprazole    Tablet  piperacillin/tazobactam IVPB..  sodium chloride  sodium chloride 0.9%.  sodium chloride 0.9%.  vancomycin  IVPB    Pertinent Labs: 07-27 @ 03:43: Na 131<L>, BUN 10, Cr 0.66, <H>, K+ 4.7, Phos --, Mg --, Alk Phos --, ALT/SGPT --, AST/SGOT --, HbA1c --  07-26 @ 21:08: Na 131<L>, BUN 10, Cr 0.75, <H>, K+ 4.4, Phos --, Mg --, Alk Phos --, ALT/SGPT --, AST/SGOT --, HbA1c --    Finger Sticks: n/a    Skin per nursing documentation: No noted pressure injuries.  Edema: No noted edema as per flow sheets.     Estimated Needs:   [x] no change since previous assessment  (based on dosing wt 81.6kg):   Estimated Energy Needs: (25-30kcal/kg): 2040-2448kcal  Estimated Protein Needs: (1.2-1.4g protein/kg): 98-114g protein    Previous Nutrition Diagnosis: Moderate Malnutrition, Increased Nutrient Needs  Nutrition Diagnosis is: [x] ongoing  [] resolved [] not applicable     New Nutrition Diagnosis: [x] Not applicable    Nutrition Care Plan:  [x] In Progress - to be addressed with PO encouragement, oral nutrition supplements when feasible.  [] Achieved  [] Not applicable    Nutrition Interventions:     Education Provided:       [x] Yes:  [] No: Reviewed colostomy nutrition therapy briefly. Discussed eating low-fiber foods, chewing foods well, small frequent meals high in protein & energy. Discussed foods that bulk stool and thin stool, and importance of adequate hydration. Pt deferred handout at this time     Recommendations:      1. Diet advancement per medical team/GI. Suggest clear liquid to full liquid to low fiber as tolerated.  2. When medically feasible, suggest Ensure Enlive 2x daily (700 laura and 40 gm protein) to promote PO intake.  3. Continue multivitamin if not otherwise contraindicated.  4. Reinforce nutrition education as needed - RD remains available.     Monitoring and Evaluation:   Continue to monitor nutritional intake, tolerance to diet prescription, weights, labs, skin integrity    RD remains available upon request and will follow up per protocol    Palak Mello MS, RD, CDN Pager# [phone]

## 2021-07-27 NOTE — PROGRESS NOTE ADULT - ASSESSMENT
61y Male patient s/p en bloc sacrectomy for chordoma w/ L4-pelvis fusion w/ plastics closure vertical rectus abdominis myocutaneous (VRAM) flap for chordoma on 07/07 and 07/08, post-op course c/b bilateral PEs (on hep gtt) and post-op ileus, resolved. Patient now incontinent of stool and soiling his posterior dressing s/p wound debridement with Plastic surgery 07/20 and 07/26. Pt now opting to proceed to OR for colostomy.     Plan:  - Continue CLD post-op  - Bowel prep today  - Plan for colostomy on Wednesday  - Continue excellent care per neurosurgery    To be discussed with attending  [facility]  [id number]  61y Male patient s/p en bloc sacrectomy for chordoma w/ L4-pelvis fusion w/ plastics closure vertical rectus abdominis myocutaneous (VRAM) flap for chordoma on 07/07 and 07/08, post-op course c/b bilateral PEs (on hep gtt) and post-op ileus, resolved. Patient now incontinent of stool and soiling his posterior dressing s/p wound debridement with Plastic surgery 07/20 and 07/26. Pt now opting to proceed to OR for colostomy.     Plan:  - Continue CLD today  - Bowel prep with MOM today   - Plan for colostomy on Wednesday  - Pre-op and consent  - Continue excellent care per neurosurgery    D/w Dr. Davis  Red [facility]  [id number]

## 2021-07-27 NOTE — PROGRESS NOTE ADULT - SUBJECTIVE AND OBJECTIVE BOX
patient scheduled for colostomy this week     REVIEW OF SYSTEMS  Constitutional - No fever,  No fatigue  HEENT - No vertigo, No neck pain  Neurological - No headaches, No memory loss, No loss of strength, No numbness, No tremors  Skin - No rashes, No lesions   Musculoskeletal - No joint pain, No joint swelling, No muscle pain  Psychiatric - No depression, No anxiety    FUNCTIONAL PROGRESS  7/26 PT  bed mobility min assist  transfers supervision, RW  gait contact guard    7/24 OT  bed mobility contact guard/min assist  transfers contact guard/min assist    VITALS  T(C): 36.3 (07-27-21 @ 11:08), Max: 37.8 (07-26-21 @ 15:13)  HR: 77 (07-27-21 @ 11:08) (71 - 90)  BP: 117/68 (07-27-21 @ 11:08) (103/58 - 133/79)  RR: 18 (07-27-21 @ 11:08) (18 - 20)  SpO2: 94% (07-27-21 @ 11:08) (94% - 100%)  Wt(kg): --    MEDICATIONS   acetaminophen   Tablet .. 650 milliGRAM(s) every 6 hours PRN  aluminum hydroxide/magnesium hydroxide/simethicone Suspension 30 milliLiter(s) every 4 hours PRN  calcium carbonate 1250 mG  + Vitamin D (OsCal 500 + D) 1 Tablet(s) daily  gabapentin 200 milliGRAM(s) every 8 hours  heparin  Infusion 2100 Unit(s)/Hr <Continuous>  levothyroxine 137 MICROGram(s) daily  magnesium hydroxide Suspension 30 milliLiter(s) two times a day  multivitamin 1 Tablet(s) daily  ondansetron Injectable 4 milliGRAM(s) every 6 hours PRN  oxyCODONE    IR 5 milliGRAM(s) every 6 hours PRN  oxyCODONE    IR 10 milliGRAM(s) every 4 hours PRN  pantoprazole    Tablet 40 milliGRAM(s) before breakfast  piperacillin/tazobactam IVPB.. 3.375 Gram(s) every 8 hours  sodium chloride 2 Gram(s) two times a day  sodium chloride 0.9%. 1000 milliLiter(s) <Continuous>  sodium chloride 0.9%. 1000 milliLiter(s) <Continuous>  vancomycin  IVPB 1000 milliGRAM(s) every 8 hours      RECENT LABS - Reviewed                        9.7    13.65 )-----------( 475      ( 27 Jul 2021 03:43 )             32.5     07-27    131<L>  |  96  |  10  ----------------------------<  159<H>  4.7   |  23  |  0.66    Ca    8.7      27 Jul 2021 03:43      PT/INR - ( 27 Jul 2021 03:43 )   PT: 16.0 sec;   INR: 1.35 ratio         PTT - ( 27 Jul 2021 10:01 )  PTT:69.1 sec    < from: CT Abdomen and Pelvis w/ Oral Cont and w/ IV Cont (07.23.21 @ 14:56) >    IMPRESSION:  Status post partial sacral resection and lumbosacral iliac fusion with associated postoperative changes and drainage catheter. Increased sacral/presacral fluid collection. Superimposed infection is not excluded.    Mild rectal thickening with mild perirectal standing, may represent proctitis.    < end of copied text >    ------------------------------------------------------------------------------  PHYSICAL EXAM  Constitutional - NAD, Comfortable, in bed    HEENT - NGT  Chest - Breathing comfortably   Cardiovascular - S1S2   Abdomen - Soft   Extremities - No C/C/E, No calf tenderness   Neurologic Exam -                    Cognitive - Awake, Alert, AAO to self, place, date, year, situation     Communication - Fluent, No dysarthria        Motor -                     LEFT    UE - ShAB 5/5, EF 5/5, EE 5/5, WE 5/5,  5/5                    RIGHT UE - ShAB 5/5, EF 5/5, EE 5/5, WE 5/5,  5/5                    LEFT    LE - HF 5/5, KE 5/5, DF 5/5, PF 5/5                    RIGHT LE - HF 5/5, KE 5/5, DF 5/5, PF 5/5        Sensory - Intact to LT     Psychiatric - Mood stable, Affect WNL  ----------------------------------------------------------------------------------------  ASSESSMENT/PLAN  61yMale with functional deficits after sacrectomy, L4-pelvis fusion for chordoma  post op PE   now on vanc/zosyn for proctitis, CT abd/pelvis with increased fluid collection, blood cultures negative    s/p OR wound debridement 7/26, OR cultures pending, rectal tube placed, for colostomy tomorrow  VRAM flap, sacral wound, needs to lay in lateral position  Pain - Tylenol, oxycodone, gabapentin   DVT PPX - SCDs, heparin   Rehab - Will continue to follow for ongoing rehab needs and recommendations.   continue bedside PT/OT  out of bed to chair daily   Recommend ACUTE inpatient rehabilitation for the functional deficits consisting of 3 hours of therapy/day & 24 hour RN/daily PMR physician for comorbid medical management. Patient will be able to tolerate 3 hours a day.

## 2021-07-28 ENCOUNTER — APPOINTMENT (OUTPATIENT)
Dept: SURGICAL ONCOLOGY | Facility: HOSPITAL | Age: 61
End: 2021-07-28

## 2021-07-28 ENCOUNTER — RESULT REVIEW (OUTPATIENT)
Age: 61
End: 2021-07-28

## 2021-07-28 LAB
ANION GAP SERPL CALC-SCNC: 13 MMOL/L — SIGNIFICANT CHANGE UP (ref 5–17)
APTT BLD: 23.4 SEC — LOW (ref 27.5–35.5)
BUN SERPL-MCNC: 9 MG/DL — SIGNIFICANT CHANGE UP (ref 7–23)
CALCIUM SERPL-MCNC: 8.8 MG/DL — SIGNIFICANT CHANGE UP (ref 8.4–10.5)
CHLORIDE SERPL-SCNC: 98 MMOL/L — SIGNIFICANT CHANGE UP (ref 96–108)
CO2 SERPL-SCNC: 19 MMOL/L — LOW (ref 22–31)
CREAT SERPL-MCNC: 0.78 MG/DL — SIGNIFICANT CHANGE UP (ref 0.5–1.3)
GLUCOSE SERPL-MCNC: 128 MG/DL — HIGH (ref 70–99)
HCT VFR BLD CALC: 30.1 % — LOW (ref 39–50)
HGB BLD-MCNC: 9.5 G/DL — LOW (ref 13–17)
INR BLD: 1.31 RATIO — HIGH (ref 0.88–1.16)
MCHC RBC-ENTMCNC: 27.9 PG — SIGNIFICANT CHANGE UP (ref 27–34)
MCHC RBC-ENTMCNC: 31.6 GM/DL — LOW (ref 32–36)
MCV RBC AUTO: 88.5 FL — SIGNIFICANT CHANGE UP (ref 80–100)
NRBC # BLD: 0 /100 WBCS — SIGNIFICANT CHANGE UP (ref 0–0)
PLATELET # BLD AUTO: 431 K/UL — HIGH (ref 150–400)
POTASSIUM SERPL-MCNC: 4.3 MMOL/L — SIGNIFICANT CHANGE UP (ref 3.5–5.3)
POTASSIUM SERPL-SCNC: 4.3 MMOL/L — SIGNIFICANT CHANGE UP (ref 3.5–5.3)
PROTHROM AB SERPL-ACNC: 15.5 SEC — HIGH (ref 10.6–13.6)
RBC # BLD: 3.4 M/UL — LOW (ref 4.2–5.8)
RBC # FLD: 14 % — SIGNIFICANT CHANGE UP (ref 10.3–14.5)
SODIUM SERPL-SCNC: 130 MMOL/L — LOW (ref 135–145)
WBC # BLD: 11.37 K/UL — HIGH (ref 3.8–10.5)
WBC # FLD AUTO: 11.37 K/UL — HIGH (ref 3.8–10.5)

## 2021-07-28 PROCEDURE — 44050 REDUCE BOWEL OBSTRUCTION: CPT | Mod: 59,78

## 2021-07-28 PROCEDURE — 99232 SBSQ HOSP IP/OBS MODERATE 35: CPT

## 2021-07-28 PROCEDURE — 50715 RELEASE OF URETER: CPT | Mod: 59,78

## 2021-07-28 PROCEDURE — 44143 PARTIAL REMOVAL OF COLON: CPT | Mod: 78

## 2021-07-28 RX ORDER — HYDROMORPHONE HYDROCHLORIDE 2 MG/ML
0.5 INJECTION INTRAMUSCULAR; INTRAVENOUS; SUBCUTANEOUS
Refills: 0 | Status: DISCONTINUED | OUTPATIENT
Start: 2021-07-28 | End: 2021-07-28

## 2021-07-28 RX ORDER — HYDROMORPHONE HYDROCHLORIDE 2 MG/ML
0.5 INJECTION INTRAMUSCULAR; INTRAVENOUS; SUBCUTANEOUS EVERY 4 HOURS
Refills: 0 | Status: DISCONTINUED | OUTPATIENT
Start: 2021-07-28 | End: 2021-08-01

## 2021-07-28 RX ORDER — ONDANSETRON 8 MG/1
4 TABLET, FILM COATED ORAL ONCE
Refills: 0 | Status: DISCONTINUED | OUTPATIENT
Start: 2021-07-28 | End: 2021-07-28

## 2021-07-28 RX ORDER — LEVOTHYROXINE SODIUM 125 MCG
69 TABLET ORAL AT BEDTIME
Refills: 0 | Status: DISCONTINUED | OUTPATIENT
Start: 2021-07-28 | End: 2021-08-01

## 2021-07-28 RX ORDER — PANTOPRAZOLE SODIUM 20 MG/1
40 TABLET, DELAYED RELEASE ORAL DAILY
Refills: 0 | Status: DISCONTINUED | OUTPATIENT
Start: 2021-07-28 | End: 2021-07-31

## 2021-07-28 RX ORDER — ACETAMINOPHEN 500 MG
1000 TABLET ORAL ONCE
Refills: 0 | Status: COMPLETED | OUTPATIENT
Start: 2021-07-28 | End: 2021-07-28

## 2021-07-28 RX ORDER — HEPARIN SODIUM 5000 [USP'U]/ML
1700 INJECTION INTRAVENOUS; SUBCUTANEOUS
Qty: 25000 | Refills: 0 | Status: DISCONTINUED | OUTPATIENT
Start: 2021-07-28 | End: 2021-08-01

## 2021-07-28 RX ORDER — SODIUM CHLORIDE 9 MG/ML
1000 INJECTION INTRAMUSCULAR; INTRAVENOUS; SUBCUTANEOUS
Refills: 0 | Status: DISCONTINUED | OUTPATIENT
Start: 2021-07-28 | End: 2021-07-29

## 2021-07-28 RX ADMIN — Medication 1000 MILLIGRAM(S): at 18:48

## 2021-07-28 RX ADMIN — Medication 400 MILLIGRAM(S): at 18:16

## 2021-07-28 RX ADMIN — Medication 250 MILLIGRAM(S): at 05:07

## 2021-07-28 RX ADMIN — Medication 69 MICROGRAM(S): at 21:22

## 2021-07-28 RX ADMIN — PIPERACILLIN AND TAZOBACTAM 25 GRAM(S): 4; .5 INJECTION, POWDER, LYOPHILIZED, FOR SOLUTION INTRAVENOUS at 17:56

## 2021-07-28 RX ADMIN — Medication 250 MILLIGRAM(S): at 17:56

## 2021-07-28 RX ADMIN — GABAPENTIN 200 MILLIGRAM(S): 400 CAPSULE ORAL at 05:08

## 2021-07-28 RX ADMIN — HEPARIN SODIUM 17 UNIT(S)/HR: 5000 INJECTION INTRAVENOUS; SUBCUTANEOUS at 23:16

## 2021-07-28 RX ADMIN — HEPARIN SODIUM 20 UNIT(S)/HR: 5000 INJECTION INTRAVENOUS; SUBCUTANEOUS at 00:15

## 2021-07-28 RX ADMIN — MAGNESIUM HYDROXIDE 30 MILLILITER(S): 400 TABLET, CHEWABLE ORAL at 05:20

## 2021-07-28 RX ADMIN — PANTOPRAZOLE SODIUM 40 MILLIGRAM(S): 20 TABLET, DELAYED RELEASE ORAL at 05:08

## 2021-07-28 RX ADMIN — PIPERACILLIN AND TAZOBACTAM 25 GRAM(S): 4; .5 INJECTION, POWDER, LYOPHILIZED, FOR SOLUTION INTRAVENOUS at 05:07

## 2021-07-28 RX ADMIN — Medication 137 MICROGRAM(S): at 05:08

## 2021-07-28 NOTE — CHART NOTE - NSCHARTNOTEFT_GEN_A_CORE
POST-OPERATIVE NOTE    Subjective:  Patient is s/p ex lap and creation of sigmoid colostomy. Recovering appropriately.     Vital Signs Last 24 Hrs  T(C): 36.6 (28 Jul 2021 17:35), Max: 38.2 (27 Jul 2021 20:49)  T(F): 97.8 (28 Jul 2021 17:35), Max: 100.8 (27 Jul 2021 20:49)  HR: 82 (28 Jul 2021 17:35) (80 - 90)  BP: 159/82 (28 Jul 2021 17:35) (100/57 - 159/82)  BP(mean): 108 (28 Jul 2021 16:00) (96 - 109)  RR: 16 (28 Jul 2021 17:35) (14 - 18)  SpO2: 96% (28 Jul 2021 17:35) (94% - 99%)  I&O's Detail    27 Jul 2021 07:01  -  28 Jul 2021 07:00  --------------------------------------------------------  IN:    Heparin: 163 mL    Oral Fluid: 680 mL    sodium chloride 0.9%: 900 mL  Total IN: 1743 mL    OUT:    Bulb (mL): 135 mL    Bulb (mL): 160 mL    Indwelling Catheter - Urethral (mL): 1200 mL  Total OUT: 1495 mL    Total NET: 248 mL      28 Jul 2021 07:01  -  28 Jul 2021 20:24  --------------------------------------------------------  IN:    sodium chloride 0.9%: 300 mL    sodium chloride 0.9%: 500 mL  Total IN: 800 mL    OUT:    Bulb (mL): 40 mL    Bulb (mL): 60 mL    Indwelling Catheter - Urethral (mL): 920 mL    Oral Fluid: 0 mL  Total OUT: 1020 mL    Total NET: -220 mL        piperacillin/tazobactam IVPB.. 3.375  vancomycin  IVPB 1000  piperacillin/tazobactam IVPB.. 3.375  vancomycin  IVPB 1000    PAST MEDICAL & SURGICAL HISTORY:  HLD (hyperlipidemia)    Thyroid cancer  2010    History of central serous retinopathy    H/O hypogonadism    H/O vitamin D deficiency    H/O osteopenia    H/O thyroidectomy  Total --2010    H/O colonoscopy          Physical Exam:  General: NAD, resting comfortably in bed  Pulmonary: Nonlabored breathing, no respiratory distress  Cardiovascular: NSR  Abdominal: soft, NT/ND  Extremities: WWP      LABS:                        9.5    11.37 )-----------( 431      ( 28 Jul 2021 06:41 )             30.1     07-28    130<L>  |  98  |  9   ----------------------------<  128<H>  4.3   |  19<L>  |  0.78    Ca    8.8      28 Jul 2021 05:40      PT/INR - ( 28 Jul 2021 05:40 )   PT: 15.5 sec;   INR: 1.31 ratio         PTT - ( 28 Jul 2021 05:40 )  PTT:23.4 sec  CAPILLARY BLOOD GLUCOSE          Radiology and Additional Studies:    Assessment:  The patient is a 61y Male who is now several hours post-op from a     Plan:  - Pain control as needed  - DVT ppx  - OOB and ambulating as tolerated  - F/u AM labs POST-OPERATIVE NOTE    Subjective:  Patient is s/p ex lap and creation of sigmoid colostomy. Recovering appropriately. Endorses pain around ostomy site that is tolerable. Denies chest pain, SOB, palpitations, nausea/vomiting.    Vital Signs Last 24 Hrs  T(C): 36.6 (28 Jul 2021 17:35), Max: 38.2 (27 Jul 2021 20:49)  T(F): 97.8 (28 Jul 2021 17:35), Max: 100.8 (27 Jul 2021 20:49)  HR: 82 (28 Jul 2021 17:35) (80 - 90)  BP: 159/82 (28 Jul 2021 17:35) (100/57 - 159/82)  BP(mean): 108 (28 Jul 2021 16:00) (96 - 109)  RR: 16 (28 Jul 2021 17:35) (14 - 18)  SpO2: 96% (28 Jul 2021 17:35) (94% - 99%)  I&O's Detail    27 Jul 2021 07:01  -  28 Jul 2021 07:00  --------------------------------------------------------  IN:    Heparin: 163 mL    Oral Fluid: 680 mL    sodium chloride 0.9%: 900 mL  Total IN: 1743 mL    OUT:    Bulb (mL): 135 mL    Bulb (mL): 160 mL    Indwelling Catheter - Urethral (mL): 1200 mL  Total OUT: 1495 mL    Total NET: 248 mL      28 Jul 2021 07:01  -  28 Jul 2021 20:24  --------------------------------------------------------  IN:    sodium chloride 0.9%: 300 mL    sodium chloride 0.9%: 500 mL  Total IN: 800 mL    OUT:    Bulb (mL): 40 mL    Bulb (mL): 60 mL    Indwelling Catheter - Urethral (mL): 920 mL    Oral Fluid: 0 mL  Total OUT: 1020 mL    Total NET: -220 mL        piperacillin/tazobactam IVPB.. 3.375  vancomycin  IVPB 1000  piperacillin/tazobactam IVPB.. 3.375  vancomycin  IVPB 1000    PAST MEDICAL & SURGICAL HISTORY:  HLD (hyperlipidemia)    Thyroid cancer  2010    History of central serous retinopathy    H/O hypogonadism    H/O vitamin D deficiency    H/O osteopenia    H/O thyroidectomy  Total --2010    H/O colonoscopy          Physical Exam:  General: NAD, resting comfortably in bed  Pulmonary: Nonlabored breathing, no respiratory distress  Abdominal: soft, +slight tenderness to palpation around incisions, ostomy pink and viable, no gas or liquid yet. Dressings c/d/i.   Extremities: WWP      LABS:                        9.5    11.37 )-----------( 431      ( 28 Jul 2021 06:41 )             30.1     07-28    130<L>  |  98  |  9   ----------------------------<  128<H>  4.3   |  19<L>  |  0.78    Ca    8.8      28 Jul 2021 05:40      PT/INR - ( 28 Jul 2021 05:40 )   PT: 15.5 sec;   INR: 1.31 ratio         PTT - ( 28 Jul 2021 05:40 )  PTT:23.4 sec  CAPILLARY BLOOD GLUCOSE          Radiology and Additional Studies:    Assessment:  The patient is a 61y Male who is now several hours post-op from a ex lap and creation of sigmoid colostomy.    Plan:  - Pain control as needed  - IV abx, vanc/zosyn  - Diet, NPO  - IVF  - DVT ppx  - OOB and ambulating as tolerated  - F/u AM labs    [facility]  #[id number]

## 2021-07-28 NOTE — PROGRESS NOTE ADULT - SUBJECTIVE AND OBJECTIVE BOX
Jesse Rodriguez   Pager [phone]  Office [phone]      CC: Patient is a 61y old  Male who presents with a chief complaint of wound (28 Jul 2021 10:53)      SUBJECTIVE / OVERNIGHT EVENTS:    MEDICATIONS  (STANDING):  calcium carbonate 1250 mG  + Vitamin D (OsCal 500 + D) 1 Tablet(s) Oral daily  gabapentin 200 milliGRAM(s) Oral every 8 hours  levothyroxine 137 MICROGram(s) Oral daily  multivitamin 1 Tablet(s) Oral daily  pantoprazole    Tablet 40 milliGRAM(s) Oral before breakfast  piperacillin/tazobactam IVPB.. 3.375 Gram(s) IV Intermittent every 8 hours  sodium chloride 0.9%. 1000 milliLiter(s) (75 mL/Hr) IV Continuous <Continuous>  sodium chloride 0.9%. 1000 milliLiter(s) (75 mL/Hr) IV Continuous <Continuous>  vancomycin  IVPB 1000 milliGRAM(s) IV Intermittent every 8 hours    MEDICATIONS  (PRN):  acetaminophen   Tablet .. 650 milliGRAM(s) Oral every 6 hours PRN Temp greater or equal to 38C (100.4F), Mild Pain (1 - 3)  aluminum hydroxide/magnesium hydroxide/simethicone Suspension 30 milliLiter(s) Oral every 4 hours PRN Dyspepsia  ondansetron Injectable 4 milliGRAM(s) IV Push every 6 hours PRN Nausea  oxyCODONE    IR 5 milliGRAM(s) Oral every 6 hours PRN Moderate Pain (4 - 6)  oxyCODONE    IR 10 milliGRAM(s) Oral every 4 hours PRN Severe Pain (7 - 10)      Vital Signs Last 24 Hrs  T(C): 36.6 (28 Jul 2021 08:50), Max: 38.2 (27 Jul 2021 20:49)  T(F): 97.9 (28 Jul 2021 08:50), Max: 100.8 (27 Jul 2021 20:49)  HR: 80 (28 Jul 2021 08:50) (80 - 90)  BP: 100/57 (28 Jul 2021 08:50) (100/57 - 137/62)  BP(mean): --  RR: 18 (28 Jul 2021 08:50) (18 - 18)  SpO2: 97% (28 Jul 2021 08:50) (94% - 99%)  CAPILLARY BLOOD GLUCOSE        I&O's Summary    27 Jul 2021 07:01  -  28 Jul 2021 07:00  --------------------------------------------------------  IN: 1743 mL / OUT: 1495 mL / NET: 248 mL    28 Jul 2021 07:01  -  28 Jul 2021 12:45  --------------------------------------------------------  IN: 300 mL / OUT: 0 mL / NET: 300 mL      tele:    PHYSICAL EXAM:    GENERAL: NAD   HEENT: EOMI, PERRL  PULM: Clear to auscultation bilaterally  CV: Regular rate and rhythm; nl S1, S2; No murmurs, rubs, or gallops  ABDOMEN: Soft, Nontender, Nondistended; Bowel sounds present  EXTREMITIES/MSK:  No edema, calf tenderness   PSYCH: AAOx3  NEUROLOGY: non-focal          LABS:                        9.5    11.37 )-----------( 431      ( 28 Jul 2021 06:41 )             30.1     07-28    130<L>  |  98  |  9   ----------------------------<  128<H>  4.3   |  19<L>  |  0.78    Ca    8.8      28 Jul 2021 05:40      PT/INR - ( 28 Jul 2021 05:40 )   PT: 15.5 sec;   INR: 1.31 ratio         PTT - ( 28 Jul 2021 05:40 )  PTT:23.4 sec            Culture - Surgical Swab (collected 27 Jul 2021 00:51)  Source: .Surgical Swab back culture #1  Preliminary Report (27 Jul 2021 21:30):    Few Coag Negative Staphylococcus "Susceptibilities not performed"    Culture - Surgical Swab (collected 27 Jul 2021 00:51)  Source: .Surgical Swab back culture #2  Preliminary Report (27 Jul 2021 21:33):    Rare Coag Negative Staphylococcus "Susceptibilities not performed"      RADIOLOGY & ADDITIONAL TESTS:    Imaging Personally Reviewed:    Consultant(s) Notes Reviewed:      Care Discussed with Consultants/Other Providers:   Jesse Rodriguez   Pager [phone]  Office [phone]      CC: Patient is a 61y old  Male who presents with a chief complaint of wound (28 Jul 2021 10:53)      SUBJECTIVE / OVERNIGHT EVENTS: felt a fever last night. no cp/sob/n/v/d/abd pain. +BMs disloding rectal tube    MEDICATIONS  (STANDING):  calcium carbonate 1250 mG  + Vitamin D (OsCal 500 + D) 1 Tablet(s) Oral daily  gabapentin 200 milliGRAM(s) Oral every 8 hours  levothyroxine 137 MICROGram(s) Oral daily  multivitamin 1 Tablet(s) Oral daily  pantoprazole    Tablet 40 milliGRAM(s) Oral before breakfast  piperacillin/tazobactam IVPB.. 3.375 Gram(s) IV Intermittent every 8 hours  sodium chloride 0.9%. 1000 milliLiter(s) (75 mL/Hr) IV Continuous <Continuous>  sodium chloride 0.9%. 1000 milliLiter(s) (75 mL/Hr) IV Continuous <Continuous>  vancomycin  IVPB 1000 milliGRAM(s) IV Intermittent every 8 hours    MEDICATIONS  (PRN):  acetaminophen   Tablet .. 650 milliGRAM(s) Oral every 6 hours PRN Temp greater or equal to 38C (100.4F), Mild Pain (1 - 3)  aluminum hydroxide/magnesium hydroxide/simethicone Suspension 30 milliLiter(s) Oral every 4 hours PRN Dyspepsia  ondansetron Injectable 4 milliGRAM(s) IV Push every 6 hours PRN Nausea  oxyCODONE    IR 5 milliGRAM(s) Oral every 6 hours PRN Moderate Pain (4 - 6)  oxyCODONE    IR 10 milliGRAM(s) Oral every 4 hours PRN Severe Pain (7 - 10)      Vital Signs Last 24 Hrs  T(C): 36.6 (28 Jul 2021 08:50), Max: 38.2 (27 Jul 2021 20:49)  T(F): 97.9 (28 Jul 2021 08:50), Max: 100.8 (27 Jul 2021 20:49)  HR: 80 (28 Jul 2021 08:50) (80 - 90)  BP: 100/57 (28 Jul 2021 08:50) (100/57 - 137/62)  BP(mean): --  RR: 18 (28 Jul 2021 08:50) (18 - 18)  SpO2: 97% (28 Jul 2021 08:50) (94% - 99%)  CAPILLARY BLOOD GLUCOSE        I&O's Summary    27 Jul 2021 07:01  -  28 Jul 2021 07:00  --------------------------------------------------------  IN: 1743 mL / OUT: 1495 mL / NET: 248 mL    28 Jul 2021 07:01  -  28 Jul 2021 12:45  --------------------------------------------------------  IN: 300 mL / OUT: 0 mL / NET: 300 mL          PHYSICAL EXAM:    GENERAL: NAD   HEENT: EOMI, PERRL  PULM: Clear to auscultation bilaterally  CV: Regular rate and rhythm; nl S1, S2; No murmurs, rubs, or gallops  ABDOMEN: Soft, Nontender, Nondistended; Bowel sounds present  EXTREMITIES/MSK:  No edema, calf tenderness   PSYCH: AAOx3  NEUROLOGY: non-focal          LABS:                        9.5    11.37 )-----------( 431      ( 28 Jul 2021 06:41 )             30.1     07-28    130<L>  |  98  |  9   ----------------------------<  128<H>  4.3   |  19<L>  |  0.78    Ca    8.8      28 Jul 2021 05:40      PT/INR - ( 28 Jul 2021 05:40 )   PT: 15.5 sec;   INR: 1.31 ratio         PTT - ( 28 Jul 2021 05:40 )  PTT:23.4 sec            Culture - Surgical Swab (collected 27 Jul 2021 00:51)  Source: .Surgical Swab back culture #1  Preliminary Report (27 Jul 2021 21:30):    Few Coag Negative Staphylococcus "Susceptibilities not performed"    Culture - Surgical Swab (collected 27 Jul 2021 00:51)  Source: .Surgical Swab back culture #2  Preliminary Report (27 Jul 2021 21:33):    Rare Coag Negative Staphylococcus "Susceptibilities not performed"      RADIOLOGY & ADDITIONAL TESTS:    Imaging Personally Reviewed:    Consultant(s) Notes Reviewed:      Care Discussed with Consultants/Other Providers: neurosurg

## 2021-07-28 NOTE — PRE-ANESTHESIA EVALUATION ADULT - NSANTHOSAYNRD_GEN_A_CORE
No. JOSÉ LUIS screening performed.  STOP BANG Legend: 0-2 = LOW Risk; 3-4 = INTERMEDIATE Risk; 5-8 = HIGH Risk

## 2021-07-28 NOTE — PRE-ANESTHESIA EVALUATION ADULT - LAST STRESS TEST
2018, normal study as per patient

## 2021-07-28 NOTE — PRE-ANESTHESIA EVALUATION ADULT - NSANTHPEFT_GEN_ALL_CORE
Decreased breath sounds  RRR
PHYSICAL EXAM:  GENERAL: No acute distress,   HEAD: Atraumatic, Normocephalic  RESP: Normal respiratory pattern    HEART: Regular rate and rhythm  PSYCH: AAOx3  NEUROLOGY: Grossly Non-focal

## 2021-07-28 NOTE — PRE-ANESTHESIA EVALUATION ADULT - NSANTHPMHFT_GEN_ALL_CORE
61 year old male with PMH of Thyroid Cancer, s/p Total Thyroidectomy 2010 and Radioactive Iodine 2011, Hypogonadism, Vitamin D Deficiency, Osteopenia with recently diagnosed Sacral Tumor- s/p biopsy Chondroma. Patient reports that he suffers from chronic constipation, he has noted that his urine stream is weaker than before, he reports right buttock and right scrotal pain and numbness. Patient denies: Angina & Dyspnea on rest or exertion, Denies Severe Regurgitation/Heart burn.
61 year old male with PMH of Thyroid Cancer, s/p Total Thyroidectomy 2010 and Radioactive Iodine 2011, Hypogonadism, Vitamin D Deficiency, Osteopenia with recently diagnosed Sacral Tumor. On 7/7, he underwent Plastic Surgery and General Surgery assisted vertical rectus abdominal myocutaneous flap harvest with transperitoneal ligation of internal iliac artery and vein. on 7/8 he underwent an en bloc sacrectomy for resection of chordoma w/ L4-pelvis instrumentation and fusion and plastics closure now for back wound washout
s/p spinal cord tumor excision - now with incontinence and inability to ambulate with concern for wound infection from fecal incontinence  PE on Heparin drip - stable respiratory status  No CP at this time

## 2021-07-28 NOTE — PROGRESS NOTE ADULT - ASSESSMENT
61y Male patient s/p en bloc sacrectomy for chordoma w/ L4-pelvis fusion w/ plastics closure vertical rectus abdominis myocutaneous (VRAM) flap for chordoma on 07/07 and 07/08, post-op course c/b bilateral PEs (on hep gtt) and post-op ileus, resolved. Patient now incontinent of stool and soiling his posterior dressing s/p wound debridement with Plastic surgery 07/20 and 07/26. Pt now opting to proceed to OR for colostomy.     Plan:  - NPO for OR today with Dr. Davis  - Hep gtt held   - Continue excellent care per neurosurgery    Red Surgery  [id number]

## 2021-07-28 NOTE — PROGRESS NOTE ADULT - SUBJECTIVE AND OBJECTIVE BOX
Plastic Surgery Progress Note    Subjective:     Patient seen and examined at bedside. Patient without complaints this AM. Denies N/V/F/C.     OBJECTIVE:     T(C): 36.6 (07-28-21 @ 08:50), Max: 38.2 (07-27-21 @ 20:49)  HR: 80 (07-28-21 @ 08:50) (77 - 90)  BP: 100/57 (07-28-21 @ 08:50) (100/57 - 137/62)  RR: 18 (07-28-21 @ 08:50) (18 - 18)  SpO2: 97% (07-28-21 @ 08:50) (94% - 99%)  Wt(kg): --    I&O's Detail    27 Jul 2021 07:01  -  28 Jul 2021 07:00  --------------------------------------------------------  IN:    Heparin: 163 mL    Oral Fluid: 680 mL    sodium chloride 0.9%: 900 mL  Total IN: 1743 mL    OUT:    Bulb (mL): 135 mL    Bulb (mL): 160 mL    Indwelling Catheter - Urethral (mL): 1200 mL  Total OUT: 1495 mL    Total NET: 248 mL      28 Jul 2021 07:01  -  28 Jul 2021 10:54  --------------------------------------------------------  IN:    sodium chloride 0.9%: 300 mL  Total IN: 300 mL    OUT:  Total OUT: 0 mL    Total NET: 300 mL          PHYSICAL EXAM:    GENERAL: NAD, lying in bed comfortably  HEAD:  Atraumatic, Normocephalic  ENT: Moist mucous membranes  CHEST/LUNG: Unlabored respirations  ABDOMEN: Soft, Nontender, Nondistended.   NERVOUS SYSTEM:  Alert & Oriented X3, speech clear.   SKIN: No rashes or lesions    MEDICATIONS  (STANDING):  calcium carbonate 1250 mG  + Vitamin D (OsCal 500 + D) 1 Tablet(s) Oral daily  gabapentin 200 milliGRAM(s) Oral every 8 hours  levothyroxine 137 MICROGram(s) Oral daily  multivitamin 1 Tablet(s) Oral daily  pantoprazole    Tablet 40 milliGRAM(s) Oral before breakfast  piperacillin/tazobactam IVPB.. 3.375 Gram(s) IV Intermittent every 8 hours  sodium chloride 0.9%. 1000 milliLiter(s) (75 mL/Hr) IV Continuous <Continuous>  sodium chloride 0.9%. 1000 milliLiter(s) (75 mL/Hr) IV Continuous <Continuous>  vancomycin  IVPB 1000 milliGRAM(s) IV Intermittent every 8 hours    MEDICATIONS  (PRN):  acetaminophen   Tablet .. 650 milliGRAM(s) Oral every 6 hours PRN Temp greater or equal to 38C (100.4F), Mild Pain (1 - 3)  aluminum hydroxide/magnesium hydroxide/simethicone Suspension 30 milliLiter(s) Oral every 4 hours PRN Dyspepsia  ondansetron Injectable 4 milliGRAM(s) IV Push every 6 hours PRN Nausea  oxyCODONE    IR 5 milliGRAM(s) Oral every 6 hours PRN Moderate Pain (4 - 6)  oxyCODONE    IR 10 milliGRAM(s) Oral every 4 hours PRN Severe Pain (7 - 10)      LABS:                          9.5    11.37 )-----------( 431      ( 28 Jul 2021 06:41 )             30.1     07-28    130<L>  |  98  |  9   ----------------------------<  128<H>  4.3   |  19<L>  |  0.78    Ca    8.8      28 Jul 2021 05:40      PT/INR - ( 28 Jul 2021 05:40 )   PT: 15.5 sec;   INR: 1.31 ratio         PTT - ( 28 Jul 2021 05:40 )  PTT:23.4 sec           Plastic Surgery Progress Note    Subjective:     POD 2. VSS, AF. Patient seen and examined at bedside. To OR today.    OBJECTIVE:     T(C): 36.6 (07-28-21 @ 08:50), Max: 38.2 (07-27-21 @ 20:49)  HR: 80 (07-28-21 @ 08:50) (77 - 90)  BP: 100/57 (07-28-21 @ 08:50) (100/57 - 137/62)  RR: 18 (07-28-21 @ 08:50) (18 - 18)  SpO2: 97% (07-28-21 @ 08:50) (94% - 99%)  Wt(kg): --    I&O's Detail    27 Jul 2021 07:01  -  28 Jul 2021 07:00  --------------------------------------------------------  IN:    Heparin: 163 mL    Oral Fluid: 680 mL    sodium chloride 0.9%: 900 mL  Total IN: 1743 mL    OUT:    Bulb (mL): 135 mL    Bulb (mL): 160 mL    Indwelling Catheter - Urethral (mL): 1200 mL  Total OUT: 1495 mL    Total NET: 248 mL      28 Jul 2021 07:01  -  28 Jul 2021 10:54  --------------------------------------------------------  IN:    sodium chloride 0.9%: 300 mL  Total IN: 300 mL    OUT:  Total OUT: 0 mL    Total NET: 300 mL          PHYSICAL EXAM:    GENERAL: NAD, lying in bed comfortably  HEAD:  Atraumatic, Normocephalic  CHEST/LUNG: Unlabored respirations  ABDOMEN: Soft, Nontender, Nondistended  SKIN: No rashes or lesions    MEDICATIONS  (STANDING):  calcium carbonate 1250 mG  + Vitamin D (OsCal 500 + D) 1 Tablet(s) Oral daily  gabapentin 200 milliGRAM(s) Oral every 8 hours  levothyroxine 137 MICROGram(s) Oral daily  multivitamin 1 Tablet(s) Oral daily  pantoprazole    Tablet 40 milliGRAM(s) Oral before breakfast  piperacillin/tazobactam IVPB.. 3.375 Gram(s) IV Intermittent every 8 hours  sodium chloride 0.9%. 1000 milliLiter(s) (75 mL/Hr) IV Continuous <Continuous>  sodium chloride 0.9%. 1000 milliLiter(s) (75 mL/Hr) IV Continuous <Continuous>  vancomycin  IVPB 1000 milliGRAM(s) IV Intermittent every 8 hours    MEDICATIONS  (PRN):  acetaminophen   Tablet .. 650 milliGRAM(s) Oral every 6 hours PRN Temp greater or equal to 38C (100.4F), Mild Pain (1 - 3)  aluminum hydroxide/magnesium hydroxide/simethicone Suspension 30 milliLiter(s) Oral every 4 hours PRN Dyspepsia  ondansetron Injectable 4 milliGRAM(s) IV Push every 6 hours PRN Nausea  oxyCODONE    IR 5 milliGRAM(s) Oral every 6 hours PRN Moderate Pain (4 - 6)  oxyCODONE    IR 10 milliGRAM(s) Oral every 4 hours PRN Severe Pain (7 - 10)      LABS:                          9.5    11.37 )-----------( 431      ( 28 Jul 2021 06:41 )             30.1     07-28    130<L>  |  98  |  9   ----------------------------<  128<H>  4.3   |  19<L>  |  0.78    Ca    8.8      28 Jul 2021 05:40      PT/INR - ( 28 Jul 2021 05:40 )   PT: 15.5 sec;   INR: 1.31 ratio         PTT - ( 28 Jul 2021 05:40 )  PTT:23.4 sec           Plastic Surgery Progress Note    Subjective:     POD 2. VSS, AF. Patient seen and examined at bedside. To OR today.    OBJECTIVE:     T(C): 36.6 (07-28-21 @ 08:50), Max: 38.2 (07-27-21 @ 20:49)  HR: 80 (07-28-21 @ 08:50) (77 - 90)  BP: 100/57 (07-28-21 @ 08:50) (100/57 - 137/62)  RR: 18 (07-28-21 @ 08:50) (18 - 18)  SpO2: 97% (07-28-21 @ 08:50) (94% - 99%)  Wt(kg): --    I&O's Detail    27 Jul 2021 07:01  -  28 Jul 2021 07:00  --------------------------------------------------------  IN:    Heparin: 163 mL    Oral Fluid: 680 mL    sodium chloride 0.9%: 900 mL  Total IN: 1743 mL    OUT:    Bulb (mL): 135 mL    Bulb (mL): 160 mL    Indwelling Catheter - Urethral (mL): 1200 mL  Total OUT: 1495 mL    Total NET: 248 mL      28 Jul 2021 07:01  -  28 Jul 2021 10:54  --------------------------------------------------------  IN:    sodium chloride 0.9%: 300 mL  Total IN: 300 mL    OUT:  Total OUT: 0 mL    Total NET: 300 mL          PHYSICAL EXAM:    GENERAL: NAD, lying in bed comfortably  HEAD:  Atraumatic, Normocephalic  ABDOMEN: Midline incision c/d/i, soft non tender, abdominal SEDRICK removed previously, prineo intact on wound.   BACK: midline aquacell dressing intact, no palpable collections, drains with SS output.     MEDICATIONS  (STANDING):  calcium carbonate 1250 mG  + Vitamin D (OsCal 500 + D) 1 Tablet(s) Oral daily  gabapentin 200 milliGRAM(s) Oral every 8 hours  levothyroxine 137 MICROGram(s) Oral daily  multivitamin 1 Tablet(s) Oral daily  pantoprazole    Tablet 40 milliGRAM(s) Oral before breakfast  piperacillin/tazobactam IVPB.. 3.375 Gram(s) IV Intermittent every 8 hours  sodium chloride 0.9%. 1000 milliLiter(s) (75 mL/Hr) IV Continuous <Continuous>  sodium chloride 0.9%. 1000 milliLiter(s) (75 mL/Hr) IV Continuous <Continuous>  vancomycin  IVPB 1000 milliGRAM(s) IV Intermittent every 8 hours    MEDICATIONS  (PRN):  acetaminophen   Tablet .. 650 milliGRAM(s) Oral every 6 hours PRN Temp greater or equal to 38C (100.4F), Mild Pain (1 - 3)  aluminum hydroxide/magnesium hydroxide/simethicone Suspension 30 milliLiter(s) Oral every 4 hours PRN Dyspepsia  ondansetron Injectable 4 milliGRAM(s) IV Push every 6 hours PRN Nausea  oxyCODONE    IR 5 milliGRAM(s) Oral every 6 hours PRN Moderate Pain (4 - 6)  oxyCODONE    IR 10 milliGRAM(s) Oral every 4 hours PRN Severe Pain (7 - 10)      LABS:                          9.5    11.37 )-----------( 431      ( 28 Jul 2021 06:41 )             30.1     07-28    130<L>  |  98  |  9   ----------------------------<  128<H>  4.3   |  19<L>  |  0.78    Ca    8.8      28 Jul 2021 05:40      PT/INR - ( 28 Jul 2021 05:40 )   PT: 15.5 sec;   INR: 1.31 ratio         PTT - ( 28 Jul 2021 05:40 )  PTT:23.4 sec           Plastic Surgery Progress Note    Subjective:     POD 2. VSS, AF. Patient seen and examined at bedside. Drains 135 and 160cc SS. To OR today.    OBJECTIVE:     T(C): 36.6 (07-28-21 @ 08:50), Max: 38.2 (07-27-21 @ 20:49)  HR: 80 (07-28-21 @ 08:50) (77 - 90)  BP: 100/57 (07-28-21 @ 08:50) (100/57 - 137/62)  RR: 18 (07-28-21 @ 08:50) (18 - 18)  SpO2: 97% (07-28-21 @ 08:50) (94% - 99%)  Wt(kg): --    I&O's Detail    27 Jul 2021 07:01  -  28 Jul 2021 07:00  --------------------------------------------------------  IN:    Heparin: 163 mL    Oral Fluid: 680 mL    sodium chloride 0.9%: 900 mL  Total IN: 1743 mL    OUT:    Bulb (mL): 135 mL    Bulb (mL): 160 mL    Indwelling Catheter - Urethral (mL): 1200 mL  Total OUT: 1495 mL    Total NET: 248 mL      28 Jul 2021 07:01  -  28 Jul 2021 10:54  --------------------------------------------------------  IN:    sodium chloride 0.9%: 300 mL  Total IN: 300 mL    OUT:  Total OUT: 0 mL    Total NET: 300 mL          PHYSICAL EXAM:    GENERAL: NAD, lying in bed comfortably  HEAD:  Atraumatic, Normocephalic  ABDOMEN: Midline incision c/d/i, soft non tender, abdominal SEDRICK removed previously, prineo intact on wound.   BACK: midline aquacell dressing intact, no palpable collections, drains with SS output.     MEDICATIONS  (STANDING):  calcium carbonate 1250 mG  + Vitamin D (OsCal 500 + D) 1 Tablet(s) Oral daily  gabapentin 200 milliGRAM(s) Oral every 8 hours  levothyroxine 137 MICROGram(s) Oral daily  multivitamin 1 Tablet(s) Oral daily  pantoprazole    Tablet 40 milliGRAM(s) Oral before breakfast  piperacillin/tazobactam IVPB.. 3.375 Gram(s) IV Intermittent every 8 hours  sodium chloride 0.9%. 1000 milliLiter(s) (75 mL/Hr) IV Continuous <Continuous>  sodium chloride 0.9%. 1000 milliLiter(s) (75 mL/Hr) IV Continuous <Continuous>  vancomycin  IVPB 1000 milliGRAM(s) IV Intermittent every 8 hours    MEDICATIONS  (PRN):  acetaminophen   Tablet .. 650 milliGRAM(s) Oral every 6 hours PRN Temp greater or equal to 38C (100.4F), Mild Pain (1 - 3)  aluminum hydroxide/magnesium hydroxide/simethicone Suspension 30 milliLiter(s) Oral every 4 hours PRN Dyspepsia  ondansetron Injectable 4 milliGRAM(s) IV Push every 6 hours PRN Nausea  oxyCODONE    IR 5 milliGRAM(s) Oral every 6 hours PRN Moderate Pain (4 - 6)  oxyCODONE    IR 10 milliGRAM(s) Oral every 4 hours PRN Severe Pain (7 - 10)      LABS:                          9.5    11.37 )-----------( 431      ( 28 Jul 2021 06:41 )             30.1     07-28    130<L>  |  98  |  9   ----------------------------<  128<H>  4.3   |  19<L>  |  0.78    Ca    8.8      28 Jul 2021 05:40      PT/INR - ( 28 Jul 2021 05:40 )   PT: 15.5 sec;   INR: 1.31 ratio         PTT - ( 28 Jul 2021 05:40 )  PTT:23.4 sec

## 2021-07-28 NOTE — PROGRESS NOTE ADULT - PROBLEM SELECTOR PLAN 3
resolved on abx. ID consult appreciated - continue vanc and zosyn for now. bcx NTD, CT abd/pelvis as above w/ increased fluid collection and possible proctitis. surgical cx coag neg staph. repeat blood cx

## 2021-07-28 NOTE — BRIEF OPERATIVE NOTE - NSICDXBRIEFPREOP_GEN_ALL_CORE_FT
PRE-OP DIAGNOSIS:  Chordoma 07-Jul-2021 14:40:07  Jair Watters  
PRE-OP DIAGNOSIS:  Chordoma 07-Jul-2021 14:40:07  Jair Watters  
PRE-OP DIAGNOSIS:  Fecal incontinence 28-Jul-2021 15:26:45  Ashwin Carmona  
PRE-OP DIAGNOSIS:  Chordoma 07-Jul-2021 14:40:07  Jair Watters

## 2021-07-28 NOTE — BRIEF OPERATIVE NOTE - NSICDXBRIEFPOSTOP_GEN_ALL_CORE_FT
POST-OP DIAGNOSIS:  Chordoma 07-Jul-2021 14:40:19  Jair Watters  
POST-OP DIAGNOSIS:  Chordoma 07-Jul-2021 14:40:19  Jair Watters  
POST-OP DIAGNOSIS:  Fecal incontinence 28-Jul-2021 15:26:56  Ashwin Carmona  
POST-OP DIAGNOSIS:  Chordoma 07-Jul-2021 14:40:19  Jair Watters

## 2021-07-28 NOTE — PROGRESS NOTE ADULT - ASSESSMENT
61y male with history of thyroid cancer status post total thyroidectomy in 2010 and radioactive iodine treatment in 2011, hypogonadism, vitamin D deficiency, and osteopenia, with chronic constipation  right buttock and right scrotal pain and numbness. 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 The chordoma resulted in perineal numbness and overflow incontinence and he was admitted 7/7/21 for staged resection. On 7/7, he underwent Plastic Surgery and General Surgery assisted vertical rectus abdominal myocutaneous flap harvest with transperitoneal ligation of internal iliac artery and vein.  s/p en bloc sacrectomy/   with L4-pelvis fusion; EBL 4.5L status post 8 units PRBC, 6 units FFP, 1 pack platelets and 5L crystalloid; 7/8. Extubated 7/9 with hypoxia respiratory distress, desaturation CPAP  at night CTA revealed bilateral sub  segmental PE No right heart strain. & Bibasilar and left lingular consolidative opacities . Started on Heparin gtt. PTT goal 50-70 Treated with Levaquin for 7 days Course further c/b ileus requiring gastric decompression . Pathology pending s/p inferior sacral wound debridement at bedside 7/20/21 . He is reported to have leukocytosis. ID consulted.  Plastic surgery note review they write in their note posterior incision noted to have drainage may need posterior washout of wound. For this reason the patient was started on empiric Vanco and Zosyn, Vital reviewed no fevers recorded here, but noted that wbc has continued to trend up to 21K, UA is clear so doubt uti, respiratory cx taken on 7.12 grew Enterobacter. He was treated with IV Levaquin from 7.14-->7/20 as per orders.   Recent fever and leukocytosis likely secondary to infected sacral collection which was drained on 7/26.  7/22 blood cx are no growth to date .His leukocytosis has moderated post op.  OR cultures are with coag negative staph so far.One exposed screw seen, Hopefully source control has been achieved.  Isolation of  coag negative staph is difficult to interpret,it can be skin linda but with hardware it can be a pathogen.  Plan   continue Vanco and Zosyn IV for sacral collection and leukocytosis , POD 2, day 5 of this regimen  Supportive care per medicine  Wound care per PRS  Will adjust antibiotics as needed, will ask micro to do sensitivities on coag negative staph.  Diverting ostomy to keep wound clean.

## 2021-07-28 NOTE — CHART NOTE - NSCHARTNOTESELECT_GEN_ALL_CORE
POC/Event Note
Surgery/Event Note
Event Note
Nutrition Services
Post-Op Check
Pre-op
Ultrasound Note/Event Note
VTE risk assessment/Event Note

## 2021-07-28 NOTE — PROGRESS NOTE ADULT - SUBJECTIVE AND OBJECTIVE BOX
24h Events:   - NPO since midnight  - Heparin gtt held  - Overnight, no acute events    Subjective:   Patient examined at bedside this AM. Tolerated MOM. Having soft BMs, pushed out rectal tube.     Objective:  Vital Signs  T(C): 37.2 (07-28 @ 04:15), Max: 38.2 (07-27 @ 20:49)  HR: 90 (07-28 @ 04:15) (74 - 90)  BP: 129/71 (07-28 @ 04:15) (117/68 - 137/62)  RR: 18 (07-28 @ 04:15) (18 - 18)  SpO2: 98% (07-28 @ 04:15) (94% - 99%)  07-26-21 @ 07:01  -  07-27-21 @ 07:00  --------------------------------------------------------  IN:  Total IN: 0 mL    OUT:    Bulb (mL): 150 mL    Bulb (mL): 120 mL    Indwelling Catheter - Urethral (mL): 1435 mL  Total OUT: 1705 mL    Total NET: -1705 mL      07-27-21 @ 07:01  -  07-28-21 @ 06:54  --------------------------------------------------------  IN:  Total IN: 0 mL    OUT:    Bulb (mL): 135 mL    Bulb (mL): 160 mL    Indwelling Catheter - Urethral (mL): 1200 mL  Total OUT: 1495 mL    Total NET: -1495 mL    Physical Exam:  General: alert and oriented, NAD  Resp: airway patent, respirations unlabored  CVS: regular rate and rhythm  Abdomen: soft, nontender, nondistended  Extremities: no edema  Skin: warm, dry, appropriate color    Labs:                        9.5    11.37 )-----------( 431      ( 28 Jul 2021 06:41 )             30.1   07-28    130<L>  |  98  |  9   ----------------------------<  128<H>  4.3   |  19<L>  |  0.78    Ca    8.8      28 Jul 2021 05:40      CAPILLARY BLOOD GLUCOSE          Microbiology:    Culture - Surgical Swab (collected 27 Jul 2021 00:51)  Source: .Surgical Swab back culture #1  Preliminary Report (27 Jul 2021 21:30):    Few Coag Negative Staphylococcus "Susceptibilities not performed"    Culture - Surgical Swab (collected 27 Jul 2021 00:51)  Source: .Surgical Swab back culture #2  Preliminary Report (27 Jul 2021 21:33):    Rare Coag Negative Staphylococcus "Susceptibilities not performed"        Medications:   MEDICATIONS  (STANDING):  calcium carbonate 1250 mG  + Vitamin D (OsCal 500 + D) 1 Tablet(s) Oral daily  gabapentin 200 milliGRAM(s) Oral every 8 hours  levothyroxine 137 MICROGram(s) Oral daily  multivitamin 1 Tablet(s) Oral daily  pantoprazole    Tablet 40 milliGRAM(s) Oral before breakfast  piperacillin/tazobactam IVPB.. 3.375 Gram(s) IV Intermittent every 8 hours  sodium chloride 0.9%. 1000 milliLiter(s) (75 mL/Hr) IV Continuous <Continuous>  sodium chloride 0.9%. 1000 milliLiter(s) (75 mL/Hr) IV Continuous <Continuous>  vancomycin  IVPB 1000 milliGRAM(s) IV Intermittent every 8 hours    MEDICATIONS  (PRN):  acetaminophen   Tablet .. 650 milliGRAM(s) Oral every 6 hours PRN Temp greater or equal to 38C (100.4F), Mild Pain (1 - 3)  aluminum hydroxide/magnesium hydroxide/simethicone Suspension 30 milliLiter(s) Oral every 4 hours PRN Dyspepsia  ondansetron Injectable 4 milliGRAM(s) IV Push every 6 hours PRN Nausea  oxyCODONE    IR 5 milliGRAM(s) Oral every 6 hours PRN Moderate Pain (4 - 6)  oxyCODONE    IR 10 milliGRAM(s) Oral every 4 hours PRN Severe Pain (7 - 10)

## 2021-07-28 NOTE — PROGRESS NOTE ADULT - ASSESSMENT
Mr. Jackson is a 62 yo M with PMHx of thyroid cancer s/p total thyroidectomy (2010), HLD, Vit D deficiency, Osteopenia, and chordoma, who is now s/p Sacral sacrectomy and VRAM flap harvest (07/07, 07/08).    -To OR today for exploratory laparotomy and ostomy creation.  -Continue IV abx per ID.  -Abdominal dressing removed by PRS 07/23-prineo still in place   -continue with aquacell dressing  -RN may change gauze/tegaderm at drain insertion sites b/l PRN   -rest of care per primary team   -PRS will continue to follow     Ny Oliva MD  PGY1 Surgery  #[id number] for questions Mr. Jackson is a 62 yo M with PMHx of thyroid cancer s/p total thyroidectomy (2010), HLD, Vit D deficiency, Osteopenia, and chordoma, who is now s/p Sacral sacrectomy and VRAM flap harvest (07/07, 07/08).    -To OR today for exploratory laparotomy and colostomy creation for stool incontinence 2/2 sacrectomy.  -Continue IV abx per ID.  -Abdominal dressing removed by PRS 07/23-prineo still in place   -continue with aquacell dressing  -RN may change gauze/tegaderm at drain insertion sites b/l PRN   -rest of care per primary team   -PRS will continue to follow     Ny Oliva MD  PGY1 Surgery  #[phone] for questions

## 2021-07-28 NOTE — PRE-ANESTHESIA EVALUATION ADULT - NSANTHAIRWAYFT_ENT_ALL_CORE
FROM  TMD>3FB  good mouth opening  denies loose, chipped or cracked teeth
TM < 3 FB, interincisor distance < 3 FB

## 2021-07-28 NOTE — BRIEF OPERATIVE NOTE - NSICDXBRIEFPROCEDURE_GEN_ALL_CORE_FT
PROCEDURES:  Debridement, subcutaneous tissue, bone, and skin 26-Jul-2021 19:36:39  Lashay Gonzalez  
PROCEDURES:  Reconstruction of abdominoperineal defect using vertical rectus abdominis myocutaneous (VRAM) flap 07-Jul-2021 14:39:49  Jair Watters  Sacrectomy 08-Jul-2021 20:56:25  Jair Watters  
PROCEDURES:  Exploratory laparotomy 28-Jul-2021 15:25:37  Ashwin Carmona  Creation of sigmoid colostomy 28-Jul-2021 15:26:21  Ashwin Carmona  
PROCEDURES:  Reconstruction of abdominoperineal defect using vertical rectus abdominis myocutaneous (VRAM) flap 07-Jul-2021 14:39:49  Jair Watters

## 2021-07-28 NOTE — PROGRESS NOTE ADULT - SUBJECTIVE AND OBJECTIVE BOX
CC: f/u for post op wound infection    Patient reports: he is comfortable, s/p diverting colostomy today    REVIEW OF SYSTEMS:  All other review of systems negative (Comprehensive ROS)    Antimicrobials Day #  :day 5, POD 2  piperacillin/tazobactam IVPB.. 3.375 Gram(s) IV Intermittent every 8 hours  vancomycin  IVPB 1000 milliGRAM(s) IV Intermittent every 8 hours    Other Medications Reviewed    T(F): 97.5 (07-28-21 @ 16:30), Max: 100.8 (07-27-21 @ 20:49)  HR: 81 (07-28-21 @ 16:30)  BP: 138/79 (07-28-21 @ 16:30)  RR: 14 (07-28-21 @ 16:30)  SpO2: 98% (07-28-21 @ 16:30)  Wt(kg): --    PHYSICAL EXAM:  General: alert, no acute distress  Eyes:  anicteric, no conjunctival injection, no discharge  Oropharynx: no lesions or injection 	  Neck: supple, without adenopathy  Lungs: clear to auscultation  Heart: regular rate and rhythm; no murmur, rubs or gallops  Abdomen: soft, nondistended, nontender, LLQ colostomy  Skin: no rash  Extremities: no clubbing, cyanosis, or edema  Neurologic: alert, oriented, moves all extremities  Back incision intact per PRS, SEDRICK with SS drainage  LAB RESULTS:                        9.5    11.37 )-----------( 431      ( 28 Jul 2021 06:41 )             30.1     07-28    130<L>  |  98  |  9   ----------------------------<  128<H>  4.3   |  19<L>  |  0.78    Ca    8.8      28 Jul 2021 05:40          MICROBIOLOGY:  RECENT CULTURES:  07-27 @ 00:51 .Surgical Swab back culture #2     Rare Coag Negative Staphylococcus "Susceptibilities not performed"          RADIOLOGY REVIEWED:  < from: CT Abdomen and Pelvis w/ Oral Cont and w/ IV Cont (07.23.21 @ 14:56) >  IMPRESSION:  Status post partial sacral resection and lumbosacral iliac fusion with associated postoperative changes and drainage catheter. Increased sacral/presacral fluid collection. Superimposed infection is not excluded.    Mild rectal thickening with mild perirectal standing, may represent proctitis.    < end of copied text >  < from: CT Abdomen and Pelvis w/ Oral Cont and w/ IV Cont (07.23.21 @ 14:56) >  IMPRESSION:  Status post partial sacral resection and lumbosacral iliac fusion with associated postoperative changes and drainage catheter. Increased sacral/presacral fluid collection. Superimposed infection is not excluded.    Mild rectal thickening with mild perirectal standing, may represent proctitis.    < end of copied text >

## 2021-07-28 NOTE — PROGRESS NOTE ADULT - PROBLEM SELECTOR PLAN 1
7/8 sacrectomy with L4-pelvis fusion. c/w postop fluid collection. 7/26 s/p Posterior incision opened, duc pus encountered, cultures taken. Necrotic tissue debrided. One exposed screw. Pulse lavage. New drains placed through new incisions. Closed in layers. surgical cx coag neg staph. repeat blood cx. cont abx

## 2021-07-28 NOTE — BRIEF OPERATIVE NOTE - OPERATION/FINDINGS
[place] of vertical rectus abdominis myocutaneous flap w/ transperitoneal ligation of internal iliac artery and vein
en bloc sacrectomy for resection of chordoma w/ L4-pelvis instrumentation and fusion and plastics closure
Posterior incision opened, duc pus encountered, cultures taken. Necrotic tissue debrided. One exposed screw. Pulse lavage. New drains placed through new incisions. Closed in layers
Abdomen entered through vertical incision in LLQ, sigmoid colon mobilized, L ureter and spermatic vessels identified and preserved. Colon mobilized down to the rectum, 80mm BENNY blue load used to staple across rectosigmoid, rectal stump oversewn w/ 3-0 PDS II, and sigmoid brought up to the skin medial to the incision. Operative site irrigated, hemostasis confirmed, and posterior and anterior sheaths closed w/ loop PDS, Vicryls used to approximate the subcutaneous layers, and staples used to close the skin. Sigmoid colostomy was then matured in Delma fashion.
Trans-peritoneal anterior exposure. Ligation of right hypogastric artery. Mobilization of rectum.

## 2021-07-29 DIAGNOSIS — R50.9 FEVER, UNSPECIFIED: ICD-10-CM

## 2021-07-29 DIAGNOSIS — Z93.3 COLOSTOMY STATUS: ICD-10-CM

## 2021-07-29 LAB
-  AMPICILLIN/SULBACTAM: SIGNIFICANT CHANGE UP
-  AMPICILLIN: SIGNIFICANT CHANGE UP
-  AMPICILLIN: SIGNIFICANT CHANGE UP
-  CEFAZOLIN: SIGNIFICANT CHANGE UP
-  CLINDAMYCIN: SIGNIFICANT CHANGE UP
-  DAPTOMYCIN: SIGNIFICANT CHANGE UP
-  DAPTOMYCIN: SIGNIFICANT CHANGE UP
-  ERYTHROMYCIN: SIGNIFICANT CHANGE UP
-  GENTAMICIN: SIGNIFICANT CHANGE UP
-  LINEZOLID: SIGNIFICANT CHANGE UP
-  LINEZOLID: SIGNIFICANT CHANGE UP
-  OXACILLIN: SIGNIFICANT CHANGE UP
-  PENICILLIN: SIGNIFICANT CHANGE UP
-  RIFAMPIN: SIGNIFICANT CHANGE UP
-  TETRACYCLINE: SIGNIFICANT CHANGE UP
-  TRIMETHOPRIM/SULFAMETHOXAZOLE: SIGNIFICANT CHANGE UP
-  VANCOMYCIN: SIGNIFICANT CHANGE UP
ANION GAP SERPL CALC-SCNC: 14 MMOL/L — SIGNIFICANT CHANGE UP (ref 5–17)
APTT BLD: 44.3 SEC — HIGH (ref 27.5–35.5)
APTT BLD: 53.4 SEC — HIGH (ref 27.5–35.5)
APTT BLD: 56.4 SEC — HIGH (ref 27.5–35.5)
BASOPHILS # BLD AUTO: 0.02 K/UL — SIGNIFICANT CHANGE UP (ref 0–0.2)
BASOPHILS NFR BLD AUTO: 0.1 % — SIGNIFICANT CHANGE UP (ref 0–2)
BUN SERPL-MCNC: 9 MG/DL — SIGNIFICANT CHANGE UP (ref 7–23)
CALCIUM SERPL-MCNC: 9 MG/DL — SIGNIFICANT CHANGE UP (ref 8.4–10.5)
CHLORIDE SERPL-SCNC: 99 MMOL/L — SIGNIFICANT CHANGE UP (ref 96–108)
CO2 SERPL-SCNC: 20 MMOL/L — LOW (ref 22–31)
CREAT SERPL-MCNC: 0.66 MG/DL — SIGNIFICANT CHANGE UP (ref 0.5–1.3)
CULTURE RESULTS: SIGNIFICANT CHANGE UP
CULTURE RESULTS: SIGNIFICANT CHANGE UP
EOSINOPHIL # BLD AUTO: 0 K/UL — SIGNIFICANT CHANGE UP (ref 0–0.5)
EOSINOPHIL NFR BLD AUTO: 0 % — SIGNIFICANT CHANGE UP (ref 0–6)
GLUCOSE SERPL-MCNC: 129 MG/DL — HIGH (ref 70–99)
HCT VFR BLD CALC: 32.9 % — LOW (ref 39–50)
HGB BLD-MCNC: 10.1 G/DL — LOW (ref 13–17)
IMM GRANULOCYTES NFR BLD AUTO: 1.1 % — SIGNIFICANT CHANGE UP (ref 0–1.5)
LYMPHOCYTES # BLD AUTO: 0.98 K/UL — LOW (ref 1–3.3)
LYMPHOCYTES # BLD AUTO: 6.9 % — LOW (ref 13–44)
MCHC RBC-ENTMCNC: 27.2 PG — SIGNIFICANT CHANGE UP (ref 27–34)
MCHC RBC-ENTMCNC: 30.7 GM/DL — LOW (ref 32–36)
MCV RBC AUTO: 88.4 FL — SIGNIFICANT CHANGE UP (ref 80–100)
METHOD TYPE: SIGNIFICANT CHANGE UP
MONOCYTES # BLD AUTO: 1.16 K/UL — HIGH (ref 0–0.9)
MONOCYTES NFR BLD AUTO: 8.2 % — SIGNIFICANT CHANGE UP (ref 2–14)
NEUTROPHILS # BLD AUTO: 11.87 K/UL — HIGH (ref 1.8–7.4)
NEUTROPHILS NFR BLD AUTO: 83.7 % — HIGH (ref 43–77)
NRBC # BLD: 0 /100 WBCS — SIGNIFICANT CHANGE UP (ref 0–0)
ORGANISM # SPEC MICROSCOPIC CNT: SIGNIFICANT CHANGE UP
PLATELET # BLD AUTO: 477 K/UL — HIGH (ref 150–400)
POTASSIUM SERPL-MCNC: 4.5 MMOL/L — SIGNIFICANT CHANGE UP (ref 3.5–5.3)
POTASSIUM SERPL-SCNC: 4.5 MMOL/L — SIGNIFICANT CHANGE UP (ref 3.5–5.3)
RBC # BLD: 3.72 M/UL — LOW (ref 4.2–5.8)
RBC # FLD: 13.7 % — SIGNIFICANT CHANGE UP (ref 10.3–14.5)
SODIUM SERPL-SCNC: 133 MMOL/L — LOW (ref 135–145)
SPECIMEN SOURCE: SIGNIFICANT CHANGE UP
SPECIMEN SOURCE: SIGNIFICANT CHANGE UP
VANCOMYCIN TROUGH SERPL-MCNC: 19.6 UG/ML — SIGNIFICANT CHANGE UP (ref 10–20)
WBC # BLD: 14.18 K/UL — HIGH (ref 3.8–10.5)
WBC # FLD AUTO: 14.18 K/UL — HIGH (ref 3.8–10.5)

## 2021-07-29 PROCEDURE — 99231 SBSQ HOSP IP/OBS SF/LOW 25: CPT | Mod: GC

## 2021-07-29 PROCEDURE — 99232 SBSQ HOSP IP/OBS MODERATE 35: CPT

## 2021-07-29 PROCEDURE — 99233 SBSQ HOSP IP/OBS HIGH 50: CPT

## 2021-07-29 RX ORDER — METRONIDAZOLE 500 MG
500 TABLET ORAL THREE TIMES A DAY
Refills: 0 | Status: DISCONTINUED | OUTPATIENT
Start: 2021-07-29 | End: 2021-07-29

## 2021-07-29 RX ORDER — SODIUM CHLORIDE 9 MG/ML
1000 INJECTION INTRAMUSCULAR; INTRAVENOUS; SUBCUTANEOUS
Refills: 0 | Status: DISCONTINUED | OUTPATIENT
Start: 2021-07-29 | End: 2021-08-02

## 2021-07-29 RX ORDER — METRONIDAZOLE 500 MG
500 TABLET ORAL EVERY 8 HOURS
Refills: 0 | Status: DISCONTINUED | OUTPATIENT
Start: 2021-07-29 | End: 2021-08-02

## 2021-07-29 RX ADMIN — Medication 250 MILLIGRAM(S): at 08:17

## 2021-07-29 RX ADMIN — PIPERACILLIN AND TAZOBACTAM 25 GRAM(S): 4; .5 INJECTION, POWDER, LYOPHILIZED, FOR SOLUTION INTRAVENOUS at 17:37

## 2021-07-29 RX ADMIN — HYDROMORPHONE HYDROCHLORIDE 0.5 MILLIGRAM(S): 2 INJECTION INTRAMUSCULAR; INTRAVENOUS; SUBCUTANEOUS at 12:15

## 2021-07-29 RX ADMIN — HYDROMORPHONE HYDROCHLORIDE 0.5 MILLIGRAM(S): 2 INJECTION INTRAMUSCULAR; INTRAVENOUS; SUBCUTANEOUS at 06:46

## 2021-07-29 RX ADMIN — HYDROMORPHONE HYDROCHLORIDE 0.5 MILLIGRAM(S): 2 INJECTION INTRAMUSCULAR; INTRAVENOUS; SUBCUTANEOUS at 02:45

## 2021-07-29 RX ADMIN — Medication 69 MICROGRAM(S): at 21:40

## 2021-07-29 RX ADMIN — HYDROMORPHONE HYDROCHLORIDE 0.5 MILLIGRAM(S): 2 INJECTION INTRAMUSCULAR; INTRAVENOUS; SUBCUTANEOUS at 18:05

## 2021-07-29 RX ADMIN — HYDROMORPHONE HYDROCHLORIDE 0.5 MILLIGRAM(S): 2 INJECTION INTRAMUSCULAR; INTRAVENOUS; SUBCUTANEOUS at 22:02

## 2021-07-29 RX ADMIN — HYDROMORPHONE HYDROCHLORIDE 0.5 MILLIGRAM(S): 2 INJECTION INTRAMUSCULAR; INTRAVENOUS; SUBCUTANEOUS at 22:32

## 2021-07-29 RX ADMIN — PANTOPRAZOLE SODIUM 40 MILLIGRAM(S): 20 TABLET, DELAYED RELEASE ORAL at 11:44

## 2021-07-29 RX ADMIN — HYDROMORPHONE HYDROCHLORIDE 0.5 MILLIGRAM(S): 2 INJECTION INTRAMUSCULAR; INTRAVENOUS; SUBCUTANEOUS at 03:15

## 2021-07-29 RX ADMIN — HYDROMORPHONE HYDROCHLORIDE 0.5 MILLIGRAM(S): 2 INJECTION INTRAMUSCULAR; INTRAVENOUS; SUBCUTANEOUS at 11:45

## 2021-07-29 RX ADMIN — SODIUM CHLORIDE 125 MILLILITER(S): 9 INJECTION INTRAMUSCULAR; INTRAVENOUS; SUBCUTANEOUS at 11:45

## 2021-07-29 RX ADMIN — Medication 250 MILLIGRAM(S): at 00:38

## 2021-07-29 RX ADMIN — Medication 250 MILLIGRAM(S): at 17:38

## 2021-07-29 RX ADMIN — PIPERACILLIN AND TAZOBACTAM 25 GRAM(S): 4; .5 INJECTION, POWDER, LYOPHILIZED, FOR SOLUTION INTRAVENOUS at 08:17

## 2021-07-29 RX ADMIN — HEPARIN SODIUM 18 UNIT(S)/HR: 5000 INJECTION INTRAVENOUS; SUBCUTANEOUS at 06:27

## 2021-07-29 RX ADMIN — HEPARIN SODIUM 18 UNIT(S)/HR: 5000 INJECTION INTRAVENOUS; SUBCUTANEOUS at 12:15

## 2021-07-29 RX ADMIN — Medication 100 MILLIGRAM(S): at 21:40

## 2021-07-29 RX ADMIN — HYDROMORPHONE HYDROCHLORIDE 0.5 MILLIGRAM(S): 2 INJECTION INTRAMUSCULAR; INTRAVENOUS; SUBCUTANEOUS at 17:38

## 2021-07-29 RX ADMIN — PIPERACILLIN AND TAZOBACTAM 25 GRAM(S): 4; .5 INJECTION, POWDER, LYOPHILIZED, FOR SOLUTION INTRAVENOUS at 00:38

## 2021-07-29 NOTE — PROGRESS NOTE ADULT - ASSESSMENT
61 year-old man with history of thyroid cancer status post total thyroidectomy in 2010 and radioactive iodine treatment in 2011, hypogonadism, vitamin D deficiency, and osteopenia, with chronic constipation,  right buttock and right scrotal pain and numbness. 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 The chordoma resulted in perineal numbness and overflow incontinence and he was admitted 7/7/21 for staged resection. On 7/7, he underwent Plastic Surgery and General Surgery assisted vertical rectus abdominal myocutaneous flap harvest with transperitoneal ligation of internal iliac artery and vein.  s/p en bloc sacrectomy/ with L4-pelvis fusion; EBL 4.5L status post 8 units PRBC, 6 units FFP, 1 pack platelets and 5L crystalloid; 7/8. Extubated 7/9 with hypoxia respiratory distress, desaturation CPAP  at night CTA revealed bilateral sub  segmental PE. No right heart strain & Bibasilar and left lingular consolidative opacities. Started on Heparin gtt. PTT goal 50-70 Treated with Levaquin for 7 days Course further c/b ileus requiring gastric decompression. Pt pending echo to check for RV per pulm. Now with leucocytosis and febrile overnight. ID following on Vanco and zosyn for emperic coverage. Wound washout with plastics and duc pus noted, entire wound reopened on 7/26/21 and placement of 2 drains. Incontinent of stool. Cultures growing coag neg staph/staph aureus/enterococcus. S/P Colostomy on 7/28/21.       PLAN:  Neuro:   - pain control- continue dilaudid iv while NPO, gabapentin 200q8  Respiratory:   - on Heparin gtt for bilateral PEs  - PTT goal 50-70  CV:  - vitals stable  Endocrine:   - hypothyroid- continue synthroid IV while NPO            DVT ppx:   - venodynes  ID:   - infected sacral collection- cultures growing coag neg staph, staph aureus enterococcus- continue vancomycin and zosyn  GI:    - incontinent of stool- s/p colostomy- had small brown liquid in colostomy bag this morning  - started clear liquid diet per Surgery  - continue tony  PT/OT:   - Acute rehab      Spectralink # [id number] 61 year-old man with history of thyroid cancer status post total thyroidectomy in 2010 and radioactive iodine treatment in 2011, hypogonadism, vitamin D deficiency, and osteopenia, with chronic constipation,  right buttock and right scrotal pain and numbness. 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 The chordoma resulted in perineal numbness and overflow incontinence and he was admitted 7/7/21 for staged resection. On 7/7, he underwent Plastic Surgery and General Surgery assisted vertical rectus abdominal myocutaneous flap harvest with transperitoneal ligation of internal iliac artery and vein.  s/p en bloc sacrectomy/ with L4-pelvis fusion; EBL 4.5L status post 8 units PRBC, 6 units FFP, 1 pack platelets and 5L crystalloid; 7/8. Extubated 7/9 with hypoxia respiratory distress, desaturation CPAP  at night CTA revealed bilateral sub  segmental PE. No right heart strain & Bibasilar and left lingular consolidative opacities. Started on Heparin gtt. PTT goal 50-70 Treated with Levaquin for 7 days Course further c/b ileus requiring gastric decompression. Pt pending echo to check for RV per pulm. Now with leucocytosis and febrile overnight. ID following on Vanco and zosyn for emperic coverage. Wound washout with plastics and duc pus noted, entire wound reopened on 7/26/21 and placement of 2 drains. Incontinent of stool. Cultures growing coag neg staph/staph aureus/enterococcus. S/P Colostomy on 7/28/21.       PLAN:  Neuro:   - pain control- continue dilaudid iv while NPO, gabapentin 200q8  Respiratory:   - on Heparin gtt for bilateral PEs  - PTT goal 50-70  CV:  - vitals stable  Endocrine:   - hypothyroid- continue synthroid IV while NPO            DVT ppx:   - venodynes  ID:   - infected sacral collection- cultures growing coag neg staph, staph aureus enterococcus- continue vancomycin and zosyn  GI:    - incontinent of stool- s/p colostomy- had small brown liquid in colostomy bag this morning  - started clear liquid diet per Surgery, pt instructed to take small sips of fluid and not to drink everything on his tray  - continue tony  PT/OT:   - Acute rehab      Spectralink # [id number]

## 2021-07-29 NOTE — PROGRESS NOTE ADULT - ASSESSMENT
61 year old male with PMH of Thyroid Cancer, s/p Total Thyroidectomy 2010 and Radioactive Iodine 2011, Hypogonadism, Vitamin D Deficiency, Osteopenia with recently diagnosed Sacral Tumor. On 7/7, he underwent Plastic Surgery and General Surgery assisted vertical rectus abdominal myocutaneous flap harvest with transperitoneal ligation of internal iliac artery and vein. on 7/8 he underwent an en bloc sacrectomy for resection of chordoma w/ L4-pelvis instrumentation and fusion and plastics closure.    HOSPITAL COURSE:  7/7: Stage 1 - VRAM harvest, iliac artery and vein ligation  7/8: Stage 2 - sacrectomy with L4-pelvis fusion; EBL 4.5L status post 8 units PRBC, 6 units FFP, 1 pack platelets and 5L crystalloid; post-op status post 1 pack platelets  7/10: CTA chest: b/l upper lobe segmental PEs requiring high flow nasal cannula  7/11: Nausea/vomiting sec to ileus. NGT placed to low wall suction with immediate 1.2L bilious output.  7/17: started on Levofloxacin for Enterobacter in sputum  7/23: Started on vanc/zosyn - CT abd/pelvis with increased fluid collection, possible proctitis   7/26: Posterior incision opened, duc pus encountered, cultures taken. Necrotic tissue debrided. One exposed screw. Pulse lavage. New drains placed through new incisions. Closed in layers  7/28: colostomy

## 2021-07-29 NOTE — PROGRESS NOTE ADULT - ASSESSMENT
The patient is a 61 year old male with a history of PE who is now s/p resection of L4 chordoma with midline abdominal incision on 7/8 now s/p colostomy. He is satting well on room air. He is breathing comfortably with no increased work of breathing. He does not have any postoperative pulmonary complications.     Recommend incentive spirometry.   PT when able.   Therapeutic AC for PE.     Pulm to sign off. Please call with questions.

## 2021-07-29 NOTE — PROGRESS NOTE ADULT - SUBJECTIVE AND OBJECTIVE BOX
HPI:  POD1 sigmoid colostomy     24h Events:   - OR for sigmoid colostomy, tolerated well  - Overnight, 1 small BM     Subjective:   Patient examined at bedside this AM. Reports abdominal pain surrounding colostomy site. Pain controlled with pain meds.     Objective:  Vital Signs  T(C): 36.7 (07-29 @ 04:20), Max: 37.3 (07-28 @ 20:29)  HR: 73 (07-29 @ 04:20) (67 - 89)  BP: 157/78 (07-29 @ 04:20) (100/57 - 159/82)  RR: 18 (07-29 @ 04:20) (14 - 18)  SpO2: 96% (07-29 @ 04:20) (94% - 98%)  07-28-21 @ 07:01  -  07-29-21 @ 07:00  --------------------------------------------------------  IN:  Total IN: 0 mL    OUT:    Bulb (mL): 100 mL    Bulb (mL): 70 mL    Colostomy (mL): 0 mL    Indwelling Catheter - Urethral (mL): 2820 mL  Total OUT: 2990 mL    Total NET: -2990 mL    Physical Exam:  General: alert and oriented, NAD  Resp: airway patent, respirations unlabored  CVS: regular rate and rhythm  Abdomen: soft, appropriate tenderness surrounding colostomy site, colostomy with bowel sweat, no output, mildly edematous ostomy without bleeding or signs of necrosis   Extremities: no edema  Skin: warm, dry, appropriate color    Labs:                        10.1   14.18 )-----------( 477      ( 29 Jul 2021 05:16 )             32.9   07-29    133<L>  |  99  |  9   ----------------------------<  129<H>  4.5   |  20<L>  |  0.66    Ca    9.0      29 Jul 2021 05:16      CAPILLARY BLOOD GLUCOSE          Microbiology:    Culture - Surgical Swab (collected 27 Jul 2021 00:51)  Source: .Surgical Swab back culture #1  Preliminary Report (28 Jul 2021 22:48):    Rare Coag Negative Staphylococcus Susceptibility to follow.    Rare Staphylococcus aureus    Few Enterococcus faecalis    Few Bacteroides vulgatus group "Susceptibilities not performed"    Culture - Surgical Swab (collected 27 Jul 2021 00:51)  Source: .Surgical Swab back culture #2  Preliminary Report (28 Jul 2021 22:48):    Rare Coag Negative Staphylococcus Susceptibility to follow.    Rare Staphylococcus aureus    Rare Enterococcus faecalis    Rare Bacteroides vulgatus group "Susceptibilities not performed"        Medications:   MEDICATIONS  (STANDING):  calcium carbonate 1250 mG  + Vitamin D (OsCal 500 + D) 1 Tablet(s) Oral daily  gabapentin 200 milliGRAM(s) Oral every 8 hours  heparin  Infusion 1700 Unit(s)/Hr (18 mL/Hr) IV Continuous <Continuous>  levothyroxine Injectable 69 MICROGram(s) IV Push at bedtime  multivitamin 1 Tablet(s) Oral daily  pantoprazole  Injectable 40 milliGRAM(s) IV Push daily  piperacillin/tazobactam IVPB.. 3.375 Gram(s) IV Intermittent every 8 hours  sodium chloride 0.9%. 1000 milliLiter(s) (125 mL/Hr) IV Continuous <Continuous>  vancomycin  IVPB 1000 milliGRAM(s) IV Intermittent every 8 hours    MEDICATIONS  (PRN):  aluminum hydroxide/magnesium hydroxide/simethicone Suspension 30 milliLiter(s) Oral every 4 hours PRN Dyspepsia  HYDROmorphone  Injectable 0.5 milliGRAM(s) IV Push every 4 hours PRN Moderate Pain (4 - 6)  ondansetron Injectable 4 milliGRAM(s) IV Push every 6 hours PRN Nausea

## 2021-07-29 NOTE — PROGRESS NOTE ADULT - ASSESSMENT
A/P:    Mr. Jackson is a 62 yo M with PMHx of thyroid cancer s/p total thyroidectomy (2010), HLD, Vit D deficiency, Osteopenia, and chordoma, who is now s/p Sacral sacrectomy and VRAM flap harvest (07/07, 07/08).    -POD 1 exploratory laparotomy and diverting ileostomy  -Appreciate ID recs, Zosyn changed to Flagyl per sensitivities returning  -continue with aquacell dressing  -RN may change gauze/tegaderm at drain insertion sites b/l PRN   -rest of care per primary team   -PRS will continue to follow     Ny Oliva MD  PGY1 Surgery  #[phone] for questions

## 2021-07-29 NOTE — PROGRESS NOTE ADULT - ASSESSMENT
61 year old male with PMH of Thyroid Cancer, s/p Total Thyroidectomy 2010 and Radioactive Iodine 2011, Hypogonadism, Vitamin D Deficiency, Osteopenia with recently diagnosed Sacral Tumor. Admitted on 7/7, he underwent Plastic Surgery and General Surgery assisted vertical rectus abdominal myocutaneous flap harvest with transperitoneal ligation of internal iliac artery and vein. He underwent en bloc sacrectomy on 7/8, with L4-pelvis fusion, transfused 8 units PRBC, 6 units FFP, 1 pack platelets and 5L crystalloid; post-op status post 1 pack platelets, CTA 7/10 with bilateral upper lobe segmental PEs,  post- op course complicated by ileus      #s/p Ileus  Incontinent of stool and soiling his posterior dressing s/p wound debridement with Plastics   s/p diverting sigmoid colostomy 7/28  -post-op care and management per surgery  -await return of GI function  -bed mobility/turning  -wound care  -monitor/replete lytes PRN  goal K 4-4.5 and Mg>2  -PPI for GI prophylaxis  -Synthroid dosing per primary team    Further care per primary team  Will Sign off care. Please recall prn for GI concerns.  Thank You.      Connor Camp PA-C    [facility]  [phone]  After hours and weekend coverage [phone]

## 2021-07-29 NOTE — PROGRESS NOTE ADULT - SUBJECTIVE AND OBJECTIVE BOX
SUBJECTIVE: Pt seen and examined, lying flat in bed this morning. He reports moderate pain at colostomy site, making it difficult to turn on his side. A small amount of brown liquid seen in colostomy bag after turning him to the left side.    OVERNIGHT EVENTS: none    Vital Signs Last 24 Hrs  T(C): 36.7 (29 Jul 2021 08:34), Max: 37.3 (28 Jul 2021 20:29)  T(F): 98 (29 Jul 2021 08:34), Max: 99.1 (28 Jul 2021 20:29)  HR: 76 (29 Jul 2021 08:34) (67 - 89)  BP: 129/76 (29 Jul 2021 08:34) (113/67 - 159/82)  BP(mean): 108 (28 Jul 2021 16:00) (96 - 109)  RR: 18 (29 Jul 2021 08:34) (14 - 18)  SpO2: 98% (29 Jul 2021 08:34) (94% - 98%)    PHYSICAL EXAM:    General: No Acute Distress     Neurological: Awake, alert oriented to person, place and time, Following Commands, B/l UE 5/5 RLE DF/PF 4+/5, LLE 5/5. LE limited by abdominal pain    Pulmonary: Clear to Auscultation, No Rales, No Rhonchi, No Wheezes     Cardiovascular: S1, S2, Regular Rate and Rhythm     Gastrointestinal: Soft, Nontender, Nondistended     Incision: +dressing c/d/i, drains x2 per plastics     LABS:                        10.1   14.18 )-----------( 477      ( 29 Jul 2021 05:16 )             32.9    07-29    133<L>  |  99  |  9   ----------------------------<  129<H>  4.5   |  20<L>  |  0.66    Ca    9.0      29 Jul 2021 05:16    PT/INR - ( 28 Jul 2021 05:40 )   PT: 15.5 sec;   INR: 1.31 ratio         PTT - ( 29 Jul 2021 05:16 )  PTT:44.3 sec      07-28 @ 07:01  -  07-29 @ 07:00  --------------------------------------------------------  IN: 2580 mL / OUT: 2990 mL / NET: -410 mL      MEDICATIONS:  Antibiotics:  piperacillin/tazobactam IVPB.. 3.375 Gram(s) IV Intermittent every 8 hours  vancomycin  IVPB 1000 milliGRAM(s) IV Intermittent every 8 hours    Neuro:  gabapentin 200 milliGRAM(s) Oral every 8 hours  HYDROmorphone  Injectable 0.5 milliGRAM(s) IV Push every 4 hours PRN Moderate Pain (4 - 6)  ondansetron Injectable 4 milliGRAM(s) IV Push every 6 hours PRN Nausea    Cardiac:    Pulm:    GI/:  aluminum hydroxide/magnesium hydroxide/simethicone Suspension 30 milliLiter(s) Oral every 4 hours PRN Dyspepsia  pantoprazole  Injectable 40 milliGRAM(s) IV Push daily    Other:   calcium carbonate 1250 mG  + Vitamin D (OsCal 500 + D) 1 Tablet(s) Oral daily  heparin  Infusion 1700 Unit(s)/Hr IV Continuous <Continuous>  levothyroxine Injectable 69 MICROGram(s) IV Push at bedtime  multivitamin 1 Tablet(s) Oral daily  sodium chloride 0.9%. 1000 milliLiter(s) IV Continuous <Continuous>    DIET: [] Regular [] CCD [] Renal [] Puree [] Dysphagia [] Tube Feeds: clear liquid diet    IMAGING:   < from: Xray Chest 1 View- PORTABLE-Urgent (Xray Chest 1 View- PORTABLE-Urgent .) (07.23.21 @ 22:29) >  IMPRESSION:    The heart is normal in size. Bibasilarpneumonia. This appears to be slightly worsening on the left and unchanged on the right when compared to previous study done July 23, 2021 at 11:57 AM.    < end of copied text >  < from: CT Angio Chest PE Protocol w/ IV Cont (07.10.21 @ 11:43) >  IMPRESSION:    Small bilateral upper lobe subsegmental pulmonary emboli. No right heart strain.    Bibasilar and left lingular consolidative opacities with associated volume loss likely represent atelectasis.    < end of copied text >  < from: CT Pelvis No Cont (07.09.21 @ 10:04) >  IMPRESSION:  1. Postsurgical changes are present from sacral resection and lumbo-iliac spinal fusion.  2. Fine linear hyperdensity is present extending along the right margin of the L4 vertebral body and slightly into the adjacent inferior vena cava, possibly reflecting surgical material or venous cement.    < end of copied text >

## 2021-07-29 NOTE — PROGRESS NOTE ADULT - SUBJECTIVE AND OBJECTIVE BOX
Jesse Rodriguez   Pager [phone]  Office [phone]      CC: Patient is a 61y old  Male who presents with a chief complaint of sacral tumor (28 Jul 2021 17:33)      SUBJECTIVE / OVERNIGHT EVENTS:    MEDICATIONS  (STANDING):  calcium carbonate 1250 mG  + Vitamin D (OsCal 500 + D) 1 Tablet(s) Oral daily  gabapentin 200 milliGRAM(s) Oral every 8 hours  heparin  Infusion 1700 Unit(s)/Hr (18 mL/Hr) IV Continuous <Continuous>  levothyroxine Injectable 69 MICROGram(s) IV Push at bedtime  multivitamin 1 Tablet(s) Oral daily  pantoprazole  Injectable 40 milliGRAM(s) IV Push daily  piperacillin/tazobactam IVPB.. 3.375 Gram(s) IV Intermittent every 8 hours  sodium chloride 0.9%. 1000 milliLiter(s) (125 mL/Hr) IV Continuous <Continuous>  vancomycin  IVPB 1000 milliGRAM(s) IV Intermittent every 8 hours    MEDICATIONS  (PRN):  aluminum hydroxide/magnesium hydroxide/simethicone Suspension 30 milliLiter(s) Oral every 4 hours PRN Dyspepsia  HYDROmorphone  Injectable 0.5 milliGRAM(s) IV Push every 4 hours PRN Moderate Pain (4 - 6)  ondansetron Injectable 4 milliGRAM(s) IV Push every 6 hours PRN Nausea      Vital Signs Last 24 Hrs  T(C): 36.7 (29 Jul 2021 08:34), Max: 37.3 (28 Jul 2021 20:29)  T(F): 98 (29 Jul 2021 08:34), Max: 99.1 (28 Jul 2021 20:29)  HR: 76 (29 Jul 2021 08:34) (67 - 89)  BP: 129/76 (29 Jul 2021 08:34) (113/67 - 159/82)  BP(mean): 108 (28 Jul 2021 16:00) (96 - 109)  RR: 18 (29 Jul 2021 08:34) (14 - 18)  SpO2: 98% (29 Jul 2021 08:34) (94% - 98%)  CAPILLARY BLOOD GLUCOSE        I&O's Summary    28 Jul 2021 07:01  -  29 Jul 2021 07:00  --------------------------------------------------------  IN: 2580 mL / OUT: 2990 mL / NET: -410 mL      tele:    PHYSICAL EXAM:    GENERAL: NAD   HEENT: EOMI, PERRL  PULM: Clear to auscultation bilaterally  CV: Regular rate and rhythm; nl S1, S2; No murmurs, rubs, or gallops  ABDOMEN: Soft, Nontender, Nondistended; Bowel sounds present  EXTREMITIES/MSK:  No edema, calf tenderness   PSYCH: AAOx3  NEUROLOGY: non-focal          LABS:                        10.1   14.18 )-----------( 477      ( 29 Jul 2021 05:16 )             32.9     07-29    133<L>  |  99  |  9   ----------------------------<  129<H>  4.5   |  20<L>  |  0.66    Ca    9.0      29 Jul 2021 05:16      PT/INR - ( 28 Jul 2021 05:40 )   PT: 15.5 sec;   INR: 1.31 ratio         PTT - ( 29 Jul 2021 05:16 )  PTT:44.3 sec            Culture - Surgical Swab (collected 27 Jul 2021 00:51)  Source: .Surgical Swab back culture #1  Preliminary Report (28 Jul 2021 22:48):    Rare Coag Negative Staphylococcus Susceptibility to follow.    Rare Staphylococcus aureus    Few Enterococcus faecalis    Few Bacteroides vulgatus group "Susceptibilities not performed"    Culture - Surgical Swab (collected 27 Jul 2021 00:51)  Source: .Surgical Swab back culture #2  Preliminary Report (28 Jul 2021 22:48):    Rare Coag Negative Staphylococcus Susceptibility to follow.    Rare Staphylococcus aureus    Rare Enterococcus faecalis    Rare Bacteroides vulgatus group "Susceptibilities not performed"      RADIOLOGY & ADDITIONAL TESTS:    Imaging Personally Reviewed:    Consultant(s) Notes Reviewed:      Care Discussed with Consultants/Other Providers:   Jesse Rodriguez   Pager [phone]  Office [phone]      CC: Patient is a 61y old  Male who presents with a chief complaint of sacral tumor (28 Jul 2021 17:33)      SUBJECTIVE / OVERNIGHT EVENTS: has some surgical site pain controlled c hydromorphone. no n/v/cp/sob. no f/c/r.    MEDICATIONS  (STANDING):  calcium carbonate 1250 mG  + Vitamin D (OsCal 500 + D) 1 Tablet(s) Oral daily  gabapentin 200 milliGRAM(s) Oral every 8 hours  heparin  Infusion 1700 Unit(s)/Hr (18 mL/Hr) IV Continuous <Continuous>  levothyroxine Injectable 69 MICROGram(s) IV Push at bedtime  multivitamin 1 Tablet(s) Oral daily  pantoprazole  Injectable 40 milliGRAM(s) IV Push daily  piperacillin/tazobactam IVPB.. 3.375 Gram(s) IV Intermittent every 8 hours  sodium chloride 0.9%. 1000 milliLiter(s) (125 mL/Hr) IV Continuous <Continuous>  vancomycin  IVPB 1000 milliGRAM(s) IV Intermittent every 8 hours    MEDICATIONS  (PRN):  aluminum hydroxide/magnesium hydroxide/simethicone Suspension 30 milliLiter(s) Oral every 4 hours PRN Dyspepsia  HYDROmorphone  Injectable 0.5 milliGRAM(s) IV Push every 4 hours PRN Moderate Pain (4 - 6)  ondansetron Injectable 4 milliGRAM(s) IV Push every 6 hours PRN Nausea      Vital Signs Last 24 Hrs  T(C): 36.7 (29 Jul 2021 08:34), Max: 37.3 (28 Jul 2021 20:29)  T(F): 98 (29 Jul 2021 08:34), Max: 99.1 (28 Jul 2021 20:29)  HR: 76 (29 Jul 2021 08:34) (67 - 89)  BP: 129/76 (29 Jul 2021 08:34) (113/67 - 159/82)  BP(mean): 108 (28 Jul 2021 16:00) (96 - 109)  RR: 18 (29 Jul 2021 08:34) (14 - 18)  SpO2: 98% (29 Jul 2021 08:34) (94% - 98%)  CAPILLARY BLOOD GLUCOSE        I&O's Summary    28 Jul 2021 07:01  -  29 Jul 2021 07:00  --------------------------------------------------------  IN: 2580 mL / OUT: 2990 mL / NET: -410 mL          PHYSICAL EXAM:    GENERAL: NAD   HEENT: EOMI, PERRL  PULM: Clear to auscultation bilaterally  CV: Regular rate and rhythm; nl S1, S2; No murmurs, rubs, or gallops  ABDOMEN: Soft, Nontender, Nondistended; Bowel sounds present; surgical wound c/d/i  EXTREMITIES/MSK:  No edema, calf tenderness   PSYCH: AAOx3  NEUROLOGY: non-focal          LABS:                        10.1   14.18 )-----------( 477      ( 29 Jul 2021 05:16 )             32.9     07-29    133<L>  |  99  |  9   ----------------------------<  129<H>  4.5   |  20<L>  |  0.66    Ca    9.0      29 Jul 2021 05:16      PT/INR - ( 28 Jul 2021 05:40 )   PT: 15.5 sec;   INR: 1.31 ratio         PTT - ( 29 Jul 2021 05:16 )  PTT:44.3 sec            Culture - Surgical Swab (collected 27 Jul 2021 00:51)  Source: .Surgical Swab back culture #1  Preliminary Report (28 Jul 2021 22:48):    Rare Coag Negative Staphylococcus Susceptibility to follow.    Rare Staphylococcus aureus    Few Enterococcus faecalis    Few Bacteroides vulgatus group "Susceptibilities not performed"    Culture - Surgical Swab (collected 27 Jul 2021 00:51)  Source: .Surgical Swab back culture #2  Preliminary Report (28 Jul 2021 22:48):    Rare Coag Negative Staphylococcus Susceptibility to follow.    Rare Staphylococcus aureus    Rare Enterococcus faecalis    Rare Bacteroides vulgatus group "Susceptibilities not performed"      RADIOLOGY & ADDITIONAL TESTS:    Imaging Personally Reviewed:    Consultant(s) Notes Reviewed:      Care Discussed with Consultants/Other Providers: neurosurg

## 2021-07-29 NOTE — PROGRESS NOTE ADULT - SUBJECTIVE AND OBJECTIVE BOX
CC: f/u for  draining post op wound    Patient reports his abdomen is a little bloated    REVIEW OF SYSTEMS:  All other review of systems negative (Comprehensive ROS)    Antimicrobials Day #  :pod 3  piperacillin/tazobactam IVPB.. 3.375 Gram(s) IV Intermittent every 8 hours  vancomycin  IVPB 1000 milliGRAM(s) IV Intermittent every 8 hours    Other Medications Reviewed    T(F): 97.7 (07-29-21 @ 15:25), Max: 99.1 (07-28-21 @ 20:29)  HR: 72 (07-29-21 @ 15:25)  BP: 130/76 (07-29-21 @ 15:25)  RR: 18 (07-29-21 @ 15:25)  SpO2: 97% (07-29-21 @ 15:25)  Wt(kg): --    PHYSICAL EXAM:  General: alert, no acute distress  Eyes:  anicteric, no conjunctival injection, no discharge  Oropharynx: no lesions or injection 	  Neck: supple, without adenopathy  Lungs: clear to auscultation  Heart: regular rate and rhythm; no murmur, rubs or gallops  Abdomen: soft, mildly distended, nontender, without mass or organomegaly, colostomy  Skin: no lesions  Extremities: no clubbing, cyanosis, or edema  Neurologic: alert, oriented, moves all extremities    LAB RESULTS:                        10.1   14.18 )-----------( 477      ( 29 Jul 2021 05:16 )             32.9     07-29    133<L>  |  99  |  9   ----------------------------<  129<H>  4.5   |  20<L>  |  0.66    Ca    9.0      29 Jul 2021 05:16          MICROBIOLOGY:  RECENT CULTURES:  07-27 @ 00:51 .Surgical Swab back culture #2     Rare Coag Negative Staphylococcus Susceptibility to follow.  Rare Staphylococcus aureus  Rare Enterococcus faecalis  Rare Bacteroides vulgatus group "Susceptibilities not performed"          RADIOLOGY REVIEWED:      < from: CT Abdomen and Pelvis w/ Oral Cont and w/ IV Cont (07.23.21 @ 14:56) >    EXAM:  CT ABDOMEN AND PELVIS OC IC                            PROCEDURE DATE:  07/23/2021            INTERPRETATION:  CLINICAL INFORMATION: Leukocytosis, evaluate for intra-abdominal infection.    COMPARISON: CT pelvis 7/9/2021. CTA chest 7/10/2021, CT abdomen and pelvis 6/3/2021.    CONTRAST/COMPLICATIONS:  IV Contrast: Omnipaque 350  90 cc administered   10 cc discarded  Oral Contrast: Omnipaque 300  Complications: None reported    PROCEDURE:  CT of the Abdomen and Pelvis was performed.  Sagittal and coronal reformats were performed.    FINDINGS:  LOWER CHEST: Trace right pleural effusion. Unchanged bilateral lower lobe consolidation, likely atelectasis.    LIVER: Within normal limits.  BILE DUCTS: Normal caliber.  GALLBLADDER: Within normal limits.  SPLEEN: Within normal limits.  PANCREAS: Within normal limits.  ADRENALS: Within normal limits.  KIDNEYS/URETERS: No hydronephrosis. Left renal hypodensities too small to characterize.    BLADDER: Barnett catheter.  REPRODUCTIVE ORGANS: Prostate within normal limits.    BOWEL: No bowel obstruction. Appendix is normal. Mild rectal thickening with mild perirectal standing.  PERITONEUM: No ascites.  VESSELS: Within normal limits.  RETROPERITONEUM/LYMPH NODES: No lymphadenopathy.  ABDOMINALWALL: Postsurgical changes in the ventral abdominal wall with drainage catheter. Postsurgical changes from  BONES: Streak artifact from lumbar spinal hardware limits evaluation. However there is ill-defined. Status post partial sacral resection with associated postoperative changes. Increased fluid in the sacral/presacral space, measuring 10.7 x 2.1 cm, previously 6.2 x 0.9 cm.    IMPRESSION:  Status post partial sacral resection and lumbosacral iliac fusion with associated postoperative changes and drainage catheter. Increased sacral/presacral fluid collection. Superimposed infection is not excluded.    Mild rectal thickening with mild perirectal standing, may represent proctitis.    --- End of Report ---        < end of copied text >          Assessment:  Patient s/p extensive sacral resection for chordoma with muscle flaps reconstruction, fusion, post op respiratory failure, PE, ileus. Over the past several days developed fever, sacral wound drainage, required wound washout with polymicrobic growth as would be expected. So far has staph aureus, coag neg staph, bacteroides, enterococcus.  He underwent  a diverting colostomy as well.   Plan:  will continue vancomycin and change zosyn to flagyl  f/u final cultures  await sensitivity of the isolates as well  anticipate will need at least 5 weeks of antibiotics

## 2021-07-29 NOTE — PROGRESS NOTE ADULT - SUBJECTIVE AND OBJECTIVE BOX
Patient is a 61y old  Male who presented with a chief complaint of sacral chordoma (29 Jul 2021 11:46)      INTERVAL HPI/OVERNIGHT EVENTS:  s/p sigmoid colostomy creation 7/28  post-op surgical site pain near colostomy; no output from colostomy but small BM via rectum reported overnight      MEDICATIONS  (STANDING):  calcium carbonate 1250 mG  + Vitamin D (OsCal 500 + D) 1 Tablet(s) Oral daily  gabapentin 200 milliGRAM(s) Oral every 8 hours  heparin  Infusion 1700 Unit(s)/Hr (18 mL/Hr) IV Continuous <Continuous>  levothyroxine Injectable 69 MICROGram(s) IV Push at bedtime  multivitamin 1 Tablet(s) Oral daily  pantoprazole  Injectable 40 milliGRAM(s) IV Push daily  piperacillin/tazobactam IVPB.. 3.375 Gram(s) IV Intermittent every 8 hours  sodium chloride 0.9%. 1000 milliLiter(s) (125 mL/Hr) IV Continuous <Continuous>  vancomycin  IVPB 1000 milliGRAM(s) IV Intermittent every 8 hours    MEDICATIONS  (PRN):  aluminum hydroxide/magnesium hydroxide/simethicone Suspension 30 milliLiter(s) Oral every 4 hours PRN Dyspepsia  HYDROmorphone  Injectable 0.5 milliGRAM(s) IV Push every 4 hours PRN Moderate Pain (4 - 6)  ondansetron Injectable 4 milliGRAM(s) IV Push every 6 hours PRN Nausea      Allergies  No Known Allergies      Review of Systems:  General:  No wt loss, fevers, chills, night sweats,fatigue,   CV:  No pain, palpitations, hypo/hypertension  Resp:  No dyspnea, cough, tachypnea, wheezing  GI: see HPI  :  No pain, bleeding, incontinence, nocturia  Muscle:  +LE weakness  Neuro:  see HPI  Psych:  No fatigue, insomnia, mood problems, depression  Endocrine:  No polyuria, polydypsia, cold/heat intolerance  Heme:  No petechiae, ecchymosis, easy bruisability +hx thyroid CA s/p resection  Skin:  No rash, tattoos, scars, edema      Vital Signs Last 24 Hrs  T(C): 36.5 (29 Jul 2021 15:25), Max: 37.3 (28 Jul 2021 20:29)  T(F): 97.7 (29 Jul 2021 15:25), Max: 99.1 (28 Jul 2021 20:29)  HR: 72 (29 Jul 2021 15:25) (67 - 84)  BP: 130/76 (29 Jul 2021 15:25) (126/70 - 159/82)  BP(mean): 108 (28 Jul 2021 16:00) (103 - 108)  RR: 18 (29 Jul 2021 15:25) (14 - 18)  SpO2: 97% (29 Jul 2021 15:25) (95% - 98%)    PHYSICAL EXAM:  Constitutional: NAD, alert and responsive lying in bed  Neck: No LAD, supple WH surgical scar  Respiratory: clear b/l  Cardiovascular: S1 and S2, RRR, no M  Gastrointestinal: BS+, soft ND NT +BS  midline incision c/d/i RLQ colostomy - bag flat stoma pink and viable  Extremities: No peripheral edema, neg clubbing, cyanosis  Vascular: 2+ peripheral pulses  Neurological: A/O x 3, decreased sensation medial aspect of left calf  Psychiatric: Normal mood, normal affect  Skin: No rashes      LABS:                        10.1   14.18 )-----------( 477      ( 29 Jul 2021 05:16 )             32.9     07-29    133<L>  |  99  |  9   ----------------------------<  129<H>  4.5   |  20<L>  |  0.66    Ca    9.0      29 Jul 2021 05:16      PT/INR - ( 28 Jul 2021 05:40 )   PT: 15.5 sec;   INR: 1.31 ratio         PTT - ( 29 Jul 2021 12:19 )  PTT:53.4 sec        RADIOLOGY & ADDITIONAL TESTS:

## 2021-07-29 NOTE — PROGRESS NOTE ADULT - SUBJECTIVE AND OBJECTIVE BOX
Patient is a 61y old  Male who presents with a chief complaint of sacral chordoma (29 Jul 2021 11:46)      SUBJECTIVE / OVERNIGHT EVENTS:    Patient seen and examined at bedside. No acute events overnight.    T(F): 97.7 (07-29 @ 15:25), Max: 99.1 (07-28 @ 20:29)  HR: 72 (07-29 @ 15:25) (67 - 82)  BP: 130/76 (07-29 @ 15:25) (126/70 - 159/82)  RR: 18 (07-29 @ 15:25) (14 - 18)  SpO2: 97% (07-29 @ 15:25) (95% - 98%)    I&O's Summary    28 Jul 2021 07:01  -  29 Jul 2021 07:00  --------------------------------------------------------  IN: 2580 mL / OUT: 2990 mL / NET: -410 mL    29 Jul 2021 07:01  -  29 Jul 2021 16:20  --------------------------------------------------------  IN: 260 mL / OUT: 635 mL / NET: -375 mL        MEDICATIONS  (STANDING):  calcium carbonate 1250 mG  + Vitamin D (OsCal 500 + D) 1 Tablet(s) Oral daily  gabapentin 200 milliGRAM(s) Oral every 8 hours  heparin  Infusion 1700 Unit(s)/Hr (18 mL/Hr) IV Continuous <Continuous>  levothyroxine Injectable 69 MICROGram(s) IV Push at bedtime  multivitamin 1 Tablet(s) Oral daily  pantoprazole  Injectable 40 milliGRAM(s) IV Push daily  piperacillin/tazobactam IVPB.. 3.375 Gram(s) IV Intermittent every 8 hours  sodium chloride 0.9%. 1000 milliLiter(s) (125 mL/Hr) IV Continuous <Continuous>  vancomycin  IVPB 1000 milliGRAM(s) IV Intermittent every 8 hours    MEDICATIONS  (PRN):  aluminum hydroxide/magnesium hydroxide/simethicone Suspension 30 milliLiter(s) Oral every 4 hours PRN Dyspepsia  HYDROmorphone  Injectable 0.5 milliGRAM(s) IV Push every 4 hours PRN Moderate Pain (4 - 6)  ondansetron Injectable 4 milliGRAM(s) IV Push every 6 hours PRN Nausea      PHYSICAL EXAM:   GEN: Age appropriate, resting comfortably in bed, no acute distress, non toxic appearing, speaking in complete sentences.   HEENT: Conjunctiva and sclera normal  PULM: Lungs CTAB, no wheezes, rales, rhonchi  CV: RRR, S1S2, no MRG  MSK: no stiffness or joint effusions  Abdominal: Soft, nontender to palpation, non-distended, +BS  Extremities: No edema or cyanosis  NEURO: AAOx3  Psych: normal affect, normal behavior  Skin: No rashes, lesions    LABS:  Labs personally reviewed.                        10.1   14.18 )-----------( 477      ( 29 Jul 2021 05:16 )             32.9     Hgb Trend: 10.1<--, 9.5<--, 9.7<--, 10.1<--, 9.1<--  07-29    133<L>  |  99  |  9   ----------------------------<  129<H>  4.5   |  20<L>  |  0.66    Ca    9.0      29 Jul 2021 05:16      Creatinine Trend: 0.66<--, 0.78<--, 0.66<--, 0.75<--, 0.70<--, 0.75<--  PT/INR - ( 28 Jul 2021 05:40 )   PT: 15.5 sec;   INR: 1.31 ratio         PTT - ( 29 Jul 2021 12:19 )  PTT:53.4 sec        [facility]  Pulmonary and Critical Care Fellow    PGY-5 Pager: [phone]  [id number]  [facility] [phone]   Night Float:

## 2021-07-29 NOTE — PROGRESS NOTE ADULT - SUBJECTIVE AND OBJECTIVE BOX
Plastic Surgery Progress Note    Subjective:     POD 1 from ex lap and ostomy with general surgery. VSS, AF overnight. Team explained to patient at bedside that nerve function may return for voluntary defecating and urinating in the future.     OBJECTIVE:     T(C): 36.4 (07-29-21 @ 19:50), Max: 37.3 (07-28-21 @ 20:29)  HR: 78 (07-29-21 @ 19:50) (67 - 78)  BP: 149/80 (07-29-21 @ 19:50) (126/70 - 157/80)  RR: 18 (07-29-21 @ 19:50) (16 - 18)  SpO2: 94% (07-29-21 @ 19:50) (94% - 98%)  Wt(kg): --    I&O's Detail    28 Jul 2021 07:01  -  29 Jul 2021 07:00  --------------------------------------------------------  IN:    Heparin: 155 mL    sodium chloride 0.9%: 300 mL    sodium chloride 0.9%: 2125 mL  Total IN: 2580 mL    OUT:    Bulb (mL): 100 mL    Bulb (mL): 70 mL    Colostomy (mL): 0 mL    Indwelling Catheter - Urethral (mL): 2820 mL    Oral Fluid: 0 mL  Total OUT: 2990 mL    Total NET: -410 mL      29 Jul 2021 07:01  -  29 Jul 2021 19:58  --------------------------------------------------------  IN:    Oral Fluid: 260 mL    sodium chloride 0.9%: 1250 mL    sodium chloride 0.9%: 150 mL  Total IN: 1660 mL    OUT:    Bulb (mL): 30 mL    Bulb (mL): 5 mL    Indwelling Catheter - Urethral (mL): 600 mL  Total OUT: 635 mL    Total NET: 1025 mL          PHYSICAL EXAM:    GENERAL: NAD, lying in bed comfortably  HEAD:  Atraumatic, Normocephalic  ENT: Moist mucous membranes  CHEST/LUNG: Unlabored respirations  ABDOMEN: Soft, Nontender, Nondistended.   NERVOUS SYSTEM:  Alert & Oriented X3, speech clear.   SKIN: No rashes or lesions    MEDICATIONS  (STANDING):  calcium carbonate 1250 mG  + Vitamin D (OsCal 500 + D) 1 Tablet(s) Oral daily  gabapentin 200 milliGRAM(s) Oral every 8 hours  heparin  Infusion 1700 Unit(s)/Hr (18 mL/Hr) IV Continuous <Continuous>  levothyroxine Injectable 69 MICROGram(s) IV Push at bedtime  metroNIDAZOLE  IVPB 500 milliGRAM(s) IV Intermittent every 8 hours  multivitamin 1 Tablet(s) Oral daily  pantoprazole  Injectable 40 milliGRAM(s) IV Push daily  sodium chloride 0.9%. 1000 milliLiter(s) (50 mL/Hr) IV Continuous <Continuous>  vancomycin  IVPB 1000 milliGRAM(s) IV Intermittent every 8 hours    MEDICATIONS  (PRN):  aluminum hydroxide/magnesium hydroxide/simethicone Suspension 30 milliLiter(s) Oral every 4 hours PRN Dyspepsia  HYDROmorphone  Injectable 0.5 milliGRAM(s) IV Push every 4 hours PRN Moderate Pain (4 - 6)  ondansetron Injectable 4 milliGRAM(s) IV Push every 6 hours PRN Nausea      LABS:                          10.1   14.18 )-----------( 477      ( 29 Jul 2021 05:16 )             32.9     07-29    133<L>  |  99  |  9   ----------------------------<  129<H>  4.5   |  20<L>  |  0.66    Ca    9.0      29 Jul 2021 05:16      PT/INR - ( 28 Jul 2021 05:40 )   PT: 15.5 sec;   INR: 1.31 ratio         PTT - ( 29 Jul 2021 18:16 )  PTT:56.4 sec           Plastic Surgery Progress Note    Subjective:     POD 1 from ex lap and ostomy with general surgery. VSS, AF overnight. Team explained to patient at bedside that nerve function may return for voluntary defecating and urinating in the future. SEDRICK R 105cc SS L 100cc.    OBJECTIVE:     T(C): 36.4 (07-29-21 @ 19:50), Max: 37.3 (07-28-21 @ 20:29)  HR: 78 (07-29-21 @ 19:50) (67 - 78)  BP: 149/80 (07-29-21 @ 19:50) (126/70 - 157/80)  RR: 18 (07-29-21 @ 19:50) (16 - 18)  SpO2: 94% (07-29-21 @ 19:50) (94% - 98%)  Wt(kg): --    I&O's Detail    28 Jul 2021 07:01  -  29 Jul 2021 07:00  --------------------------------------------------------  IN:    Heparin: 155 mL    sodium chloride 0.9%: 300 mL    sodium chloride 0.9%: 2125 mL  Total IN: 2580 mL    OUT:    Bulb (mL): 100 mL    Bulb (mL): 70 mL    Colostomy (mL): 0 mL    Indwelling Catheter - Urethral (mL): 2820 mL    Oral Fluid: 0 mL  Total OUT: 2990 mL    Total NET: -410 mL      29 Jul 2021 07:01  -  29 Jul 2021 19:58  --------------------------------------------------------  IN:    Oral Fluid: 260 mL    sodium chloride 0.9%: 1250 mL    sodium chloride 0.9%: 150 mL  Total IN: 1660 mL    OUT:    Bulb (mL): 30 mL    Bulb (mL): 5 mL    Indwelling Catheter - Urethral (mL): 600 mL  Total OUT: 635 mL    Total NET: 1025 mL          PHYSICAL EXAM:    GENERAL: NAD, lying in bed comfortably  HEAD:  Atraumatic, Normocephalic  ENT: Moist mucous membranes  CHEST/LUNG: Unlabored respirations  ABDOMEN: Soft, Nontender, Nondistended.   NERVOUS SYSTEM:  Alert & Oriented X3, speech clear.   SKIN: No rashes or lesions    MEDICATIONS  (STANDING):  calcium carbonate 1250 mG  + Vitamin D (OsCal 500 + D) 1 Tablet(s) Oral daily  gabapentin 200 milliGRAM(s) Oral every 8 hours  heparin  Infusion 1700 Unit(s)/Hr (18 mL/Hr) IV Continuous <Continuous>  levothyroxine Injectable 69 MICROGram(s) IV Push at bedtime  metroNIDAZOLE  IVPB 500 milliGRAM(s) IV Intermittent every 8 hours  multivitamin 1 Tablet(s) Oral daily  pantoprazole  Injectable 40 milliGRAM(s) IV Push daily  sodium chloride 0.9%. 1000 milliLiter(s) (50 mL/Hr) IV Continuous <Continuous>  vancomycin  IVPB 1000 milliGRAM(s) IV Intermittent every 8 hours    MEDICATIONS  (PRN):  aluminum hydroxide/magnesium hydroxide/simethicone Suspension 30 milliLiter(s) Oral every 4 hours PRN Dyspepsia  HYDROmorphone  Injectable 0.5 milliGRAM(s) IV Push every 4 hours PRN Moderate Pain (4 - 6)  ondansetron Injectable 4 milliGRAM(s) IV Push every 6 hours PRN Nausea      LABS:                          10.1   14.18 )-----------( 477      ( 29 Jul 2021 05:16 )             32.9     07-29    133<L>  |  99  |  9   ----------------------------<  129<H>  4.5   |  20<L>  |  0.66    Ca    9.0      29 Jul 2021 05:16      PT/INR - ( 28 Jul 2021 05:40 )   PT: 15.5 sec;   INR: 1.31 ratio         PTT - ( 29 Jul 2021 18:16 )  PTT:56.4 sec           Plastic Surgery Progress Note    Subjective:     POD 1 from ex lap and ostomy with general surgery. VSS, AF overnight. Team explained to patient at bedside that nerve function may return for voluntary defecating and urinating in the future. SEDRICK R 105cc SS L 100cc.    OBJECTIVE:     T(C): 36.4 (07-29-21 @ 19:50), Max: 37.3 (07-28-21 @ 20:29)  HR: 78 (07-29-21 @ 19:50) (67 - 78)  BP: 149/80 (07-29-21 @ 19:50) (126/70 - 157/80)  RR: 18 (07-29-21 @ 19:50) (16 - 18)  SpO2: 94% (07-29-21 @ 19:50) (94% - 98%)  Wt(kg): --    I&O's Detail    28 Jul 2021 07:01  -  29 Jul 2021 07:00  --------------------------------------------------------  IN:    Heparin: 155 mL    sodium chloride 0.9%: 300 mL    sodium chloride 0.9%: 2125 mL  Total IN: 2580 mL    OUT:    Bulb (mL): 100 mL    Bulb (mL): 70 mL    Colostomy (mL): 0 mL    Indwelling Catheter - Urethral (mL): 2820 mL    Oral Fluid: 0 mL  Total OUT: 2990 mL    Total NET: -410 mL      29 Jul 2021 07:01  -  29 Jul 2021 19:58  --------------------------------------------------------  IN:    Oral Fluid: 260 mL    sodium chloride 0.9%: 1250 mL    sodium chloride 0.9%: 150 mL  Total IN: 1660 mL    OUT:    Bulb (mL): 30 mL    Bulb (mL): 5 mL    Indwelling Catheter - Urethral (mL): 600 mL  Total OUT: 635 mL    Total NET: 1025 mL          PHYSICAL EXAM:    GENERAL: NAD, lying in bed comfortably  HEAD:  Atraumatic, Normocephalic  ABDOMEN: Midline incision c/d/i, soft non tender. SEDRICK drains X2.   BACK: midline aquacell dressing intact, no palpable collections, drains with SS output.     MEDICATIONS  (STANDING):  calcium carbonate 1250 mG  + Vitamin D (OsCal 500 + D) 1 Tablet(s) Oral daily  gabapentin 200 milliGRAM(s) Oral every 8 hours  heparin  Infusion 1700 Unit(s)/Hr (18 mL/Hr) IV Continuous <Continuous>  levothyroxine Injectable 69 MICROGram(s) IV Push at bedtime  metroNIDAZOLE  IVPB 500 milliGRAM(s) IV Intermittent every 8 hours  multivitamin 1 Tablet(s) Oral daily  pantoprazole  Injectable 40 milliGRAM(s) IV Push daily  sodium chloride 0.9%. 1000 milliLiter(s) (50 mL/Hr) IV Continuous <Continuous>  vancomycin  IVPB 1000 milliGRAM(s) IV Intermittent every 8 hours    MEDICATIONS  (PRN):  aluminum hydroxide/magnesium hydroxide/simethicone Suspension 30 milliLiter(s) Oral every 4 hours PRN Dyspepsia  HYDROmorphone  Injectable 0.5 milliGRAM(s) IV Push every 4 hours PRN Moderate Pain (4 - 6)  ondansetron Injectable 4 milliGRAM(s) IV Push every 6 hours PRN Nausea      LABS:                          10.1   14.18 )-----------( 477      ( 29 Jul 2021 05:16 )             32.9     07-29    133<L>  |  99  |  9   ----------------------------<  129<H>  4.5   |  20<L>  |  0.66    Ca    9.0      29 Jul 2021 05:16      PT/INR - ( 28 Jul 2021 05:40 )   PT: 15.5 sec;   INR: 1.31 ratio         PTT - ( 29 Jul 2021 18:16 )  PTT:56.4 sec

## 2021-07-29 NOTE — PROGRESS NOTE ADULT - ASSESSMENT
61y Male patient s/p en bloc sacrectomy for chordoma w/ L4-pelvis fusion w/ plastics closure vertical rectus abdominis myocutaneous (VRAM) flap for chordoma on 07/07 and 07/08, post-op course c/b bilateral PEs (on hep gtt) and post-op ileus, resolved. Patient now incontinent of stool and soiling his posterior dressing s/p wound debridement with Plastic surgery 07/20 and 07/26. Pt now opting to proceed to OR for colostomy.     Plan:  - NPO until colostomy output, then can start CLD   - Hep gtt okay to restart   - Continue excellent care per neurosurgery    [facility]  [id number] 61y Male patient s/p en bloc sacrectomy for chordoma w/ L4-pelvis fusion w/ plastics closure vertical rectus abdominis myocutaneous (VRAM) flap for chordoma on 07/07 and 07/08, post-op course c/b bilateral PEs (on hep gtt) and post-op ileus, resolved. Patient now incontinent of stool and soiling his posterior dressing s/p wound debridement with Plastic surgery 07/20 and 07/26. Pt now opting to proceed to OR for colostomy.     Plan:  - Advance to CLD   - Hep gtt restarted  - Continue excellent care per neurosurgery    [facility]  [id number] 61y Male patient s/p en bloc sacrectomy for chordoma w/ L4-pelvis fusion w/ plastics closure vertical rectus abdominis myocutaneous (VRAM) flap for chordoma on 07/07 and 07/08, post-op course c/b bilateral PEs (on hep gtt) and post-op ileus, resolved. Patient now incontinent of stool and soiling his posterior dressing s/p wound debridement with Plastic surgery 07/20 and 07/26. Pt now opting to proceed to OR for colostomy.     Plan:  - Advance to CLD   - Hep gtt restarted  - Continue excellent care per neurosurgery    D/w Dr. Davis  [facility]  [id number]

## 2021-07-30 LAB
ANION GAP SERPL CALC-SCNC: 12 MMOL/L — SIGNIFICANT CHANGE UP (ref 5–17)
APTT BLD: 50.4 SEC — HIGH (ref 27.5–35.5)
BUN SERPL-MCNC: 11 MG/DL — SIGNIFICANT CHANGE UP (ref 7–23)
CALCIUM SERPL-MCNC: 9 MG/DL — SIGNIFICANT CHANGE UP (ref 8.4–10.5)
CHLORIDE SERPL-SCNC: 100 MMOL/L — SIGNIFICANT CHANGE UP (ref 96–108)
CO2 SERPL-SCNC: 20 MMOL/L — LOW (ref 22–31)
CREAT SERPL-MCNC: 0.69 MG/DL — SIGNIFICANT CHANGE UP (ref 0.5–1.3)
GLUCOSE SERPL-MCNC: 91 MG/DL — SIGNIFICANT CHANGE UP (ref 70–99)
HCT VFR BLD CALC: 29.9 % — LOW (ref 39–50)
HGB BLD-MCNC: 9.3 G/DL — LOW (ref 13–17)
MCHC RBC-ENTMCNC: 27.7 PG — SIGNIFICANT CHANGE UP (ref 27–34)
MCHC RBC-ENTMCNC: 31.1 GM/DL — LOW (ref 32–36)
MCV RBC AUTO: 89 FL — SIGNIFICANT CHANGE UP (ref 80–100)
NRBC # BLD: 0 /100 WBCS — SIGNIFICANT CHANGE UP (ref 0–0)
PLATELET # BLD AUTO: 433 K/UL — HIGH (ref 150–400)
POTASSIUM SERPL-MCNC: 3.8 MMOL/L — SIGNIFICANT CHANGE UP (ref 3.5–5.3)
POTASSIUM SERPL-SCNC: 3.8 MMOL/L — SIGNIFICANT CHANGE UP (ref 3.5–5.3)
RBC # BLD: 3.36 M/UL — LOW (ref 4.2–5.8)
RBC # FLD: 13.9 % — SIGNIFICANT CHANGE UP (ref 10.3–14.5)
SODIUM SERPL-SCNC: 132 MMOL/L — LOW (ref 135–145)
WBC # BLD: 11.31 K/UL — HIGH (ref 3.8–10.5)
WBC # FLD AUTO: 11.31 K/UL — HIGH (ref 3.8–10.5)

## 2021-07-30 PROCEDURE — 99232 SBSQ HOSP IP/OBS MODERATE 35: CPT

## 2021-07-30 RX ORDER — SODIUM CHLORIDE 9 MG/ML
2 INJECTION INTRAMUSCULAR; INTRAVENOUS; SUBCUTANEOUS
Refills: 0 | Status: COMPLETED | OUTPATIENT
Start: 2021-07-30 | End: 2021-08-01

## 2021-07-30 RX ORDER — CHLORHEXIDINE GLUCONATE 213 G/1000ML
1 SOLUTION TOPICAL DAILY
Refills: 0 | Status: DISCONTINUED | OUTPATIENT
Start: 2021-07-30 | End: 2021-08-02

## 2021-07-30 RX ADMIN — Medication 250 MILLIGRAM(S): at 08:09

## 2021-07-30 RX ADMIN — Medication 1 TABLET(S): at 11:32

## 2021-07-30 RX ADMIN — GABAPENTIN 200 MILLIGRAM(S): 400 CAPSULE ORAL at 21:06

## 2021-07-30 RX ADMIN — PANTOPRAZOLE SODIUM 40 MILLIGRAM(S): 20 TABLET, DELAYED RELEASE ORAL at 11:32

## 2021-07-30 RX ADMIN — Medication 250 MILLIGRAM(S): at 17:30

## 2021-07-30 RX ADMIN — HYDROMORPHONE HYDROCHLORIDE 0.5 MILLIGRAM(S): 2 INJECTION INTRAMUSCULAR; INTRAVENOUS; SUBCUTANEOUS at 04:48

## 2021-07-30 RX ADMIN — Medication 69 MICROGRAM(S): at 21:06

## 2021-07-30 RX ADMIN — HYDROMORPHONE HYDROCHLORIDE 0.5 MILLIGRAM(S): 2 INJECTION INTRAMUSCULAR; INTRAVENOUS; SUBCUTANEOUS at 19:12

## 2021-07-30 RX ADMIN — Medication 250 MILLIGRAM(S): at 01:36

## 2021-07-30 RX ADMIN — Medication 100 MILLIGRAM(S): at 14:31

## 2021-07-30 RX ADMIN — HYDROMORPHONE HYDROCHLORIDE 0.5 MILLIGRAM(S): 2 INJECTION INTRAMUSCULAR; INTRAVENOUS; SUBCUTANEOUS at 20:00

## 2021-07-30 RX ADMIN — Medication 100 MILLIGRAM(S): at 21:05

## 2021-07-30 RX ADMIN — SODIUM CHLORIDE 2 GRAM(S): 9 INJECTION INTRAMUSCULAR; INTRAVENOUS; SUBCUTANEOUS at 17:31

## 2021-07-30 RX ADMIN — Medication 100 MILLIGRAM(S): at 04:47

## 2021-07-30 RX ADMIN — GABAPENTIN 200 MILLIGRAM(S): 400 CAPSULE ORAL at 14:30

## 2021-07-30 RX ADMIN — HYDROMORPHONE HYDROCHLORIDE 0.5 MILLIGRAM(S): 2 INJECTION INTRAMUSCULAR; INTRAVENOUS; SUBCUTANEOUS at 05:30

## 2021-07-30 NOTE — PROVIDER CONTACT NOTE (CRITICAL VALUE NOTIFICATION) - SITUATION
surg swab   from 7/26 rare coag neg staphlococcus rare mrsa enterociccus faecaris, bacterial versadis
Patient's PH of recent ABg was 7.61

## 2021-07-30 NOTE — PROGRESS NOTE ADULT - ASSESSMENT
61y Male patient s/p en bloc sacrectomy for chordoma w/ L4-pelvis fusion w/ plastics closure vertical rectus abdominis myocutaneous (VRAM) flap for chordoma on 07/07 and 07/08, post-op course c/b bilateral PEs (on hep gtt) and post-op ileus, resolved. Patient now incontinent of stool and soiling his posterior dressing s/p wound debridement with Plastic surgery 07/20 and 07/26, and now s/p sigmoid colostomy on 07/28.     Plan:  - Continue CLD  - Monitor ostomy output/function   - Continue excellent care per neurosurgery    D/w Dr. Davis  [facility]  [id number]

## 2021-07-30 NOTE — PROGRESS NOTE ADULT - ASSESSMENT
61 year-old man with history of thyroid cancer status post total thyroidectomy in 2010 and radioactive iodine treatment in 2011, hypogonadism, vitamin D deficiency, and osteopenia, with chronic constipation,  right buttock and right scrotal pain and numbness. 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 The chordoma resulted in perineal numbness and overflow incontinence and he was admitted 7/7/21 for staged resection. On 7/7, he underwent Plastic Surgery and General Surgery assisted vertical rectus abdominal myocutaneous flap harvest with transperitoneal ligation of internal iliac artery and vein.  s/p en bloc sacrectomy/ with L4-pelvis fusion; EBL 4.5L status post 8 units PRBC, 6 units FFP, 1 pack platelets and 5L crystalloid; 7/8. Extubated 7/9 with hypoxia respiratory distress, desaturation CPAP  at night CTA revealed bilateral sub  segmental PE. No right heart strain & Bibasilar and left lingular consolidative opacities. Started on Heparin gtt. PTT goal 50-70 Treated with Levaquin for 7 days Course further c/b ileus requiring gastric decompression. Pt pending echo to check for RV per pulm. Now with leucocytosis and febrile overnight. ID following on Vanco and zosyn for emperic coverage. Wound washout with plastics and duc pus noted, entire wound reopened on 7/26/21 and placement of 2 drains. Incontinent of stool. Cultures growing coag neg staph/staph aureus/enterococcus. S/P Colostomy on 7/28/21.       PLAN:  Neuro:   - pain control- continue dilaudid iv while NPO, gabapentin 200q8  Respiratory:   - on Heparin gtt for bilateral PEs  - PTT goal 50-70  CV:  - vitals stable  Endocrine:   - hypothyroid- continue synthroid IV while NPO            DVT ppx:   - venodynes  ID:   - infected sacral collection- cultures growing rare coag neg staph, staph aureus enterococcus- continue vancomycin and flagyl per ID  -pt will need PICC line for 5 weeks of IV antibiotics, consent in chart, Hep gtt will need to be held x 4 hours prior to insertion of PICC line, scheduled for tomorrow, IV RN to coordinate time  GI:    - incontinent of stool- s/p colostomy  - continue clear liquid diet per Surgery until colostomy is functioning, pt instructed to take small sips of fluid and not to drink everything on his tray  - continue tony  PT/OT:   - Acute rehab      [facility] # [id number]

## 2021-07-30 NOTE — PROGRESS NOTE ADULT - SUBJECTIVE AND OBJECTIVE BOX
HPI:  POD2 sigmoid colostomy    24h Events:   - Abx changed from Zosyn to Flagyl   - Overnight, no acute events    Subjective:   Patient examined at bedside this AM. Reports     Objective:  Vital Signs  T(C): 36.5 (07-30 @ 04:52), Max: 36.7 (07-29 @ 08:34)  HR: 77 (07-30 @ 04:52) (72 - 78)  BP: 145/79 (07-30 @ 04:52) (125/69 - 149/80)  RR: 18 (07-30 @ 04:52) (18 - 18)  SpO2: 96% (07-30 @ 04:52) (94% - 98%)  07-29-21 @ 07:01  -  07-30-21 @ 07:00  --------------------------------------------------------  IN:  Total IN: 0 mL    OUT:    Bulb (mL): 50 mL    Bulb (mL): 60 mL    Indwelling Catheter - Urethral (mL): 2850 mL  Total OUT: 2960 mL    Total NET: -2960 mL    Physical Exam:  General: alert and oriented, NAD  Resp: airway patent, respirations unlabored  CVS: regular rate and rhythm  Abdomen: soft, appropriate tenderness surrounding colostomy site, colostomy with bowel sweat, no output, mildly edematous ostomy without bleeding or signs of necrosis   Extremities: no edema  Skin: warm, dry, appropriate color    Labs:                        9.3    11.31 )-----------( 433      ( 30 Jul 2021 05:57 )             29.9   07-29    133<L>  |  99  |  9   ----------------------------<  129<H>  4.5   |  20<L>  |  0.66    Ca    9.0      29 Jul 2021 05:16      CAPILLARY BLOOD GLUCOSE          Microbiology:    Culture - Blood (collected 29 Jul 2021 03:37)  Source: .Blood Blood-Venous  Preliminary Report (30 Jul 2021 04:01):    No growth to date.    Culture - Blood (collected 29 Jul 2021 03:37)  Source: .Blood Blood-Peripheral  Preliminary Report (30 Jul 2021 04:01):    No growth to date.    Culture - Surgical Swab (collected 27 Jul 2021 00:51)  Source: .Surgical Swab back culture #1  Final Report (29 Jul 2021 22:35):    Rare Coag Negative Staphylococcus    Rare Methicillin resistant Staphylococcus aureus    Few Enterococcus faecalis    Few Bacteroides vulgatus group "Susceptibilities not performed"  Organism: Coag Negative Staphylococcus  Methicillin resistant Staphylococcus aureus  Enterococcus faecalis (29 Jul 2021 22:35)  Organism: Enterococcus faecalis (29 Jul 2021 22:35)      -  Ampicillin: S <=2 Predicts results to ampicillin/sulbactam, amoxacillin-clavulanate and  piperacillin-tazobactam.      -  Tetra/Doxy: R >8      -  Vancomycin: S 2      Method Type: SUZAN  Organism: Methicillin resistant Staphylococcus aureus (29 Jul 2021 22:35)      -  Ampicillin/Sulbactam: R <=8/4      -  Cefazolin: R <=4      -  Clindamycin: R >4      -  Daptomycin: S 0.5      -  Erythromycin: R >4      -  Gentamicin: S <=1 Should not be used as monotherapy      -  Linezolid: S 2      -  Oxacillin: R >2      -  Penicillin: R >8      -  RIF- Rifampin: S <=1 Should not be used as monotherapy      -  Tetra/Doxy: S <=1      -  Trimethoprim/Sulfamethoxazole: S <=0.5/9.5      -  Vancomycin: S 1      Method Type: SUZAN  Organism: Coag Negative Staphylococcus (29 Jul 2021 22:35)      -  Ampicillin/Sulbactam: R <=8/4      -  Cefazolin: R <=4      -  Clindamycin: R <=0.25 This isolate is presumed to be clindamycin resistant based on detection of inducible resistance. Clindamycin may still be effective in some patients.      -  Erythromycin: R >4      -  Gentamicin: S <=1 Should not be used as monotherapy      -  Oxacillin: R >2      -  Penicillin: R >8      -  RIF- Rifampin: S <=1 Should not be used as monotherapy      -  Tetra/Doxy: S <=1      -  Trimethoprim/Sulfamethoxazole: S <=0.5/9.5      -  Vancomycin: S 2      Method Type: SUZAN    Culture - Surgical Swab (collected 27 Jul 2021 00:51)  Source: .Surgical Swab back culture #2  Final Report (29 Jul 2021 22:34):    Rare Coag Negative Staphylococcus    Rare Methicillin resistant Staphylococcus aureus    Rare Enterococcus faecalis    Rare Bacteroides vulgatus group "Susceptibilities not performed"  Organism: Coag Negative Staphylococcus  Methicillin resistant Staphylococcus aureus  Enterococcus faecalis (29 Jul 2021 22:34)  Organism: Enterococcus faecalis (29 Jul 2021 22:34)      -  Ampicillin: S <=2 Predicts results to ampicillin/sulbactam, amoxacillin-clavulanate and  piperacillin-tazobactam.      -  Tetra/Doxy: R >8      -  Vancomycin: S 2      Method Type: SUZAN  Organism: Methicillin resistant Staphylococcus aureus (29 Jul 2021 22:34)      -  Ampicillin/Sulbactam: R <=8/4      -  Cefazolin: R <=4      -  Clindamycin: R >4      -  Daptomycin: S 0.5      -  Erythromycin: R >4      -  Gentamicin: S <=1 Should not be used as monotherapy      -  Linezolid: S 2      -  Oxacillin: R >2      -  Penicillin: R >8      -  RIF- Rifampin: S <=1 Should not be used as monotherapy      -  Tetra/Doxy: S <=1      -  Trimethoprim/Sulfamethoxazole: S <=0.5/9.5      -  Vancomycin: S 2      Method Type: SUZAN  Organism: Coag Negative Staphylococcus (29 Jul 2021 22:34)      -  Ampicillin/Sulbactam: R >16/8      -  Cefazolin: R <=4      -  Clindamycin: R <=0.25 This isolate is presumed to be clindamycin resistant based on detection of inducible resistance. Clindamycin may still be effective in some patients.      -  Erythromycin: R >4      -  Gentamicin: S 2 Should not be used as monotherapy      -  Oxacillin: R >2      -  Penicillin: R >8      -  RIF- Rifampin: S <=1 Should not be used as monotherapy      -  Tetra/Doxy: S 2      -  Trimethoprim/Sulfamethoxazole: S <=0.5/9.5      -  Vancomycin: S 2      Method Type: SUZAN        Medications:   MEDICATIONS  (STANDING):  calcium carbonate 1250 mG  + Vitamin D (OsCal 500 + D) 1 Tablet(s) Oral daily  gabapentin 200 milliGRAM(s) Oral every 8 hours  heparin  Infusion 1700 Unit(s)/Hr (18 mL/Hr) IV Continuous <Continuous>  levothyroxine Injectable 69 MICROGram(s) IV Push at bedtime  metroNIDAZOLE  IVPB 500 milliGRAM(s) IV Intermittent every 8 hours  multivitamin 1 Tablet(s) Oral daily  pantoprazole  Injectable 40 milliGRAM(s) IV Push daily  sodium chloride 0.9%. 1000 milliLiter(s) (50 mL/Hr) IV Continuous <Continuous>  vancomycin  IVPB 1000 milliGRAM(s) IV Intermittent every 8 hours    MEDICATIONS  (PRN):  aluminum hydroxide/magnesium hydroxide/simethicone Suspension 30 milliLiter(s) Oral every 4 hours PRN Dyspepsia  HYDROmorphone  Injectable 0.5 milliGRAM(s) IV Push every 4 hours PRN Moderate Pain (4 - 6)  ondansetron Injectable 4 milliGRAM(s) IV Push every 6 hours PRN Nausea       HPI:  POD2 sigmoid colostomy    24h Events:   - Ostomy bag changed by ostomy nurses, expression of seropurulence from 6/7oclock position   - Abx changed from Zosyn to Flagyl   - Overnight, no acute events    Subjective:   Patient examined at bedside this AM. Reports     Objective:  Vital Signs  T(C): 36.5 (07-30 @ 04:52), Max: 36.7 (07-29 @ 08:34)  HR: 77 (07-30 @ 04:52) (72 - 78)  BP: 145/79 (07-30 @ 04:52) (125/69 - 149/80)  RR: 18 (07-30 @ 04:52) (18 - 18)  SpO2: 96% (07-30 @ 04:52) (94% - 98%)  07-29-21 @ 07:01  -  07-30-21 @ 07:00  --------------------------------------------------------  IN:  Total IN: 0 mL    OUT:    Bulb (mL): 50 mL    Bulb (mL): 60 mL    Indwelling Catheter - Urethral (mL): 2850 mL  Total OUT: 2960 mL    Total NET: -2960 mL    Physical Exam:  General: alert and oriented, NAD  Resp: airway patent, respirations unlabored  CVS: regular rate and rhythm  Abdomen: soft, appropriate tenderness surrounding colostomy site, colostomy with bowel sweat and gas, ostomy without bleeding or signs of necrosis/infection  Extremities: no edema  Skin: warm, dry, appropriate color    Labs:                        9.3    11.31 )-----------( 433      ( 30 Jul 2021 05:57 )             29.9   07-29    133<L>  |  99  |  9   ----------------------------<  129<H>  4.5   |  20<L>  |  0.66    Ca    9.0      29 Jul 2021 05:16      CAPILLARY BLOOD GLUCOSE          Microbiology:    Culture - Blood (collected 29 Jul 2021 03:37)  Source: .Blood Blood-Venous  Preliminary Report (30 Jul 2021 04:01):    No growth to date.    Culture - Blood (collected 29 Jul 2021 03:37)  Source: .Blood Blood-Peripheral  Preliminary Report (30 Jul 2021 04:01):    No growth to date.    Culture - Surgical Swab (collected 27 Jul 2021 00:51)  Source: .Surgical Swab back culture #1  Final Report (29 Jul 2021 22:35):    Rare Coag Negative Staphylococcus    Rare Methicillin resistant Staphylococcus aureus    Few Enterococcus faecalis    Few Bacteroides vulgatus group "Susceptibilities not performed"  Organism: Coag Negative Staphylococcus  Methicillin resistant Staphylococcus aureus  Enterococcus faecalis (29 Jul 2021 22:35)  Organism: Enterococcus faecalis (29 Jul 2021 22:35)      -  Ampicillin: S <=2 Predicts results to ampicillin/sulbactam, amoxacillin-clavulanate and  piperacillin-tazobactam.      -  Tetra/Doxy: R >8      -  Vancomycin: S 2      Method Type: SUZAN  Organism: Methicillin resistant Staphylococcus aureus (29 Jul 2021 22:35)      -  Ampicillin/Sulbactam: R <=8/4      -  Cefazolin: R <=4      -  Clindamycin: R >4      -  Daptomycin: S 0.5      -  Erythromycin: R >4      -  Gentamicin: S <=1 Should not be used as monotherapy      -  Linezolid: S 2      -  Oxacillin: R >2      -  Penicillin: R >8      -  RIF- Rifampin: S <=1 Should not be used as monotherapy      -  Tetra/Doxy: S <=1      -  Trimethoprim/Sulfamethoxazole: S <=0.5/9.5      -  Vancomycin: S 1      Method Type: SUZAN  Organism: Coag Negative Staphylococcus (29 Jul 2021 22:35)      -  Ampicillin/Sulbactam: R <=8/4      -  Cefazolin: R <=4      -  Clindamycin: R <=0.25 This isolate is presumed to be clindamycin resistant based on detection of inducible resistance. Clindamycin may still be effective in some patients.      -  Erythromycin: R >4      -  Gentamicin: S <=1 Should not be used as monotherapy      -  Oxacillin: R >2      -  Penicillin: R >8      -  RIF- Rifampin: S <=1 Should not be used as monotherapy      -  Tetra/Doxy: S <=1      -  Trimethoprim/Sulfamethoxazole: S <=0.5/9.5      -  Vancomycin: S 2      Method Type: SUZAN    Culture - Surgical Swab (collected 27 Jul 2021 00:51)  Source: .Surgical Swab back culture #2  Final Report (29 Jul 2021 22:34):    Rare Coag Negative Staphylococcus    Rare Methicillin resistant Staphylococcus aureus    Rare Enterococcus faecalis    Rare Bacteroides vulgatus group "Susceptibilities not performed"  Organism: Coag Negative Staphylococcus  Methicillin resistant Staphylococcus aureus  Enterococcus faecalis (29 Jul 2021 22:34)  Organism: Enterococcus faecalis (29 Jul 2021 22:34)      -  Ampicillin: S <=2 Predicts results to ampicillin/sulbactam, amoxacillin-clavulanate and  piperacillin-tazobactam.      -  Tetra/Doxy: R >8      -  Vancomycin: S 2      Method Type: SUZAN  Organism: Methicillin resistant Staphylococcus aureus (29 Jul 2021 22:34)      -  Ampicillin/Sulbactam: R <=8/4      -  Cefazolin: R <=4      -  Clindamycin: R >4      -  Daptomycin: S 0.5      -  Erythromycin: R >4      -  Gentamicin: S <=1 Should not be used as monotherapy      -  Linezolid: S 2      -  Oxacillin: R >2      -  Penicillin: R >8      -  RIF- Rifampin: S <=1 Should not be used as monotherapy      -  Tetra/Doxy: S <=1      -  Trimethoprim/Sulfamethoxazole: S <=0.5/9.5      -  Vancomycin: S 2      Method Type: SUZAN  Organism: Coag Negative Staphylococcus (29 Jul 2021 22:34)      -  Ampicillin/Sulbactam: R >16/8      -  Cefazolin: R <=4      -  Clindamycin: R <=0.25 This isolate is presumed to be clindamycin resistant based on detection of inducible resistance. Clindamycin may still be effective in some patients.      -  Erythromycin: R >4      -  Gentamicin: S 2 Should not be used as monotherapy      -  Oxacillin: R >2      -  Penicillin: R >8      -  RIF- Rifampin: S <=1 Should not be used as monotherapy      -  Tetra/Doxy: S 2      -  Trimethoprim/Sulfamethoxazole: S <=0.5/9.5      -  Vancomycin: S 2      Method Type: SUZAN        Medications:   MEDICATIONS  (STANDING):  calcium carbonate 1250 mG  + Vitamin D (OsCal 500 + D) 1 Tablet(s) Oral daily  gabapentin 200 milliGRAM(s) Oral every 8 hours  heparin  Infusion 1700 Unit(s)/Hr (18 mL/Hr) IV Continuous <Continuous>  levothyroxine Injectable 69 MICROGram(s) IV Push at bedtime  metroNIDAZOLE  IVPB 500 milliGRAM(s) IV Intermittent every 8 hours  multivitamin 1 Tablet(s) Oral daily  pantoprazole  Injectable 40 milliGRAM(s) IV Push daily  sodium chloride 0.9%. 1000 milliLiter(s) (50 mL/Hr) IV Continuous <Continuous>  vancomycin  IVPB 1000 milliGRAM(s) IV Intermittent every 8 hours    MEDICATIONS  (PRN):  aluminum hydroxide/magnesium hydroxide/simethicone Suspension 30 milliLiter(s) Oral every 4 hours PRN Dyspepsia  HYDROmorphone  Injectable 0.5 milliGRAM(s) IV Push every 4 hours PRN Moderate Pain (4 - 6)  ondansetron Injectable 4 milliGRAM(s) IV Push every 6 hours PRN Nausea

## 2021-07-30 NOTE — PROGRESS NOTE ADULT - SUBJECTIVE AND OBJECTIVE BOX
Jesse Rodriguez   Pager [phone]  Office [phone]      CC: Patient is a 61y old  Male who presents with a chief complaint of chordoma resection (30 Jul 2021 09:56)      SUBJECTIVE / OVERNIGHT EVENTS:    MEDICATIONS  (STANDING):  calcium carbonate 1250 mG  + Vitamin D (OsCal 500 + D) 1 Tablet(s) Oral daily  gabapentin 200 milliGRAM(s) Oral every 8 hours  heparin  Infusion 1700 Unit(s)/Hr (18 mL/Hr) IV Continuous <Continuous>  levothyroxine Injectable 69 MICROGram(s) IV Push at bedtime  metroNIDAZOLE  IVPB 500 milliGRAM(s) IV Intermittent every 8 hours  multivitamin 1 Tablet(s) Oral daily  pantoprazole  Injectable 40 milliGRAM(s) IV Push daily  sodium chloride 0.9%. 1000 milliLiter(s) (50 mL/Hr) IV Continuous <Continuous>  vancomycin  IVPB 1000 milliGRAM(s) IV Intermittent every 8 hours    MEDICATIONS  (PRN):  aluminum hydroxide/magnesium hydroxide/simethicone Suspension 30 milliLiter(s) Oral every 4 hours PRN Dyspepsia  HYDROmorphone  Injectable 0.5 milliGRAM(s) IV Push every 4 hours PRN Moderate Pain (4 - 6)  ondansetron Injectable 4 milliGRAM(s) IV Push every 6 hours PRN Nausea      Vital Signs Last 24 Hrs  T(C): 36.7 (30 Jul 2021 08:18), Max: 36.7 (30 Jul 2021 08:18)  T(F): 98 (30 Jul 2021 08:18), Max: 98 (30 Jul 2021 08:18)  HR: 65 (30 Jul 2021 08:18) (65 - 78)  BP: 136/76 (30 Jul 2021 08:18) (125/69 - 149/80)  BP(mean): --  RR: 189 (30 Jul 2021 08:18) (18 - 189)  SpO2: 96% (30 Jul 2021 04:52) (94% - 97%)  CAPILLARY BLOOD GLUCOSE        I&O's Summary    29 Jul 2021 07:01  -  30 Jul 2021 07:00  --------------------------------------------------------  IN: 1660 mL / OUT: 2960 mL / NET: -1300 mL    30 Jul 2021 07:01  -  30 Jul 2021 11:14  --------------------------------------------------------  IN: 240 mL / OUT: 0 mL / NET: 240 mL      tele:    PHYSICAL EXAM:    GENERAL: NAD   HEENT: EOMI, PERRL  PULM: Clear to auscultation bilaterally  CV: Regular rate and rhythm; nl S1, S2; No murmurs, rubs, or gallops  ABDOMEN: Soft, Nontender, Nondistended; Bowel sounds present  EXTREMITIES/MSK:  No edema, calf tenderness   PSYCH: AAOx3  NEUROLOGY: non-focal          LABS:                        9.3    11.31 )-----------( 433      ( 30 Jul 2021 05:57 )             29.9     07-30    132<L>  |  100  |  11  ----------------------------<  91  3.8   |  20<L>  |  0.69    Ca    9.0      30 Jul 2021 05:55      PTT - ( 30 Jul 2021 05:57 )  PTT:50.4 sec            Culture - Blood (collected 29 Jul 2021 03:37)  Source: .Blood Blood-Venous  Preliminary Report (30 Jul 2021 04:01):    No growth to date.    Culture - Blood (collected 29 Jul 2021 03:37)  Source: .Blood Blood-Peripheral  Preliminary Report (30 Jul 2021 04:01):    No growth to date.      RADIOLOGY & ADDITIONAL TESTS:    Imaging Personally Reviewed:    Consultant(s) Notes Reviewed:      Care Discussed with Consultants/Other Providers:   Jesse Rodriguez   Pager [phone]  Office [phone]      CC: Patient is a 61y old  Male who presents with a chief complaint of chordoma resection (30 Jul 2021 09:56)      SUBJECTIVE / OVERNIGHT EVENTS: colostomy site pain controlled with meds. no cp/sob. no f/c/r.     MEDICATIONS  (STANDING):  calcium carbonate 1250 mG  + Vitamin D (OsCal 500 + D) 1 Tablet(s) Oral daily  gabapentin 200 milliGRAM(s) Oral every 8 hours  heparin  Infusion 1700 Unit(s)/Hr (18 mL/Hr) IV Continuous <Continuous>  levothyroxine Injectable 69 MICROGram(s) IV Push at bedtime  metroNIDAZOLE  IVPB 500 milliGRAM(s) IV Intermittent every 8 hours  multivitamin 1 Tablet(s) Oral daily  pantoprazole  Injectable 40 milliGRAM(s) IV Push daily  sodium chloride 0.9%. 1000 milliLiter(s) (50 mL/Hr) IV Continuous <Continuous>  vancomycin  IVPB 1000 milliGRAM(s) IV Intermittent every 8 hours    MEDICATIONS  (PRN):  aluminum hydroxide/magnesium hydroxide/simethicone Suspension 30 milliLiter(s) Oral every 4 hours PRN Dyspepsia  HYDROmorphone  Injectable 0.5 milliGRAM(s) IV Push every 4 hours PRN Moderate Pain (4 - 6)  ondansetron Injectable 4 milliGRAM(s) IV Push every 6 hours PRN Nausea      Vital Signs Last 24 Hrs  T(C): 36.7 (30 Jul 2021 08:18), Max: 36.7 (30 Jul 2021 08:18)  T(F): 98 (30 Jul 2021 08:18), Max: 98 (30 Jul 2021 08:18)  HR: 65 (30 Jul 2021 08:18) (65 - 78)  BP: 136/76 (30 Jul 2021 08:18) (125/69 - 149/80)  BP(mean): --  RR: 189 (30 Jul 2021 08:18) (18 - 189)  SpO2: 96% (30 Jul 2021 04:52) (94% - 97%)  CAPILLARY BLOOD GLUCOSE        I&O's Summary    29 Jul 2021 07:01  -  30 Jul 2021 07:00  --------------------------------------------------------  IN: 1660 mL / OUT: 2960 mL / NET: -1300 mL    30 Jul 2021 07:01  -  30 Jul 2021 11:14  --------------------------------------------------------  IN: 240 mL / OUT: 0 mL / NET: 240 mL          PHYSICAL EXAM:    GENERAL: NAD   HEENT: EOMI, PERRL  PULM: Clear to auscultation bilaterally  CV: Regular rate and rhythm; nl S1, S2; No murmurs, rubs, or gallops  ABDOMEN: Soft, Nontender, Nondistended; Bowel sounds decreased; wound c/d/i  EXTREMITIES/MSK:  No edema, calf tenderness   PSYCH: AAOx3  NEUROLOGY: non-focal          LABS:                        9.3    11.31 )-----------( 433      ( 30 Jul 2021 05:57 )             29.9     07-30    132<L>  |  100  |  11  ----------------------------<  91  3.8   |  20<L>  |  0.69    Ca    9.0      30 Jul 2021 05:55      PTT - ( 30 Jul 2021 05:57 )  PTT:50.4 sec            Culture - Blood (collected 29 Jul 2021 03:37)  Source: .Blood Blood-Venous  Preliminary Report (30 Jul 2021 04:01):    No growth to date.    Culture - Blood (collected 29 Jul 2021 03:37)  Source: .Blood Blood-Peripheral  Preliminary Report (30 Jul 2021 04:01):    No growth to date.      RADIOLOGY & ADDITIONAL TESTS:    Imaging Personally Reviewed:    Consultant(s) Notes Reviewed:      Care Discussed with Consultants/Other Providers: neurosurg

## 2021-07-30 NOTE — PROGRESS NOTE ADULT - ASSESSMENT
Mr. Jackson is a 62 yo M with PMHx of thyroid cancer s/p total thyroidectomy (2010), HLD, Vit D deficiency, Osteopenia, and chordoma, who is now s/p Sacral sacrectomy and VRAM flap harvest (07/07, 07/08).    -POD 2 exploratory laparotomy and diverting ileostomy  -CLD per general surgery  -Appreciate ID recs, Zosyn changed to Flagyl per sensitivities returning  -continue with aquacell dressing  -RN may change gauze/tegaderm at drain insertion sites b/l PRN   -rest of care per primary team   -PRS will continue to follow     Ny Oliva MD  PGY1 Surgery  #[phone] for questions

## 2021-07-30 NOTE — PROGRESS NOTE ADULT - SUBJECTIVE AND OBJECTIVE BOX
CC: f/u for post op wound infection    Patient reports: stable abdominal discomfort, tolerating clears    REVIEW OF SYSTEMS:  All other review of systems negative (Comprehensive ROS)    Antimicrobials Day #  :POD 4  metroNIDAZOLE  IVPB 500 milliGRAM(s) IV Intermittent every 8 hours  vancomycin  IVPB 1000 milliGRAM(s) IV Intermittent every 8 hours    Other Medications Reviewed  MEDICATIONS  (STANDING):  calcium carbonate 1250 mG  + Vitamin D (OsCal 500 + D) 1 Tablet(s) Oral daily  gabapentin 200 milliGRAM(s) Oral every 8 hours  heparin  Infusion 1700 Unit(s)/Hr (18 mL/Hr) IV Continuous <Continuous>  levothyroxine Injectable 69 MICROGram(s) IV Push at bedtime  metroNIDAZOLE  IVPB 500 milliGRAM(s) IV Intermittent every 8 hours  multivitamin 1 Tablet(s) Oral daily  pantoprazole  Injectable 40 milliGRAM(s) IV Push daily  sodium chloride 0.9%. 1000 milliLiter(s) (50 mL/Hr) IV Continuous <Continuous>  vancomycin  IVPB 1000 milliGRAM(s) IV Intermittent every 8 hours    T(F): 98 (07-30-21 @ 08:18), Max: 98 (07-30-21 @ 08:18)  HR: 65 (07-30-21 @ 08:18)  BP: 136/76 (07-30-21 @ 08:18)  RR: 189 (07-30-21 @ 08:18)  SpO2: 96% (07-30-21 @ 04:52)  Wt(kg): --    PHYSICAL EXAM:  General: alert, no acute distress  Eyes:  anicteric, no conjunctival injection, no discharge  Oropharynx: no lesions or injection 	  Neck: supple, without adenopathy  Lungs: clear to auscultation  Heart: regular rate and rhythm; no murmur, rubs or gallops  Abdomen: soft, nondistended, mild left sided tenderness, LLQ colostomy  Skin: incisions per PRS, drains serosanguinous  Extremities: no clubbing, cyanosis, or edema  Neurologic: alert, oriented, moves all extremities, RLE weak   tony  LAB RESULTS:                        9.3    11.31 )-----------( 433      ( 30 Jul 2021 05:57 )             29.9     07-30    132<L>  |  100  |  11  ----------------------------<  91  3.8   |  20<L>  |  0.69    Ca    9.0      30 Jul 2021 05:55          MICROBIOLOGY:  RECENT CULTURES:  07-29 @ 03:37 .Blood Blood-Peripheral     No growth to date.      07-27 @ 00:51 .Surgical Swab back culture #2 Coag Negative Staphylococcus  Methicillin resistant Staphylococcus aureus  Enterococcus faecalis    Rare Coag Negative Staphylococcus  Rare Methicillin resistant Staphylococcus aureus  Rare Enterococcus faecalis  Rare Bacteroides vulgatus group "Susceptibilities not performed"          RADIOLOGY REVIEWED:

## 2021-07-30 NOTE — PROGRESS NOTE ADULT - PROBLEM SELECTOR PLAN 1
surgical cx c MRSA, enteroccocus and bacteroides. cont abx per ID. apprec ID f/u. will need PICC for long-term abx

## 2021-07-30 NOTE — PROGRESS NOTE ADULT - ASSESSMENT
61y male with history of thyroid cancer status post total thyroidectomy in 2010 and radioactive iodine treatment in 2011, hypogonadism, vitamin D deficiency, and osteopenia, with chronic constipation  right buttock and right scrotal pain and numbness. 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 The chordoma resulted in perineal numbness and overflow incontinence and he was admitted 7/7/21 for staged resection. On 7/7, he underwent Plastic Surgery and General Surgery assisted vertical rectus abdominal myocutaneous flap harvest with transperitoneal ligation of internal iliac artery and vein.  s/p en bloc sacrectomy/   with L4-pelvis fusion; EBL 4.5L status post 8 units PRBC, 6 units FFP, 1 pack platelets and 5L crystalloid; 7/8. Extubated 7/9 with hypoxia respiratory distress, desaturation CPAP  at night CTA revealed bilateral sub  segmental PE and DVT.No right heart strain. & Bibasilar and left lingular consolidative opacities . Started on Heparin gtt. PTT goal 50-70 Treated with Levaquin for 7 days Course further c/b ileus requiring gastric decompression . Pathology pending s/p inferior sacral wound debridement at bedside 7/20/21 . He is reported to have leukocytosis. ID consulted.  CT noted sacral collection. For this reason the patient was started on empiric Vanco and Zosyn,  7.12 grew Enterobacter. He was treated with IV Levaquin from 7.14-->7/20 as per orders.   Recent fever and leukocytosis likely secondary to infected sacral collection which was drained on 7/26.Pus encountered as well as 1 exposed screw.  7/22 blood cx are no growth to date .His leukocytosis has moderated post op.  OR cultures are with coag negative staph , MRSA, enterococcus and bacteroides..One exposed screw seen, Hopefully source control has been achieved.  He is now on vanco/metronidazole, levels about 19, will follow renal function.  Will consider decreasing vanco dose to BID in near future as he need extended course, I would like to minimize renal toxicity  Plan   continue Vanco and flagyl  for sacral collection  , POD 4,  Supportive care per medicine  Anticoagulation per other services for B/L DVT  Wound care per PRS  Diverting ostomy to keep wound clean.  tony, ? for cleanliness vs bladder dysfunction  Anticipate 6 week post op course  Metronidazole will be converted to po route when GI tract reliably functional.

## 2021-07-30 NOTE — PROGRESS NOTE ADULT - PROBLEM SELECTOR PLAN 8
levothyroxine recently increased. recheck week of 8/8.

## 2021-07-30 NOTE — PROGRESS NOTE ADULT - SUBJECTIVE AND OBJECTIVE BOX
Plastic Surgery Progress Note    Subjective:     POD 3. VSS, AF. Off 2L NC, OOB w/ PT. Dressing not changed. Patient without complaint. SEDRICK's 50cc 60cc R and L SS.    OBJECTIVE:     T(C): 36.5 (07-30-21 @ 04:52), Max: 36.7 (07-29-21 @ 08:34)  HR: 77 (07-30-21 @ 04:52) (72 - 78)  BP: 145/79 (07-30-21 @ 04:52) (125/69 - 149/80)  RR: 18 (07-30-21 @ 04:52) (18 - 18)  SpO2: 96% (07-30-21 @ 04:52) (94% - 98%)  Wt(kg): --    I&O's Detail    29 Jul 2021 07:01  -  30 Jul 2021 07:00  --------------------------------------------------------  IN:    Oral Fluid: 260 mL    sodium chloride 0.9%: 1250 mL    sodium chloride 0.9%: 150 mL  Total IN: 1660 mL    OUT:    Bulb (mL): 50 mL    Bulb (mL): 60 mL    Indwelling Catheter - Urethral (mL): 2850 mL  Total OUT: 2960 mL    Total NET: -1300 mL          PHYSICAL EXAM:    GENERAL: NAD, lying in bed comfortably  HEAD:  Atraumatic, Normocephalic  ABDOMEN: Laparotomy incision c/d/i, soft non tender. SEDRICK drains X2. Ostomy bag present on L side draining.  BACK: midline aquacell dressing intact, no palpable collections, drains with SS output.     MEDICATIONS  (STANDING):  calcium carbonate 1250 mG  + Vitamin D (OsCal 500 + D) 1 Tablet(s) Oral daily  gabapentin 200 milliGRAM(s) Oral every 8 hours  heparin  Infusion 1700 Unit(s)/Hr (18 mL/Hr) IV Continuous <Continuous>  levothyroxine Injectable 69 MICROGram(s) IV Push at bedtime  metroNIDAZOLE  IVPB 500 milliGRAM(s) IV Intermittent every 8 hours  multivitamin 1 Tablet(s) Oral daily  pantoprazole  Injectable 40 milliGRAM(s) IV Push daily  sodium chloride 0.9%. 1000 milliLiter(s) (50 mL/Hr) IV Continuous <Continuous>  vancomycin  IVPB 1000 milliGRAM(s) IV Intermittent every 8 hours    MEDICATIONS  (PRN):  aluminum hydroxide/magnesium hydroxide/simethicone Suspension 30 milliLiter(s) Oral every 4 hours PRN Dyspepsia  HYDROmorphone  Injectable 0.5 milliGRAM(s) IV Push every 4 hours PRN Moderate Pain (4 - 6)  ondansetron Injectable 4 milliGRAM(s) IV Push every 6 hours PRN Nausea      LABS:                          9.3    11.31 )-----------( 433      ( 30 Jul 2021 05:57 )             29.9     07-30    132<L>  |  100  |  11  ----------------------------<  91  3.8   |  20<L>  |  0.69    Ca    9.0      30 Jul 2021 05:55      PTT - ( 30 Jul 2021 05:57 )  PTT:50.4 sec

## 2021-07-30 NOTE — PROGRESS NOTE ADULT - SUBJECTIVE AND OBJECTIVE BOX
Vital Signs Last 24 Hrs  T(C): 36.7 (30 Jul 2021 08:18), Max: 36.7 (30 Jul 2021 08:18)  T(F): 98 (30 Jul 2021 08:18), Max: 98 (30 Jul 2021 08:18)  HR: 65 (30 Jul 2021 08:18) (65 - 78)  BP: 136/76 (30 Jul 2021 08:18) (125/69 - 149/80)  BP(mean): --  RR: 189 (30 Jul 2021 08:18) (18 - 189)  SpO2: 96% (30 Jul 2021 04:52) (94% - 97%)    I&O's Detail    29 Jul 2021 07:01  -  30 Jul 2021 07:00  --------------------------------------------------------  IN:    Oral Fluid: 260 mL    sodium chloride 0.9%: 1250 mL    sodium chloride 0.9%: 150 mL  Total IN: 1660 mL    OUT:    Bulb (mL): 50 mL    Bulb (mL): 60 mL    Indwelling Catheter - Urethral (mL): 2850 mL  Total OUT: 2960 mL    Total NET: -1300 mL      30 Jul 2021 07:01  -  30 Jul 2021 10:41  --------------------------------------------------------  IN:    Oral Fluid: 240 mL  Total IN: 240 mL    OUT:  Total OUT: 0 mL    Total NET: 240 mL                                9.3    11.31 )-----------( 433      ( 30 Jul 2021 05:57 )             29.9       07-30    132<L>  |  100  |  11  ----------------------------<  91  3.8   |  20<L>  |  0.69    Ca    9.0      30 Jul 2021 05:55        PTT - ( 30 Jul 2021 05:57 )  PTT:50.4 sec    Ostomy pink but no function yet  tolerating clear liquids    PLAN:  continue only clear liquids until ostomy functions

## 2021-07-30 NOTE — PROGRESS NOTE ADULT - SUBJECTIVE AND OBJECTIVE BOX
SUBJECTIVE: Pt seen and examined, lying in bed being washed up, father at bedside. Consent obtained for PICC line as pt will need 5 weeks of IV antibiotics. Pain from colostomy is better today, still with back incisional pain.    OVERNIGHT EVENTS: none    Vital Signs Last 24 Hrs  T(C): 36.7 (30 Jul 2021 08:18), Max: 36.7 (30 Jul 2021 08:18)  T(F): 98 (30 Jul 2021 08:18), Max: 98 (30 Jul 2021 08:18)  HR: 65 (30 Jul 2021 08:18) (65 - 78)  BP: 136/76 (30 Jul 2021 08:18) (125/69 - 149/80)  BP(mean): --  RR: 189 (30 Jul 2021 08:18) (18 - 189)  SpO2: 96% (30 Jul 2021 04:52) (94% - 97%)    PHYSICAL EXAM:    General: No Acute Distress     Neurological: Awake, alert oriented to person, place and time, Following Commands, B/l UE 5/5 RLE DF/PF 4+/5, LLE 5/5. LE limited by abdominal pain    Pulmonary: Clear to Auscultation, No Rales, No Rhonchi, No Wheezes     Cardiovascular: S1, S2, Regular Rate and Rhythm     Gastrointestinal: Soft, Nontender, Nondistended     Incision: sacral +dressing c/d/i, drains x2 per plastics     LABS:                        9.3    11.31 )-----------( 433      ( 30 Jul 2021 05:57 )             29.9    07-30    132<L>  |  100  |  11  ----------------------------<  91  3.8   |  20<L>  |  0.69    Ca    9.0      30 Jul 2021 05:55    PTT - ( 30 Jul 2021 05:57 )  PTT:50.4 sec      07-29 @ 07:01  -  07-30 @ 07:00  --------------------------------------------------------  IN: 1660 mL / OUT: 2960 mL / NET: -1300 mL    07-30 @ 07:01 - 07-30 @ 11:20  --------------------------------------------------------  IN: 240 mL / OUT: 0 mL / NET: 240 mL      DRAINS:     MEDICATIONS:  Antibiotics:  metroNIDAZOLE  IVPB 500 milliGRAM(s) IV Intermittent every 8 hours  vancomycin  IVPB 1000 milliGRAM(s) IV Intermittent every 8 hours    Neuro:  gabapentin 200 milliGRAM(s) Oral every 8 hours  HYDROmorphone  Injectable 0.5 milliGRAM(s) IV Push every 4 hours PRN Moderate Pain (4 - 6)  ondansetron Injectable 4 milliGRAM(s) IV Push every 6 hours PRN Nausea    Cardiac:    Pulm:    GI/:  aluminum hydroxide/magnesium hydroxide/simethicone Suspension 30 milliLiter(s) Oral every 4 hours PRN Dyspepsia  pantoprazole  Injectable 40 milliGRAM(s) IV Push daily    Other:   calcium carbonate 1250 mG  + Vitamin D (OsCal 500 + D) 1 Tablet(s) Oral daily  heparin  Infusion 1700 Unit(s)/Hr IV Continuous <Continuous>  levothyroxine Injectable 69 MICROGram(s) IV Push at bedtime  multivitamin 1 Tablet(s) Oral daily  sodium chloride 0.9%. 1000 milliLiter(s) IV Continuous <Continuous>    DIET: [] Regular [] CCD [] Renal [] Puree [] Dysphagia [] Tube Feeds: clear fluid diet    IMAGING:   < from: Xray Chest 1 View- PORTABLE-Urgent (Xray Chest 1 View- PORTABLE-Urgent .) (07.23.21 @ 22:29) >  IMPRESSION:    The heart is normal in size. Bibasilarpneumonia. This appears to be slightly worsening on the left and unchanged on the right when compared to previous study done July 23, 2021 at 11:57 AM.    < end of copied text >  < from: CT Angio Chest PE Protocol w/ IV Cont (07.10.21 @ 11:43) >  IMPRESSION:    Small bilateral upper lobe subsegmental pulmonary emboli. No right heart strain.    Bibasilar and left lingular consolidative opacities with associated volume loss likely represent atelectasis.    < end of copied text >  < from: CT Pelvis No Cont (07.09.21 @ 10:04) >  IMPRESSION:  1. Postsurgical changes are present from sacral resection and lumbo-iliac spinal fusion.  2. Fine linear hyperdensity is present extending along the right margin of the L4 vertebral body and slightly into the adjacent inferior vena cava, possibly reflecting surgical material or venous cement.    < end of copied text >

## 2021-07-31 LAB
ANION GAP SERPL CALC-SCNC: 13 MMOL/L — SIGNIFICANT CHANGE UP (ref 5–17)
APTT BLD: 63.4 SEC — HIGH (ref 27.5–35.5)
BUN SERPL-MCNC: 9 MG/DL — SIGNIFICANT CHANGE UP (ref 7–23)
CALCIUM SERPL-MCNC: 8.9 MG/DL — SIGNIFICANT CHANGE UP (ref 8.4–10.5)
CHLORIDE SERPL-SCNC: 102 MMOL/L — SIGNIFICANT CHANGE UP (ref 96–108)
CO2 SERPL-SCNC: 23 MMOL/L — SIGNIFICANT CHANGE UP (ref 22–31)
CREAT SERPL-MCNC: 0.61 MG/DL — SIGNIFICANT CHANGE UP (ref 0.5–1.3)
GLUCOSE SERPL-MCNC: 96 MG/DL — SIGNIFICANT CHANGE UP (ref 70–99)
HCT VFR BLD CALC: 29.5 % — LOW (ref 39–50)
HGB BLD-MCNC: 9 G/DL — LOW (ref 13–17)
MCHC RBC-ENTMCNC: 27.3 PG — SIGNIFICANT CHANGE UP (ref 27–34)
MCHC RBC-ENTMCNC: 30.5 GM/DL — LOW (ref 32–36)
MCV RBC AUTO: 89.4 FL — SIGNIFICANT CHANGE UP (ref 80–100)
NRBC # BLD: 0 /100 WBCS — SIGNIFICANT CHANGE UP (ref 0–0)
PLATELET # BLD AUTO: 454 K/UL — HIGH (ref 150–400)
POTASSIUM SERPL-MCNC: 4 MMOL/L — SIGNIFICANT CHANGE UP (ref 3.5–5.3)
POTASSIUM SERPL-SCNC: 4 MMOL/L — SIGNIFICANT CHANGE UP (ref 3.5–5.3)
RBC # BLD: 3.3 M/UL — LOW (ref 4.2–5.8)
RBC # FLD: 14.1 % — SIGNIFICANT CHANGE UP (ref 10.3–14.5)
SODIUM SERPL-SCNC: 138 MMOL/L — SIGNIFICANT CHANGE UP (ref 135–145)
WBC # BLD: 11.23 K/UL — HIGH (ref 3.8–10.5)
WBC # FLD AUTO: 11.23 K/UL — HIGH (ref 3.8–10.5)

## 2021-07-31 PROCEDURE — 99232 SBSQ HOSP IP/OBS MODERATE 35: CPT

## 2021-07-31 PROCEDURE — 71045 X-RAY EXAM CHEST 1 VIEW: CPT | Mod: 26

## 2021-07-31 RX ORDER — CHLORHEXIDINE GLUCONATE 213 G/1000ML
1 SOLUTION TOPICAL
Refills: 0 | Status: DISCONTINUED | OUTPATIENT
Start: 2021-07-31 | End: 2021-08-02

## 2021-07-31 RX ORDER — PANTOPRAZOLE SODIUM 20 MG/1
40 TABLET, DELAYED RELEASE ORAL
Refills: 0 | Status: DISCONTINUED | OUTPATIENT
Start: 2021-07-31 | End: 2021-08-02

## 2021-07-31 RX ORDER — SODIUM CHLORIDE 9 MG/ML
10 INJECTION INTRAMUSCULAR; INTRAVENOUS; SUBCUTANEOUS
Refills: 0 | Status: DISCONTINUED | OUTPATIENT
Start: 2021-07-31 | End: 2021-08-02

## 2021-07-31 RX ADMIN — SODIUM CHLORIDE 2 GRAM(S): 9 INJECTION INTRAMUSCULAR; INTRAVENOUS; SUBCUTANEOUS at 17:37

## 2021-07-31 RX ADMIN — Medication 100 MILLIGRAM(S): at 04:56

## 2021-07-31 RX ADMIN — Medication 1 TABLET(S): at 11:40

## 2021-07-31 RX ADMIN — HYDROMORPHONE HYDROCHLORIDE 0.5 MILLIGRAM(S): 2 INJECTION INTRAMUSCULAR; INTRAVENOUS; SUBCUTANEOUS at 20:03

## 2021-07-31 RX ADMIN — GABAPENTIN 200 MILLIGRAM(S): 400 CAPSULE ORAL at 21:25

## 2021-07-31 RX ADMIN — HYDROMORPHONE HYDROCHLORIDE 0.5 MILLIGRAM(S): 2 INJECTION INTRAMUSCULAR; INTRAVENOUS; SUBCUTANEOUS at 20:33

## 2021-07-31 RX ADMIN — CHLORHEXIDINE GLUCONATE 1 APPLICATION(S): 213 SOLUTION TOPICAL at 11:41

## 2021-07-31 RX ADMIN — SODIUM CHLORIDE 2 GRAM(S): 9 INJECTION INTRAMUSCULAR; INTRAVENOUS; SUBCUTANEOUS at 04:56

## 2021-07-31 RX ADMIN — PANTOPRAZOLE SODIUM 40 MILLIGRAM(S): 20 TABLET, DELAYED RELEASE ORAL at 11:41

## 2021-07-31 RX ADMIN — Medication 250 MILLIGRAM(S): at 17:55

## 2021-07-31 RX ADMIN — Medication 250 MILLIGRAM(S): at 00:04

## 2021-07-31 RX ADMIN — Medication 100 MILLIGRAM(S): at 21:25

## 2021-07-31 RX ADMIN — Medication 100 MILLIGRAM(S): at 14:41

## 2021-07-31 RX ADMIN — Medication 250 MILLIGRAM(S): at 07:33

## 2021-07-31 RX ADMIN — HYDROMORPHONE HYDROCHLORIDE 0.5 MILLIGRAM(S): 2 INJECTION INTRAMUSCULAR; INTRAVENOUS; SUBCUTANEOUS at 01:00

## 2021-07-31 RX ADMIN — Medication 69 MICROGRAM(S): at 21:25

## 2021-07-31 RX ADMIN — GABAPENTIN 200 MILLIGRAM(S): 400 CAPSULE ORAL at 04:56

## 2021-07-31 RX ADMIN — HYDROMORPHONE HYDROCHLORIDE 0.5 MILLIGRAM(S): 2 INJECTION INTRAMUSCULAR; INTRAVENOUS; SUBCUTANEOUS at 00:04

## 2021-07-31 NOTE — PROGRESS NOTE ADULT - ASSESSMENT
61y Male patient s/p en bloc sacrectomy for chordoma w/ L4-pelvis fusion w/ plastics closure vertical rectus abdominis myocutaneous (VRAM) flap for chordoma on 07/07 and 07/08, post-op course c/b bilateral PEs (on hep gtt) and post-op ileus, resolved. Patient now incontinent of stool and soiling his posterior dressing s/p wound debridement with Plastic surgery 07/20 and 07/26, and now s/p sigmoid colostomy on 07/28.     Plan:  - Advanced diet to soft  - Monitor ostomy output/function   - Continue excellent care per neurosurgery    [facility]  [id number]   61y Male patient s/p en bloc sacrectomy for chordoma w/ L4-pelvis fusion w/ plastics closure vertical rectus abdominis myocutaneous (VRAM) flap for chordoma on 07/07 and 07/08, post-op course c/b bilateral PEs (on hep gtt) and post-op ileus, resolved. Patient now incontinent of stool and soiling his posterior dressing s/p wound debridement with Plastic surgery 07/20 and 07/26, and now s/p sigmoid colostomy on 07/28.     Plan:  - Advanced diet to soft  - Monitor ostomy output/function  - Continued ostomy care and teaching  - Continue excellent care per neurosurgery    [facility]  [id number]

## 2021-07-31 NOTE — PROGRESS NOTE ADULT - SUBJECTIVE AND OBJECTIVE BOX
SURGERY DAILY PROGRESS NOTE:     HPI:  POD3 sigmoid colostomy    SUBJECTIVE/ROS: On CLD. Denies nausea, vomiting. Gas in ostomy bag.     OBJECTIVE:  Vital Signs:  Afebrile, HDS  Vital Signs Last 24 Hrs  T(C): 36.9 (31 Jul 2021 12:57), Max: 36.9 (31 Jul 2021 12:57)  T(F): 98.5 (31 Jul 2021 12:57), Max: 98.5 (31 Jul 2021 12:57)  HR: 80 (31 Jul 2021 12:57) (70 - 82)  BP: 126/73 (31 Jul 2021 12:57) (126/73 - 152/76)  BP(mean): --  RR: 18 (31 Jul 2021 12:57) (18 - 18)  SpO2: 96% (31 Jul 2021 12:57) (96% - 98%)                        9.0    11.23 )-----------( 454      ( 31 Jul 2021 06:10 )             29.5     07-31    138  |  102  |  9   ----------------------------<  96  4.0   |  23  |  0.61    Ca    8.9      31 Jul 2021 06:09     PTT - ( 31 Jul 2021 06:10 )  PTT:63.4 sec  I&O's Detail    30 Jul 2021 07:01  -  31 Jul 2021 07:00  --------------------------------------------------------  IN:    Heparin: 18 mL    Oral Fluid: 500 mL  Total IN: 518 mL    OUT:    Bulb (mL): 70 mL    Bulb (mL): 39 mL    Indwelling Catheter - Urethral (mL): 2650 mL  Total OUT: 2759 mL    Total NET: -2241 mL      31 Jul 2021 07:01  -  31 Jul 2021 13:16  --------------------------------------------------------  IN:    Oral Fluid: 320 mL  Total IN: 320 mL    OUT:    Indwelling Catheter - Urethral (mL): 600 mL  Total OUT: 600 mL    Total NET: -280 mL    PHYSICAL EXAM:  Constitutional: NAD  Respiratory: non-labored breathing, patent airway  Gastrointestinal: abdomen soft, nontender, nondistended, gas, but no stool in ostomy bad  Extremities: warm  Neurological: intact

## 2021-07-31 NOTE — PROGRESS NOTE ADULT - SUBJECTIVE AND OBJECTIVE BOX
SUBJECTIVE:     OVERNIGHT EVENTS:     Vital Signs Last 24 Hrs  T(C): 36.4 (31 Jul 2021 08:42), Max: 36.7 (30 Jul 2021 20:35)  T(F): 97.5 (31 Jul 2021 08:42), Max: 98.1 (30 Jul 2021 20:35)  HR: 79 (31 Jul 2021 08:42) (64 - 82)  BP: 132/71 (31 Jul 2021 08:42) (96/61 - 152/76)  BP(mean): --  RR: 18 (31 Jul 2021 08:42) (18 - 18)  SpO2: 97% (31 Jul 2021 08:42) (96% - 98%)    PHYSICAL EXAM:  pending  LABS:                        9.0    11.23 )-----------( 454      ( 31 Jul 2021 06:10 )             29.5    07-31    138  |  102  |  9   ----------------------------<  96  4.0   |  23  |  0.61    Ca    8.9      31 Jul 2021 06:09    PTT - ( 31 Jul 2021 06:10 )  PTT:63.4 sec      07-30 @ 07:01  -  07-31 @ 07:00  --------------------------------------------------------  IN: 518 mL / OUT: 2759 mL / NET: -2241 mL      DRAINS:     MEDICATIONS:  Antibiotics:  metroNIDAZOLE  IVPB 500 milliGRAM(s) IV Intermittent every 8 hours  vancomycin  IVPB 1000 milliGRAM(s) IV Intermittent every 8 hours    Neuro:  gabapentin 200 milliGRAM(s) Oral every 8 hours  HYDROmorphone  Injectable 0.5 milliGRAM(s) IV Push every 4 hours PRN Moderate Pain (4 - 6)  ondansetron Injectable 4 milliGRAM(s) IV Push every 6 hours PRN Nausea    Cardiac:    Pulm:    GI/:  aluminum hydroxide/magnesium hydroxide/simethicone Suspension 30 milliLiter(s) Oral every 4 hours PRN Dyspepsia  pantoprazole  Injectable 40 milliGRAM(s) IV Push daily    Other:   calcium carbonate 1250 mG  + Vitamin D (OsCal 500 + D) 1 Tablet(s) Oral daily  chlorhexidine 2% Cloths 1 Application(s) Topical daily  heparin  Infusion 1700 Unit(s)/Hr IV Continuous <Continuous>  levothyroxine Injectable 69 MICROGram(s) IV Push at bedtime  multivitamin 1 Tablet(s) Oral daily  sodium chloride 2 Gram(s) Oral two times a day  sodium chloride 0.9%. 1000 milliLiter(s) IV Continuous <Continuous>    DIET: [] Regular [] CCD [] Renal [] Puree [] Dysphagia [] Tube Feeds:     IMAGING:    SUBJECTIVE:   doing well this am, enjoyed his soft diet meal. He has no complaints of pain, is looking forward to going to rehab.   planned for picc today   OVERNIGHT EVENTS:   patsy  Vital Signs Last 24 Hrs  T(C): 36.4 (31 Jul 2021 08:42), Max: 36.7 (30 Jul 2021 20:35)  T(F): 97.5 (31 Jul 2021 08:42), Max: 98.1 (30 Jul 2021 20:35)  HR: 79 (31 Jul 2021 08:42) (64 - 82)  BP: 132/71 (31 Jul 2021 08:42) (96/61 - 152/76)  BP(mean): --  RR: 18 (31 Jul 2021 08:42) (18 - 18)  SpO2: 97% (31 Jul 2021 08:42) (96% - 98%)    PHYSICAL EXAM:  AOx3 pupils R b/l, FC, b/l UE 5/5, RLE DF/PF 4+/5, LLE 5/5, incision with dressing on, not saturated. abdominal incision c/d/i  LABS:                        9.0    11.23 )-----------( 454      ( 31 Jul 2021 06:10 )             29.5    07-31    138  |  102  |  9   ----------------------------<  96  4.0   |  23  |  0.61    Ca    8.9      31 Jul 2021 06:09    PTT - ( 31 Jul 2021 06:10 )  PTT:63.4 sec      07-30 @ 07:01  -  07-31 @ 07:00  --------------------------------------------------------  IN: 518 mL / OUT: 2759 mL / NET: -2241 mL      DRAINS:     MEDICATIONS:  Antibiotics:  metroNIDAZOLE  IVPB 500 milliGRAM(s) IV Intermittent every 8 hours  vancomycin  IVPB 1000 milliGRAM(s) IV Intermittent every 8 hours    Neuro:  gabapentin 200 milliGRAM(s) Oral every 8 hours  HYDROmorphone  Injectable 0.5 milliGRAM(s) IV Push every 4 hours PRN Moderate Pain (4 - 6)  ondansetron Injectable 4 milliGRAM(s) IV Push every 6 hours PRN Nausea    Cardiac:    Pulm:    GI/:  aluminum hydroxide/magnesium hydroxide/simethicone Suspension 30 milliLiter(s) Oral every 4 hours PRN Dyspepsia  pantoprazole  Injectable 40 milliGRAM(s) IV Push daily    Other:   calcium carbonate 1250 mG  + Vitamin D (OsCal 500 + D) 1 Tablet(s) Oral daily  chlorhexidine 2% Cloths 1 Application(s) Topical daily  heparin  Infusion 1700 Unit(s)/Hr IV Continuous <Continuous>  levothyroxine Injectable 69 MICROGram(s) IV Push at bedtime  multivitamin 1 Tablet(s) Oral daily  sodium chloride 2 Gram(s) Oral two times a day  sodium chloride 0.9%. 1000 milliLiter(s) IV Continuous <Continuous>    DIET: [] Regular [] CCD [] Renal [] Puree [] Dysphagia [] Tube Feeds:     IMAGING:

## 2021-07-31 NOTE — PROGRESS NOTE ADULT - ASSESSMENT
61 year-old man with history of thyroid cancer status post total thyroidectomy in 2010 and radioactive iodine treatment in 2011, hypogonadism, vitamin D deficiency, and osteopenia, with chronic constipation,  right buttock and right scrotal pain and numbness. 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 The chordoma resulted in perineal numbness and overflow incontinence and he was admitted 7/7/21 for staged resection. On 7/7, he underwent Plastic Surgery and General Surgery assisted vertical rectus abdominal myocutaneous flap harvest with transperitoneal ligation of internal iliac artery and vein.  s/p en bloc sacrectomy/ with L4-pelvis fusion; EBL 4.5L status post 8 units PRBC, 6 units FFP, 1 pack platelets and 5L crystalloid; 7/8. Extubated 7/9 with hypoxia respiratory distress, desaturation CPAP  at night CTA revealed bilateral sub  segmental PE. No right heart strain & Bibasilar and left lingular consolidative opacities. Started on Heparin gtt. PTT goal 50-70 Treated with Levaquin for 7 days Course further c/b ileus requiring gastric decompression. Pt pending echo to check for RV per pulm. Now with leucocytosis and febrile overnight. ID following on Vanco and zosyn for emperic coverage. Wound washout with plastics and duc pus noted, entire wound reopened on 7/26/21 and placement of 2 drains. Incontinent of stool. Cultures growing coag neg staph/staph aureus/enterococcus. S/P Colostomy on 7/28/21.       PLAN:  Neuro:   - pain control - continue dilaudid iv while NPO, gabapentin 200q8  -monitor drain output  Respiratory:   - on Heparin gtt for bilateral PEs  - PTT goal 50-70, last PTT 63  CV:  - vitals stable  Endocrine:   - hypothyroid- continue synthroid IV while NPO            DVT ppx:   - venodynes  ID:   -afebrile overnight, WBC 11 stable, BCx from 7/29 NGTD, surgical cx :Rare Coag Negative Staphylococcus, Rare Methicillin resistant Staphylococcus aureus, Rare Enterococcus faecalis, Rare Bacteroides vulgatus group  - infected sacral collection- cultures growing rare coag neg staph, staph aureus enterococcus- continue vancomycin and flagyl per ID, anticipate 6 wk course, would like trough 15   -pt will need PICC line for 6 weeks of IV antibiotics, consent in chart, Hep gtt will need to be held x 4 hours prior to insertion of PICC line, scheduled for today, IV RN to coordinate time  GI:    - incontinent of stool- s/p colostomy  - continue clear liquid diet per Surgery until colostomy is functioning, pt instructed to take small sips of fluid and not to drink everything on his tray  - continue tony  PT/OT:   - Acute rehab      [facility] # [id number]   61 year-old man with history of thyroid cancer status post total thyroidectomy in 2010 and radioactive iodine treatment in 2011, hypogonadism, vitamin D deficiency, and osteopenia, with chronic constipation,  right buttock and right scrotal pain and numbness. 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 The chordoma resulted in perineal numbness and overflow incontinence and he was admitted 7/7/21 for staged resection. On 7/7, he underwent Plastic Surgery and General Surgery assisted vertical rectus abdominal myocutaneous flap harvest with transperitoneal ligation of internal iliac artery and vein.  s/p en bloc sacrectomy/ with L4-pelvis fusion; EBL 4.5L status post 8 units PRBC, 6 units FFP, 1 pack platelets and 5L crystalloid; 7/8. Extubated 7/9 with hypoxia respiratory distress, desaturation CPAP  at night CTA revealed bilateral sub  segmental PE. No right heart strain & Bibasilar and left lingular consolidative opacities. Started on Heparin gtt. PTT goal 50-70 Treated with Levaquin for 7 days Course further c/b ileus requiring gastric decompression. Pt pending echo to check for RV per pulm. Now with leucocytosis and febrile overnight. ID following on Vanco and zosyn for emperic coverage. Wound washout with plastics and duc pus noted, entire wound reopened on 7/26/21 and placement of 2 drains. Incontinent of stool. Cultures growing coag neg staph/staph aureus/enterococcus. S/P Colostomy on 7/28/21.       PLAN:  Neuro:   - pain control - continue dilaudid iv while NPO, gabapentin 200q8  -monitor drain output  Respiratory:   - on Heparin gtt for bilateral PEs  - PTT goal 50-70, last PTT 63  CV:  - vitals stable  Endocrine:   - hypothyroid- continue synthroid IV while NPO            DVT ppx:   - venodynes  ID:   -afebrile overnight, WBC 11 stable, BCx from 7/29 NGTD, surgical cx :Rare Coag Negative Staphylococcus, Rare Methicillin resistant Staphylococcus aureus, Rare Enterococcus faecalis, Rare Bacteroides vulgatus group  - infected sacral collection- cultures growing rare coag neg staph, staph aureus enterococcus- continue vancomycin and flagyl per ID, anticipate 6 wk course, would like trough 15   -pt will need PICC line for 6 weeks of IV antibiotics, consent in chart, Hep gtt will need to be held x 4 hours prior to insertion of PICC line, scheduled for today, IV RN to coordinate time  GI:    - incontinent of stool- s/p colostomy  - upgrade to soft diet per surgery  - continue tony  PT/OT:   - Acute rehab      [facility] # [id number]

## 2021-07-31 NOTE — PROGRESS NOTE ADULT - SUBJECTIVE AND OBJECTIVE BOX
Patient is a 61y old  Male who presents with a chief complaint of chordoma resection (31 Jul 2021 05:50)      SUBJECTIVE / OVERNIGHT EVENTS: Pt in bed, c/o pain. Appears dyspneic. Father by bedside.     Vital Signs Last 24 Hrs  T(C): 36.7 (31 Jul 2021 15:56), Max: 36.9 (31 Jul 2021 12:57)  T(F): 98 (31 Jul 2021 15:56), Max: 98.5 (31 Jul 2021 12:57)  HR: 84 (31 Jul 2021 15:56) (70 - 84)  BP: 142/76 (31 Jul 2021 15:56) (126/73 - 152/76)  BP(mean): --  RR: 18 (31 Jul 2021 15:56) (18 - 18)  SpO2: 97% (31 Jul 2021 15:56) (96% - 98%)    MEDICATIONS  (STANDING):  calcium carbonate 1250 mG  + Vitamin D (OsCal 500 + D) 1 Tablet(s) Oral daily  chlorhexidine 2% Cloths 1 Application(s) Topical daily  gabapentin 200 milliGRAM(s) Oral every 8 hours  heparin  Infusion 1700 Unit(s)/Hr (18 mL/Hr) IV Continuous <Continuous>  levothyroxine Injectable 69 MICROGram(s) IV Push at bedtime  metroNIDAZOLE  IVPB 500 milliGRAM(s) IV Intermittent every 8 hours  multivitamin 1 Tablet(s) Oral daily  pantoprazole  Injectable 40 milliGRAM(s) IV Push daily  sodium chloride 2 Gram(s) Oral two times a day  sodium chloride 0.9%. 1000 milliLiter(s) (50 mL/Hr) IV Continuous <Continuous>  vancomycin  IVPB 1000 milliGRAM(s) IV Intermittent every 8 hours    MEDICATIONS  (PRN):  aluminum hydroxide/magnesium hydroxide/simethicone Suspension 30 milliLiter(s) Oral every 4 hours PRN Dyspepsia  HYDROmorphone  Injectable 0.5 milliGRAM(s) IV Push every 4 hours PRN Moderate Pain (4 - 6)  ondansetron Injectable 4 milliGRAM(s) IV Push every 6 hours PRN Nausea        CAPILLARY BLOOD GLUCOSE        I&O's Summary    30 Jul 2021 07:01  -  31 Jul 2021 07:00  --------------------------------------------------------  IN: 518 mL / OUT: 2759 mL / NET: -2241 mL    31 Jul 2021 07:01  -  31 Jul 2021 16:41  --------------------------------------------------------  IN: 560 mL / OUT: 630 mL / NET: -70 mL        PHYSICAL EXAM:  GENERAL: NAD, well-developed  HEAD:  Atraumatic, Normocephalic  EYES: EOMI, PERRLA, conjunctiva and sclera clear  NECK: Supple, No JVD  CHEST/LUNG: Clear to auscultation bilaterally; No wheeze  HEART: Regular rate and rhythm; No murmurs, rubs, or gallops  ABDOMEN: Soft, Nontender, Nondistended; Bowel sounds present  EXTREMITIES:  2+ Peripheral Pulses, No clubbing, cyanosis, or edema  PSYCH: AAOx3  NEUROLOGY: moving all extremities  SKIN: No rashes or lesions    LABS:                        9.0    11.23 )-----------( [id number]      ( 31 Jul 2021 06:10 )             29.5     07-31    138  |  102  |  9   ----------------------------<  96  4.0   |  23  |  0.61    Ca    8.9      31 Jul 2021 06:09      PTT - ( 31 Jul 2021 06:10 )  PTT:63.4 sec          RADIOLOGY & ADDITIONAL TESTS:    Imaging Personally Reviewed:    Consultant(s) Notes Reviewed:      Care Discussed with Consultants/Other Providers: Alonzo

## 2021-07-31 NOTE — PROGRESS NOTE ADULT - SUBJECTIVE AND OBJECTIVE BOX
CC: f/u for post op infected sacral collection    Patient reports: no new complaints.Tolerating clears.Awaiting colostomy function    REVIEW OF SYSTEMS:  All other review of systems negative (Comprehensive ROS)    Antimicrobials Day #  POD 5 :  metroNIDAZOLE  IVPB 500 milliGRAM(s) IV Intermittent every 8 hours  vancomycin  IVPB 1000 milliGRAM(s) IV Intermittent every 8 hours    Other Medications Reviewed  MEDICATIONS  (STANDING):  calcium carbonate 1250 mG  + Vitamin D (OsCal 500 + D) 1 Tablet(s) Oral daily  chlorhexidine 2% Cloths 1 Application(s) Topical daily  gabapentin 200 milliGRAM(s) Oral every 8 hours  heparin  Infusion 1700 Unit(s)/Hr (18 mL/Hr) IV Continuous <Continuous>  levothyroxine Injectable 69 MICROGram(s) IV Push at bedtime  metroNIDAZOLE  IVPB 500 milliGRAM(s) IV Intermittent every 8 hours  multivitamin 1 Tablet(s) Oral daily  pantoprazole  Injectable 40 milliGRAM(s) IV Push daily  sodium chloride 2 Gram(s) Oral two times a day  sodium chloride 0.9%. 1000 milliLiter(s) (50 mL/Hr) IV Continuous <Continuous>  vancomycin  IVPB 1000 milliGRAM(s) IV Intermittent every 8 hours    T(F): 97.7 (07-31-21 @ 04:59), Max: 98.1 (07-30-21 @ 20:35)  HR: 73 (07-31-21 @ 04:59)  BP: 136/71 (07-31-21 @ 04:59)  RR: 18 (07-31-21 @ 04:59)  SpO2: 97% (07-31-21 @ 04:59)  Wt(kg): --    PHYSICAL EXAM:  General: alert, no acute distress  Eyes:  anicteric, no conjunctival injection, no discharge  Oropharynx: no lesions or injection 	  Neck: supple, without adenopathy  Lungs: clear to auscultation  Heart: regular rate and rhythm; no murmur, rubs or gallops  Abdomen: soft, nondistended, nontender, LLQ colostomy  Skin: back incision dressing intact, JPx 2 with SS drainage  Extremities: no clubbing, cyanosis, or edema  Neurologic: alert, oriented, moves all extremities  :tony  LAB RESULTS:                        9.3    11.31 )-----------( 433      ( 30 Jul 2021 05:57 )             29.9     07-30    132<L>  |  100  |  11  ----------------------------<  91  3.8   |  20<L>  |  0.69    Ca    9.0      30 Jul 2021 05:55          MICROBIOLOGY:  RECENT CULTURES:  07-29 @ 03:37 .Blood Blood-Peripheral     No growth to date.      07-27 @ 00:51 .Surgical Swab back culture #2 Coag Negative Staphylococcus  Methicillin resistant Staphylococcus aureus  Enterococcus faecalis    Rare Coag Negative Staphylococcus  Rare Methicillin resistant Staphylococcus aureus  Rare Enterococcus faecalis  Rare Bacteroides vulgatus group "Susceptibilities not performed"          RADIOLOGY REVIEWED:

## 2021-07-31 NOTE — PROGRESS NOTE ADULT - ASSESSMENT
61y male with history of thyroid cancer status post total thyroidectomy in 2010 and radioactive iodine treatment in 2011, hypogonadism, vitamin D deficiency, and osteopenia, with chronic constipation  right buttock and right scrotal pain and numbness. 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 The chordoma resulted in perineal numbness and overflow incontinence and he was admitted 7/7/21 for staged resection. On 7/7, he underwent Plastic Surgery and General Surgery assisted vertical rectus abdominal myocutaneous flap harvest with transperitoneal ligation of internal iliac artery and vein.  s/p en bloc sacrectomy/   with L4-pelvis fusion; EBL 4.5L status post 8 units PRBC, 6 units FFP, 1 pack platelets and 5L crystalloid; 7/8. Extubated 7/9 with hypoxia respiratory distress, desaturation CPAP  at night CTA revealed bilateral sub  segmental PE and DVT.No right heart strain. & Bibasilar and left lingular consolidative opacities . Started on Heparin gtt. PTT goal 50-70 Treated with Levaquin for 7 days Course further c/b ileus requiring gastric decompression . Pathology pending s/p inferior sacral wound debridement at bedside 7/20/21 . He is reported to have leukocytosis. ID consulted.  CT noted sacral collection. For this reason the patient was started on empiric Vanco and Zosyn,  7.12 grew Enterobacter. He was treated with IV Levaquin from 7.14-->7/20 as per orders.   Recent fever and leukocytosis likely secondary to infected sacral collection which was drained on 7/26.Pus encountered as well as 1 exposed screw.  7/22 blood cx are no growth to date .His leukocytosis has moderated post op.  OR cultures are with coag negative staph , MRSA, enterococcus and bacteroides..One exposed screw seen, Hopefully source control has been achieved.  He is now on vanco/metronidazole, levels about 19, will follow renal function.  Will consider decreasing vanco dose to BID in near future as he needs extended course, I would like to minimize renal toxicity  He has a tony as well, ? if to keep wound clean vs bladder dysfunction  Plan   continue Vanco and flagyl  for sacral collection  , POD 5,, extended course planned.  Supportive care per medicine  Anticoagulation per other services for B/L DVT  Wound care per PRS  Diverting ostomy to keep sacral wound clean.  Anticipate 6 week post op course of antibiotics  Repeat vanco trough in a few days, would like trough closer to 15 than 20.  Metronidazole will be converted to po route when GI tract reliably functional.

## 2021-08-01 LAB
ANION GAP SERPL CALC-SCNC: 12 MMOL/L — SIGNIFICANT CHANGE UP (ref 5–17)
APTT BLD: 62 SEC — HIGH (ref 27.5–35.5)
APTT BLD: 90.4 SEC — HIGH (ref 27.5–35.5)
BUN SERPL-MCNC: 11 MG/DL — SIGNIFICANT CHANGE UP (ref 7–23)
CALCIUM SERPL-MCNC: 8.9 MG/DL — SIGNIFICANT CHANGE UP (ref 8.4–10.5)
CHLORIDE SERPL-SCNC: 99 MMOL/L — SIGNIFICANT CHANGE UP (ref 96–108)
CO2 SERPL-SCNC: 22 MMOL/L — SIGNIFICANT CHANGE UP (ref 22–31)
CREAT SERPL-MCNC: 0.63 MG/DL — SIGNIFICANT CHANGE UP (ref 0.5–1.3)
GLUCOSE SERPL-MCNC: 104 MG/DL — HIGH (ref 70–99)
HCT VFR BLD CALC: 30 % — LOW (ref 39–50)
HGB BLD-MCNC: 9.3 G/DL — LOW (ref 13–17)
INR BLD: 1.45 RATIO — HIGH (ref 0.88–1.16)
MCHC RBC-ENTMCNC: 27.8 PG — SIGNIFICANT CHANGE UP (ref 27–34)
MCHC RBC-ENTMCNC: 31 GM/DL — LOW (ref 32–36)
MCV RBC AUTO: 89.6 FL — SIGNIFICANT CHANGE UP (ref 80–100)
NRBC # BLD: 0 /100 WBCS — SIGNIFICANT CHANGE UP (ref 0–0)
PLATELET # BLD AUTO: 414 K/UL — HIGH (ref 150–400)
POTASSIUM SERPL-MCNC: 3.7 MMOL/L — SIGNIFICANT CHANGE UP (ref 3.5–5.3)
POTASSIUM SERPL-SCNC: 3.7 MMOL/L — SIGNIFICANT CHANGE UP (ref 3.5–5.3)
PROTHROM AB SERPL-ACNC: 17.1 SEC — HIGH (ref 10.6–13.6)
RBC # BLD: 3.35 M/UL — LOW (ref 4.2–5.8)
RBC # FLD: 14.1 % — SIGNIFICANT CHANGE UP (ref 10.3–14.5)
SODIUM SERPL-SCNC: 133 MMOL/L — LOW (ref 135–145)
WBC # BLD: 13.01 K/UL — HIGH (ref 3.8–10.5)
WBC # FLD AUTO: 13.01 K/UL — HIGH (ref 3.8–10.5)

## 2021-08-01 RX ORDER — TRAMADOL HYDROCHLORIDE 50 MG/1
50 TABLET ORAL EVERY 6 HOURS
Refills: 0 | Status: DISCONTINUED | OUTPATIENT
Start: 2021-08-01 | End: 2021-08-02

## 2021-08-01 RX ORDER — HYDROMORPHONE HYDROCHLORIDE 2 MG/ML
0.5 INJECTION INTRAMUSCULAR; INTRAVENOUS; SUBCUTANEOUS EVERY 6 HOURS
Refills: 0 | Status: DISCONTINUED | OUTPATIENT
Start: 2021-08-01 | End: 2021-08-02

## 2021-08-01 RX ORDER — LEVOTHYROXINE SODIUM 125 MCG
137 TABLET ORAL AT BEDTIME
Refills: 0 | Status: DISCONTINUED | OUTPATIENT
Start: 2021-08-01 | End: 2021-08-02

## 2021-08-01 RX ORDER — OXYCODONE HYDROCHLORIDE 5 MG/1
10 TABLET ORAL EVERY 4 HOURS
Refills: 0 | Status: DISCONTINUED | OUTPATIENT
Start: 2021-08-01 | End: 2021-08-01

## 2021-08-01 RX ORDER — OXYCODONE HYDROCHLORIDE 5 MG/1
5 TABLET ORAL EVERY 4 HOURS
Refills: 0 | Status: DISCONTINUED | OUTPATIENT
Start: 2021-08-01 | End: 2021-08-01

## 2021-08-01 RX ORDER — ENOXAPARIN SODIUM 100 MG/ML
70 INJECTION SUBCUTANEOUS
Refills: 0 | Status: DISCONTINUED | OUTPATIENT
Start: 2021-08-01 | End: 2021-08-02

## 2021-08-01 RX ORDER — TRAMADOL HYDROCHLORIDE 50 MG/1
25 TABLET ORAL EVERY 6 HOURS
Refills: 0 | Status: DISCONTINUED | OUTPATIENT
Start: 2021-08-01 | End: 2021-08-02

## 2021-08-01 RX ORDER — LANOLIN ALCOHOL/MO/W.PET/CERES
5 CREAM (GRAM) TOPICAL AT BEDTIME
Refills: 0 | Status: DISCONTINUED | OUTPATIENT
Start: 2021-08-01 | End: 2021-08-02

## 2021-08-01 RX ORDER — VANCOMYCIN HCL 1 G
1000 VIAL (EA) INTRAVENOUS
Refills: 0 | Status: DISCONTINUED | OUTPATIENT
Start: 2021-08-01 | End: 2021-08-02

## 2021-08-01 RX ADMIN — HYDROMORPHONE HYDROCHLORIDE 0.5 MILLIGRAM(S): 2 INJECTION INTRAMUSCULAR; INTRAVENOUS; SUBCUTANEOUS at 04:07

## 2021-08-01 RX ADMIN — CHLORHEXIDINE GLUCONATE 1 APPLICATION(S): 213 SOLUTION TOPICAL at 14:28

## 2021-08-01 RX ADMIN — HYDROMORPHONE HYDROCHLORIDE 0.5 MILLIGRAM(S): 2 INJECTION INTRAMUSCULAR; INTRAVENOUS; SUBCUTANEOUS at 03:37

## 2021-08-01 RX ADMIN — Medication 137 MICROGRAM(S): at 21:05

## 2021-08-01 RX ADMIN — Medication 100 MILLIGRAM(S): at 05:54

## 2021-08-01 RX ADMIN — GABAPENTIN 200 MILLIGRAM(S): 400 CAPSULE ORAL at 14:27

## 2021-08-01 RX ADMIN — Medication 250 MILLIGRAM(S): at 00:59

## 2021-08-01 RX ADMIN — TRAMADOL HYDROCHLORIDE 50 MILLIGRAM(S): 50 TABLET ORAL at 17:07

## 2021-08-01 RX ADMIN — HYDROMORPHONE HYDROCHLORIDE 0.5 MILLIGRAM(S): 2 INJECTION INTRAMUSCULAR; INTRAVENOUS; SUBCUTANEOUS at 08:22

## 2021-08-01 RX ADMIN — ENOXAPARIN SODIUM 70 MILLIGRAM(S): 100 INJECTION SUBCUTANEOUS at 17:06

## 2021-08-01 RX ADMIN — GABAPENTIN 200 MILLIGRAM(S): 400 CAPSULE ORAL at 05:54

## 2021-08-01 RX ADMIN — Medication 100 MILLIGRAM(S): at 21:05

## 2021-08-01 RX ADMIN — Medication 1 TABLET(S): at 11:18

## 2021-08-01 RX ADMIN — Medication 250 MILLIGRAM(S): at 17:07

## 2021-08-01 RX ADMIN — PANTOPRAZOLE SODIUM 40 MILLIGRAM(S): 20 TABLET, DELAYED RELEASE ORAL at 05:54

## 2021-08-01 RX ADMIN — HYDROMORPHONE HYDROCHLORIDE 0.5 MILLIGRAM(S): 2 INJECTION INTRAMUSCULAR; INTRAVENOUS; SUBCUTANEOUS at 08:40

## 2021-08-01 RX ADMIN — GABAPENTIN 200 MILLIGRAM(S): 400 CAPSULE ORAL at 21:05

## 2021-08-01 RX ADMIN — HEPARIN SODIUM 17 UNIT(S)/HR: 5000 INJECTION INTRAVENOUS; SUBCUTANEOUS at 08:22

## 2021-08-01 RX ADMIN — Medication 100 MILLIGRAM(S): at 14:26

## 2021-08-01 RX ADMIN — CHLORHEXIDINE GLUCONATE 1 APPLICATION(S): 213 SOLUTION TOPICAL at 06:00

## 2021-08-01 RX ADMIN — Medication 5 MILLIGRAM(S): at 23:48

## 2021-08-01 RX ADMIN — TRAMADOL HYDROCHLORIDE 50 MILLIGRAM(S): 50 TABLET ORAL at 17:37

## 2021-08-01 NOTE — PROGRESS NOTE ADULT - SUBJECTIVE AND OBJECTIVE BOX
SUBJECTIVE: Pt seen and examined, lying in bed, he reports less pain at colostomy site (liquid stool noted )    OVERNIGHT EVENTS: none    Vital Signs Last 24 Hrs  T(C): 36.3 (01 Aug 2021 12:03), Max: 36.9 (31 Jul 2021 20:27)  T(F): 97.3 (01 Aug 2021 12:03), Max: 98.4 (31 Jul 2021 20:27)  HR: 85 (01 Aug 2021 12:03) (73 - 85)  BP: 122/77 (01 Aug 2021 12:03) (116/73 - 142/78)  BP(mean): --  RR: 18 (01 Aug 2021 12:03) (18 - 18)  SpO2: 95% (01 Aug 2021 12:03) (95% - 97%)    PHYSICAL EXAM:    General: No Acute Distress     Neurological: Awake, alert oriented to person, place and time, Following Commands, B/l UE 5/5 RLE DF/PF 4+/5, LLE 5/5. LE limited by abdominal pain    Pulmonary: Clear to Auscultation, No Rales, No Rhonchi, No Wheezes     Cardiovascular: S1, S2, Regular Rate and Rhythm     Gastrointestinal: Soft, Nontender, Nondistended     Incision: sacral +dressing c/d/i, drains x2 per plastics     LABS:                        9.3    13.01 )-----------( 414      ( 01 Aug 2021 05:20 )             30.0    08-01    133<L>  |  99  |  11  ----------------------------<  104<H>  3.7   |  22  |  0.63    Ca    8.9      01 Aug 2021 05:19    PT/INR - ( 01 Aug 2021 12:07 )   PT: 17.1 sec;   INR: 1.45 ratio         PTT - ( 01 Aug 2021 12:07 )  PTT:62.0 sec      07-31 @ 07:01  -  08-01 @ 07:00  --------------------------------------------------------  IN: 560 mL / OUT: 2336 mL / NET: -1776 mL    08-01 @ 07:01 - 08-01 @ 13:17  --------------------------------------------------------  IN: 402 mL / OUT: 0 mL / NET: 402 mL      DRAINS:     MEDICATIONS:  Antibiotics:  metroNIDAZOLE  IVPB 500 milliGRAM(s) IV Intermittent every 8 hours  vancomycin  IVPB 1000 milliGRAM(s) IV Intermittent two times a day    Neuro:  gabapentin 200 milliGRAM(s) Oral every 8 hours  HYDROmorphone  Injectable 0.5 milliGRAM(s) IV Push every 6 hours PRN breakthrough pain  ondansetron Injectable 4 milliGRAM(s) IV Push every 6 hours PRN Nausea  oxyCODONE    IR 5 milliGRAM(s) Oral every 4 hours PRN Moderate Pain (4 - 6)  oxyCODONE    IR 10 milliGRAM(s) Oral every 4 hours PRN Severe Pain (7 - 10)    Cardiac:    Pulm:    GI/:  aluminum hydroxide/magnesium hydroxide/simethicone Suspension 30 milliLiter(s) Oral every 4 hours PRN Dyspepsia  pantoprazole    Tablet 40 milliGRAM(s) Oral before breakfast    Other:   calcium carbonate 1250 mG  + Vitamin D (OsCal 500 + D) 1 Tablet(s) Oral daily  chlorhexidine 2% Cloths 1 Application(s) Topical daily  chlorhexidine 4% Liquid 1 Application(s) Topical <User Schedule>  heparin  Infusion 1700 Unit(s)/Hr IV Continuous <Continuous>  levothyroxine 137 MICROGram(s) Oral at bedtime  multivitamin 1 Tablet(s) Oral daily  sodium chloride 0.9% lock flush 10 milliLiter(s) IV Push every 1 hour PRN Pre/post blood products, medications, blood draw, and to maintain line patency  sodium chloride 0.9%. 1000 milliLiter(s) IV Continuous <Continuous>    DIET: [] Regular [] CCD [] Renal [] Puree [] Dysphagia [] Tube Feeds: soft diet    IMAGING:   < from: Xray Chest 1 View- PORTABLE-Urgent (Xray Chest 1 View- PORTABLE-Urgent .) (07.23.21 @ 22:29) >  IMPRESSION:    The heart is normal in size. Bibasilarpneumonia. This appears to be slightly worsening on the left and unchanged on the right when compared to previous study done July 23, 2021 at 11:57 AM.    < end of copied text >  < from: CT Angio Chest PE Protocol w/ IV Cont (07.10.21 @ 11:43) >  IMPRESSION:    Small bilateral upper lobe subsegmental pulmonary emboli. No right heart strain.    Bibasilar and left lingular consolidative opacities with associated volume loss likely represent atelectasis.    < end of copied text >  < from: CT Pelvis No Cont (07.09.21 @ 10:04) >  IMPRESSION:  1. Postsurgical changes are present from sacral resection and lumbo-iliac spinal fusion.  2. Fine linear hyperdensity is present extending along the right margin of the L4 vertebral body and slightly into the adjacent inferior vena cava, possibly reflecting surgical material or venous cement.    < end of copied text >

## 2021-08-01 NOTE — PROGRESS NOTE ADULT - ATTENDING COMMENTS
SEE NP NOTE
Sonia Escobar MD, FACP, FACG, AGAF  [facility]  [phone]     After hours and weekend coverage GI service : [phone]
60 y/o M s/p resection of L4 chordoma with midline abdominal incision on 7/8 now s/p colostomy. Patient doing well postoperatively with no pulmonary complications. Saturating well on room air.     - Continue therapeutic AC for PE  - Incentive spirometry  - Mobilization if ok with surgical teams
Agree with above. Ileus resolved - encourage mobility as much as possible.
For diverting loop sigmoid colostomy tomorrow due to fecal incontinence from chordoma resection. R/B/A's explained in detail to patient who expressed understanding and wishes to proceed.
Full function from ostomy. Tolerating diet. Ostomy teaching. Will remove staples in 10-14 days.
Pt. is a 61 male with sacral tumor surgery / sacrectomy for chordoma being followed by Plastic Surgery for ongoing wound care.  Having bowel movements/he is fecal incontinence. No ileus pattern reported on recent CT dated 7/23/21  Per patient he will be going for a "wash-out " procedure. He said he will be getting a colostomy to aid in wound healing, scheduling of that surgery  per surgical service     Recommendations:  Monitor bowel frequency   Await surgical service scheduling for colostomy   Call us with further questions      Michel Danielson MD  [facility]
Pt. is a 61 year old male with PMH of Thyroid Cancer, s/p Total Thyroidectomy 2010 and Radioactive Iodine 2011, Hypogonadism, Vitamin D Deficiency, Osteopenia with recently diagnosed Sacral Tumor. Admitted on 7/7, he underwent Plastic Surgery and General Surgery assisted vertical rectus abdominal myocutaneous flap harvest with transperitoneal ligation of internal iliac artery and vein. He underwent en bloc sacrectomy on 7/8, with L4-pelvis fusion, transfused 8 units PRBC, 6 units FFP, 1 pack platelets and 5L crystalloid; post-op status post 1 pack platelets, CTA 7/10 with bilateral upper lobe segmental PEs,  now with post op ileus    Issues:  1. Ileus - IMPROVED  -OK for advance diet, monitor tolerance  -laxatives as ordered; plan enema x 1 today Given post-sacrectomy with decreased rectal tone and sensation, will likely require enema or supp for rectal stimulation to help pass BMs  -OOB/PT; bed mobility/turning  -monitor/replete lytes PRN  goal K 4-4.5 and Mg>2  -PPI for GI prophylaxis  -Synthroid dosing per primary team    Michel Danielson MD  [facility]
Sonia Escobar MD, FACP, FACG, AGAF  [facility]  [phone]     After hours and weekend coverage GI service : [phone]
Sonia Escobar MD, FACP, FACG, AGAF  [facility]  [phone]     After hours and weekend coverage GI service : [phone]
sacral tumor, s/p surgery, now with ileus, resolving. Tolerating clears and had bm  yesterday    plan ppi daily           bowel regimen as above           monitor h/h
61 year old male with  Sacral Tumor. status post en bloc sacrectomy, Stage 2 w/ L4-pelvix fusion w/ plastic closure.  pain control  PE, on heparin drip  monitor drain output and signs of bleeding  decreased UO, likely dehydrated, will bolus plasmalyte
Agree with above. Continue NGT, follow-up serial xray and abdominal exam for ileus.
Flatus in colostomy bag. Wound appropriate. Will advance diet.
edited as appropriate above
post-op stage II.  awakening from anesthesia.  follow-up post-op CBC.
post-op stage I complex sacrectomy.  aside from abdominal harvest site discomfort, quite well appearing, hemodynamically stable, PCA for pain control.  stage II in AM then return to NSCU post-op.
stable.  f/u pTT and CBC this afternoon.  possibly d/c drains tomorrow.  continue to titrate high-flow as tolerated.
Patient examined and plan discussed with fellow, PA team and bedside RN.
Patient seen, examined and plan discussed with Dr. Sweet, jamey Griffith and bedside RN. Above noted edited as needed.
mildly hypoxic; splinting on exam, mediocre effort on spirometer.  encourage pain control with regular spirometry use.  low suspicion for PE at this point but will reconsider if oxygenation does not improve.
on heparin GTT for PE though I still maintain a greater portion of his hypoxia is secondary to splinting and atelectasis.  placed in window room to decrease delirium.  continue to monitor.

## 2021-08-01 NOTE — PROGRESS NOTE ADULT - SUBJECTIVE AND OBJECTIVE BOX
HPI:  60yo Male with Hx of __ who p/w __, now POD_ from     24h Events:   - Overnight, no acute events    Subjective:   Patient examined at bedside this AM. Reports     Objective:  Vital Signs  T(C): 36.6 (08-01 @ 05:14), Max: 36.9 (07-31 @ 12:57)  HR: 73 (08-01 @ 05:14) (73 - 84)  BP: 128/77 (08-01 @ 05:14) (116/73 - 142/76)  RR: 18 (08-01 @ 05:14) (18 - 18)  SpO2: 96% (08-01 @ 05:14) (96% - 97%)  07-31-21 @ 07:01  -  08-01-21 @ 07:00  --------------------------------------------------------  IN:  Total IN: 0 mL    OUT:    Bulb (mL): 1 mL    Bulb (mL): 35 mL    Indwelling Catheter - Urethral (mL): 2300 mL  Total OUT: 2336 mL    Total NET: -2336 mL          Physical Exam:  General: alert and oriented, NAD  Resp: airway patent, respirations unlabored  CVS: regular rate and rhythm  Abdomen: soft, appropriate tenderness surrounding colostomy site, colostomy with bowel sweat, stool, gas, ostomy without bleeding or signs of necrosis/infection  Extremities: no edema  Skin: warm, dry, appropriate color    Labs:                        9.3    13.01 )-----------( 414      ( 01 Aug 2021 05:20 )             30.0   08-01    133<L>  |  99  |  11  ----------------------------<  104<H>  3.7   |  22  |  0.63    Ca    8.9      01 Aug 2021 05:19      CAPILLARY BLOOD GLUCOSE          Microbiology:    Culture - Blood (collected 29 Jul 2021 03:37)  Source: .Blood Blood-Venous  Preliminary Report (30 Jul 2021 04:01):    No growth to date.    Culture - Blood (collected 29 Jul 2021 03:37)  Source: .Blood Blood-Peripheral  Preliminary Report (30 Jul 2021 04:01):    No growth to date.        Medications:   MEDICATIONS  (STANDING):  calcium carbonate 1250 mG  + Vitamin D (OsCal 500 + D) 1 Tablet(s) Oral daily  chlorhexidine 2% Cloths 1 Application(s) Topical daily  chlorhexidine 4% Liquid 1 Application(s) Topical <User Schedule>  gabapentin 200 milliGRAM(s) Oral every 8 hours  heparin  Infusion 1700 Unit(s)/Hr (17 mL/Hr) IV Continuous <Continuous>  levothyroxine Injectable 69 MICROGram(s) IV Push at bedtime  metroNIDAZOLE  IVPB 500 milliGRAM(s) IV Intermittent every 8 hours  multivitamin 1 Tablet(s) Oral daily  pantoprazole    Tablet 40 milliGRAM(s) Oral before breakfast  sodium chloride 0.9%. 1000 milliLiter(s) (50 mL/Hr) IV Continuous <Continuous>  vancomycin  IVPB 1000 milliGRAM(s) IV Intermittent two times a day    MEDICATIONS  (PRN):  aluminum hydroxide/magnesium hydroxide/simethicone Suspension 30 milliLiter(s) Oral every 4 hours PRN Dyspepsia  HYDROmorphone  Injectable 0.5 milliGRAM(s) IV Push every 4 hours PRN Moderate Pain (4 - 6)  ondansetron Injectable 4 milliGRAM(s) IV Push every 6 hours PRN Nausea  sodium chloride 0.9% lock flush 10 milliLiter(s) IV Push every 1 hour PRN Pre/post blood products, medications, blood draw, and to maintain line patency       HPI:  POD4 sigmoid colostomy    24h Events:   - Overnight, no acute events    Subjective:   Patient examined at bedside this AM. Reports unchanged abdominal pain, controlled on current pain regiment.     Objective:  Vital Signs  T(C): 36.6 (08-01 @ 05:14), Max: 36.9 (07-31 @ 12:57)  HR: 73 (08-01 @ 05:14) (73 - 84)  BP: 128/77 (08-01 @ 05:14) (116/73 - 142/76)  RR: 18 (08-01 @ 05:14) (18 - 18)  SpO2: 96% (08-01 @ 05:14) (96% - 97%)  07-31-21 @ 07:01  -  08-01-21 @ 07:00  --------------------------------------------------------  IN:  Total IN: 0 mL    OUT:    Bulb (mL): 1 mL    Bulb (mL): 35 mL    Indwelling Catheter - Urethral (mL): 2300 mL  Total OUT: 2336 mL    Total NET: -2336 mL          Physical Exam:  General: alert and oriented, NAD  Resp: airway patent, respirations unlabored  CVS: regular rate and rhythm  Abdomen: soft, appropriate tenderness surrounding colostomy site, colostomy with bowel sweat, stool, gas, ostomy without bleeding or signs of necrosis/infection  Extremities: no edema  Skin: warm, dry, appropriate color    Labs:                        9.3    13.01 )-----------( 414      ( 01 Aug 2021 05:20 )             30.0   08-01    133<L>  |  99  |  11  ----------------------------<  104<H>  3.7   |  22  |  0.63    Ca    8.9      01 Aug 2021 05:19      CAPILLARY BLOOD GLUCOSE          Microbiology:    Culture - Blood (collected 29 Jul 2021 03:37)  Source: .Blood Blood-Venous  Preliminary Report (30 Jul 2021 04:01):    No growth to date.    Culture - Blood (collected 29 Jul 2021 03:37)  Source: .Blood Blood-Peripheral  Preliminary Report (30 Jul 2021 04:01):    No growth to date.        Medications:   MEDICATIONS  (STANDING):  calcium carbonate 1250 mG  + Vitamin D (OsCal 500 + D) 1 Tablet(s) Oral daily  chlorhexidine 2% Cloths 1 Application(s) Topical daily  chlorhexidine 4% Liquid 1 Application(s) Topical <User Schedule>  gabapentin 200 milliGRAM(s) Oral every 8 hours  heparin  Infusion 1700 Unit(s)/Hr (17 mL/Hr) IV Continuous <Continuous>  levothyroxine Injectable 69 MICROGram(s) IV Push at bedtime  metroNIDAZOLE  IVPB 500 milliGRAM(s) IV Intermittent every 8 hours  multivitamin 1 Tablet(s) Oral daily  pantoprazole    Tablet 40 milliGRAM(s) Oral before breakfast  sodium chloride 0.9%. 1000 milliLiter(s) (50 mL/Hr) IV Continuous <Continuous>  vancomycin  IVPB 1000 milliGRAM(s) IV Intermittent two times a day    MEDICATIONS  (PRN):  aluminum hydroxide/magnesium hydroxide/simethicone Suspension 30 milliLiter(s) Oral every 4 hours PRN Dyspepsia  HYDROmorphone  Injectable 0.5 milliGRAM(s) IV Push every 4 hours PRN Moderate Pain (4 - 6)  ondansetron Injectable 4 milliGRAM(s) IV Push every 6 hours PRN Nausea  sodium chloride 0.9% lock flush 10 milliLiter(s) IV Push every 1 hour PRN Pre/post blood products, medications, blood draw, and to maintain line patency

## 2021-08-01 NOTE — PROGRESS NOTE ADULT - ASSESSMENT
61y male with history of thyroid cancer status post total thyroidectomy in 2010 and radioactive iodine treatment in 2011, hypogonadism, vitamin D deficiency, and osteopenia, with chronic constipation  right buttock and right scrotal pain and numbness. 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 The chordoma resulted in perineal numbness and overflow incontinence and he was admitted 7/7/21 for staged resection. On 7/7, he underwent Plastic Surgery and General Surgery assisted vertical rectus abdominal myocutaneous flap harvest with transperitoneal ligation of internal iliac artery and vein.  s/p en bloc sacrectomy/   with L4-pelvis fusion; EBL 4.5L status post 8 units PRBC, 6 units FFP, 1 pack platelets and 5L crystalloid; 7/8. Extubated 7/9 with hypoxia respiratory distress, desaturation CPAP  at night CTA revealed bilateral sub  segmental PE and DVT.No right heart strain. & Bibasilar and left lingular consolidative opacities . Started on Heparin gtt. PTT goal 50-70 Treated with Levaquin for 7 days Course further c/b ileus requiring gastric decompression . Pathology pending s/p inferior sacral wound debridement at bedside 7/20/21 . He is reported to have leukocytosis. ID consulted.  CT noted sacral collection. For this reason the patient was started on empiric Vanco and Zosyn,  7.12 grew Enterobacter. He was treated with IV Levaquin from 7.14-->7/20 as per orders.   Recent fever and leukocytosis likely secondary to infected sacral collection which was drained on 7/26.Pus encountered as well as 1 exposed screw.  7/22 blood cx are no growth to date .His leukocytosis has moderated post op.  OR cultures are with coag negative staph , MRSA, enterococcus and bacteroides..One exposed screw seen, Hopefully source control has been achieved.  He is now on vanco/metronidazole, levels about 19, will follow renal function.  Will consider decreasing vanco dose to BID in near future as he needs extended course, I would like to minimize renal toxicity  He has a tnoy as well, ? if to keep wound clean vs bladder dysfunction  Plan   continue Vanco and flagyl  for sacral collection  , POD 6,, extended course planned.  Supportive care per medicine  Anticoagulation per other services for B/L DVT  Wound care per PRS  Diverting ostomy to keep sacral wound clean.  Anticipate 6 week post op course of antibiotics  Repeat vanco trough in a few days, would like trough closer to 15 than 20.  I will switch to q12 dosing.  Metronidazole will be converted to po route when GI tract reliably functional.

## 2021-08-01 NOTE — PROGRESS NOTE ADULT - ATTENDING SUPERVISION STATEMENT
Fellow
ACP
Resident
Resident
ACP
Fellow
Resident
Fellow

## 2021-08-01 NOTE — PROGRESS NOTE ADULT - ASSESSMENT
61y Male patient s/p en bloc sacrectomy for chordoma w/ L4-pelvis fusion w/ plastics closure vertical rectus abdominis myocutaneous (VRAM) flap for chordoma on 07/07 and 07/08, post-op course c/b bilateral PEs (on hep gtt) and post-op ileus, resolved. Patient now incontinent of stool and soiling his posterior dressing s/p wound debridement with Plastic surgery 07/20 and 07/26, and now s/p sigmoid colostomy on 07/28 with full ostomy function.     Plan:  - Continue soft diet   - Monitor ostomy output/function  - Continued ostomy care and teaching  - Continue excellent care per neurosurgery    D/w Dr. Davis  [facility]  [id number]

## 2021-08-01 NOTE — PROGRESS NOTE ADULT - SUBJECTIVE AND OBJECTIVE BOX
CC: f/u for post op wound infection    Patient reports: no complaints, diet is being advanced    REVIEW OF SYSTEMS:  All other review of systems negative (Comprehensive ROS)    Antimicrobials Day #  :POD 6  metroNIDAZOLE  IVPB 500 milliGRAM(s) IV Intermittent every 8 hours  vanco 1 gr q12  Other Medications Reviewed    T(F): 97.8 (08-01-21 @ 05:14), Max: 98.5 (07-31-21 @ 12:57)  HR: 73 (08-01-21 @ 05:14)  BP: 128/77 (08-01-21 @ 05:14)  RR: 18 (08-01-21 @ 05:14)  SpO2: 96% (08-01-21 @ 05:14)  Wt(kg): --    PHYSICAL EXAM:  General: alert, no acute distress  Eyes:  anicteric, no conjunctival injection, no discharge  Oropharynx: no lesions or injection 	  Neck: supple, without adenopathy  Lungs: clear to auscultation  Heart: regular rate and rhythm; no murmur, rubs or gallops  Abdomen: soft, nondistended, nontender, llq colostomy  Skin: no lesions  Extremities: no clubbing, cyanosis, or edema  Neurologic: alert, oriented, moves all extremities  : tony  Sacral wound dressed, drains in place  LAB RESULTS:                        9.3    13.01 )-----------( [id number]      ( 01 Aug 2021 05:20 )             30.0     08-01    133<L>  |  99  |  11  ----------------------------<  104<H>  3.7   |  22  |  0.63    Ca    8.9      01 Aug 2021 05:19          MICROBIOLOGY:  RECENT CULTURES:  07-29 @ 03:37 .Blood Blood-Peripheral     No growth to date.          RADIOLOGY REVIEWED:

## 2021-08-01 NOTE — PROGRESS NOTE ADULT - ASSESSMENT
61 year-old man with history of thyroid cancer status post total thyroidectomy in 2010 and radioactive iodine treatment in 2011, hypogonadism, vitamin D deficiency, and osteopenia, with chronic constipation,  right buttock and right scrotal pain and numbness. 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 The chordoma resulted in perineal numbness and overflow incontinence and he was admitted 7/7/21 for staged resection. On 7/7, he underwent Plastic Surgery and General Surgery assisted vertical rectus abdominal myocutaneous flap harvest with transperitoneal ligation of internal iliac artery and vein.  s/p en bloc sacrectomy/ with L4-pelvis fusion; EBL 4.5L status post 8 units PRBC, 6 units FFP, 1 pack platelets and 5L crystalloid; 7/8. Extubated 7/9 with hypoxia respiratory distress, desaturation CPAP  at night CTA revealed bilateral sub  segmental PE. No right heart strain & Bibasilar and left lingular consolidative opacities. Started on Heparin gtt. PTT goal 50-70 Treated with Levaquin for 7 days Course further c/b ileus requiring gastric decompression. Pt pending echo to check for RV per pulm. Now with leucocytosis and febrile overnight. ID following on Vanco and zosyn for emperic coverage. Wound washout with plastics and duc pus noted, entire wound reopened on 7/26/21 and placement of 2 drains. Incontinent of stool. Cultures growing coag neg staph/staph aureus/enterococcus. S/P Diversion Colostomy to keep sacral incision clean on 7/28/21.       PLAN:  Neuro:   - pain control- continue oxycodone prn, gabapentin 200q8  Respiratory:   - on Heparin gtt for bilateral PEs, start therapeutic lovenox tonight  - PTT goal 50-70  CV:  - vitals stable  Endocrine:   - hypothyroid- continue synthroid          DVT ppx:   - venodynes  ID:   - infected sacral collection- cultures growing rare coag neg staph, staph aureus enterococcus- continue vancomycin and flagyl per ID  - PICC line for placed for longterm IV antibiotics,  GI:    - incontinent of stool- s/p diversion colostomy (full ostomy function)  - continue soft diet per patient request  - continue tony for urinary incontinence to keep sacral wound clean  PT/OT:   - Acute rehab      [facility] # [id number]

## 2021-08-02 ENCOUNTER — TRANSCRIPTION ENCOUNTER (OUTPATIENT)
Age: 61
End: 2021-08-02

## 2021-08-02 ENCOUNTER — INPATIENT (INPATIENT)
Facility: HOSPITAL | Age: 61
LOS: 23 days | Discharge: HOME HEALTH | DRG: 949 | End: 2021-08-26
Attending: SPECIALIST | Admitting: PHYSICAL MEDICINE & REHABILITATION
Payer: MEDICAID

## 2021-08-02 VITALS
OXYGEN SATURATION: 95 % | DIASTOLIC BLOOD PRESSURE: 81 MMHG | RESPIRATION RATE: 14 BRPM | TEMPERATURE: 99 F | HEIGHT: 65 IN | HEART RATE: 83 BPM | WEIGHT: 162.04 LBS | SYSTOLIC BLOOD PRESSURE: 143 MMHG

## 2021-08-02 VITALS
HEART RATE: 84 BPM | DIASTOLIC BLOOD PRESSURE: 78 MMHG | TEMPERATURE: 98 F | SYSTOLIC BLOOD PRESSURE: 123 MMHG | RESPIRATION RATE: 18 BRPM | OXYGEN SATURATION: 96 %

## 2021-08-02 DIAGNOSIS — R20.0 ANESTHESIA OF SKIN: ICD-10-CM

## 2021-08-02 DIAGNOSIS — Z51.89 ENCOUNTER FOR OTHER SPECIFIED AFTERCARE: ICD-10-CM

## 2021-08-02 DIAGNOSIS — C41.4 MALIGNANT NEOPLASM OF PELVIC BONES, SACRUM AND COCCYX: ICD-10-CM

## 2021-08-02 DIAGNOSIS — E89.0 POSTPROCEDURAL HYPOTHYROIDISM: ICD-10-CM

## 2021-08-02 DIAGNOSIS — E89.0 POSTPROCEDURAL HYPOTHYROIDISM: Chronic | ICD-10-CM

## 2021-08-02 DIAGNOSIS — M79.606 PAIN IN LEG, UNSPECIFIED: ICD-10-CM

## 2021-08-02 DIAGNOSIS — R29.898 OTHER SYMPTOMS AND SIGNS INVOLVING THE MUSCULOSKELETAL SYSTEM: ICD-10-CM

## 2021-08-02 DIAGNOSIS — Z48.3 AFTERCARE FOLLOWING SURGERY FOR NEOPLASM: ICD-10-CM

## 2021-08-02 DIAGNOSIS — R53.81 OTHER MALAISE: ICD-10-CM

## 2021-08-02 DIAGNOSIS — F41.9 ANXIETY DISORDER, UNSPECIFIED: ICD-10-CM

## 2021-08-02 DIAGNOSIS — R14.0 ABDOMINAL DISTENSION (GASEOUS): ICD-10-CM

## 2021-08-02 DIAGNOSIS — E55.9 VITAMIN D DEFICIENCY, UNSPECIFIED: ICD-10-CM

## 2021-08-02 DIAGNOSIS — R32 UNSPECIFIED URINARY INCONTINENCE: ICD-10-CM

## 2021-08-02 DIAGNOSIS — M85.80 OTHER SPECIFIED DISORDERS OF BONE DENSITY AND STRUCTURE, UNSPECIFIED SITE: ICD-10-CM

## 2021-08-02 DIAGNOSIS — Z79.2 LONG TERM (CURRENT) USE OF ANTIBIOTICS: ICD-10-CM

## 2021-08-02 DIAGNOSIS — Z98.1 ARTHRODESIS STATUS: ICD-10-CM

## 2021-08-02 DIAGNOSIS — R26.9 UNSPECIFIED ABNORMALITIES OF GAIT AND MOBILITY: ICD-10-CM

## 2021-08-02 DIAGNOSIS — B95.62 METHICILLIN RESISTANT STAPHYLOCOCCUS AUREUS INFECTION AS THE CAUSE OF DISEASES CLASSIFIED ELSEWHERE: ICD-10-CM

## 2021-08-02 DIAGNOSIS — D47.3 ESSENTIAL (HEMORRHAGIC) THROMBOCYTHEMIA: ICD-10-CM

## 2021-08-02 DIAGNOSIS — D43.4 NEOPLASM OF UNCERTAIN BEHAVIOR OF SPINAL CORD: ICD-10-CM

## 2021-08-02 DIAGNOSIS — T81.40XD INFECTION FOLLOWING A PROCEDURE, UNSPECIFIED, SUBSEQUENT ENCOUNTER: ICD-10-CM

## 2021-08-02 DIAGNOSIS — Z43.3 ENCOUNTER FOR ATTENTION TO COLOSTOMY: ICD-10-CM

## 2021-08-02 DIAGNOSIS — D64.9 ANEMIA, UNSPECIFIED: ICD-10-CM

## 2021-08-02 DIAGNOSIS — B95.2 ENTEROCOCCUS AS THE CAUSE OF DISEASES CLASSIFIED ELSEWHERE: ICD-10-CM

## 2021-08-02 DIAGNOSIS — I26.99 OTHER PULMONARY EMBOLISM WITHOUT ACUTE COR PULMONALE: ICD-10-CM

## 2021-08-02 DIAGNOSIS — Z47.89 ENCOUNTER FOR OTHER ORTHOPEDIC AFTERCARE: ICD-10-CM

## 2021-08-02 DIAGNOSIS — E43 UNSPECIFIED SEVERE PROTEIN-CALORIE MALNUTRITION: ICD-10-CM

## 2021-08-02 DIAGNOSIS — G47.00 INSOMNIA, UNSPECIFIED: ICD-10-CM

## 2021-08-02 DIAGNOSIS — Z98.890 OTHER SPECIFIED POSTPROCEDURAL STATES: Chronic | ICD-10-CM

## 2021-08-02 DIAGNOSIS — R33.9 RETENTION OF URINE, UNSPECIFIED: ICD-10-CM

## 2021-08-02 DIAGNOSIS — Z86.711 PERSONAL HISTORY OF PULMONARY EMBOLISM: ICD-10-CM

## 2021-08-02 DIAGNOSIS — E87.6 HYPOKALEMIA: ICD-10-CM

## 2021-08-02 DIAGNOSIS — Z85.850 PERSONAL HISTORY OF MALIGNANT NEOPLASM OF THYROID: ICD-10-CM

## 2021-08-02 LAB
ANION GAP SERPL CALC-SCNC: 11 MMOL/L — SIGNIFICANT CHANGE UP (ref 5–17)
APTT BLD: 29.9 SEC — SIGNIFICANT CHANGE UP (ref 27.5–35.5)
BUN SERPL-MCNC: 12 MG/DL — SIGNIFICANT CHANGE UP (ref 7–23)
CALCIUM SERPL-MCNC: 9.3 MG/DL — SIGNIFICANT CHANGE UP (ref 8.4–10.5)
CHLORIDE SERPL-SCNC: 102 MMOL/L — SIGNIFICANT CHANGE UP (ref 96–108)
CO2 SERPL-SCNC: 23 MMOL/L — SIGNIFICANT CHANGE UP (ref 22–31)
CREAT SERPL-MCNC: 0.71 MG/DL — SIGNIFICANT CHANGE UP (ref 0.5–1.3)
GLUCOSE SERPL-MCNC: 106 MG/DL — HIGH (ref 70–99)
HCT VFR BLD CALC: 32.1 % — LOW (ref 39–50)
HGB BLD-MCNC: 9.9 G/DL — LOW (ref 13–17)
MCHC RBC-ENTMCNC: 27.6 PG — SIGNIFICANT CHANGE UP (ref 27–34)
MCHC RBC-ENTMCNC: 30.8 GM/DL — LOW (ref 32–36)
MCV RBC AUTO: 89.4 FL — SIGNIFICANT CHANGE UP (ref 80–100)
NRBC # BLD: 0 /100 WBCS — SIGNIFICANT CHANGE UP (ref 0–0)
PLATELET # BLD AUTO: 427 K/UL — HIGH (ref 150–400)
POTASSIUM SERPL-MCNC: 4.2 MMOL/L — SIGNIFICANT CHANGE UP (ref 3.5–5.3)
POTASSIUM SERPL-SCNC: 4.2 MMOL/L — SIGNIFICANT CHANGE UP (ref 3.5–5.3)
RBC # BLD: 3.59 M/UL — LOW (ref 4.2–5.8)
RBC # FLD: 14.6 % — HIGH (ref 10.3–14.5)
SARS-COV-2 RNA SPEC QL NAA+PROBE: SIGNIFICANT CHANGE UP
SODIUM SERPL-SCNC: 136 MMOL/L — SIGNIFICANT CHANGE UP (ref 135–145)
VANCOMYCIN TROUGH SERPL-MCNC: 10.1 UG/ML — SIGNIFICANT CHANGE UP (ref 10–20)
WBC # BLD: 15.06 K/UL — HIGH (ref 3.8–10.5)
WBC # FLD AUTO: 15.06 K/UL — HIGH (ref 3.8–10.5)

## 2021-08-02 PROCEDURE — 82947 ASSAY GLUCOSE BLOOD QUANT: CPT

## 2021-08-02 PROCEDURE — 97162 PT EVAL MOD COMPLEX 30 MIN: CPT

## 2021-08-02 PROCEDURE — 82435 ASSAY OF BLOOD CHLORIDE: CPT

## 2021-08-02 PROCEDURE — 36430 TRANSFUSION BLD/BLD COMPNT: CPT

## 2021-08-02 PROCEDURE — 71045 X-RAY EXAM CHEST 1 VIEW: CPT

## 2021-08-02 PROCEDURE — 82330 ASSAY OF CALCIUM: CPT

## 2021-08-02 PROCEDURE — 71045 X-RAY EXAM CHEST 1 VIEW: CPT | Mod: 26

## 2021-08-02 PROCEDURE — 83605 ASSAY OF LACTIC ACID: CPT

## 2021-08-02 PROCEDURE — 87070 CULTURE OTHR SPECIMN AEROBIC: CPT

## 2021-08-02 PROCEDURE — 82803 BLOOD GASES ANY COMBINATION: CPT

## 2021-08-02 PROCEDURE — 99223 1ST HOSP IP/OBS HIGH 75: CPT

## 2021-08-02 PROCEDURE — 84540 ASSAY OF URINE/UREA-N: CPT

## 2021-08-02 PROCEDURE — 83735 ASSAY OF MAGNESIUM: CPT

## 2021-08-02 PROCEDURE — 72192 CT PELVIS W/O DYE: CPT

## 2021-08-02 PROCEDURE — 85018 HEMOGLOBIN: CPT

## 2021-08-02 PROCEDURE — 93306 TTE W/DOPPLER COMPLETE: CPT

## 2021-08-02 PROCEDURE — 88304 TISSUE EXAM BY PATHOLOGIST: CPT

## 2021-08-02 PROCEDURE — C1769: CPT

## 2021-08-02 PROCEDURE — 94002 VENT MGMT INPAT INIT DAY: CPT

## 2021-08-02 PROCEDURE — 84439 ASSAY OF FREE THYROXINE: CPT

## 2021-08-02 PROCEDURE — C1889: CPT

## 2021-08-02 PROCEDURE — 72131 CT LUMBAR SPINE W/O DYE: CPT

## 2021-08-02 PROCEDURE — 86901 BLOOD TYPING SEROLOGIC RH(D): CPT

## 2021-08-02 PROCEDURE — P9037: CPT

## 2021-08-02 PROCEDURE — 72020 X-RAY EXAM OF SPINE 1 VIEW: CPT

## 2021-08-02 PROCEDURE — 84484 ASSAY OF TROPONIN QUANT: CPT

## 2021-08-02 PROCEDURE — 83690 ASSAY OF LIPASE: CPT

## 2021-08-02 PROCEDURE — 93005 ELECTROCARDIOGRAM TRACING: CPT

## 2021-08-02 PROCEDURE — 86850 RBC ANTIBODY SCREEN: CPT

## 2021-08-02 PROCEDURE — 93970 EXTREMITY STUDY: CPT

## 2021-08-02 PROCEDURE — 81003 URINALYSIS AUTO W/O SCOPE: CPT

## 2021-08-02 PROCEDURE — C9399: CPT

## 2021-08-02 PROCEDURE — 84132 ASSAY OF SERUM POTASSIUM: CPT

## 2021-08-02 PROCEDURE — 83935 ASSAY OF URINE OSMOLALITY: CPT

## 2021-08-02 PROCEDURE — U0005: CPT

## 2021-08-02 PROCEDURE — P9011: CPT

## 2021-08-02 PROCEDURE — C1781: CPT

## 2021-08-02 PROCEDURE — 36569 INSJ PICC 5 YR+ W/O IMAGING: CPT

## 2021-08-02 PROCEDURE — 84295 ASSAY OF SERUM SODIUM: CPT

## 2021-08-02 PROCEDURE — 74018 RADEX ABDOMEN 1 VIEW: CPT

## 2021-08-02 PROCEDURE — 80048 BASIC METABOLIC PNL TOTAL CA: CPT

## 2021-08-02 PROCEDURE — 87077 CULTURE AEROBIC IDENTIFY: CPT

## 2021-08-02 PROCEDURE — 87186 SC STD MICRODIL/AGAR DIL: CPT

## 2021-08-02 PROCEDURE — 82570 ASSAY OF URINE CREATININE: CPT

## 2021-08-02 PROCEDURE — 84300 ASSAY OF URINE SODIUM: CPT

## 2021-08-02 PROCEDURE — P9016: CPT

## 2021-08-02 PROCEDURE — 85025 COMPLETE CBC W/AUTO DIFF WBC: CPT

## 2021-08-02 PROCEDURE — 76000 FLUOROSCOPY <1 HR PHYS/QHP: CPT

## 2021-08-02 PROCEDURE — 84100 ASSAY OF PHOSPHORUS: CPT

## 2021-08-02 PROCEDURE — 80202 ASSAY OF VANCOMYCIN: CPT

## 2021-08-02 PROCEDURE — 36600 WITHDRAWAL OF ARTERIAL BLOOD: CPT

## 2021-08-02 PROCEDURE — 97116 GAIT TRAINING THERAPY: CPT

## 2021-08-02 PROCEDURE — 87641 MR-STAPH DNA AMP PROBE: CPT

## 2021-08-02 PROCEDURE — 94660 CPAP INITIATION&MGMT: CPT

## 2021-08-02 PROCEDURE — 97166 OT EVAL MOD COMPLEX 45 MIN: CPT

## 2021-08-02 PROCEDURE — 97110 THERAPEUTIC EXERCISES: CPT

## 2021-08-02 PROCEDURE — 85014 HEMATOCRIT: CPT

## 2021-08-02 PROCEDURE — 87640 STAPH A DNA AMP PROBE: CPT

## 2021-08-02 PROCEDURE — C1713: CPT

## 2021-08-02 PROCEDURE — C1751: CPT

## 2021-08-02 PROCEDURE — U0003: CPT

## 2021-08-02 PROCEDURE — 97530 THERAPEUTIC ACTIVITIES: CPT

## 2021-08-02 PROCEDURE — 86900 BLOOD TYPING SEROLOGIC ABO: CPT

## 2021-08-02 PROCEDURE — 81001 URINALYSIS AUTO W/SCOPE: CPT

## 2021-08-02 PROCEDURE — 71275 CT ANGIOGRAPHY CHEST: CPT

## 2021-08-02 PROCEDURE — P9045: CPT

## 2021-08-02 PROCEDURE — 86769 SARS-COV-2 COVID-19 ANTIBODY: CPT

## 2021-08-02 PROCEDURE — 74177 CT ABD & PELVIS W/CONTRAST: CPT

## 2021-08-02 PROCEDURE — 85730 THROMBOPLASTIN TIME PARTIAL: CPT

## 2021-08-02 PROCEDURE — 84443 ASSAY THYROID STIM HORMONE: CPT

## 2021-08-02 PROCEDURE — 86923 COMPATIBILITY TEST ELECTRIC: CPT

## 2021-08-02 PROCEDURE — 87040 BLOOD CULTURE FOR BACTERIA: CPT

## 2021-08-02 PROCEDURE — 99233 SBSQ HOSP IP/OBS HIGH 50: CPT

## 2021-08-02 PROCEDURE — 80076 HEPATIC FUNCTION PANEL: CPT

## 2021-08-02 PROCEDURE — 85610 PROTHROMBIN TIME: CPT

## 2021-08-02 PROCEDURE — 97535 SELF CARE MNGMENT TRAINING: CPT

## 2021-08-02 PROCEDURE — 99232 SBSQ HOSP IP/OBS MODERATE 35: CPT

## 2021-08-02 PROCEDURE — 85027 COMPLETE CBC AUTOMATED: CPT

## 2021-08-02 PROCEDURE — 82565 ASSAY OF CREATININE: CPT

## 2021-08-02 RX ORDER — TRAMADOL HYDROCHLORIDE 50 MG/1
0.5 TABLET ORAL
Qty: 0 | Refills: 0 | DISCHARGE
Start: 2021-08-02

## 2021-08-02 RX ORDER — PANTOPRAZOLE SODIUM 20 MG/1
40 TABLET, DELAYED RELEASE ORAL
Refills: 0 | Status: DISCONTINUED | OUTPATIENT
Start: 2021-08-02 | End: 2021-08-26

## 2021-08-02 RX ORDER — OMEGA-3 ACID ETHYL ESTERS 1 G
1 CAPSULE ORAL
Qty: 0 | Refills: 0 | DISCHARGE

## 2021-08-02 RX ORDER — LEVOTHYROXINE SODIUM 125 MCG
137 TABLET ORAL DAILY
Refills: 0 | Status: DISCONTINUED | OUTPATIENT
Start: 2021-08-02 | End: 2021-08-26

## 2021-08-02 RX ORDER — CHLORHEXIDINE GLUCONATE 213 G/1000ML
1 SOLUTION TOPICAL
Refills: 0 | Status: DISCONTINUED | OUTPATIENT
Start: 2021-08-02 | End: 2021-08-26

## 2021-08-02 RX ORDER — TRAMADOL HYDROCHLORIDE 50 MG/1
1 TABLET ORAL
Qty: 0 | Refills: 0 | DISCHARGE
Start: 2021-08-02

## 2021-08-02 RX ORDER — ENOXAPARIN SODIUM 100 MG/ML
70 INJECTION SUBCUTANEOUS
Refills: 0 | Status: DISCONTINUED | OUTPATIENT
Start: 2021-08-02 | End: 2021-08-11

## 2021-08-02 RX ORDER — PANTOPRAZOLE SODIUM 20 MG/1
1 TABLET, DELAYED RELEASE ORAL
Qty: 0 | Refills: 0 | DISCHARGE
Start: 2021-08-02

## 2021-08-02 RX ORDER — METRONIDAZOLE 500 MG
1 TABLET ORAL
Qty: 0 | Refills: 0 | DISCHARGE
Start: 2021-08-02

## 2021-08-02 RX ORDER — GABAPENTIN 400 MG/1
200 CAPSULE ORAL EVERY 8 HOURS
Refills: 0 | Status: DISCONTINUED | OUTPATIENT
Start: 2021-08-02 | End: 2021-08-26

## 2021-08-02 RX ORDER — LANOLIN ALCOHOL/MO/W.PET/CERES
1 CREAM (GRAM) TOPICAL
Qty: 0 | Refills: 0 | DISCHARGE
Start: 2021-08-02

## 2021-08-02 RX ORDER — LANOLIN ALCOHOL/MO/W.PET/CERES
3 CREAM (GRAM) TOPICAL AT BEDTIME
Refills: 0 | Status: DISCONTINUED | OUTPATIENT
Start: 2021-08-02 | End: 2021-08-05

## 2021-08-02 RX ORDER — METRONIDAZOLE 500 MG
500 TABLET ORAL EVERY 8 HOURS
Refills: 0 | Status: DISCONTINUED | OUTPATIENT
Start: 2021-08-02 | End: 2021-08-02

## 2021-08-02 RX ORDER — VANCOMYCIN HCL 1 G
1.25 VIAL (EA) INTRAVENOUS
Qty: 0 | Refills: 0 | DISCHARGE
Start: 2021-08-02

## 2021-08-02 RX ORDER — ACETAMINOPHEN 500 MG
650 TABLET ORAL EVERY 6 HOURS
Refills: 0 | Status: DISCONTINUED | OUTPATIENT
Start: 2021-08-02 | End: 2021-08-26

## 2021-08-02 RX ORDER — ENOXAPARIN SODIUM 100 MG/ML
70 INJECTION SUBCUTANEOUS
Qty: 0 | Refills: 0 | DISCHARGE
Start: 2021-08-02

## 2021-08-02 RX ORDER — TRAMADOL HYDROCHLORIDE 50 MG/1
50 TABLET ORAL EVERY 6 HOURS
Refills: 0 | Status: DISCONTINUED | OUTPATIENT
Start: 2021-08-02 | End: 2021-08-06

## 2021-08-02 RX ORDER — TRAMADOL HYDROCHLORIDE 50 MG/1
25 TABLET ORAL EVERY 6 HOURS
Refills: 0 | Status: DISCONTINUED | OUTPATIENT
Start: 2021-08-02 | End: 2021-08-06

## 2021-08-02 RX ORDER — GABAPENTIN 400 MG/1
2 CAPSULE ORAL
Qty: 0 | Refills: 0 | DISCHARGE
Start: 2021-08-02

## 2021-08-02 RX ORDER — VANCOMYCIN HCL 1 G
1250 VIAL (EA) INTRAVENOUS EVERY 12 HOURS
Refills: 0 | Status: DISCONTINUED | OUTPATIENT
Start: 2021-08-02 | End: 2021-08-26

## 2021-08-02 RX ORDER — METRONIDAZOLE 500 MG
500 TABLET ORAL EVERY 8 HOURS
Refills: 0 | Status: DISCONTINUED | OUTPATIENT
Start: 2021-08-02 | End: 2021-08-26

## 2021-08-02 RX ORDER — VANCOMYCIN HCL 1 G
1250 VIAL (EA) INTRAVENOUS
Refills: 0 | Status: DISCONTINUED | OUTPATIENT
Start: 2021-08-02 | End: 2021-08-02

## 2021-08-02 RX ADMIN — Medication 100 MILLIGRAM(S): at 05:47

## 2021-08-02 RX ADMIN — CHLORHEXIDINE GLUCONATE 1 APPLICATION(S): 213 SOLUTION TOPICAL at 05:49

## 2021-08-02 RX ADMIN — Medication 1 TABLET(S): at 12:36

## 2021-08-02 RX ADMIN — CHLORHEXIDINE GLUCONATE 1 APPLICATION(S): 213 SOLUTION TOPICAL at 12:37

## 2021-08-02 RX ADMIN — GABAPENTIN 200 MILLIGRAM(S): 400 CAPSULE ORAL at 14:53

## 2021-08-02 RX ADMIN — GABAPENTIN 200 MILLIGRAM(S): 400 CAPSULE ORAL at 05:48

## 2021-08-02 RX ADMIN — Medication 500 MILLIGRAM(S): at 13:44

## 2021-08-02 RX ADMIN — ENOXAPARIN SODIUM 70 MILLIGRAM(S): 100 INJECTION SUBCUTANEOUS at 20:43

## 2021-08-02 RX ADMIN — Medication 500 MILLIGRAM(S): at 22:16

## 2021-08-02 RX ADMIN — Medication 650 MILLIGRAM(S): at 21:35

## 2021-08-02 RX ADMIN — Medication 166.67 MILLIGRAM(S): at 20:44

## 2021-08-02 RX ADMIN — ONDANSETRON 4 MILLIGRAM(S): 8 TABLET, FILM COATED ORAL at 15:08

## 2021-08-02 RX ADMIN — PANTOPRAZOLE SODIUM 40 MILLIGRAM(S): 20 TABLET, DELAYED RELEASE ORAL at 05:50

## 2021-08-02 RX ADMIN — GABAPENTIN 200 MILLIGRAM(S): 400 CAPSULE ORAL at 22:16

## 2021-08-02 RX ADMIN — Medication 250 MILLIGRAM(S): at 05:48

## 2021-08-02 RX ADMIN — Medication 650 MILLIGRAM(S): at 20:42

## 2021-08-02 RX ADMIN — ENOXAPARIN SODIUM 70 MILLIGRAM(S): 100 INJECTION SUBCUTANEOUS at 05:48

## 2021-08-02 RX ADMIN — Medication 3 MILLIGRAM(S): at 22:16

## 2021-08-02 NOTE — PROGRESS NOTE ADULT - THIS PATIENT HAS THE FOLLOWING CONDITION(S)/DIAGNOSES ON THIS ADMISSION:
None
None/Acute Respiratory Failure
None

## 2021-08-02 NOTE — PROGRESS NOTE ADULT - ASSESSMENT
61y male with PMH of thyroid cancer, status post total thyroidectomy in 2010 and radioactive iodine treatment in 2011, hypogonadism, vitamin D deficiency, and osteopenia, with chronic constipation, right buttock and right scrotal pain and numbness. 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 The chordoma resulted in perineal numbness and overflow incontinence and he was admitted 7/7/21 for staged resection. On 7/7, he underwent Plastic Surgery and General Surgery assisted vertical rectus abdominal myocutaneous flap harvest with transperitoneal ligation of internal iliac artery and vein.  S/p en bloc sacrectomy/ with L4-pelvis fusion. Extubated 7/9 with hypoxia respiratory distress, desaturation CPAP at night CTA revealed bilateral sub segmental PE and DVT. No right heart strain & Bibasilar and left lingular consolidative opacities. Started on Heparin gtt. Treated with Levaquin for 7 days. Course further c/b ileus requiring gastric decompression. S/p inferior sacral wound debridement at bedside 7/20/21. He is reported to have leukocytosis. ID consulted.  CT noted sacral collection. For this reason the patient was started on empiric Vanco and Zosyn,    7/12 grew Enterobacter- treated with IV Levaquin from 7/14-->7/20.   Recent fever and leukocytosis likely secondary to infected sacral collection which was drained on 7/26.   Pus encountered as well as 1 exposed screw.  7/22 blood cx no growth- final.  His leukocytosis has moderated post op.  OR cultures are with coag negative staph, MRSA, enterococcus and bacteroides. One exposed screw seen, Hopefully source control has been achieved.  He is now on vanco/metronidazole, level is 10.1 on a vanco dose of 1 gm BID.  PLAN:  He has a tony as well, ? if to keep wound clean vs bladder dysfunction    Plan   Increase vanco dose to 1.25 gm BID  Change flagyl to PO - since tolerating soft oral diet now  POD# 7/42 extended course planned for sacral collection.  Supportive care per medicine  Anticoagulation per other services for B/L DVT  Wound care per PRS  Diverting ostomy to keep sacral wound clean.

## 2021-08-02 NOTE — PROGRESS NOTE ADULT - ASSESSMENT
61 year-old man with history of thyroid cancer status post total thyroidectomy in 2010 and radioactive iodine treatment in 2011, hypogonadism, vitamin D deficiency, and osteopenia, with chronic constipation,  right buttock and right scrotal pain and numbness. 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 The chordoma resulted in perineal numbness and overflow incontinence and he was admitted 7/7/21 for staged resection. On 7/7, he underwent Plastic Surgery and General Surgery assisted vertical rectus abdominal myocutaneous flap harvest with transperitoneal ligation of internal iliac artery and vein.  s/p en bloc sacrectomy/ with L4-pelvis fusion; EBL 4.5L status post 8 units PRBC, 6 units FFP, 1 pack platelets and 5L crystalloid; 7/8. Extubated 7/9 with hypoxia respiratory distress, desaturation CPAP  at night CTA revealed bilateral sub  segmental PE. No right heart strain & Bibasilar and left lingular consolidative opacities. Started on Heparin gtt. PTT goal 50-70 Treated with Levaquin for 7 days Course further c/b ileus requiring gastric decompression. Pt pending echo to check for RV per pulm. Now with leucocytosis and febrile overnight. ID following on Vanco and zosyn for emperic coverage. Wound washout with plastics and duc pus noted, entire wound reopened on 7/26/21 and placement of 2 drains. Incontinent of stool. Cultures growing coag neg staph/staph aureus/enterococcus. S/P Diversion Colostomy to keep sacral incision clean on 7/28/21.       PLAN:  Neuro:   - pain control- continue oxycodone prn, gabapentin 200q8  Respiratory:   -  PE- continue therapeutic lovenox   CV:  - vitals stable  Endocrine:   - hypothyroid- continue synthroid          DVT ppx:   - venodynes, LE dopp neg for DVT  ID:   - infected sacral collection- cultures growing rare coag neg staph, MRSA, enterococcus and bacteroides- continue vancomycin and flagyl per ID  - PICC line for placed for longterm IV antibiotics,  GI:    - incontinent of stool- s/p diversion colostomy (full ostomy function)  - advance to regular diet  - continue tony for urinary incontinence to keep sacral wound clean  PT/OT:   - Acute rehab      [facility] # [id number]

## 2021-08-02 NOTE — H&P ADULT - NSHPSOCIALHISTORY_GEN_ALL_CORE
SOCIAL HISTORY  - Smoking: denied  - Alcohol: denied  - Drug Use: denied    FUNCTIONAL HISTORY  lives with dad in private house with 4 steps to enter with bilateral rails, right hand dominant, wears glasses, was driving, not working, small shower stall without grab bars    CURRENT FUNCTIONAL STATUS  8/1  Bed Mobility  Bed Mobility Training Rolling/Turning: independent;  supervsion;  bed rails  Bed Mobility Training Scooting: supervsion;  supervision;  bed rails  Bed Mobility Training Sit-to-Supine: contact guard;  1 person assist;  nonverbal cues (demo/gestures);  verbal cues;  bed rails  Bed Mobility Training Supine-to-Sit: minimum assist (75% patient effort);  1 person assist;  nonverbal cues (demo/gestures);  verbal cues;  bed rails  Bed Mobility Training Limitations: decreased ability to use arms for pushing/pulling;  decreased ability to use legs for bridging/pushing;  impaired ability to control trunk for mobility;  decreased strength;  impaired balance    Sit-Stand Transfer Training  Transfer Training Sit-to-Stand Transfer: minimum assist (75% patient effort);  2 person assist;  verbal cues;  nonverbal cues (demo/gestures);  full weight-bearing   rolling walker  Transfer Training Stand-to-Sit Transfer: minimum assist (75% patient effort);  2 person assist;  nonverbal cues (demo/gestures);  verbal cues;  full weight-bearing   rolling walker  Sit-to-Stand Transfer Training Transfer Safety Analysis: decreased balance;  decreased step length;  decreased strength;  impaired balance;  rolling walker    Gait Training  Gait Training: contact guard;  1 person assist;  nonverbal cues (demo/gestures);  verbal cues;  full weight-bearing   rolling walker;  sidesteps to HOB  Gait Analysis: 3-point gait   decreased gomez;  increased time in double stance;  decreased step length;  decreased stride length;  decreased strength;  impaired balance;  side steps to HOB;  rolling walker

## 2021-08-02 NOTE — PROGRESS NOTE ADULT - ASSESSMENT
61 year old male with PMH of Thyroid Cancer, s/p Total Thyroidectomy 2010 and Radioactive Iodine 2011, Hypogonadism, Vitamin D Deficiency, Osteopenia with recently diagnosed Sacral Tumor.     On 7/7, he underwent Plastic Surgery and General Surgery assisted vertical rectus abdominal myocutaneous flap harvest with transperitoneal ligation of internal iliac artery and vein. On 7/8 he underwent an en bloc sacrectomy for resection of chordoma w/ L4-pelvis instrumentation and fusion and plastics closure.    HOSPITAL COURSE:  7/7: Stage 1 - VRAM harvest, iliac artery and vein ligation  7/8: Stage 2 - sacrectomy with L4-pelvis fusion; EBL 4.5L status post 8 units PRBC, 6 units FFP, 1 pack platelets and 5L crystalloid; post-op status post 1 pack platelets  7/10: CTA chest: b/l upper lobe segmental PEs requiring high flow nasal cannula  7/11: Nausea/vomiting sec to ileus. NGT placed to low wall suction with immediate 1.2L bilious output.  7/17: started on Levofloxacin for Enterobacter in sputum  7/23: Started on vanc/zosyn - CT abd/pelvis with increased fluid collection, possible proctitis   7/26: Posterior incision opened, duc pus encountered, cultures taken. Necrotic tissue debrided. One exposed screw. Pulse lavage. New drains placed through new incisions. Closed in layers  7/28: colostomy      Pt currently in bed, some abdominal pain this am, now improved.

## 2021-08-02 NOTE — PROVIDER CONTACT NOTE (OTHER) - RECOMMENDATIONS
Decrease Heparin
Put patient back on high flow nasal cannula
1Unit of blood
as per team
exchange drain
Rectal tube or alternative
Change Heparin and replace tony
assess & eval, change dressing
notify provider
notify provider
as per team
notify provider

## 2021-08-02 NOTE — PROGRESS NOTE ADULT - SUBJECTIVE AND OBJECTIVE BOX
HPI:  62yo Male with Hx of __ who p/w __, now POD_ from     24h Events:   - Overnight, no acute events    Subjective:   Patient examined at bedside this AM. Reports     Objective:  Vital Signs  T(C): 36.6 (08-02 @ 04:56), Max: 37.1 (08-01 @ 19:45)  HR: 80 (08-02 @ 04:56) (80 - 89)  BP: 144/80 (08-02 @ 04:56) (113/74 - 144/80)  RR: 18 (08-02 @ 04:56) (18 - 18)  SpO2: 95% (08-02 @ 04:56) (95% - 97%)  08-01-21 @ 07:01  -  08-02-21 @ 07:00  --------------------------------------------------------  IN:  Total IN: 0 mL    OUT:    Bulb (mL): 105 mL    Bulb (mL): 110 mL    Colostomy (mL): 350 mL    Indwelling Catheter - Urethral (mL): 2000 mL  Total OUT: 2565 mL    Total NET: -2565 mL          Physical Exam:  General: alert and oriented, NAD  Resp: airway patent, respirations unlabored  CVS: regular rate and rhythm  Abdomen: colostomy with bowel sweat, stool, gas, ostomy without bleeding or signs of necrosis/infection  Extremities: no edema  Skin: warm, dry, appropriate color    Labs:                        9.9    15.06 )-----------( 427      ( 02 Aug 2021 06:18 )             32.1   08-02    136  |  102  |  12  ----------------------------<  106<H>  4.2   |  23  |  0.71    Ca    9.3      02 Aug 2021 06:18      CAPILLARY BLOOD GLUCOSE          Microbiology:    Culture - Blood (collected 29 Jul 2021 03:37)  Source: .Blood Blood-Venous  Preliminary Report (30 Jul 2021 04:01):    No growth to date.    Culture - Blood (collected 29 Jul 2021 03:37)  Source: .Blood Blood-Peripheral  Preliminary Report (30 Jul 2021 04:01):    No growth to date.        Medications:   MEDICATIONS  (STANDING):  calcium carbonate 1250 mG  + Vitamin D (OsCal 500 + D) 1 Tablet(s) Oral daily  chlorhexidine 2% Cloths 1 Application(s) Topical daily  chlorhexidine 4% Liquid 1 Application(s) Topical <User Schedule>  enoxaparin Injectable 70 milliGRAM(s) SubCutaneous two times a day  gabapentin 200 milliGRAM(s) Oral every 8 hours  levothyroxine 137 MICROGram(s) Oral at bedtime  melatonin 5 milliGRAM(s) Oral at bedtime  metroNIDAZOLE  IVPB 500 milliGRAM(s) IV Intermittent every 8 hours  multivitamin 1 Tablet(s) Oral daily  pantoprazole    Tablet 40 milliGRAM(s) Oral before breakfast  sodium chloride 0.9%. 1000 milliLiter(s) (50 mL/Hr) IV Continuous <Continuous>  vancomycin  IVPB 1000 milliGRAM(s) IV Intermittent two times a day    MEDICATIONS  (PRN):  aluminum hydroxide/magnesium hydroxide/simethicone Suspension 30 milliLiter(s) Oral every 4 hours PRN Dyspepsia  HYDROmorphone  Injectable 0.5 milliGRAM(s) IV Push every 6 hours PRN breakthrough pain  ondansetron Injectable 4 milliGRAM(s) IV Push every 6 hours PRN Nausea  sodium chloride 0.9% lock flush 10 milliLiter(s) IV Push every 1 hour PRN Pre/post blood products, medications, blood draw, and to maintain line patency  traMADol 25 milliGRAM(s) Oral every 6 hours PRN Moderate Pain (4 - 6)  traMADol 50 milliGRAM(s) Oral every 6 hours PRN Severe Pain (7 - 10)       HPI:  POD5 sigmoid colostomy    24h Events:   - Overnight, no acute events    Subjective:   Patient examined at bedside this AM. Reports improving abdominal pain. Was OOBTC yesterday. Anticipating working with PT today. Continues to tolerate diet.     Objective:  Vital Signs  T(C): 36.6 (08-02 @ 04:56), Max: 37.1 (08-01 @ 19:45)  HR: 80 (08-02 @ 04:56) (80 - 89)  BP: 144/80 (08-02 @ 04:56) (113/74 - 144/80)  RR: 18 (08-02 @ 04:56) (18 - 18)  SpO2: 95% (08-02 @ 04:56) (95% - 97%)  08-01-21 @ 07:01  -  08-02-21 @ 07:00  --------------------------------------------------------  IN:  Total IN: 0 mL    OUT:    Bulb (mL): 105 mL    Bulb (mL): 110 mL    Colostomy (mL): 350 mL    Indwelling Catheter - Urethral (mL): 2000 mL  Total OUT: 2565 mL    Total NET: -2565 mL          Physical Exam:  General: alert and oriented, NAD  Resp: airway patent, respirations unlabored  CVS: regular rate and rhythm  Abdomen: colostomy with bowel sweat, stool, gas, ostomy without bleeding or signs of necrosis/infection  Extremities: no edema  Skin: warm, dry, appropriate color    Labs:                        9.9    15.06 )-----------( 427      ( 02 Aug 2021 06:18 )             32.1   08-02    136  |  102  |  12  ----------------------------<  106<H>  4.2   |  23  |  0.71    Ca    9.3      02 Aug 2021 06:18      CAPILLARY BLOOD GLUCOSE          Microbiology:    Culture - Blood (collected 29 Jul 2021 03:37)  Source: .Blood Blood-Venous  Preliminary Report (30 Jul 2021 04:01):    No growth to date.    Culture - Blood (collected 29 Jul 2021 03:37)  Source: .Blood Blood-Peripheral  Preliminary Report (30 Jul 2021 04:01):    No growth to date.        Medications:   MEDICATIONS  (STANDING):  calcium carbonate 1250 mG  + Vitamin D (OsCal 500 + D) 1 Tablet(s) Oral daily  chlorhexidine 2% Cloths 1 Application(s) Topical daily  chlorhexidine 4% Liquid 1 Application(s) Topical <User Schedule>  enoxaparin Injectable 70 milliGRAM(s) SubCutaneous two times a day  gabapentin 200 milliGRAM(s) Oral every 8 hours  levothyroxine 137 MICROGram(s) Oral at bedtime  melatonin 5 milliGRAM(s) Oral at bedtime  metroNIDAZOLE  IVPB 500 milliGRAM(s) IV Intermittent every 8 hours  multivitamin 1 Tablet(s) Oral daily  pantoprazole    Tablet 40 milliGRAM(s) Oral before breakfast  sodium chloride 0.9%. 1000 milliLiter(s) (50 mL/Hr) IV Continuous <Continuous>  vancomycin  IVPB 1000 milliGRAM(s) IV Intermittent two times a day    MEDICATIONS  (PRN):  aluminum hydroxide/magnesium hydroxide/simethicone Suspension 30 milliLiter(s) Oral every 4 hours PRN Dyspepsia  HYDROmorphone  Injectable 0.5 milliGRAM(s) IV Push every 6 hours PRN breakthrough pain  ondansetron Injectable 4 milliGRAM(s) IV Push every 6 hours PRN Nausea  sodium chloride 0.9% lock flush 10 milliLiter(s) IV Push every 1 hour PRN Pre/post blood products, medications, blood draw, and to maintain line patency  traMADol 25 milliGRAM(s) Oral every 6 hours PRN Moderate Pain (4 - 6)  traMADol 50 milliGRAM(s) Oral every 6 hours PRN Severe Pain (7 - 10)

## 2021-08-02 NOTE — DISCHARGE NOTE PROVIDER - HOSPITAL COURSE
61 year-old man with history of thyroid cancer status post total thyroidectomy in 2010 and radioactive iodine treatment in 2011, hypogonadism, vitamin D deficiency, and osteopenia, with chronic constipation,  right buttock and right scrotal pain and numbness. 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 The chordoma resulted in perineal numbness and overflow incontinence and he was admitted 7/7/21 for staged resection.   On 7/7, he underwent Plastic Surgery and General Surgery assisted vertical rectus abdominal myocutaneous flap harvest with transperitoneal ligation of internal iliac artery and vein.  s/p en bloc sacrectomy/ with L4-pelvis fusion; EBL 4.5L status post 8 units PRBC, 6 units FFP, 1 pack platelets and 5L crystalloid; 7/8. Extubated. On 7/9  patient developed hypoxia and respiratory distress, desaturation. CPAP at night.  CTA revealed bilateral sub  segmental PE. No right heart strain & Bibasilar and left lingular consolidative opacities. Started on Heparin gtt and transitioned to therapeutic lovenox on 8/1. Course further c/b ileus requiring gastric decompression. Pt pending echo to check for RV per pulm, grossly unremarkable.  Wound washout with plastics and duc pus noted, entire wound reopened on 7/26/21 and placement of 2 drains. Incontinent of stool. Cultures growing MRSA, enterococcus and bacteroides. ID following. Continue vancomycin and flagyl. S/P Diversion Colostomy to keep sacral incision clean on 7/28/21. Barnett for urinary incontinence and to keep sacral wound clean. Pt requires 6 week course of antibiotics. PICC line in place. LE doppler neg for DVT. Patient tolerating regular diet with fully functioning colostomy. Evaluated by Physical Therapy and recommended for Acute rehab. On day of discharge patient is medically and neurologically stable.        61 year-old man with history of thyroid cancer status post total thyroidectomy in 2010 and radioactive iodine treatment in 2011, hypogonadism, vitamin D deficiency, and osteopenia, with chronic constipation,  right buttock and right scrotal pain and numbness. 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 The chordoma resulted in perineal numbness and overflow incontinence and he was admitted 7/7/21 for staged resection.   On 7/7, he underwent Plastic Surgery and General Surgery assisted vertical rectus abdominal myocutaneous flap harvest with transperitoneal ligation of internal iliac artery and vein.  s/p en bloc sacrectomy/ with L4-pelvis fusion; EBL 4.5L status post 8 units PRBC, 6 units FFP, 1 pack platelets and 5L crystalloid; 7/8. Extubated. On 7/9  patient developed hypoxia and respiratory distress, desaturation. CPAP at night.  CTA revealed bilateral sub  segmental PE. No right heart strain & Bibasilar and left lingular consolidative opacities. Started on Heparin gtt and transitioned to therapeutic lovenox on 8/1. Course further c/b ileus requiring gastric decompression. Pt pending echo to check for RV per pulm, grossly unremarkable.  Wound washout with plastics and duc pus noted, entire wound reopened on 7/26/21 and placement of 2 drains. Incontinent of stool. Cultures growing MRSA, enterococcus and bacteroides. ID following. Continue vancomycin and flagyl. S/P Diversion Colostomy to keep sacral incision clean on 7/28/21. Barnett for urinary incontinence and to keep sacral wound clean. Pt requires 6 week course of antibiotics until 9/7/21. PICC line in place. LE doppler neg for DVT. Patient tolerating regular diet with fully functioning colostomy. Evaluated by Physical Therapy and recommended for Acute rehab. On day of discharge patient is medically and neurologically stable.

## 2021-08-02 NOTE — DISCHARGE NOTE PROVIDER - CARE PROVIDER_API CALL
Lucho Mcallister)  Neurosurgery  [address] [facility], [address]  [place] [address]  Phone: [phone]  Fax: [phone]  Follow Up Time:    Lucho Mcallister)  Neurosurgery  [address] [facility], [address]  [place] [address]  Phone: [phone]  Fax: [phone]  Follow Up Time:     Colby Peraza  PLASTIC SURGERY  [address] [place] [address]  Phone: [phone]  Fax: [phone]  Follow Up Time:     Leopoldo Davis)  Surgery  [address] [facility], [place] [address]  Phone: [phone]  Fax: [phone]  Follow Up Time:

## 2021-08-02 NOTE — PROGRESS NOTE ADULT - REASON FOR ADMISSION
Removal of Sacral Tumor
Sacral Chordoma post resection
Sacrectomy
chordoma resection
sacral chordoma
staged sacral tumor removal
Chordoma section
Sacral Chordoma
Sacral tumor removal
Sacral tumor removal w VRAM flap
Sacral tumor removal with VRAM reconstruction
chordoma resection
sacral tumor
Chordoma Resection
Sacral Chordoma
chordoma resection
chordoma resection
sacral chordoma
Sacral Chordoma
Sacrectomy
chordoma
Sacral Tumor Removal
chordoma
sacral chordoma
wound
Chordoma Resection

## 2021-08-02 NOTE — PROVIDER CONTACT NOTE (OTHER) - DATE AND TIME:
27-Jul-2021 10:14
28-Jul-2021 05:53
29-Jul-2021 13:25
01-Aug-2021 06:40
14-Jul-2021 00:09
23-Jul-2021 00:42
27-Jul-2021 17:20
23-Jul-2021 00:48
29-Jul-2021 06:00
07-Jul-2021 19:35
14-Jul-2021 00:09
27-Jul-2021 21:00
18-Jul-2021 04:00
20-Jul-2021 18:00
01-Aug-2021 23:00
27-Jul-2021 23:00

## 2021-08-02 NOTE — PROVIDER CONTACT NOTE (OTHER) - REASON
PTT 44.3 and tony leaking
Patient's lumbar incision dressing soiled with loose bm
Rectal tube dislodged
Febrile 100.8
Right back hemovac dressing saturated
aptt resulted at 70.6
PTT 71.7
aPTT 90.4 goal 50-70
aptt resulted at 69.1
Drain clot
Pt drowsy - PCA not started as yet
patients aptt 53.4
Patient hemoglobin 7.8 from 8.3
Patient's pO2 is 55
Pt R back Hemovac dressing soiled
Pt with blood tinged stool

## 2021-08-02 NOTE — DISCHARGE NOTE PROVIDER - NSDCFUADDINST_GEN_ALL_CORE_FT
Return to ER for fever, chills, nausea or vomiting, severe headache or pain not relieved with pain medication, sluggishness or change in mental status, any bleeding or drainage from wound,  or any weakness of extremities. You may shower.   No rubbing or scrubbing of incision. Keep incision clean and dry. Do not apply creams or lotions to incision. No driving until cleared by your surgeon. Do not return to work until cleared by surgeon. Do not take advil, aleve or ibuprofen as they can cause bleeding.

## 2021-08-02 NOTE — PROGRESS NOTE ADULT - ASSESSMENT
61y Male patient s/p en bloc sacrectomy for chordoma w/ L4-pelvis fusion w/ plastics closure vertical rectus abdominis myocutaneous (VRAM) flap for chordoma on 07/07 and 07/08, post-op course c/b bilateral PEs (on hep gtt) and post-op ileus, resolved. Patient now incontinent of stool and soiling his posterior dressing s/p wound debridement with Plastic surgery 07/20 and 07/26, and now s/p sigmoid colostomy on 07/28 with full ostomy function.     Plan:  - Continue soft diet   - Monitor ostomy output/function  - Continued ostomy care and teaching  - Continue excellent care per neurosurgery    D/w Dr. Davis  [facility]  [phone]

## 2021-08-02 NOTE — H&P ADULT - HISTORY OF PRESENT ILLNESS
Pt is a 62 y/o M with PMHx of thyroid cancer status post total thyroidectomy in 2010 and radioactive iodine treatment in 2011, hypogonadism, vitamin D deficiency, and osteopenia, with chronic constipation, right buttock and right scrotal pain and numbness. 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 The chordoma resulted in perineal numbness and overflow incontinence and he was admitted 7/7/21 for staged resection.   On 7/7, he underwent Plastic Surgery and General Surgery assisted vertical rectus abdominal myocutaneous flap harvest with transperitoneal ligation of internal iliac artery and vein. He is s/p en bloc sacrectomy with L4-pelvis fusion; EBL 4.5L status post 8 units PRBC, 6 units FFP, 1 pack platelets and 5L crystalloid; 7/8. Extubated. On 7/9  patient developed hypoxia and respiratory distress, desaturation. CPAP at night.  CTA revealed bilateral sub segmental PE. No right heart strain & Bibasilar and left lingular consolidative opacities. Started on Heparin gtt and transitioned to therapeutic lovenox on 8/1. Course further c/b ileus requiring gastric decompression. Pt underwent echo to check for RV per pulm, and it was grossly unremarkable.  Wound washout with plastics and duc pus noted, entire wound reopened on 7/26/21 and placement of 2 drains. Incontinent of stool. Cultures growing MRSA, enterococcus and bacteroides. ID following. Continue vancomycin and flagyl. S/P Diversion Colostomy to keep sacral incision clean on 7/28/21. Barnett for urinary incontinence and to keep sacral wound clean. Pt requires 6 week course of antibiotics until 9/7/21. PICC line in place. LE doppler neg for DVT. Patient tolerating regular diet with fully functioning colostomy. Evaluated by Physical Therapy and PM&R and was recommended for Acute rehab. Pt was medically stable on 8/2/21 and was transferred to [facility].

## 2021-08-02 NOTE — PROGRESS NOTE ADULT - SUBJECTIVE AND OBJECTIVE BOX
no new complaints    REVIEW OF SYSTEMS  Constitutional - No fever,  No fatigue  HEENT - No vertigo, No neck pain  Neurological - No headaches, No memory loss, No loss of strength, No numbness, No tremors  Skin - No rashes, No lesions   Musculoskeletal - No joint pain, No joint swelling, No muscle pain  Psychiatric - No depression, No anxiety    FUNCTIONAL PROGRESS  8/1 PT  bed mobility min assist/CG  transfers min assist x 2, RW  gait CG with RW x sidesteps to HOB      VITALS  T(C): 36.4 (08-02-21 @ 08:18), Max: 37.1 (08-01-21 @ 19:45)  HR: 81 (08-02-21 @ 08:18) (80 - 89)  BP: 135/94 (08-02-21 @ 08:18) (113/74 - 144/80)  RR: 18 (08-02-21 @ 08:18) (18 - 18)  SpO2: 95% (08-02-21 @ 08:18) (95% - 96%)  Wt(kg): --    MEDICATIONS   aluminum hydroxide/magnesium hydroxide/simethicone Suspension 30 milliLiter(s) every 4 hours PRN  calcium carbonate 1250 mG  + Vitamin D (OsCal 500 + D) 1 Tablet(s) daily  chlorhexidine 2% Cloths 1 Application(s) daily  chlorhexidine 4% Liquid 1 Application(s) <User Schedule>  enoxaparin Injectable 70 milliGRAM(s) two times a day  gabapentin 200 milliGRAM(s) every 8 hours  HYDROmorphone  Injectable 0.5 milliGRAM(s) every 6 hours PRN  levothyroxine 137 MICROGram(s) at bedtime  melatonin 5 milliGRAM(s) at bedtime  metroNIDAZOLE  IVPB 500 milliGRAM(s) every 8 hours  multivitamin 1 Tablet(s) daily  ondansetron Injectable 4 milliGRAM(s) every 6 hours PRN  pantoprazole    Tablet 40 milliGRAM(s) before breakfast  sodium chloride 0.9% lock flush 10 milliLiter(s) every 1 hour PRN  traMADol 25 milliGRAM(s) every 6 hours PRN  traMADol 50 milliGRAM(s) every 6 hours PRN  vancomycin  IVPB 1250 milliGRAM(s) two times a day      RECENT LABS - Reviewed                        9.9    15.06 )-----------( 427      ( 02 Aug 2021 06:18 )             32.1     08-02    136  |  102  |  12  ----------------------------<  106<H>  4.2   |  23  |  0.71    Ca    9.3      02 Aug 2021 06:18      PT/INR - ( 01 Aug 2021 12:07 )   PT: 17.1 sec;   INR: 1.45 ratio         PTT - ( 02 Aug 2021 06:18 )  PTT:29.9 sec      ------------------------------------------------------------------------------  PHYSICAL EXAM  Constitutional - NAD, Comfortable, in bed    HEENT - NGT  Chest - Breathing comfortably   Cardiovascular - S1S2   Abdomen - Soft   Extremities - No C/C/E, No calf tenderness   Neurologic Exam -                    Cognitive - Awake, Alert, AAO to self, place, date, year, situation     Communication - Fluent, No dysarthria        Motor -                     LEFT    UE - ShAB 5/5, EF 5/5, EE 5/5, WE 5/5,  5/5                    RIGHT UE - ShAB 5/5, EF 5/5, EE 5/5, WE 5/5,  5/5                    LEFT    LE - HF 5/5, KE 5/5, DF 5/5, PF 5/5                    RIGHT LE - HF 5/5, KE 5/5, DF 5/5, PF 5/5        Sensory - Intact to LT     Psychiatric - Mood stable, Affect WNL  ----------------------------------------------------------------------------------------  ASSESSMENT/PLAN  61yMale with functional deficits after sacrectomy, L4-pelvis fusion for chordoma  post op PE   now on vanco/flagyl, extended course for sacral collection, PICC line placed, ID following   s/p OR wound debridement 7/26, diversion colostomy   VRAM flap, sacral wound, needs to lay in lateral position  Pain - Tylenol, tramadol, gabapentin   DVT PPX - SCDs, lovenox   Rehab - Will continue to follow for ongoing rehab needs and recommendations.   continue bedside PT/OT  out of bed to chair daily   Recommend ACUTE inpatient rehabilitation for the functional deficits consisting of 3 hours of therapy/day & 24 hour RN/daily PMR physician for comorbid medical management. Patient will be able to tolerate 3 hours a day.

## 2021-08-02 NOTE — DISCHARGE NOTE PROVIDER - NSDCCPCAREPLAN_GEN_ALL_CORE_FT
PRINCIPAL DISCHARGE DIAGNOSIS  Diagnosis: Sacral chordoma  Assessment and Plan of Treatment: On 7/7/21, underwent Plastic Surgery and General Surgery assisted vertical rectus abdominal myocutaneous flap harvest with transperitoneal ligation of internal iliac artery and vein.  s/p en bloc sacrectomy/ with L4-pelvis fusion; EBL 4.5L status post 8 units PRBC, 6 units FFP, 1 pack platelets and 5L crystalloid;  7/26/21 Wound washout with plastics and duc pus noted, entire wound reopened and placement of 2 drains.  Maintain and monitor SEDRICK drains.      SECONDARY DISCHARGE DIAGNOSES  Diagnosis: Acute pulmonary embolism  Assessment and Plan of Treatment: Continue therapeutic lovenox    Diagnosis: Colostomy present  Assessment and Plan of Treatment: S/P Diversion Colostomy to keep sacral incision clean on 7/28/21.    Diagnosis: Wound infection  Assessment and Plan of Treatment: Cultures growing MRSA, enterococcus and bacteroides. ID following. Continue vancomycin and flagyl for total 6 weeks antibiiotics. PICC line in place    Diagnosis: Barnett catheter present  Assessment and Plan of Treatment: Barnett for urinary incontinence and to keep sacral wound clean.     PRINCIPAL DISCHARGE DIAGNOSIS  Diagnosis: Sacral chordoma  Assessment and Plan of Treatment: On 7/7/21, underwent Plastic Surgery and General Surgery assisted vertical rectus abdominal myocutaneous flap harvest with transperitoneal ligation of internal iliac artery and vein.  s/p en bloc sacrectomy/ with L4-pelvis fusion; EBL 4.5L status post 8 units PRBC, 6 units FFP, 1 pack platelets and 5L crystalloid;  7/26/21 Wound washout with plastics and duc pus noted, entire wound reopened and placement of 2 drains.  Maintain and monitor SEDRICK drains.      SECONDARY DISCHARGE DIAGNOSES  Diagnosis: Acute pulmonary embolism  Assessment and Plan of Treatment: Continue therapeutic lovenox    Diagnosis: Colostomy present  Assessment and Plan of Treatment: S/P Diversion Colostomy to keep sacral incision clean on 7/28/21.    Diagnosis: Wound infection  Assessment and Plan of Treatment: Cultures growing MRSA, enterococcus and bacteroides. ID following. Continue vancomycin and flagyl for total 6 weeks antibiiotics until 9/7/21. PICC line in place    Diagnosis: Barnett catheter present  Assessment and Plan of Treatment: Barnett for urinary incontinence and to keep sacral wound clean.

## 2021-08-02 NOTE — PROGRESS NOTE ADULT - ASSESSMENT
Mr. Jackson is a 60 yo M with PMHx of thyroid cancer s/p total thyroidectomy (2010), HLD, Vit D deficiency, Osteopenia, and chordoma, who is now s/p Sacral sacrectomy and VRAM flap harvest (07/07, 07/08).    -POD 4 exploratory laparotomy and diverting ileostomy  -Regular diet  -Appreciate ID recs, Zosyn changed to Flagyl per sensitivities returning  -continue with aquacell dressing  -RN may change gauze/tegaderm at drain insertion sites b/l PRN   -rest of care per primary team   -PRS will continue to follow     Ny Oliva MD  PGY1 Surgery  #[id number] for questions   Mr. Jackson is a 60 yo M with PMHx of thyroid cancer s/p total thyroidectomy (2010), HLD, Vit D deficiency, Osteopenia, and chordoma, who is now s/p Sacral sacrectomy and VRAM flap harvest (07/07, 07/08).    -POD 4 exploratory laparotomy and diverting ileostomy  -Appreciate ID recs, Zosyn changed to Flagyl per sensitivities returning  -PRS will continue to follow   -continue with aquacell dressing  -RN may change gauze/tegaderm at drain insertion sites b/l PRN   -rest of care per primary team     Ny Oliva MD  PGY1 Surgery  #[phone] for questions   Mr. Jackson is a 60 yo M with PMHx of thyroid cancer s/p total thyroidectomy (2010), HLD, Vit D deficiency, Osteopenia, and chordoma, who is now s/p Sacral sacrectomy and VRAM flap harvest (07/07, 07/08).    -PRS will continue to follow   -continue with aquacell dressing  -Appreciate ID recs, Zosyn changed to Flagyl per sensitivities returning  -RN may change gauze/tegaderm at drain insertion sites b/l PRN   -rest of care per primary team     Ny Oliva MD  PGY1 Surgery  #[phone] for questions

## 2021-08-02 NOTE — DISCHARGE NOTE PROVIDER - NSDCMRMEDTOKEN_GEN_ALL_CORE_FT
Calcium 600+D 600 mg-200 intl units (5 mcg) oral tablet: 1 tab(s) orally once a day  Fish Oil 1000 mg oral capsule: 1 cap(s) orally once a day  Synthroid 125 mcg (0.125 mg) oral tablet: 1 tab(s) orally once a day  TriCor 134 mg oral capsule: 1 cap(s) orally once a day  Vitamin D3 2000 intl units (50 mcg) oral tablet: orally once a day   calcium-vitamin D 500 mg-5 mcg (200 intl units) oral tablet: 1 tab(s) orally once a day  enoxaparin: 70 milligram(s) subcutaneous 2 times a day  gabapentin 100 mg oral capsule: 2 cap(s) orally every 8 hours  melatonin 5 mg oral tablet: 1 tab(s) orally once a day (at bedtime)  metroNIDAZOLE 500 mg oral tablet: 1 tab(s) orally every 8 hours  Multiple Vitamins oral tablet: 1 tab(s) orally once a day  pantoprazole 40 mg oral delayed release tablet: 1 tab(s) orally once a day (before a meal)  Synthroid 125 mcg (0.125 mg) oral tablet: 1 tab(s) orally once a day  traMADol 50 mg oral tablet: 1 tab(s) orally every 6 hours, As needed, Severe Pain (7 - 10)  traMADol 50 mg oral tablet: 0.5 tab(s) orally every 6 hours, As needed, Moderate Pain (4 - 6)  TriCor 134 mg oral capsule: 1 cap(s) orally once a day  vancomycin 1.25 g intravenous injection: 1.25 gram(s) intravenous 2 times a day  Vitamin D3 2000 intl units (50 mcg) oral tablet: orally once a day

## 2021-08-02 NOTE — H&P ADULT - NSHPOUTPATIENTPROVIDERS_GEN_ALL_CORE
Lucho Mcallister (MD)  Neurosurgery  [address] [facility], [address]  [place] [address]  Phone: [phone]  Colby Peraza  PLASTIC SURGERY  [address] [place] [address]  Phone: [phone]    Leopoldo aDvis)  Surgery  [address] [facility], [place] [address]  Phone: [phone]

## 2021-08-02 NOTE — PROGRESS NOTE ADULT - SUBJECTIVE AND OBJECTIVE BOX
Patient is a 61y old  Male who presents with a chief complaint of sacral chordoma (02 Aug 2021 09:47)      SUBJECTIVE / OVERNIGHT EVENTS: Pt in bed, some improvement in pain today.     Vital Signs Last 24 Hrs  T(C): 36.4 (02 Aug 2021 08:18), Max: 37.1 (01 Aug 2021 19:45)  T(F): 97.6 (02 Aug 2021 08:18), Max: 98.8 (01 Aug 2021 19:45)  HR: 81 (02 Aug 2021 08:18) (80 - 89)  BP: 135/94 (02 Aug 2021 08:18) (113/74 - 144/80)  BP(mean): --  RR: 18 (02 Aug 2021 08:18) (18 - 18)  SpO2: 95% (02 Aug 2021 08:18) (95% - 96%)    MEDICATIONS  (STANDING):  calcium carbonate 1250 mG  + Vitamin D (OsCal 500 + D) 1 Tablet(s) Oral daily  chlorhexidine 2% Cloths 1 Application(s) Topical daily  chlorhexidine 4% Liquid 1 Application(s) Topical <User Schedule>  enoxaparin Injectable 70 milliGRAM(s) SubCutaneous two times a day  gabapentin 200 milliGRAM(s) Oral every 8 hours  levothyroxine 137 MICROGram(s) Oral at bedtime  melatonin 5 milliGRAM(s) Oral at bedtime  metroNIDAZOLE  IVPB 500 milliGRAM(s) IV Intermittent every 8 hours  multivitamin 1 Tablet(s) Oral daily  pantoprazole    Tablet 40 milliGRAM(s) Oral before breakfast  vancomycin  IVPB 1250 milliGRAM(s) IV Intermittent two times a day    MEDICATIONS  (PRN):  aluminum hydroxide/magnesium hydroxide/simethicone Suspension 30 milliLiter(s) Oral every 4 hours PRN Dyspepsia  HYDROmorphone  Injectable 0.5 milliGRAM(s) IV Push every 6 hours PRN breakthrough pain  ondansetron Injectable 4 milliGRAM(s) IV Push every 6 hours PRN Nausea  sodium chloride 0.9% lock flush 10 milliLiter(s) IV Push every 1 hour PRN Pre/post blood products, medications, blood draw, and to maintain line patency  traMADol 50 milliGRAM(s) Oral every 6 hours PRN Severe Pain (7 - 10)  traMADol 25 milliGRAM(s) Oral every 6 hours PRN Moderate Pain (4 - 6)        CAPILLARY BLOOD GLUCOSE        I&O's Summary    01 Aug 2021 07:01  -  02 Aug 2021 07:00  --------------------------------------------------------  IN: 2390 mL / OUT: 2565 mL / NET: -175 mL        PHYSICAL EXAM:  GENERAL: NAD, well-developed  HEAD:  Atraumatic, Normocephalic  EYES: EOMI, PERRLA, conjunctiva and sclera clear  NECK: Supple, No JVD  CHEST/LUNG: Clear to auscultation anteriorly  HEART: Regular rate and rhythm; No murmurs, rubs, or gallops  ABDOMEN: Soft, Nontender, Nondistended; Colostomy in place  EXTREMITIES:  2+ Peripheral Pulses, No clubbing, cyanosis, or edema  PSYCH: AAOx3  NEUROLOGY: LE weakness  SKIN: No rashes or lesions    LABS:                        9.9    15.06 )-----------( 427      ( 02 Aug 2021 06:18 )             32.1     08-02    136  |  102  |  12  ----------------------------<  106<H>  4.2   |  23  |  0.71    Ca    9.3      02 Aug 2021 06:18      PT/INR - ( 01 Aug 2021 12:07 )   PT: 17.1 sec;   INR: 1.45 ratio         PTT - ( 02 Aug 2021 06:18 )  PTT:29.9 sec          RADIOLOGY & ADDITIONAL TESTS:    Imaging Personally Reviewed:    Consultant(s) Notes Reviewed:      Care Discussed with Consultants/Other Providers: Alonzo

## 2021-08-02 NOTE — PROGRESS NOTE ADULT - SUBJECTIVE AND OBJECTIVE BOX
SUBJECTIVE: Pt seen and examined, in good spirits this morning, pain is well controlled    OVERNIGHT EVENTS:     Vital Signs Last 24 Hrs  T(C): 36.4 (02 Aug 2021 08:18), Max: 37.1 (01 Aug 2021 19:45)  T(F): 97.6 (02 Aug 2021 08:18), Max: 98.8 (01 Aug 2021 19:45)  HR: 81 (02 Aug 2021 08:18) (80 - 89)  BP: 135/94 (02 Aug 2021 08:18) (113/74 - 144/80)  BP(mean): --  RR: 18 (02 Aug 2021 08:18) (18 - 18)  SpO2: 95% (02 Aug 2021 08:18) (95% - 96%)    PHYSICAL EXAM:    General: No Acute Distress     Neurological: Awake, alert oriented to person, place and time, Following Commands, PERRL, EOMI, Face Symmetrical, Speech Fluent, Moving all extremities, Muscle Strength normal in all four extremities, No Drift, Sensation to Light Touch Intact    Pulmonary: Clear to Auscultation, No Rales, No Rhonchi, No Wheezes     Cardiovascular: S1, S2, Regular Rate and Rhythm     Gastrointestinal: Soft, Nontender, Nondistended     Incision:     LABS:                        9.9    15.06 )-----------( 427      ( 02 Aug 2021 06:18 )             32.1    08-02    136  |  102  |  12  ----------------------------<  106<H>  4.2   |  23  |  0.71    Ca    9.3      02 Aug 2021 06:18    PT/INR - ( 01 Aug 2021 12:07 )   PT: 17.1 sec;   INR: 1.45 ratio         PTT - ( 02 Aug 2021 06:18 )  PTT:29.9 sec      08-01 @ 07:01  -  08-02 @ 07:00  --------------------------------------------------------  IN: 2390 mL / OUT: 2565 mL / NET: -175 mL      DRAINS:     MEDICATIONS:  Antibiotics:  metroNIDAZOLE  IVPB 500 milliGRAM(s) IV Intermittent every 8 hours  vancomycin  IVPB 1250 milliGRAM(s) IV Intermittent two times a day    Neuro:  gabapentin 200 milliGRAM(s) Oral every 8 hours  HYDROmorphone  Injectable 0.5 milliGRAM(s) IV Push every 6 hours PRN breakthrough pain  melatonin 5 milliGRAM(s) Oral at bedtime  ondansetron Injectable 4 milliGRAM(s) IV Push every 6 hours PRN Nausea  traMADol 25 milliGRAM(s) Oral every 6 hours PRN Moderate Pain (4 - 6)  traMADol 50 milliGRAM(s) Oral every 6 hours PRN Severe Pain (7 - 10)    Cardiac:    Pulm:    GI/:  aluminum hydroxide/magnesium hydroxide/simethicone Suspension 30 milliLiter(s) Oral every 4 hours PRN Dyspepsia  pantoprazole    Tablet 40 milliGRAM(s) Oral before breakfast    Other:   calcium carbonate 1250 mG  + Vitamin D (OsCal 500 + D) 1 Tablet(s) Oral daily  chlorhexidine 2% Cloths 1 Application(s) Topical daily  chlorhexidine 4% Liquid 1 Application(s) Topical <User Schedule>  enoxaparin Injectable 70 milliGRAM(s) SubCutaneous two times a day  levothyroxine 137 MICROGram(s) Oral at bedtime  multivitamin 1 Tablet(s) Oral daily  sodium chloride 0.9% lock flush 10 milliLiter(s) IV Push every 1 hour PRN Pre/post blood products, medications, blood draw, and to maintain line patency  sodium chloride 0.9%. 1000 milliLiter(s) IV Continuous <Continuous>    DIET: [] Regular [] CCD [] Renal [] Puree [] Dysphagia [] Tube Feeds:     IMAGING:    SUBJECTIVE: Pt seen and examined, in good spirits this morning, pain is well controlled, will advance to regular diet as per Surgery     OVERNIGHT EVENTS: none    Vital Signs Last 24 Hrs  T(C): 36.4 (02 Aug 2021 08:18), Max: 37.1 (01 Aug 2021 19:45)  T(F): 97.6 (02 Aug 2021 08:18), Max: 98.8 (01 Aug 2021 19:45)  HR: 81 (02 Aug 2021 08:18) (80 - 89)  BP: 135/94 (02 Aug 2021 08:18) (113/74 - 144/80)  BP(mean): --  RR: 18 (02 Aug 2021 08:18) (18 - 18)  SpO2: 95% (02 Aug 2021 08:18) (95% - 96%)    PHYSICAL EXAM:    General: No Acute Distress     Neurological: Awake, alert oriented to person, place and time, Following Commands, B/l UE 5/5 RLE DF/PF 4+/5, LLE 5/5. decreased sensation to right foot    Pulmonary: Clear to Auscultation, No Rales, No Rhonchi, No Wheezes     Cardiovascular: S1, S2, Regular Rate and Rhythm     Gastrointestinal: Soft, Nontender, Nondistended     Incision: sacral aquacel ag dressing c/d/i, SEDRICK x2 (110, 105)    LABS:                        9.9    15.06 )-----------( 427      ( 02 Aug 2021 06:18 )             32.1    08-02    136  |  102  |  12  ----------------------------<  106<H>  4.2   |  23  |  0.71    Ca    9.3      02 Aug 2021 06:18    PT/INR - ( 01 Aug 2021 12:07 )   PT: 17.1 sec;   INR: 1.45 ratio         PTT - ( 02 Aug 2021 06:18 )  PTT:29.9 sec      08-01 @ 07:01  -  08-02 @ 07:00  --------------------------------------------------------  IN: 2390 mL / OUT: 2565 mL / NET: -175 mL      MEDICATIONS:  Antibiotics:  metroNIDAZOLE  IVPB 500 milliGRAM(s) IV Intermittent every 8 hours  vancomycin  IVPB 1250 milliGRAM(s) IV Intermittent two times a day    Neuro:  gabapentin 200 milliGRAM(s) Oral every 8 hours  HYDROmorphone  Injectable 0.5 milliGRAM(s) IV Push every 6 hours PRN breakthrough pain  melatonin 5 milliGRAM(s) Oral at bedtime  ondansetron Injectable 4 milliGRAM(s) IV Push every 6 hours PRN Nausea  traMADol 25 milliGRAM(s) Oral every 6 hours PRN Moderate Pain (4 - 6)  traMADol 50 milliGRAM(s) Oral every 6 hours PRN Severe Pain (7 - 10)    Cardiac:    Pulm:    GI/:  aluminum hydroxide/magnesium hydroxide/simethicone Suspension 30 milliLiter(s) Oral every 4 hours PRN Dyspepsia  pantoprazole    Tablet 40 milliGRAM(s) Oral before breakfast    Other:   calcium carbonate 1250 mG  + Vitamin D (OsCal 500 + D) 1 Tablet(s) Oral daily  chlorhexidine 2% Cloths 1 Application(s) Topical daily  chlorhexidine 4% Liquid 1 Application(s) Topical <User Schedule>  enoxaparin Injectable 70 milliGRAM(s) SubCutaneous two times a day  levothyroxine 137 MICROGram(s) Oral at bedtime  multivitamin 1 Tablet(s) Oral daily  sodium chloride 0.9% lock flush 10 milliLiter(s) IV Push every 1 hour PRN Pre/post blood products, medications, blood draw, and to maintain line patency  sodium chloride 0.9%. 1000 milliLiter(s) IV Continuous <Continuous>    DIET: [x] Regular [] CCD [] Renal [] Puree [] Dysphagia [] Tube Feeds:     IMAGING:   < from: Xray Chest 1 View- PORTABLE-Urgent (Xray Chest 1 View- PORTABLE-Urgent .) (07.23.21 @ 22:29) >  IMPRESSION:    The heart is normal in size. Bibasilarpneumonia. This appears to be slightly worsening on the left and unchanged on the right when compared to previous study done July 23, 2021 at 11:57 AM.    < end of copied text >  < from: CT Angio Chest PE Protocol w/ IV Cont (07.10.21 @ 11:43) >  IMPRESSION:    Small bilateral upper lobe subsegmental pulmonary emboli. No right heart strain.    Bibasilar and left lingular consolidative opacities with associated volume loss likely represent atelectasis.    < end of copied text >  < from: CT Pelvis No Cont (07.09.21 @ 10:04) >  IMPRESSION:  1. Postsurgical changes are present from sacral resection and lumbo-iliac spinal fusion.  2. Fine linear hyperdensity is present extending along the right margin of the L4 vertebral body and slightly into the adjacent inferior vena cava, possibly reflecting surgical material or venous cement.    < end of copied text >

## 2021-08-02 NOTE — DISCHARGE NOTE PROVIDER - PROVIDER TOKENS
PROVIDER:[TOKEN:[[id number]]] PROVIDER:[TOKEN:[[id number]]],PROVIDER:[TOKEN:[[id number]]],PROVIDER:[TOKEN:[[id number]]]

## 2021-08-02 NOTE — H&P ADULT - NSHPREVIEWOFSYSTEMS_GEN_ALL_CORE
REVIEW OF SYSTEMS:    CONSTITUTIONAL: No fevers or chills  EYES/ENT: No visual changes;  No vertigo or throat pain   NECK: No pain or stiffness  RESPIRATORY: No cough, wheezing, or shortness of breath  CARDIOVASCULAR: No chest pain or palpitations  GASTROINTESTINAL: No abdominal or epigastric pain. No nausea or vomiting. No diarrhea or constipation. +colostomy  GENITOURINARY: No dysuria, frequency or hematuria, +Barnett  NEUROLOGICAL: + numbness, + weakness  SKIN: No itching, rashes, +incision

## 2021-08-02 NOTE — DISCHARGE NOTE PROVIDER - CARE PROVIDERS DIRECT ADDRESSES
,DirectAddress_Unknown ,DirectAddress_Unknown,[email or web address]

## 2021-08-02 NOTE — H&P ADULT - NSHPLABSRESULTS_GEN_ALL_CORE
.  LABS:                         9.9    15.06 )-----------( 427      ( 02 Aug 2021 06:18 )             32.1     08-02    136  |  102  |  12  ----------------------------<  106<H>  4.2   |  23  |  0.71    Ca    9.3      02 Aug 2021 06:18      PT/INR - ( 01 Aug 2021 12:07 )   PT: 17.1 sec;   INR: 1.45 ratio         PTT - ( 02 Aug 2021 06:18 )  PTT:29.9 sec              RADIOLOGY, EKG & ADDITIONAL TESTS: Reviewed.

## 2021-08-02 NOTE — PROVIDER CONTACT NOTE (OTHER) - SITUATION
PTT 71.7. Heparin currently running at 21gtt/hr
patients aptt 53.4
Patient's lumbar incision dressing soiled with loose bm. Rectal tube placement discussed with patient.
aptt resulted at 70.6, lab was drawn again as per ANTONIO Iniguez to ensure patients coags before surgery
temp 100.8
aptt resulted at 69.1 goal range 50-70, second therapeutic result.
Large clot noted in left SEDRICK drain. Right SEDRICK drain patent
Pt R back Hemovac dressing soiled with a small amount of serous drainage, Plastics changed dressing site at 20:00.
Pt with blood tinged stool. Stool color pink/red.
PTT 44.3 and Barnett noted to be leaking at port
Patient on 4L NC and his arterial pO2 is 55
Patient with hemoglobin drop from 8.3 to 7.8
Rectal tube dislodged due to pt having formed stools
aPTT 90.4 goal 50-70
right back hemovac dressing saturated with serosanguinous drainage

## 2021-08-02 NOTE — PROGRESS NOTE ADULT - PROVIDER SPECIALTY LIST ADULT
Anesthesia
Gastroenterology
Gastroenterology
Hospitalist
Infectious Disease
NSICU
Neurosurgery
Plastic Surgery
Pulmonology
Rehab Medicine
Surgery
Anesthesia
Anesthesia
Gastroenterology
Hospitalist
Infectious Disease
NSICU
Neurosurgery
Plastic Surgery
Plastic Surgery
Rehab Medicine
Rehab Medicine
Surgery
Gastroenterology
Infectious Disease
NSICU
Neurosurgery
Plastic Surgery
Surgery
Anesthesia
Gastroenterology
Gastroenterology
Infectious Disease
NSICU
Neurosurgery
Plastic Surgery
Surgery
Gastroenterology
Gastroenterology
NSICU
Neurosurgery
NSICU
Neurosurgery
Neurosurgery
NSICU
Hospitalist

## 2021-08-02 NOTE — DISCHARGE NOTE NURSING/CASE MANAGEMENT/SOCIAL WORK - PATIENT PORTAL LINK FT
You can access the FollowMyHealth Patient Portal offered by [facility] by registering at the following website: [email or web address]. By joining [facility]’s FollowMyHealth portal, you will also be able to view your health information using other applications (apps) compatible with our system.

## 2021-08-02 NOTE — PROVIDER CONTACT NOTE (OTHER) - NAME OF MD/NP/PA/DO NOTIFIED:
Anita Traore MD
Jalili, Sadaf PA
Saaf Jalili,PA
[facility] Plastics &Alvarez ALVAREZ
PA Geetha neurosurgery
Arpan Reddy MD
Arpan Reddy MD
MD Antony Dahinda Neurosurg
MD Antony Waterford
ANTONIO Valdovinos neurosurgery
Jalili, Sadaf PA
MD Fer Thomason
PA Geetha nsx
Arpan Reddy MD
Arpan Reddy MD
ANTONIO Rodríguez

## 2021-08-02 NOTE — PROGRESS NOTE ADULT - SUBJECTIVE AND OBJECTIVE BOX
Plastic Surgery Progress Note    Subjective:     Patient seen and examined at bedside. Patient without complaints this AM. Denies N/V/F/C.     OBJECTIVE:     T(C): 36.7 (08-02-21 @ 15:33), Max: 37.1 (08-01-21 @ 19:45)  HR: 84 (08-02-21 @ 15:33) (80 - 89)  BP: 123/78 (08-02-21 @ 15:33) (113/74 - 144/80)  RR: 18 (08-02-21 @ 15:33) (18 - 18)  SpO2: 96% (08-02-21 @ 15:33) (95% - 96%)  Wt(kg): --    I&O's Detail    01 Aug 2021 07:01  -  02 Aug 2021 07:00  --------------------------------------------------------  IN:    Heparin: 170 mL    Oral Fluid: 1120 mL    sodium chloride 0.9%: 1100 mL  Total IN: 2390 mL    OUT:    Bulb (mL): 105 mL    Bulb (mL): 110 mL    Colostomy (mL): 350 mL    Indwelling Catheter - Urethral (mL): 2000 mL  Total OUT: 2565 mL    Total NET: -175 mL      02 Aug 2021 07:01  -  02 Aug 2021 16:22  --------------------------------------------------------  IN:    Oral Fluid: 540 mL  Total IN: 540 mL    OUT:    Bulb (mL): 30 mL    Bulb (mL): 35 mL    Indwelling Catheter - Urethral (mL): 600 mL  Total OUT: 665 mL    Total NET: -125 mL          PHYSICAL EXAM:    GENERAL: NAD, lying in bed comfortably  HEAD:  Atraumatic, Normocephalic  ENT: Moist mucous membranes  CHEST/LUNG: Unlabored respirations  ABDOMEN: Soft, Nontender, Nondistended.   NERVOUS SYSTEM:  Alert & Oriented X3, speech clear.   SKIN: No rashes or lesions    MEDICATIONS  (STANDING):  calcium carbonate 1250 mG  + Vitamin D (OsCal 500 + D) 1 Tablet(s) Oral daily  chlorhexidine 2% Cloths 1 Application(s) Topical daily  chlorhexidine 4% Liquid 1 Application(s) Topical <User Schedule>  enoxaparin Injectable 70 milliGRAM(s) SubCutaneous two times a day  gabapentin 200 milliGRAM(s) Oral every 8 hours  levothyroxine 137 MICROGram(s) Oral at bedtime  melatonin 5 milliGRAM(s) Oral at bedtime  metroNIDAZOLE    Tablet 500 milliGRAM(s) Oral every 8 hours  multivitamin 1 Tablet(s) Oral daily  pantoprazole    Tablet 40 milliGRAM(s) Oral before breakfast  vancomycin  IVPB 1250 milliGRAM(s) IV Intermittent two times a day    MEDICATIONS  (PRN):  ondansetron Injectable 4 milliGRAM(s) IV Push every 6 hours PRN Nausea  sodium chloride 0.9% lock flush 10 milliLiter(s) IV Push every 1 hour PRN Pre/post blood products, medications, blood draw, and to maintain line patency  traMADol 25 milliGRAM(s) Oral every 6 hours PRN Moderate Pain (4 - 6)  traMADol 50 milliGRAM(s) Oral every 6 hours PRN Severe Pain (7 - 10)      LABS:                          9.9    15.06 )-----------( 427      ( 02 Aug 2021 06:18 )             32.1     08-02    136  |  102  |  12  ----------------------------<  106<H>  4.2   |  23  |  0.71    Ca    9.3      02 Aug 2021 06:18      PT/INR - ( 01 Aug 2021 12:07 )   PT: 17.1 sec;   INR: 1.45 ratio         PTT - ( 02 Aug 2021 06:18 )  PTT:29.9 sec           Plastic Surgery Progress Note    Subjective:     POD 26. VSS, AF. Patient without complaints. Laureano and examined at bedside. Dressing not changed Denies N/V/F/C. R 110c L105cc SS SEDRICK's.    OBJECTIVE:     T(C): 36.7 (08-02-21 @ 15:33), Max: 37.1 (08-01-21 @ 19:45)  HR: 84 (08-02-21 @ 15:33) (80 - 89)  BP: 123/78 (08-02-21 @ 15:33) (113/74 - 144/80)  RR: 18 (08-02-21 @ 15:33) (18 - 18)  SpO2: 96% (08-02-21 @ 15:33) (95% - 96%)  Wt(kg): --    I&O's Detail    01 Aug 2021 07:01  -  02 Aug 2021 07:00  --------------------------------------------------------  IN:    Heparin: 170 mL    Oral Fluid: 1120 mL    sodium chloride 0.9%: 1100 mL  Total IN: 2390 mL    OUT:    Bulb (mL): 105 mL    Bulb (mL): 110 mL    Colostomy (mL): 350 mL    Indwelling Catheter - Urethral (mL): 2000 mL  Total OUT: 2565 mL    Total NET: -175 mL      02 Aug 2021 07:01  -  02 Aug 2021 16:22  --------------------------------------------------------  IN:    Oral Fluid: 540 mL  Total IN: 540 mL    OUT:    Bulb (mL): 30 mL    Bulb (mL): 35 mL    Indwelling Catheter - Urethral (mL): 600 mL  Total OUT: 665 mL    Total NET: -125 mL          PHYSICAL EXAM:    GENERAL: NAD, lying in bed comfortably  HEAD:  Atraumatic, Normocephalic  ENT: Moist mucous membranes  CHEST/LUNG: Unlabored respirations  ABDOMEN: Soft, Nontender, Nondistended.   NERVOUS SYSTEM:  Alert & Oriented X3, speech clear.   SKIN: No rashes or lesions    MEDICATIONS  (STANDING):  calcium carbonate 1250 mG  + Vitamin D (OsCal 500 + D) 1 Tablet(s) Oral daily  chlorhexidine 2% Cloths 1 Application(s) Topical daily  chlorhexidine 4% Liquid 1 Application(s) Topical <User Schedule>  enoxaparin Injectable 70 milliGRAM(s) SubCutaneous two times a day  gabapentin 200 milliGRAM(s) Oral every 8 hours  levothyroxine 137 MICROGram(s) Oral at bedtime  melatonin 5 milliGRAM(s) Oral at bedtime  metroNIDAZOLE    Tablet 500 milliGRAM(s) Oral every 8 hours  multivitamin 1 Tablet(s) Oral daily  pantoprazole    Tablet 40 milliGRAM(s) Oral before breakfast  vancomycin  IVPB 1250 milliGRAM(s) IV Intermittent two times a day    MEDICATIONS  (PRN):  ondansetron Injectable 4 milliGRAM(s) IV Push every 6 hours PRN Nausea  sodium chloride 0.9% lock flush 10 milliLiter(s) IV Push every 1 hour PRN Pre/post blood products, medications, blood draw, and to maintain line patency  traMADol 25 milliGRAM(s) Oral every 6 hours PRN Moderate Pain (4 - 6)  traMADol 50 milliGRAM(s) Oral every 6 hours PRN Severe Pain (7 - 10)      LABS:                          9.9    15.06 )-----------( 427      ( 02 Aug 2021 06:18 )             32.1     08-02    136  |  102  |  12  ----------------------------<  106<H>  4.2   |  23  |  0.71    Ca    9.3      02 Aug 2021 06:18      PT/INR - ( 01 Aug 2021 12:07 )   PT: 17.1 sec;   INR: 1.45 ratio         PTT - ( 02 Aug 2021 06:18 )  PTT:29.9 sec           Plastic Surgery Progress Note    Subjective:     POD 26. VSS, AF. Patient without complaints. Seen and examined at bedside. Dressing not changed. Denies N/V/F/C. R 110c L105cc  SEDRICK's.    OBJECTIVE:     T(C): 36.7 (08-02-21 @ 15:33), Max: 37.1 (08-01-21 @ 19:45)  HR: 84 (08-02-21 @ 15:33) (80 - 89)  BP: 123/78 (08-02-21 @ 15:33) (113/74 - 144/80)  RR: 18 (08-02-21 @ 15:33) (18 - 18)  SpO2: 96% (08-02-21 @ 15:33) (95% - 96%)  Wt(kg): --    I&O's Detail    01 Aug 2021 07:01  -  02 Aug 2021 07:00  --------------------------------------------------------  IN:    Heparin: 170 mL    Oral Fluid: 1120 mL    sodium chloride 0.9%: 1100 mL  Total IN: 2390 mL    OUT:    Bulb (mL): 105 mL    Bulb (mL): 110 mL    Colostomy (mL): 350 mL    Indwelling Catheter - Urethral (mL): 2000 mL  Total OUT: 2565 mL    Total NET: -175 mL      02 Aug 2021 07:01  -  02 Aug 2021 16:22  --------------------------------------------------------  IN:    Oral Fluid: 540 mL  Total IN: 540 mL    OUT:    Bulb (mL): 30 mL    Bulb (mL): 35 mL    Indwelling Catheter - Urethral (mL): 600 mL  Total OUT: 665 mL    Total NET: -125 mL          PHYSICAL EXAM:    GENERAL: NAD, lying in bed comfortably  HEAD:  Atraumatic, Normocephalic  ABDOMEN: Laparotomy incision c/d/i, soft non tender. SEDRICK drains X2. Ostomy bag present on L side draining.  BACK: midline aquacell dressing intact, no palpable collections, drains with SS output.       MEDICATIONS  (STANDING):  calcium carbonate 1250 mG  + Vitamin D (OsCal 500 + D) 1 Tablet(s) Oral daily  chlorhexidine 2% Cloths 1 Application(s) Topical daily  chlorhexidine 4% Liquid 1 Application(s) Topical <User Schedule>  enoxaparin Injectable 70 milliGRAM(s) SubCutaneous two times a day  gabapentin 200 milliGRAM(s) Oral every 8 hours  levothyroxine 137 MICROGram(s) Oral at bedtime  melatonin 5 milliGRAM(s) Oral at bedtime  metroNIDAZOLE    Tablet 500 milliGRAM(s) Oral every 8 hours  multivitamin 1 Tablet(s) Oral daily  pantoprazole    Tablet 40 milliGRAM(s) Oral before breakfast  vancomycin  IVPB 1250 milliGRAM(s) IV Intermittent two times a day    MEDICATIONS  (PRN):  ondansetron Injectable 4 milliGRAM(s) IV Push every 6 hours PRN Nausea  sodium chloride 0.9% lock flush 10 milliLiter(s) IV Push every 1 hour PRN Pre/post blood products, medications, blood draw, and to maintain line patency  traMADol 25 milliGRAM(s) Oral every 6 hours PRN Moderate Pain (4 - 6)  traMADol 50 milliGRAM(s) Oral every 6 hours PRN Severe Pain (7 - 10)      LABS:                          9.9    15.06 )-----------( 427      ( 02 Aug 2021 06:18 )             32.1     08-02    136  |  102  |  12  ----------------------------<  106<H>  4.2   |  23  |  0.71    Ca    9.3      02 Aug 2021 06:18      PT/INR - ( 01 Aug 2021 12:07 )   PT: 17.1 sec;   INR: 1.45 ratio         PTT - ( 02 Aug 2021 06:18 )  PTT:29.9 sec           Plastic Surgery Progress Note    Subjective:     POD 26. VSS, AF. Patient without complaints, tolerating regular diet well. Seen and examined at bedside. Dressing not changed. Denies N/V/F/C. R 110c L105cc  SEDRICK's.    OBJECTIVE:     T(C): 36.7 (08-02-21 @ 15:33), Max: 37.1 (08-01-21 @ 19:45)  HR: 84 (08-02-21 @ 15:33) (80 - 89)  BP: 123/78 (08-02-21 @ 15:33) (113/74 - 144/80)  RR: 18 (08-02-21 @ 15:33) (18 - 18)  SpO2: 96% (08-02-21 @ 15:33) (95% - 96%)  Wt(kg): --    I&O's Detail    01 Aug 2021 07:01  -  02 Aug 2021 07:00  --------------------------------------------------------  IN:    Heparin: 170 mL    Oral Fluid: 1120 mL    sodium chloride 0.9%: 1100 mL  Total IN: 2390 mL    OUT:    Bulb (mL): 105 mL    Bulb (mL): 110 mL    Colostomy (mL): 350 mL    Indwelling Catheter - Urethral (mL): 2000 mL  Total OUT: 2565 mL    Total NET: -175 mL      02 Aug 2021 07:01  -  02 Aug 2021 16:22  --------------------------------------------------------  IN:    Oral Fluid: 540 mL  Total IN: 540 mL    OUT:    Bulb (mL): 30 mL    Bulb (mL): 35 mL    Indwelling Catheter - Urethral (mL): 600 mL  Total OUT: 665 mL    Total NET: -125 mL          PHYSICAL EXAM:    GENERAL: NAD, lying in bed comfortably  HEAD:  Atraumatic, Normocephalic  ABDOMEN: Laparotomy incision c/d/i, soft non tender. SEDRICK drains X2. Ostomy bag present on L side draining.  BACK: midline aquacell dressing intact, no palpable collections, drains with SS output.       MEDICATIONS  (STANDING):  calcium carbonate 1250 mG  + Vitamin D (OsCal 500 + D) 1 Tablet(s) Oral daily  chlorhexidine 2% Cloths 1 Application(s) Topical daily  chlorhexidine 4% Liquid 1 Application(s) Topical <User Schedule>  enoxaparin Injectable 70 milliGRAM(s) SubCutaneous two times a day  gabapentin 200 milliGRAM(s) Oral every 8 hours  levothyroxine 137 MICROGram(s) Oral at bedtime  melatonin 5 milliGRAM(s) Oral at bedtime  metroNIDAZOLE    Tablet 500 milliGRAM(s) Oral every 8 hours  multivitamin 1 Tablet(s) Oral daily  pantoprazole    Tablet 40 milliGRAM(s) Oral before breakfast  vancomycin  IVPB 1250 milliGRAM(s) IV Intermittent two times a day    MEDICATIONS  (PRN):  ondansetron Injectable 4 milliGRAM(s) IV Push every 6 hours PRN Nausea  sodium chloride 0.9% lock flush 10 milliLiter(s) IV Push every 1 hour PRN Pre/post blood products, medications, blood draw, and to maintain line patency  traMADol 25 milliGRAM(s) Oral every 6 hours PRN Moderate Pain (4 - 6)  traMADol 50 milliGRAM(s) Oral every 6 hours PRN Severe Pain (7 - 10)      LABS:                          9.9    15.06 )-----------( 427      ( 02 Aug 2021 06:18 )             32.1     08-02    136  |  102  |  12  ----------------------------<  106<H>  4.2   |  23  |  0.71    Ca    9.3      02 Aug 2021 06:18      PT/INR - ( 01 Aug 2021 12:07 )   PT: 17.1 sec;   INR: 1.45 ratio         PTT - ( 02 Aug 2021 06:18 )  PTT:29.9 sec           Plastic Surgery Progress Note    Subjective:     POD 26. VSS, AF. Patient without complaints, tolerating regular diet well. Seen and examined at bedside. Dressing not changed. Denies N/V/F/C. R 110c L105cc  SEDRICK's.    OBJECTIVE:     T(C): 36.7 (08-02-21 @ 15:33), Max: 37.1 (08-01-21 @ 19:45)  HR: 84 (08-02-21 @ 15:33) (80 - 89)  BP: 123/78 (08-02-21 @ 15:33) (113/74 - 144/80)  RR: 18 (08-02-21 @ 15:33) (18 - 18)  SpO2: 96% (08-02-21 @ 15:33) (95% - 96%)  Wt(kg): --    I&O's Detail    01 Aug 2021 07:01  -  02 Aug 2021 07:00  --------------------------------------------------------  IN:    Heparin: 170 mL    Oral Fluid: 1120 mL    sodium chloride 0.9%: 1100 mL  Total IN: 2390 mL    OUT:    Bulb (mL): 105 mL    Bulb (mL): 110 mL    Colostomy (mL): 350 mL    Indwelling Catheter - Urethral (mL): 2000 mL  Total OUT: 2565 mL    Total NET: -175 mL      02 Aug 2021 07:01  -  02 Aug 2021 16:22  --------------------------------------------------------  IN:    Oral Fluid: 540 mL  Total IN: 540 mL    OUT:    Bulb (mL): 30 mL    Bulb (mL): 35 mL    Indwelling Catheter - Urethral (mL): 600 mL  Total OUT: 665 mL    Total NET: -125 mL          PHYSICAL EXAM:    GENERAL: NAD, lying in bed comfortably  HEAD:  Atraumatic, Normocephalic  ABDOMEN: Laparotomy incision c/d/i, soft non tender. Ostomy bag present on L side draining.  BACK: midline aquacell dressing intact, no palpable collections, drains with SS output.       MEDICATIONS  (STANDING):  calcium carbonate 1250 mG  + Vitamin D (OsCal 500 + D) 1 Tablet(s) Oral daily  chlorhexidine 2% Cloths 1 Application(s) Topical daily  chlorhexidine 4% Liquid 1 Application(s) Topical <User Schedule>  enoxaparin Injectable 70 milliGRAM(s) SubCutaneous two times a day  gabapentin 200 milliGRAM(s) Oral every 8 hours  levothyroxine 137 MICROGram(s) Oral at bedtime  melatonin 5 milliGRAM(s) Oral at bedtime  metroNIDAZOLE    Tablet 500 milliGRAM(s) Oral every 8 hours  multivitamin 1 Tablet(s) Oral daily  pantoprazole    Tablet 40 milliGRAM(s) Oral before breakfast  vancomycin  IVPB 1250 milliGRAM(s) IV Intermittent two times a day    MEDICATIONS  (PRN):  ondansetron Injectable 4 milliGRAM(s) IV Push every 6 hours PRN Nausea  sodium chloride 0.9% lock flush 10 milliLiter(s) IV Push every 1 hour PRN Pre/post blood products, medications, blood draw, and to maintain line patency  traMADol 25 milliGRAM(s) Oral every 6 hours PRN Moderate Pain (4 - 6)  traMADol 50 milliGRAM(s) Oral every 6 hours PRN Severe Pain (7 - 10)      LABS:                          9.9    15.06 )-----------( 427      ( 02 Aug 2021 06:18 )             32.1     08-02    136  |  102  |  12  ----------------------------<  106<H>  4.2   |  23  |  0.71    Ca    9.3      02 Aug 2021 06:18      PT/INR - ( 01 Aug 2021 12:07 )   PT: 17.1 sec;   INR: 1.45 ratio         PTT - ( 02 Aug 2021 06:18 )  PTT:29.9 sec           Plastic Surgery Progress Note    Subjective:     POD 26, 5 from ex lap. VSS, AF. Patient without complaints, tolerating regular diet well. Seen and examined at bedside. Dressing not changed. Denies N/V/F/C. R 110c L105cc SS SEDRICK's.    OBJECTIVE:     T(C): 36.7 (08-02-21 @ 15:33), Max: 37.1 (08-01-21 @ 19:45)  HR: 84 (08-02-21 @ 15:33) (80 - 89)  BP: 123/78 (08-02-21 @ 15:33) (113/74 - 144/80)  RR: 18 (08-02-21 @ 15:33) (18 - 18)  SpO2: 96% (08-02-21 @ 15:33) (95% - 96%)  Wt(kg): --    I&O's Detail    01 Aug 2021 07:01  -  02 Aug 2021 07:00  --------------------------------------------------------  IN:    Heparin: 170 mL    Oral Fluid: 1120 mL    sodium chloride 0.9%: 1100 mL  Total IN: 2390 mL    OUT:    Bulb (mL): 105 mL    Bulb (mL): 110 mL    Colostomy (mL): 350 mL    Indwelling Catheter - Urethral (mL): 2000 mL  Total OUT: 2565 mL    Total NET: -175 mL      02 Aug 2021 07:01  -  02 Aug 2021 16:22  --------------------------------------------------------  IN:    Oral Fluid: 540 mL  Total IN: 540 mL    OUT:    Bulb (mL): 30 mL    Bulb (mL): 35 mL    Indwelling Catheter - Urethral (mL): 600 mL  Total OUT: 665 mL    Total NET: -125 mL          PHYSICAL EXAM:    GENERAL: NAD, lying in bed comfortably  HEAD:  Atraumatic, Normocephalic  ABDOMEN: Laparotomy incision c/d/i, soft non tender. Ostomy bag present on L side draining.  BACK: midline aquacell dressing intact, no palpable collections, drains with SS output.       MEDICATIONS  (STANDING):  calcium carbonate 1250 mG  + Vitamin D (OsCal 500 + D) 1 Tablet(s) Oral daily  chlorhexidine 2% Cloths 1 Application(s) Topical daily  chlorhexidine 4% Liquid 1 Application(s) Topical <User Schedule>  enoxaparin Injectable 70 milliGRAM(s) SubCutaneous two times a day  gabapentin 200 milliGRAM(s) Oral every 8 hours  levothyroxine 137 MICROGram(s) Oral at bedtime  melatonin 5 milliGRAM(s) Oral at bedtime  metroNIDAZOLE    Tablet 500 milliGRAM(s) Oral every 8 hours  multivitamin 1 Tablet(s) Oral daily  pantoprazole    Tablet 40 milliGRAM(s) Oral before breakfast  vancomycin  IVPB 1250 milliGRAM(s) IV Intermittent two times a day    MEDICATIONS  (PRN):  ondansetron Injectable 4 milliGRAM(s) IV Push every 6 hours PRN Nausea  sodium chloride 0.9% lock flush 10 milliLiter(s) IV Push every 1 hour PRN Pre/post blood products, medications, blood draw, and to maintain line patency  traMADol 25 milliGRAM(s) Oral every 6 hours PRN Moderate Pain (4 - 6)  traMADol 50 milliGRAM(s) Oral every 6 hours PRN Severe Pain (7 - 10)      LABS:                          9.9    15.06 )-----------( 427      ( 02 Aug 2021 06:18 )             32.1     08-02    136  |  102  |  12  ----------------------------<  106<H>  4.2   |  23  |  0.71    Ca    9.3      02 Aug 2021 06:18      PT/INR - ( 01 Aug 2021 12:07 )   PT: 17.1 sec;   INR: 1.45 ratio         PTT - ( 02 Aug 2021 06:18 )  PTT:29.9 sec

## 2021-08-02 NOTE — PROGRESS NOTE ADULT - NSICDXPILOT_GEN_ALL_CORE
[place]

## 2021-08-02 NOTE — PROVIDER CONTACT NOTE (OTHER) - BACKGROUND
Pt s/p sacrectomy with decreased sensation of bm and voiding
s/p Sacral sacrectomy and VRAM flap harvest (07/07, 07/08).  b/l back hemovac dressings saturated on transfer to [address] [place] [address]. Dressings changed by plastics at bedside
s/p vertical rectus abdominal myocutaneous flap harvest with transperitoneal ligation of internal illiac artery and vein. s/p sacrectomy with L4- pelvis fusion
s/p wound wash out. pending colostomy tomorrow
s/p wound washout. Heparin at 17gtt/hr
s/p vertical rectus abdominal myocutaneous flap harvest with transperitoneal ligation of internal illiac artery and vein. s/p sacrectomy with L4- pelvis fusion
Patient with extensive background of thyroid cancer admitted for sacral mass removal
Patient with extensive medical history of thyroid cancer, came in for sacral removal
goal 50-70
s/p wound washout
pt s/p VRAM
pt admitted for chordoma s/p spinal surgery
s/p wound washout
s/p wound washout, PE

## 2021-08-02 NOTE — H&P ADULT - ATTENDING COMMENTS
I have personally performed face to face diagnostic evaluation on this patient. I have reviewed note and agree with history exam, plan of care except as noted.   start inpatient rehab program  continue DVT prophylaxis  titrate pain meds.     MEDICAL PROGNOSIS: GOOD                                   REHAB POTENTIAL: GOOD  ESTIMATED DISPOSITION: HOME                             ELOS: 10-14 Days   EXPECTED THERAPY:     P.T. 1 hr/day       O.T. 1 hr/day      S.L.P. 1 hr/day      EXP FREQUENCY: 5 days per 7 day period     PRESCREEN COMPARISON I have reviewed the prescreen information and I have found no relevant changes between the preadmission screening and my post admission evaluation     RATIONALE FOR INPATIENT ADMISSION - Patient demonstrates the following: (check all that apply)  [X] Medically appropriate for rehabilitation admission  [X] Has attainable rehab goals with an appropriate initial discharge plan  [X] Has rehabilitation potential (expected to make a significant improvement within a reasonable period of time)   [X] Requires close medical management by a rehab physician, rehab nursing care, Hospitalist and comprehensive interdisciplinary team (including PT, OT, & or SLP, Prosthetics and Orthotics)

## 2021-08-02 NOTE — H&P ADULT - NSHPPHYSICALEXAM_GEN_ALL_CORE
Constitutional - NAD, Comfortably lying in bed  HEENT - NCAT, EOMI  Neck - Supple, No limited ROM  Chest - Breathing comfortably, No wheezing, CTAB  Cardiovascular - S1S2, RRR  Abdomen - Soft, nontender, +colostomy bag with brown stool, +abdominal incision intact and closed with no drainage  Extremities - No C/C/E, No calf tenderness   Neurologic Exam -                    Cognitive - Awake, Alert, AAO to self, place, date, year, situation     Communication - Fluent, No dysarthria     Cranial Nerves - CN 2-12 intact     Motor -                     LEFT    UE - ShAB 5/5, EF 5/5, EE 5/5, WE 5/5,  5/5                    RIGHT UE - ShAB 5/5, EF 5/5, EE 5/5, WE 5/5,  5/5                    LEFT    LE - HF 4/5, KE 4/5, DF 4/5, PF 5/5                    RIGHT LE - HF 4+/5, KE 4/5, DF 5/5, PF 5/5        Sensory - mildly decreased LLE compared to right  Psychiatric - Mood stable, Affect WNL  Skin: back incision with 2 drains draining sanguinous fluid, incision with Aquacel dressing intact without drainage

## 2021-08-02 NOTE — PROGRESS NOTE ADULT - SUBJECTIVE AND OBJECTIVE BOX
CC: f/u for post op wound infection     Patient reports no complaints     REVIEW OF SYSTEMS:  All other review of systems negative (Comprehensive ROS)    Antimicrobials Day #  : POD # 7   metroNIDAZOLE  IVPB 500 milliGRAM(s) IV Intermittent every 8 hours  vancomycin  IVPB 1250 milliGRAM(s) IV Intermittent two times a day    Other Medications Reviewed    T(F): 97.6 (08-02-21 @ 08:18), Max: 98.8 (08-01-21 @ 19:45)  HR: 81 (08-02-21 @ 08:18)  BP: 135/94 (08-02-21 @ 08:18)  RR: 18 (08-02-21 @ 08:18)  SpO2: 95% (08-02-21 @ 08:18)  Wt(kg): --    PHYSICAL EXAM:  General: alert, no acute distress  Eyes:  anicteric, no conjunctival injection, no discharge  Neck: supple  Lungs: clear to auscultation  Heart: regular rate and rhythm; no murmurs  Abdomen: soft, nondistended, nontender, incisions - C/D/I, LLQ colostomy is functional.   : tony with clear urine  Skin: sacral wounds dressed. SEDRICK with bloody output  Extremities: no edema. Right arm PICC+  Neurologic: alert, oriented, moves all extremities    LAB RESULTS:                        9.9    15.06 )-----------( 427      ( 02 Aug 2021 06:18 )             32.1     08-02    136  |  102  |  12  ----------------------------<  106<H>  4.2   |  23  |  0.71    Ca    9.3      02 Aug 2021 06:18      MICROBIOLOGY:  RECENT CULTURES:  07-29 @ 03:37 .Blood Blood-Peripheral     No growth to date.        RADIOLOGY REVIEWED:

## 2021-08-02 NOTE — PROGRESS NOTE ADULT - NUTRITIONAL ASSESSMENT
This patient has been assessed with a concern for Malnutrition and has been determined to have a diagnosis/diagnoses of Moderate protein-calorie malnutrition.    This patient is being managed with:   Diet Clear Liquid-  Entered: Jul 15 2021 12:07AM    
This patient has been assessed with a concern for Malnutrition and has been determined to have a diagnosis/diagnoses of Moderate protein-calorie malnutrition.    This patient is being managed with:   Diet Soft-  Supplement Feeding Modality:  Oral  Ensure Enlive Cans or Servings Per Day:  1       Frequency:  Three Times a day  Entered: Jul 18 2021 12:36AM    
This patient has been assessed with a concern for Malnutrition and has been determined to have a diagnosis/diagnoses of Moderate protein-calorie malnutrition.    This patient is being managed with:   Diet Soft-  Supplement Feeding Modality:  Oral  Ensure Enlive Cans or Servings Per Day:  1       Frequency:  Three Times a day  Probiotic Yogurt/Smoothie Cans or Servings Per Day:  2       Frequency:  Daily  Entered: Jul 19 2021 10:42AM    
This patient has been assessed with a concern for Malnutrition and has been determined to have a diagnosis/diagnoses of Moderate protein-calorie malnutrition.    This patient is being managed with:   Diet Clear Liquid-  Entered: Jul 15 2021  9:33PM    
This patient has been assessed with a concern for Malnutrition and has been determined to have a diagnosis/diagnoses of Moderate protein-calorie malnutrition.    This patient is being managed with:   Diet Clear Liquid-  Entered: Jul 15 2021 12:07AM    
This patient has been assessed with a concern for Malnutrition and has been determined to have a diagnosis/diagnoses of Moderate protein-calorie malnutrition.    This patient is being managed with:   Diet Clear Liquid-  Entered: Jul 29 2021 11:01AM    
This patient has been assessed with a concern for Malnutrition and has been determined to have a diagnosis/diagnoses of Moderate protein-calorie malnutrition.    This patient is being managed with:   Diet Soft-  Entered: Jul 31 2021 10:14AM    
This patient has been assessed with a concern for Malnutrition and has been determined to have a diagnosis/diagnoses of Moderate protein-calorie malnutrition.    This patient is being managed with:   Diet Soft-  Supplement Feeding Modality:  Oral  Ensure Enlive Cans or Servings Per Day:  1       Frequency:  Three Times a day  Probiotic Yogurt/Smoothie Cans or Servings Per Day:  2       Frequency:  Daily  Entered: Jul 19 2021 10:42AM    
This patient has been assessed with a concern for Malnutrition and has been determined to have a diagnosis/diagnoses of Moderate protein-calorie malnutrition.    This patient is being managed with:   Diet Clear Liquid-  Entered: Jul 15 2021  9:33PM    
This patient has been assessed with a concern for Malnutrition and has been determined to have a diagnosis/diagnoses of Moderate protein-calorie malnutrition.    This patient is being managed with:   Diet Clear Liquid-  Entered: Jul 15 2021  9:33PM    
This patient has been assessed with a concern for Malnutrition and has been determined to have a diagnosis/diagnoses of Moderate protein-calorie malnutrition.    This patient is being managed with:   Diet NPO-  Entered: Jul 11 2021  5:51PM    
This patient has been assessed with a concern for Malnutrition and has been determined to have a diagnosis/diagnoses of Moderate protein-calorie malnutrition.    This patient is being managed with:   Diet NPO-  Except Medications  Tube Feeding Modality: Nasogastric  Entered: Jul 13 2021  8:57PM    
This patient has been assessed with a concern for Malnutrition and has been determined to have a diagnosis/diagnoses of Moderate protein-calorie malnutrition.    This patient is being managed with:   Diet Soft-  Entered: Jul 31 2021 10:14AM    
This patient has been assessed with a concern for Malnutrition and has been determined to have a diagnosis/diagnoses of Moderate protein-calorie malnutrition.    This patient is being managed with:   Diet Soft-  Supplement Feeding Modality:  Oral  Ensure Enlive Cans or Servings Per Day:  1       Frequency:  Three Times a day  Entered: Jul 18 2021 12:36AM    
This patient has been assessed with a concern for Malnutrition and has been determined to have a diagnosis/diagnoses of Moderate protein-calorie malnutrition.    This patient is being managed with:   Diet Soft-  Supplement Feeding Modality:  Oral  Ensure Enlive Cans or Servings Per Day:  1       Frequency:  Three Times a day  Probiotic Yogurt/Smoothie Cans or Servings Per Day:  2       Frequency:  Daily  Entered: Jul 19 2021 10:42AM    
This patient has been assessed with a concern for Malnutrition and has been determined to have a diagnosis/diagnoses of Moderate protein-calorie malnutrition.    This patient is being managed with:   Diet Soft-  Supplement Feeding Modality:  Oral  Ensure Enlive Cans or Servings Per Day:  1       Frequency:  Three Times a day  Probiotic Yogurt/Smoothie Cans or Servings Per Day:  2       Frequency:  Daily  Entered: Jul 19 2021 10:42AM    
This patient has been assessed with a concern for Malnutrition and has been determined to have a diagnosis/diagnoses of Moderate protein-calorie malnutrition.    This patient is being managed with:   Diet NPO after Midnight-     NPO Start Date: 25-Jul-2021   NPO Start Time: 23:59  Except Medications  Entered: Jul 26 2021  3:27AM    Diet Soft-  Supplement Feeding Modality:  Oral  Ensure Enlive Cans or Servings Per Day:  1       Frequency:  Three Times a day  Probiotic Yogurt/Smoothie Cans or Servings Per Day:  2       Frequency:  Daily  Entered: Jul 24 2021  7:30AM    
This patient has been assessed with a concern for Malnutrition and has been determined to have a diagnosis/diagnoses of Moderate protein-calorie malnutrition.    This patient is being managed with:   Diet NPO-  Entered: Jul 11 2021  5:51PM    
This patient has been assessed with a concern for Malnutrition and has been determined to have a diagnosis/diagnoses of Moderate protein-calorie malnutrition.    This patient is being managed with:   Diet Soft-  Entered: Jul 31 2021 10:14AM    
This patient has been assessed with a concern for Malnutrition and has been determined to have a diagnosis/diagnoses of Moderate protein-calorie malnutrition.    This patient is being managed with:   Diet Soft-  Supplement Feeding Modality:  Oral  Ensure Enlive Cans or Servings Per Day:  1       Frequency:  Three Times a day  Probiotic Yogurt/Smoothie Cans or Servings Per Day:  2       Frequency:  Daily  Entered: Jul 19 2021 10:42AM    
This patient has been assessed with a concern for Malnutrition and has been determined to have a diagnosis/diagnoses of Moderate protein-calorie malnutrition.    This patient is being managed with:   Diet Soft-  Supplement Feeding Modality:  Oral  Ensure Enlive Cans or Servings Per Day:  1       Frequency:  Three Times a day  Probiotic Yogurt/Smoothie Cans or Servings Per Day:  2       Frequency:  Daily  Entered: Jul 24 2021  7:30AM    
This patient has been assessed with a concern for Malnutrition and has been determined to have a diagnosis/diagnoses of Moderate protein-calorie malnutrition.    This patient is being managed with:   Diet NPO after Midnight-     NPO Start Date: 25-Jul-2021   NPO Start Time: 23:59  Entered: Jul 25 2021 10:22AM    Diet Soft-  Supplement Feeding Modality:  Oral  Ensure Enlive Cans or Servings Per Day:  1       Frequency:  Three Times a day  Probiotic Yogurt/Smoothie Cans or Servings Per Day:  2       Frequency:  Daily  Entered: Jul 24 2021  7:30AM    
This patient has been assessed with a concern for Malnutrition and has been determined to have a diagnosis/diagnoses of Moderate protein-calorie malnutrition.    This patient is being managed with:   Diet NPO after Midnight-     NPO Start Date: 25-Jul-2021   NPO Start Time: 23:59  Except Medications  Entered: Jul 26 2021  3:27AM    Diet Soft-  Supplement Feeding Modality:  Oral  Ensure Enlive Cans or Servings Per Day:  1       Frequency:  Three Times a day  Probiotic Yogurt/Smoothie Cans or Servings Per Day:  2       Frequency:  Daily  Entered: Jul 24 2021  7:30AM    
This patient has been assessed with a concern for Malnutrition and has been determined to have a diagnosis/diagnoses of Moderate protein-calorie malnutrition.    This patient is being managed with:   Diet NPO after Midnight-     NPO Start Date: 27-Jul-2021   NPO Start Time: 23:59  Except Medications  Entered: Jul 27 2021 11:10AM    Diet Clear Liquid-  Entered: Jul 26 2021 11:52PM    
This patient has been assessed with a concern for Malnutrition and has been determined to have a diagnosis/diagnoses of Moderate protein-calorie malnutrition.    This patient is being managed with:   Diet Soft-  Supplement Feeding Modality:  Oral  Ensure Enlive Cans or Servings Per Day:  1       Frequency:  Three Times a day  Probiotic Yogurt/Smoothie Cans or Servings Per Day:  2       Frequency:  Daily  Entered: Jul 19 2021 10:42AM    
This patient has been assessed with a concern for Malnutrition and has been determined to have a diagnosis/diagnoses of Moderate protein-calorie malnutrition.    This patient is being managed with:   Diet Soft-  Supplement Feeding Modality:  Oral  Ensure Enlive Cans or Servings Per Day:  1       Frequency:  Three Times a day  Probiotic Yogurt/Smoothie Cans or Servings Per Day:  2       Frequency:  Daily  Entered: Jul 19 2021 10:42AM    
This patient has been assessed with a concern for Malnutrition and has been determined to have a diagnosis/diagnoses of Moderate protein-calorie malnutrition.    This patient is being managed with:   Diet Clear Liquid-  Entered: Jul 15 2021  9:33PM    
This patient has been assessed with a concern for Malnutrition and has been determined to have a diagnosis/diagnoses of Moderate protein-calorie malnutrition.    This patient is being managed with:   Diet Clear Liquid-  Entered: Jul 29 2021 11:01AM    
This patient has been assessed with a concern for Malnutrition and has been determined to have a diagnosis/diagnoses of Moderate protein-calorie malnutrition.    This patient is being managed with:   Diet Clear Liquid-  Entered: Jul 29 2021 11:01AM    
This patient has been assessed with a concern for Malnutrition and has been determined to have a diagnosis/diagnoses of Moderate protein-calorie malnutrition.    This patient is being managed with:   Diet Soft-  Supplement Feeding Modality:  Oral  Ensure Enlive Cans or Servings Per Day:  1       Frequency:  Three Times a day  Probiotic Yogurt/Smoothie Cans or Servings Per Day:  2       Frequency:  Daily  Entered: Jul 24 2021  7:30AM    
This patient has been assessed with a concern for Malnutrition and has been determined to have a diagnosis/diagnoses of Moderate protein-calorie malnutrition.    This patient is being managed with:   Diet Clear Liquid-  Entered: Jul 29 2021 11:01AM    
This patient has been assessed with a concern for Malnutrition and has been determined to have a diagnosis/diagnoses of Moderate protein-calorie malnutrition.    This patient is being managed with:   Diet NPO-  Except Medications  Tube Feeding Modality: Nasogastric  Entered: Jul 13 2021  8:57PM    
This patient has been assessed with a concern for Malnutrition and has been determined to have a diagnosis/diagnoses of Moderate protein-calorie malnutrition.    This patient is being managed with:   Diet NPO-  Except Medications  Tube Feeding Modality: Nasogastric  Entered: Jul 13 2021  8:57PM

## 2021-08-03 LAB
ALBUMIN SERPL ELPH-MCNC: 2.2 G/DL — LOW (ref 3.3–5)
ALP SERPL-CCNC: 101 U/L — SIGNIFICANT CHANGE UP (ref 40–120)
ALT FLD-CCNC: 23 U/L — SIGNIFICANT CHANGE UP (ref 10–45)
ANION GAP SERPL CALC-SCNC: 6 MMOL/L — SIGNIFICANT CHANGE UP (ref 5–17)
AST SERPL-CCNC: 26 U/L — SIGNIFICANT CHANGE UP (ref 10–40)
BASOPHILS # BLD AUTO: 0.07 K/UL — SIGNIFICANT CHANGE UP (ref 0–0.2)
BASOPHILS NFR BLD AUTO: 0.5 % — SIGNIFICANT CHANGE UP (ref 0–2)
BILIRUB SERPL-MCNC: 0.3 MG/DL — SIGNIFICANT CHANGE UP (ref 0.2–1.2)
BUN SERPL-MCNC: 12 MG/DL — SIGNIFICANT CHANGE UP (ref 7–23)
CALCIUM SERPL-MCNC: 8.6 MG/DL — SIGNIFICANT CHANGE UP (ref 8.4–10.5)
CHLORIDE SERPL-SCNC: 103 MMOL/L — SIGNIFICANT CHANGE UP (ref 96–108)
CO2 SERPL-SCNC: 29 MMOL/L — SIGNIFICANT CHANGE UP (ref 22–31)
COVID-19 SPIKE DOMAIN AB INTERP: POSITIVE
COVID-19 SPIKE DOMAIN ANTIBODY RESULT: >250 U/ML — HIGH
CREAT SERPL-MCNC: 0.89 MG/DL — SIGNIFICANT CHANGE UP (ref 0.5–1.3)
CULTURE RESULTS: SIGNIFICANT CHANGE UP
CULTURE RESULTS: SIGNIFICANT CHANGE UP
EOSINOPHIL # BLD AUTO: 0.42 K/UL — SIGNIFICANT CHANGE UP (ref 0–0.5)
EOSINOPHIL NFR BLD AUTO: 3.3 % — SIGNIFICANT CHANGE UP (ref 0–6)
GLUCOSE SERPL-MCNC: 101 MG/DL — HIGH (ref 70–99)
HCT VFR BLD CALC: 31.3 % — LOW (ref 39–50)
HCV AB S/CO SERPL IA: 0.17 S/CO — SIGNIFICANT CHANGE UP (ref 0–0.99)
HCV AB SERPL-IMP: SIGNIFICANT CHANGE UP
HGB BLD-MCNC: 10 G/DL — LOW (ref 13–17)
IMM GRANULOCYTES NFR BLD AUTO: 2.1 % — HIGH (ref 0–1.5)
LYMPHOCYTES # BLD AUTO: 18.2 % — SIGNIFICANT CHANGE UP (ref 13–44)
LYMPHOCYTES # BLD AUTO: 2.34 K/UL — SIGNIFICANT CHANGE UP (ref 1–3.3)
MCHC RBC-ENTMCNC: 28.2 PG — SIGNIFICANT CHANGE UP (ref 27–34)
MCHC RBC-ENTMCNC: 31.9 GM/DL — LOW (ref 32–36)
MCV RBC AUTO: 88.4 FL — SIGNIFICANT CHANGE UP (ref 80–100)
MONOCYTES # BLD AUTO: 1.11 K/UL — HIGH (ref 0–0.9)
MONOCYTES NFR BLD AUTO: 8.6 % — SIGNIFICANT CHANGE UP (ref 2–14)
NEUTROPHILS # BLD AUTO: 8.65 K/UL — HIGH (ref 1.8–7.4)
NEUTROPHILS NFR BLD AUTO: 67.3 % — SIGNIFICANT CHANGE UP (ref 43–77)
NRBC # BLD: 0 /100 WBCS — SIGNIFICANT CHANGE UP (ref 0–0)
PLATELET # BLD AUTO: 397 K/UL — SIGNIFICANT CHANGE UP (ref 150–400)
POTASSIUM SERPL-MCNC: 3.9 MMOL/L — SIGNIFICANT CHANGE UP (ref 3.5–5.3)
POTASSIUM SERPL-SCNC: 3.9 MMOL/L — SIGNIFICANT CHANGE UP (ref 3.5–5.3)
PROT SERPL-MCNC: 6.7 G/DL — SIGNIFICANT CHANGE UP (ref 6–8.3)
RBC # BLD: 3.54 M/UL — LOW (ref 4.2–5.8)
RBC # FLD: 14.3 % — SIGNIFICANT CHANGE UP (ref 10.3–14.5)
SARS-COV-2 IGG+IGM SERPL QL IA: >250 U/ML — HIGH
SARS-COV-2 IGG+IGM SERPL QL IA: POSITIVE
SARS-COV-2 RNA SPEC QL NAA+PROBE: SIGNIFICANT CHANGE UP
SODIUM SERPL-SCNC: 138 MMOL/L — SIGNIFICANT CHANGE UP (ref 135–145)
SPECIMEN SOURCE: SIGNIFICANT CHANGE UP
SPECIMEN SOURCE: SIGNIFICANT CHANGE UP
WBC # BLD: 12.86 K/UL — HIGH (ref 3.8–10.5)
WBC # FLD AUTO: 12.86 K/UL — HIGH (ref 3.8–10.5)

## 2021-08-03 PROCEDURE — 99223 1ST HOSP IP/OBS HIGH 75: CPT

## 2021-08-03 PROCEDURE — 99232 SBSQ HOSP IP/OBS MODERATE 35: CPT

## 2021-08-03 RX ADMIN — CHLORHEXIDINE GLUCONATE 1 APPLICATION(S): 213 SOLUTION TOPICAL at 05:49

## 2021-08-03 RX ADMIN — Medication 500 MILLIGRAM(S): at 14:39

## 2021-08-03 RX ADMIN — CHLORHEXIDINE GLUCONATE 1 APPLICATION(S): 213 SOLUTION TOPICAL at 17:51

## 2021-08-03 RX ADMIN — Medication 137 MICROGRAM(S): at 05:48

## 2021-08-03 RX ADMIN — GABAPENTIN 200 MILLIGRAM(S): 400 CAPSULE ORAL at 14:39

## 2021-08-03 RX ADMIN — ENOXAPARIN SODIUM 70 MILLIGRAM(S): 100 INJECTION SUBCUTANEOUS at 06:46

## 2021-08-03 RX ADMIN — Medication 1 TABLET(S): at 12:05

## 2021-08-03 RX ADMIN — Medication 166.67 MILLIGRAM(S): at 17:47

## 2021-08-03 RX ADMIN — GABAPENTIN 200 MILLIGRAM(S): 400 CAPSULE ORAL at 21:49

## 2021-08-03 RX ADMIN — ENOXAPARIN SODIUM 70 MILLIGRAM(S): 100 INJECTION SUBCUTANEOUS at 17:48

## 2021-08-03 RX ADMIN — Medication 166.67 MILLIGRAM(S): at 05:48

## 2021-08-03 RX ADMIN — PANTOPRAZOLE SODIUM 40 MILLIGRAM(S): 20 TABLET, DELAYED RELEASE ORAL at 06:46

## 2021-08-03 RX ADMIN — GABAPENTIN 200 MILLIGRAM(S): 400 CAPSULE ORAL at 05:48

## 2021-08-03 RX ADMIN — Medication 500 MILLIGRAM(S): at 05:48

## 2021-08-03 RX ADMIN — Medication 500 MILLIGRAM(S): at 21:49

## 2021-08-03 NOTE — DIETITIAN INITIAL EVALUATION ADULT. - ADD RECOMMEND
1) Monitor Weights, Intake, Tolerance, Skin & Labwork   2) Education Provided on Need for Supplementation 3) Ensure Enlive 8oz PO BID 4) Continue Nutrition Plan of Care

## 2021-08-03 NOTE — DIETITIAN INITIAL EVALUATION ADULT. - OTHER INFO
Initial Nutrition Assessment   61yr Old Male   Dx: Neoplasm of Spine  Diet - Regular Diet w/ Thin Liquids   (Recommend Initiate Ensure Enlive 8oz PO BID)  Denies Food Allergy/Intolerance  Tolerates Diet Well - No Chewing/Swallowing Complications (Per Patient)  Surgical Incision on Back & No Pressure Ulcers (as Per Nursing Flow Sheets)  No Edema Noted (as Per Nursing Flow Sheets)  No Recent Nausea/Vomiting (as Per Patient) & Has Colostomy

## 2021-08-03 NOTE — DIETITIAN INITIAL EVALUATION ADULT. - PERTINENT MEDS FT
Lovenox, Flagyl, Vanco, Gabapentin, Protonix, Tramadol, Calcium, Vitamin D, Melatonin, Multivitamin,

## 2021-08-03 NOTE — DIETITIAN INITIAL EVALUATION ADULT. - ORAL INTAKE PTA/DIET HISTORY
Patient Does Not Follow Diet @Home, Consumes 3Meals a Day & Small Snack @Night  & Does Take Vitamin/Supplements @Home (Fish Oil, Vitamin D, Calcium)    on Regular Diet w/ Thin Liquids   Recommend Initiate Ensure Enlive 8oz PO BID (Provides 700kcal & 40grams of Protein)  Education Provided on Need for Supplementation

## 2021-08-03 NOTE — PROGRESS NOTE ADULT - SUBJECTIVE AND OBJECTIVE BOX
Patient is a 61y old  Male who presents with a chief complaint of     HPI:  Pt is a 62 y/o M with PMHx of thyroid cancer status post total thyroidectomy in 2010 and radioactive iodine treatment in 2011, hypogonadism, vitamin D deficiency, and osteopenia, with chronic constipation, right buttock and right scrotal pain and numbness. 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 The chordoma resulted in perineal numbness and overflow incontinence and he was admitted 7/7/21 for staged resection.   On 7/7, he underwent Plastic Surgery and General Surgery assisted vertical rectus abdominal myocutaneous flap harvest with transperitoneal ligation of internal iliac artery and vein. He is s/p en bloc sacrectomy with L4-pelvis fusion; EBL 4.5L status post 8 units PRBC, 6 units FFP, 1 pack platelets and 5L crystalloid; 7/8. Extubated. On 7/9  patient developed hypoxia and respiratory distress, desaturation. CPAP at night.  CTA revealed bilateral sub segmental PE. No right heart strain & Bibasilar and left lingular consolidative opacities. Started on Heparin gtt and transitioned to therapeutic lovenox on 8/1. Course further c/b ileus requiring gastric decompression. Pt underwent echo to check for RV per pulm, and it was grossly unremarkable.  Wound washout with plastics and duc pus noted, entire wound reopened on 7/26/21 and placement of 2 drains. Incontinent of stool. Cultures growing MRSA, enterococcus and bacteroides. ID following. Continue vancomycin and flagyl. S/P Diversion Colostomy to keep sacral incision clean on 7/28/21. Tony for urinary incontinence and to keep sacral wound clean. Pt requires 6 week course of antibiotics until 9/7/21. PICC line in place. LE doppler neg for DVT. Patient tolerating regular diet with fully functioning colostomy. Evaluated by Physical Therapy and PM&R and was recommended for Acute rehab. Pt was medically stable on 8/2/21 and was transferred to [facility]. (02 Aug 2021 14:54)      TODAY'S SUBJECTIVE & REVIEW OF SYMPTOMS: Patient seen and evaluated this morning. No acute events overnight. Participating well in therapy.  Denies chest pain, SOB, nausea, vomiting,  . Slept well last night. Pain in lower back region. tony in place.       [X] Constiutional WNL        [X] Cardio WNL              [X] Resp WNL  [X] GI WNL                            [X]  WNL                    [X] Heme WNL  [X] Endo WNL                       [X] Skin WNL                  [X] MSK WNL  [X] Neuro WNL                     [X] Cognitive WNL         [X] Psych WNL      PHYSICAL EXAM    Vital Signs Last 24 Hrs  T(C): 36.9 (03 Aug 2021 08:08), Max: 38.1 (02 Aug 2021 20:28)  T(F): 98.5 (03 Aug 2021 08:08), Max: 100.5 (02 Aug 2021 20:28)  HR: 84 (03 Aug 2021 08:08) (79 - 86)  BP: 122/78 (03 Aug 2021 08:08) (122/78 - 143/81)  BP(mean): --  RR: 16 (03 Aug 2021 08:08) (14 - 18)  SpO2: 96% (03 Aug 2021 08:08) (95% - 96%)    Physical Exam: Constitutional - NAD, Comfortably lying in bed  HEENT - NCAT, EOMI  Neck - Supple, No limited ROM  Chest - Breathing comfortably, No wheezing, CTAB  Cardiovascular - S1S2, RRR  Abdomen - Soft, nontender, +colostomy bag with brown stool, +abdominal incision intact and closed with no drainage  Extremities - No C/C/E, No calf tenderness   Neurologic Exam -                    Cognitive - Awake, Alert, AAO to self, place, date, year, situation     Communication - Fluent, No dysarthria     Cranial Nerves - CN 2-12 intact     Motor -                     LEFT    UE - ShAB 5/5, EF 5/5, EE 5/5, WE 5/5,  5/5                    RIGHT UE - ShAB 5/5, EF 5/5, EE 5/5, WE 5/5,  5/5                    LEFT    LE - HF 4/5, KE 4/5, DF 4/5, PF 5/5                    RIGHT LE - HF 4+/5, KE 4/5, DF 5/5, PF 5/5        Sensory - mildly decreased LLE compared to right  Psychiatric - Mood stable, Affect WNL  Skin: back incision with 2 drains draining sanguinous fluid, incision with Aquacel dressing intact without drainage  RECENT LABS/IMAGING                        10.0   12.86 )-----------( 397      ( 03 Aug 2021 06:20 )             31.3     08-03    138  |  103  |  12  ----------------------------<  101<H>  3.9   |  29  |  0.89    Ca    8.6      03 Aug 2021 06:20    TPro  6.7  /  Alb  2.2<L>  /  TBili  0.3  /  DBili  x   /  AST  26  /  ALT  23  /  AlkPhos  101  08-03    PT/INR - ( 01 Aug 2021 12:07 )   PT: 17.1 sec;   INR: 1.45 ratio         PTT - ( 02 Aug 2021 06:18 )  PTT:29.9 sec       · Assessment	  Pt is a 62 y/o M with PMHx of thyroid cancer status post total thyroidectomy in 2010 and radioactive iodine treatment in 2011, hypogonadism, vitamin D deficiency, and osteopenia, who presented to the hospital for planned resection for sacral mass. He is s/p sacrectomy with L4-pelvis fusion. Post-operative course was complicated by bilateral PE, MRSA wound infection s/p diversion colostomy. Pt with deficits with ambulation, strength, and endurance.     #Sacral Mass  - s/p sacrectomy with L4-pelvis fusion  - wound care  - Maintain and monitor SEDRICK drains  - PT/OT/ST for 1hr each a day, 5 days a week    #MRSA/Enterococcus/Bacteroides wound infection  - Vancomycin 1250mg bid until 9/7/21  - Flagyl 500mg q8hrs until 9/7/21  - s/p diversion colostomy to keep sacral incision clean  - Tony present for urinary incontinence and to keep sacral wound clear    #PE  - bilateral subsegmental PE  - therapeutic Lovenox 70mg bid    #Hypothyroidism  - continue Synthroid 137mcg daily    #Pain  - Tylenol PRN, Tramadol PRN  - Gabapentin 200mg q8hrs    #Insomnia  - continue Melatonin    #GI  - Protonix    #  - Tony for incontinence and to help keep wound clean  - remove when appropriate    #Diet  - Regular  - multivitamin, Calcium carbonate

## 2021-08-03 NOTE — DIETITIAN NUTRITION RISK NOTIFICATION - TREATMENT: THE FOLLOWING DIET HAS BEEN RECOMMENDED
Recommend Initiate Ensure Enlive 8oz PO BID (Provides 700kcal & 40grams of Protein)   Education Provided on Need for Supplementation

## 2021-08-03 NOTE — DIETITIAN INITIAL EVALUATION ADULT. - PHYSCIAL ASSESSMENT
Temporalis, Orbital Fat Pads, Buccal Fat Pads,Bicep, Tricep, Gastrocnemius(Calf) & Hand (Interosseous) Wasting Observed  (Per Nutrition Focused Physical Exam)

## 2021-08-04 ENCOUNTER — NON-APPOINTMENT (OUTPATIENT)
Age: 61
End: 2021-08-04

## 2021-08-04 LAB — VANCOMYCIN TROUGH SERPL-MCNC: 13.4 UG/ML — SIGNIFICANT CHANGE UP (ref 10–20)

## 2021-08-04 PROCEDURE — 99232 SBSQ HOSP IP/OBS MODERATE 35: CPT

## 2021-08-04 RX ADMIN — GABAPENTIN 200 MILLIGRAM(S): 400 CAPSULE ORAL at 05:39

## 2021-08-04 RX ADMIN — Medication 137 MICROGRAM(S): at 05:40

## 2021-08-04 RX ADMIN — Medication 500 MILLIGRAM(S): at 21:37

## 2021-08-04 RX ADMIN — Medication 166.67 MILLIGRAM(S): at 17:03

## 2021-08-04 RX ADMIN — CHLORHEXIDINE GLUCONATE 1 APPLICATION(S): 213 SOLUTION TOPICAL at 05:40

## 2021-08-04 RX ADMIN — CHLORHEXIDINE GLUCONATE 1 APPLICATION(S): 213 SOLUTION TOPICAL at 21:38

## 2021-08-04 RX ADMIN — Medication 500 MILLIGRAM(S): at 05:39

## 2021-08-04 RX ADMIN — ENOXAPARIN SODIUM 70 MILLIGRAM(S): 100 INJECTION SUBCUTANEOUS at 17:03

## 2021-08-04 RX ADMIN — ENOXAPARIN SODIUM 70 MILLIGRAM(S): 100 INJECTION SUBCUTANEOUS at 05:39

## 2021-08-04 RX ADMIN — GABAPENTIN 200 MILLIGRAM(S): 400 CAPSULE ORAL at 14:09

## 2021-08-04 RX ADMIN — Medication 500 MILLIGRAM(S): at 14:09

## 2021-08-04 RX ADMIN — Medication 1 TABLET(S): at 11:55

## 2021-08-04 RX ADMIN — GABAPENTIN 200 MILLIGRAM(S): 400 CAPSULE ORAL at 21:38

## 2021-08-04 RX ADMIN — PANTOPRAZOLE SODIUM 40 MILLIGRAM(S): 20 TABLET, DELAYED RELEASE ORAL at 05:40

## 2021-08-04 RX ADMIN — Medication 166.67 MILLIGRAM(S): at 05:39

## 2021-08-04 NOTE — PROGRESS NOTE ADULT - SUBJECTIVE AND OBJECTIVE BOX
Patient is a 61y old  Male who presents with a chief complaint of     HPI:  Pt is a 60 y/o M with PMHx of thyroid cancer status post total thyroidectomy in 2010 and radioactive iodine treatment in 2011, hypogonadism, vitamin D deficiency, and osteopenia, with chronic constipation, right buttock and right scrotal pain and numbness. 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 The chordoma resulted in perineal numbness and overflow incontinence and he was admitted 7/7/21 for staged resection.   On 7/7, he underwent Plastic Surgery and General Surgery assisted vertical rectus abdominal myocutaneous flap harvest with transperitoneal ligation of internal iliac artery and vein. He is s/p en bloc sacrectomy with L4-pelvis fusion; EBL 4.5L status post 8 units PRBC, 6 units FFP, 1 pack platelets and 5L crystalloid; 7/8. Extubated. On 7/9  patient developed hypoxia and respiratory distress, desaturation. CPAP at night.  CTA revealed bilateral sub segmental PE. No right heart strain & Bibasilar and left lingular consolidative opacities. Started on Heparin gtt and transitioned to therapeutic lovenox on 8/1. Course further c/b ileus requiring gastric decompression. Pt underwent echo to check for RV per pulm, and it was grossly unremarkable.  Wound washout with plastics and duc pus noted, entire wound reopened on 7/26/21 and placement of 2 drains. Incontinent of stool. Cultures growing MRSA, enterococcus and bacteroides. ID following. Continue vancomycin and flagyl. S/P Diversion Colostomy to keep sacral incision clean on 7/28/21. Barnett for urinary incontinence and to keep sacral wound clean. Pt requires 6 week course of antibiotics until 9/7/21. PICC line in place. LE doppler neg for DVT. Patient tolerating regular diet with fully functioning colostomy. Evaluated by Physical Therapy and PM&R and was recommended for Acute rehab. Pt was medically stable on 8/2/21 and was transferred to [facility]. (02 Aug 2021 14:54)      TODAY'S SUBJECTIVE & REVIEW OF SYMPTOMS: Patient seen and evaluated this morning. No acute events overnight. Participating well in therapy.  Denies chest pain, SOB, nausea, vomiting,  . Slept well last night.       [X] Constiutional WNL        [X] Cardio WNL              [X] Resp WNL  [X] GI WNL                            [X]  WNL                    [X] Heme WNL  [X] Endo WNL                       [X] Skin WNL                  [X] MSK WNL  [X] Neuro WNL                     [X] Cognitive WNL         [X] Psych WNL      PHYSICAL EXAM    Vital Signs Last 24 Hrs   Vital Signs Last 24 Hrs  T(C): 36.8 (04 Aug 2021 08:02), Max: 37.5 (03 Aug 2021 21:46)  T(F): 98.2 (04 Aug 2021 08:02), Max: 99.5 (03 Aug 2021 21:46)  HR: 85 (04 Aug 2021 08:02) (78 - 85)  BP: 112/76 (04 Aug 2021 08:02) (112/76 - 125/69)  BP(mean): --  RR: 16 (04 Aug 2021 08:02) (16 - 18)  SpO2: 99% (04 Aug 2021 08:02) (97% - 99%)    Physical Exam: Constitutional - NAD, Comfortably lying in bed  HEENT - NCAT, EOMI  Neck - Supple, No limited ROM  Chest - Breathing comfortably, No wheezing, CTAB  Cardiovascular - S1S2, RRR  Abdomen - Soft, nontender, +colostomy bag with brown stool, +abdominal incision intact and closed with no drainage  Extremities - No C/C/E, No calf tenderness   Neurologic Exam -                    Cognitive - Awake, Alert, AAO to self, place, date, year, situation     Communication - Fluent, No dysarthria     Cranial Nerves - CN 2-12 intact     Motor -                     LEFT    UE - ShAB 5/5, EF 5/5, EE 5/5, WE 5/5,  5/5                    RIGHT UE - ShAB 5/5, EF 5/5, EE 5/5, WE 5/5,  5/5                    LEFT    LE - HF 4/5, KE 4/5, DF 4/5, PF 5/5                    RIGHT LE - HF 4+/5, KE 4/5, DF 5/5, PF 5/5        Sensory - mildly decreased LLE compared to right  Psychiatric - Mood stable, Affect WNL  Skin: back incision with 2 drains draining sanguinous fluid, incision with Aquacel dressing intact without drainage  RECENT LABS/IMAGING                        10.0   12.86 )-----------( 397      ( 03 Aug 2021 06:20 )             31.3     08-03    138  |  103  |  12  ----------------------------<  101<H>  3.9   |  29  |  0.89    Ca    8.6      03 Aug 2021 06:20    TPro  6.7  /  Alb  2.2<L>  /  TBili  0.3  /  DBili  x   /  AST  26  /  ALT  23  /  AlkPhos  101  08-03    PT/INR - ( 01 Aug 2021 12:07 )   PT: 17.1 sec;   INR: 1.45 ratio         PTT - ( 02 Aug 2021 06:18 )  PTT:29.9 sec       · Assessment	  Pt is a 60 y/o M with PMHx of thyroid cancer status post total thyroidectomy in 2010 and radioactive iodine treatment in 2011, hypogonadism, vitamin D deficiency, and osteopenia, who presented to the hospital for planned resection for sacral mass. He is s/p sacrectomy with L4-pelvis fusion. Post-operative course was complicated by bilateral PE, MRSA wound infection s/p diversion colostomy. Pt with deficits with ambulation, strength, and endurance.     #Sacral Mass  - s/p sacrectomy with L4-pelvis fusion  - wound care  - Maintain and monitor SEDRICK drains  - PT/OT/ST for 1hr each a day, 5 days a week    #MRSA/Enterococcus/Bacteroides wound infection  - Vancomycin 1250mg bid until 9/7/21  - Flagyl 500mg q8hrs until 9/7/21  - s/p diversion colostomy to keep sacral incision clean  - Barnett present for urinary incontinence and to keep sacral wound clear    #PE  - bilateral subsegmental PE  - therapeutic Lovenox 70mg bid    #Hypothyroidism  - continue Synthroid 137mcg daily    #Pain  - Tylenol PRN, Tramadol PRN  - Gabapentin 200mg q8hrs    #Insomnia  - continue Melatonin    #GI  - Protonix    #  - Barnett for incontinence and to help keep wound clean  - remove when appropriate    #Diet  - Regular  - multivitamin, Calcium carbonate

## 2021-08-04 NOTE — PROGRESS NOTE ADULT - SUBJECTIVE AND OBJECTIVE BOX
Patient is a 61y old  Male who presents with a chief complaint of Debility due to sacral mass removal (sacrectomy) (03 Aug 2021 11:43)      Patient seen and examined at bedside.  Denies chest pain, dyspnea, abd pain.    ALLERGIES:  No Known Allergies    MEDICATIONS  (STANDING):  calcium carbonate 1250 mG  + Vitamin D (OsCal 500 + D) 1 Tablet(s) Oral daily  chlorhexidine 4% Liquid 1 Application(s) Topical two times a day  enoxaparin Injectable 70 milliGRAM(s) SubCutaneous two times a day  gabapentin 200 milliGRAM(s) Oral every 8 hours  levothyroxine 137 MICROGram(s) Oral daily  melatonin 3 milliGRAM(s) Oral at bedtime  metroNIDAZOLE    Tablet 500 milliGRAM(s) Oral every 8 hours  multivitamin 1 Tablet(s) Oral daily  pantoprazole    Tablet 40 milliGRAM(s) Oral before breakfast  vancomycin  IVPB 1250 milliGRAM(s) IV Intermittent every 12 hours    MEDICATIONS  (PRN):  acetaminophen   Tablet .. 650 milliGRAM(s) Oral every 6 hours PRN Temp greater or equal to 38C (100.4F), Mild Pain (1 - 3)  traMADol 25 milliGRAM(s) Oral every 6 hours PRN Moderate Pain (4 - 6)  traMADol 50 milliGRAM(s) Oral every 6 hours PRN Severe Pain (7 - 10)    Vital Signs Last 24 Hrs  T(F): 98.2 (04 Aug 2021 08:02), Max: 99.5 (03 Aug 2021 21:46)  HR: 85 (04 Aug 2021 08:02) (78 - 85)  BP: 112/76 (04 Aug 2021 08:02) (112/76 - 125/69)  RR: 16 (04 Aug 2021 08:02) (16 - 18)  SpO2: 99% (04 Aug 2021 08:02) (97% - 99%)  I&O's Summary    03 Aug 2021 07:01  -  04 Aug 2021 07:00  --------------------------------------------------------  IN: 250 mL / OUT: 1590 mL / NET: -1340 mL      BMI (kg/m2): 27 (08-02-21 @ 19:14)  PHYSICAL EXAM:  General: NAD, A/O x 3  ENT: MMM  Neck: Supple, No JVD  Lungs: Clear to auscultation bilaterally  Cardio: RRR, S1/S2, No murmurs  Abdomen: Soft, Nontender, Nondistended; Bowel sounds present  Extremities: No calf tenderness, No pitting edema    LABS:                        10.0   12.86 )-----------( 397      ( 03 Aug 2021 06:20 )             31.3       08-03    138  |  103  |  12  ----------------------------<  101  3.9   |  29  |  0.89    Ca    8.6      03 Aug 2021 06:20    TPro  6.7  /  Alb  2.2  /  TBili  0.3  /  DBili  x   /  AST  26  /  ALT  23  /  AlkPhos  101  08-03     eGFR if Non : 93 mL/min/1.73M2 (08-03-21 @ 06:20)  eGFR if : 108 mL/min/1.73M2 (08-03-21 @ 06:20)    PT/INR - ( 01 Aug 2021 12:07 )   PT: 17.1 sec;   INR: 1.45 ratio         PTT - ( 02 Aug 2021 06:18 )  PTT:29.9 sec                               Culture - Blood (collected 29 Jul 2021 03:37)  Source: .Blood Blood-Venous  Final Report (03 Aug 2021 04:01):    No Growth Final    Culture - Blood (collected 29 Jul 2021 03:37)  Source: .Blood Blood-Peripheral  Final Report (03 Aug 2021 04:01):    No Growth Final      COVID-19 PCR: NotDetec (08-02-21 @ 20:45)  COVID-19 PCR: NotDetec (08-02-21 @ 13:00)  COVID-19 PCR: NotDetec (07-07-21 @ 17:36)      RADIOLOGY & ADDITIONAL TESTS:    Care Discussed with Consultants/Other Providers:

## 2021-08-04 NOTE — PROGRESS NOTE ADULT - ASSESSMENT
Pt is a 62 y/o M with PMHx of thyroid cancer status post total thyroidectomy in 2010 and radioactive iodine treatment in 2011, hypogonadism, vitamin D deficiency, and osteopenia, who presented to the hospital for planned resection for sacral mass. He is s/p sacrectomy with L4-pelvis fusion. Post-operative course was complicated by bilateral PE, MRSA wound infection s/p diversion colostomy. Pt with deficits with ambulation, strength, and endurance.     #Sacral Mass  #Debility  - s/p sacrectomy with L4-pelvis fusion  - wound care  - Maintain and monitor SEDRICK drains  - Comprehensive rehab program  - pain control    #MRSA/Enterococcus/Bacteroides wound infection  - Vancomycin 1250mg bid until 9/7/21  - Flagyl 500mg q8hrs until 9/7/21  - s/p diversion colostomy to keep sacral incision clean  - Barnett present for urinary incontinence and to keep sacral wound clear    #PE  - bilateral subsegmental PE  - therapeutic Lovenox 70mg bid    #Hypothyroidism  - continue Synthroid 137mcg daily    #GI  - Protonix    #  - Barnett for incontinence and to help keep wound clean  - remove when appropriate

## 2021-08-05 LAB
ANION GAP SERPL CALC-SCNC: 12 MMOL/L — SIGNIFICANT CHANGE UP (ref 5–17)
BASOPHILS # BLD AUTO: 0.08 K/UL — SIGNIFICANT CHANGE UP (ref 0–0.2)
BASOPHILS NFR BLD AUTO: 0.6 % — SIGNIFICANT CHANGE UP (ref 0–2)
BUN SERPL-MCNC: 14 MG/DL — SIGNIFICANT CHANGE UP (ref 7–23)
CALCIUM SERPL-MCNC: 8.9 MG/DL — SIGNIFICANT CHANGE UP (ref 8.4–10.5)
CHLORIDE SERPL-SCNC: 101 MMOL/L — SIGNIFICANT CHANGE UP (ref 96–108)
CO2 SERPL-SCNC: 25 MMOL/L — SIGNIFICANT CHANGE UP (ref 22–31)
CREAT SERPL-MCNC: 0.88 MG/DL — SIGNIFICANT CHANGE UP (ref 0.5–1.3)
EOSINOPHIL # BLD AUTO: 0.27 K/UL — SIGNIFICANT CHANGE UP (ref 0–0.5)
EOSINOPHIL NFR BLD AUTO: 2 % — SIGNIFICANT CHANGE UP (ref 0–6)
GLUCOSE SERPL-MCNC: 105 MG/DL — HIGH (ref 70–99)
HCT VFR BLD CALC: 32.4 % — LOW (ref 39–50)
HGB BLD-MCNC: 10.3 G/DL — LOW (ref 13–17)
IMM GRANULOCYTES NFR BLD AUTO: 1.7 % — HIGH (ref 0–1.5)
LYMPHOCYTES # BLD AUTO: 16.9 % — SIGNIFICANT CHANGE UP (ref 13–44)
LYMPHOCYTES # BLD AUTO: 2.26 K/UL — SIGNIFICANT CHANGE UP (ref 1–3.3)
MCHC RBC-ENTMCNC: 27.9 PG — SIGNIFICANT CHANGE UP (ref 27–34)
MCHC RBC-ENTMCNC: 31.8 GM/DL — LOW (ref 32–36)
MCV RBC AUTO: 87.8 FL — SIGNIFICANT CHANGE UP (ref 80–100)
MONOCYTES # BLD AUTO: 1.32 K/UL — HIGH (ref 0–0.9)
MONOCYTES NFR BLD AUTO: 9.9 % — SIGNIFICANT CHANGE UP (ref 2–14)
NEUTROPHILS # BLD AUTO: 9.21 K/UL — HIGH (ref 1.8–7.4)
NEUTROPHILS NFR BLD AUTO: 68.9 % — SIGNIFICANT CHANGE UP (ref 43–77)
NRBC # BLD: 0 /100 WBCS — SIGNIFICANT CHANGE UP (ref 0–0)
PLATELET # BLD AUTO: 428 K/UL — HIGH (ref 150–400)
POTASSIUM SERPL-MCNC: 3.7 MMOL/L — SIGNIFICANT CHANGE UP (ref 3.5–5.3)
POTASSIUM SERPL-SCNC: 3.7 MMOL/L — SIGNIFICANT CHANGE UP (ref 3.5–5.3)
RBC # BLD: 3.69 M/UL — LOW (ref 4.2–5.8)
RBC # FLD: 14.6 % — HIGH (ref 10.3–14.5)
SODIUM SERPL-SCNC: 138 MMOL/L — SIGNIFICANT CHANGE UP (ref 135–145)
SURGICAL PATHOLOGY STUDY: SIGNIFICANT CHANGE UP
WBC # BLD: 13.37 K/UL — HIGH (ref 3.8–10.5)
WBC # FLD AUTO: 13.37 K/UL — HIGH (ref 3.8–10.5)

## 2021-08-05 PROCEDURE — 99232 SBSQ HOSP IP/OBS MODERATE 35: CPT

## 2021-08-05 RX ORDER — LANOLIN ALCOHOL/MO/W.PET/CERES
3 CREAM (GRAM) TOPICAL AT BEDTIME
Refills: 0 | Status: DISCONTINUED | OUTPATIENT
Start: 2021-08-05 | End: 2021-08-26

## 2021-08-05 RX ORDER — SIMETHICONE 80 MG/1
80 TABLET, CHEWABLE ORAL
Refills: 0 | Status: DISCONTINUED | OUTPATIENT
Start: 2021-08-05 | End: 2021-08-26

## 2021-08-05 RX ORDER — SENNA PLUS 8.6 MG/1
2 TABLET ORAL AT BEDTIME
Refills: 0 | Status: DISCONTINUED | OUTPATIENT
Start: 2021-08-05 | End: 2021-08-26

## 2021-08-05 RX ADMIN — GABAPENTIN 200 MILLIGRAM(S): 400 CAPSULE ORAL at 05:27

## 2021-08-05 RX ADMIN — Medication 137 MICROGRAM(S): at 05:26

## 2021-08-05 RX ADMIN — CHLORHEXIDINE GLUCONATE 1 APPLICATION(S): 213 SOLUTION TOPICAL at 17:05

## 2021-08-05 RX ADMIN — Medication 166.67 MILLIGRAM(S): at 17:01

## 2021-08-05 RX ADMIN — Medication 500 MILLIGRAM(S): at 05:26

## 2021-08-05 RX ADMIN — ENOXAPARIN SODIUM 70 MILLIGRAM(S): 100 INJECTION SUBCUTANEOUS at 05:26

## 2021-08-05 RX ADMIN — SENNA PLUS 2 TABLET(S): 8.6 TABLET ORAL at 21:45

## 2021-08-05 RX ADMIN — PANTOPRAZOLE SODIUM 40 MILLIGRAM(S): 20 TABLET, DELAYED RELEASE ORAL at 06:29

## 2021-08-05 RX ADMIN — Medication 166.67 MILLIGRAM(S): at 05:25

## 2021-08-05 RX ADMIN — Medication 500 MILLIGRAM(S): at 21:45

## 2021-08-05 RX ADMIN — GABAPENTIN 200 MILLIGRAM(S): 400 CAPSULE ORAL at 21:45

## 2021-08-05 RX ADMIN — ENOXAPARIN SODIUM 70 MILLIGRAM(S): 100 INJECTION SUBCUTANEOUS at 17:01

## 2021-08-05 RX ADMIN — Medication 1 TABLET(S): at 11:45

## 2021-08-05 RX ADMIN — GABAPENTIN 200 MILLIGRAM(S): 400 CAPSULE ORAL at 13:39

## 2021-08-05 RX ADMIN — CHLORHEXIDINE GLUCONATE 1 APPLICATION(S): 213 SOLUTION TOPICAL at 05:26

## 2021-08-05 RX ADMIN — Medication 500 MILLIGRAM(S): at 13:40

## 2021-08-05 NOTE — PROGRESS NOTE ADULT - SUBJECTIVE AND OBJECTIVE BOX
Patient is a 61y old  Male who presents with a chief complaint of Debility due to sacral mass removal (sacrectomy) (03 Aug 2021 11:43)      Patient seen and examined at bedside.  Denies chest pain, dyspnea, abd pain.    ALLERGIES:  No Known Allergies    MEDICATIONS  (STANDING):  calcium carbonate 1250 mG  + Vitamin D (OsCal 500 + D) 1 Tablet(s) Oral daily  chlorhexidine 4% Liquid 1 Application(s) Topical two times a day  enoxaparin Injectable 70 milliGRAM(s) SubCutaneous two times a day  gabapentin 200 milliGRAM(s) Oral every 8 hours  levothyroxine 137 MICROGram(s) Oral daily  metroNIDAZOLE    Tablet 500 milliGRAM(s) Oral every 8 hours  multivitamin 1 Tablet(s) Oral daily  pantoprazole    Tablet 40 milliGRAM(s) Oral before breakfast  vancomycin  IVPB 1250 milliGRAM(s) IV Intermittent every 12 hours    MEDICATIONS  (PRN):  acetaminophen   Tablet .. 650 milliGRAM(s) Oral every 6 hours PRN Temp greater or equal to 38C (100.4F), Mild Pain (1 - 3)  melatonin 3 milliGRAM(s) Oral at bedtime PRN Insomnia  traMADol 25 milliGRAM(s) Oral every 6 hours PRN Moderate Pain (4 - 6)  traMADol 50 milliGRAM(s) Oral every 6 hours PRN Severe Pain (7 - 10)    Vital Signs Last 24 Hrs  T(F): 97.9 (05 Aug 2021 08:17), Max: 99.3 (04 Aug 2021 20:14)  HR: 71 (05 Aug 2021 08:17) (71 - 83)  BP: 132/74 (05 Aug 2021 08:17) (132/74 - 144/75)  RR: 16 (05 Aug 2021 08:17) (16 - 17)  SpO2: 95% (05 Aug 2021 08:17) (95% - 95%)  I&O's Summary    04 Aug 2021 07:01  -  05 Aug 2021 07:00  --------------------------------------------------------  IN: 250 mL / OUT: 660 mL / NET: -410 mL      BMI (kg/m2): 27 (08-02-21 @ 19:14)  PHYSICAL EXAM:  General: NAD, A/O x 3  ENT: MMM  Neck: Supple, No JVD  Lungs: Clear to auscultation bilaterally  Cardio: RRR, S1/S2, No murmurs  Abdomen: Soft, Nontender, Nondistended; Bowel sounds present  Extremities: No calf tenderness, No pitting edema    LABS:                        10.3   13.37 )-----------( 428      ( 05 Aug 2021 05:30 )             32.4       08-05    138  |  101  |  14  ----------------------------<  105  3.7   |  25  |  0.88    Ca    8.9      05 Aug 2021 05:30    TPro  6.7  /  Alb  2.2  /  TBili  0.3  /  DBili  x   /  AST  26  /  ALT  23  /  AlkPhos  101  08-03     eGFR if Non : 93 mL/min/1.73M2 (08-05-21 @ 05:30)  eGFR if : 108 mL/min/1.73M2 (08-05-21 @ 05:30)                                   COVID-19 PCR: NotDetec (08-02-21 @ 20:45)  COVID-19 PCR: NotDetec (08-02-21 @ 13:00)  COVID-19 PCR: NotDetec (07-07-21 @ 17:36)      RADIOLOGY & ADDITIONAL TESTS:    Care Discussed with Consultants/Other Providers:

## 2021-08-05 NOTE — PROGRESS NOTE ADULT - ASSESSMENT
Pt is a 60 y/o M with PMHx of thyroid cancer status post total thyroidectomy in 2010 and radioactive iodine treatment in 2011, hypogonadism, vitamin D deficiency, and osteopenia, who presented to the hospital for planned resection for sacral mass. He is s/p sacrectomy with L4-pelvis fusion. Post-operative course was complicated by bilateral PE, MRSA wound infection s/p diversion colostomy. Pt with deficits with ambulation, strength, and endurance.     #Sacral Mass  - s/p sacrectomy with L4-pelvis fusion  - wound care  - Maintain and monitor SEDRICK drains  - PT/OT/ST for 1hr each a day, 5 days a week    #MRSA/Enterococcus/Bacteroides wound infection  - Vancomycin 1250mg bid until 9/7/21  - Flagyl 500mg q8hrs until 9/7/21  - s/p diversion colostomy to keep sacral incision clean  - Barnett present for urinary incontinence and to keep sacral wound clear    #PE  - bilateral subsegmental PE  - therapeutic Lovenox 70mg bid    #Hypothyroidism  - continue Synthroid 137mcg daily    #Pain  - Tylenol PRN, Tramadol PRN  - Gabapentin 200mg q8hrs    #Insomnia  - continue Melatonin    #GI  - Protonix    #  - Barnett for incontinence and to help keep wound clean  - remove when appropriate    #Diet  - Regular  - multivitamin, Calcium carbonate    #Discharge planning  Team meeting DATE: 8/5/21  SW: lives with his 90 year old father who can help with self care tasks, but nothing physically. There are 4-5 steps to enter and 2 steps up to the bedroom  OT: supervision grooming, mod A bathing, LBD, min A toilet transers, total A toileting and showering  PT: 60' with RW with min A, 4 steps with 2 rails with min/mod A  EDOD:  8/21

## 2021-08-05 NOTE — PROGRESS NOTE ADULT - SUBJECTIVE AND OBJECTIVE BOX
HPI:  Pt is a 60 y/o M with PMHx of thyroid cancer status post total thyroidectomy in 2010 and radioactive iodine treatment in 2011, hypogonadism, vitamin D deficiency, and osteopenia, with chronic constipation, right buttock and right scrotal pain and numbness. 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 The chordoma resulted in perineal numbness and overflow incontinence and he was admitted 7/7/21 for staged resection.   On 7/7, he underwent Plastic Surgery and General Surgery assisted vertical rectus abdominal myocutaneous flap harvest with transperitoneal ligation of internal iliac artery and vein. He is s/p en bloc sacrectomy with L4-pelvis fusion; EBL 4.5L status post 8 units PRBC, 6 units FFP, 1 pack platelets and 5L crystalloid; 7/8. Extubated. On 7/9  patient developed hypoxia and respiratory distress, desaturation. CPAP at night.  CTA revealed bilateral sub segmental PE. No right heart strain & Bibasilar and left lingular consolidative opacities. Started on Heparin gtt and transitioned to therapeutic lovenox on 8/1. Course further c/b ileus requiring gastric decompression. Pt underwent echo to check for RV per pulm, and it was grossly unremarkable.  Wound washout with plastics and duc pus noted, entire wound reopened on 7/26/21 and placement of 2 drains. Incontinent of stool. Cultures growing MRSA, enterococcus and bacteroides. ID following. Continue vancomycin and flagyl. S/P Diversion Colostomy to keep sacral incision clean on 7/28/21. Barnett for urinary incontinence and to keep sacral wound clean. Pt requires 6 week course of antibiotics until 9/7/21. PICC line in place. LE doppler neg for DVT. Patient tolerating regular diet with fully functioning colostomy. Evaluated by Physical Therapy and PM&R and was recommended for Acute rehab. Pt was medically stable on 8/2/21 and was transferred to [facility].    SUBJECTIVE / INTERVAL HPI: Patient seen and examined in PT gym. He is doing well this morning. His pain is under control and he was able to sleep well overnight. He states that he feels tired at the end of PT sessions and knows that he needs to improve his endurance. He denies nausea, constipation, or shortness of breath.     REVIEW OF SYSTEMS:    CONSTITUTIONAL: No fevers or chills  EYES/ENT: No visual changes;  No vertigo or throat pain   NECK: No pain or stiffness  RESPIRATORY: No cough, wheezing, or shortness of breath  CARDIOVASCULAR: No chest pain or palpitations  GASTROINTESTINAL: No abdominal or epigastric pain. No nausea or vomiting. No diarrhea or constipation. +colostomy  GENITOURINARY: No dysuria, frequency or hematuria, +Barnett  NEUROLOGICAL: No numbness, +weakness  SKIN: No itching, rashes      VITAL SIGNS:  Vital Signs Last 24 Hrs  T(C): 36.6 (05 Aug 2021 08:17), Max: 37.4 (04 Aug 2021 20:14)  T(F): 97.9 (05 Aug 2021 08:17), Max: 99.3 (04 Aug 2021 20:14)  HR: 71 (05 Aug 2021 08:17) (71 - 83)  BP: 132/74 (05 Aug 2021 08:17) (132/74 - 144/75)  BP(mean): --  RR: 16 (05 Aug 2021 08:17) (16 - 17)  SpO2: 95% (05 Aug 2021 08:17) (95% - 95%)    PHYSICAL EXAM:    General: NAD, sitting in PT gym exercising  HEENT: NC/AT; PERRL, anicteric sclera; MMM  Neck: supple  Cardiovascular: +S1/S2, RRR  Respiratory: breathing comfortably, no respiratory distress  Gastrointestinal: soft, NT/ND, +colostomy bag present, incision intact  Extremities: warm, well perfused; no edema, clubbing or cyanosis  Neurological: AAOx3; motor exam with mild lower extremity weakness bilaterally 4/5 proximally.     MEDICATIONS:  MEDICATIONS  (STANDING):  calcium carbonate 1250 mG  + Vitamin D (OsCal 500 + D) 1 Tablet(s) Oral daily  chlorhexidine 4% Liquid 1 Application(s) Topical two times a day  enoxaparin Injectable 70 milliGRAM(s) SubCutaneous two times a day  gabapentin 200 milliGRAM(s) Oral every 8 hours  levothyroxine 137 MICROGram(s) Oral daily  metroNIDAZOLE    Tablet 500 milliGRAM(s) Oral every 8 hours  multivitamin 1 Tablet(s) Oral daily  pantoprazole    Tablet 40 milliGRAM(s) Oral before breakfast  vancomycin  IVPB 1250 milliGRAM(s) IV Intermittent every 12 hours    MEDICATIONS  (PRN):  acetaminophen   Tablet .. 650 milliGRAM(s) Oral every 6 hours PRN Temp greater or equal to 38C (100.4F), Mild Pain (1 - 3)  melatonin 3 milliGRAM(s) Oral at bedtime PRN Insomnia  traMADol 25 milliGRAM(s) Oral every 6 hours PRN Moderate Pain (4 - 6)  traMADol 50 milliGRAM(s) Oral every 6 hours PRN Severe Pain (7 - 10)      ALLERGIES:  Allergies    No Known Allergies    Intolerances        LABS:                        10.3   13.37 )-----------( 428      ( 05 Aug 2021 05:30 )             32.4     08-05    138  |  101  |  14  ----------------------------<  105<H>  3.7   |  25  |  0.88    Ca    8.9      05 Aug 2021 05:30          CAPILLARY BLOOD GLUCOSE          RADIOLOGY & ADDITIONAL TESTS: Reviewed.

## 2021-08-06 PROCEDURE — 99232 SBSQ HOSP IP/OBS MODERATE 35: CPT | Mod: GC

## 2021-08-06 PROCEDURE — 99232 SBSQ HOSP IP/OBS MODERATE 35: CPT

## 2021-08-06 RX ORDER — POLYETHYLENE GLYCOL 3350 17 G/17G
17 POWDER, FOR SOLUTION ORAL DAILY
Refills: 0 | Status: DISCONTINUED | OUTPATIENT
Start: 2021-08-06 | End: 2021-08-26

## 2021-08-06 RX ORDER — TRAMADOL HYDROCHLORIDE 50 MG/1
25 TABLET ORAL EVERY 6 HOURS
Refills: 0 | Status: DISCONTINUED | OUTPATIENT
Start: 2021-08-06 | End: 2021-08-12

## 2021-08-06 RX ORDER — TRAMADOL HYDROCHLORIDE 50 MG/1
50 TABLET ORAL EVERY 6 HOURS
Refills: 0 | Status: DISCONTINUED | OUTPATIENT
Start: 2021-08-06 | End: 2021-08-12

## 2021-08-06 RX ADMIN — Medication 500 MILLIGRAM(S): at 14:38

## 2021-08-06 RX ADMIN — Medication 1 TABLET(S): at 12:17

## 2021-08-06 RX ADMIN — Medication 166.67 MILLIGRAM(S): at 17:24

## 2021-08-06 RX ADMIN — SENNA PLUS 2 TABLET(S): 8.6 TABLET ORAL at 21:14

## 2021-08-06 RX ADMIN — GABAPENTIN 200 MILLIGRAM(S): 400 CAPSULE ORAL at 14:38

## 2021-08-06 RX ADMIN — PANTOPRAZOLE SODIUM 40 MILLIGRAM(S): 20 TABLET, DELAYED RELEASE ORAL at 05:10

## 2021-08-06 RX ADMIN — GABAPENTIN 200 MILLIGRAM(S): 400 CAPSULE ORAL at 21:13

## 2021-08-06 RX ADMIN — ENOXAPARIN SODIUM 70 MILLIGRAM(S): 100 INJECTION SUBCUTANEOUS at 17:25

## 2021-08-06 RX ADMIN — ENOXAPARIN SODIUM 70 MILLIGRAM(S): 100 INJECTION SUBCUTANEOUS at 05:09

## 2021-08-06 RX ADMIN — CHLORHEXIDINE GLUCONATE 1 APPLICATION(S): 213 SOLUTION TOPICAL at 17:26

## 2021-08-06 RX ADMIN — CHLORHEXIDINE GLUCONATE 1 APPLICATION(S): 213 SOLUTION TOPICAL at 05:10

## 2021-08-06 RX ADMIN — Medication 500 MILLIGRAM(S): at 21:13

## 2021-08-06 RX ADMIN — Medication 166.67 MILLIGRAM(S): at 05:09

## 2021-08-06 RX ADMIN — GABAPENTIN 200 MILLIGRAM(S): 400 CAPSULE ORAL at 05:10

## 2021-08-06 RX ADMIN — Medication 137 MICROGRAM(S): at 05:10

## 2021-08-06 RX ADMIN — Medication 500 MILLIGRAM(S): at 05:10

## 2021-08-06 NOTE — PROGRESS NOTE ADULT - SUBJECTIVE AND OBJECTIVE BOX
Patient is a 61y old  Male who presents with a chief complaint of Debility due to sacral mass removal (sacrectomy) (03 Aug 2021 11:43)      Patient seen and examined at bedside.  Denies chest pain, dyspnea, abd pain.    ALLERGIES:  No Known Allergies    MEDICATIONS  (STANDING):  calcium carbonate 1250 mG  + Vitamin D (OsCal 500 + D) 1 Tablet(s) Oral daily  chlorhexidine 4% Liquid 1 Application(s) Topical two times a day  enoxaparin Injectable 70 milliGRAM(s) SubCutaneous two times a day  gabapentin 200 milliGRAM(s) Oral every 8 hours  levothyroxine 137 MICROGram(s) Oral daily  metroNIDAZOLE    Tablet 500 milliGRAM(s) Oral every 8 hours  multivitamin 1 Tablet(s) Oral daily  pantoprazole    Tablet 40 milliGRAM(s) Oral before breakfast  senna 2 Tablet(s) Oral at bedtime  vancomycin  IVPB 1250 milliGRAM(s) IV Intermittent every 12 hours    MEDICATIONS  (PRN):  acetaminophen   Tablet .. 650 milliGRAM(s) Oral every 6 hours PRN Temp greater or equal to 38C (100.4F), Mild Pain (1 - 3)  melatonin 3 milliGRAM(s) Oral at bedtime PRN Insomnia  simethicone 80 milliGRAM(s) Chew two times a day PRN Upset Stomach  traMADol 25 milliGRAM(s) Oral every 6 hours PRN Moderate Pain (4 - 6)  traMADol 50 milliGRAM(s) Oral every 6 hours PRN Severe Pain (7 - 10)    Vital Signs Last 24 Hrs  T(F): 98.2 (06 Aug 2021 08:52), Max: 98.2 (06 Aug 2021 08:52)  HR: 87 (06 Aug 2021 08:52) (84 - 87)  BP: 118/78 (06 Aug 2021 08:52) (116/61 - 118/78)  RR: 16 (06 Aug 2021 08:52) (16 - 16)  SpO2: 98% (06 Aug 2021 08:52) (94% - 98%)  I&O's Summary    05 Aug 2021 07:01  -  06 Aug 2021 07:00  --------------------------------------------------------  IN: 0 mL / OUT: 890 mL / NET: -890 mL      BMI (kg/m2): 27 (08-02-21 @ 19:14)  PHYSICAL EXAM:  General: NAD, A/O x 3  ENT: MMM  Neck: Supple, No JVD  Lungs: Clear to auscultation bilaterally  Cardio: RRR, S1/S2, No murmurs  Abdomen: Soft, Nontender, Nondistended; Bowel sounds present  Extremities: No calf tenderness, No pitting edema    LABS:                        10.3   13.37 )-----------( 428      ( 05 Aug 2021 05:30 )             32.4       08-05    138  |  101  |  14  ----------------------------<  105  3.7   |  25  |  0.88    Ca    8.9      05 Aug 2021 05:30       eGFR if Non : 93 mL/min/1.73M2 (08-05-21 @ 05:30)  eGFR if : 108 mL/min/1.73M2 (08-05-21 @ 05:30)                                   COVID-19 PCR: NotDetec (08-02-21 @ 20:45)  COVID-19 PCR: NotDetec (08-02-21 @ 13:00)  COVID-19 PCR: NotDetec (07-07-21 @ 17:36)      RADIOLOGY & ADDITIONAL TESTS:    Care Discussed with Consultants/Other Providers:

## 2021-08-06 NOTE — PROGRESS NOTE ADULT - SUBJECTIVE AND OBJECTIVE BOX
HPI:  Pt is a 60 y/o M with PMHx of thyroid cancer status post total thyroidectomy in 2010 and radioactive iodine treatment in 2011, hypogonadism, vitamin D deficiency, and osteopenia, with chronic constipation, right buttock and right scrotal pain and numbness. 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 The chordoma resulted in perineal numbness and overflow incontinence and he was admitted 7/7/21 for staged resection.   On 7/7, he underwent Plastic Surgery and General Surgery assisted vertical rectus abdominal myocutaneous flap harvest with transperitoneal ligation of internal iliac artery and vein. He is s/p en bloc sacrectomy with L4-pelvis fusion; EBL 4.5L status post 8 units PRBC, 6 units FFP, 1 pack platelets and 5L crystalloid; 7/8. Extubated. On 7/9  patient developed hypoxia and respiratory distress, desaturation. CPAP at night.  CTA revealed bilateral sub segmental PE. No right heart strain & Bibasilar and left lingular consolidative opacities. Started on Heparin gtt and transitioned to therapeutic lovenox on 8/1. Course further c/b ileus requiring gastric decompression. Pt underwent echo to check for RV per pulm, and it was grossly unremarkable.  Wound washout with plastics and duc pus noted, entire wound reopened on 7/26/21 and placement of 2 drains. Incontinent of stool. Cultures growing MRSA, enterococcus and bacteroides. ID following. Continue vancomycin and flagyl. S/P Diversion Colostomy to keep sacral incision clean on 7/28/21. Barnett for urinary incontinence and to keep sacral wound clean. Pt requires 6 week course of antibiotics until 9/7/21. PICC line in place. LE doppler neg for DVT. Patient tolerating regular diet with fully functioning colostomy. Evaluated by Physical Therapy and PM&R and was recommended for Acute rehab. Pt was medically stable on 8/2/21 and was transferred to [facility].    SUBJECTIVE / INTERVAL HPI: Patient seen and examined at bedside. Last night he was concerned about not having any stool in colostomy. He received senna overnight and had stool this morning and is not having abdominal discomfort. He states that he occasionally feel a little lightheaded while standing in therapy, but it only lasts for a short time and his bp is stable. Overall doing well.     REVIEW OF SYSTEMS:    CONSTITUTIONAL: No weakness, fevers or chills  EYES/ENT: No visual changes;  No vertigo or throat pain   NECK: No pain or stiffness  RESPIRATORY: No cough, wheezing, or shortness of breath  CARDIOVASCULAR: No chest pain or palpitations  GASTROINTESTINAL: No abdominal or epigastric pain. No nausea or vomiting. No diarrhea or constipation. +colostomy  GENITOURINARY: No dysuria, frequency or hematuria, +Barnett  NEUROLOGICAL: No numbness, +weakness  SKIN: No itching, rashes, +sacral incision      VITAL SIGNS:  Vital Signs Last 24 Hrs  T(C): 36.8 (06 Aug 2021 08:52), Max: 36.8 (06 Aug 2021 08:52)  T(F): 98.2 (06 Aug 2021 08:52), Max: 98.2 (06 Aug 2021 08:52)  HR: 87 (06 Aug 2021 08:52) (84 - 87)  BP: 118/78 (06 Aug 2021 08:52) (116/61 - 118/78)  BP(mean): --  RR: 16 (06 Aug 2021 08:52) (16 - 16)  SpO2: 98% (06 Aug 2021 08:52) (94% - 98%)    PHYSICAL EXAM:    General: NAD, comfortably sitting in chair  HEENT: NC/AT; PERRL, anicteric sclera; MMM  Neck: supple  Cardiovascular: +S1/S2, RRR  Respiratory: no respiratory distress, no wheezing  Gastrointestinal: soft, NT/ND; +colostomy with brown stool  Extremities: warm, well perfused; no edema, clubbing or cyanosis  Neurological: AAOx3; no focal deficits, motor exam has mild proximal lower extremity weakness  Skin: incisions intact and clean    MEDICATIONS:  MEDICATIONS  (STANDING):  calcium carbonate 1250 mG  + Vitamin D (OsCal 500 + D) 1 Tablet(s) Oral daily  chlorhexidine 4% Liquid 1 Application(s) Topical two times a day  enoxaparin Injectable 70 milliGRAM(s) SubCutaneous two times a day  gabapentin 200 milliGRAM(s) Oral every 8 hours  levothyroxine 137 MICROGram(s) Oral daily  metroNIDAZOLE    Tablet 500 milliGRAM(s) Oral every 8 hours  multivitamin 1 Tablet(s) Oral daily  pantoprazole    Tablet 40 milliGRAM(s) Oral before breakfast  senna 2 Tablet(s) Oral at bedtime  vancomycin  IVPB 1250 milliGRAM(s) IV Intermittent every 12 hours    MEDICATIONS  (PRN):  acetaminophen   Tablet .. 650 milliGRAM(s) Oral every 6 hours PRN Temp greater or equal to 38C (100.4F), Mild Pain (1 - 3)  melatonin 3 milliGRAM(s) Oral at bedtime PRN Insomnia  simethicone 80 milliGRAM(s) Chew two times a day PRN Upset Stomach  traMADol 25 milliGRAM(s) Oral every 6 hours PRN Moderate Pain (4 - 6)  traMADol 50 milliGRAM(s) Oral every 6 hours PRN Severe Pain (7 - 10)      ALLERGIES:  Allergies    No Known Allergies    Intolerances        LABS:                        10.3   13.37 )-----------( 428      ( 05 Aug 2021 05:30 )             32.4     08-05    138  |  101  |  14  ----------------------------<  105<H>  3.7   |  25  |  0.88    Ca    8.9      05 Aug 2021 05:30          CAPILLARY BLOOD GLUCOSE          RADIOLOGY & ADDITIONAL TESTS: Reviewed.

## 2021-08-06 NOTE — PROGRESS NOTE ADULT - ASSESSMENT
Pt is a 60 y/o M with PMHx of thyroid cancer status post total thyroidectomy in 2010 and radioactive iodine treatment in 2011, hypogonadism, vitamin D deficiency, and osteopenia, who presented to the hospital for planned resection for sacral mass. He is s/p sacrectomy with L4-pelvis fusion. Post-operative course was complicated by bilateral PE, MRSA wound infection s/p diversion colostomy. Pt with deficits with ambulation, strength, and endurance.     #Sacral Mass  #Debility  - s/p sacrectomy with L4-pelvis fusion  - wound care  - Maintain and monitor SEDRICK drains  - Comprehensive rehab program  - pain control    #MRSA/Enterococcus/Bacteroides wound infection  - Vancomycin 1250mg bid until 9/7/21  - Flagyl 500mg q8hrs until 9/7/21  - s/p diversion colostomy to keep sacral incision clean  - Barnett present for urinary incontinence and to keep sacral wound clear    #PE  - bilateral subsegmental PE  - therapeutic Lovenox 70mg bid    #Hypothyroidism  - continue Synthroid 137mcg daily    #GI  - Protonix    #  - Barnett for incontinence and to help keep wound clean  - remove when appropriate

## 2021-08-07 PROCEDURE — 99232 SBSQ HOSP IP/OBS MODERATE 35: CPT

## 2021-08-07 RX ADMIN — GABAPENTIN 200 MILLIGRAM(S): 400 CAPSULE ORAL at 15:39

## 2021-08-07 RX ADMIN — ENOXAPARIN SODIUM 70 MILLIGRAM(S): 100 INJECTION SUBCUTANEOUS at 18:13

## 2021-08-07 RX ADMIN — Medication 1 TABLET(S): at 12:24

## 2021-08-07 RX ADMIN — Medication 166.67 MILLIGRAM(S): at 18:12

## 2021-08-07 RX ADMIN — PANTOPRAZOLE SODIUM 40 MILLIGRAM(S): 20 TABLET, DELAYED RELEASE ORAL at 06:35

## 2021-08-07 RX ADMIN — GABAPENTIN 200 MILLIGRAM(S): 400 CAPSULE ORAL at 22:46

## 2021-08-07 RX ADMIN — Medication 500 MILLIGRAM(S): at 15:39

## 2021-08-07 RX ADMIN — Medication 500 MILLIGRAM(S): at 22:48

## 2021-08-07 RX ADMIN — ENOXAPARIN SODIUM 70 MILLIGRAM(S): 100 INJECTION SUBCUTANEOUS at 06:36

## 2021-08-07 RX ADMIN — Medication 166.67 MILLIGRAM(S): at 08:27

## 2021-08-07 RX ADMIN — Medication 137 MICROGRAM(S): at 06:36

## 2021-08-07 RX ADMIN — CHLORHEXIDINE GLUCONATE 1 APPLICATION(S): 213 SOLUTION TOPICAL at 06:36

## 2021-08-07 RX ADMIN — SIMETHICONE 80 MILLIGRAM(S): 80 TABLET, CHEWABLE ORAL at 18:12

## 2021-08-07 RX ADMIN — GABAPENTIN 200 MILLIGRAM(S): 400 CAPSULE ORAL at 06:36

## 2021-08-07 RX ADMIN — CHLORHEXIDINE GLUCONATE 1 APPLICATION(S): 213 SOLUTION TOPICAL at 18:13

## 2021-08-07 RX ADMIN — SIMETHICONE 80 MILLIGRAM(S): 80 TABLET, CHEWABLE ORAL at 15:39

## 2021-08-07 RX ADMIN — Medication 500 MILLIGRAM(S): at 06:36

## 2021-08-07 NOTE — PROVIDER CONTACT NOTE (OTHER) - ASSESSMENT
Pt due for vancomycin 1250mg IV last trough 8-4-21 13.4 as per Dr Bhagat ok to give due am dose.  Pt appears sad and withdrawn. RN consulted with Dr Bhagat for psychology consult. Pt with dry heaving while taking meds. MD notified. Able to tolerate meds. MD also notified.
c/o bloating and poor appetite

## 2021-08-07 NOTE — PROGRESS NOTE ADULT - SUBJECTIVE AND OBJECTIVE BOX
Cc: Gait dysfunciton    HPI: Patient with no new medical issues today.  Pain controlled, no chest pain, no N/V, no Fevers/Chills. No other new ROS  Has been tolerating rehabilitation program.    acetaminophen   Tablet .. 650 milliGRAM(s) Oral every 6 hours PRN  calcium carbonate 1250 mG  + Vitamin D (OsCal 500 + D) 1 Tablet(s) Oral daily  chlorhexidine 4% Liquid 1 Application(s) Topical two times a day  enoxaparin Injectable 70 milliGRAM(s) SubCutaneous two times a day  gabapentin 200 milliGRAM(s) Oral every 8 hours  levothyroxine 137 MICROGram(s) Oral daily  melatonin 3 milliGRAM(s) Oral at bedtime PRN  metroNIDAZOLE    Tablet 500 milliGRAM(s) Oral every 8 hours  pantoprazole    Tablet 40 milliGRAM(s) Oral before breakfast  polyethylene glycol 3350 17 Gram(s) Oral daily PRN  senna 2 Tablet(s) Oral at bedtime  simethicone 80 milliGRAM(s) Chew two times a day PRN  traMADol 25 milliGRAM(s) Oral every 6 hours PRN  traMADol 50 milliGRAM(s) Oral every 6 hours PRN  vancomycin  IVPB 1250 milliGRAM(s) IV Intermittent every 12 hours    In NAD  HEENT- EOMI  Heart- Well Perfused  Lungs- No resp distress, no use of accessory resp muscles  Abd- + BS, NT  Ext- No calf pain  Neuro- Exam unchanged  Psych- Affect wnl    Imp: Patient with diagnosis of sacral mass admitted for comprehensive acute rehabilitation.    Plan:  - Continue therapies  - DVT prophylaxis- Lovenox  - Skin- Turn q2h, check skin daily  - Continue current medications; patient medically stable.   - Patient is stable to continue current rehabilitation program.

## 2021-08-08 PROCEDURE — 99232 SBSQ HOSP IP/OBS MODERATE 35: CPT

## 2021-08-08 RX ADMIN — ENOXAPARIN SODIUM 70 MILLIGRAM(S): 100 INJECTION SUBCUTANEOUS at 17:51

## 2021-08-08 RX ADMIN — PANTOPRAZOLE SODIUM 40 MILLIGRAM(S): 20 TABLET, DELAYED RELEASE ORAL at 07:44

## 2021-08-08 RX ADMIN — SIMETHICONE 80 MILLIGRAM(S): 80 TABLET, CHEWABLE ORAL at 14:59

## 2021-08-08 RX ADMIN — Medication 500 MILLIGRAM(S): at 14:56

## 2021-08-08 RX ADMIN — CHLORHEXIDINE GLUCONATE 1 APPLICATION(S): 213 SOLUTION TOPICAL at 07:43

## 2021-08-08 RX ADMIN — CHLORHEXIDINE GLUCONATE 1 APPLICATION(S): 213 SOLUTION TOPICAL at 17:51

## 2021-08-08 RX ADMIN — Medication 1 TABLET(S): at 13:05

## 2021-08-08 RX ADMIN — GABAPENTIN 200 MILLIGRAM(S): 400 CAPSULE ORAL at 07:53

## 2021-08-08 RX ADMIN — Medication 500 MILLIGRAM(S): at 07:43

## 2021-08-08 RX ADMIN — GABAPENTIN 200 MILLIGRAM(S): 400 CAPSULE ORAL at 14:56

## 2021-08-08 RX ADMIN — Medication 166.67 MILLIGRAM(S): at 07:42

## 2021-08-08 RX ADMIN — Medication 137 MICROGRAM(S): at 07:45

## 2021-08-08 RX ADMIN — Medication 500 MILLIGRAM(S): at 21:31

## 2021-08-08 RX ADMIN — Medication 166.67 MILLIGRAM(S): at 17:51

## 2021-08-08 RX ADMIN — SENNA PLUS 2 TABLET(S): 8.6 TABLET ORAL at 17:48

## 2021-08-08 RX ADMIN — GABAPENTIN 200 MILLIGRAM(S): 400 CAPSULE ORAL at 21:31

## 2021-08-08 RX ADMIN — ENOXAPARIN SODIUM 70 MILLIGRAM(S): 100 INJECTION SUBCUTANEOUS at 07:43

## 2021-08-08 NOTE — PROGRESS NOTE ADULT - SUBJECTIVE AND OBJECTIVE BOX
Patient is a 61y old  Male who presents with a chief complaint of Debility due to sacral mass removal (sacrectomy) (03 Aug 2021 11:43)      TODAY'S SUBJECTIVE & REVIEW OF SYMPTOMS  Seated in bedside recliner  States that he feels ok  Is on contact isolation     Denies HA/CP/palpitations  + Barnett  + colostomy        PHYSICAL EXAM    Vital Signs Last 24 Hrs  T(C): 36.7 (08 Aug 2021 13:16), Max: 37 (07 Aug 2021 20:00)  T(F): 98.1 (08 Aug 2021 13:16), Max: 98.6 (07 Aug 2021 20:00)  HR: 92 (08 Aug 2021 13:16) (83 - 92)  BP: 124/69 (08 Aug 2021 13:16) (124/69 - 128/74)  BP(mean): --  RR: 18 (08 Aug 2021 13:16) (18 - 18)  SpO2: 100% (08 Aug 2021 13:16) (98% - 100%)    Constitutional - NAD, Comfortable  Chest - breathing comfortably  Abdomen - has colostomy+ Abd incision has healed well  Extremities - No C/C/E, No calf tenderness       MEDICATIONS  (STANDING):  calcium carbonate 1250 mG  + Vitamin D (OsCal 500 + D) 1 Tablet(s) Oral daily  chlorhexidine 4% Liquid 1 Application(s) Topical two times a day  enoxaparin Injectable 70 milliGRAM(s) SubCutaneous two times a day  gabapentin 200 milliGRAM(s) Oral every 8 hours  levothyroxine 137 MICROGram(s) Oral daily  metroNIDAZOLE    Tablet 500 milliGRAM(s) Oral every 8 hours  pantoprazole    Tablet 40 milliGRAM(s) Oral before breakfast  senna 2 Tablet(s) Oral at bedtime  vancomycin  IVPB 1250 milliGRAM(s) IV Intermittent every 12 hours    MEDICATIONS  (PRN):  acetaminophen   Tablet .. 650 milliGRAM(s) Oral every 6 hours PRN Temp greater or equal to 38C (100.4F), Mild Pain (1 - 3)  melatonin 3 milliGRAM(s) Oral at bedtime PRN Insomnia  polyethylene glycol 3350 17 Gram(s) Oral daily PRN Constipation  simethicone 80 milliGRAM(s) Chew two times a day PRN Upset Stomach  traMADol 25 milliGRAM(s) Oral every 6 hours PRN Moderate Pain (4 - 6)  traMADol 50 milliGRAM(s) Oral every 6 hours PRN Severe Pain (7 - 10)

## 2021-08-08 NOTE — PROGRESS NOTE ADULT - ASSESSMENT
61 year old male planned resection for sacral mass. He is s/p sacrectomy with L4-pelvis fusion. Post-operative course was complicated by bilateral PE, MRSA wound infection s/p diversion colostomy.     Continue rehab program    Continue IV abx - Vancomycin IV and Flagyl PO    Pain management: on Gabapentin, tylenol prn tramadol prn    DVT prophylaxis: On Lovenox

## 2021-08-09 LAB
ANION GAP SERPL CALC-SCNC: 12 MMOL/L — SIGNIFICANT CHANGE UP (ref 5–17)
BASOPHILS # BLD AUTO: 0.09 K/UL — SIGNIFICANT CHANGE UP (ref 0–0.2)
BASOPHILS NFR BLD AUTO: 1 % — SIGNIFICANT CHANGE UP (ref 0–2)
BUN SERPL-MCNC: 10 MG/DL — SIGNIFICANT CHANGE UP (ref 7–23)
CALCIUM SERPL-MCNC: 8.7 MG/DL — SIGNIFICANT CHANGE UP (ref 8.4–10.5)
CHLORIDE SERPL-SCNC: 100 MMOL/L — SIGNIFICANT CHANGE UP (ref 96–108)
CO2 SERPL-SCNC: 25 MMOL/L — SIGNIFICANT CHANGE UP (ref 22–31)
CREAT SERPL-MCNC: 0.95 MG/DL — SIGNIFICANT CHANGE UP (ref 0.5–1.3)
EOSINOPHIL # BLD AUTO: 0.11 K/UL — SIGNIFICANT CHANGE UP (ref 0–0.5)
EOSINOPHIL NFR BLD AUTO: 1.2 % — SIGNIFICANT CHANGE UP (ref 0–6)
GLUCOSE SERPL-MCNC: 110 MG/DL — HIGH (ref 70–99)
HCT VFR BLD CALC: 33 % — LOW (ref 39–50)
HGB BLD-MCNC: 10.5 G/DL — LOW (ref 13–17)
IMM GRANULOCYTES NFR BLD AUTO: 0.6 % — SIGNIFICANT CHANGE UP (ref 0–1.5)
LYMPHOCYTES # BLD AUTO: 1.52 K/UL — SIGNIFICANT CHANGE UP (ref 1–3.3)
LYMPHOCYTES # BLD AUTO: 17.1 % — SIGNIFICANT CHANGE UP (ref 13–44)
MCHC RBC-ENTMCNC: 28.1 PG — SIGNIFICANT CHANGE UP (ref 27–34)
MCHC RBC-ENTMCNC: 31.8 GM/DL — LOW (ref 32–36)
MCV RBC AUTO: 88.2 FL — SIGNIFICANT CHANGE UP (ref 80–100)
MONOCYTES # BLD AUTO: 0.93 K/UL — HIGH (ref 0–0.9)
MONOCYTES NFR BLD AUTO: 10.5 % — SIGNIFICANT CHANGE UP (ref 2–14)
NEUTROPHILS # BLD AUTO: 6.17 K/UL — SIGNIFICANT CHANGE UP (ref 1.8–7.4)
NEUTROPHILS NFR BLD AUTO: 69.6 % — SIGNIFICANT CHANGE UP (ref 43–77)
NRBC # BLD: 0 /100 WBCS — SIGNIFICANT CHANGE UP (ref 0–0)
PLATELET # BLD AUTO: 417 K/UL — HIGH (ref 150–400)
POTASSIUM SERPL-MCNC: 3.4 MMOL/L — LOW (ref 3.5–5.3)
POTASSIUM SERPL-SCNC: 3.4 MMOL/L — LOW (ref 3.5–5.3)
RBC # BLD: 3.74 M/UL — LOW (ref 4.2–5.8)
RBC # FLD: 15.7 % — HIGH (ref 10.3–14.5)
SODIUM SERPL-SCNC: 137 MMOL/L — SIGNIFICANT CHANGE UP (ref 135–145)
SURGICAL PATHOLOGY STUDY: SIGNIFICANT CHANGE UP
WBC # BLD: 8.87 K/UL — SIGNIFICANT CHANGE UP (ref 3.8–10.5)
WBC # FLD AUTO: 8.87 K/UL — SIGNIFICANT CHANGE UP (ref 3.8–10.5)

## 2021-08-09 PROCEDURE — 99232 SBSQ HOSP IP/OBS MODERATE 35: CPT

## 2021-08-09 PROCEDURE — 96116 NUBHVL XM PHYS/QHP 1ST HR: CPT

## 2021-08-09 RX ADMIN — ENOXAPARIN SODIUM 70 MILLIGRAM(S): 100 INJECTION SUBCUTANEOUS at 06:22

## 2021-08-09 RX ADMIN — CHLORHEXIDINE GLUCONATE 1 APPLICATION(S): 213 SOLUTION TOPICAL at 06:22

## 2021-08-09 RX ADMIN — Medication 137 MICROGRAM(S): at 06:22

## 2021-08-09 RX ADMIN — Medication 500 MILLIGRAM(S): at 06:22

## 2021-08-09 RX ADMIN — GABAPENTIN 200 MILLIGRAM(S): 400 CAPSULE ORAL at 06:22

## 2021-08-09 RX ADMIN — ENOXAPARIN SODIUM 70 MILLIGRAM(S): 100 INJECTION SUBCUTANEOUS at 17:31

## 2021-08-09 RX ADMIN — PANTOPRAZOLE SODIUM 40 MILLIGRAM(S): 20 TABLET, DELAYED RELEASE ORAL at 06:22

## 2021-08-09 RX ADMIN — GABAPENTIN 200 MILLIGRAM(S): 400 CAPSULE ORAL at 22:01

## 2021-08-09 RX ADMIN — Medication 1 TABLET(S): at 12:46

## 2021-08-09 RX ADMIN — CHLORHEXIDINE GLUCONATE 1 APPLICATION(S): 213 SOLUTION TOPICAL at 17:32

## 2021-08-09 RX ADMIN — Medication 166.67 MILLIGRAM(S): at 08:45

## 2021-08-09 RX ADMIN — Medication 500 MILLIGRAM(S): at 22:03

## 2021-08-09 RX ADMIN — Medication 166.67 MILLIGRAM(S): at 22:15

## 2021-08-09 RX ADMIN — GABAPENTIN 200 MILLIGRAM(S): 400 CAPSULE ORAL at 15:22

## 2021-08-09 RX ADMIN — Medication 500 MILLIGRAM(S): at 15:22

## 2021-08-09 NOTE — PROGRESS NOTE ADULT - ASSESSMENT
61 year old male planned resection for sacral mass. He is s/p sacrectomy with L4-pelvis fusion. Post-operative course was complicated by bilateral PE, MRSA wound infection s/p diversion colostomy.     Continue rehab program    #MRSA INFECTION  -Continue IV abx - Vancomycin IV and Flagyl PO  -Contact isolation    #Pain management:   -Gabapentin, tylenol prn tramadol prn    # PE  - Lovenox BID  - f/u with vascular: switching to oral    #Hypothyroidism  - Synthroid 137 mcg daily    #Colostomy  -Monitor out daily  - ostomy care    #GI ppx  - Protonix  - probiotic yoghurt with meals      #Bowel Regimen  - Senna nightly

## 2021-08-09 NOTE — PROGRESS NOTE ADULT - ASSESSMENT
60 y/o M with PMHx of thyroid cancer status post total thyroidectomy in 2010 and radioactive iodine treatment in 2011, hypogonadism, vitamin D deficiency, and osteopenia, who presented to the hospital for planned resection for sacral mass. He is s/p sacrectomy with L4-pelvis fusion. Post-operative course was complicated by bilateral PE, MRSA wound infection s/p diversion colostomy. Pt with deficits with ambulation, strength, and endurance.     #Sacral Mass  #Debility  - s/p sacrectomy with L4-pelvis fusion  - wound care  - Comprehensive rehab program  - pain control and bowel regimen as per primary team  - Barnett for incontinence and to help keep wound clean  - remove when appropriate    #MRSA/Enterococcus/Bacteroides wound infection  - Vancomycin 1250mg bid until 9/7/21  - Flagyl 500mg q8hrs until 9/7/21  - s/p diversion colostomy to keep sacral incision clean  - Barnett present for urinary incontinence and to keep sacral wound clear    #PE  - bilateral subsegmental PE  - therapeutic Lovenox 70mg bid    #Hypothyroidism  - continue Synthroid    #Vitamin D Deficiency and Osteopenia  - Calcium/Vit D3 supplement    #Normocytic Anemia  - H/H stable  - continue to monitor    #Thrombocytosis  - likely reactive  - continue to monitor    #Hypokalemia  - replete potassium

## 2021-08-09 NOTE — PROGRESS NOTE ADULT - SUBJECTIVE AND OBJECTIVE BOX
Patient is a 61y old  Male who presents with a chief complaint of Debility due to sacral mass removal (sacrectomy) (03 Aug 2021 11:43)      Patient seen and examined at bedside.  No overnight events  Reports feeling bloated the last 3 - 4 days    ALLERGIES:  No Known Allergies    MEDICATIONS  (STANDING):  calcium carbonate 1250 mG  + Vitamin D (OsCal 500 + D) 1 Tablet(s) Oral daily  chlorhexidine 4% Liquid 1 Application(s) Topical two times a day  enoxaparin Injectable 70 milliGRAM(s) SubCutaneous two times a day  gabapentin 200 milliGRAM(s) Oral every 8 hours  levothyroxine 137 MICROGram(s) Oral daily  metroNIDAZOLE    Tablet 500 milliGRAM(s) Oral every 8 hours  pantoprazole    Tablet 40 milliGRAM(s) Oral before breakfast  senna 2 Tablet(s) Oral at bedtime  vancomycin  IVPB 1250 milliGRAM(s) IV Intermittent every 12 hours    MEDICATIONS  (PRN):  acetaminophen   Tablet .. 650 milliGRAM(s) Oral every 6 hours PRN Temp greater or equal to 38C (100.4F), Mild Pain (1 - 3)  melatonin 3 milliGRAM(s) Oral at bedtime PRN Insomnia  polyethylene glycol 3350 17 Gram(s) Oral daily PRN Constipation  simethicone 80 milliGRAM(s) Chew two times a day PRN Upset Stomach  traMADol 25 milliGRAM(s) Oral every 6 hours PRN Moderate Pain (4 - 6)  traMADol 50 milliGRAM(s) Oral every 6 hours PRN Severe Pain (7 - 10)    Vital Signs Last 24 Hrs  T(F): 98.6 (08 Aug 2021 19:45), Max: 98.6 (08 Aug 2021 19:45)  HR: 80 (08 Aug 2021 19:45) (80 - 92)  BP: 135/79 (08 Aug 2021 19:45) (124/69 - 135/79)  RR: 18 (08 Aug 2021 19:45) (18 - 18)  SpO2: 100% (08 Aug 2021 19:45) (100% - 100%)  I&O's Summary    08 Aug 2021 07:01  -  09 Aug 2021 07:00  --------------------------------------------------------  IN: 0 mL / OUT: 2310 mL / NET: -2310 mL      PHYSICAL EXAM:  GENERAL: NAD  HENT:  Atraumatic, Normocephalic; No tonsillar erythema, exudates, or enlargement; Moist mucous membranes;   EYES: EOMI, PERRLA, conjunctiva and sclera clear, no lid-lag  NECK: Supple, No JVD, Normal thyroid  CHEST/LUNG: Clear to percussion bilaterally; No rales, rhonchi, wheezing, or rubs; normal respiratory effort, no intercostal retractions  HEART: Regular rate and rhythm; No murmurs, rubs, or gallops; No pitting edema  ABDOMEN: Soft, Nontender, Nondistended; Bowel sounds present; No HSM; + healing midline scar, + Colostomy  MUSCULOSKELETAL/EXTREMITIES:  2+ Peripheral Pulses, No clubbing or digital cyanosis  PSYCH: Appropriate affect, Alert & Awake    LABS:                        10.5   8.87  )-----------( 417      ( 09 Aug 2021 07:04 )             33.0       08-09    137  |  100  |  10  ----------------------------<  110  3.4   |  25  |  0.95    Ca    8.7      09 Aug 2021 07:04       eGFR if Non African American: 87 mL/min/1.73M2 (08-09-21 @ 07:04)  eGFR if African American: 101 mL/min/1.73M2 (08-09-21 @ 07:04)      COVID-19 PCR: NotDetec (08-02-21 @ 20:45)  COVID-19 PCR: NotDetec (08-02-21 @ 13:00)      Care Discussed with Rehab Attending and Other Providers

## 2021-08-10 DIAGNOSIS — E03.9 HYPOTHYROIDISM, UNSPECIFIED: ICD-10-CM

## 2021-08-10 DIAGNOSIS — E87.6 HYPOKALEMIA: ICD-10-CM

## 2021-08-10 DIAGNOSIS — E55.9 VITAMIN D DEFICIENCY, UNSPECIFIED: ICD-10-CM

## 2021-08-10 DIAGNOSIS — M53.3 SACROCOCCYGEAL DISORDERS, NOT ELSEWHERE CLASSIFIED: ICD-10-CM

## 2021-08-10 DIAGNOSIS — D47.3 ESSENTIAL (HEMORRHAGIC) THROMBOCYTHEMIA: ICD-10-CM

## 2021-08-10 DIAGNOSIS — A49.8 OTHER BACTERIAL INFECTIONS OF UNSPECIFIED SITE: ICD-10-CM

## 2021-08-10 DIAGNOSIS — D64.9 ANEMIA, UNSPECIFIED: ICD-10-CM

## 2021-08-10 PROCEDURE — 99232 SBSQ HOSP IP/OBS MODERATE 35: CPT

## 2021-08-10 PROCEDURE — 99223 1ST HOSP IP/OBS HIGH 75: CPT

## 2021-08-10 RX ADMIN — Medication 166.67 MILLIGRAM(S): at 22:10

## 2021-08-10 RX ADMIN — ENOXAPARIN SODIUM 70 MILLIGRAM(S): 100 INJECTION SUBCUTANEOUS at 17:45

## 2021-08-10 RX ADMIN — ENOXAPARIN SODIUM 70 MILLIGRAM(S): 100 INJECTION SUBCUTANEOUS at 05:49

## 2021-08-10 RX ADMIN — Medication 1 TABLET(S): at 12:36

## 2021-08-10 RX ADMIN — Medication 500 MILLIGRAM(S): at 13:36

## 2021-08-10 RX ADMIN — Medication 137 MICROGRAM(S): at 05:51

## 2021-08-10 RX ADMIN — Medication 500 MILLIGRAM(S): at 05:51

## 2021-08-10 RX ADMIN — GABAPENTIN 200 MILLIGRAM(S): 400 CAPSULE ORAL at 22:15

## 2021-08-10 RX ADMIN — TRAMADOL HYDROCHLORIDE 50 MILLIGRAM(S): 50 TABLET ORAL at 22:16

## 2021-08-10 RX ADMIN — GABAPENTIN 200 MILLIGRAM(S): 400 CAPSULE ORAL at 05:57

## 2021-08-10 RX ADMIN — PANTOPRAZOLE SODIUM 40 MILLIGRAM(S): 20 TABLET, DELAYED RELEASE ORAL at 05:51

## 2021-08-10 RX ADMIN — Medication 166.67 MILLIGRAM(S): at 08:13

## 2021-08-10 RX ADMIN — Medication 500 MILLIGRAM(S): at 22:15

## 2021-08-10 RX ADMIN — GABAPENTIN 200 MILLIGRAM(S): 400 CAPSULE ORAL at 13:36

## 2021-08-10 RX ADMIN — CHLORHEXIDINE GLUCONATE 1 APPLICATION(S): 213 SOLUTION TOPICAL at 05:50

## 2021-08-10 RX ADMIN — CHLORHEXIDINE GLUCONATE 1 APPLICATION(S): 213 SOLUTION TOPICAL at 17:47

## 2021-08-10 RX ADMIN — TRAMADOL HYDROCHLORIDE 50 MILLIGRAM(S): 50 TABLET ORAL at 23:00

## 2021-08-10 NOTE — PROGRESS NOTE ADULT - SUBJECTIVE AND OBJECTIVE BOX
Patient is a 61y old  Male who presents with a chief complaint of Debility due to sacral mass removal (sacrectomy) (03 Aug 2021 11:43)       SUBJECTIVE: Patient was seen in room. Discussed duration of antibiotics he needs. Patient denies any new complaints today. Ostomy having good output.   He remains on contact isolation   He denies HA/CP/palpitations, fever, chills  + Barnett  + colostomy  + SEDRICK drain    PHYSICAL EXAM  Constitutional - NAD, Comfortable  Chest - breathing comfortably  Abdomen - has colostomy+ Abd incision has healed well, + SEDRICK drain  Extremities - No C/C/E, No calf tenderness    Vital Signs Last 24 Hrs  T(C): 36.8 (10 Aug 2021 08:35), Max: 36.8 (10 Aug 2021 08:35)  T(F): 98.2 (10 Aug 2021 08:35), Max: 98.2 (10 Aug 2021 08:35)  HR: 86 (10 Aug 2021 08:35) (74 - 86)  BP: 110/72 (10 Aug 2021 08:35) (110/72 - 120/70)  BP(mean): --  RR: 18 (10 Aug 2021 08:35) (18 - 18)  SpO2: 98% (10 Aug 2021 08:35) (98% - 99%)      MEDICATIONS  (STANDING):  calcium carbonate 1250 mG  + Vitamin D (OsCal 500 + D) 1 Tablet(s) Oral daily  chlorhexidine 4% Liquid 1 Application(s) Topical two times a day  enoxaparin Injectable 70 milliGRAM(s) SubCutaneous two times a day  gabapentin 200 milliGRAM(s) Oral every 8 hours  levothyroxine 137 MICROGram(s) Oral daily  metroNIDAZOLE    Tablet 500 milliGRAM(s) Oral every 8 hours  pantoprazole    Tablet 40 milliGRAM(s) Oral before breakfast  senna 2 Tablet(s) Oral at bedtime  vancomycin  IVPB 1250 milliGRAM(s) IV Intermittent every 12 hours    MEDICATIONS  (PRN):  acetaminophen   Tablet .. 650 milliGRAM(s) Oral every 6 hours PRN Temp greater or equal to 38C (100.4F), Mild Pain (1 - 3)  melatonin 3 milliGRAM(s) Oral at bedtime PRN Insomnia  polyethylene glycol 3350 17 Gram(s) Oral daily PRN Constipation  simethicone 80 milliGRAM(s) Chew two times a day PRN Upset Stomach  traMADol 25 milliGRAM(s) Oral every 6 hours PRN Moderate Pain (4 - 6)  traMADol 50 milliGRAM(s) Oral every 6 hours PRN Severe Pain (7 - 10)

## 2021-08-10 NOTE — PROGRESS NOTE ADULT - ASSESSMENT
62 y/o M with PMHx of thyroid cancer status post total thyroidectomy in 2010 and radioactive iodine treatment in 2011, hypogonadism, vitamin D deficiency, and osteopenia, who presented to the hospital for planned resection for sacral mass. He is s/p sacrectomy with L4-pelvis fusion. Post-operative course was complicated by bilateral PE, MRSA wound infection s/p diversion colostomy. Pt with deficits with ambulation, strength, and endurance.

## 2021-08-10 NOTE — PROGRESS NOTE ADULT - SUBJECTIVE AND OBJECTIVE BOX
Patient is a 61y old  Male who presents with a chief complaint of s/p sacral surgery (10 Aug 2021 09:35)      Patient seen and examined at bedside.  No overnight events  No complaints this morning    ALLERGIES:  No Known Allergies    MEDICATIONS  (STANDING):  calcium carbonate 1250 mG  + Vitamin D (OsCal 500 + D) 1 Tablet(s) Oral daily  chlorhexidine 4% Liquid 1 Application(s) Topical two times a day  enoxaparin Injectable 70 milliGRAM(s) SubCutaneous two times a day  gabapentin 200 milliGRAM(s) Oral every 8 hours  levothyroxine 137 MICROGram(s) Oral daily  metroNIDAZOLE    Tablet 500 milliGRAM(s) Oral every 8 hours  pantoprazole    Tablet 40 milliGRAM(s) Oral before breakfast  senna 2 Tablet(s) Oral at bedtime  vancomycin  IVPB 1250 milliGRAM(s) IV Intermittent every 12 hours    MEDICATIONS  (PRN):  acetaminophen   Tablet .. 650 milliGRAM(s) Oral every 6 hours PRN Temp greater or equal to 38C (100.4F), Mild Pain (1 - 3)  melatonin 3 milliGRAM(s) Oral at bedtime PRN Insomnia  polyethylene glycol 3350 17 Gram(s) Oral daily PRN Constipation  simethicone 80 milliGRAM(s) Chew two times a day PRN Upset Stomach  traMADol 25 milliGRAM(s) Oral every 6 hours PRN Moderate Pain (4 - 6)  traMADol 50 milliGRAM(s) Oral every 6 hours PRN Severe Pain (7 - 10)    Vital Signs Last 24 Hrs  T(F): 98.2 (10 Aug 2021 08:35), Max: 98.2 (10 Aug 2021 08:35)  HR: 86 (10 Aug 2021 08:35) (74 - 86)  BP: 110/72 (10 Aug 2021 08:35) (110/72 - 120/70)  RR: 18 (10 Aug 2021 08:35) (18 - 18)  SpO2: 98% (10 Aug 2021 08:35) (98% - 99%)  I&O's Summary    09 Aug 2021 07:01  -  10 Aug 2021 07:00  --------------------------------------------------------  IN: 250 mL / OUT: 2060 mL / NET: -1810 mL          PHYSICAL EXAM:  GENERAL: NAD  HENT:  Atraumatic, Normocephalic; No tonsillar erythema, exudates, or enlargement; Moist mucous membranes;   EYES: EOMI, PERRLA, conjunctiva and sclera clear, no lid-lag  NECK: Supple, No JVD, Normal thyroid  CHEST/LUNG: Clear to percussion bilaterally; No rales, rhonchi, wheezing, or rubs; normal respiratory effort, no intercostal retractions  HEART: Regular rate and rhythm; No murmurs, rubs, or gallops; No pitting edema  ABDOMEN: Soft, Nontender, Nondistended; Bowel sounds present; No HSM  MUSCULOSKELETAL/EXTREMITIES:  2+ Peripheral Pulses, No clubbing or digital cyanosis  PSYCH: Appropriate affect, Alert & Awake    LABS:                        10.5   8.87  )-----------( 417      ( 09 Aug 2021 07:04 )             33.0       08-09    137  |  100  |  10  ----------------------------<  110  3.4   |  25  |  0.95    Ca    8.7      09 Aug 2021 07:04       eGFR if Non African American: 87 mL/min/1.73M2 (08-09-21 @ 07:04)  eGFR if African American: 101 mL/min/1.73M2 (08-09-21 @ 07:04)                                   COVID-19 PCR: Tessy (08-02-21 @ 20:45)  COVID-19 PCR: Gopiteyeny (08-02-21 @ 13:00)      Care Discussed with Rehab Attending and Other Providers   Patient is a 61y old  Male who presents with a chief complaint of s/p sacral surgery (10 Aug 2021 09:35)      Patient seen and examined at bedside.  No overnight events  No complaints this morning. Had BM yesterday    ALLERGIES:  No Known Allergies    MEDICATIONS  (STANDING):  calcium carbonate 1250 mG  + Vitamin D (OsCal 500 + D) 1 Tablet(s) Oral daily  chlorhexidine 4% Liquid 1 Application(s) Topical two times a day  enoxaparin Injectable 70 milliGRAM(s) SubCutaneous two times a day  gabapentin 200 milliGRAM(s) Oral every 8 hours  levothyroxine 137 MICROGram(s) Oral daily  metroNIDAZOLE    Tablet 500 milliGRAM(s) Oral every 8 hours  pantoprazole    Tablet 40 milliGRAM(s) Oral before breakfast  senna 2 Tablet(s) Oral at bedtime  vancomycin  IVPB 1250 milliGRAM(s) IV Intermittent every 12 hours    MEDICATIONS  (PRN):  acetaminophen   Tablet .. 650 milliGRAM(s) Oral every 6 hours PRN Temp greater or equal to 38C (100.4F), Mild Pain (1 - 3)  melatonin 3 milliGRAM(s) Oral at bedtime PRN Insomnia  polyethylene glycol 3350 17 Gram(s) Oral daily PRN Constipation  simethicone 80 milliGRAM(s) Chew two times a day PRN Upset Stomach  traMADol 25 milliGRAM(s) Oral every 6 hours PRN Moderate Pain (4 - 6)  traMADol 50 milliGRAM(s) Oral every 6 hours PRN Severe Pain (7 - 10)    Vital Signs Last 24 Hrs  T(F): 98.2 (10 Aug 2021 08:35), Max: 98.2 (10 Aug 2021 08:35)  HR: 86 (10 Aug 2021 08:35) (74 - 86)  BP: 110/72 (10 Aug 2021 08:35) (110/72 - 120/70)  RR: 18 (10 Aug 2021 08:35) (18 - 18)  SpO2: 98% (10 Aug 2021 08:35) (98% - 99%)  I&O's Summary    09 Aug 2021 07:01  -  10 Aug 2021 07:00  --------------------------------------------------------  IN: 250 mL / OUT: 2060 mL / NET: -1810 mL      PHYSICAL EXAM:  GENERAL: NAD  HENT:  Atraumatic, Normocephalic; No tonsillar erythema, exudates, or enlargement; Moist mucous membranes;   EYES: EOMI, PERRLA, conjunctiva and sclera clear, no lid-lag  NECK: Supple, No JVD, Normal thyroid  CHEST/LUNG: Clear to percussion bilaterally; No rales, rhonchi, wheezing, or rubs; normal respiratory effort, no intercostal retractions  HEART: Regular rate and rhythm; No murmurs, rubs, or gallops; No pitting edema  ABDOMEN: Soft, Nontender, Nondistended; Bowel sounds present; No HSM; +colostomy bag  MUSCULOSKELETAL/EXTREMITIES:  2+ Peripheral Pulses, No clubbing or digital cyanosis  PSYCH: Appropriate affect, Alert & Awake  Drains in place with fluid    LABS:                        10.5   8.87  )-----------( 417      ( 09 Aug 2021 07:04 )             33.0       08-09    137  |  100  |  10  ----------------------------<  110  3.4   |  25  |  0.95    Ca    8.7      09 Aug 2021 07:04       eGFR if Non African American: 87 mL/min/1.73M2 (08-09-21 @ 07:04)  eGFR if African American: 101 mL/min/1.73M2 (08-09-21 @ 07:04)      COVID-19 PCR: Tessy (08-02-21 @ 20:45)  COVID-19 PCR: Tessy (08-02-21 @ 13:00)      Care Discussed with Rehab Attending and Other Providers

## 2021-08-10 NOTE — PROGRESS NOTE ADULT - ASSESSMENT
61 year old male planned resection for sacral mass. He is s/p sacrectomy with L4-pelvis fusion. Post-operative course was complicated by bilateral PE, MRSA wound infection s/p diversion colostomy.     Continue rehab program    #MRSA INFECTION  -Continue IV abx - Vancomycin IV and Flagyl PO; ends 9/7/21.  -Contact isolation    #Pain management:   -Gabapentin, tylenol prn tramadol prn    # PE  - Lovenox BID  - f/u with vascular: switching to oral is okay, pending clearance from surgical team.     #Hypothyroidism  - Synthroid 137 mcg daily    #Colostomy  -Monitor out daily  - ostomy care    #GI ppx  - Protonix  - probiotic yoghurt with meals      #Bowel Regimen  - Senna nightly

## 2021-08-11 DIAGNOSIS — D64.9 ANEMIA, UNSPECIFIED: ICD-10-CM

## 2021-08-11 LAB
ALBUMIN SERPL ELPH-MCNC: 2.4 G/DL — LOW (ref 3.3–5)
ALP SERPL-CCNC: 261 U/L — HIGH (ref 40–120)
ALT FLD-CCNC: 48 U/L — HIGH (ref 10–45)
ANION GAP SERPL CALC-SCNC: 9 MMOL/L — SIGNIFICANT CHANGE UP (ref 5–17)
AST SERPL-CCNC: 25 U/L — SIGNIFICANT CHANGE UP (ref 10–40)
BILIRUB SERPL-MCNC: 0.4 MG/DL — SIGNIFICANT CHANGE UP (ref 0.2–1.2)
BUN SERPL-MCNC: 12 MG/DL — SIGNIFICANT CHANGE UP (ref 7–23)
CALCIUM SERPL-MCNC: 8.3 MG/DL — LOW (ref 8.4–10.5)
CHLORIDE SERPL-SCNC: 102 MMOL/L — SIGNIFICANT CHANGE UP (ref 96–108)
CO2 SERPL-SCNC: 27 MMOL/L — SIGNIFICANT CHANGE UP (ref 22–31)
CREAT SERPL-MCNC: 0.88 MG/DL — SIGNIFICANT CHANGE UP (ref 0.5–1.3)
FERRITIN SERPL-MCNC: 477 NG/ML — HIGH (ref 30–400)
FOLATE SERPL-MCNC: 14.5 NG/ML — SIGNIFICANT CHANGE UP
GLUCOSE SERPL-MCNC: 112 MG/DL — HIGH (ref 70–99)
HCT VFR BLD CALC: 30 % — LOW (ref 39–50)
HGB BLD-MCNC: 9.5 G/DL — LOW (ref 13–17)
MCHC RBC-ENTMCNC: 27.8 PG — SIGNIFICANT CHANGE UP (ref 27–34)
MCHC RBC-ENTMCNC: 31.7 GM/DL — LOW (ref 32–36)
MCV RBC AUTO: 87.7 FL — SIGNIFICANT CHANGE UP (ref 80–100)
NRBC # BLD: 0 /100 WBCS — SIGNIFICANT CHANGE UP (ref 0–0)
PLATELET # BLD AUTO: 342 K/UL — SIGNIFICANT CHANGE UP (ref 150–400)
POTASSIUM SERPL-MCNC: 3.3 MMOL/L — LOW (ref 3.5–5.3)
POTASSIUM SERPL-SCNC: 3.3 MMOL/L — LOW (ref 3.5–5.3)
PROT SERPL-MCNC: 6.3 G/DL — SIGNIFICANT CHANGE UP (ref 6–8.3)
RBC # BLD: 3.42 M/UL — LOW (ref 4.2–5.8)
RBC # BLD: 3.44 M/UL — LOW (ref 4.2–5.8)
RBC # FLD: 15.8 % — HIGH (ref 10.3–14.5)
RETICS #: 142.4 K/UL — HIGH (ref 25–125)
RETICS/RBC NFR: 4.1 % — HIGH (ref 0.5–2.5)
SODIUM SERPL-SCNC: 138 MMOL/L — SIGNIFICANT CHANGE UP (ref 135–145)
VIT B12 SERPL-MCNC: 750 PG/ML — SIGNIFICANT CHANGE UP (ref 232–1245)
WBC # BLD: 8.54 K/UL — SIGNIFICANT CHANGE UP (ref 3.8–10.5)
WBC # FLD AUTO: 8.54 K/UL — SIGNIFICANT CHANGE UP (ref 3.8–10.5)

## 2021-08-11 PROCEDURE — 99232 SBSQ HOSP IP/OBS MODERATE 35: CPT

## 2021-08-11 RX ORDER — POTASSIUM CHLORIDE 20 MEQ
20 PACKET (EA) ORAL
Refills: 0 | Status: DISCONTINUED | OUTPATIENT
Start: 2021-08-11 | End: 2021-08-11

## 2021-08-11 RX ORDER — POTASSIUM CHLORIDE 20 MEQ
20 PACKET (EA) ORAL
Refills: 0 | Status: COMPLETED | OUTPATIENT
Start: 2021-08-11 | End: 2021-08-11

## 2021-08-11 RX ORDER — APIXABAN 2.5 MG/1
10 TABLET, FILM COATED ORAL EVERY 12 HOURS
Refills: 0 | Status: COMPLETED | OUTPATIENT
Start: 2021-08-11 | End: 2021-08-18

## 2021-08-11 RX ADMIN — Medication 1 TABLET(S): at 12:33

## 2021-08-11 RX ADMIN — Medication 166.67 MILLIGRAM(S): at 19:47

## 2021-08-11 RX ADMIN — Medication 20 MILLIEQUIVALENT(S): at 14:25

## 2021-08-11 RX ADMIN — CHLORHEXIDINE GLUCONATE 1 APPLICATION(S): 213 SOLUTION TOPICAL at 05:01

## 2021-08-11 RX ADMIN — Medication 137 MICROGRAM(S): at 05:02

## 2021-08-11 RX ADMIN — GABAPENTIN 200 MILLIGRAM(S): 400 CAPSULE ORAL at 14:25

## 2021-08-11 RX ADMIN — Medication 500 MILLIGRAM(S): at 14:25

## 2021-08-11 RX ADMIN — Medication 500 MILLIGRAM(S): at 05:02

## 2021-08-11 RX ADMIN — PANTOPRAZOLE SODIUM 40 MILLIGRAM(S): 20 TABLET, DELAYED RELEASE ORAL at 05:03

## 2021-08-11 RX ADMIN — GABAPENTIN 200 MILLIGRAM(S): 400 CAPSULE ORAL at 05:02

## 2021-08-11 RX ADMIN — Medication 500 MILLIGRAM(S): at 21:27

## 2021-08-11 RX ADMIN — ENOXAPARIN SODIUM 70 MILLIGRAM(S): 100 INJECTION SUBCUTANEOUS at 05:00

## 2021-08-11 RX ADMIN — CHLORHEXIDINE GLUCONATE 1 APPLICATION(S): 213 SOLUTION TOPICAL at 18:52

## 2021-08-11 RX ADMIN — Medication 166.67 MILLIGRAM(S): at 08:03

## 2021-08-11 RX ADMIN — SENNA PLUS 2 TABLET(S): 8.6 TABLET ORAL at 21:27

## 2021-08-11 RX ADMIN — APIXABAN 10 MILLIGRAM(S): 2.5 TABLET, FILM COATED ORAL at 18:52

## 2021-08-11 RX ADMIN — GABAPENTIN 200 MILLIGRAM(S): 400 CAPSULE ORAL at 21:27

## 2021-08-11 NOTE — PROGRESS NOTE ADULT - SUBJECTIVE AND OBJECTIVE BOX
LISBET DEGROOT   61y   Male    Admitting: BRENDEN Solo  HPI:  Pt is a 62 y/o M with PMHx of thyroid cancer status post total thyroidectomy in 2010 and radioactive iodine treatment in 2011, hypogonadism, vitamin D deficiency, and osteopenia, with chronic constipation, right buttock and right scrotal pain and numbness. 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 The chordoma resulted in perineal numbness and overflow incontinence and he was admitted 7/7/21 for staged resection.   On 7/7, he underwent Plastic Surgery and General Surgery assisted vertical rectus abdominal myocutaneous flap harvest with transperitoneal ligation of internal iliac artery and vein. He is s/p en bloc sacrectomy with L4-pelvis fusion; EBL 4.5L status post 8 units PRBC, 6 units FFP, 1 pack platelets and 5L crystalloid; 7/8. Extubated. On 7/9  patient developed hypoxia and respiratory distress, desaturation. CPAP at night.  CTA revealed bilateral sub segmental PE. No right heart strain & Bibasilar and left lingular consolidative opacities. Started on Heparin gtt and transitioned to therapeutic lovenox on 8/1. Course further c/b ileus requiring gastric decompression. Pt underwent echo to check for RV per pulm, and it was grossly unremarkable.  Wound washout with plastics and duc pus noted, entire wound reopened on 7/26/21 and placement of 2 drains. Incontinent of stool. Cultures growing MRSA, enterococcus and bacteroides. ID following. On antibiotics. S/P Diversion Colostomy to keep sacral incision clean on 7/28/21. Barnett for urinary incontinence and to keep sacral wound clean. Pt requires 6 week course of antibiotics until 9/7/21. PICC line in place. LE doppler neg for DVT. Evaluated by Physical Therapy and PM&R and was recommended for Acute rehab.       PAST MEDICAL & SURGICAL HISTORY:  HLD (hyperlipidemia)    Thyroid cancer  2010    History of central serous retinopathy    H/O hypogonadism    H/O vitamin D deficiency    H/O osteopenia    H/O thyroidectomy  Total --2010    H/O colonoscopy      HEALTH ISSUES - PROBLEM Dx:  Pulmonary thromboembolism    Thrombocytosis    Sacral mass    Bacteroides infection    Hypothyroidism    Vitamin D deficiency    Normocytic anemia    Hypokalemia      MEDICATIONS  (STANDING):  calcium carbonate 1250 mG  + Vitamin D (OsCal 500 + D) 1 Tablet(s) Oral daily  chlorhexidine 4% Liquid 1 Application(s) Topical two times a day  enoxaparin Injectable 70 milliGRAM(s) SubCutaneous two times a day  gabapentin 200 milliGRAM(s) Oral every 8 hours  levothyroxine 137 MICROGram(s) Oral daily  metroNIDAZOLE    Tablet 500 milliGRAM(s) Oral every 8 hours  pantoprazole    Tablet 40 milliGRAM(s) Oral before breakfast  senna 2 Tablet(s) Oral at bedtime  vancomycin  IVPB 1250 milliGRAM(s) IV Intermittent every 12 hours    MEDICATIONS  (PRN):  acetaminophen   Tablet .. 650 milliGRAM(s) Oral every 6 hours PRN Temp greater or equal to 38C (100.4F), Mild Pain (1 - 3)  melatonin 3 milliGRAM(s) Oral at bedtime PRN Insomnia  polyethylene glycol 3350 17 Gram(s) Oral daily PRN Constipation  simethicone 80 milliGRAM(s) Chew two times a day PRN Upset Stomach  traMADol 25 milliGRAM(s) Oral every 6 hours PRN Moderate Pain (4 - 6)  traMADol 50 milliGRAM(s) Oral every 6 hours PRN Severe Pain (7 - 10)    Allergies    No Known Allergies    INTERVAL HPI/OVERNIGHT EVENTS:  Patient S&E at bedside. No c/o CP or SOB.     VITAL SIGNS:  T(F): 98.1 (08-11-21 @ 08:16)  HR: 76 (08-11-21 @ 08:16)  BP: 124/71 (08-11-21 @ 08:16)  RR: 18 (08-11-21 @ 08:16)  SpO2: 97% (08-11-21 @ 08:16)    PHYSICAL EXAM:  Constitutional: NAD  Eyes: sclera non-icteric  Neck: no JVD  Respiratory: CTA ant.; no wheezes  Cardiovascular: RRR, no M/R/G  Gastrointestinal: soft, NTND, no masses palpable  Extremities: no calf tenderness  Neurological: Awake, alert.    Labs:             9.5    8.54  )-----------( 342      ( 08-11 @ 06:45 )             30.0                10.5   8.87  )-----------( 417      ( 08-09 @ 07:04 )             33.0     Consultant notes reviewed : YES [x ] ; NO [ ]

## 2021-08-11 NOTE — PROGRESS NOTE ADULT - SUBJECTIVE AND OBJECTIVE BOX
Subjective: Night uneventful, slept well, Right flank pain radiating to leg. Afebrile, improved spirits this am.      HISTORY OF PRESENT ILLNESS  Pt is a 62 y/o M with PMHx of thyroid cancer status post total thyroidectomy in 2010 and radioactive iodine treatment in 2011, hypogonadism, vitamin D deficiency, and osteopenia, with chronic constipation, right buttock and right scrotal pain and numbness. 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 The chordoma resulted in perineal numbness and overflow incontinence and he was admitted 7/7/21 for staged resection.   On 7/7, he underwent Plastic Surgery and General Surgery assisted vertical rectus abdominal myocutaneous flap harvest with transperitoneal ligation of internal iliac artery and vein. He is s/p en bloc sacrectomy with L4-pelvis fusion; EBL 4.5L status post 8 units PRBC, 6 units FFP, 1 pack platelets and 5L crystalloid; 7/8. Extubated. On 7/9  patient developed hypoxia and respiratory distress, desaturation. CPAP at night.  CTA revealed bilateral sub segmental PE. No right heart strain & Bibasilar and left lingular consolidative opacities. Started on Heparin gtt and transitioned to therapeutic lovenox on 8/1. Course further c/b ileus requiring gastric decompression. Pt underwent echo to check for RV per pulm, and it was grossly unremarkable.  Wound washout with plastics and duc pus noted, entire wound reopened on 7/26/21 and placement of 2 drains. Incontinent of stool. Cultures growing MRSA, enterococcus and bacteroides. ID following. Continue vancomycin and flagyl. S/P Diversion Colostomy to keep sacral incision clean on 7/28/21. Barnett for urinary incontinence and to keep sacral wound clean. Pt requires 6 week course of antibiotics until 9/7/21. PICC line in place. LE doppler neg for DVT. Patient tolerating regular diet with fully functioning colostomy. Evaluated by Physical Therapy and PM&R and was recommended for Acute rehab. Pt was medically stable on 8/2/21 and was transferred to [facility].       PAST MEDICAL & SURGICAL HISTORY:  HLD (hyperlipidemia)    Thyroid cancer  2010    History of central serous retinopathy    H/O hypogonadism    H/O vitamin D deficiency    H/O osteopenia    H/O thyroidectomy  Total --2010    H/O colonoscopy    ROS: colostomy, pain at rightleg.   VITALS  Vital Signs Last 24 Hrs  T(C): 36.7 (11 Aug 2021 08:16), Max: 36.7 (11 Aug 2021 08:16)  T(F): 98.1 (11 Aug 2021 08:16), Max: 98.1 (11 Aug 2021 08:16)  HR: 76 (11 Aug 2021 08:16) (76 - 84)  BP: 124/71 (11 Aug 2021 08:16) (112/70 - 124/71)  BP(mean): --  RR: 18 (11 Aug 2021 08:16) (18 - 18)  SpO2: 97% (11 Aug 2021 08:16) (97% - 98%)     PHYSICAL EXAM  Constitutional - NAD, Comfortable  Chest - breathing comfortably  Abdomen - has colostomy+ Abd incision has healed well, + SEDRICK drain  Extremities - No C/C/E, No calf tenderness    Vital Signs Last 24 Hrs stable      RECENT LABS                        9.5    8.54  )-----------( 342      ( 11 Aug 2021 06:45 )             30.0     08-11    138  |  102  |  12  ----------------------------<  112<H>  3.3<L>   |  27  |  0.88    Ca    8.3<L>      11 Aug 2021 06:45    TPro  6.3  /  Alb  2.4<L>  /  TBili  0.4  /  DBili  x   /  AST  25  /  ALT  48<H>  /  AlkPhos  261<H>  08-11      LIVER FUNCTIONS - ( 11 Aug 2021 06:45 )  Alb: 2.4 g/dL / Pro: 6.3 g/dL / ALK PHOS: 261 U/L / ALT: 48 U/L / AST: 25 U/L / GGT: x                           RADIOLOGY/OTHER RESULTS      CURRENT MEDICATIONS  MEDICATIONS  (STANDING):  calcium carbonate 1250 mG  + Vitamin D (OsCal 500 + D) 1 Tablet(s) Oral daily  chlorhexidine 4% Liquid 1 Application(s) Topical two times a day  gabapentin 200 milliGRAM(s) Oral every 8 hours  levothyroxine 137 MICROGram(s) Oral daily  metroNIDAZOLE    Tablet 500 milliGRAM(s) Oral every 8 hours  pantoprazole    Tablet 40 milliGRAM(s) Oral before breakfast  senna 2 Tablet(s) Oral at bedtime  vancomycin  IVPB 1250 milliGRAM(s) IV Intermittent every 12 hours    MEDICATIONS  (PRN):  acetaminophen   Tablet .. 650 milliGRAM(s) Oral every 6 hours PRN Temp greater or equal to 38C (100.4F), Mild Pain (1 - 3)  melatonin 3 milliGRAM(s) Oral at bedtime PRN Insomnia  polyethylene glycol 3350 17 Gram(s) Oral daily PRN Constipation  simethicone 80 milliGRAM(s) Chew two times a day PRN Upset Stomach  traMADol 25 milliGRAM(s) Oral every 6 hours PRN Moderate Pain (4 - 6)  traMADol 50 milliGRAM(s) Oral every 6 hours PRN Severe Pain (7 - 10)      ASSESSMENT & PLAN      Continue comprehensive acute rehab program consisting of 3hrs/day of OT/PT and SLP.

## 2021-08-11 NOTE — PROGRESS NOTE ADULT - ASSESSMENT
s/p sacretomy 7/7 with pelvic stabilization and myocutaneous muscle transfer for chordoma.  Washout 7/29 for post. wound infection with drain placement.  Today right drain was removed  -continue left drain.  Consider removal when output less than 30cc /day with surgeon consent.

## 2021-08-11 NOTE — PROGRESS NOTE ADULT - ASSESSMENT
Pt is a 62 y/o M with PMHx of thyroid cancer status post total thyroidectomy in 2010 and radioactive iodine treatment in 2011, hypogonadism, vitamin D deficiency, and osteopenia, with chronic constipation, right buttock and right scrotal pain and numbness. 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 The chordoma resulted in perineal numbness and overflow incontinence and he was admitted 7/7/21 for staged resection.   On 7/7, he underwent Plastic Surgery and General Surgery assisted vertical rectus abdominal myocutaneous flap harvest with transperitoneal ligation of internal iliac artery and vein. He is s/p en bloc sacrectomy with L4-pelvis fusion; EBL 4.5L status post 8 units PRBC, 6 units FFP, 1 pack platelets and 5L crystalloid; 7/8. Extubated. On 7/9  patient developed hypoxia and respiratory distress, desaturation. CPAP at night.  CTA revealed bilateral sub segmental PE. No right heart strain & Bibasilar and left lingular consolidative opacities. Started on Heparin gtt and transitioned to therapeutic lovenox on 8/1. Course further c/b ileus requiring gastric decompression. Pt underwent echo to check for RV per pulm, and it was grossly unremarkable.  Wound washout with plastics and duc pus noted, entire wound reopened on 7/26/21 and placement of 2 drains. Incontinent of stool. Cultures growing MRSA, enterococcus and bacteroides. ID following. On antibiotics. S/P Diversion Colostomy to keep sacral incision clean on 7/28/21. Barnett for urinary incontinence and to keep sacral wound clean. Pt requires 6 week course of antibiotics until 9/7/21. PICC line in place. LE doppler neg for DVT. Evaluated by Physical Therapy and PM&R and was recommended for Acute rehab.

## 2021-08-11 NOTE — PROGRESS NOTE ADULT - SUBJECTIVE AND OBJECTIVE BOX
Patient is a 61y old  Male who presents with a chief complaint of s/p chondroma resection (11 Aug 2021 09:12)      Patient seen and examined at bedside.  Reports some feeling of sharp pain down his leg last     ALLERGIES:  No Known Allergies    MEDICATIONS  (STANDING):  calcium carbonate 1250 mG  + Vitamin D (OsCal 500 + D) 1 Tablet(s) Oral daily  chlorhexidine 4% Liquid 1 Application(s) Topical two times a day  gabapentin 200 milliGRAM(s) Oral every 8 hours  levothyroxine 137 MICROGram(s) Oral daily  metroNIDAZOLE    Tablet 500 milliGRAM(s) Oral every 8 hours  pantoprazole    Tablet 40 milliGRAM(s) Oral before breakfast  senna 2 Tablet(s) Oral at bedtime  vancomycin  IVPB 1250 milliGRAM(s) IV Intermittent every 12 hours    MEDICATIONS  (PRN):  acetaminophen   Tablet .. 650 milliGRAM(s) Oral every 6 hours PRN Temp greater or equal to 38C (100.4F), Mild Pain (1 - 3)  melatonin 3 milliGRAM(s) Oral at bedtime PRN Insomnia  polyethylene glycol 3350 17 Gram(s) Oral daily PRN Constipation  simethicone 80 milliGRAM(s) Chew two times a day PRN Upset Stomach  traMADol 25 milliGRAM(s) Oral every 6 hours PRN Moderate Pain (4 - 6)  traMADol 50 milliGRAM(s) Oral every 6 hours PRN Severe Pain (7 - 10)    Vital Signs Last 24 Hrs  T(F): 98.1 (11 Aug 2021 08:16), Max: 98.1 (11 Aug 2021 08:16)  HR: 76 (11 Aug 2021 08:16) (76 - 84)  BP: 124/71 (11 Aug 2021 08:16) (112/70 - 124/71)  RR: 18 (11 Aug 2021 08:16) (18 - 18)  SpO2: 97% (11 Aug 2021 08:16) (97% - 98%)  I&O's Summary    10 Aug 2021 07:01  -  11 Aug 2021 07:00  --------------------------------------------------------  IN: 250 mL / OUT: 1255 mL / NET: -1005 mL    11 Aug 2021 07:01  -  11 Aug 2021 10:44  --------------------------------------------------------  IN: 0 mL / OUT: 10 mL / NET: -10 mL      PHYSICAL EXAM:  GENERAL: NAD  HENT:  Atraumatic, Normocephalic; No tonsillar erythema, exudates, or enlargement; Moist mucous membranes;   EYES: EOMI, PERRLA, conjunctiva and sclera clear, no lid-lag  NECK: Supple, No JVD, Normal thyroid  CHEST/LUNG: Clear to percussion bilaterally; No rales, rhonchi, wheezing, or rubs; normal respiratory effort, no intercostal retractions  HEART: Regular rate and rhythm; No murmurs, rubs, or gallops; No pitting edema  ABDOMEN: Soft, Nontender, Nondistended; Bowel sounds present; No HSM; + colostomy; + left drain  MUSCULOSKELETAL/EXTREMITIES:  2+ Peripheral Pulses, No clubbing or digital cyanosis  PSYCH: Appropriate affect, Alert & Awake    LABS:                        9.5    8.54  )-----------( 342      ( 11 Aug 2021 06:45 )             30.0       08-11    138  |  102  |  12  ----------------------------<  112  3.3   |  27  |  0.88    Ca    8.3      11 Aug 2021 06:45    TPro  6.3  /  Alb  2.4  /  TBili  0.4  /  DBili  x   /  AST  25  /  ALT  48  /  AlkPhos  261  08-11     eGFR if Non : 93 mL/min/1.73M2 (08-11-21 @ 06:45)  eGFR if : 108 mL/min/1.73M2 (08-11-21 @ 06:45)       COVID-19 PCR: NotDetec (08-02-21 @ 20:45)  COVID-19 PCR: NotDetec (08-02-21 @ 13:00)      Care Discussed with Rehab Attending and Other Providers

## 2021-08-11 NOTE — PROGRESS NOTE ADULT - SUBJECTIVE AND OBJECTIVE BOX
(  x) No complaints (  ) Complains of:   Dr. Karmen hoffman with right back drain removal    T(F): 97.8 (08-11-21 @ 19:54), Max: 98.6 (08-11-21 @ 17:37)  HR: 84 (08-11-21 @ 19:54) (76 - 84)  BP: 111/73 (08-11-21 @ 19:54) (111/73 - 129/70)  RR: 16 (08-11-21 @ 19:54) (16 - 18)  SpO2: 98% (08-11-21 @ 19:54) (96% - 98%)        Wound Location 1:  milking of drain done then right drain removed.  bulb contained 10 cc serosanguinous fluid.  clean sterile dressing applied to drain site                                 9.5    8.54  )-----------( 342      ( 11 Aug 2021 06:45 )             30.0     CBC Full  -  ( 11 Aug 2021 06:45 )  WBC Count : 8.54 K/uL  RBC Count : 3.42 M/uL  Hemoglobin : 9.5 g/dL  Hematocrit : 30.0 %  Platelet Count - Automated : 342 K/uL  Mean Cell Volume : 87.7 fl  Mean Cell Hemoglobin : 27.8 pg  Mean Cell Hemoglobin Concentration : 31.7 gm/dL  Auto Neutrophil # : x  Auto Lymphocyte # : x  Auto Monocyte # : x  Auto Eosinophil # : x  Auto Basophil # : x  Auto Neutrophil % : x  Auto Lymphocyte % : x  Auto Monocyte % : x  Auto Eosinophil % : x  Auto Basophil % : x    138|102|12<112  3.3|27|0.88  8.3,--,--  08-11 @ 06:45                  Procedure Performed:  (  )Yes     ( x )No  Name of Procedure:  (  )debridement    (  )I&D    (  )Other:  (  )partial thickness     (  )full thickness     (  )subcutaneous     (  )muscle/tendon     (  )bone  (  )sharp     (  )surgical

## 2021-08-11 NOTE — PROGRESS NOTE ADULT - ASSESSMENT
60 y/o M with PMHx of thyroid cancer status post total thyroidectomy in 2010 and radioactive iodine treatment in 2011, hypogonadism, vitamin D deficiency, and osteopenia, who presented to the hospital for planned resection for sacral mass. He is s/p sacrectomy with L4-pelvis fusion. Post-operative course was complicated by bilateral PE, MRSA wound infection s/p diversion colostomy. Pt with deficits with ambulation, strength, and endurance.

## 2021-08-11 NOTE — PROGRESS NOTE ADULT - ASSESSMENT
61 year old male planned resection for sacral mass. He is s/p sacrectomy with L4-pelvis fusion. Post-operative course was complicated by bilateral PE, MRSA wound infection s/p diversion colostomy.     Continue rehab program    #MRSA INFECTION  -Continue IV abx - Vancomycin IV and Flagyl PO; ends 9/7/21.  -Contact isolation  -Right SEDRICK with very little drainage. Removal by Dr Cevallos on 8/11. Discussed w/Karmen on 8/10-ok to remove if less than 30cc drainage per shift.       #Pain management:   -Gabapentin, tylenol prn tramadol prn    # PE  - Lovenox BID to transition to Eliquis today  -evaluated by heme  - plan discussed with primary Plastics and Surgical team on 8/10: Dr Peraza and Dr Robin-Em. Ok for PO Eliquis to begin/no contraindication.     #Hypothyroidism  - Synthroid 137 mcg daily    #Colostomy  -Monitor out daily  - ostomy care    #GI ppx  - Protonix  - probiotic yoghurt with meals      #Bowel Regimen  - Senna nightly

## 2021-08-12 LAB
ANION GAP SERPL CALC-SCNC: 8 MMOL/L — SIGNIFICANT CHANGE UP (ref 5–17)
BASOPHILS # BLD AUTO: 0.08 K/UL — SIGNIFICANT CHANGE UP (ref 0–0.2)
BASOPHILS NFR BLD AUTO: 1 % — SIGNIFICANT CHANGE UP (ref 0–2)
BUN SERPL-MCNC: 13 MG/DL — SIGNIFICANT CHANGE UP (ref 7–23)
CALCIUM SERPL-MCNC: 8.7 MG/DL — SIGNIFICANT CHANGE UP (ref 8.4–10.5)
CHLORIDE SERPL-SCNC: 103 MMOL/L — SIGNIFICANT CHANGE UP (ref 96–108)
CO2 SERPL-SCNC: 28 MMOL/L — SIGNIFICANT CHANGE UP (ref 22–31)
CREAT SERPL-MCNC: 0.88 MG/DL — SIGNIFICANT CHANGE UP (ref 0.5–1.3)
EOSINOPHIL # BLD AUTO: 0.14 K/UL — SIGNIFICANT CHANGE UP (ref 0–0.5)
EOSINOPHIL NFR BLD AUTO: 1.8 % — SIGNIFICANT CHANGE UP (ref 0–6)
GLUCOSE SERPL-MCNC: 104 MG/DL — HIGH (ref 70–99)
HCT VFR BLD CALC: 31.5 % — LOW (ref 39–50)
HGB BLD-MCNC: 9.9 G/DL — LOW (ref 13–17)
IMM GRANULOCYTES NFR BLD AUTO: 0.5 % — SIGNIFICANT CHANGE UP (ref 0–1.5)
IRON SATN MFR SERPL: 15 % — LOW (ref 16–55)
IRON SATN MFR SERPL: 30 UG/DL — LOW (ref 45–165)
LYMPHOCYTES # BLD AUTO: 1.27 K/UL — SIGNIFICANT CHANGE UP (ref 1–3.3)
LYMPHOCYTES # BLD AUTO: 16 % — SIGNIFICANT CHANGE UP (ref 13–44)
MCHC RBC-ENTMCNC: 27.6 PG — SIGNIFICANT CHANGE UP (ref 27–34)
MCHC RBC-ENTMCNC: 31.4 GM/DL — LOW (ref 32–36)
MCV RBC AUTO: 87.7 FL — SIGNIFICANT CHANGE UP (ref 80–100)
MONOCYTES # BLD AUTO: 0.88 K/UL — SIGNIFICANT CHANGE UP (ref 0–0.9)
MONOCYTES NFR BLD AUTO: 11.1 % — SIGNIFICANT CHANGE UP (ref 2–14)
NEUTROPHILS # BLD AUTO: 5.53 K/UL — SIGNIFICANT CHANGE UP (ref 1.8–7.4)
NEUTROPHILS NFR BLD AUTO: 69.6 % — SIGNIFICANT CHANGE UP (ref 43–77)
NRBC # BLD: 0 /100 WBCS — SIGNIFICANT CHANGE UP (ref 0–0)
PLATELET # BLD AUTO: 356 K/UL — SIGNIFICANT CHANGE UP (ref 150–400)
POTASSIUM SERPL-MCNC: 3.7 MMOL/L — SIGNIFICANT CHANGE UP (ref 3.5–5.3)
POTASSIUM SERPL-SCNC: 3.7 MMOL/L — SIGNIFICANT CHANGE UP (ref 3.5–5.3)
RBC # BLD: 3.59 M/UL — LOW (ref 4.2–5.8)
RBC # FLD: 15.7 % — HIGH (ref 10.3–14.5)
SODIUM SERPL-SCNC: 139 MMOL/L — SIGNIFICANT CHANGE UP (ref 135–145)
TIBC SERPL-MCNC: 201 UG/DL — LOW (ref 220–430)
UIBC SERPL-MCNC: 172 UG/DL — SIGNIFICANT CHANGE UP (ref 110–370)
WBC # BLD: 7.94 K/UL — SIGNIFICANT CHANGE UP (ref 3.8–10.5)
WBC # FLD AUTO: 7.94 K/UL — SIGNIFICANT CHANGE UP (ref 3.8–10.5)

## 2021-08-12 PROCEDURE — 99233 SBSQ HOSP IP/OBS HIGH 50: CPT

## 2021-08-12 PROCEDURE — 99232 SBSQ HOSP IP/OBS MODERATE 35: CPT

## 2021-08-12 RX ADMIN — TRAMADOL HYDROCHLORIDE 50 MILLIGRAM(S): 50 TABLET ORAL at 21:37

## 2021-08-12 RX ADMIN — Medication 166.67 MILLIGRAM(S): at 08:20

## 2021-08-12 RX ADMIN — Medication 650 MILLIGRAM(S): at 22:14

## 2021-08-12 RX ADMIN — Medication 500 MILLIGRAM(S): at 22:11

## 2021-08-12 RX ADMIN — GABAPENTIN 200 MILLIGRAM(S): 400 CAPSULE ORAL at 22:11

## 2021-08-12 RX ADMIN — Medication 650 MILLIGRAM(S): at 23:14

## 2021-08-12 RX ADMIN — Medication 137 MICROGRAM(S): at 06:15

## 2021-08-12 RX ADMIN — Medication 500 MILLIGRAM(S): at 13:06

## 2021-08-12 RX ADMIN — GABAPENTIN 200 MILLIGRAM(S): 400 CAPSULE ORAL at 13:06

## 2021-08-12 RX ADMIN — TRAMADOL HYDROCHLORIDE 50 MILLIGRAM(S): 50 TABLET ORAL at 20:37

## 2021-08-12 RX ADMIN — Medication 1 TABLET(S): at 13:06

## 2021-08-12 RX ADMIN — TRAMADOL HYDROCHLORIDE 25 MILLIGRAM(S): 50 TABLET ORAL at 06:45

## 2021-08-12 RX ADMIN — APIXABAN 10 MILLIGRAM(S): 2.5 TABLET, FILM COATED ORAL at 06:14

## 2021-08-12 RX ADMIN — CHLORHEXIDINE GLUCONATE 1 APPLICATION(S): 213 SOLUTION TOPICAL at 17:33

## 2021-08-12 RX ADMIN — CHLORHEXIDINE GLUCONATE 1 APPLICATION(S): 213 SOLUTION TOPICAL at 06:19

## 2021-08-12 RX ADMIN — Medication 500 MILLIGRAM(S): at 06:14

## 2021-08-12 RX ADMIN — PANTOPRAZOLE SODIUM 40 MILLIGRAM(S): 20 TABLET, DELAYED RELEASE ORAL at 06:15

## 2021-08-12 RX ADMIN — APIXABAN 10 MILLIGRAM(S): 2.5 TABLET, FILM COATED ORAL at 17:26

## 2021-08-12 RX ADMIN — Medication 166.67 MILLIGRAM(S): at 20:27

## 2021-08-12 RX ADMIN — GABAPENTIN 200 MILLIGRAM(S): 400 CAPSULE ORAL at 06:14

## 2021-08-12 RX ADMIN — TRAMADOL HYDROCHLORIDE 25 MILLIGRAM(S): 50 TABLET ORAL at 06:15

## 2021-08-12 NOTE — PROGRESS NOTE ADULT - SUBJECTIVE AND OBJECTIVE BOX
Subjective: NAD, night uneventful, BM yesterday, afebrile. No bleeding-Eliquis day 2.       HISTORY OF PRESENT ILLNESS  Pt is a 60 y/o M with PMHx of thyroid cancer status post total thyroidectomy in 2010 and radioactive iodine treatment in 2011, hypogonadism, vitamin D deficiency, and osteopenia, with chronic constipation, right buttock and right scrotal pain and numbness. 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 The chordoma resulted in perineal numbness and overflow incontinence and he was admitted 7/7/21 for staged resection.   On 7/7, he underwent Plastic Surgery and General Surgery assisted vertical rectus abdominal myocutaneous flap harvest with transperitoneal ligation of internal iliac artery and vein. He is s/p en bloc sacrectomy with L4-pelvis fusion; EBL 4.5L status post 8 units PRBC, 6 units FFP, 1 pack platelets and 5L crystalloid; 7/8. Extubated. On 7/9  patient developed hypoxia and respiratory distress, desaturation. CPAP at night.  CTA revealed bilateral sub segmental PE. No right heart strain & Bibasilar and left lingular consolidative opacities. Started on Heparin gtt and transitioned to therapeutic lovenox on 8/1. Course further c/b ileus requiring gastric decompression. Pt underwent echo to check for RV per pulm, and it was grossly unremarkable.  Wound washout with plastics and duc pus noted, entire wound reopened on 7/26/21 and placement of 2 drains. Incontinent of stool. Cultures growing MRSA, enterococcus and bacteroides. ID following. Continue vancomycin and flagyl. S/P Diversion Colostomy to keep sacral incision clean on 7/28/21. Tony for urinary incontinence and to keep sacral wound clean. Pt requires 6 week course of antibiotics until 9/7/21. PICC line in place. LE doppler neg for DVT. Patient tolerating regular diet with fully functioning colostomy. Evaluated by Physical Therapy and PM&R and was recommended for Acute rehab. Pt was medically stable on 8/2/21 and was transferred to [facility]. (    PAST MEDICAL & SURGICAL HISTORY:  HLD (hyperlipidemia)    Thyroid cancer  2010    History of central serous retinopathy    H/O hypogonadism    H/O vitamin D deficiency    H/O osteopenia    H/O thyroidectomy  Total --2010    H/O colonoscopy   REVIEW OF SYMPTOMS  [X] Constitutional WNL     [X] Cardio WNL            [X] Resp WNL           [X] GI WNL                          [X]  WNL                   [X] Heme WNL              [X] Endo WNL                     [X] Skin WNL                 [X] MSK WNL            [X] Neuro WNL                   [X] Cognitive WNL        [X] Psych WNL      VITALS  Vital Signs Last 24 Hrs  T(C): 36.7 (12 Aug 2021 08:32), Max: 37 (11 Aug 2021 17:37)  T(F): 98 (12 Aug 2021 08:32), Max: 98.6 (11 Aug 2021 17:37)  HR: 80 (12 Aug 2021 08:32) (80 - 84)  BP: 119/80 (12 Aug 2021 08:32) (111/73 - 129/70)  BP(mean): --  RR: 16 (12 Aug 2021 08:32) (16 - 18)  SpO2: 95% (12 Aug 2021 08:32) (95% - 98%)    PHYSICAL EXAM  Constitutional - NAD, Comfortable  Chest - breathing comfortably  Abdomen - has colostomy+ Abd incision has healed well, + SEDRICK drain on left CDI/serosanguinous drainage. R SEDRICK removed-healing well.  -tony drains clear yellow urine.  Extremities - No C/C/E, No calf tenderness    Vital Signs Last 24 Hrs stable  Psych-anxious, but slept well.        RECENT LABS                        9.9    7.94  )-----------( 356      ( 12 Aug 2021 06:41 )             31.5     08-12    139  |  103  |  13  ----------------------------<  104<H>  3.7   |  28  |  0.88    Ca    8.7      12 Aug 2021 06:41    TPro  6.3  /  Alb  2.4<L>  /  TBili  0.4  /  DBili  x   /  AST  25  /  ALT  48<H>  /  AlkPhos  261<H>  08-11      LIVER FUNCTIONS - ( 11 Aug 2021 06:45 )  Alb: 2.4 g/dL / Pro: 6.3 g/dL / ALK PHOS: 261 U/L / ALT: 48 U/L / AST: 25 U/L / GGT: x                           RADIOLOGY/OTHER RESULTS      CURRENT MEDICATIONS  MEDICATIONS  (STANDING):  apixaban 10 milliGRAM(s) Oral every 12 hours  calcium carbonate 1250 mG  + Vitamin D (OsCal 500 + D) 1 Tablet(s) Oral daily  chlorhexidine 4% Liquid 1 Application(s) Topical two times a day  gabapentin 200 milliGRAM(s) Oral every 8 hours  levothyroxine 137 MICROGram(s) Oral daily  metroNIDAZOLE    Tablet 500 milliGRAM(s) Oral every 8 hours  pantoprazole    Tablet 40 milliGRAM(s) Oral before breakfast  senna 2 Tablet(s) Oral at bedtime  vancomycin  IVPB 1250 milliGRAM(s) IV Intermittent every 12 hours    MEDICATIONS  (PRN):  acetaminophen   Tablet .. 650 milliGRAM(s) Oral every 6 hours PRN Temp greater or equal to 38C (100.4F), Mild Pain (1 - 3)  melatonin 3 milliGRAM(s) Oral at bedtime PRN Insomnia  polyethylene glycol 3350 17 Gram(s) Oral daily PRN Constipation  simethicone 80 milliGRAM(s) Chew two times a day PRN Upset Stomach  traMADol 25 milliGRAM(s) Oral every 6 hours PRN Moderate Pain (4 - 6)  traMADol 50 milliGRAM(s) Oral every 6 hours PRN Severe Pain (7 - 10)      ASSESSMENT & PLAN      Continue comprehensive acute rehab program consisting of 3hrs/day of OT/PT and SLP.

## 2021-08-12 NOTE — PROGRESS NOTE ADULT - ASSESSMENT
61 year old male planned resection for sacral mass. He is s/p sacrectomy with L4-pelvis fusion. Post-operative course was complicated by bilateral PE, MRSA wound infection s/p diversion colostomy.     Continue rehab program    #MRSA INFECTION  -Continue IV abx - Vancomycin IV and Flagyl PO; ends 9/7/21. ID in. Afebrile, improved leukocytosis.   -Contact isolation  -Right SEDRICK removed by Dr Cevallos on 8/11. Discussed w/Karmen on 8/10-ok to remove if less than 30cc drainage per shift. Site healing well.      #Pain management:   -Gabapentin, tylenol prn,  tramadol prn    # PE  - Lovenox BID to transition to Eliquis 8/11. Tolerating well, no bleeding reported. Close watch of HH.  -evaluated by heme  - plan discussed with primary Plastics and Surgical team on 8/10: Dr Peraza and Dr Robin-Strickland. Ok for PO Eliquis to begin/no contraindication.     #Hypothyroidism  - Synthroid 137 mcg daily    #s/p Ileus, now s/p Colostomy  -Monitor output daily, much less bloating, bag flat this am. BM yesterday-formed.  - ostomy care-abd sites healing well, pt not distended    #GI ppx  - Protonix for GI ppx while on Eliquis  - probiotic yoghurt with meals  -nutrition input appreciated      #Bowel Regimen  - Senna nightly    #Anxiety  -neuropsych following  -pt refused Rx interventions at this time.

## 2021-08-12 NOTE — PROGRESS NOTE ADULT - SUBJECTIVE AND OBJECTIVE BOX
LISBET DEGROOT   61y   Male    Admitting: BRENDEN Solo  HPI:  Pt is a 62 y/o M with PMHx of thyroid cancer status post total thyroidectomy in 2010 and radioactive iodine treatment in 2011, hypogonadism, vitamin D deficiency, and osteopenia, with chronic constipation, right buttock and right scrotal pain and numbness. 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 The chordoma resulted in perineal numbness and overflow incontinence and he was admitted 7/7/21 for staged resection.   On 7/7, he underwent Plastic Surgery and General Surgery assisted vertical rectus abdominal myocutaneous flap harvest with transperitoneal ligation of internal iliac artery and vein. He is s/p en bloc sacrectomy with L4-pelvis fusion; EBL 4.5L status post 8 units PRBC, 6 units FFP, 1 pack platelets and 5L crystalloid; 7/8. Extubated. On 7/9  patient developed hypoxia and respiratory distress, desaturation. CPAP at night.  CTA revealed bilateral sub segmental PE. No right heart strain & Bibasilar and left lingular consolidative opacities. Started on Heparin gtt and transitioned to therapeutic lovenox on 8/1. Course further c/b ileus requiring gastric decompression. Pt underwent echo to check for RV per pulm, and it was grossly unremarkable.  Wound washout with plastics and duc pus noted, entire wound reopened on 7/26/21 and placement of 2 drains. Incontinent of stool. Cultures growing MRSA, enterococcus and bacteroides. ID following. On antibiotics. S/P Diversion Colostomy to keep sacral incision clean on 7/28/21. Barnett for urinary incontinence and to keep sacral wound clean. Pt requires 6 week course of antibiotics until 9/7/21. PICC line in place. LE doppler neg for DVT. Evaluated by Physical Therapy and PM&R and was recommended for Acute rehab.       PAST MEDICAL & SURGICAL HISTORY:  HLD (hyperlipidemia)    Thyroid cancer  2010    History of central serous retinopathy    H/O hypogonadism    H/O vitamin D deficiency    H/O osteopenia    H/O thyroidectomy  Total --2010    H/O colonoscopy      HEALTH ISSUES - PROBLEM Dx:  Pulmonary thromboembolism    Thrombocytosis    Sacral mass    Bacteroides infection    Hypothyroidism    Vitamin D deficiency    Normocytic anemia    Hypokalemia    Anemia      MEDICATIONS  (STANDING):  apixaban 10 milliGRAM(s) Oral every 12 hours  calcium carbonate 1250 mG  + Vitamin D (OsCal 500 + D) 1 Tablet(s) Oral daily  chlorhexidine 4% Liquid 1 Application(s) Topical two times a day  gabapentin 200 milliGRAM(s) Oral every 8 hours  levothyroxine 137 MICROGram(s) Oral daily  metroNIDAZOLE    Tablet 500 milliGRAM(s) Oral every 8 hours  pantoprazole    Tablet 40 milliGRAM(s) Oral before breakfast  senna 2 Tablet(s) Oral at bedtime  vancomycin  IVPB 1250 milliGRAM(s) IV Intermittent every 12 hours    MEDICATIONS  (PRN):  acetaminophen   Tablet .. 650 milliGRAM(s) Oral every 6 hours PRN Temp greater or equal to 38C (100.4F), Mild Pain (1 - 3)  melatonin 3 milliGRAM(s) Oral at bedtime PRN Insomnia  polyethylene glycol 3350 17 Gram(s) Oral daily PRN Constipation  simethicone 80 milliGRAM(s) Chew two times a day PRN Upset Stomach  traMADol 25 milliGRAM(s) Oral every 6 hours PRN Moderate Pain (4 - 6)  traMADol 50 milliGRAM(s) Oral every 6 hours PRN Severe Pain (7 - 10)    Allergies    No Known Allergies    INTERVAL HPI/OVERNIGHT EVENTS:  Patient S&E at bedside. No c/o CP or SOB.     VITAL SIGNS:  T(F): 98 (08-12-21 @ 08:32)  HR: 80 (08-12-21 @ 08:32)  BP: 119/80 (08-12-21 @ 08:32)  RR: 16 (08-12-21 @ 08:32)  SpO2: 95% (08-12-21 @ 08:32)      PHYSICAL EXAM:  Constitutional: NAD  Eyes: sclera non-icteric  Neck: no JVD  Respiratory: CTA ant.; no wheezes  Cardiovascular: RRR, no M/R/G  Gastrointestinal: soft, NTND, no masses palpable  Extremities: no calf tenderness  Neurological: Awake, alert.    Labs:             9.9    7.94  )-----------( 356      ( 08-12 @ 06:41 )             31.5                9.5    8.54  )-----------( 342      ( 08-11 @ 06:45 )             30.0       08-12    139  |  103  |  13  ----------------------------<  104<H>  3.7   |  28  |  0.88    Ca    8.7      12 Aug 2021 06:41    Iron Total, Serum: 30 ug/dL (08-11-21 @ 06:45)  Iron - Total Binding Capacity.: 201 ug/dL (08-11-21 @ 06:45)  % Saturation, Iron: 15 % (08-11-21 @ 06:45)  Ferritin, Serum: 477 ng/mL (08-11-21 @ 06:45)  Absolute Reticulocytes: 142.4 K/uL (08-11-21 @ 06:45)  Vitamin B12, Serum: 750 pg/mL (08-11-21 @ 06:45)  Folate, Serum: 14.5 ng/mL (08-11-21 @ 06:45)    Consultant notes reviewed : YES [x ] ; NO [ ]

## 2021-08-13 PROCEDURE — 99232 SBSQ HOSP IP/OBS MODERATE 35: CPT

## 2021-08-13 PROCEDURE — 99232 SBSQ HOSP IP/OBS MODERATE 35: CPT | Mod: GC

## 2021-08-13 RX ADMIN — PANTOPRAZOLE SODIUM 40 MILLIGRAM(S): 20 TABLET, DELAYED RELEASE ORAL at 06:02

## 2021-08-13 RX ADMIN — Medication 500 MILLIGRAM(S): at 22:02

## 2021-08-13 RX ADMIN — APIXABAN 10 MILLIGRAM(S): 2.5 TABLET, FILM COATED ORAL at 06:01

## 2021-08-13 RX ADMIN — Medication 166.67 MILLIGRAM(S): at 20:24

## 2021-08-13 RX ADMIN — CHLORHEXIDINE GLUCONATE 1 APPLICATION(S): 213 SOLUTION TOPICAL at 06:56

## 2021-08-13 RX ADMIN — GABAPENTIN 200 MILLIGRAM(S): 400 CAPSULE ORAL at 22:01

## 2021-08-13 RX ADMIN — GABAPENTIN 200 MILLIGRAM(S): 400 CAPSULE ORAL at 15:14

## 2021-08-13 RX ADMIN — APIXABAN 10 MILLIGRAM(S): 2.5 TABLET, FILM COATED ORAL at 17:52

## 2021-08-13 RX ADMIN — Medication 500 MILLIGRAM(S): at 15:14

## 2021-08-13 RX ADMIN — Medication 166.67 MILLIGRAM(S): at 08:01

## 2021-08-13 RX ADMIN — Medication 1 TABLET(S): at 15:14

## 2021-08-13 RX ADMIN — GABAPENTIN 200 MILLIGRAM(S): 400 CAPSULE ORAL at 06:01

## 2021-08-13 RX ADMIN — CHLORHEXIDINE GLUCONATE 1 APPLICATION(S): 213 SOLUTION TOPICAL at 17:32

## 2021-08-13 RX ADMIN — Medication 137 MICROGRAM(S): at 06:02

## 2021-08-13 RX ADMIN — Medication 500 MILLIGRAM(S): at 06:02

## 2021-08-13 NOTE — PROGRESS NOTE ADULT - SUBJECTIVE AND OBJECTIVE BOX
Subjective: NAD, afebrile, tolerating therapy. Offers no complaints.       HISTORY OF PRESENT ILLNESS  Pt is a 60 y/o M with PMHx of thyroid cancer status post total thyroidectomy in 2010 and radioactive iodine treatment in 2011, hypogonadism, vitamin D deficiency, and osteopenia, with chronic constipation, right buttock and right scrotal pain and numbness. 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 The chordoma resulted in perineal numbness and overflow incontinence and he was admitted 7/7/21 for staged resection.   On 7/7, he underwent Plastic Surgery and General Surgery assisted vertical rectus abdominal myocutaneous flap harvest with transperitoneal ligation of internal iliac artery and vein. He is s/p en bloc sacrectomy with L4-pelvis fusion; EBL 4.5L status post 8 units PRBC, 6 units FFP, 1 pack platelets and 5L crystalloid; 7/8. Extubated. On 7/9  patient developed hypoxia and respiratory distress, desaturation. CPAP at night.  CTA revealed bilateral sub segmental PE. No right heart strain & Bibasilar and left lingular consolidative opacities. Started on Heparin gtt and transitioned to therapeutic lovenox on 8/1. Course further c/b ileus requiring gastric decompression. Pt underwent echo to check for RV per pulm, and it was grossly unremarkable.  Wound washout with plastics and duc pus noted, entire wound reopened on 7/26/21 and placement of 2 drains. Incontinent of stool. Cultures growing MRSA, enterococcus and bacteroides. ID following. Continue vancomycin and flagyl. S/P Diversion Colostomy to keep sacral incision clean on 7/28/21. Tony for urinary incontinence and to keep sacral wound clean. Pt requires 6 week course of antibiotics until 9/7/21. PICC line in place. LE doppler neg for DVT. Patient tolerating regular diet with fully functioning colostomy. Evaluated by Physical Therapy and PM&R and was recommended for Acute rehab. Pt was medically stable on 8/2/21 and was transferred to [facility].       PAST MEDICAL & SURGICAL HISTORY:  HLD (hyperlipidemia)    Thyroid cancer  2010    History of central serous retinopathy    H/O hypogonadism    H/O vitamin D deficiency    H/O osteopenia    H/O thyroidectomy  Total --2010    H/O colonoscopy     REVIEW OF SYMPTOMS  [X] Constitutional WNL     [X] Cardio WNL            [X] Resp WNL           [X] GI WNL                          [X]  WNL                   [X] Heme WNL              [X] Endo WNL                     [X] Skin WNL                 [X] MSK WNL            [X] Neuro WNL                   [X] Cognitive WNL        [X] Psych WNL        VITALS  Vital Signs Last 24 Hrs  T(C): 37.3 (12 Aug 2021 20:11), Max: 37.3 (12 Aug 2021 20:11)  T(F): 99.1 (12 Aug 2021 20:11), Max: 99.1 (12 Aug 2021 20:11)  HR: 84 (12 Aug 2021 20:11) (84 - 84)  BP: 133/74 (12 Aug 2021 20:11) (133/74 - 133/74)  BP(mean): --  RR: 18 (12 Aug 2021 20:11) (18 - 18)  SpO2: 94% (12 Aug 2021 20:11) (94% - 94%)    PHYSICAL EXAM  Constitutional - NAD, Comfortable  Chest - breathing comfortably  Abdomen - has colostomy+ Abd incision has healed well, + SEDRICK drain on left CDI/serosanguinous drainage. R SEDRICK removed-healing well.  -tony drains clear yellow urine.  Extremities - No C/C/E, No calf tenderness    Vital Signs Last 24 Hrs stable  Psych-anxious, but slept well.      RECENT LABS                        9.9    7.94  )-----------( 356      ( 12 Aug 2021 06:41 )             31.5     08-12    139  |  103  |  13  ----------------------------<  104<H>  3.7   |  28  |  0.88    Ca    8.7      12 Aug 2021 06:41                          RADIOLOGY/OTHER RESULTS      CURRENT MEDICATIONS  MEDICATIONS  (STANDING):  apixaban 10 milliGRAM(s) Oral every 12 hours  calcium carbonate 1250 mG  + Vitamin D (OsCal 500 + D) 1 Tablet(s) Oral daily  chlorhexidine 4% Liquid 1 Application(s) Topical two times a day  gabapentin 200 milliGRAM(s) Oral every 8 hours  levothyroxine 137 MICROGram(s) Oral daily  metroNIDAZOLE    Tablet 500 milliGRAM(s) Oral every 8 hours  pantoprazole    Tablet 40 milliGRAM(s) Oral before breakfast  senna 2 Tablet(s) Oral at bedtime  vancomycin  IVPB 1250 milliGRAM(s) IV Intermittent every 12 hours    MEDICATIONS  (PRN):  acetaminophen   Tablet .. 650 milliGRAM(s) Oral every 6 hours PRN Temp greater or equal to 38C (100.4F), Mild Pain (1 - 3)  melatonin 3 milliGRAM(s) Oral at bedtime PRN Insomnia  polyethylene glycol 3350 17 Gram(s) Oral daily PRN Constipation  simethicone 80 milliGRAM(s) Chew two times a day PRN Upset Stomach  traMADol 25 milliGRAM(s) Oral every 6 hours PRN Moderate Pain (4 - 6)  traMADol 50 milliGRAM(s) Oral every 6 hours PRN Severe Pain (7 - 10)      ASSESSMENT & PLAN    Continue comprehensive acute rehab program consisting of 3hrs/day of OT/PT and SLP.

## 2021-08-13 NOTE — PROGRESS NOTE ADULT - SUBJECTIVE AND OBJECTIVE BOX
Good Afternoon,     The prior authorization for [facility] Livalo prescription has been APPROVED FROM 1/21/2020 TO 1/20/2021 with copayment of $0.00.       Patient has been notified of the decision on 1/21/2020 and patient states he will  medication on Wednesday morning at [facility].    If there are any additional questions or concerns, please contact me.    Thank You!   Karen Rodriguez CPhT, B.A  Patient Care Advocate   [facility]  Phone: [phone] Ext [phone]  Fax: [phone]   Patient is a 61y old  Male who presents with a chief complaint of s/p chondroma resection (13 Aug 2021 09:56)      Patient seen and examined at bedside.  No overnight events      ALLERGIES:  No Known Allergies    MEDICATIONS  (STANDING):  apixaban 10 milliGRAM(s) Oral every 12 hours  calcium carbonate 1250 mG  + Vitamin D (OsCal 500 + D) 1 Tablet(s) Oral daily  chlorhexidine 4% Liquid 1 Application(s) Topical two times a day  gabapentin 200 milliGRAM(s) Oral every 8 hours  levothyroxine 137 MICROGram(s) Oral daily  metroNIDAZOLE    Tablet 500 milliGRAM(s) Oral every 8 hours  pantoprazole    Tablet 40 milliGRAM(s) Oral before breakfast  senna 2 Tablet(s) Oral at bedtime  vancomycin  IVPB 1250 milliGRAM(s) IV Intermittent every 12 hours    MEDICATIONS  (PRN):  acetaminophen   Tablet .. 650 milliGRAM(s) Oral every 6 hours PRN Temp greater or equal to 38C (100.4F), Mild Pain (1 - 3)  melatonin 3 milliGRAM(s) Oral at bedtime PRN Insomnia  polyethylene glycol 3350 17 Gram(s) Oral daily PRN Constipation  simethicone 80 milliGRAM(s) Chew two times a day PRN Upset Stomach  traMADol 25 milliGRAM(s) Oral every 6 hours PRN Moderate Pain (4 - 6)  traMADol 50 milliGRAM(s) Oral every 6 hours PRN Severe Pain (7 - 10)    Vital Signs Last 24 Hrs  T(F): 99.1 (12 Aug 2021 20:11), Max: 99.1 (12 Aug 2021 20:11)  HR: 84 (12 Aug 2021 20:11) (84 - 84)  BP: 133/74 (12 Aug 2021 20:11) (133/74 - 133/74)  RR: 18 (12 Aug 2021 20:11) (18 - 18)  SpO2: 94% (12 Aug 2021 20:11) (94% - 94%)  I&O's Summary    12 Aug 2021 07:01  -  13 Aug 2021 07:00  --------------------------------------------------------  IN: 0 mL / OUT: 1900 mL / NET: -1900 mL      PHYSICAL EXAM:  GENERAL: NAD  HENT:  Atraumatic, Normocephalic; No tonsillar erythema, exudates, or enlargement; Moist mucous membranes;   EYES: EOMI, PERRLA, conjunctiva and sclera clear, no lid-lag  NECK: Supple, No JVD, Normal thyroid  CHEST/LUNG: Clear to percussion bilaterally; No rales, rhonchi, wheezing, or rubs; normal respiratory effort, no intercostal retractions  HEART: Regular rate and rhythm; No murmurs, rubs, or gallops; No pitting edema  ABDOMEN: Soft, Nontender, Nondistended; Bowel sounds present; No HSM; + Left drain; +colostomy  MUSCULOSKELETAL/EXTREMITIES:  2+ Peripheral Pulses, No clubbing or digital cyanosis  PSYCH: Appropriate affect, Alert & Awake    LABS:                        9.9    7.94  )-----------( 356      ( 12 Aug 2021 06:41 )             31.5       08-12    139  |  103  |  13  ----------------------------<  104  3.7   |  28  |  0.88    Ca    8.7      12 Aug 2021 06:41    TPro  6.3  /  Alb  2.4  /  TBili  0.4  /  DBili  x   /  AST  25  /  ALT  48  /  AlkPhos  261  08-11     eGFR if Non African American: 94 mL/min/1.73M2 (08-12-21 @ 06:41)  eGFR if : 108 mL/min/1.73M2 (08-12-21 @ 06:41)       COVID-19 PCR: NotDetec (08-02-21 @ 20:45)  COVID-19 PCR: NotDetec (08-02-21 @ 13:00)      Care Discussed with Rehab Attending and Other Providers

## 2021-08-13 NOTE — PROGRESS NOTE ADULT - ASSESSMENT
Pt is a 60 y/o M with PMHx of thyroid cancer status post total thyroidectomy in 2010 and radioactive iodine treatment in 2011, hypogonadism, vitamin D deficiency, and osteopenia, with chronic constipation, right buttock and right scrotal pain and numbness. 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 The chordoma resulted in perineal numbness and overflow incontinence and he was admitted 7/7/21 for staged resection.   On 7/7, he underwent Plastic Surgery and General Surgery assisted vertical rectus abdominal myocutaneous flap harvest with transperitoneal ligation of internal iliac artery and vein. He is s/p en bloc sacrectomy with L4-pelvis fusion; EBL 4.5L status post 8 units PRBC, 6 units FFP, 1 pack platelets and 5L crystalloid; 7/8. Extubated. On 7/9  patient developed hypoxia and respiratory distress, desaturation. CPAP at night.  CTA revealed bilateral sub segmental PE. No right heart strain & Bibasilar and left lingular consolidative opacities. Started on Heparin gtt and transitioned to therapeutic lovenox on 8/1. Course further c/b ileus requiring gastric decompression. Pt underwent echo to check for RV per pulm, and it was grossly unremarkable.  Wound washout with plastics and duc pus noted, entire wound reopened on 7/26/21 and placement of 2 drains. Incontinent of stool. Cultures growing MRSA, enterococcus and bacteroides. ID following. On antibiotics. S/P Diversion Colostomy to keep sacral incision clean on 7/28/21. Barnett for urinary incontinence and to keep sacral wound clean. Pt requires 6 week course of antibiotics until 9/7/21. PICC line in place. LE doppler neg for DVT. Evaluated by Physical Therapy and PM&R and was recommended for Acute rehab.

## 2021-08-13 NOTE — PROGRESS NOTE ADULT - ASSESSMENT
61 year old male planned resection for sacral mass. He is s/p sacrectomy with L4-pelvis fusion. Post-operative course was complicated by bilateral PE, MRSA wound infection s/p diversion colostomy.     Continue rehab program    #MRSA INFECTION  -Continue IV abx - Vancomycin IV and Flagyl PO; ends 9/7/21. ID in. Afebrile, improved leukocytosis.   -Contact isolation  -Right SEDRICK removed by Dr Cevallos on 8/11. Discussed w/Karmen on 8/10-ok to remove if less than 30cc drainage per shift. Site healing well.      #Pain management:   -Gabapentin, tylenol prn,  tramadol prn    # PE  - tolerating Eliquis 8/11, no bleeding reported. Close watch of HH.  -evaluated by heme  - plan discussed with primary Plastics and Surgical team on 8/10: Dr Peraza and Dr Robin-Strickland. Ok for PO Eliquis to begin/no contraindication.     #Hypothyroidism  - Synthroid 137 mcg daily    #s/p Ileus, now s/p Colostomy  -Monitor output daily, much less bloating, bag w/formed brown stool.   - ostomy care-abd sites healing well, pt not distended    #GI ppx  - Protonix for GI ppx while on Eliquis  - probiotic yoghurt with meals  -nutrition input appreciated      #Bowel Regimen  - Senna nightly    #Anxiety  -neuropsych following  -pt refused Rx interventions at this time.

## 2021-08-13 NOTE — PROGRESS NOTE ADULT - SUBJECTIVE AND OBJECTIVE BOX
LISBET DEGROOT   61y   Male    Admitting: BRENDEN Solo  HPI:  Pt is a 60 y/o M with PMHx of thyroid cancer status post total thyroidectomy in 2010 and radioactive iodine treatment in 2011, hypogonadism, vitamin D deficiency, and osteopenia, with chronic constipation, right buttock and right scrotal pain and numbness. 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 The chordoma resulted in perineal numbness and overflow incontinence and he was admitted 7/7/21 for staged resection.   On 7/7, he underwent Plastic Surgery and General Surgery assisted vertical rectus abdominal myocutaneous flap harvest with transperitoneal ligation of internal iliac artery and vein. He is s/p en bloc sacrectomy with L4-pelvis fusion; EBL 4.5L status post 8 units PRBC, 6 units FFP, 1 pack platelets and 5L crystalloid; 7/8. Extubated. On 7/9  patient developed hypoxia and respiratory distress, desaturation. CPAP at night.  CTA revealed bilateral sub segmental PE. No right heart strain & Bibasilar and left lingular consolidative opacities. Started on Heparin gtt and transitioned to therapeutic lovenox on 8/1. Course further c/b ileus requiring gastric decompression. Pt underwent echo to check for RV per pulm, and it was grossly unremarkable.  Wound washout with plastics and duc pus noted, entire wound reopened on 7/26/21 and placement of 2 drains. Incontinent of stool. Cultures growing MRSA, enterococcus and bacteroides. ID following. On antibiotics. S/P Diversion Colostomy to keep sacral incision clean on 7/28/21. Barnett for urinary incontinence and to keep sacral wound clean. Pt requires 6 week course of antibiotics until 9/7/21. PICC line in place. LE doppler neg for DVT. Evaluated by Physical Therapy and PM&R and was recommended for Acute rehab.       PAST MEDICAL & SURGICAL HISTORY:  HLD (hyperlipidemia)    Thyroid cancer  2010    History of central serous retinopathy    H/O hypogonadism    H/O vitamin D deficiency    H/O osteopenia    H/O thyroidectomy  Total --2010    H/O colonoscopy      HEALTH ISSUES - PROBLEM Dx:  Pulmonary thromboembolism    Thrombocytosis    Sacral mass    Bacteroides infection    Hypothyroidism    Vitamin D deficiency    Normocytic anemia    Hypokalemia    Anemia    MEDICATIONS  (STANDING):  apixaban 10 milliGRAM(s) Oral every 12 hours  calcium carbonate 1250 mG  + Vitamin D (OsCal 500 + D) 1 Tablet(s) Oral daily  chlorhexidine 4% Liquid 1 Application(s) Topical two times a day  gabapentin 200 milliGRAM(s) Oral every 8 hours  levothyroxine 137 MICROGram(s) Oral daily  metroNIDAZOLE    Tablet 500 milliGRAM(s) Oral every 8 hours  pantoprazole    Tablet 40 milliGRAM(s) Oral before breakfast  senna 2 Tablet(s) Oral at bedtime  vancomycin  IVPB 1250 milliGRAM(s) IV Intermittent every 12 hours    MEDICATIONS  (PRN):  acetaminophen   Tablet .. 650 milliGRAM(s) Oral every 6 hours PRN Temp greater or equal to 38C (100.4F), Mild Pain (1 - 3)  melatonin 3 milliGRAM(s) Oral at bedtime PRN Insomnia  polyethylene glycol 3350 17 Gram(s) Oral daily PRN Constipation  simethicone 80 milliGRAM(s) Chew two times a day PRN Upset Stomach  traMADol 25 milliGRAM(s) Oral every 6 hours PRN Moderate Pain (4 - 6)  traMADol 50 milliGRAM(s) Oral every 6 hours PRN Severe Pain (7 - 10)    Allergies    No Known Allergies    INTERVAL HPI/OVERNIGHT EVENTS:  Patient S&E at bedside. No c/o CP, SOB.    VITAL SIGNS:  T(F): 99.1 (08-12-21 @ 20:11)  HR: 84 (08-12-21 @ 20:11)  BP: 133/74 (08-12-21 @ 20:11)  RR: 18 (08-12-21 @ 20:11)  SpO2: 94% (08-12-21 @ 20:11)    PHYSICAL EXAM:  Constitutional: NAD  Eyes: sclera non-icteric  Neck: no JVD  Respiratory: CTA ant.; no wheezes.  Cardiovascular: RRR, no M/R/G  Gastrointestinal: soft, NTND, no masses palpable  Extremities: no calf tenderness elicited.  Neurological: Awake, alert.    Labs:             9.9    7.94  )-----------( 356      ( 08-12 @ 06:41 )             31.5                9.5    8.54  )-----------( 342      ( 08-11 @ 06:45 )             30.0       08-12    139  |  103  |  13  ----------------------------<  104<H>  3.7   |  28  |  0.88    Ca    8.7      12 Aug 2021 06:41        Iron Total, Serum: 30 ug/dL (08-11-21 @ 06:45)  Iron - Total Binding Capacity.: 201 ug/dL (08-11-21 @ 06:45)  % Saturation, Iron: 15 % (08-11-21 @ 06:45)  Ferritin, Serum: 477 ng/mL (08-11-21 @ 06:45)  Absolute Reticulocytes: 142.4 K/uL (08-11-21 @ 06:45)  Vitamin B12, Serum: 750 pg/mL (08-11-21 @ 06:45)  Folate, Serum: 14.5 ng/mL (08-11-21 @ 06:45)    Consultant notes reviewed : YES [x ] ; NO [ ]

## 2021-08-14 PROCEDURE — 99232 SBSQ HOSP IP/OBS MODERATE 35: CPT

## 2021-08-14 RX ADMIN — GABAPENTIN 200 MILLIGRAM(S): 400 CAPSULE ORAL at 13:29

## 2021-08-14 RX ADMIN — Medication 650 MILLIGRAM(S): at 22:51

## 2021-08-14 RX ADMIN — APIXABAN 10 MILLIGRAM(S): 2.5 TABLET, FILM COATED ORAL at 06:24

## 2021-08-14 RX ADMIN — Medication 650 MILLIGRAM(S): at 23:23

## 2021-08-14 RX ADMIN — Medication 650 MILLIGRAM(S): at 09:00

## 2021-08-14 RX ADMIN — Medication 500 MILLIGRAM(S): at 13:29

## 2021-08-14 RX ADMIN — PANTOPRAZOLE SODIUM 40 MILLIGRAM(S): 20 TABLET, DELAYED RELEASE ORAL at 06:22

## 2021-08-14 RX ADMIN — GABAPENTIN 200 MILLIGRAM(S): 400 CAPSULE ORAL at 06:24

## 2021-08-14 RX ADMIN — CHLORHEXIDINE GLUCONATE 1 APPLICATION(S): 213 SOLUTION TOPICAL at 17:27

## 2021-08-14 RX ADMIN — Medication 166.67 MILLIGRAM(S): at 08:59

## 2021-08-14 RX ADMIN — APIXABAN 10 MILLIGRAM(S): 2.5 TABLET, FILM COATED ORAL at 17:27

## 2021-08-14 RX ADMIN — Medication 500 MILLIGRAM(S): at 22:51

## 2021-08-14 RX ADMIN — Medication 166.67 MILLIGRAM(S): at 20:20

## 2021-08-14 RX ADMIN — Medication 137 MICROGRAM(S): at 06:24

## 2021-08-14 RX ADMIN — Medication 1 TABLET(S): at 12:40

## 2021-08-14 RX ADMIN — Medication 500 MILLIGRAM(S): at 06:22

## 2021-08-14 RX ADMIN — Medication 650 MILLIGRAM(S): at 12:28

## 2021-08-14 RX ADMIN — GABAPENTIN 200 MILLIGRAM(S): 400 CAPSULE ORAL at 22:38

## 2021-08-14 RX ADMIN — CHLORHEXIDINE GLUCONATE 1 APPLICATION(S): 213 SOLUTION TOPICAL at 06:22

## 2021-08-14 NOTE — PROGRESS NOTE ADULT - ASSESSMENT
61 year old male planned resection for sacral mass. He is s/p sacrectomy with L4-pelvis fusion. Post-operative course was complicated by bilateral PE, MRSA wound infection s/p diversion colostomy.     Continue rehab program    #MRSA INFECTION  -Continue IV abx - Vancomycin IV and Flagyl PO; ends 9/7/21. ID in. Afebrile, improved leukocytosis.   -Contact isolation  -Right SEDRICK removed by Dr Cevallos on 8/11. Discussed w/Karmen on 8/10-ok to remove if less than 30cc drainage per shift. Site healing well.      #Pain management:   -Gabapentin, tylenol prn,  tramadol prn    # PE  - tolerating Eliquis 8/11, no bleeding reported. Close watch of HH.  -evaluated by heme  - plan discussed with primary Plastics and Surgical team on 8/10: Dr Peraza and Dr Robin-Strickland. Ok for PO Eliquis to begin/no contraindication.     #Hypothyroidism  - Synthroid 137 mcg daily    #s/p Ileus, now s/p Colostomy  -Monitor output daily, much less bloating, bag w/formed brown stool.   - ostomy care-abd sites healing well, pt not distended    #GI ppx  - Protonix for GI ppx while on Eliquis  - probiotic yoghurt with meals  -nutrition input appreciated      #Bowel Regimen  - Senna nightly    #Anxiety  -neuropsych following  -pt refused Rx interventions at this time.     Labs:  CBC, BMP 8/16

## 2021-08-14 NOTE — PROGRESS NOTE ADULT - SUBJECTIVE AND OBJECTIVE BOX
Subjective: NAD, afebrile, tolerating therapy. Offers no complaints.       HISTORY OF PRESENT ILLNESS  Pt is a 60 y/o M with PMHx of thyroid cancer status post total thyroidectomy in 2010 and radioactive iodine treatment in 2011, hypogonadism, vitamin D deficiency, and osteopenia, with chronic constipation, right buttock and right scrotal pain and numbness. 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 The chordoma resulted in perineal numbness and overflow incontinence and he was admitted 7/7/21 for staged resection.   On 7/7, he underwent Plastic Surgery and General Surgery assisted vertical rectus abdominal myocutaneous flap harvest with transperitoneal ligation of internal iliac artery and vein. He is s/p en bloc sacrectomy with L4-pelvis fusion; EBL 4.5L status post 8 units PRBC, 6 units FFP, 1 pack platelets and 5L crystalloid; 7/8. Extubated. On 7/9  patient developed hypoxia and respiratory distress, desaturation. CPAP at night.  CTA revealed bilateral sub segmental PE. No right heart strain & Bibasilar and left lingular consolidative opacities. Started on Heparin gtt and transitioned to therapeutic lovenox on 8/1. Course further c/b ileus requiring gastric decompression. Pt underwent echo to check for RV per pulm, and it was grossly unremarkable.  Wound washout with plastics and duc pus noted, entire wound reopened on 7/26/21 and placement of 2 drains. Incontinent of stool. Cultures growing MRSA, enterococcus and bacteroides. ID following. Continue vancomycin and flagyl. S/P Diversion Colostomy to keep sacral incision clean on 7/28/21. Tony for urinary incontinence and to keep sacral wound clean. Pt requires 6 week course of antibiotics until 9/7/21. PICC line in place. LE doppler neg for DVT. Patient tolerating regular diet with fully functioning colostomy. Evaluated by Physical Therapy and PM&R and was recommended for Acute rehab. Pt was medically stable on 8/2/21 and was transferred to [facility].       PAST MEDICAL & SURGICAL HISTORY:  HLD (hyperlipidemia)    Thyroid cancer  2010    History of central serous retinopathy    H/O hypogonadism    H/O vitamin D deficiency    H/O osteopenia    H/O thyroidectomy  Total --2010    H/O colonoscopy     REVIEW OF SYMPTOMS/Subjective: Patient seen at bedside this morning. Patient in bed in NAD, no SOB, no n/v, pain controlled.  [X] Constitutional WNL     [X] Cardio WNL            [X] Resp WNL           [X] GI WNL                          [X]  WNL                   [ ] Heme WNL              [ ] Endo WNL                     [ ] Skin WNL                 [X] MSK WNL            [X] Neuro WNL                   [X] Cognitive WNL        [X] Psych WNL        VITALS  Vital Signs Last 24 Hrs  T(C): 37.1 (14 Aug 2021 09:20), Max: 37.3 (13 Aug 2021 20:25)  T(F): 98.8 (14 Aug 2021 09:20), Max: 99.1 (13 Aug 2021 20:25)  HR: 92 (14 Aug 2021 09:20) (82 - 92)  BP: 108/70 (14 Aug 2021 09:20) (108/70 - 133/69)  BP(mean): --  RR: 16 (14 Aug 2021 09:20) (15 - 16)  SpO2: 97% (14 Aug 2021 09:20) (94% - 97%)    PHYSICAL EXAM  Constitutional - NAD, Comfortable  Chest - breathing comfortably  Abdomen - has colostomy+ Abd incision has healed well, + SEDRICK drain on left CDI/serosanguinous drainage.   -tony drains clear yellow urine.  Extremities - No C/C/E, No calf tenderness   Psych-stable    Motor:  BUE: 5/5mp  LLE: 5/5mp  RLE: hip felxion 3/5mp, ankle 3-4/5mp      RECENT LABS                        9.9    7.94  )-----------( 356      ( 12 Aug 2021 06:41 )             31.5     08-12    139  |  103  |  13  ----------------------------<  104<H>  3.7   |  28  |  0.88    Ca    8.7      12 Aug 2021 06:41                          RADIOLOGY/OTHER RESULTS      CURRENT MEDICATIONS  MEDICATIONS  (STANDING):  apixaban 10 milliGRAM(s) Oral every 12 hours  calcium carbonate 1250 mG  + Vitamin D (OsCal 500 + D) 1 Tablet(s) Oral daily  chlorhexidine 4% Liquid 1 Application(s) Topical two times a day  gabapentin 200 milliGRAM(s) Oral every 8 hours  levothyroxine 137 MICROGram(s) Oral daily  metroNIDAZOLE    Tablet 500 milliGRAM(s) Oral every 8 hours  pantoprazole    Tablet 40 milliGRAM(s) Oral before breakfast  senna 2 Tablet(s) Oral at bedtime  vancomycin  IVPB 1250 milliGRAM(s) IV Intermittent every 12 hours    MEDICATIONS  (PRN):  acetaminophen   Tablet .. 650 milliGRAM(s) Oral every 6 hours PRN Temp greater or equal to 38C (100.4F), Mild Pain (1 - 3)  melatonin 3 milliGRAM(s) Oral at bedtime PRN Insomnia  polyethylene glycol 3350 17 Gram(s) Oral daily PRN Constipation  simethicone 80 milliGRAM(s) Chew two times a day PRN Upset Stomach  traMADol 25 milliGRAM(s) Oral every 6 hours PRN Moderate Pain (4 - 6)  traMADol 50 milliGRAM(s) Oral every 6 hours PRN Severe Pain (7 - 10)      ASSESSMENT & PLAN    Continue comprehensive acute rehab program consisting of 3hrs/day of OT/PT and SLP.

## 2021-08-14 NOTE — PROGRESS NOTE ADULT - ASSESSMENT
s/p sacrectomy with L4-pelvis fusion  PT/OT per rehab  Pain control per rehab    Wound infection  Vanco/Flagyl    Bloating, not new  simethicone prn    PE  Eliquis, Transition to 5mg dose after 7 day of 10mg    Anemia  h/h stable  Monitor for now    Hypokalemia  Resolved

## 2021-08-14 NOTE — PROGRESS NOTE ADULT - SUBJECTIVE AND OBJECTIVE BOX
HPI:  Pt is a 62 y/o M with PMHx of thyroid cancer status post total thyroidectomy in 2010 and radioactive iodine treatment in 2011, hypogonadism, vitamin D deficiency, and osteopenia, with chronic constipation, right buttock and right scrotal pain and numbness. 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 The chordoma resulted in perineal numbness and overflow incontinence and he was admitted 7/7/21 for staged resection.   On 7/7, he underwent Plastic Surgery and General Surgery assisted vertical rectus abdominal myocutaneous flap harvest with transperitoneal ligation of internal iliac artery and vein. He is s/p en bloc sacrectomy with L4-pelvis fusion; EBL 4.5L status post 8 units PRBC, 6 units FFP, 1 pack platelets and 5L crystalloid; 7/8. Extubated. On 7/9  patient developed hypoxia and respiratory distress, desaturation. CPAP at night.  CTA revealed bilateral sub segmental PE. No right heart strain & Bibasilar and left lingular consolidative opacities. Started on Heparin gtt and transitioned to therapeutic lovenox on 8/1. Course further c/b ileus requiring gastric decompression. Pt underwent echo to check for RV per pulm, and it was grossly unremarkable.  Wound washout with plastics and duc pus noted, entire wound reopened on 7/26/21 and placement of 2 drains. Incontinent of stool. Cultures growing MRSA, enterococcus and bacteroides. ID following. Continue vancomycin and flagyl. S/P Diversion Colostomy to keep sacral incision clean on 7/28/21. Barnett for urinary incontinence and to keep sacral wound clean. Pt requires 6 week course of antibiotics until 9/7/21. PICC line in place. LE doppler neg for DVT. Patient tolerating regular diet with fully functioning colostomy. Evaluated by Physical Therapy and PM&R and was recommended for Acute rehab. Pt was medically stable on 8/2/21 and was transferred to [facility]. (02 Aug 2021 14:54)      Subjective    c/o bloating        PAST MEDICAL & SURGICAL HISTORY:  HLD (hyperlipidemia)    Thyroid cancer  2010    History of central serous retinopathy    H/O hypogonadism    H/O vitamin D deficiency    H/O osteopenia    H/O thyroidectomy  Total --2010    H/O colonoscopy        MedsMEDICATIONS  (STANDING):  apixaban 10 milliGRAM(s) Oral every 12 hours  calcium carbonate 1250 mG  + Vitamin D (OsCal 500 + D) 1 Tablet(s) Oral daily  chlorhexidine 4% Liquid 1 Application(s) Topical two times a day  gabapentin 200 milliGRAM(s) Oral every 8 hours  levothyroxine 137 MICROGram(s) Oral daily  metroNIDAZOLE    Tablet 500 milliGRAM(s) Oral every 8 hours  pantoprazole    Tablet 40 milliGRAM(s) Oral before breakfast  senna 2 Tablet(s) Oral at bedtime  vancomycin  IVPB 1250 milliGRAM(s) IV Intermittent every 12 hours    MEDICATIONS  (PRN):  acetaminophen   Tablet .. 650 milliGRAM(s) Oral every 6 hours PRN Temp greater or equal to 38C (100.4F), Mild Pain (1 - 3)  melatonin 3 milliGRAM(s) Oral at bedtime PRN Insomnia  polyethylene glycol 3350 17 Gram(s) Oral daily PRN Constipation  simethicone 80 milliGRAM(s) Chew two times a day PRN Upset Stomach      Vital Signs Last 24 Hrs  T(C): 37.1 (14 Aug 2021 09:20), Max: 37.3 (13 Aug 2021 20:25)  T(F): 98.8 (14 Aug 2021 09:20), Max: 99.1 (13 Aug 2021 20:25)  HR: 92 (14 Aug 2021 09:20) (82 - 92)  BP: 108/70 (14 Aug 2021 09:20) (108/70 - 133/69)  BP(mean): --  RR: 16 (14 Aug 2021 09:20) (15 - 16)  SpO2: 97% (14 Aug 2021 09:20) (94% - 97%)  I&O's Summary    13 Aug 2021 07:01  -  14 Aug 2021 07:00  --------------------------------------------------------  IN: 250 mL / OUT: 1070 mL / NET: -820 mL    14 Aug 2021 07:01  -  14 Aug 2021 10:51  --------------------------------------------------------  IN: 0 mL / OUT: 30 mL / NET: -30 mL        PHYSICAL EXAM:  GENERAL: NAD  NECK: Supple  NERVOUS SYSTEM:  awake and alert  HEART: S1s2 NL , RRR  CHEST/LUNG: Clear to percussion bilaterally  ABDOMEN: Soft, Nontender, mild distended, pos BS, pos colostomy  EXTREMITIES:  No edema      LABS:              RVP:          Tox:           CAPILLARY BLOOD GLUCOSE          Imaging Personally Reviewed:  [ ] YES  [ ] NO        Care Discussed with Consultants/Other Providers [ x] YES  [ ] NO

## 2021-08-15 PROCEDURE — 99232 SBSQ HOSP IP/OBS MODERATE 35: CPT

## 2021-08-15 RX ADMIN — CHLORHEXIDINE GLUCONATE 1 APPLICATION(S): 213 SOLUTION TOPICAL at 16:53

## 2021-08-15 RX ADMIN — GABAPENTIN 200 MILLIGRAM(S): 400 CAPSULE ORAL at 13:32

## 2021-08-15 RX ADMIN — Medication 500 MILLIGRAM(S): at 05:49

## 2021-08-15 RX ADMIN — GABAPENTIN 200 MILLIGRAM(S): 400 CAPSULE ORAL at 05:50

## 2021-08-15 RX ADMIN — Medication 166.67 MILLIGRAM(S): at 18:43

## 2021-08-15 RX ADMIN — APIXABAN 10 MILLIGRAM(S): 2.5 TABLET, FILM COATED ORAL at 17:11

## 2021-08-15 RX ADMIN — Medication 166.67 MILLIGRAM(S): at 08:05

## 2021-08-15 RX ADMIN — GABAPENTIN 200 MILLIGRAM(S): 400 CAPSULE ORAL at 22:37

## 2021-08-15 RX ADMIN — Medication 137 MICROGRAM(S): at 05:50

## 2021-08-15 RX ADMIN — APIXABAN 10 MILLIGRAM(S): 2.5 TABLET, FILM COATED ORAL at 05:49

## 2021-08-15 RX ADMIN — CHLORHEXIDINE GLUCONATE 1 APPLICATION(S): 213 SOLUTION TOPICAL at 05:49

## 2021-08-15 RX ADMIN — PANTOPRAZOLE SODIUM 40 MILLIGRAM(S): 20 TABLET, DELAYED RELEASE ORAL at 05:49

## 2021-08-15 RX ADMIN — Medication 500 MILLIGRAM(S): at 22:37

## 2021-08-15 RX ADMIN — Medication 500 MILLIGRAM(S): at 13:32

## 2021-08-15 RX ADMIN — Medication 1 TABLET(S): at 13:32

## 2021-08-15 NOTE — PROGRESS NOTE ADULT - SUBJECTIVE AND OBJECTIVE BOX
Patient is a 61y old  Male who presents with a chief complaint of sacral mass, L4-sacral fusion (15 Aug 2021 10:41)      Patient seen and examined at bedside.  Denies chest pain, dyspnea, abd pain.    ALLERGIES:  No Known Allergies    MEDICATIONS  (STANDING):  apixaban 10 milliGRAM(s) Oral every 12 hours  calcium carbonate 1250 mG  + Vitamin D (OsCal 500 + D) 1 Tablet(s) Oral daily  chlorhexidine 4% Liquid 1 Application(s) Topical two times a day  gabapentin 200 milliGRAM(s) Oral every 8 hours  levothyroxine 137 MICROGram(s) Oral daily  metroNIDAZOLE    Tablet 500 milliGRAM(s) Oral every 8 hours  pantoprazole    Tablet 40 milliGRAM(s) Oral before breakfast  senna 2 Tablet(s) Oral at bedtime  vancomycin  IVPB 1250 milliGRAM(s) IV Intermittent every 12 hours    MEDICATIONS  (PRN):  acetaminophen   Tablet .. 650 milliGRAM(s) Oral every 6 hours PRN Temp greater or equal to 38C (100.4F), Mild Pain (1 - 3)  melatonin 3 milliGRAM(s) Oral at bedtime PRN Insomnia  polyethylene glycol 3350 17 Gram(s) Oral daily PRN Constipation  simethicone 80 milliGRAM(s) Chew two times a day PRN Upset Stomach    Vital Signs Last 24 Hrs  T(F): 98 (15 Aug 2021 08:05), Max: 99 (14 Aug 2021 22:52)  HR: 76 (15 Aug 2021 08:05) (76 - 89)  BP: 118/74 (15 Aug 2021 08:05) (118/74 - 126/72)  RR: 17 (15 Aug 2021 08:05) (15 - 17)  SpO2: 98% (15 Aug 2021 08:05) (96% - 98%)  I&O's Summary    14 Aug 2021 07:01  -  15 Aug 2021 07:00  --------------------------------------------------------  IN: 250 mL / OUT: 945 mL / NET: -695 mL    15 Aug 2021 07:01  -  15 Aug 2021 13:36  --------------------------------------------------------  IN: 0 mL / OUT: 30 mL / NET: -30 mL        PHYSICAL EXAM:  General: NAD, A/O x 3  ENT: MMM  Neck: Supple, No JVD  Lungs: Clear to auscultation bilaterally  Cardio: RRR, S1/S2, No murmurs  Abdomen: Soft, Nontender, Nondistended; Bowel sounds present  Extremities: No calf tenderness, No pitting edema    LABS:                                              COVID-19 PCR: NotDetec (08-02-21 @ 20:45)  COVID-19 PCR: NotDetec (08-02-21 @ 13:00)      RADIOLOGY & ADDITIONAL TESTS:    Care Discussed with Consultants/Other Providers:

## 2021-08-15 NOTE — PROGRESS NOTE ADULT - ASSESSMENT
61 year old male planned resection for sacral mass. He is s/p sacrectomy with L4-pelvis fusion. Post-operative course was complicated by bilateral PE, MRSA wound infection s/p diversion colostomy.     Continue rehab program    #MRSA INFECTION  -Continue IV abx - Vancomycin IV and Flagyl PO; ends 9/7/21. ID in. Afebrile, improved leukocytosis.   -Contact isolation  -Right SEDRICK removed by Dr Cevallos on 8/11. Discussed w/Karmen on 8/10-ok to remove if less than 30cc drainage per shift.      #Pain management:   -Gabapentin, tylenol prn,  tramadol prn    # PE  - tolerating Eliquis 8/11, no bleeding reported. Close watch of HH.  -evaluated by heme  - plan discussed with primary Plastics and Surgical team on 8/10: Dr Peraza and Dr Robin-Strickland. Ok for PO Eliquis to begin/no contraindication.     #Hypothyroidism  - Synthroid 137 mcg daily    #s/p Ileus, now s/p Colostomy  -Monitor output daily, much less bloating, bag w/formed brown stool.   - ostomy care-abd sites healing well, pt not distended    #GI ppx  - Protonix for GI ppx while on Eliquis  - probiotic yoghurt with meals  -nutrition input appreciated      #Bowel Regimen  - Senna nightly    #Anxiety  -neuropsych following  -pt refused Rx interventions at this time.     Labs:  CBC, BMP 8/16

## 2021-08-15 NOTE — PROGRESS NOTE ADULT - SUBJECTIVE AND OBJECTIVE BOX
Subjective: NAD, afebrile, tolerating therapy. Offers no complaints.       HISTORY OF PRESENT ILLNESS  Pt is a 62 y/o M with PMHx of thyroid cancer status post total thyroidectomy in 2010 and radioactive iodine treatment in 2011, hypogonadism, vitamin D deficiency, and osteopenia, with chronic constipation, right buttock and right scrotal pain and numbness. 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 The chordoma resulted in perineal numbness and overflow incontinence and he was admitted 7/7/21 for staged resection.   On 7/7, he underwent Plastic Surgery and General Surgery assisted vertical rectus abdominal myocutaneous flap harvest with transperitoneal ligation of internal iliac artery and vein. He is s/p en bloc sacrectomy with L4-pelvis fusion; EBL 4.5L status post 8 units PRBC, 6 units FFP, 1 pack platelets and 5L crystalloid; 7/8. Extubated. On 7/9  patient developed hypoxia and respiratory distress, desaturation. CPAP at night.  CTA revealed bilateral sub segmental PE. No right heart strain & Bibasilar and left lingular consolidative opacities. Started on Heparin gtt and transitioned to therapeutic lovenox on 8/1. Course further c/b ileus requiring gastric decompression. Pt underwent echo to check for RV per pulm, and it was grossly unremarkable.  Wound washout with plastics and duc pus noted, entire wound reopened on 7/26/21 and placement of 2 drains. Incontinent of stool. Cultures growing MRSA, enterococcus and bacteroides. ID following. Continue vancomycin and flagyl. S/P Diversion Colostomy to keep sacral incision clean on 7/28/21. Tony for urinary incontinence and to keep sacral wound clean. Pt requires 6 week course of antibiotics until 9/7/21. PICC line in place. LE doppler neg for DVT. Patient tolerating regular diet with fully functioning colostomy. Evaluated by Physical Therapy and PM&R and was recommended for Acute rehab. Pt was medically stable on 8/2/21 and was transferred to [facility].       PAST MEDICAL & SURGICAL HISTORY:  HLD (hyperlipidemia)    Thyroid cancer  2010    History of central serous retinopathy    H/O hypogonadism    H/O vitamin D deficiency    H/O osteopenia    H/O thyroidectomy  Total --2010    H/O colonoscopy     REVIEW OF SYMPTOMS/Subjective: Patient seen at bedside this morning. Patient in bed in NAD, no SOB, no n/v, pain controlled.  [X] Constitutional WNL     [X] Cardio WNL            [X] Resp WNL           [X] GI WNL                          [ ]  WNL                   [ ] Heme WNL              [ ] Endo WNL                     [ ] Skin WNL                 [ ] MSK WNL            [X] Neuro WNL                   [X] Cognitive WNL        [X] Psych WNL        VITALS  Vital Signs Last 24 Hrs  T(C): 36.7 (15 Aug 2021 08:05), Max: 37.2 (14 Aug 2021 22:52)  T(F): 98 (15 Aug 2021 08:05), Max: 99 (14 Aug 2021 22:52)  HR: 76 (15 Aug 2021 08:05) (76 - 89)  BP: 118/74 (15 Aug 2021 08:05) (118/74 - 126/72)  BP(mean): --  RR: 17 (15 Aug 2021 08:05) (15 - 17)  SpO2: 98% (15 Aug 2021 08:05) (96% - 98%)    PHYSICAL EXAM  Constitutional - NAD, Comfortable  Chest - breathing comfortably  Abdomen - has colostomy+ Abd incision has healed well, + SEDRICK drain on left CDI   -tony drains clear yellow urine.  Extremities - No C/C/E, No calf tenderness   Psych-stable    Motor:  BUE: 5/5mp  LLE: 5/5mp  RLE: hip flexion 3/5mp, ankle 3-4/5mp      RECENT LABS                        9.9    7.94  )-----------( 356      ( 12 Aug 2021 06:41 )             31.5     08-12    139  |  103  |  13  ----------------------------<  104<H>  3.7   |  28  |  0.88    Ca    8.7      12 Aug 2021 06:41                          RADIOLOGY/OTHER RESULTS      CURRENT MEDICATIONS  MEDICATIONS  (STANDING):  apixaban 10 milliGRAM(s) Oral every 12 hours  calcium carbonate 1250 mG  + Vitamin D (OsCal 500 + D) 1 Tablet(s) Oral daily  chlorhexidine 4% Liquid 1 Application(s) Topical two times a day  gabapentin 200 milliGRAM(s) Oral every 8 hours  levothyroxine 137 MICROGram(s) Oral daily  metroNIDAZOLE    Tablet 500 milliGRAM(s) Oral every 8 hours  pantoprazole    Tablet 40 milliGRAM(s) Oral before breakfast  senna 2 Tablet(s) Oral at bedtime  vancomycin  IVPB 1250 milliGRAM(s) IV Intermittent every 12 hours    MEDICATIONS  (PRN):  acetaminophen   Tablet .. 650 milliGRAM(s) Oral every 6 hours PRN Temp greater or equal to 38C (100.4F), Mild Pain (1 - 3)  melatonin 3 milliGRAM(s) Oral at bedtime PRN Insomnia  polyethylene glycol 3350 17 Gram(s) Oral daily PRN Constipation  simethicone 80 milliGRAM(s) Chew two times a day PRN Upset Stomach  traMADol 25 milliGRAM(s) Oral every 6 hours PRN Moderate Pain (4 - 6)  traMADol 50 milliGRAM(s) Oral every 6 hours PRN Severe Pain (7 - 10)      ASSESSMENT & PLAN    Continue comprehensive acute rehab program consisting of 3hrs/day of OT/PT and SLP.

## 2021-08-16 LAB
ANION GAP SERPL CALC-SCNC: 7 MMOL/L — SIGNIFICANT CHANGE UP (ref 5–17)
BASOPHILS # BLD AUTO: 0.08 K/UL — SIGNIFICANT CHANGE UP (ref 0–0.2)
BASOPHILS NFR BLD AUTO: 1 % — SIGNIFICANT CHANGE UP (ref 0–2)
BUN SERPL-MCNC: 11 MG/DL — SIGNIFICANT CHANGE UP (ref 7–23)
CALCIUM SERPL-MCNC: 8.9 MG/DL — SIGNIFICANT CHANGE UP (ref 8.4–10.5)
CHLORIDE SERPL-SCNC: 102 MMOL/L — SIGNIFICANT CHANGE UP (ref 96–108)
CO2 SERPL-SCNC: 30 MMOL/L — SIGNIFICANT CHANGE UP (ref 22–31)
CREAT SERPL-MCNC: 0.9 MG/DL — SIGNIFICANT CHANGE UP (ref 0.5–1.3)
EOSINOPHIL # BLD AUTO: 0.25 K/UL — SIGNIFICANT CHANGE UP (ref 0–0.5)
EOSINOPHIL NFR BLD AUTO: 3 % — SIGNIFICANT CHANGE UP (ref 0–6)
GLUCOSE SERPL-MCNC: 98 MG/DL — SIGNIFICANT CHANGE UP (ref 70–99)
HCT VFR BLD CALC: 34.1 % — LOW (ref 39–50)
HGB BLD-MCNC: 10.9 G/DL — LOW (ref 13–17)
IMM GRANULOCYTES NFR BLD AUTO: 0.6 % — SIGNIFICANT CHANGE UP (ref 0–1.5)
LYMPHOCYTES # BLD AUTO: 1.31 K/UL — SIGNIFICANT CHANGE UP (ref 1–3.3)
LYMPHOCYTES # BLD AUTO: 15.7 % — SIGNIFICANT CHANGE UP (ref 13–44)
MCHC RBC-ENTMCNC: 27.9 PG — SIGNIFICANT CHANGE UP (ref 27–34)
MCHC RBC-ENTMCNC: 32 GM/DL — SIGNIFICANT CHANGE UP (ref 32–36)
MCV RBC AUTO: 87.2 FL — SIGNIFICANT CHANGE UP (ref 80–100)
MONOCYTES # BLD AUTO: 0.89 K/UL — SIGNIFICANT CHANGE UP (ref 0–0.9)
MONOCYTES NFR BLD AUTO: 10.7 % — SIGNIFICANT CHANGE UP (ref 2–14)
NEUTROPHILS # BLD AUTO: 5.75 K/UL — SIGNIFICANT CHANGE UP (ref 1.8–7.4)
NEUTROPHILS NFR BLD AUTO: 69 % — SIGNIFICANT CHANGE UP (ref 43–77)
NRBC # BLD: 0 /100 WBCS — SIGNIFICANT CHANGE UP (ref 0–0)
PLATELET # BLD AUTO: 340 K/UL — SIGNIFICANT CHANGE UP (ref 150–400)
POTASSIUM SERPL-MCNC: 3.9 MMOL/L — SIGNIFICANT CHANGE UP (ref 3.5–5.3)
POTASSIUM SERPL-SCNC: 3.9 MMOL/L — SIGNIFICANT CHANGE UP (ref 3.5–5.3)
RBC # BLD: 3.91 M/UL — LOW (ref 4.2–5.8)
RBC # FLD: 15.4 % — HIGH (ref 10.3–14.5)
SODIUM SERPL-SCNC: 139 MMOL/L — SIGNIFICANT CHANGE UP (ref 135–145)
WBC # BLD: 8.33 K/UL — SIGNIFICANT CHANGE UP (ref 3.8–10.5)
WBC # FLD AUTO: 8.33 K/UL — SIGNIFICANT CHANGE UP (ref 3.8–10.5)

## 2021-08-16 PROCEDURE — 99232 SBSQ HOSP IP/OBS MODERATE 35: CPT

## 2021-08-16 PROCEDURE — 99232 SBSQ HOSP IP/OBS MODERATE 35: CPT | Mod: GC

## 2021-08-16 RX ADMIN — Medication 500 MILLIGRAM(S): at 15:28

## 2021-08-16 RX ADMIN — Medication 137 MICROGRAM(S): at 06:34

## 2021-08-16 RX ADMIN — Medication 1 TABLET(S): at 12:46

## 2021-08-16 RX ADMIN — CHLORHEXIDINE GLUCONATE 1 APPLICATION(S): 213 SOLUTION TOPICAL at 17:50

## 2021-08-16 RX ADMIN — Medication 166.67 MILLIGRAM(S): at 06:27

## 2021-08-16 RX ADMIN — APIXABAN 10 MILLIGRAM(S): 2.5 TABLET, FILM COATED ORAL at 06:35

## 2021-08-16 RX ADMIN — GABAPENTIN 200 MILLIGRAM(S): 400 CAPSULE ORAL at 15:28

## 2021-08-16 RX ADMIN — PANTOPRAZOLE SODIUM 40 MILLIGRAM(S): 20 TABLET, DELAYED RELEASE ORAL at 06:35

## 2021-08-16 RX ADMIN — GABAPENTIN 200 MILLIGRAM(S): 400 CAPSULE ORAL at 06:34

## 2021-08-16 RX ADMIN — GABAPENTIN 200 MILLIGRAM(S): 400 CAPSULE ORAL at 21:54

## 2021-08-16 RX ADMIN — Medication 166.67 MILLIGRAM(S): at 17:50

## 2021-08-16 RX ADMIN — APIXABAN 10 MILLIGRAM(S): 2.5 TABLET, FILM COATED ORAL at 17:51

## 2021-08-16 RX ADMIN — CHLORHEXIDINE GLUCONATE 1 APPLICATION(S): 213 SOLUTION TOPICAL at 06:36

## 2021-08-16 RX ADMIN — Medication 500 MILLIGRAM(S): at 22:13

## 2021-08-16 RX ADMIN — Medication 500 MILLIGRAM(S): at 06:35

## 2021-08-16 NOTE — PROGRESS NOTE ADULT - SUBJECTIVE AND OBJECTIVE BOX
HPI:  Pt is a 60 y/o M with PMHx of thyroid cancer status post total thyroidectomy in 2010 and radioactive iodine treatment in 2011, hypogonadism, vitamin D deficiency, and osteopenia, with chronic constipation, right buttock and right scrotal pain and numbness. 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 The chordoma resulted in perineal numbness and overflow incontinence and he was admitted 7/7/21 for staged resection.   On 7/7, he underwent Plastic Surgery and General Surgery assisted vertical rectus abdominal myocutaneous flap harvest with transperitoneal ligation of internal iliac artery and vein. He is s/p en bloc sacrectomy with L4-pelvis fusion; EBL 4.5L status post 8 units PRBC, 6 units FFP, 1 pack platelets and 5L crystalloid; 7/8. Extubated. On 7/9  patient developed hypoxia and respiratory distress, desaturation. CPAP at night.  CTA revealed bilateral sub segmental PE. No right heart strain & Bibasilar and left lingular consolidative opacities. Started on Heparin gtt and transitioned to therapeutic lovenox on 8/1. Course further c/b ileus requiring gastric decompression. Pt underwent echo to check for RV per pulm, and it was grossly unremarkable.  Wound washout with plastics and duc pus noted, entire wound reopened on 7/26/21 and placement of 2 drains. Incontinent of stool. Cultures growing MRSA, enterococcus and bacteroides. ID following. Continue vancomycin and flagyl. S/P Diversion Colostomy to keep sacral incision clean on 7/28/21. Barnett for urinary incontinence and to keep sacral wound clean. Pt requires 6 week course of antibiotics until 9/7/21. PICC line in place. LE doppler neg for DVT. Patient tolerating regular diet with fully functioning colostomy. Evaluated by Physical Therapy and PM&R and was recommended for Acute rehab. Pt was medically stable on 8/2/21 and was transferred to [facility].     SUBJECTIVE / INTERVAL HPI: Patient seen and examined at bedside. He was walking well with OT. He felt a little lightheaded this morning before therapy, but he did not eat breakfast before he went to OT. He is making progress with his ambulation. Over the weekend, there was a little blood around his stoma site, stool remained brown in color and Hgb this morning was increased, no drop.     REVIEW OF SYSTEMS:    CONSTITUTIONAL: No fevers or chills  EYES/ENT: No visual changes;  No vertigo or throat pain   NECK: No pain or stiffness  RESPIRATORY: No cough, wheezing, or shortness of breath  CARDIOVASCULAR: No chest pain or palpitations  GASTROINTESTINAL: No abdominal or epigastric pain. No nausea or vomiting. No diarrhea or constipation. +Colostomy  GENITOURINARY: No dysuria, frequency or hematuria, +Barnett  NEUROLOGICAL: No numbness, +weakness  SKIN: No itching, rashes      VITAL SIGNS:  Vital Signs Last 24 Hrs  T(C): 37.2 (16 Aug 2021 07:35), Max: 37.2 (16 Aug 2021 07:35)  T(F): 98.9 (16 Aug 2021 07:35), Max: 98.9 (16 Aug 2021 07:35)  HR: 77 (16 Aug 2021 07:35) (77 - 85)  BP: 139/81 (16 Aug 2021 07:35) (116/72 - 139/81)  BP(mean): --  RR: 18 (16 Aug 2021 07:35) (15 - 18)  SpO2: 97% (16 Aug 2021 07:35) (97% - 98%)    PHYSICAL EXAM:    General: NAD, comfortably sitting up in chair  HEENT: NC/AT; PERRL, anicteric sclera; MMM  Neck: supple  Cardiovascular: +S1/S2, RRR  Respiratory: no respiratory distress, breathing comfortably  Gastrointestinal: soft, NT/ND; +brown stool in colostomy bag, drain present  Extremities: warm, well perfused; no edema, clubbing or cyanosis  Neurological: AAOx3; LLE with proximal weakness, BUE 5/5    MEDICATIONS:  MEDICATIONS  (STANDING):  apixaban 10 milliGRAM(s) Oral every 12 hours  calcium carbonate 1250 mG  + Vitamin D (OsCal 500 + D) 1 Tablet(s) Oral daily  chlorhexidine 4% Liquid 1 Application(s) Topical two times a day  gabapentin 200 milliGRAM(s) Oral every 8 hours  levothyroxine 137 MICROGram(s) Oral daily  metroNIDAZOLE    Tablet 500 milliGRAM(s) Oral every 8 hours  pantoprazole    Tablet 40 milliGRAM(s) Oral before breakfast  senna 2 Tablet(s) Oral at bedtime  vancomycin  IVPB 1250 milliGRAM(s) IV Intermittent every 12 hours    MEDICATIONS  (PRN):  acetaminophen   Tablet .. 650 milliGRAM(s) Oral every 6 hours PRN Temp greater or equal to 38C (100.4F), Mild Pain (1 - 3)  melatonin 3 milliGRAM(s) Oral at bedtime PRN Insomnia  polyethylene glycol 3350 17 Gram(s) Oral daily PRN Constipation  simethicone 80 milliGRAM(s) Chew two times a day PRN Upset Stomach      ALLERGIES:  Allergies    No Known Allergies    Intolerances        LABS:                        10.9   8.33  )-----------( 340      ( 16 Aug 2021 06:30 )             34.1     08-16    139  |  102  |  11  ----------------------------<  98  3.9   |  30  |  0.90    Ca    8.9      16 Aug 2021 06:30          CAPILLARY BLOOD GLUCOSE          RADIOLOGY & ADDITIONAL TESTS: Reviewed.

## 2021-08-16 NOTE — PROGRESS NOTE ADULT - ASSESSMENT
61 year old male planned resection for sacral mass. He is s/p sacrectomy with L4-pelvis fusion. Post-operative course was complicated by bilateral PE, MRSA wound infection s/p diversion colostomy.     Continue rehab program    #MRSA INFECTION  -Continue IV abx - Vancomycin IV and Flagyl PO; ends 9/7/21. ID in. Afebrile, improved leukocytosis.   -Contact isolation  -Right SEDRICK removed by Dr Cevallos on 8/11. Discussed w/Dr. Peraza on 8/10-ok to remove if less than 30cc drainage per shift.      #Pain management:   -Gabapentin, tylenol prn,  tramadol prn    # PE  - tolerating Eliquis 8/11, no bleeding reported. Close watch of HH.  -evaluated by heme  - plan discussed with primary Plastics and Surgical team on 8/10: Dr Peraza and Dr Robin-Strickland. Ok for PO Eliquis to begin/no contraindication.     #Hypothyroidism  - Synthroid 137 mcg daily    #s/p Ileus, now s/p Colostomy  -Monitor output daily, much less bloating, bag w/formed brown stool.   - ostomy care-abd sites healing well, pt not distended    #GI ppx  - Protonix for GI ppx while on Eliquis  - probiotic yoghurt with meals  -nutrition input appreciated      #Bowel Regimen  - Senna nightly    #Anxiety  -neuropsych following  -pt refused Rx interventions at this time.     #Discharge planning  - KIRT: 8/21

## 2021-08-16 NOTE — PROGRESS NOTE ADULT - SUBJECTIVE AND OBJECTIVE BOX
Patient is a 61y old  Male who presents with a chief complaint of sacral mass, L4-sacral fusion (15 Aug 2021 10:41)      Patient seen and examined at bedside.  No overnight events  No complaints this morning    ALLERGIES:  No Known Allergies    MEDICATIONS  (STANDING):  apixaban 10 milliGRAM(s) Oral every 12 hours  calcium carbonate 1250 mG  + Vitamin D (OsCal 500 + D) 1 Tablet(s) Oral daily  chlorhexidine 4% Liquid 1 Application(s) Topical two times a day  gabapentin 200 milliGRAM(s) Oral every 8 hours  levothyroxine 137 MICROGram(s) Oral daily  metroNIDAZOLE    Tablet 500 milliGRAM(s) Oral every 8 hours  pantoprazole    Tablet 40 milliGRAM(s) Oral before breakfast  senna 2 Tablet(s) Oral at bedtime  vancomycin  IVPB 1250 milliGRAM(s) IV Intermittent every 12 hours    MEDICATIONS  (PRN):  acetaminophen   Tablet .. 650 milliGRAM(s) Oral every 6 hours PRN Temp greater or equal to 38C (100.4F), Mild Pain (1 - 3)  melatonin 3 milliGRAM(s) Oral at bedtime PRN Insomnia  polyethylene glycol 3350 17 Gram(s) Oral daily PRN Constipation  simethicone 80 milliGRAM(s) Chew two times a day PRN Upset Stomach    Vital Signs Last 24 Hrs  T(F): 98.9 (16 Aug 2021 07:35), Max: 98.9 (16 Aug 2021 07:35)  HR: 77 (16 Aug 2021 07:35) (77 - 85)  BP: 139/81 (16 Aug 2021 07:35) (116/72 - 139/81)  RR: 18 (16 Aug 2021 07:35) (15 - 18)  SpO2: 97% (16 Aug 2021 07:35) (97% - 98%)  I&O's Summary    15 Aug 2021 07:01  -  16 Aug 2021 07:00  --------------------------------------------------------  IN: 0 mL / OUT: 775 mL / NET: -775 mL      PHYSICAL EXAM:  GENERAL: NAD  HENT:  Atraumatic, Normocephalic; No tonsillar erythema, exudates, or enlargement; Moist mucous membranes;   EYES: EOMI, PERRLA, conjunctiva and sclera clear, no lid-lag  NECK: Supple, No JVD, Normal thyroid  CHEST/LUNG: Clear to percussion bilaterally; No rales, rhonchi, wheezing, or rubs; normal respiratory effort, no intercostal retractions  HEART: Regular rate and rhythm; No murmurs, rubs, or gallops; No pitting edema  ABDOMEN: Soft, Nontender, Nondistended; Bowel sounds present; No HSM  MUSCULOSKELETAL/EXTREMITIES:  2+ Peripheral Pulses, No clubbing or digital cyanosis; + drain, + colostomy  PSYCH: Appropriate affect, Alert & Awake    LABS:                        10.9   8.33  )-----------( 340      ( 16 Aug 2021 06:30 )             34.1       08-16    139  |  102  |  11  ----------------------------<  98  3.9   |  30  |  0.90    Ca    8.9      16 Aug 2021 06:30       eGFR if Non : 93 mL/min/1.73M2 (08-16-21 @ 06:30)  eGFR if : 107 mL/min/1.73M2 (08-16-21 @ 06:30)       COVID-19 PCR: NotDetec (08-02-21 @ 20:45)  COVID-19 PCR: NotDetec (08-02-21 @ 13:00)      Care Discussed with Rehab Attending and Other Providers

## 2021-08-17 PROCEDURE — 99232 SBSQ HOSP IP/OBS MODERATE 35: CPT | Mod: GC

## 2021-08-17 RX ADMIN — Medication 500 MILLIGRAM(S): at 06:40

## 2021-08-17 RX ADMIN — APIXABAN 10 MILLIGRAM(S): 2.5 TABLET, FILM COATED ORAL at 17:28

## 2021-08-17 RX ADMIN — Medication 166.67 MILLIGRAM(S): at 06:27

## 2021-08-17 RX ADMIN — GABAPENTIN 200 MILLIGRAM(S): 400 CAPSULE ORAL at 06:38

## 2021-08-17 RX ADMIN — Medication 500 MILLIGRAM(S): at 14:52

## 2021-08-17 RX ADMIN — Medication 1 TABLET(S): at 11:51

## 2021-08-17 RX ADMIN — Medication 500 MILLIGRAM(S): at 21:28

## 2021-08-17 RX ADMIN — Medication 137 MICROGRAM(S): at 06:37

## 2021-08-17 RX ADMIN — GABAPENTIN 200 MILLIGRAM(S): 400 CAPSULE ORAL at 14:51

## 2021-08-17 RX ADMIN — CHLORHEXIDINE GLUCONATE 1 APPLICATION(S): 213 SOLUTION TOPICAL at 21:30

## 2021-08-17 RX ADMIN — CHLORHEXIDINE GLUCONATE 1 APPLICATION(S): 213 SOLUTION TOPICAL at 06:38

## 2021-08-17 RX ADMIN — GABAPENTIN 200 MILLIGRAM(S): 400 CAPSULE ORAL at 21:28

## 2021-08-17 RX ADMIN — Medication 166.67 MILLIGRAM(S): at 17:28

## 2021-08-17 RX ADMIN — APIXABAN 10 MILLIGRAM(S): 2.5 TABLET, FILM COATED ORAL at 06:36

## 2021-08-17 RX ADMIN — PANTOPRAZOLE SODIUM 40 MILLIGRAM(S): 20 TABLET, DELAYED RELEASE ORAL at 06:36

## 2021-08-17 NOTE — PROGRESS NOTE ADULT - ASSESSMENT
61 year old male planned resection for sacral mass. He is s/p sacrectomy with L4-pelvis fusion. Post-operative course was complicated by bilateral PE, MRSA wound infection s/p diversion colostomy.     Continue rehab program    #MRSA INFECTION  -Continue IV abx - Vancomycin IV and Flagyl PO; ends 9/7/21. ID in. Afebrile, improved leukocytosis.   -Contact isolation  -Right SEDRICK removed by Dr Cevallos on 8/11. Discussed w/Dr. Peraza on 8/10-ok to remove if less than 30cc drainage per shift.      #Pain management:   -Gabapentin, tylenol prn,  tramadol prn    # PE  - tolerating Eliquis 8/11, no bleeding reported. Close watch of HH.  -evaluated by heme  - plan discussed with primary Plastics and Surgical team on 8/10: Dr Peraza and Dr Robin-Strickland. Ok for PO Eliquis to begin/no contraindication.     #Hypothyroidism  - Synthroid 137 mcg daily    #s/p Ileus, now s/p Colostomy  -Monitor output daily, much less bloating, bag w/formed brown stool.   - ostomy care-abd sites healing well, pt not distended    #  - will consult urology regarding Barnett and if we can do a TOV or potentially have him straight cath at home    #GI ppx  - Protonix for GI ppx while on Eliquis  - probiotic yoghurt with meals  -nutrition input appreciated      #Bowel Regimen  - Senna nightly    #Anxiety  -neuropsych following  -pt refused Rx interventions at this time.     #Discharge planning  - KIRT: 8/21  - Pt will require the use of a hospital bed at home. Due to his wounds, the hospital bed will be able to provide pressure relief as well as the ability to turn and adjust. Pt will also need to be in more upright positions to change the colostomy bag and either change his Barnett catheter or perform straight catheterizations.

## 2021-08-17 NOTE — PROVIDER CONTACT NOTE (MEDICATION) - ACTION/TREATMENT ORDERED:
notified dr tubbs
MD notified , checked labs , verified ok to give Vancomycin IVPB scheduled for 6am

## 2021-08-18 PROCEDURE — 99232 SBSQ HOSP IP/OBS MODERATE 35: CPT

## 2021-08-18 RX ORDER — APIXABAN 2.5 MG/1
5 TABLET, FILM COATED ORAL EVERY 12 HOURS
Refills: 0 | Status: DISCONTINUED | OUTPATIENT
Start: 2021-08-18 | End: 2021-08-26

## 2021-08-18 RX ADMIN — Medication 166.67 MILLIGRAM(S): at 05:36

## 2021-08-18 RX ADMIN — CHLORHEXIDINE GLUCONATE 1 APPLICATION(S): 213 SOLUTION TOPICAL at 06:48

## 2021-08-18 RX ADMIN — APIXABAN 5 MILLIGRAM(S): 2.5 TABLET, FILM COATED ORAL at 18:06

## 2021-08-18 RX ADMIN — Medication 1 TABLET(S): at 13:11

## 2021-08-18 RX ADMIN — Medication 500 MILLIGRAM(S): at 13:11

## 2021-08-18 RX ADMIN — PANTOPRAZOLE SODIUM 40 MILLIGRAM(S): 20 TABLET, DELAYED RELEASE ORAL at 05:37

## 2021-08-18 RX ADMIN — Medication 137 MICROGRAM(S): at 05:37

## 2021-08-18 RX ADMIN — GABAPENTIN 200 MILLIGRAM(S): 400 CAPSULE ORAL at 22:21

## 2021-08-18 RX ADMIN — GABAPENTIN 200 MILLIGRAM(S): 400 CAPSULE ORAL at 05:37

## 2021-08-18 RX ADMIN — Medication 500 MILLIGRAM(S): at 22:21

## 2021-08-18 RX ADMIN — GABAPENTIN 200 MILLIGRAM(S): 400 CAPSULE ORAL at 13:11

## 2021-08-18 RX ADMIN — Medication 500 MILLIGRAM(S): at 05:37

## 2021-08-18 RX ADMIN — Medication 166.67 MILLIGRAM(S): at 17:32

## 2021-08-18 RX ADMIN — APIXABAN 10 MILLIGRAM(S): 2.5 TABLET, FILM COATED ORAL at 06:48

## 2021-08-18 NOTE — PROGRESS NOTE ADULT - SUBJECTIVE AND OBJECTIVE BOX
Patient is a 61y old  Male who presents with a chief complaint of sacral mass, L4-sacral fusion (15 Aug 2021 10:41)      Patient seen and examined at bedside.  No overnight events  Feels uncomfortable sitting in the chair this morning    ALLERGIES:  No Known Allergies    MEDICATIONS  (STANDING):  calcium carbonate 1250 mG  + Vitamin D (OsCal 500 + D) 1 Tablet(s) Oral daily  chlorhexidine 4% Liquid 1 Application(s) Topical two times a day  gabapentin 200 milliGRAM(s) Oral every 8 hours  levothyroxine 137 MICROGram(s) Oral daily  metroNIDAZOLE    Tablet 500 milliGRAM(s) Oral every 8 hours  pantoprazole    Tablet 40 milliGRAM(s) Oral before breakfast  senna 2 Tablet(s) Oral at bedtime  vancomycin  IVPB 1250 milliGRAM(s) IV Intermittent every 12 hours    MEDICATIONS  (PRN):  acetaminophen   Tablet .. 650 milliGRAM(s) Oral every 6 hours PRN Temp greater or equal to 38C (100.4F), Mild Pain (1 - 3)  melatonin 3 milliGRAM(s) Oral at bedtime PRN Insomnia  polyethylene glycol 3350 17 Gram(s) Oral daily PRN Constipation  simethicone 80 milliGRAM(s) Chew two times a day PRN Upset Stomach    Vital Signs Last 24 Hrs  T(F): 98.8 (17 Aug 2021 20:07), Max: 98.8 (17 Aug 2021 20:07)  HR: 93 (17 Aug 2021 20:07) (93 - 93)  BP: 132/77 (17 Aug 2021 20:07) (132/77 - 132/77)  RR: 15 (17 Aug 2021 20:07) (15 - 15)  SpO2: 97% (17 Aug 2021 20:07) (97% - 97%)  I&O's Summary    17 Aug 2021 07:01  -  18 Aug 2021 07:00  --------------------------------------------------------  IN: 0 mL / OUT: 1050 mL / NET: -1050 mL    PHYSICAL EXAM:  GENERAL: NAD  HENT:  Atraumatic, Normocephalic; No tonsillar erythema, exudates, or enlargement; Moist mucous membranes;   EYES: EOMI, PERRLA, conjunctiva and sclera clear, no lid-lag  NECK: Supple, No JVD, Normal thyroid  CHEST/LUNG: Clear to percussion bilaterally; No rales, rhonchi, wheezing, or rubs; normal respiratory effort, no intercostal retractions  HEART: Regular rate and rhythm; No murmurs, rubs, or gallops; No pitting edema  ABDOMEN: Soft, Nontender, Nondistended; Bowel sounds present; No HSM; +colostomy; + left drain  MUSCULOSKELETAL/EXTREMITIES:  2+ Peripheral Pulses, No clubbing or digital cyanosis  PSYCH: Appropriate affect, Alert & Awake    LABS:                        10.9   8.33  )-----------( 340      ( 16 Aug 2021 06:30 )             34.1       08-16    139  |  102  |  11  ----------------------------<  98  3.9   |  30  |  0.90    Ca    8.9      16 Aug 2021 06:30       eGFR if Non : 93 mL/min/1.73M2 (08-16-21 @ 06:30)  eGFR if : 107 mL/min/1.73M2 (08-16-21 @ 06:30)    COVID-19 PCR: NotDetec (08-02-21 @ 20:45)  COVID-19 PCR: NotDetec (08-02-21 @ 13:00)      Care Discussed with Rehab Attending and Other Providers

## 2021-08-18 NOTE — PHARMACOTHERAPY INTERVENTION NOTE - COMMENTS
Pt completed apixaban loading dose this morning. Spoke with MD to order maintenance dose starting tonight.

## 2021-08-18 NOTE — PROGRESS NOTE ADULT - SUBJECTIVE AND OBJECTIVE BOX
HPI:  Pt is a 60 y/o M with PMHx of thyroid cancer status post total thyroidectomy in 2010 and radioactive iodine treatment in 2011, hypogonadism, vitamin D deficiency, and osteopenia, with chronic constipation, right buttock and right scrotal pain and numbness. 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 The chordoma resulted in perineal numbness and overflow incontinence and he was admitted 7/7/21 for staged resection.   On 7/7, he underwent Plastic Surgery and General Surgery assisted vertical rectus abdominal myocutaneous flap harvest with transperitoneal ligation of internal iliac artery and vein. He is s/p en bloc sacrectomy with L4-pelvis fusion; EBL 4.5L status post 8 units PRBC, 6 units FFP, 1 pack platelets and 5L crystalloid; 7/8. Extubated. On 7/9  patient developed hypoxia and respiratory distress, desaturation. CPAP at night.  CTA revealed bilateral sub segmental PE. No right heart strain & Bibasilar and left lingular consolidative opacities. Started on Heparin gtt and transitioned to therapeutic lovenox on 8/1. Course further c/b ileus requiring gastric decompression. Pt underwent echo to check for RV per pulm, and it was grossly unremarkable.  Wound washout with plastics and duc pus noted, entire wound reopened on 7/26/21 and placement of 2 drains. Incontinent of stool. Cultures growing MRSA, enterococcus and bacteroides. ID following. Continue vancomycin and flagyl. S/P Diversion Colostomy to keep sacral incision clean on 7/28/21. Barnett for urinary incontinence and to keep sacral wound clean. Pt requires 6 week course of antibiotics until 9/7/21. PICC line in place. LE doppler neg for DVT. Patient tolerating regular diet with fully functioning colostomy. Evaluated by Physical Therapy and PM&R and was recommended for Acute rehab. Pt was medically stable on 8/2/21 and was transferred to [facility].     SUBJECTIVE / INTERVAL HPI: Patient seen and examined at bedside. He is doing well.  States he did start working with nursing to manage his own colostomy.  Was tearful discussing need to manage this on his own at home.       REVIEW OF SYSTEMS:    CONSTITUTIONAL: No fevers or chills  EYES/ENT: No visual changes;  No vertigo or throat pain   NECK: No pain or stiffness  RESPIRATORY: No cough, wheezing, or shortness of breath  CARDIOVASCULAR: No chest pain or palpitations  GASTROINTESTINAL: No abdominal or epigastric pain. No nausea or vomiting. No diarrhea or constipation. +colostomy  GENITOURINARY: No dysuria, frequency or hematuria. +Barnett  NEUROLOGICAL: No numbness, +mild weakness  SKIN: No itching, rashes      VITAL SIGNS:  Vital Signs Last 24 Hrs  T(C): 37.1 (18 Aug 2021 09:13), Max: 37.1 (17 Aug 2021 20:07)  T(F): 98.8 (18 Aug 2021 09:13), Max: 98.8 (17 Aug 2021 20:07)  HR: 96 (18 Aug 2021 09:13) (93 - 96)  BP: 128/80 (18 Aug 2021 09:13) (128/80 - 132/77)  BP(mean): --  RR: 18 (18 Aug 2021 09:13) (15 - 18)  SpO2: 96% (18 Aug 2021 09:13) (96% - 97%)      PHYSICAL EXAM:    General: NAD, sitting in chair comfortably  HEENT: NC/AT; Pupils unremarkable, anicteric sclera  Cardiovascular: +S1/S2, RRR  Respiratory: breathing comfortably, no wheezing; CTA b/l, no adventitious sounds  Gastrointestinal: BS+/soft, NT/ND, +brown stool in colostomy, colostomy site c/d/i  Back: Aquacel dressing and other dsgs in place, SEDRICK drain in place with serosanguinous drainage.  General back area appears benign, no EOI.  Extremities: warm, well perfused; no edema or cyanosis  Neurological: awake, alert, conversant, coherent, following commands, speech grossly WNL; motor exam stable with mild LLE proximal weakness, 5/5 elsewhere    MEDICATIONS:  MEDICATIONS  (STANDING):  apixaban 5 milliGRAM(s) Oral every 12 hours  calcium carbonate 1250 mG  + Vitamin D (OsCal 500 + D) 1 Tablet(s) Oral daily  chlorhexidine 4% Liquid 1 Application(s) Topical two times a day  gabapentin 200 milliGRAM(s) Oral every 8 hours  levothyroxine 137 MICROGram(s) Oral daily  metroNIDAZOLE    Tablet 500 milliGRAM(s) Oral every 8 hours  pantoprazole    Tablet 40 milliGRAM(s) Oral before breakfast  senna 2 Tablet(s) Oral at bedtime  vancomycin  IVPB 1250 milliGRAM(s) IV Intermittent every 12 hours    MEDICATIONS  (PRN):  acetaminophen   Tablet .. 650 milliGRAM(s) Oral every 6 hours PRN Temp greater or equal to 38C (100.4F), Mild Pain (1 - 3)  melatonin 3 milliGRAM(s) Oral at bedtime PRN Insomnia  polyethylene glycol 3350 17 Gram(s) Oral daily PRN Constipation  simethicone 80 milliGRAM(s) Chew two times a day PRN Upset Stomach        ALLERGIES:  Allergies    No Known Allergies    Intolerances        LABS:                        10.9   8.33  )-----------( 340      ( 16 Aug 2021 06:30 )             34.1     08-16    139  |  102  |  11  ----------------------------<  98  3.9   |  30  |  0.90    Ca    8.9      16 Aug 2021 06:30          CAPILLARY BLOOD GLUCOSE          RADIOLOGY & ADDITIONAL TESTS: Reviewed.

## 2021-08-18 NOTE — PROGRESS NOTE ADULT - ASSESSMENT
61 year old male planned resection for sacral mass. He is s/p sacrectomy with L4-pelvis fusion. Post-operative course was complicated by bilateral PE, MRSA wound infection s/p diversion colostomy.     Continue rehab program    #MRSA INFECTION  -Continue IV abx - Vancomycin IV and Flagyl PO; ends 9/7/21. ID in. Afebrile, improved leukocytosis.   -Contact isolation  -Right SEDRICK removed by Dr Cevallos on 8/11. Discussed w/Dr. Peraza on 8/10-ok to remove if less than 30cc drainage per shift.  -Last vanco trough 8/4: 13.1, repeat vanco trough in AM    #Pain management:   -Gabapentin, tylenol prn,  tramadol prn    # PE  - tolerating Eliquis 8/11, no bleeding reported. Close watch of HH.  -evaluated by heme  - plan discussed with primary Plastics and Surgical team on 8/10: Dr Peraza and Dr Robin-Strickland. Ok for PO Eliquis to begin/no contraindication.     #Hypothyroidism  - Synthroid 137 mcg daily    #s/p Ileus, now s/p Colostomy  -Monitor output daily, much less bloating, bag w/formed brown stool.   - ostomy care-abd sites healing well, pt not distended    #  -  consulted regarding Barnett and if we can do a TOV or potentially have him straight cath at home    #GI ppx  - Protonix for GI ppx while on Eliquis  - probiotic yoghurt with meals  - nutrition input appreciated      #Bowel Regimen  - Senna nightly    #Anxiety  - neuropsych following  - pt refused Rx interventions at this time.     #Discharge planning  - EDOD: 8/21 to community with elderly father, home services ([facility])  - DME (hosp bed): Pt will require the use of a hospital bed at home. Due to his wounds, the hospital bed will be able to provide pressure relief as well as the ability to turn and adjust. Pt will also need to be in more upright positions to change the colostomy bag and either change his Barnett catheter or perform straight catheterizations.

## 2021-08-19 DIAGNOSIS — R33.9 RETENTION OF URINE, UNSPECIFIED: ICD-10-CM

## 2021-08-19 LAB
ANION GAP SERPL CALC-SCNC: 9 MMOL/L — SIGNIFICANT CHANGE UP (ref 5–17)
BASOPHILS # BLD AUTO: 0.08 K/UL — SIGNIFICANT CHANGE UP (ref 0–0.2)
BASOPHILS NFR BLD AUTO: 1.1 % — SIGNIFICANT CHANGE UP (ref 0–2)
BUN SERPL-MCNC: 10 MG/DL — SIGNIFICANT CHANGE UP (ref 7–23)
CALCIUM SERPL-MCNC: 8.8 MG/DL — SIGNIFICANT CHANGE UP (ref 8.4–10.5)
CHLORIDE SERPL-SCNC: 102 MMOL/L — SIGNIFICANT CHANGE UP (ref 96–108)
CO2 SERPL-SCNC: 28 MMOL/L — SIGNIFICANT CHANGE UP (ref 22–31)
CREAT SERPL-MCNC: 0.95 MG/DL — SIGNIFICANT CHANGE UP (ref 0.5–1.3)
EOSINOPHIL # BLD AUTO: 0.24 K/UL — SIGNIFICANT CHANGE UP (ref 0–0.5)
EOSINOPHIL NFR BLD AUTO: 3.3 % — SIGNIFICANT CHANGE UP (ref 0–6)
GLUCOSE SERPL-MCNC: 112 MG/DL — HIGH (ref 70–99)
HCT VFR BLD CALC: 33 % — LOW (ref 39–50)
HGB BLD-MCNC: 10.7 G/DL — LOW (ref 13–17)
IMM GRANULOCYTES NFR BLD AUTO: 0.4 % — SIGNIFICANT CHANGE UP (ref 0–1.5)
LYMPHOCYTES # BLD AUTO: 1.13 K/UL — SIGNIFICANT CHANGE UP (ref 1–3.3)
LYMPHOCYTES # BLD AUTO: 15.6 % — SIGNIFICANT CHANGE UP (ref 13–44)
MCHC RBC-ENTMCNC: 28.1 PG — SIGNIFICANT CHANGE UP (ref 27–34)
MCHC RBC-ENTMCNC: 32.4 GM/DL — SIGNIFICANT CHANGE UP (ref 32–36)
MCV RBC AUTO: 86.6 FL — SIGNIFICANT CHANGE UP (ref 80–100)
MONOCYTES # BLD AUTO: 0.8 K/UL — SIGNIFICANT CHANGE UP (ref 0–0.9)
MONOCYTES NFR BLD AUTO: 11 % — SIGNIFICANT CHANGE UP (ref 2–14)
NEUTROPHILS # BLD AUTO: 4.96 K/UL — SIGNIFICANT CHANGE UP (ref 1.8–7.4)
NEUTROPHILS NFR BLD AUTO: 68.6 % — SIGNIFICANT CHANGE UP (ref 43–77)
NRBC # BLD: 0 /100 WBCS — SIGNIFICANT CHANGE UP (ref 0–0)
PLATELET # BLD AUTO: 309 K/UL — SIGNIFICANT CHANGE UP (ref 150–400)
POTASSIUM SERPL-MCNC: 3.6 MMOL/L — SIGNIFICANT CHANGE UP (ref 3.5–5.3)
POTASSIUM SERPL-SCNC: 3.6 MMOL/L — SIGNIFICANT CHANGE UP (ref 3.5–5.3)
RBC # BLD: 3.81 M/UL — LOW (ref 4.2–5.8)
RBC # FLD: 15.1 % — HIGH (ref 10.3–14.5)
SODIUM SERPL-SCNC: 139 MMOL/L — SIGNIFICANT CHANGE UP (ref 135–145)
WBC # BLD: 7.24 K/UL — SIGNIFICANT CHANGE UP (ref 3.8–10.5)
WBC # FLD AUTO: 7.24 K/UL — SIGNIFICANT CHANGE UP (ref 3.8–10.5)

## 2021-08-19 PROCEDURE — 99232 SBSQ HOSP IP/OBS MODERATE 35: CPT | Mod: GC

## 2021-08-19 RX ADMIN — Medication 500 MILLIGRAM(S): at 05:37

## 2021-08-19 RX ADMIN — Medication 500 MILLIGRAM(S): at 14:23

## 2021-08-19 RX ADMIN — Medication 1 TABLET(S): at 11:59

## 2021-08-19 RX ADMIN — CHLORHEXIDINE GLUCONATE 1 APPLICATION(S): 213 SOLUTION TOPICAL at 05:44

## 2021-08-19 RX ADMIN — APIXABAN 5 MILLIGRAM(S): 2.5 TABLET, FILM COATED ORAL at 05:37

## 2021-08-19 RX ADMIN — GABAPENTIN 200 MILLIGRAM(S): 400 CAPSULE ORAL at 14:22

## 2021-08-19 RX ADMIN — CHLORHEXIDINE GLUCONATE 1 APPLICATION(S): 213 SOLUTION TOPICAL at 22:47

## 2021-08-19 RX ADMIN — PANTOPRAZOLE SODIUM 40 MILLIGRAM(S): 20 TABLET, DELAYED RELEASE ORAL at 05:37

## 2021-08-19 RX ADMIN — Medication 137 MICROGRAM(S): at 05:37

## 2021-08-19 RX ADMIN — Medication 166.67 MILLIGRAM(S): at 17:25

## 2021-08-19 RX ADMIN — GABAPENTIN 200 MILLIGRAM(S): 400 CAPSULE ORAL at 22:44

## 2021-08-19 RX ADMIN — Medication 166.67 MILLIGRAM(S): at 05:38

## 2021-08-19 RX ADMIN — Medication 500 MILLIGRAM(S): at 22:45

## 2021-08-19 RX ADMIN — APIXABAN 5 MILLIGRAM(S): 2.5 TABLET, FILM COATED ORAL at 17:25

## 2021-08-19 RX ADMIN — GABAPENTIN 200 MILLIGRAM(S): 400 CAPSULE ORAL at 05:36

## 2021-08-19 NOTE — CONSULT NOTE ADULT - ASSESSMENT
Pt is a 60 y/o M with PMHx of thyroid cancer status post total thyroidectomy in 2010 and radioactive iodine treatment in 2011, hypogonadism, vitamin D deficiency, and osteopenia, with chronic constipation, right buttock and right scrotal pain and numbness. 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 The chordoma resulted in perineal numbness and overflow incontinence and he was admitted 7/7/21 for staged resection.   On 7/7, he underwent Plastic Surgery and General Surgery assisted vertical rectus abdominal myocutaneous flap harvest with transperitoneal ligation of internal iliac artery and vein. He is s/p en bloc sacrectomy with L4-pelvis fusion; EBL 4.5L status post 8 units PRBC, 6 units FFP, 1 pack platelets and 5L crystalloid; 7/8. Extubated. On 7/9  patient developed hypoxia and respiratory distress, desaturation. CPAP at night.  CTA revealed bilateral sub segmental PE. No right heart strain & Bibasilar and left lingular consolidative opacities. Started on Heparin gtt and transitioned to therapeutic lovenox on 8/1. Course further c/b ileus requiring gastric decompression. Pt underwent echo to check for RV per pulm, and it was grossly unremarkable.  Wound washout with plastics and duc pus noted, entire wound reopened on 7/26/21 and placement of 2 drains. Incontinent of stool. Cultures growing MRSA, enterococcus and bacteroides. ID following. On antibiotics. S/P Diversion Colostomy to keep sacral incision clean on 7/28/21. Barnett for urinary incontinence and to keep sacral wound clean. Pt requires 6 week course of antibiotics until 9/7/21. PICC line in place. LE doppler neg for DVT. Evaluated by Physical Therapy and PM&R and was recommended for Acute rehab. 
Pt is a 62 y/o M with PMHx of thyroid cancer status post total thyroidectomy in 2010 and radioactive iodine treatment in 2011, hypogonadism, vitamin D deficiency, and osteopenia, who presented to the hospital for planned resection for sacral mass. He is s/p sacrectomy with L4-pelvis fusion. Post-operative course was complicated by bilateral PE, MRSA wound infection s/p diversion colostomy. Pt with deficits with ambulation, strength, and endurance.     #Sacral Mass  #Debility  - s/p sacrectomy with L4-pelvis fusion  - wound care  - Maintain and monitor SEDRICK drains  - Comprehensive rehab program  - pain control    #MRSA/Enterococcus/Bacteroides wound infection  - Vancomycin 1250mg bid until 9/7/21  - Flagyl 500mg q8hrs until 9/7/21  - s/p diversion colostomy to keep sacral incision clean  - Barnett present for urinary incontinence and to keep sacral wound clear    #PE  - bilateral subsegmental PE  - therapeutic Lovenox 70mg bid    #Hypothyroidism  - continue Synthroid 137mcg daily    #GI  - Protonix    #  - Barnett for incontinence and to help keep wound clean  - remove when appropriate
61 year old male s/p staged sacretomy for  s2 sacral chordoma (malignant) with pelvic fusion and myocutaneous flap transposition 7/7 s/p wash out of surgical back wound 7/25 after infection 6 weeks antibiotics to be completed 9/7/21. s/p diverting colostomy. pt does self cath.  Incision abdomen and back intact without sign of infection.  abdullahi drain outputs reviewed since admission to [facility].  Right drain out put trends less than 30 cc per day.  left abdullahi over 50 cc per day in general.  The right drain likely can be discontinued but surgeon's consent needed.  -suggest lower back incision care: three times per week, clean with normal saline, pat dry, cover with aquacel surgical dressing  -suggest drain sites care:  three times per week, clean with normal saline, cover with 4 inch gauze and tegaderm
Assessment: Pt presents with relatively intact cognitive functioning. Mild difficulties were noted in speech fluency and abstract reasoning, which are likely due to his current emotional state. His affect is depressed, tearful at times, and he reports a few adjustment symptoms including depressed mood, tearfulness, insomnia and inappetence in response to his current medical condition. FIM scores: Social Interaction 5; Problem Solving 6; Memory 7.  Plan: Individual supportive psychotherapy to address adjustment symptoms. Participation in recreation/art therapy in order to have pleasure and mastery experiences and regain/reestablish a sense of routine.  Time spent with Pt, 50 minutes.  
urinary retention

## 2021-08-19 NOTE — CONSULT NOTE ADULT - SUBJECTIVE AND OBJECTIVE BOX
CC:  Patient is a 61y old  Male who presents with a chief complaint of s/p sacretomy, and L4 pelvis fusion      HPI:  Pt is a 60 y/o M with PMHx of thyroid cancer status post total thyroidectomy in 2010 and radioactive iodine treatment in 2011, hypogonadism, vitamin D deficiency, and osteopenia, with chronic constipation, right buttock and right scrotal pain and numbness. 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 The chordoma resulted in perineal numbness and overflow incontinence and he was admitted 7/7/21 for staged resection.   On 7/7, he underwent Plastic Surgery and General Surgery assisted vertical rectus abdominal myocutaneous flap harvest with transperitoneal ligation of internal iliac artery and vein. He is s/p en bloc sacrectomy with L4-pelvis fusion; EBL 4.5L status post 8 units PRBC, 6 units FFP, 1 pack platelets and 5L crystalloid; 7/8. Extubated. On 7/9  patient developed hypoxia and respiratory distress, desaturation. CPAP at night.  CTA revealed bilateral sub segmental PE. No right heart strain & Bibasilar and left lingular consolidative opacities. Started on Heparin gtt and transitioned to therapeutic lovenox on 8/1. Course further c/b ileus requiring gastric decompression. Pt underwent echo to check for RV per pulm, and it was grossly unremarkable.  Wound washout with plastics and duc pus noted, entire wound reopened on 7/26/21 and placement of 2 drains. Incontinent of stool. Cultures growing MRSA, enterococcus and bacteroides. ID following. Continue vancomycin and flagyl. S/P Diversion Colostomy to keep sacral incision clean on 7/28/21. Barnett for urinary incontinence and to keep sacral wound clean. Pt requires 6 week course of antibiotics until 9/7/21. PICC line in place. LE doppler neg for DVT. Patient tolerating regular diet with fully functioning colostomy. Evaluated by Physical Therapy and PM&R and was recommended for Acute rehab. Pt was medically stable on 8/2/21 and was transferred to [facility]. (02 Aug 2021 14:54)      PAST MEDICAL & SURGICAL HISTORY:  HLD (hyperlipidemia)    Thyroid cancer  2010    History of central serous retinopathy    H/O hypogonadism    H/O vitamin D deficiency    H/O osteopenia    H/O thyroidectomy  Total --2010    H/O colonoscopy        Allergies    No Known Allergies    Intolerances        SOCIAL HISTORY          Smoking: Yes [ ]  No [ ]   ______pk yrs          ETOH  Yes [ ]  No [ ]  Social [ ]          DRUGS:  Yes [ ]  No [ ]  if so what______________    FAMILY HISTORY:      MEDICATIONS  (STANDING):  calcium carbonate 1250 mG  + Vitamin D (OsCal 500 + D) 1 Tablet(s) Oral daily  chlorhexidine 4% Liquid 1 Application(s) Topical two times a day  enoxaparin Injectable 70 milliGRAM(s) SubCutaneous two times a day  gabapentin 200 milliGRAM(s) Oral every 8 hours  levothyroxine 137 MICROGram(s) Oral daily  metroNIDAZOLE    Tablet 500 milliGRAM(s) Oral every 8 hours  pantoprazole    Tablet 40 milliGRAM(s) Oral before breakfast  senna 2 Tablet(s) Oral at bedtime  vancomycin  IVPB 1250 milliGRAM(s) IV Intermittent every 12 hours    MEDICATIONS  (PRN):  acetaminophen   Tablet .. 650 milliGRAM(s) Oral every 6 hours PRN Temp greater or equal to 38C (100.4F), Mild Pain (1 - 3)  melatonin 3 milliGRAM(s) Oral at bedtime PRN Insomnia  polyethylene glycol 3350 17 Gram(s) Oral daily PRN Constipation  simethicone 80 milliGRAM(s) Chew two times a day PRN Upset Stomach  traMADol 25 milliGRAM(s) Oral every 6 hours PRN Moderate Pain (4 - 6)  traMADol 50 milliGRAM(s) Oral every 6 hours PRN Severe Pain (7 - 10)         Review of systems:  General:  no fever or chills  Pulmonary:  no SOB  Cardiac:  denies chest pain, palpitations  GI:  no nausea, vomitting, or abddominal pain  :  no increase frequency, or burning  Heme:  no easy bruising with minor trauma  Musculoskeletal:  No history of back pain or trauma  Skin:  no history of rashes, injury, trauma  Neuro:    weakness         Vital Signs Last 24 Hrs  T(C): 36.8 (10 Aug 2021 08:35), Max: 36.8 (10 Aug 2021 08:35)  T(F): 98.2 (10 Aug 2021 08:35), Max: 98.2 (10 Aug 2021 08:35)  HR: 86 (10 Aug 2021 08:35) (74 - 86)  BP: 110/72 (10 Aug 2021 08:35) (110/72 - 120/70)  BP(mean): --  RR: 18 (10 Aug 2021 08:35) (18 - 18)  SpO2: 98% (10 Aug 2021 08:35) (98% - 99%)    Physical Exam:    General:   no distress, flat affect seems appropriate       Skin:  lower back midline incision, clean, dry intact.  right and left SEDRICK drains in place, drain sites clean, drainage serosanguinous  date     right (ml)     left (ml)  8/3/21   30               30  8/4       15                85  8/5       15                95  8/6       10                105  8/7       30                50  8/8       36                105  8/9       20                90  8/10/21  10               100      LABS:                        10.5   8.87  )-----------( 417      ( 09 Aug 2021 07:04 )             33.0     08-09    137  |  100  |  10  ----------------------------<  110<H>  3.4<L>   |  25  |  0.95    Ca    8.7      09 Aug 2021 07:04            RADIOLOGY & ADDITIONAL STUDIES:    Risks, benefits, and alternatives to treatment discussed. All questions answered with understanding.    Procedure Performed:  (  )Yes     (  )No  Name of Procedure:      [  ]Debridement     [  ]I&D    [  ]Laceration Repair     [  ]Other:  (  )partial thickness     (  )full thickness     (  )subcutaneous     (  )muscle/tendon     (  )bone  (  )sharp     (  )surgical  
Patient is a 61y old  Male who presents with a chief complaint of sacral mass, L4-sacral fusion (15 Aug 2021 10:41)      HPI:  Pt is a 62 y/o M with PMHx of thyroid cancer status post total thyroidectomy in 2010 and radioactive iodine treatment in 2011, hypogonadism, vitamin D deficiency, and osteopenia, with chronic constipation, right buttock and right scrotal pain and numbness. 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 The chordoma resulted in perineal numbness and overflow incontinence and he was admitted 7/7/21 for staged resection.   On 7/7, he underwent Plastic Surgery and General Surgery assisted vertical rectus abdominal myocutaneous flap harvest with transperitoneal ligation of internal iliac artery and vein. He is s/p en bloc sacrectomy with L4-pelvis fusion; EBL 4.5L status post 8 units PRBC, 6 units FFP, 1 pack platelets and 5L crystalloid; 7/8. Extubated. On 7/9  patient developed hypoxia and respiratory distress, desaturation. CPAP at night.  CTA revealed bilateral sub segmental PE. No right heart strain & Bibasilar and left lingular consolidative opacities. Started on Heparin gtt and transitioned to therapeutic lovenox on 8/1. Course further c/b ileus requiring gastric decompression. Pt underwent echo to check for RV per pulm, and it was grossly unremarkable.  Wound washout with plastics and duc pus noted, entire wound reopened on 7/26/21 and placement of 2 drains. Incontinent of stool. Cultures growing MRSA, enterococcus and bacteroides. ID following. Continue vancomycin and flagyl. S/P Diversion Colostomy to keep sacral incision clean on 7/28/21. Tony for urinary incontinence and to keep sacral wound clean. Pt requires 6 week course of antibiotics until 9/7/21. PICC line in place. LE doppler neg for DVT. Patient tolerating regular diet with fully functioning colostomy. Evaluated by Physical Therapy and PM&R and was recommended for Acute rehab. Pt was medically stable on 8/2/21 and was transferred to [facility]. (02 Aug 2021 14:54)    no prior urological history or voiding symptoms      PAST MEDICAL & SURGICAL HISTORY:  HLD (hyperlipidemia)    Thyroid cancer  2010    History of central serous retinopathy    H/O hypogonadism    H/O vitamin D deficiency    H/O osteopenia    H/O thyroidectomy  Total --2010    H/O colonoscopy        REVIEW OF SYSTEMS:    CONSTITUTIONAL:  no fever or chills  HEENT: No visual changes  ENDO: No sweating  NECK: No pain or stiffness  MUSCULOSKELETAL: No back pain, no joint pain  RESPIRATORY: No shortness of breath  CARDIOVASCULAR: No chest pain  GASTROINTESTINAL: No abdominal or epigastric pain. No nausea, vomiting,  No diarrhea or constipation.   NEUROLOGICAL: No mental status changes  PSYCH: No depression, no mood changes  SKIN: No itching      MEDICATIONS  (STANDING):  apixaban 5 milliGRAM(s) Oral every 12 hours  calcium carbonate 1250 mG  + Vitamin D (OsCal 500 + D) 1 Tablet(s) Oral daily  chlorhexidine 4% Liquid 1 Application(s) Topical two times a day  gabapentin 200 milliGRAM(s) Oral every 8 hours  levothyroxine 137 MICROGram(s) Oral daily  metroNIDAZOLE    Tablet 500 milliGRAM(s) Oral every 8 hours  pantoprazole    Tablet 40 milliGRAM(s) Oral before breakfast  senna 2 Tablet(s) Oral at bedtime  vancomycin  IVPB 1250 milliGRAM(s) IV Intermittent every 12 hours    MEDICATIONS  (PRN):  acetaminophen   Tablet .. 650 milliGRAM(s) Oral every 6 hours PRN Temp greater or equal to 38C (100.4F), Mild Pain (1 - 3)  melatonin 3 milliGRAM(s) Oral at bedtime PRN Insomnia  polyethylene glycol 3350 17 Gram(s) Oral daily PRN Constipation  simethicone 80 milliGRAM(s) Chew two times a day PRN Upset Stomach      Allergies    No Known Allergies    Intolerances        SOCIAL HISTORY: No illicit drug use    FAMILY HISTORY:        T(C): 36.9 (08-18-21 @ 22:09), Max: 37.1 (08-17-21 @ 20:07)  T(F): 98.4 (08-18-21 @ 22:09), Max: 98.8 (08-17-21 @ 20:07)  HR: 90 (08-18-21 @ 22:09) (88 - 96)  BP: 136/89 (08-18-21 @ 22:09) (125/79 - 136/89)  RR: 16 (08-18-21 @ 22:09) (15 - 18)  SpO2: 96% (08-18-21 @ 22:09) (96% - 98%)      PHYSICAL EXAM:    Constitutional: alert, no acute distress  Back: No CVA tenderness  Respiratory: No accessory respiratory muscle use  Abd: Soft, colostomy  no organomegaly  no hernia  : no bladder distention, tony yellow, testes benign  Extremities: no edema  Neurological: A/O x 3  Psychiatric: Normal mood, normal affect  Skin: No rashes      08-16-21 @ 07:01  -  08-17-21 @ 07:00  --------------------------------------------------------  IN:  Total IN: 0 mL    OUT:    Indwelling Catheter - Urethral (mL): 2050 mL  Total OUT: 2050 mL    Total NET: -2050 mL      08-17-21 @ 07:01  -  08-18-21 @ 07:00  --------------------------------------------------------  IN:  Total IN: 0 mL    OUT:    Indwelling Catheter - Urethral (mL): 1000 mL  Total OUT: 1000 mL    Total NET: -1000 mL      08-18-21 @ 07:01  -  08-19-21 @ 07:00  --------------------------------------------------------  IN:  Total IN: 0 mL    OUT:    Indwelling Catheter - Urethral (mL): 750 mL  Total OUT: 750 mL    Total NET: -750 mL        08-18-21 @ 07:01  -  08-19-21 @ 07:00  --------------------------------------------------------  IN: 250 mL / OUT: 780 mL / NET: -530 mL            LABS:                RADIOLOGY & ADDITIONAL STUDIES:
LISBET DEGROOT  61y  Male  Admitting: BRENDEN Solo    HPI:  Pt is a 62 y/o M with PMHx of thyroid cancer status post total thyroidectomy in 2010 and radioactive iodine treatment in 2011, hypogonadism, vitamin D deficiency, and osteopenia, with chronic constipation, right buttock and right scrotal pain and numbness. 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 The chordoma resulted in perineal numbness and overflow incontinence and he was admitted 7/7/21 for staged resection.   On 7/7, he underwent Plastic Surgery and General Surgery assisted vertical rectus abdominal myocutaneous flap harvest with transperitoneal ligation of internal iliac artery and vein. He is s/p en bloc sacrectomy with L4-pelvis fusion; EBL 4.5L status post 8 units PRBC, 6 units FFP, 1 pack platelets and 5L crystalloid; 7/8. Extubated. On 7/9  patient developed hypoxia and respiratory distress, desaturation. CPAP at night.  CTA revealed bilateral sub segmental PE. No right heart strain & Bibasilar and left lingular consolidative opacities. Started on Heparin gtt and transitioned to therapeutic lovenox on 8/1. Course further c/b ileus requiring gastric decompression. Pt underwent echo to check for RV per pulm, and it was grossly unremarkable.  Wound washout with plastics and duc pus noted, entire wound reopened on 7/26/21 and placement of 2 drains. Incontinent of stool. Cultures growing MRSA, enterococcus and bacteroides. ID following. On antibiotics. S/P Diversion Colostomy to keep sacral incision clean on 7/28/21. Barnett for urinary incontinence and to keep sacral wound clean. Pt requires 6 week course of antibiotics until 9/7/21. PICC line in place. LE doppler neg for DVT. Evaluated by Physical Therapy and PM&R and was recommended for Acute rehab.     PAST MEDICAL & SURGICAL HISTORY:  HLD (hyperlipidemia)    Thyroid cancer  2010    History of central serous retinopathy    H/O hypogonadism    H/O vitamin D deficiency    H/O osteopenia    H/O thyroidectomy  Total --2010    H/O colonoscopy      HEALTH ISSUES - PROBLEM Dx:  Pulmonary thromboembolism      MEDICATIONS  (STANDING):  calcium carbonate 1250 mG  + Vitamin D (OsCal 500 + D) 1 Tablet(s) Oral daily  chlorhexidine 4% Liquid 1 Application(s) Topical two times a day  enoxaparin Injectable 70 milliGRAM(s) SubCutaneous two times a day  gabapentin 200 milliGRAM(s) Oral every 8 hours  levothyroxine 137 MICROGram(s) Oral daily  metroNIDAZOLE    Tablet 500 milliGRAM(s) Oral every 8 hours  pantoprazole    Tablet 40 milliGRAM(s) Oral before breakfast  senna 2 Tablet(s) Oral at bedtime  vancomycin  IVPB 1250 milliGRAM(s) IV Intermittent every 12 hours    MEDICATIONS  (PRN):  acetaminophen   Tablet .. 650 milliGRAM(s) Oral every 6 hours PRN Temp greater or equal to 38C (100.4F), Mild Pain (1 - 3)  melatonin 3 milliGRAM(s) Oral at bedtime PRN Insomnia  polyethylene glycol 3350 17 Gram(s) Oral daily PRN Constipation  simethicone 80 milliGRAM(s) Chew two times a day PRN Upset Stomach  traMADol 25 milliGRAM(s) Oral every 6 hours PRN Moderate Pain (4 - 6)  traMADol 50 milliGRAM(s) Oral every 6 hours PRN Severe Pain (7 - 10)    Allergies    No Known Allergies    FAMILY HISTORY: Negative for VTE in first degree relatives.      SOCIAL HISTORY: No EtOH, no tobacco    REVIEW OF SYSTEMS:    CONSTITUTIONAL: No fevers or chills  EYES/ENT: No visual changes;  No vertigo or throat pain   NECK: No pain or stiffness  RESPIRATORY: No cough, wheezing, hemoptysis; No shortness of breath  CARDIOVASCULAR: No chest pain or palpitations  GASTROINTESTINAL: No melena or hematochezia.  GENITOURINARY: No hematuria  NEUROLOGICAL: +weakness  SKIN: No itching   All other review of systems is negative unless indicated above.    T(F): 98.2 (08-10-21 @ 08:35), Max: 98.4 (08-09-21 @ 10:01)  HR: 86 (08-10-21 @ 08:35)  BP: 110/72 (08-10-21 @ 08:35)  RR: 18 (08-10-21 @ 08:35)  SpO2: 98% (08-10-21 @ 08:35)    GENERAL: NAD, well-developed  HEAD:  Atraumatic, Normocephalic  EYES: EOMI, PERRLA, conjunctiva and sclera clear  NECK: Supple, No JVD  CHEST/LUNG: Clear to auscultation ant.; No wheeze  HEART: Regular rate and rhythm; No murmurs, rubs, or gallops  ABDOMEN: Soft, Nontender, Nondistended; Bowel sounds present  EXTREMITIES:  no calf tenderness  NEUROLOGY: awake, alert  SKIN: No petechiae    Labs:             10.5   8.87  )-----------( 417      ( 08-09 @ 07:04 )             33.0     Ca    8.7      09 Aug 2021 07:04      Consultant notes reviewed : YES [x ] ; NO [ ]    Radiology and additional tests:  < from: Xray Chest 1 View- PORTABLE-Urgent (Xray Chest 1 View- PORTABLE-Urgent .) (08.02.21 @ 19:57) >  IMPRESSION:  Unchanged bibasilar linear atelectasis.  PICC line catheter tip in distal SVC.    < end of copied text >  
Pt is a 62 y/o right-handed male with PMHx of thyroid cancer status post total thyroidectomy in 2010 and radioactive iodine treatment in 2011, hypogonadism, vitamin D deficiency, and osteopenia, with chronic constipation, right buttock and right scrotal pain and numbness. 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 The chordoma resulted in perineal numbness and overflow incontinence and he was admitted 7/7/21 for staged resection. On 7/7, he underwent Plastic Surgery and General Surgery assisted vertical rectus abdominal myocutaneous flap harvest with transperitoneal ligation of internal iliac artery and vein. He is s/p en bloc sacrectomy with L4-pelvis fusion; EBL 4.5L status post 8 units PRBC, 6 units FFP, 1 pack platelets and 5L crystalloid; 7/8. Extubated. On 7/9  Pt developed hypoxia and respiratory distress, desaturation. CPAP at night.  CTA revealed bilateral sub segmental PE. No right heart strain & Bibasilar and left lingular consolidative opacities. Started on Heparin gtt and transitioned to therapeutic lovenox on 8/1. Course further c/b ileus requiring gastric decompression. Pt underwent echo to check for RV per pulm, and it was grossly unremarkable.  Wound washout with plastics and duc pus noted, entire wound reopened on 7/26/21 and placement of 2 drains. Incontinent of stool. Cultures growing MRSA, enterococcus and bacteroides. ID following. Continue vancomycin and flagyl. S/P Diversion Colostomy to keep sacral incision clean on 7/28/21. Barnett for urinary incontinence and to keep sacral wound clean. Pt requires 6 week course of antibiotics until 9/7/21. PICC line in place. LE doppler neg for DVT. Pt tolerating regular diet with fully functioning colostomy. Evaluated by Physical Therapy and PM&R and was recommended for Acute rehab. Pt was medically stable on 8/2/21 and was transferred to [facility]. PMHx: As noted above. Current meds: Please see list in Pt’s chart. Social Hx: Pt is single and lives with his father of whom he is the caretaker. Pt completed an associate's degree and works in maintenance. Pt lacks hx of mental illness and substance abuse. Pt is Religious. He enjoys playing [employer], walking and riding on his bike.  Findings: Pt was seen for an initial assessment of his cognitive and emotional functioning. The Modified MMSE (3MS) was administered; Pt’s results (95/100) were in the Normal  range. His scores in mood measures suggested minimal levels of anxiety and depression (GAD7 = 0/21; PHQ9 = 1/27). Pt was alert, fully Ox3, and cooperative. Attn/Conc: Simple auditory attention - intact.  Concentration/Working memory for reversed counting and spelling – intact (7/7). Language: Pt’s speech was of normal volume, pitch and pace; initially Pt was sobbing often which made it difficult to understand, however, as he calmed down his speech was fully intelligible. Naming - intact. Sentence repetition - intact. Semantic fluency - moderately impaired. Auditory Comprehension - intact. Reading - intact. Writing - intact. Memory: Encoding of 3 words was intact (3/3); short- and long-delayed verbal recall – intact (3/3). LTM was for [place] presidents (4/4). Visual memory – intact. Visuospatial: Visuomotor integration – intact for copy of a 2D figure. Executive Functions: Motor Planning - intact. Organizational skills - intact. Abstract reasoning - closely intact. Initiation/Phonemic Fluency - moderately impaired. Verbal problem solving – mildly impaired. Emotional functioning: Affect - depressed, tearful. Mood - euthymic ("alright"); Pt reported experiencing sadness, tearfulness, poor sleep, decreased appetite. On mood measures he additionally reported poor appetite. Thought processes were goal-directed. No abnormal thought contents were observed.  Pt denied any AH/VH. Pt also denied SI/HI/I/P. Insight - WFL. Judgment - fair.    
Patient is a 61y old  Male who presents with a chief complaint of     HPI:  Pt is a 62 y/o M with PMHx of thyroid cancer status post total thyroidectomy in 2010 and radioactive iodine treatment in 2011, hypogonadism, vitamin D deficiency, and osteopenia, with chronic constipation, right buttock and right scrotal pain and numbness. 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 The chordoma resulted in perineal numbness and overflow incontinence and he was admitted 7/7/21 for staged resection.   On 7/7, he underwent Plastic Surgery and General Surgery assisted vertical rectus abdominal myocutaneous flap harvest with transperitoneal ligation of internal iliac artery and vein. He is s/p en bloc sacrectomy with L4-pelvis fusion; EBL 4.5L status post 8 units PRBC, 6 units FFP, 1 pack platelets and 5L crystalloid; 7/8. Extubated. On 7/9  patient developed hypoxia and respiratory distress, desaturation. CPAP at night.  CTA revealed bilateral sub segmental PE. No right heart strain & Bibasilar and left lingular consolidative opacities. Started on Heparin gtt and transitioned to therapeutic lovenox on 8/1. Course further c/b ileus requiring gastric decompression. Pt underwent echo to check for RV per pulm, and it was grossly unremarkable.  Wound washout with plastics and duc pus noted, entire wound reopened on 7/26/21 and placement of 2 drains. Incontinent of stool. Cultures growing MRSA, enterococcus and bacteroides. ID following. Continue vancomycin and flagyl. S/P Diversion Colostomy to keep sacral incision clean on 7/28/21. Barnett for urinary incontinence and to keep sacral wound clean. Pt requires 6 week course of antibiotics until 9/7/21. PICC line in place. LE doppler neg for DVT. Patient tolerating regular diet with fully functioning colostomy. Evaluated by Physical Therapy and PM&R and was recommended for Acute rehab. Pt was medically stable on 8/2/21 and was transferred to [facility]. (02 Aug 2021 14:54)      PAST MEDICAL & SURGICAL HISTORY:  HLD (hyperlipidemia)    Thyroid cancer  2010    History of central serous retinopathy    H/O hypogonadism    H/O vitamin D deficiency    H/O osteopenia    H/O thyroidectomy  Total --2010    H/O colonoscopy        Allergies: NKDA  Family hx: no pertinent hx of DM, HTN in parents/siblings  Social hx: denies smoking, alcohol, drug use        REVIEW OF SYSTEMS:  CONSTITUTIONAL: No fever, weight loss, or fatigue  EYES: No eye pain, visual disturbances, or discharge  ENMT:  No difficulty hearing, tinnitus, vertigo; No sinus or throat pain  NECK: No pain or stiffness  BREASTS: No pain, masses, or nipple discharge  RESPIRATORY: No cough, wheezing, chills or hemoptysis; No shortness of breath  CARDIOVASCULAR: No chest pain, palpitations, dizziness, or leg swelling  GASTROINTESTINAL: No abdominal or epigastric pain. No nausea, vomiting, or hematemesis; No diarrhea or constipation. No melena or hematochezia.  GENITOURINARY: No dysuria, frequency, hematuria, or incontinence  NEUROLOGICAL: No headaches, memory loss, loss of strength, numbness, or tremors  SKIN: No itching, burning, rashes, or lesions   LYMPH NODES: No enlarged glands  ENDOCRINE: No heat or cold intolerance; No hair loss  MUSCULOSKELETAL: No joint pain or swelling; No muscle, back, or extremity pain  PSYCHIATRIC: No depression, anxiety, mood swings, or difficulty sleeping  HEME/LYMPH: No easy bruising, or bleeding gums  ALLERGY AND IMMUNOLOGIC: No hives or eczema    ALL ROS REVIEWED AND NORMAL EXCEPT AS STATED ABOVE    T(C): 36.9 (08-03-21 @ 08:08), Max: 38.1 (08-02-21 @ 20:28)  HR: 84 (08-03-21 @ 08:08) (79 - 86)  BP: 122/78 (08-03-21 @ 08:08) (122/78 - 143/81)  RR: 16 (08-03-21 @ 08:08) (14 - 18)  SpO2: 96% (08-03-21 @ 08:08) (95% - 96%)  Wt(kg): --Vital Signs Last 24 Hrs  T(C): 36.9 (03 Aug 2021 08:08), Max: 38.1 (02 Aug 2021 20:28)  T(F): 98.5 (03 Aug 2021 08:08), Max: 100.5 (02 Aug 2021 20:28)  HR: 84 (03 Aug 2021 08:08) (79 - 86)  BP: 122/78 (03 Aug 2021 08:08) (122/78 - 143/81)  BP(mean): --  RR: 16 (03 Aug 2021 08:08) (14 - 18)  SpO2: 96% (03 Aug 2021 08:08) (95% - 96%)    PHYSICAL EXAM:  GENERAL: NAD, well-groomed, well-developed  HEAD:  Atraumatic, Normocephalic  EYES: EOMI, PERRLA, conjunctiva and sclera clear  ENMT: No tonsillar erythema, exudates, or enlargement; Moist mucous membranes, Good dentition, No lesions  NECK: Supple, No JVD, Normal thyroid  NERVOUS SYSTEM:  Alert & Oriented X3, Good concentration; Motor Strength 5/5 B/L upper and lower extremities; DTRs 2+ intact and symmetric  CHEST/LUNG: Clear to percussion bilaterally; No rales, rhonchi, wheezing, or rubs  HEART: Regular rate and rhythm; No murmurs, rubs, or gallops  ABDOMEN: Soft, Nontender, Nondistended; Bowel sounds present  EXTREMITIES:  2+ Peripheral Pulses, No clubbing, cyanosis, or edema  LYMPH: No lymphadenopathy noted  SKIN: No rashes or lesions    LABS:                        10.0   12.86 )-----------( 397      ( 03 Aug 2021 06:20 )             31.3     08-03    138  |  103  |  12  ----------------------------<  101<H>  3.9   |  29  |  0.89    Ca    8.6      03 Aug 2021 06:20    TPro  6.7  /  Alb  2.2<L>  /  TBili  0.3  /  DBili  x   /  AST  26  /  ALT  23  /  AlkPhos  101  08-03    PT/INR - ( 01 Aug 2021 12:07 )   PT: 17.1 sec;   INR: 1.45 ratio         PTT - ( 02 Aug 2021 06:18 )  PTT:29.9 sec     CAPILLARY BLOOD GLUCOSE                RADIOLOGY & ADDITIONAL TESTS:    Consultant(s) Notes Reviewed:  [x ] YES  [ ] NO  Care Discussed with Consultants/Other Providers [ x] YES  [ ] NO  Imaging Personally Reviewed:  [ ] YES  [ ] NO

## 2021-08-19 NOTE — PROGRESS NOTE ADULT - SUBJECTIVE AND OBJECTIVE BOX
HPI:  Pt is a 60 y/o M with PMHx of thyroid cancer status post total thyroidectomy in 2010 and radioactive iodine treatment in 2011, hypogonadism, vitamin D deficiency, and osteopenia, with chronic constipation, right buttock and right scrotal pain and numbness. 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 The chordoma resulted in perineal numbness and overflow incontinence and he was admitted 7/7/21 for staged resection.   On 7/7, he underwent Plastic Surgery and General Surgery assisted vertical rectus abdominal myocutaneous flap harvest with transperitoneal ligation of internal iliac artery and vein. He is s/p en bloc sacrectomy with L4-pelvis fusion; EBL 4.5L status post 8 units PRBC, 6 units FFP, 1 pack platelets and 5L crystalloid; 7/8. Extubated. On 7/9  patient developed hypoxia and respiratory distress, desaturation. CPAP at night.  CTA revealed bilateral sub segmental PE. No right heart strain & Bibasilar and left lingular consolidative opacities. Started on Heparin gtt and transitioned to therapeutic lovenox on 8/1. Course further c/b ileus requiring gastric decompression. Pt underwent echo to check for RV per pulm, and it was grossly unremarkable.  Wound washout with plastics and duc pus noted, entire wound reopened on 7/26/21 and placement of 2 drains. Incontinent of stool. Cultures growing MRSA, enterococcus and bacteroides. ID following. Continue vancomycin and flagyl. S/P Diversion Colostomy to keep sacral incision clean on 7/28/21. Barnett for urinary incontinence and to keep sacral wound clean. Pt requires 6 week course of antibiotics until 9/7/21. PICC line in place. LE doppler neg for DVT. Patient tolerating regular diet with fully functioning colostomy. Evaluated by Physical Therapy and PM&R and was recommended for Acute rehab. Pt was medically stable on 8/2/21 and was transferred to [facility].     SUBJECTIVE / INTERVAL HPI: Patient seen and examined while working with OT. He is doing well this morning and walking well with therapy. He denies pain, shortness of breath, nausea, or constipation. No acute issues overnight.     REVIEW OF SYSTEMS:    CONSTITUTIONAL: No fevers or chills  EYES/ENT: No visual changes;  No vertigo or throat pain   NECK: No pain or stiffness  RESPIRATORY: No cough, wheezing, or shortness of breath  CARDIOVASCULAR: No chest pain or palpitations  GASTROINTESTINAL: No abdominal or epigastric pain. No nausea or vomiting. No diarrhea or constipation. +Colostomy  GENITOURINARY: No dysuria, frequency or hematuria. +Barnett  NEUROLOGICAL: No numbness or weakness  SKIN: No itching, rashes      VITAL SIGNS:  Vital Signs Last 24 Hrs  T(C): 36.9 (19 Aug 2021 09:20), Max: 36.9 (18 Aug 2021 22:09)  T(F): 98.4 (19 Aug 2021 09:20), Max: 98.4 (18 Aug 2021 22:09)  HR: 99 (19 Aug 2021 09:20) (90 - 99)  BP: 131/84 (19 Aug 2021 09:20) (131/84 - 136/89)  BP(mean): --  RR: 18 (19 Aug 2021 09:20) (16 - 18)  SpO2: 98% (19 Aug 2021 09:20) (96% - 98%)    PHYSICAL EXAM:    General: NAD, walking with occupational therapist  HEENT: NC/AT; PERRL, anicteric sclera; MMM  Neck: supple  Cardiovascular: +S1/S2, RRR  Respiratory: breathing comfortably, no respiratory distress  Gastrointestinal: soft, NT/ND, +Colostomy intact  Extremities: warm, well perfused; no edema, clubbing or cyanosis  Neurological: AAOx3; no focal motor or sensory deficits    MEDICATIONS:  MEDICATIONS  (STANDING):  apixaban 5 milliGRAM(s) Oral every 12 hours  calcium carbonate 1250 mG  + Vitamin D (OsCal 500 + D) 1 Tablet(s) Oral daily  chlorhexidine 4% Liquid 1 Application(s) Topical two times a day  gabapentin 200 milliGRAM(s) Oral every 8 hours  levothyroxine 137 MICROGram(s) Oral daily  metroNIDAZOLE    Tablet 500 milliGRAM(s) Oral every 8 hours  pantoprazole    Tablet 40 milliGRAM(s) Oral before breakfast  senna 2 Tablet(s) Oral at bedtime  vancomycin  IVPB 1250 milliGRAM(s) IV Intermittent every 12 hours    MEDICATIONS  (PRN):  acetaminophen   Tablet .. 650 milliGRAM(s) Oral every 6 hours PRN Temp greater or equal to 38C (100.4F), Mild Pain (1 - 3)  melatonin 3 milliGRAM(s) Oral at bedtime PRN Insomnia  polyethylene glycol 3350 17 Gram(s) Oral daily PRN Constipation  simethicone 80 milliGRAM(s) Chew two times a day PRN Upset Stomach      ALLERGIES:  Allergies    No Known Allergies    Intolerances        LABS:                        10.7   7.24  )-----------( 309      ( 19 Aug 2021 07:58 )             33.0     08-19    139  |  102  |  10  ----------------------------<  112<H>  3.6   |  28  |  0.95    Ca    8.8      19 Aug 2021 07:58          CAPILLARY BLOOD GLUCOSE          RADIOLOGY & ADDITIONAL TESTS: Reviewed.

## 2021-08-19 NOTE — PROGRESS NOTE ADULT - ASSESSMENT
61 year old male planned resection for sacral mass. He is s/p sacrectomy with L4-pelvis fusion. Post-operative course was complicated by bilateral PE, MRSA wound infection s/p diversion colostomy.     Continue rehab program    #MRSA INFECTION  -Continue IV abx - Vancomycin IV and Flagyl PO; ends 9/7/21. ID in. Afebrile, improved leukocytosis.   -Contact isolation  -Right SEDRICK removed by Dr Cevallos on 8/11. Discussed w/Dr. Peraza on 8/10-ok to remove if less than 30cc drainage per shift.  -Last vanco trough 8/4: 13.1, repeat vanco trough in AM    #Pain management:   -Gabapentin, tylenol prn,  tramadol prn    # PE  - tolerating Eliquis 8/11, no bleeding reported. Close watch of HH.  -evaluated by heme  - plan discussed with primary Plastics and Surgical team on 8/10: Dr Peraza and Dr Robin-Strickland. Ok for PO Eliquis to begin/no contraindication.     #Hypothyroidism  - Synthroid 137 mcg daily    #s/p Ileus, now s/p Colostomy  -Monitor output daily, much less bloating, bag w/formed brown stool.   - ostomy care-abd sites healing well, pt not distended    #  -  consulted regarding Barnett and if we can do a TOV or potentially have him straight cath at home  - Urology recommended home with Barnett and outpatient urological follow up    #GI ppx  - Protonix for GI ppx while on Eliquis  - probiotic yoghurt with meals  - nutrition input appreciated      #Bowel Regimen  - Senna nightly    #Anxiety  - neuropsych following  - pt refused Rx interventions at this time.     #Discharge planning  - EDOD: 8/21 to community with elderly father, home services (IV Abx)  - DME (hosp bed): Pt will require the use of a hospital bed at home. Due to his wounds, the hospital bed will be able to provide pressure relief as well as the ability to turn and adjust. Pt will also need to be in more upright positions to change the colostomy bag and change his Barnett catheter.    61 year old male planned resection for sacral mass. He is s/p sacrectomy with L4-pelvis fusion. Post-operative course was complicated by bilateral PE, MRSA wound infection s/p diversion colostomy.     Continue rehab program    #MRSA INFECTION  -Continue IV abx - Vancomycin IV and Flagyl PO; ends 9/7/21. ID in. Afebrile, improved leukocytosis.   -Contact isolation  -Right SEDRICK removed by Dr Cevallos on 8/11. Discussed w/Dr. Peraza on 8/10-ok to remove if less than 30cc drainage per shift.  -Last vanco trough 8/4: 13.1, repeat vanco trough in AM  -8/19: with ongoing serosanguinous drainage into the SEDRICK drain, about 50 mls from early AM to 2PM, some intermittent serosanguinous drainage around the drain as well.  Site examined and benign, likely additional drainage resulting from increased overall activity involving her back.  Will continue to monitor.  Will d/w surgery if it persists or worsens.    #Pain management:   -Gabapentin, tylenol prn,  tramadol prn    # PE  - tolerating Eliquis 8/11, no bleeding reported. Close watch of HH.  -evaluated by heme  - plan discussed with primary Plastics and Surgical team on 8/10: Dr Peraza and Dr Robin-Strickland. Ok for PO Eliquis to begin/no contraindication.     #Hypothyroidism  - Synthroid 137 mcg daily    #s/p Ileus, now s/p Colostomy  -Monitor output daily, much less bloating, bag w/formed brown stool.   - ostomy care-abd sites healing well, pt not distended    #  -  consulted regarding Barnett and if we can do a TOV or potentially have him straight cath at home  - Urology recommended home with Barnett and outpatient urological follow up    #GI ppx  - Protonix for GI ppx while on Eliquis  - probiotic yoghurt with meals  - nutrition input appreciated      #Bowel Regimen  - Senna nightly    #Anxiety  - neuropsych following  - pt refused Rx interventions at this time.     #Discharge planning  - EDOD: 8/21 to community with elderly father, home services (IV Abx)  - DME (hosp bed): Pt will require the use of a hospital bed at home. Due to his wounds, the hospital bed will be able to provide pressure relief as well as the ability to turn and adjust. Pt will also need to be in more upright positions to change the colostomy bag and change his Barnett catheter.

## 2021-08-19 NOTE — CONSULT NOTE ADULT - PROBLEM SELECTOR RECOMMENDATION 9
Patient with post-operative PE, s/p resection of chordoma. Currently receiving therapeutic dosing LMWH. Ok to transition to oral anticoagulant from hematology viewpoint, such as Eliquis, if ok with surgical team.  Patient continues with rehab.
tony drainage, likely neurogenic bladder    discharge planned for Saturday. Best management at this point would be to consider discharge with tony. Visiting nurse can provide for catheter care with delayed void trial ? initiation of self-catheterization regimen    could consider initiation of alpha-blocker and outpatient urodynamic testing to assess bladder function and management options    patient can follow up with me via telemedicine or office visit

## 2021-08-19 NOTE — CONSULT NOTE ADULT - REASON FOR ADMISSION
Debility due to sacral mass removal (sacrectomy)
Debility due to sacral mass removal (sacrectomy)
rehab
s/p sacral surgery

## 2021-08-20 LAB — VANCOMYCIN TROUGH SERPL-MCNC: 17.8 UG/ML — SIGNIFICANT CHANGE UP (ref 10–20)

## 2021-08-20 PROCEDURE — 99232 SBSQ HOSP IP/OBS MODERATE 35: CPT | Mod: GC

## 2021-08-20 RX ADMIN — Medication 1 TABLET(S): at 11:31

## 2021-08-20 RX ADMIN — Medication 500 MILLIGRAM(S): at 06:40

## 2021-08-20 RX ADMIN — GABAPENTIN 200 MILLIGRAM(S): 400 CAPSULE ORAL at 06:46

## 2021-08-20 RX ADMIN — Medication 166.67 MILLIGRAM(S): at 17:30

## 2021-08-20 RX ADMIN — Medication 100 UNIT(S): at 11:29

## 2021-08-20 RX ADMIN — CHLORHEXIDINE GLUCONATE 1 APPLICATION(S): 213 SOLUTION TOPICAL at 06:39

## 2021-08-20 RX ADMIN — APIXABAN 5 MILLIGRAM(S): 2.5 TABLET, FILM COATED ORAL at 17:30

## 2021-08-20 RX ADMIN — GABAPENTIN 200 MILLIGRAM(S): 400 CAPSULE ORAL at 22:28

## 2021-08-20 RX ADMIN — PANTOPRAZOLE SODIUM 40 MILLIGRAM(S): 20 TABLET, DELAYED RELEASE ORAL at 06:38

## 2021-08-20 RX ADMIN — Medication 137 MICROGRAM(S): at 06:39

## 2021-08-20 RX ADMIN — Medication 500 MILLIGRAM(S): at 15:39

## 2021-08-20 RX ADMIN — CHLORHEXIDINE GLUCONATE 1 APPLICATION(S): 213 SOLUTION TOPICAL at 17:30

## 2021-08-20 RX ADMIN — Medication 500 MILLIGRAM(S): at 22:28

## 2021-08-20 RX ADMIN — GABAPENTIN 200 MILLIGRAM(S): 400 CAPSULE ORAL at 15:40

## 2021-08-20 RX ADMIN — Medication 166.67 MILLIGRAM(S): at 06:40

## 2021-08-20 RX ADMIN — APIXABAN 5 MILLIGRAM(S): 2.5 TABLET, FILM COATED ORAL at 06:39

## 2021-08-20 NOTE — PROGRESS NOTE ADULT - SUBJECTIVE AND OBJECTIVE BOX
HPI:  Pt is a 60 y/o M with PMHx of thyroid cancer status post total thyroidectomy in 2010 and radioactive iodine treatment in 2011, hypogonadism, vitamin D deficiency, and osteopenia, with chronic constipation, right buttock and right scrotal pain and numbness. 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 The chordoma resulted in perineal numbness and overflow incontinence and he was admitted 7/7/21 for staged resection.   On 7/7, he underwent Plastic Surgery and General Surgery assisted vertical rectus abdominal myocutaneous flap harvest with transperitoneal ligation of internal iliac artery and vein. He is s/p en bloc sacrectomy with L4-pelvis fusion; EBL 4.5L status post 8 units PRBC, 6 units FFP, 1 pack platelets and 5L crystalloid; 7/8. Extubated. On 7/9  patient developed hypoxia and respiratory distress, desaturation. CPAP at night.  CTA revealed bilateral sub segmental PE. No right heart strain & Bibasilar and left lingular consolidative opacities. Started on Heparin gtt and transitioned to therapeutic lovenox on 8/1. Course further c/b ileus requiring gastric decompression. Pt underwent echo to check for RV per pulm, and it was grossly unremarkable.  Wound washout with plastics and duc pus noted, entire wound reopened on 7/26/21 and placement of 2 drains. Incontinent of stool. Cultures growing MRSA, enterococcus and bacteroides. ID following. Continue vancomycin and flagyl. S/P Diversion Colostomy to keep sacral incision clean on 7/28/21. Barnett for urinary incontinence and to keep sacral wound clean. Pt requires 6 week course of antibiotics until 9/7/21. PICC line in place. LE doppler neg for DVT. Patient tolerating regular diet with fully functioning colostomy. Evaluated by Physical Therapy and PM&R and was recommended for Acute rehab. Pt was medically stable on 8/2/21 and was transferred to [facility].     SUBJECTIVE / INTERVAL HPI: Patient seen and examined at bedside. He is doing well and has no complaints. He continues to have questions about his home care, but those are being worked out. He is doing very well in therapy and he denies pain.     REVIEW OF SYSTEMS:    CONSTITUTIONAL: No fevers or chills  EYES/ENT: No visual changes;  No vertigo or throat pain   NECK: No pain or stiffness  RESPIRATORY: No cough, wheezing, or shortness of breath  CARDIOVASCULAR: No chest pain or palpitations  GASTROINTESTINAL: No abdominal or epigastric pain. No nausea or vomiting. No diarrhea or constipation. +Colostomy bag  GENITOURINARY: No dysuria, frequency or hematuria. +Barnett  NEUROLOGICAL: No numbness or weakness  SKIN: No itching, rashes      VITAL SIGNS:  Vital Signs Last 24 Hrs  T(C): 36.7 (20 Aug 2021 10:37), Max: 36.7 (19 Aug 2021 20:37)  T(F): 98.1 (20 Aug 2021 10:37), Max: 98.1 (19 Aug 2021 20:37)  HR: 83 (20 Aug 2021 10:37) (83 - 91)  BP: 139/77 (20 Aug 2021 10:37) (130/62 - 139/77)  BP(mean): --  RR: 16 (20 Aug 2021 10:37) (16 - 16)  SpO2: 96% (20 Aug 2021 10:37) (96% - 96%)    PHYSICAL EXAM:    General: NAD, sitting in wheelchair comfortably  HEENT: NC/AT; PERRL, anicteric sclera; MMM  Neck: supple  Cardiovascular: +S1/S2, RRR  Respiratory: no respiratory distress, no wheezing  Gastrointestinal: soft, NT/ND, +intact Colostomy  Extremities: warm, well perfused; no edema, clubbing or cyanosis  Neurological: AAOx3; no motor weakness or sensory deficit  Skin: Back incision intact with one drain in place, no extra drainage today    MEDICATIONS:  MEDICATIONS  (STANDING):  apixaban 5 milliGRAM(s) Oral every 12 hours  calcium carbonate 1250 mG  + Vitamin D (OsCal 500 + D) 1 Tablet(s) Oral daily  chlorhexidine 4% Liquid 1 Application(s) Topical two times a day  gabapentin 200 milliGRAM(s) Oral every 8 hours  levothyroxine 137 MICROGram(s) Oral daily  metroNIDAZOLE    Tablet 500 milliGRAM(s) Oral every 8 hours  pantoprazole    Tablet 40 milliGRAM(s) Oral before breakfast  senna 2 Tablet(s) Oral at bedtime  vancomycin  IVPB 1250 milliGRAM(s) IV Intermittent every 12 hours    MEDICATIONS  (PRN):  acetaminophen   Tablet .. 650 milliGRAM(s) Oral every 6 hours PRN Temp greater or equal to 38C (100.4F), Mild Pain (1 - 3)  melatonin 3 milliGRAM(s) Oral at bedtime PRN Insomnia  polyethylene glycol 3350 17 Gram(s) Oral daily PRN Constipation  simethicone 80 milliGRAM(s) Chew two times a day PRN Upset Stomach      ALLERGIES:  Allergies    No Known Allergies    Intolerances        LABS:                        10.7   7.24  )-----------( 309      ( 19 Aug 2021 07:58 )             33.0     08-19    139  |  102  |  10  ----------------------------<  112<H>  3.6   |  28  |  0.95    Ca    8.8      19 Aug 2021 07:58          CAPILLARY BLOOD GLUCOSE          RADIOLOGY & ADDITIONAL TESTS: Reviewed. HPI:  Pt is a 62 y/o M with PMHx of thyroid cancer status post total thyroidectomy in 2010 and radioactive iodine treatment in 2011, hypogonadism, vitamin D deficiency, and osteopenia, with chronic constipation, right buttock and right scrotal pain and numbness. 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 The chordoma resulted in perineal numbness and overflow incontinence and he was admitted 7/7/21 for staged resection.   On 7/7, he underwent Plastic Surgery and General Surgery assisted vertical rectus abdominal myocutaneous flap harvest with transperitoneal ligation of internal iliac artery and vein. He is s/p en bloc sacrectomy with L4-pelvis fusion; EBL 4.5L status post 8 units PRBC, 6 units FFP, 1 pack platelets and 5L crystalloid; 7/8. Extubated. On 7/9  patient developed hypoxia and respiratory distress, desaturation. CPAP at night.  CTA revealed bilateral sub segmental PE. No right heart strain & Bibasilar and left lingular consolidative opacities. Started on Heparin gtt and transitioned to therapeutic lovenox on 8/1. Course further c/b ileus requiring gastric decompression. Pt underwent echo to check for RV per pulm, and it was grossly unremarkable.  Wound washout with plastics and duc pus noted, entire wound reopened on 7/26/21 and placement of 2 drains. Incontinent of stool. Cultures growing MRSA, enterococcus and bacteroides. ID following. Continue vancomycin and flagyl. S/P Diversion Colostomy to keep sacral incision clean on 7/28/21. Barnett for urinary incontinence and to keep sacral wound clean. Pt requires 6 week course of antibiotics until 9/7/21. PICC line in place. LE doppler neg for DVT. Patient tolerating regular diet with fully functioning colostomy. Evaluated by Physical Therapy and PM&R and was recommended for Acute rehab. Pt was medically stable on 8/2/21 and was transferred to [facility].     SUBJECTIVE / INTERVAL HPI: Patient seen and examined at bedside. He is doing well and has no complaints. He continues to have questions about his home care, but those are being worked out. He is doing very well in therapy and he denies pain.     REVIEW OF SYSTEMS:    CONSTITUTIONAL: No fevers or chills  EYES/ENT: No visual changes;  No vertigo or throat pain   NECK: No pain or stiffness  RESPIRATORY: No cough, wheezing, or shortness of breath  CARDIOVASCULAR: No chest pain or palpitations  GASTROINTESTINAL: No abdominal or epigastric pain. No nausea or vomiting. No diarrhea or constipation. +Colostomy bag  GENITOURINARY: No dysuria, frequency or hematuria. +Barnett  NEUROLOGICAL: No numbness or weakness  SKIN: No itching, rashes      VITAL SIGNS:  Vital Signs Last 24 Hrs  T(C): 36.7 (20 Aug 2021 10:37), Max: 36.7 (19 Aug 2021 20:37)  T(F): 98.1 (20 Aug 2021 10:37), Max: 98.1 (19 Aug 2021 20:37)  HR: 83 (20 Aug 2021 10:37) (83 - 91)  BP: 139/77 (20 Aug 2021 10:37) (130/62 - 139/77)  BP(mean): --  RR: 16 (20 Aug 2021 10:37) (16 - 16)  SpO2: 96% (20 Aug 2021 10:37) (96% - 96%)    PHYSICAL EXAM:    General: NAD, sitting in wheelchair comfortably  HEENT: NC/AT; PERRL, anicteric sclera; MMM  Neck: supple  Cardiovascular: +S1/S2, RRR  Respiratory: no respiratory distress, no wheezing  Gastrointestinal: soft, NT/ND, +intact Colostomy  Extremities: warm, well perfused; no edema, clubbing or cyanosis  Neurological: AAOx3; no motor weakness or sensory deficit  Skin: Back incision intact with one SEDRICK drain in place (with moderate amt of serosanguinous drainage in tube and bulb), but no drainage around the SEDRICK today.    MEDICATIONS:  MEDICATIONS  (STANDING):  apixaban 5 milliGRAM(s) Oral every 12 hours  calcium carbonate 1250 mG  + Vitamin D (OsCal 500 + D) 1 Tablet(s) Oral daily  chlorhexidine 4% Liquid 1 Application(s) Topical two times a day  gabapentin 200 milliGRAM(s) Oral every 8 hours  levothyroxine 137 MICROGram(s) Oral daily  metroNIDAZOLE    Tablet 500 milliGRAM(s) Oral every 8 hours  pantoprazole    Tablet 40 milliGRAM(s) Oral before breakfast  senna 2 Tablet(s) Oral at bedtime  vancomycin  IVPB 1250 milliGRAM(s) IV Intermittent every 12 hours    MEDICATIONS  (PRN):  acetaminophen   Tablet .. 650 milliGRAM(s) Oral every 6 hours PRN Temp greater or equal to 38C (100.4F), Mild Pain (1 - 3)  melatonin 3 milliGRAM(s) Oral at bedtime PRN Insomnia  polyethylene glycol 3350 17 Gram(s) Oral daily PRN Constipation  simethicone 80 milliGRAM(s) Chew two times a day PRN Upset Stomach      ALLERGIES:  Allergies    No Known Allergies    Intolerances        LABS:                        10.7   7.24  )-----------( 309      ( 19 Aug 2021 07:58 )             33.0     08-19    139  |  102  |  10  ----------------------------<  112<H>  3.6   |  28  |  0.95    Ca    8.8      19 Aug 2021 07:58          CAPILLARY BLOOD GLUCOSE          RADIOLOGY & ADDITIONAL TESTS: Reviewed.

## 2021-08-20 NOTE — PROGRESS NOTE ADULT - ASSESSMENT
61 year old male planned resection for sacral mass. He is s/p sacrectomy with L4-pelvis fusion. Post-operative course was complicated by bilateral PE, MRSA wound infection s/p diversion colostomy.     Continue rehab program    #MRSA INFECTION  -Continue IV abx - Vancomycin IV and Flagyl PO; ends 9/7/21. ID in. Afebrile, improved leukocytosis.   -Contact isolation  -Right SERDICK removed by Dr Cevallos on 8/11. Discussed w/Dr. Peraza on 8/10-ok to remove if less than 30cc drainage per shift.  -Last vanco trough 8/4: 13.1, repeat vanco trough in AM - 17.8 (8/20)  -8/19: with ongoing serosanguinous drainage into the SEDRICK drain, about 50 mls from early AM to 2PM, some intermittent serosanguinous drainage around the drain as well.  Site examined and benign, likely additional drainage resulting from increased overall activity involving her back.  Will continue to monitor.  Will d/w surgery if it persists or worsens. - improved and resolved    #Pain management:   -Gabapentin, tylenol prn,  tramadol prn    # PE  - tolerating Eliquis 8/11, no bleeding reported. Close watch of HH.  -evaluated by heme  - plan discussed with primary Plastics and Surgical team on 8/10: Dr Peraza and Dr Robin-Strickland. Ok for PO Eliquis to begin/no contraindication.     #Hypothyroidism  - Synthroid 137 mcg daily    #s/p Ileus, now s/p Colostomy  -Monitor output daily, much less bloating, bag w/formed brown stool.   - ostomy care-abd sites healing well, pt not distended    #  -  consulted regarding Barnett and if we can do a TOV or potentially have him straight cath at home  - Urology recommended home with Barnett and outpatient urological follow up    #GI ppx  - Protonix for GI ppx while on Eliquis  - probiotic yoghurt with meals  - nutrition input appreciated      #Bowel Regimen  - Senna nightly    #Anxiety  - neuropsych following  - pt refused Rx interventions at this time.     #Discharge planning  - EDOD: 8/24 to community with elderly father, home services (IV Abx)  - DME (hosp bed): Pt will require the use of a hospital bed at home. Due to his wounds, the hospital bed will be able to provide pressure relief as well as the ability to turn and adjust. Pt will also need to be in more upright positions to change the colostomy bag and change his Barnett catheter.

## 2021-08-21 PROCEDURE — 99232 SBSQ HOSP IP/OBS MODERATE 35: CPT

## 2021-08-21 PROCEDURE — 99231 SBSQ HOSP IP/OBS SF/LOW 25: CPT | Mod: GC

## 2021-08-21 RX ADMIN — GABAPENTIN 200 MILLIGRAM(S): 400 CAPSULE ORAL at 05:11

## 2021-08-21 RX ADMIN — PANTOPRAZOLE SODIUM 40 MILLIGRAM(S): 20 TABLET, DELAYED RELEASE ORAL at 05:11

## 2021-08-21 RX ADMIN — Medication 1 TABLET(S): at 11:29

## 2021-08-21 RX ADMIN — Medication 166.67 MILLIGRAM(S): at 17:54

## 2021-08-21 RX ADMIN — Medication 500 MILLIGRAM(S): at 15:28

## 2021-08-21 RX ADMIN — Medication 500 MILLIGRAM(S): at 22:01

## 2021-08-21 RX ADMIN — Medication 137 MICROGRAM(S): at 05:11

## 2021-08-21 RX ADMIN — Medication 500 MILLIGRAM(S): at 05:11

## 2021-08-21 RX ADMIN — GABAPENTIN 200 MILLIGRAM(S): 400 CAPSULE ORAL at 15:29

## 2021-08-21 RX ADMIN — CHLORHEXIDINE GLUCONATE 1 APPLICATION(S): 213 SOLUTION TOPICAL at 22:01

## 2021-08-21 RX ADMIN — APIXABAN 5 MILLIGRAM(S): 2.5 TABLET, FILM COATED ORAL at 05:11

## 2021-08-21 RX ADMIN — CHLORHEXIDINE GLUCONATE 1 APPLICATION(S): 213 SOLUTION TOPICAL at 05:11

## 2021-08-21 RX ADMIN — GABAPENTIN 200 MILLIGRAM(S): 400 CAPSULE ORAL at 22:01

## 2021-08-21 RX ADMIN — APIXABAN 5 MILLIGRAM(S): 2.5 TABLET, FILM COATED ORAL at 17:54

## 2021-08-21 RX ADMIN — Medication 166.67 MILLIGRAM(S): at 05:11

## 2021-08-21 NOTE — PROGRESS NOTE ADULT - SUBJECTIVE AND OBJECTIVE BOX
Patient is a 61y old  Male who presents with a chief complaint of rehab (19 Aug 2021 07:54)      Patient seen and examined at bedside.  No overnight events  No complaints this morning. Slept well    ALLERGIES:  No Known Allergies    MEDICATIONS  (STANDING):  apixaban 5 milliGRAM(s) Oral every 12 hours  calcium carbonate 1250 mG  + Vitamin D (OsCal 500 + D) 1 Tablet(s) Oral daily  chlorhexidine 4% Liquid 1 Application(s) Topical two times a day  gabapentin 200 milliGRAM(s) Oral every 8 hours  levothyroxine 137 MICROGram(s) Oral daily  metroNIDAZOLE    Tablet 500 milliGRAM(s) Oral every 8 hours  pantoprazole    Tablet 40 milliGRAM(s) Oral before breakfast  senna 2 Tablet(s) Oral at bedtime  vancomycin  IVPB 1250 milliGRAM(s) IV Intermittent every 12 hours    MEDICATIONS  (PRN):  acetaminophen   Tablet .. 650 milliGRAM(s) Oral every 6 hours PRN Temp greater or equal to 38C (100.4F), Mild Pain (1 - 3)  melatonin 3 milliGRAM(s) Oral at bedtime PRN Insomnia  polyethylene glycol 3350 17 Gram(s) Oral daily PRN Constipation  simethicone 80 milliGRAM(s) Chew two times a day PRN Upset Stomach    Vital Signs Last 24 Hrs  T(F): 98.3 (21 Aug 2021 08:18), Max: 100 (20 Aug 2021 20:35)  HR: 86 (21 Aug 2021 08:18) (86 - 89)  BP: 139/86 (21 Aug 2021 08:18) (133/80 - 139/86)  RR: 14 (21 Aug 2021 08:18) (14 - 16)  SpO2: 94% (21 Aug 2021 08:18) (94% - 94%)  I&O's Summary    20 Aug 2021 07:01  -  21 Aug 2021 07:00  --------------------------------------------------------  IN: 0 mL / OUT: 2180 mL / NET: -2180 mL      PHYSICAL EXAM:  GENERAL: NAD  HENT:  Atraumatic, Normocephalic; No tonsillar erythema, exudates, or enlargement; Moist mucous membranes;   EYES: EOMI, PERRLA, conjunctiva and sclera clear, no lid-lag  NECK: Supple, No JVD, Normal thyroid  CHEST/LUNG: Clear to percussion bilaterally; No rales, rhonchi, wheezing, or rubs; normal respiratory effort, no intercostal retractions  HEART: Regular rate and rhythm; No murmurs, rubs, or gallops; No pitting edema  ABDOMEN: Soft, Nontender, Nondistended; Bowel sounds present; No HSM; + colostomy + left sided drain  MUSCULOSKELETAL/EXTREMITIES:  2+ Peripheral Pulses, No clubbing or digital cyanosis  PSYCH: Appropriate affect, Alert & Awake    LABS:                        10.7   7.24  )-----------( 309      ( 19 Aug 2021 07:58 )             33.0       08-19    139  |  102  |  10  ----------------------------<  112  3.6   |  28  |  0.95    Ca    8.8      19 Aug 2021 07:58       eGFR if Non African American: 87 mL/min/1.73M2 (08-19-21 @ 07:58)  eGFR if African American: 100 mL/min/1.73M2 (08-19-21 @ 07:58)      COVID-19 PCR: NotDetec (08-02-21 @ 20:45)  COVID-19 PCR: NotDetec (08-02-21 @ 13:00)      Care Discussed with Rehab Attending and Other Providers

## 2021-08-21 NOTE — PROGRESS NOTE ADULT - SUBJECTIVE AND OBJECTIVE BOX
Cc: Gait dysfunction    HPI: Patient with no new medical issues today.  +stool in ostomy  Pain controlled, no chest pain, no N/V, no Fevers/Chills. No other new ROS  Has been tolerating rehabilitation program.    MEDICATIONS  (STANDING):  apixaban 5 milliGRAM(s) Oral every 12 hours  calcium carbonate 1250 mG  + Vitamin D (OsCal 500 + D) 1 Tablet(s) Oral daily  chlorhexidine 4% Liquid 1 Application(s) Topical two times a day  gabapentin 200 milliGRAM(s) Oral every 8 hours  levothyroxine 137 MICROGram(s) Oral daily  metroNIDAZOLE    Tablet 500 milliGRAM(s) Oral every 8 hours  pantoprazole    Tablet 40 milliGRAM(s) Oral before breakfast  senna 2 Tablet(s) Oral at bedtime  vancomycin  IVPB 1250 milliGRAM(s) IV Intermittent every 12 hours    MEDICATIONS  (PRN):  acetaminophen   Tablet .. 650 milliGRAM(s) Oral every 6 hours PRN Temp greater or equal to 38C (100.4F), Mild Pain (1 - 3)  melatonin 3 milliGRAM(s) Oral at bedtime PRN Insomnia  polyethylene glycol 3350 17 Gram(s) Oral daily PRN Constipation  simethicone 80 milliGRAM(s) Chew two times a day PRN Upset Stomach      Vital Signs Last 24 Hrs  T(C): 36.8 (21 Aug 2021 08:18), Max: 37.8 (20 Aug 2021 20:35)  T(F): 98.3 (21 Aug 2021 08:18), Max: 100 (20 Aug 2021 20:35)  HR: 86 (21 Aug 2021 08:18) (86 - 89)  BP: 139/86 (21 Aug 2021 08:18) (133/80 - 139/86)  BP(mean): --  RR: 14 (21 Aug 2021 08:18) (14 - 16)  SpO2: 94% (21 Aug 2021 08:18) (94% - 94%)    In NAD  HEENT- EOMI  Heart- RRR, S1S2  Lungs- CTA bl.  Abd- + BS, NT  Ext- No calf pain  Neuro- Exam unchanged            CAPILLARY BLOOD GLUCOSE                    Imp: Patient with diagnosis of  gait ab SP resection of sacral mass admitted for comprehensive acute rehabilitation.    Plan:  - Continue therapies  - DVT prophylaxis- on apixaban  - Skin- Turn q2h, check skin daily  - Continue current medications; patient medically stable.   - Patient is stable to continue current rehabilitation program.    no

## 2021-08-22 PROCEDURE — 99231 SBSQ HOSP IP/OBS SF/LOW 25: CPT | Mod: GC

## 2021-08-22 RX ADMIN — Medication 500 MILLIGRAM(S): at 14:24

## 2021-08-22 RX ADMIN — Medication 166.67 MILLIGRAM(S): at 17:03

## 2021-08-22 RX ADMIN — APIXABAN 5 MILLIGRAM(S): 2.5 TABLET, FILM COATED ORAL at 17:03

## 2021-08-22 RX ADMIN — Medication 500 MILLIGRAM(S): at 21:43

## 2021-08-22 RX ADMIN — CHLORHEXIDINE GLUCONATE 1 APPLICATION(S): 213 SOLUTION TOPICAL at 17:04

## 2021-08-22 RX ADMIN — GABAPENTIN 200 MILLIGRAM(S): 400 CAPSULE ORAL at 14:20

## 2021-08-22 RX ADMIN — Medication 137 MICROGRAM(S): at 05:39

## 2021-08-22 RX ADMIN — APIXABAN 5 MILLIGRAM(S): 2.5 TABLET, FILM COATED ORAL at 05:39

## 2021-08-22 RX ADMIN — Medication 1 TABLET(S): at 14:24

## 2021-08-22 RX ADMIN — PANTOPRAZOLE SODIUM 40 MILLIGRAM(S): 20 TABLET, DELAYED RELEASE ORAL at 05:39

## 2021-08-22 RX ADMIN — Medication 500 MILLIGRAM(S): at 05:39

## 2021-08-22 RX ADMIN — CHLORHEXIDINE GLUCONATE 1 APPLICATION(S): 213 SOLUTION TOPICAL at 05:39

## 2021-08-22 RX ADMIN — GABAPENTIN 200 MILLIGRAM(S): 400 CAPSULE ORAL at 05:39

## 2021-08-22 RX ADMIN — GABAPENTIN 200 MILLIGRAM(S): 400 CAPSULE ORAL at 21:42

## 2021-08-22 RX ADMIN — Medication 166.67 MILLIGRAM(S): at 05:39

## 2021-08-22 NOTE — PROGRESS NOTE ADULT - SUBJECTIVE AND OBJECTIVE BOX
Cc: Gait dysfunction    HPI: Patient with no new medical issues today.  +stool in ostomy  Pain controlled, no chest pain, no N/V, no Fevers/Chills. No other new ROS  Has been tolerating rehabilitation program.    MEDICATIONS  (STANDING):  apixaban 5 milliGRAM(s) Oral every 12 hours  calcium carbonate 1250 mG  + Vitamin D (OsCal 500 + D) 1 Tablet(s) Oral daily  chlorhexidine 4% Liquid 1 Application(s) Topical two times a day  gabapentin 200 milliGRAM(s) Oral every 8 hours  levothyroxine 137 MICROGram(s) Oral daily  metroNIDAZOLE    Tablet 500 milliGRAM(s) Oral every 8 hours  pantoprazole    Tablet 40 milliGRAM(s) Oral before breakfast  senna 2 Tablet(s) Oral at bedtime  vancomycin  IVPB 1250 milliGRAM(s) IV Intermittent every 12 hours    MEDICATIONS  (PRN):  acetaminophen   Tablet .. 650 milliGRAM(s) Oral every 6 hours PRN Temp greater or equal to 38C (100.4F), Mild Pain (1 - 3)  melatonin 3 milliGRAM(s) Oral at bedtime PRN Insomnia  polyethylene glycol 3350 17 Gram(s) Oral daily PRN Constipation  simethicone 80 milliGRAM(s) Chew two times a day PRN Upset Stomach      Vital Signs Last 24 Hrs  T(C): 36.9 (22 Aug 2021 09:56), Max: 37.3 (21 Aug 2021 20:14)  T(F): 98.4 (22 Aug 2021 09:56), Max: 99.1 (21 Aug 2021 20:14)  HR: 82 (22 Aug 2021 09:56) (82 - 97)  BP: 133/83 (22 Aug 2021 09:56) (133/83 - 143/71)  BP(mean): --  RR: 17 (22 Aug 2021 09:56) (14 - 17)  SpO2: 94% (22 Aug 2021 09:56) (94% - 97%)    In NAD  HEENT- EOMI  Heart- RRR, S1S2  Lungs- CTA bl.  Abd- + BS, NT  Ext- No calf pain  Neuro- Exam unchanged            CAPILLARY BLOOD GLUCOSE                    Imp: Patient with diagnosis of  gait ab SP resection of sacral mass admitted for comprehensive acute rehabilitation.    Plan:  - Continue therapies  - DVT prophylaxis- on apixaban  - Skin- Turn q2h, check skin daily  - Continue current medications; patient medically stable.   - Patient is stable to continue current rehabilitation program.

## 2021-08-23 ENCOUNTER — TRANSCRIPTION ENCOUNTER (OUTPATIENT)
Age: 61
End: 2021-08-23

## 2021-08-23 LAB
ANION GAP SERPL CALC-SCNC: 11 MMOL/L — SIGNIFICANT CHANGE UP (ref 5–17)
BASOPHILS # BLD AUTO: 0.07 K/UL — SIGNIFICANT CHANGE UP (ref 0–0.2)
BASOPHILS NFR BLD AUTO: 0.9 % — SIGNIFICANT CHANGE UP (ref 0–2)
BUN SERPL-MCNC: 10 MG/DL — SIGNIFICANT CHANGE UP (ref 7–23)
CALCIUM SERPL-MCNC: 9 MG/DL — SIGNIFICANT CHANGE UP (ref 8.4–10.5)
CHLORIDE SERPL-SCNC: 99 MMOL/L — SIGNIFICANT CHANGE UP (ref 96–108)
CO2 SERPL-SCNC: 24 MMOL/L — SIGNIFICANT CHANGE UP (ref 22–31)
CREAT SERPL-MCNC: 1.1 MG/DL — SIGNIFICANT CHANGE UP (ref 0.5–1.3)
EOSINOPHIL # BLD AUTO: 0.14 K/UL — SIGNIFICANT CHANGE UP (ref 0–0.5)
EOSINOPHIL NFR BLD AUTO: 1.7 % — SIGNIFICANT CHANGE UP (ref 0–6)
GLUCOSE SERPL-MCNC: 164 MG/DL — HIGH (ref 70–99)
HCT VFR BLD CALC: 38.5 % — LOW (ref 39–50)
HGB BLD-MCNC: 12.4 G/DL — LOW (ref 13–17)
IMM GRANULOCYTES NFR BLD AUTO: 0.4 % — SIGNIFICANT CHANGE UP (ref 0–1.5)
LYMPHOCYTES # BLD AUTO: 0.99 K/UL — LOW (ref 1–3.3)
LYMPHOCYTES # BLD AUTO: 12.1 % — LOW (ref 13–44)
MCHC RBC-ENTMCNC: 27.9 PG — SIGNIFICANT CHANGE UP (ref 27–34)
MCHC RBC-ENTMCNC: 32.2 GM/DL — SIGNIFICANT CHANGE UP (ref 32–36)
MCV RBC AUTO: 86.5 FL — SIGNIFICANT CHANGE UP (ref 80–100)
MONOCYTES # BLD AUTO: 0.59 K/UL — SIGNIFICANT CHANGE UP (ref 0–0.9)
MONOCYTES NFR BLD AUTO: 7.2 % — SIGNIFICANT CHANGE UP (ref 2–14)
NEUTROPHILS # BLD AUTO: 6.36 K/UL — SIGNIFICANT CHANGE UP (ref 1.8–7.4)
NEUTROPHILS NFR BLD AUTO: 77.7 % — HIGH (ref 43–77)
NRBC # BLD: 0 /100 WBCS — SIGNIFICANT CHANGE UP (ref 0–0)
PLATELET # BLD AUTO: 405 K/UL — HIGH (ref 150–400)
POTASSIUM SERPL-MCNC: 3.6 MMOL/L — SIGNIFICANT CHANGE UP (ref 3.5–5.3)
POTASSIUM SERPL-SCNC: 3.6 MMOL/L — SIGNIFICANT CHANGE UP (ref 3.5–5.3)
RBC # BLD: 4.45 M/UL — SIGNIFICANT CHANGE UP (ref 4.2–5.8)
RBC # FLD: 15 % — HIGH (ref 10.3–14.5)
SODIUM SERPL-SCNC: 134 MMOL/L — LOW (ref 135–145)
WBC # BLD: 8.18 K/UL — SIGNIFICANT CHANGE UP (ref 3.8–10.5)
WBC # FLD AUTO: 8.18 K/UL — SIGNIFICANT CHANGE UP (ref 3.8–10.5)

## 2021-08-23 PROCEDURE — 99232 SBSQ HOSP IP/OBS MODERATE 35: CPT | Mod: GC

## 2021-08-23 RX ORDER — SENNA PLUS 8.6 MG/1
2 TABLET ORAL
Qty: 0 | Refills: 0 | DISCHARGE
Start: 2021-08-23

## 2021-08-23 RX ORDER — LEVOTHYROXINE SODIUM 125 MCG
1 TABLET ORAL
Qty: 0 | Refills: 0 | DISCHARGE

## 2021-08-23 RX ORDER — POLYETHYLENE GLYCOL 3350 17 G/17G
17 POWDER, FOR SOLUTION ORAL
Qty: 0 | Refills: 0 | DISCHARGE
Start: 2021-08-23

## 2021-08-23 RX ADMIN — GABAPENTIN 200 MILLIGRAM(S): 400 CAPSULE ORAL at 21:55

## 2021-08-23 RX ADMIN — Medication 500 MILLIGRAM(S): at 14:59

## 2021-08-23 RX ADMIN — Medication 166.67 MILLIGRAM(S): at 18:02

## 2021-08-23 RX ADMIN — CHLORHEXIDINE GLUCONATE 1 APPLICATION(S): 213 SOLUTION TOPICAL at 18:05

## 2021-08-23 RX ADMIN — Medication 500 MILLIGRAM(S): at 06:11

## 2021-08-23 RX ADMIN — Medication 166.67 MILLIGRAM(S): at 05:26

## 2021-08-23 RX ADMIN — CHLORHEXIDINE GLUCONATE 1 APPLICATION(S): 213 SOLUTION TOPICAL at 06:10

## 2021-08-23 RX ADMIN — Medication 137 MICROGRAM(S): at 06:11

## 2021-08-23 RX ADMIN — APIXABAN 5 MILLIGRAM(S): 2.5 TABLET, FILM COATED ORAL at 18:05

## 2021-08-23 RX ADMIN — GABAPENTIN 200 MILLIGRAM(S): 400 CAPSULE ORAL at 14:59

## 2021-08-23 RX ADMIN — GABAPENTIN 200 MILLIGRAM(S): 400 CAPSULE ORAL at 06:11

## 2021-08-23 RX ADMIN — Medication 1 TABLET(S): at 14:59

## 2021-08-23 RX ADMIN — PANTOPRAZOLE SODIUM 40 MILLIGRAM(S): 20 TABLET, DELAYED RELEASE ORAL at 06:11

## 2021-08-23 RX ADMIN — APIXABAN 5 MILLIGRAM(S): 2.5 TABLET, FILM COATED ORAL at 06:09

## 2021-08-23 RX ADMIN — Medication 500 MILLIGRAM(S): at 21:55

## 2021-08-23 NOTE — PROGRESS NOTE ADULT - ASSESSMENT
61 year old male planned resection for sacral mass. He is s/p sacrectomy with L4-pelvis fusion. Post-operative course was complicated by bilateral PE, MRSA wound infection s/p diversion colostomy.     Continue rehab program    #MRSA INFECTION  -Continue IV abx - Vancomycin IV and Flagyl PO; ends 9/7/21. ID in. Afebrile, improved leukocytosis.   -Contact isolation  -Right SEDRICK removed by Dr Cevallos on 8/11. Discussed w/Dr. Peraza on 8/10-ok to remove if less than 30cc drainage per shift.  -Last vanco trough 8/4: 13.1, repeat vanco trough in AM - 17.8 (8/20)  -8/19: with ongoing serosanguinous drainage into the SEDRICK drain, about 50 mls from early AM to 2PM, some intermittent serosanguinous drainage around the drain as well.  Site examined and benign, likely additional drainage resulting from increased overall activity involving her back.  Will continue to monitor.  Will d/w surgery if it persists or worsens. - improved and resolved    #Pain management:   -Gabapentin, tylenol prn,  tramadol prn    # PE  - tolerating Eliquis 8/11, no bleeding reported. Close watch of HH.  -evaluated by heme  - plan discussed with primary Plastics and Surgical team on 8/10: Dr Peraza and Dr Robin-Strickladn. Ok for PO Eliquis to begin/no contraindication.     #Hypothyroidism  - Synthroid 137 mcg daily    #s/p Ileus, now s/p Colostomy  -Monitor output daily, much less bloating, bag w/formed brown stool.   - ostomy care-abd sites healing well, pt not distended    #  -  consulted regarding Barnett and if we can do a TOV or potentially have him straight cath at home  - Urology recommended home with Barnett and outpatient urological follow up    #GI ppx  - Protonix for GI ppx while on Eliquis  - probiotic yoghurt with meals  - nutrition input appreciated      #Bowel Regimen  - Senna nightly    #Anxiety  - neuropsych following  - pt refused Rx interventions at this time.     #Discharge planning  - EDOD: 8/24 to community with elderly father, home services (IV Abx)  - DME (hosp bed): Pt will require the use of a hospital bed at home. Due to his wounds, the hospital bed will be able to provide pressure relief as well as the ability to turn and adjust. Pt will also need to be in more upright positions to change the colostomy bag and change his Barnett catheter.    61 year old male planned resection for sacral mass. He is s/p sacrectomy with L4-pelvis fusion. Post-operative course was complicated by bilateral PE, MRSA wound infection s/p diversion colostomy.     Continue rehab program    #MRSA INFECTION  -Continue IV abx - Vancomycin IV and Flagyl PO; ends 9/7/21. ID in. Afebrile, improved leukocytosis.   -Contact isolation  -Right SEDRICK removed by Dr Cevallos on 8/11. Discussed w/Dr. Peraza on 8/10-ok to remove if less than 30cc drainage per shift.  -Last vanco trough 8/4: 13.1, repeat vanco trough in AM - 17.8 (8/20)  -8/19: with ongoing serosanguinous drainage into the SEDRICK drain, about 50 mls from early AM to 2PM, some intermittent serosanguinous drainage around the drain as well.  Site examined and benign, likely additional drainage resulting from increased overall activity involving her back.  Will continue to monitor.  Will d/w surgery if it persists or worsens. - improved and resolved    #Pain management:   -Gabapentin, tylenol prn,  tramadol prn    # PE  - tolerating Eliquis 8/11, no bleeding reported. Close watch of HH.  -evaluated by heme  - plan discussed with primary Plastics and Surgical team on 8/10: Dr Peraza and Dr Robin-Strickland. Ok for PO Eliquis to begin/no contraindication.     #Hypothyroidism  - Synthroid 137 mcg daily    #s/p Ileus, now s/p Colostomy  -Monitor output daily, much less bloating, bag w/formed brown stool.   - ostomy care-abd sites healing well, pt not distended    #  -  consulted regarding Barnett and if we can do a TOV or potentially have him straight cath at home  - Urology recommended home with Barnett and outpatient urological follow up    #GI ppx  - Protonix for GI ppx while on Eliquis  - probiotic yoghurt with meals  - nutrition input appreciated      #Bowel Regimen  - Senna nightly    #Anxiety  - neuropsych following  - pt refused Rx interventions at this time.     #Discharge planning  - EDOD: 8/24 to community with elderly father, home services (IV Abx)  - DME (hosp bed): Pt will require the use of a hospital bed at home. Due to his wounds, the hospital bed will be able to provide pressure relief as well as the ability to turn and adjust. Pt will also need to be in more upright positions to change the colostomy bag and change his Barnett catheter.  Pt requires frequent changes in body position to alleviate pain in ways not feasible by ordinary bed.   61 year old male planned resection for sacral mass. He is s/p sacrectomy with L4-pelvis fusion. Post-operative course was complicated by bilateral PE, MRSA wound infection s/p diversion colostomy.     Continue rehab program    #MRSA INFECTION  -Continue IV abx - Vancomycin IV and Flagyl PO; ends 9/7/21. ID in. Afebrile, improved leukocytosis.   -Contact isolation  -Right SEDRICK removed by Dr Cevallos on 8/11. Discussed w/Dr. Peraza on 8/10-ok to remove if less than 30cc drainage per shift.  -Last vanco trough 8/4: 13.1, repeat vanco trough in AM - 17.8 (8/20)  -8/22 SEDRICK drain output 25ml - Monitor    #Pain management:   -Gabapentin, tylenol prn,  tramadol prn    # PE  - tolerating Eliquis 8/11, no bleeding reported. Close watch of HH.  -evaluated by heme  - plan discussed with primary Plastics and Surgical team on 8/10: Dr Peraza and Dr Robin-Strickland. Ok for PO Eliquis to begin/no contraindication.     #Hypothyroidism  - Synthroid 137 mcg daily    #s/p Ileus, now s/p Colostomy  -Monitor output daily, much less bloating, bag w/formed brown stool.   - ostomy care-abd sites healing well, pt not distended    #  -  consulted regarding Barnett and if we can do a TOV or potentially have him straight cath at home  - Urology recommended home with Barnett and outpatient urological follow up    #GI ppx  - Protonix for GI ppx while on Eliquis  - probiotic yoghurt with meals  - nutrition input appreciated      #Bowel Regimen  - Senna nightly    #Anxiety  - neuropsych following  - pt refused Rx interventions at this time.     #Discharge planning  - EDOD: 8/24 to community with elderly father, home services (IV Abx)  - DME (hosp bed): Pt will require the use of a hospital bed at home. Due to his wounds, the hospital bed will be able to provide pressure relief as well as the ability to turn and adjust. Pt will also need to be in more upright positions to change the colostomy bag and change his Barnett catheter.  Pt requires frequent changes in body position to alleviate pain in ways not feasible by ordinary bed.

## 2021-08-23 NOTE — DISCHARGE NOTE PROVIDER - CARE PROVIDER_API CALL
Lucho Mcallister)  Neurosurgery  [address], [address]  [place] [address]  Phone: [phone]  Fax: [phone]  Follow Up Time:     Leopoldo Davis)  Surgery  [address], [place] [address]  Phone: [phone]  Fax: [phone]  Follow Up Time:     Emilia Wright)  Infectious Disease  [address], [address]  [place] [address]  Phone: [phone]  Fax: [phone]  Follow Up Time:     Brody Clark  UROLOGY  [address] [facility] [address] [facility], [address]  [place], [place] [id number]  Phone: [phone]  Fax: [phone]  Follow Up Time:

## 2021-08-23 NOTE — DISCHARGE NOTE PROVIDER - CARE PROVIDERS DIRECT ADDRESSES
,DirectAddress_Unknown,[email or web address],DirectAddress_Unknown,[email or web address]

## 2021-08-23 NOTE — DISCHARGE NOTE PROVIDER - PROVIDER TOKENS
PROVIDER:[TOKEN:[[id number]]],PROVIDER:[TOKEN:[[id number]]],PROVIDER:[TOKEN:[[id number]]],PROVIDER:[TOKEN:[[id number]]]

## 2021-08-23 NOTE — DISCHARGE NOTE PROVIDER - DETAILS OF MALNUTRITION DIAGNOSIS/DIAGNOSES
This patient has been assessed with a concern for Malnutrition and was treated during this hospitalization for the following Nutrition diagnosis/diagnoses:     -  08/03/2021: Severe protein-calorie malnutrition

## 2021-08-23 NOTE — DISCHARGE NOTE PROVIDER - HOSPITAL COURSE
Pt is a 60 y/o M with PMHx of thyroid cancer status post total thyroidectomy in 2010 and radioactive iodine treatment in 2011, hypogonadism, vitamin D deficiency, and osteopenia, with chronic constipation, right buttock and right scrotal pain and numbness. 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 The chordoma resulted in perineal numbness and overflow incontinence and he was admitted 7/7/21 for staged resection.   On 7/7, he underwent Plastic Surgery and General Surgery assisted vertical rectus abdominal myocutaneous flap harvest with transperitoneal ligation of internal iliac artery and vein. He is s/p en bloc sacrectomy with L4-pelvis fusion; EBL 4.5L status post 8 units PRBC, 6 units FFP, 1 pack platelets and 5L crystalloid; 7/8. Extubated. On 7/9  patient developed hypoxia and respiratory distress, desaturation. CPAP at night.  CTA revealed bilateral sub segmental PE. No right heart strain & Bibasilar and left lingular consolidative opacities. Started on Heparin gtt and transitioned to therapeutic lovenox on 8/1. Course further c/b ileus requiring gastric decompression. Pt underwent echo to check for RV per pulm, and it was grossly unremarkable.  Wound washout with plastics and duc pus noted, entire wound reopened on 7/26/21 and placement of 2 drains. Incontinent of stool. Cultures growing MRSA, enterococcus and bacteroides. ID following. Continue vancomycin and flagyl. S/P Diversion Colostomy to keep sacral incision clean on 7/28/21. Barnett for urinary incontinence and to keep sacral wound clean. Pt requires 6 week course of antibiotics until 9/7/21. PICC line in place. LE doppler neg for DVT. Patient tolerating regular diet with fully functioning colostomy. Evaluated by Physical Therapy and PM&R and was recommended for Acute rehab. Pt was medically stable on 8/2/21 and was transferred to [facility].     Patient was stable upon rehab admission to  Inpatient Rehabilitation Facility. Admitted with gait instability, ADL, and functional impairments.     Rehab Course significant for removal of one of his SEDRICK drains by Plastics. Pt did well with colostomy and Barnett and he was continued on his antibiotics, which will continue until 9/7/21. All other medical co-morbidities were stable. Patient tolerated course of inpatient PT/OT/SLP rehab with significant functional improvements and met rehab goals prior to discharge. Patient was medically cleared on ____ for discharged to home.    Patient will follow up with the following:   Neurosurgery  General Surgery  Infectious Disease    On the day of discharge, the patient was seen and examined. Symptoms improved. Vital signs are stable. Labs and imaging reviewed. Patient is medically optimized and hemodynamically stable. Return precautions discussed, medication teach back done, and importance of physician followup emphasized. The patient verbalized understanding. Pt is a 62 y/o M with PMHx of thyroid cancer status post total thyroidectomy in 2010 and radioactive iodine treatment in 2011, hypogonadism, vitamin D deficiency, and osteopenia, with chronic constipation, right buttock and right scrotal pain and numbness. 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 The chordoma resulted in perineal numbness and overflow incontinence and he was admitted 7/7/21 for staged resection.   On 7/7, he underwent Plastic Surgery and General Surgery assisted vertical rectus abdominal myocutaneous flap harvest with transperitoneal ligation of internal iliac artery and vein. He is s/p en bloc sacrectomy with L4-pelvis fusion; EBL 4.5L status post 8 units PRBC, 6 units FFP, 1 pack platelets and 5L crystalloid; 7/8. Extubated. On 7/9  patient developed hypoxia and respiratory distress, desaturation. CPAP at night.  CTA revealed bilateral sub segmental PE. No right heart strain & Bibasilar and left lingular consolidative opacities. Started on Heparin gtt and transitioned to therapeutic lovenox on 8/1. Course further c/b ileus requiring gastric decompression. Pt underwent echo to check for RV per pulm, and it was grossly unremarkable.  Wound washout with plastics and duc pus noted, entire wound reopened on 7/26/21 and placement of 2 drains. Incontinent of stool. Cultures growing MRSA, enterococcus and bacteroides. ID following. Continue vancomycin and flagyl. S/P Diversion Colostomy to keep sacral incision clean on 7/28/21. Barnett for urinary incontinence and to keep sacral wound clean. Pt requires 6 week course of antibiotics until 9/7/21. PICC line in place. LE doppler neg for DVT. Patient tolerating regular diet with fully functioning colostomy. Evaluated by Physical Therapy and PM&R and was recommended for Acute rehab. Pt was medically stable on 8/2/21 and was transferred to [facility].     Patient was stable upon rehab admission to  Inpatient Rehabilitation Facility. Admitted with gait instability, ADL, and functional impairments.     Rehab Course significant for removal of both of his SEDRICK drains by Plastics. Pt did well with colostomy and Barnett and he was continued on his antibiotics, which will continue until 9/7/21. All other medical co-morbidities were stable. Patient tolerated course of inpatient PT/OT/SLP rehab with significant functional improvements and met rehab goals prior to discharge. Patient was medically cleared on 8/26 for discharged to home.    Patient will follow up with the following:   Neurosurgery  General Surgery  Infectious Disease    On the day of discharge, the patient was seen and examined. Symptoms improved. Vital signs are stable. Labs and imaging reviewed. Patient is medically optimized and hemodynamically stable. Return precautions discussed, medication teach back done, and importance of physician followup emphasized. The patient verbalized understanding.

## 2021-08-23 NOTE — PROGRESS NOTE ADULT - SUBJECTIVE AND OBJECTIVE BOX
HPI:  Pt is a 62 y/o M with PMHx of thyroid cancer status post total thyroidectomy in 2010 and radioactive iodine treatment in 2011, hypogonadism, vitamin D deficiency, and osteopenia, with chronic constipation, right buttock and right scrotal pain and numbness. 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 The chordoma resulted in perineal numbness and overflow incontinence and he was admitted 7/7/21 for staged resection.   On 7/7, he underwent Plastic Surgery and General Surgery assisted vertical rectus abdominal myocutaneous flap harvest with transperitoneal ligation of internal iliac artery and vein. He is s/p en bloc sacrectomy with L4-pelvis fusion; EBL 4.5L status post 8 units PRBC, 6 units FFP, 1 pack platelets and 5L crystalloid; 7/8. Extubated. On 7/9  patient developed hypoxia and respiratory distress, desaturation. CPAP at night.  CTA revealed bilateral sub segmental PE. No right heart strain & Bibasilar and left lingular consolidative opacities. Started on Heparin gtt and transitioned to therapeutic lovenox on 8/1. Course further c/b ileus requiring gastric decompression. Pt underwent echo to check for RV per pulm, and it was grossly unremarkable.  Wound washout with plastics and duc pus noted, entire wound reopened on 7/26/21 and placement of 2 drains. Incontinent of stool. Cultures growing MRSA, enterococcus and bacteroides. ID following. Continue vancomycin and flagyl. S/P Diversion Colostomy to keep sacral incision clean on 7/28/21. Barnett for urinary incontinence and to keep sacral wound clean. Pt requires 6 week course of antibiotics until 9/7/21. PICC line in place. LE doppler neg for DVT. Patient tolerating regular diet with fully functioning colostomy. Evaluated by Physical Therapy and PM&R and was recommended for Acute rehab. Pt was medically stable on 8/2/21 and was transferred to [facility].     SUBJECTIVE / INTERVAL HPI: Patient seen and examined at bedside. He is feeling well this morning. He had questions about the blood draws, but after it was explained to him, he was receptive to having it drawn. He denies pain, but occasionally has a bloated feeling after eating. Colostomy is working well.     REVIEW OF SYSTEMS:    CONSTITUTIONAL: No fevers or chills  EYES/ENT: No visual changes;  No vertigo or throat pain   NECK: No pain or stiffness  RESPIRATORY: No cough, wheezing, or shortness of breath  CARDIOVASCULAR: No chest pain or palpitations  GASTROINTESTINAL: No abdominal or epigastric pain. No nausea or vomiting. No diarrhea or constipation. +Colostomy  GENITOURINARY: No dysuria, frequency or hematuria. +Barnett  NEUROLOGICAL: No numbness or weakness  SKIN: No itching, rashes      VITAL SIGNS:  Vital Signs Last 24 Hrs  T(C): 36.6 (22 Aug 2021 21:00), Max: 36.6 (22 Aug 2021 21:00)  T(F): 97.8 (22 Aug 2021 21:00), Max: 97.8 (22 Aug 2021 21:00)  HR: 80 (22 Aug 2021 21:00) (80 - 80)  BP: 124/80 (22 Aug 2021 21:00) (124/80 - 124/80)  BP(mean): --  RR: 18 (22 Aug 2021 21:00) (18 - 18)  SpO2: 98% (22 Aug 2021 21:00) (98% - 98%)    PHYSICAL EXAM:    General: NAD  HEENT: NC/AT; PERRL, anicteric sclera; MMM  Neck: supple  Cardiovascular: +S1/S2, RRR  Respiratory: CTA B/L; no W/R/R, no crackles  Gastrointestinal: soft, NT/ND; +BSx4  Extremities: warm, well perfused; no edema, clubbing or cyanosis  Vascular: 2+ radial, DP/PT pulses B/L  Neurological: AAOx3; no focal deficits    MEDICATIONS:  MEDICATIONS  (STANDING):  apixaban 5 milliGRAM(s) Oral every 12 hours  calcium carbonate 1250 mG  + Vitamin D (OsCal 500 + D) 1 Tablet(s) Oral daily  chlorhexidine 4% Liquid 1 Application(s) Topical two times a day  gabapentin 200 milliGRAM(s) Oral every 8 hours  levothyroxine 137 MICROGram(s) Oral daily  metroNIDAZOLE    Tablet 500 milliGRAM(s) Oral every 8 hours  pantoprazole    Tablet 40 milliGRAM(s) Oral before breakfast  senna 2 Tablet(s) Oral at bedtime  vancomycin  IVPB 1250 milliGRAM(s) IV Intermittent every 12 hours    MEDICATIONS  (PRN):  acetaminophen   Tablet .. 650 milliGRAM(s) Oral every 6 hours PRN Temp greater or equal to 38C (100.4F), Mild Pain (1 - 3)  melatonin 3 milliGRAM(s) Oral at bedtime PRN Insomnia  polyethylene glycol 3350 17 Gram(s) Oral daily PRN Constipation  simethicone 80 milliGRAM(s) Chew two times a day PRN Upset Stomach      ALLERGIES:  Allergies    No Known Allergies    Intolerances        LABS:                        12.4   8.18  )-----------( 405      ( 23 Aug 2021 09:00 )             38.5     08-23    134<L>  |  99  |  10  ----------------------------<  164<H>  3.6   |  24  |  1.10    Ca    9.0      23 Aug 2021 09:00          CAPILLARY BLOOD GLUCOSE          RADIOLOGY & ADDITIONAL TESTS: Reviewed.

## 2021-08-23 NOTE — DISCHARGE NOTE PROVIDER - NSDCHHNEEDSERVICE_GEN_ALL_CORE
Central venous access care/Ostomy care and management/Rehabilitation services/Wound care and assessment

## 2021-08-23 NOTE — DISCHARGE NOTE PROVIDER - NSDCCPCAREPLAN_GEN_ALL_CORE_FT
PRINCIPAL DISCHARGE DIAGNOSIS  Diagnosis: Sacral mass  Assessment and Plan of Treatment: You presented to the hospital for a planned resection of a sacral mass. You underwent the sacrectomy and L4-pelvis fusion. Post-surgery, you developed a wound infection and underwent diversion colostomy. You came to [place] for rehab and did well with therapy. You will need to follow up with your surgeons for continued management. You will also need to follow up with urology for Barnett catheter management and eventual removal.      SECONDARY DISCHARGE DIAGNOSES  Diagnosis: MRSA infection  Assessment and Plan of Treatment: You developed an infection of your wound site and were started on antibiotics. You will need to continue on these antibiotics, Vancomycin 1250mg twice a day IV and Flagyl 500mg three times a day Oral, until 9/7/21. You will need to follow up with the infectious disease doctors as an outpatient.    Diagnosis: Pulmonary embolism  Assessment and Plan of Treatment: During your stay in the original hospital you developed clots in your lungs. You were started on Eliquis and you should continue taking this medications and follow up with your primary care physician.    Diagnosis: Hypothyroidism  Assessment and Plan of Treatment: You have low thyroid hormone and should continue taking Synthroid and follow up with your primary care physician.     PRINCIPAL DISCHARGE DIAGNOSIS  Diagnosis: Sacral mass  Assessment and Plan of Treatment: You presented to the hospital for a planned resection of a sacral mass. You underwent the sacrectomy and L4-pelvis fusion. Post-surgery, you developed a wound infection and underwent diversion colostomy. You came to [place] for rehab and did well with therapy. Your drains were removed. You will need to follow up with your surgeons for continued management. You will also need to follow up with urology for Barnett catheter management and eventual removal.      SECONDARY DISCHARGE DIAGNOSES  Diagnosis: MRSA infection  Assessment and Plan of Treatment: You developed an infection of your wound site and were started on antibiotics. You will need to continue on these antibiotics, Vancomycin 1250mg twice a day IV and Flagyl 500mg three times a day Oral, until 9/7/21. You will need to follow up with the infectious disease doctors as an outpatient.    Diagnosis: Pulmonary embolism  Assessment and Plan of Treatment: During your stay in the original hospital you developed clots in your lungs. You were started on Eliquis and you should continue taking this medications and follow up with your primary care physician.    Diagnosis: Hypothyroidism  Assessment and Plan of Treatment: You have low thyroid hormone and should continue taking Synthroid and follow up with your primary care physician.

## 2021-08-23 NOTE — DISCHARGE NOTE PROVIDER - NSDCMRMEDTOKEN_GEN_ALL_CORE_FT
apixaban 5 mg oral tablet: 1 tab(s) orally every 12 hours  calcium-vitamin D 500 mg-5 mcg (200 intl units) oral tablet: 1 tab(s) orally once a day  gabapentin 100 mg oral capsule: 2 cap(s) orally every 8 hours  levothyroxine 137 mcg (0.137 mg) oral tablet: 1 tab(s) orally once a day  metroNIDAZOLE 500 mg oral tablet: 1 tab(s) orally every 8 hours  Multiple Vitamins oral tablet: 1 tab(s) orally once a day  pantoprazole 40 mg oral delayed release tablet: 1 tab(s) orally once a day (before a meal)  polyethylene glycol 3350 oral powder for reconstitution: 17 gram(s) orally once a day, As needed, Constipation  senna oral tablet: 2 tab(s) orally once a day (at bedtime)  TriCor 134 mg oral capsule: 1 cap(s) orally once a day  vancomycin 1.25 g intravenous injection: 1.25 gram(s) intravenously every 12 hours   Vitamin D3 2000 intl units (50 mcg) oral tablet: orally once a day

## 2021-08-24 DIAGNOSIS — Z29.9 ENCOUNTER FOR PROPHYLACTIC MEASURES, UNSPECIFIED: ICD-10-CM

## 2021-08-24 PROCEDURE — 99232 SBSQ HOSP IP/OBS MODERATE 35: CPT | Mod: GC

## 2021-08-24 PROCEDURE — 99232 SBSQ HOSP IP/OBS MODERATE 35: CPT

## 2021-08-24 RX ORDER — METRONIDAZOLE 500 MG
1 TABLET ORAL
Qty: 42 | Refills: 0
Start: 2021-08-24 | End: 2021-09-06

## 2021-08-24 RX ORDER — VANCOMYCIN HCL 1 G
1.25 VIAL (EA) INTRAVENOUS
Qty: 35 | Refills: 0
Start: 2021-08-24 | End: 2021-09-06

## 2021-08-24 RX ADMIN — Medication 500 MILLIGRAM(S): at 05:56

## 2021-08-24 RX ADMIN — APIXABAN 5 MILLIGRAM(S): 2.5 TABLET, FILM COATED ORAL at 05:55

## 2021-08-24 RX ADMIN — Medication 166.67 MILLIGRAM(S): at 18:42

## 2021-08-24 RX ADMIN — GABAPENTIN 200 MILLIGRAM(S): 400 CAPSULE ORAL at 21:24

## 2021-08-24 RX ADMIN — APIXABAN 5 MILLIGRAM(S): 2.5 TABLET, FILM COATED ORAL at 18:42

## 2021-08-24 RX ADMIN — Medication 166.67 MILLIGRAM(S): at 05:56

## 2021-08-24 RX ADMIN — Medication 500 MILLIGRAM(S): at 21:25

## 2021-08-24 RX ADMIN — PANTOPRAZOLE SODIUM 40 MILLIGRAM(S): 20 TABLET, DELAYED RELEASE ORAL at 06:14

## 2021-08-24 RX ADMIN — Medication 500 MILLIGRAM(S): at 15:11

## 2021-08-24 RX ADMIN — Medication 1 TABLET(S): at 12:34

## 2021-08-24 RX ADMIN — GABAPENTIN 200 MILLIGRAM(S): 400 CAPSULE ORAL at 15:10

## 2021-08-24 RX ADMIN — GABAPENTIN 200 MILLIGRAM(S): 400 CAPSULE ORAL at 05:55

## 2021-08-24 RX ADMIN — CHLORHEXIDINE GLUCONATE 1 APPLICATION(S): 213 SOLUTION TOPICAL at 05:55

## 2021-08-24 RX ADMIN — CHLORHEXIDINE GLUCONATE 1 APPLICATION(S): 213 SOLUTION TOPICAL at 18:44

## 2021-08-24 RX ADMIN — Medication 137 MICROGRAM(S): at 05:55

## 2021-08-24 NOTE — PROGRESS NOTE ADULT - ASSESSMENT
61 year old male planned resection for sacral mass. He is s/p sacrectomy with L4-pelvis fusion. Post-operative course was complicated by bilateral PE, MRSA wound infection s/p diversion colostomy.     Continue rehab program    #MRSA INFECTION  -Continue IV abx - Vancomycin IV and Flagyl PO; ends 9/7/21. ID in. Afebrile, improved leukocytosis.   -Contact isolation  -Right SEDRICK removed by Dr Cevallos on 8/11. Discussed w/Dr. Peraza on 8/10-ok to remove if less than 30cc drainage per shift.  -Last vanco trough 8/4: 13.1, repeat vanco trough in AM - 17.8 (8/20)  -8/22 SEDRICK drain output 25ml - Monitor  - Infusion nurse to come in tomorrow to evaluate patient    #Pain management:   -Gabapentin, tylenol prn,  tramadol prn    # PE  - tolerating Eliquis 8/11, no bleeding reported. Close watch of HH.  -evaluated by heme  - plan discussed with primary Plastics and Surgical team on 8/10: Dr Peraza and Dr Robin-Strickland. Ok for PO Eliquis to begin/no contraindication.     #Hypothyroidism  - Synthroid 137 mcg daily    #s/p Ileus, now s/p Colostomy  -Monitor output daily, much less bloating, bag w/formed brown stool.   - ostomy care-abd sites healing well, pt not distended    #  -  consulted regarding Barnett and if we can do a TOV or potentially have him straight cath at home  - Urology recommended home with Barnett and outpatient urological follow up    #GI ppx  - Protonix for GI ppx while on Eliquis  - probiotic yoghurt with meals  - nutrition input appreciated      #Bowel Regimen  - Senna nightly    #Anxiety  - neuropsych following  - pt refused Rx interventions at this time.     #Discharge planning  - EDOD: 8/24 to community with elderly father, home services (IV Abx)  - DME (hosp bed): Pt will require the use of a hospital bed at home. Due to his wounds, the hospital bed will be able to provide pressure relief as well as the ability to turn and adjust. Pt will also need to be in more upright positions to change the colostomy bag and change his Barnett catheter.  Pt requires frequent changes in body position to alleviate pain in ways not feasible by ordinary bed.

## 2021-08-24 NOTE — PROGRESS NOTE ADULT - SUBJECTIVE AND OBJECTIVE BOX
Patient is a 61y old  Male who presents with a chief complaint of rehab (19 Aug 2021 07:54)      Patient seen and examined at bedside. No events overnight. No acute complaints at this time however admits to same neurological shooting pain going down his leg, not different than in the past. Patient denies fevers, chills, nausea or vomiting, chest pain, SOB, abd pain at this time. Patient looking forward to going home this week.     ALLERGIES:  No Known Allergies    MEDICATIONS  (STANDING):  apixaban 5 milliGRAM(s) Oral every 12 hours  calcium carbonate 1250 mG  + Vitamin D (OsCal 500 + D) 1 Tablet(s) Oral daily  chlorhexidine 4% Liquid 1 Application(s) Topical two times a day  gabapentin 200 milliGRAM(s) Oral every 8 hours  levothyroxine 137 MICROGram(s) Oral daily  metroNIDAZOLE    Tablet 500 milliGRAM(s) Oral every 8 hours  pantoprazole    Tablet 40 milliGRAM(s) Oral before breakfast  senna 2 Tablet(s) Oral at bedtime  vancomycin  IVPB 1250 milliGRAM(s) IV Intermittent every 12 hours    MEDICATIONS  (PRN):  acetaminophen   Tablet .. 650 milliGRAM(s) Oral every 6 hours PRN Temp greater or equal to 38C (100.4F), Mild Pain (1 - 3)  melatonin 3 milliGRAM(s) Oral at bedtime PRN Insomnia  polyethylene glycol 3350 17 Gram(s) Oral daily PRN Constipation  simethicone 80 milliGRAM(s) Chew two times a day PRN Upset Stomach    Vital Signs Last 24 Hrs  T(F): 98.4 (24 Aug 2021 08:38), Max: 98.4 (24 Aug 2021 08:38)  HR: 98 (24 Aug 2021 08:38) (78 - 98)  BP: 115/68 (24 Aug 2021 08:38) (115/68 - 122/78)  RR: 16 (24 Aug 2021 08:38) (16 - 18)  SpO2: 98% (24 Aug 2021 08:38) (98% - 98%)  I&O's Summary    23 Aug 2021 07:01  -  24 Aug 2021 07:00  --------------------------------------------------------  IN: 250 mL / OUT: 2455 mL / NET: -2205 mL        PHYSICAL EXAM:  GENERAL: NAD, well-groomed, well-developed  HEAD:  Atraumatic, Normocephalic  EYES: PEERL, conjunctiva and sclera clear  ENMT: Moist mucous membranes, Good dentition, no thrush  CHEST/LUNG: Clear to auscultation bilaterally, good air entry, non-labored breathing  HEART: RRR; S1/S2, No murmur  ABDOMEN: Soft, Nontender, Nondistended; Bowel sounds present, colostomy bag in place, pink stoma  EXTREMITIES: No calf tenderness, No cyanosis, No edema, RUE with PICC line  SKIN: Warm, Intact  PSYCH: Normal mood, Normal affect  NERVOUS SYSTEM:  A/O x3, Good concentration    LABS:                        12.4   8.18  )-----------( 405      ( 23 Aug 2021 09:00 )             38.5     08-23    134  |  99  |  10  ----------------------------<  164  3.6   |  24  |  1.10    Ca    9.0      23 Aug 2021 09:00      eGFR if Non African American: 72 mL/min/1.73M2 (08-23-21 @ 09:00)  eGFR if : 84 mL/min/1.73M2 (08-23-21 @ 09:00)                                  COVID-19 PCR: [facility] (08-02-21 @ 20:45)  COVID-19 PCR: [facility] (08-02-21 @ 13:00)      RADIOLOGY & ADDITIONAL TESTS:    Care Discussed with Consultants/Other Providers: discussed with Dr. Atkins

## 2021-08-24 NOTE — PROGRESS NOTE ADULT - SUBJECTIVE AND OBJECTIVE BOX
HPI:  Pt is a 62 y/o M with PMHx of thyroid cancer status post total thyroidectomy in 2010 and radioactive iodine treatment in 2011, hypogonadism, vitamin D deficiency, and osteopenia, with chronic constipation, right buttock and right scrotal pain and numbness. 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 The chordoma resulted in perineal numbness and overflow incontinence and he was admitted 7/7/21 for staged resection.   On 7/7, he underwent Plastic Surgery and General Surgery assisted vertical rectus abdominal myocutaneous flap harvest with transperitoneal ligation of internal iliac artery and vein. He is s/p en bloc sacrectomy with L4-pelvis fusion; EBL 4.5L status post 8 units PRBC, 6 units FFP, 1 pack platelets and 5L crystalloid; 7/8. Extubated. On 7/9  patient developed hypoxia and respiratory distress, desaturation. CPAP at night.  CTA revealed bilateral sub segmental PE. No right heart strain & Bibasilar and left lingular consolidative opacities. Started on Heparin gtt and transitioned to therapeutic lovenox on 8/1. Course further c/b ileus requiring gastric decompression. Pt underwent echo to check for RV per pulm, and it was grossly unremarkable.  Wound washout with plastics and duc pus noted, entire wound reopened on 7/26/21 and placement of 2 drains. Incontinent of stool. Cultures growing MRSA, enterococcus and bacteroides. ID following. Continue vancomycin and flagyl. S/P Diversion Colostomy to keep sacral incision clean on 7/28/21. Barnett for urinary incontinence and to keep sacral wound clean. Pt requires 6 week course of antibiotics until 9/7/21. PICC line in place. LE doppler neg for DVT. Patient tolerating regular diet with fully functioning colostomy. Evaluated by Physical Therapy and PM&R and was recommended for Acute rehab. Pt was medically stable on 8/2/21 and was transferred to [facility].     SUBJECTIVE / INTERVAL HPI: Patient seen and examined in OT gym. He is doing well and slept overnight. He continues to have intermittent bloating in his abdomen after eating. He denies pain, shortness of breath, or nausea.     REVIEW OF SYSTEMS:    CONSTITUTIONAL: No weakness, fevers or chills  EYES/ENT: No visual changes;  No vertigo or throat pain   NECK: No pain or stiffness  RESPIRATORY: No cough, wheezing, or shortness of breath  CARDIOVASCULAR: No chest pain or palpitations  GASTROINTESTINAL: No abdominal or epigastric pain. No nausea or vomiting. No diarrhea or constipation.   GENITOURINARY: No dysuria, frequency or hematuria  NEUROLOGICAL: No numbness or weakness  SKIN: No itching, rashes      VITAL SIGNS:  Vital Signs Last 24 Hrs  T(C): 36.9 (24 Aug 2021 08:38), Max: 36.9 (24 Aug 2021 08:38)  T(F): 98.4 (24 Aug 2021 08:38), Max: 98.4 (24 Aug 2021 08:38)  HR: 98 (24 Aug 2021 08:38) (78 - 98)  BP: 115/68 (24 Aug 2021 08:38) (115/68 - 122/78)  BP(mean): --  RR: 16 (24 Aug 2021 08:38) (16 - 18)  SpO2: 98% (24 Aug 2021 08:38) (98% - 98%)    PHYSICAL EXAM:    General: NAD  HEENT: NC/AT; PERRL, anicteric sclera; MMM  Neck: supple  Cardiovascular: +S1/S2, RRR  Respiratory: CTA B/L; no W/R/R, no crackles  Gastrointestinal: soft, NT/ND; +BSx4  Extremities: warm, well perfused; no edema, clubbing or cyanosis  Vascular: 2+ radial, DP/PT pulses B/L  Neurological: AAOx3; no focal deficits    MEDICATIONS:  MEDICATIONS  (STANDING):  apixaban 5 milliGRAM(s) Oral every 12 hours  calcium carbonate 1250 mG  + Vitamin D (OsCal 500 + D) 1 Tablet(s) Oral daily  chlorhexidine 4% Liquid 1 Application(s) Topical two times a day  gabapentin 200 milliGRAM(s) Oral every 8 hours  levothyroxine 137 MICROGram(s) Oral daily  metroNIDAZOLE    Tablet 500 milliGRAM(s) Oral every 8 hours  pantoprazole    Tablet 40 milliGRAM(s) Oral before breakfast  senna 2 Tablet(s) Oral at bedtime  vancomycin  IVPB 1250 milliGRAM(s) IV Intermittent every 12 hours    MEDICATIONS  (PRN):  acetaminophen   Tablet .. 650 milliGRAM(s) Oral every 6 hours PRN Temp greater or equal to 38C (100.4F), Mild Pain (1 - 3)  melatonin 3 milliGRAM(s) Oral at bedtime PRN Insomnia  polyethylene glycol 3350 17 Gram(s) Oral daily PRN Constipation  simethicone 80 milliGRAM(s) Chew two times a day PRN Upset Stomach      ALLERGIES:  Allergies    No Known Allergies    Intolerances        LABS:                        12.4   8.18  )-----------( 405      ( 23 Aug 2021 09:00 )             38.5     08-23    134<L>  |  99  |  10  ----------------------------<  164<H>  3.6   |  24  |  1.10    Ca    9.0      23 Aug 2021 09:00          CAPILLARY BLOOD GLUCOSE          RADIOLOGY & ADDITIONAL TESTS: Reviewed. HPI:  Pt is a 60 y/o M with PMHx of thyroid cancer status post total thyroidectomy in 2010 and radioactive iodine treatment in 2011, hypogonadism, vitamin D deficiency, and osteopenia, with chronic constipation, right buttock and right scrotal pain and numbness. 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 The chordoma resulted in perineal numbness and overflow incontinence and he was admitted 7/7/21 for staged resection.   On 7/7, he underwent Plastic Surgery and General Surgery assisted vertical rectus abdominal myocutaneous flap harvest with transperitoneal ligation of internal iliac artery and vein. He is s/p en bloc sacrectomy with L4-pelvis fusion; EBL 4.5L status post 8 units PRBC, 6 units FFP, 1 pack platelets and 5L crystalloid; 7/8. Extubated. On 7/9  patient developed hypoxia and respiratory distress, desaturation. CPAP at night.  CTA revealed bilateral sub segmental PE. No right heart strain & Bibasilar and left lingular consolidative opacities. Started on Heparin gtt and transitioned to therapeutic lovenox on 8/1. Course further c/b ileus requiring gastric decompression. Pt underwent echo to check for RV per pulm, and it was grossly unremarkable.  Wound washout with plastics and duc pus noted, entire wound reopened on 7/26/21 and placement of 2 drains. Incontinent of stool. Cultures growing MRSA, enterococcus and bacteroides. ID following. Continue vancomycin and flagyl. S/P Diversion Colostomy to keep sacral incision clean on 7/28/21. Barnett for urinary incontinence and to keep sacral wound clean. Pt requires 6 week course of antibiotics until 9/7/21. PICC line in place. LE doppler neg for DVT. Patient tolerating regular diet with fully functioning colostomy. Evaluated by Physical Therapy and PM&R and was recommended for Acute rehab. Pt was medically stable on 8/2/21 and was transferred to [facility].     SUBJECTIVE / INTERVAL HPI: Patient seen and examined in OT gym. He is doing well and slept overnight. He continues to have intermittent bloating in his abdomen after eating. He denies pain, shortness of breath, or nausea.     REVIEW OF SYSTEMS:    CONSTITUTIONAL: No weakness, fevers or chills  EYES/ENT: No visual changes;  No vertigo or throat pain   NECK: No pain or stiffness  RESPIRATORY: No cough, wheezing, or shortness of breath  CARDIOVASCULAR: No chest pain or palpitations  GASTROINTESTINAL: +occasional bloating. No nausea or vomiting. No diarrhea or constipation. +Colostomy  GENITOURINARY: No dysuria, frequency or hematuria. +Barnett  NEUROLOGICAL: No numbness or weakness  SKIN: No itching, rashes      VITAL SIGNS:  Vital Signs Last 24 Hrs  T(C): 36.9 (24 Aug 2021 08:38), Max: 36.9 (24 Aug 2021 08:38)  T(F): 98.4 (24 Aug 2021 08:38), Max: 98.4 (24 Aug 2021 08:38)  HR: 98 (24 Aug 2021 08:38) (78 - 98)  BP: 115/68 (24 Aug 2021 08:38) (115/68 - 122/78)  BP(mean): --  RR: 16 (24 Aug 2021 08:38) (16 - 18)  SpO2: 98% (24 Aug 2021 08:38) (98% - 98%)    PHYSICAL EXAM:    General: NAD, sitting up comfortably in OT  HEENT: NC/AT; PERRL, anicteric sclera; MMM  Neck: supple  Cardiovascular: +S1/S2, RRR  Respiratory: breathing comfortably, no respiratory distress  Gastrointestinal: soft, NT/ND, +Colostomy intact  Extremities: warm, well perfused; no edema, clubbing or cyanosis  Neurological: AAOx3; no focal deficits, motor and sensory intact    MEDICATIONS:  MEDICATIONS  (STANDING):  apixaban 5 milliGRAM(s) Oral every 12 hours  calcium carbonate 1250 mG  + Vitamin D (OsCal 500 + D) 1 Tablet(s) Oral daily  chlorhexidine 4% Liquid 1 Application(s) Topical two times a day  gabapentin 200 milliGRAM(s) Oral every 8 hours  levothyroxine 137 MICROGram(s) Oral daily  metroNIDAZOLE    Tablet 500 milliGRAM(s) Oral every 8 hours  pantoprazole    Tablet 40 milliGRAM(s) Oral before breakfast  senna 2 Tablet(s) Oral at bedtime  vancomycin  IVPB 1250 milliGRAM(s) IV Intermittent every 12 hours    MEDICATIONS  (PRN):  acetaminophen   Tablet .. 650 milliGRAM(s) Oral every 6 hours PRN Temp greater or equal to 38C (100.4F), Mild Pain (1 - 3)  melatonin 3 milliGRAM(s) Oral at bedtime PRN Insomnia  polyethylene glycol 3350 17 Gram(s) Oral daily PRN Constipation  simethicone 80 milliGRAM(s) Chew two times a day PRN Upset Stomach      ALLERGIES:  Allergies    No Known Allergies    Intolerances        LABS:                        12.4   8.18  )-----------( 405      ( 23 Aug 2021 09:00 )             38.5     08-23    134<L>  |  99  |  10  ----------------------------<  164<H>  3.6   |  24  |  1.10    Ca    9.0      23 Aug 2021 09:00          CAPILLARY BLOOD GLUCOSE          RADIOLOGY & ADDITIONAL TESTS: Reviewed. HPI:  Pt is a 62 y/o M with PMHx of thyroid cancer status post total thyroidectomy in 2010 and radioactive iodine treatment in 2011, hypogonadism, vitamin D deficiency, and osteopenia, with chronic constipation, right buttock and right scrotal pain and numbness. 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 The chordoma resulted in perineal numbness and overflow incontinence and he was admitted 7/7/21 for staged resection.   On 7/7, he underwent Plastic Surgery and General Surgery assisted vertical rectus abdominal myocutaneous flap harvest with transperitoneal ligation of internal iliac artery and vein. He is s/p en bloc sacrectomy with L4-pelvis fusion; EBL 4.5L status post 8 units PRBC, 6 units FFP, 1 pack platelets and 5L crystalloid; 7/8. Extubated. On 7/9  patient developed hypoxia and respiratory distress, desaturation. CPAP at night.  CTA revealed bilateral sub segmental PE. No right heart strain & Bibasilar and left lingular consolidative opacities. Started on Heparin gtt and transitioned to therapeutic lovenox on 8/1. Course further c/b ileus requiring gastric decompression. Pt underwent echo to check for RV per pulm, and it was grossly unremarkable.  Wound washout with plastics and duc pus noted, entire wound reopened on 7/26/21 and placement of 2 drains. Incontinent of stool. Cultures growing MRSA, enterococcus and bacteroides. ID following. Continue vancomycin and flagyl. S/P Diversion Colostomy to keep sacral incision clean on 7/28/21. Barnett for urinary incontinence and to keep sacral wound clean. Pt requires 6 week course of antibiotics until 9/7/21. PICC line in place. LE doppler neg for DVT. Patient tolerating regular diet with fully functioning colostomy. Evaluated by Physical Therapy and PM&R and was recommended for Acute rehab. Pt was medically stable on 8/2/21 and was transferred to [facility].     SUBJECTIVE / INTERVAL HPI: Patient seen and examined in OT gym. He is doing well and slept overnight. He continues to have intermittent bloating in his abdomen after eating. He denies pain, shortness of breath, or nausea.     REVIEW OF SYSTEMS:    CONSTITUTIONAL: No weakness, fevers or chills  EYES/ENT: No visual changes;  No vertigo or throat pain   NECK: No pain or stiffness  RESPIRATORY: No cough, wheezing, or shortness of breath  CARDIOVASCULAR: No chest pain or palpitations  GASTROINTESTINAL: +occasional bloating. No nausea or vomiting. No diarrhea or constipation. +Colostomy  GENITOURINARY: No dysuria, frequency or hematuria. +Barnett  NEUROLOGICAL: No numbness or weakness  SKIN: No itching, rashes      VITAL SIGNS:  Vital Signs Last 24 Hrs  T(C): 36.9 (24 Aug 2021 08:38), Max: 36.9 (24 Aug 2021 08:38)  T(F): 98.4 (24 Aug 2021 08:38), Max: 98.4 (24 Aug 2021 08:38)  HR: 98 (24 Aug 2021 08:38) (78 - 98)  BP: 115/68 (24 Aug 2021 08:38) (115/68 - 122/78)  BP(mean): --  RR: 16 (24 Aug 2021 08:38) (16 - 18)  SpO2: 98% (24 Aug 2021 08:38) (98% - 98%)  Drain output 5 ml     PHYSICAL EXAM:    General: NAD, sitting up comfortably in OT  Cardiovascular: well perfused   Respiratory: breathing comfortably, no respiratory distress  Gastrointestinal: soft, NT/ND, +Colostomy intact  Extremities: warm, well perfused; no edema, clubbing or cyanosis  Neurological: AAOx3; no focal deficits, motor and sensory intact  Ext: RUE PICC +   + SEDRICK drain     MEDICATIONS:  MEDICATIONS  (STANDING):  apixaban 5 milliGRAM(s) Oral every 12 hours  calcium carbonate 1250 mG  + Vitamin D (OsCal 500 + D) 1 Tablet(s) Oral daily  chlorhexidine 4% Liquid 1 Application(s) Topical two times a day  gabapentin 200 milliGRAM(s) Oral every 8 hours  levothyroxine 137 MICROGram(s) Oral daily  metroNIDAZOLE    Tablet 500 milliGRAM(s) Oral every 8 hours  pantoprazole    Tablet 40 milliGRAM(s) Oral before breakfast  senna 2 Tablet(s) Oral at bedtime  vancomycin  IVPB 1250 milliGRAM(s) IV Intermittent every 12 hours    MEDICATIONS  (PRN):  acetaminophen   Tablet .. 650 milliGRAM(s) Oral every 6 hours PRN Temp greater or equal to 38C (100.4F), Mild Pain (1 - 3)  melatonin 3 milliGRAM(s) Oral at bedtime PRN Insomnia  polyethylene glycol 3350 17 Gram(s) Oral daily PRN Constipation  simethicone 80 milliGRAM(s) Chew two times a day PRN Upset Stomach      ALLERGIES:  Allergies    No Known Allergies    Intolerances        LABS:                        12.4   8.18  )-----------( 405      ( 23 Aug 2021 09:00 )             38.5     08-23    134<L>  |  99  |  10  ----------------------------<  164<H>  3.6   |  24  |  1.10    Ca    9.0      23 Aug 2021 09:00          CAPILLARY BLOOD GLUCOSE          RADIOLOGY & ADDITIONAL TESTS: Reviewed.

## 2021-08-24 NOTE — CHART NOTE - NSCHARTNOTEFT_GEN_A_CORE
Nutrition Follow Up Note  Hospital Course   (Per Electronic Medical Record)    Source:  Patient [X]  Medical Record [X]      Diet:   Low Fiber DIet w/ Thin Liquids   Tolerates Diet Consistency Well  No Chewing/Swallowing Difficulties  No Recent Nausea, Vomiting (as Per Patient) & Pt Has Colostomy  Consumes 100% of Meals (as Per Documentation) - States Fair Intake (Per Patient)   on Ensure Enlive 8oz PO BID (Provides 700kcal & 40grams of Protein)  Patient Takes Nutrition Supplement Well    Enteral/Parenteral Nutrition: Not Applicable    Current Weight: 162lb on 8/2  Obtain New Weight  Obtain Weights Weekly     Pertinent Medications: MEDICATIONS  (STANDING):  apixaban 10 milliGRAM(s) Oral every 12 hours  calcium carbonate 1250 mG  + Vitamin D (OsCal 500 + D) 1 Tablet(s) Oral daily  chlorhexidine 4% Liquid 1 Application(s) Topical two times a day  gabapentin 200 milliGRAM(s) Oral every 8 hours  levothyroxine 137 MICROGram(s) Oral daily  metroNIDAZOLE    Tablet 500 milliGRAM(s) Oral every 8 hours  pantoprazole    Tablet 40 milliGRAM(s) Oral before breakfast  senna 2 Tablet(s) Oral at bedtime  vancomycin  IVPB 1250 milliGRAM(s) IV Intermittent every 12 hours    MEDICATIONS  (PRN):  acetaminophen   Tablet .. 650 milliGRAM(s) Oral every 6 hours PRN Temp greater or equal to 38C (100.4F), Mild Pain (1 - 3)  melatonin 3 milliGRAM(s) Oral at bedtime PRN Insomnia  polyethylene glycol 3350 17 Gram(s) Oral daily PRN Constipation  simethicone 80 milliGRAM(s) Chew two times a day PRN Upset Stomach    Pertinent Labs:  08-16 Na139 mmol/L Glu 98 mg/dL K+ 3.9 mmol/L Cr  0.90 mg/dL BUN 11 mg/dL 08-11 Alb 2.4 g/dL<L>    Skin: No Pressure Ulcers  Surgical Incision on Back  (as Per Nursing Flow Sheet)     Edema: None Noted (as Per Documentation)     Estimated Needs:   [X] No Change Since Previous Assessment    Previous Nutrition Diagnosis:   Severe Malnutrition    Nutrition Diagnosis is [X] Ongoing - Continues on Nutrition Supplement, Patient Takes Nutrition Supplement & Education Provided on Proper Nutrition     New Nutrition Diagnosis: [X] Not Applicable    Interventions:   1. Education Provided on Proper Nutrition   2. Recommend Continue Nutrition Plan of Care     Monitoring & Evaluation:   [X] Weights   [X] PO Intake   [X] Skin Integrity   [X] Follow Up (Per Protocol)  [X] Tolerance to Diet Prescription   [X] Other: Labs     Registered Dietitian/Nutritionist Remains Available.  Delbert Love RDN    Pager # [phone]  Phone# [phone]
Nutrition Follow Up Note  Hospital Course   (Per Electronic Medical Record)    Source:  Patient [X]  Medical Record [X]      Diet:   Low Fiber Diet w/ Thin Liquids   Tolerates Diet Consistency Well  No Chewing/Swallowing Difficulties  No Recent Nausea, Vomiting(as Per Patient) & Has Colostomy  Consumes 100% of Meals (as Per Documentation) - States Good PO Intake/Appetite \  on Ensure Enlive 8oz PO BID (Provides 700kcal & 40grams of Protein)  States Only Takes Ensure Once - Recommend Change Ensure Enlive 8oz PO to Daily  Educated Patient on Strategies For Maximizing Caloric Intake     Enteral/Parenteral Nutrition: Not Applicable    Current Weight: 140.8lb on 8/22  Obtain New Weight to Confirm  Obtain Weights Weekly     Pertinent Medications: MEDICATIONS  (STANDING):  apixaban 5 milliGRAM(s) Oral every 12 hours  calcium carbonate 1250 mG  + Vitamin D (OsCal 500 + D) 1 Tablet(s) Oral daily  chlorhexidine 4% Liquid 1 Application(s) Topical two times a day  gabapentin 200 milliGRAM(s) Oral every 8 hours  levothyroxine 137 MICROGram(s) Oral daily  metroNIDAZOLE    Tablet 500 milliGRAM(s) Oral every 8 hours  pantoprazole    Tablet 40 milliGRAM(s) Oral before breakfast  senna 2 Tablet(s) Oral at bedtime  vancomycin  IVPB 1250 milliGRAM(s) IV Intermittent every 12 hours    MEDICATIONS  (PRN):  acetaminophen   Tablet .. 650 milliGRAM(s) Oral every 6 hours PRN Temp greater or equal to 38C (100.4F), Mild Pain (1 - 3)  melatonin 3 milliGRAM(s) Oral at bedtime PRN Insomnia  polyethylene glycol 3350 17 Gram(s) Oral daily PRN Constipation  simethicone 80 milliGRAM(s) Chew two times a day PRN Upset Stomach    Pertinent Labs:  08-23 Na134 mmol/L<L> Glu 164 mg/dL<H> K+ 3.6 mmol/L Cr  1.10 mg/dL BUN 10 mg/dL    Skin: No Pressure Ulcers  Surgical Incision on Mid Back  (as Per Nursing Flow Sheet)     Edema: None Noted (as Per Documentation)     Last Bowel Movement: on 8/24    Estimated Needs:   [X] No Change Since Previous Assessment    Previous Nutrition Diagnosis:   Severe Malnutrition     Nutrition Diagnosis is [X] Ongoing - Continues on Nutrition Supplement - Recommend Change Supplement to Daily    New Nutrition Diagnosis: [X] Not Applicable    Interventions:   1. Educated Patient on Strategies For Maximizing Caloric Intake   2. Recommend Change Supplement to E Daily  3. Recommend Continue Nutrition Plan of Care     Monitoring & Evaluation:   [X] Weights   [X] PO Intake   [X] Skin Integrity   [X] Follow Up (Per Protocol)  [X] Tolerance to Diet Prescription   [X] Other: Labs     Registered Dietitian/Nutritionist Remains Available.  Delbert Love RDN    Pager #[phone]  Phone# [phone]
Nutrition Follow Up Note  Hospital Course   (Per Electronic Medical Record)    Source:  Patient [X]  Medical Record [X]      Diet:   Regular Diet w/ Thin Liquids  Tolerates Diet Consistency Well  No Chewing/Swallowing Difficulties  No Recent Vomiting & Some Recent Nausea(as Per Patient) - Pt w/ Colostomy  States Fair-Good Intake (Per Patient)   on Ensure Enlive 8oz PO BID (Provides 700kcal & 40grams of Protein)  Patient Takes Nutrition Supplement Well  Education Provided on Proper Nutrition  Obtained Food Preferences from Patient    Enteral/Parenteral Nutrition: Not Applicable    Current Weight: 162lb on 8/2  Obtain New Weight  Obtain Weights Weekly     Pertinent Medications: MEDICATIONS  (STANDING):  calcium carbonate 1250 mG  + Vitamin D (OsCal 500 + D) 1 Tablet(s) Oral daily  chlorhexidine 4% Liquid 1 Application(s) Topical two times a day  enoxaparin Injectable 70 milliGRAM(s) SubCutaneous two times a day  gabapentin 200 milliGRAM(s) Oral every 8 hours  levothyroxine 137 MICROGram(s) Oral daily  melatonin 3 milliGRAM(s) Oral at bedtime  metroNIDAZOLE    Tablet 500 milliGRAM(s) Oral every 8 hours  multivitamin 1 Tablet(s) Oral daily  pantoprazole    Tablet 40 milliGRAM(s) Oral before breakfast  vancomycin  IVPB 1250 milliGRAM(s) IV Intermittent every 12 hours    MEDICATIONS  (PRN):  acetaminophen   Tablet .. 650 milliGRAM(s) Oral every 6 hours PRN Temp greater or equal to 38C (100.4F), Mild Pain (1 - 3)  traMADol 25 milliGRAM(s) Oral every 6 hours PRN Moderate Pain (4 - 6)  traMADol 50 milliGRAM(s) Oral every 6 hours PRN Severe Pain (7 - 10)    Pertinent Labs:  08-05 Na138 mmol/L Glu 105 mg/dL<H> K+ 3.7 mmol/L Cr  0.88 mg/dL BUN 14 mg/dL 08-03 Alb 2.2 g/dL<L>    Skin: No Pressure Ulcers  Surgical Incision on Back  (as Per Nursing Flow Sheet)     Edema: None Noted (as Per Documentation)     Estimated Needs:   [X] No Change Since Previous Assessment    Previous Nutrition Diagnosis:   Severe Malnutrition     Nutrition Diagnosis is [X] Ongoing - Continues on Nutrition Supplement & Patient Takes Nutrition Supplement; Education Provided on Proper Nutrition     New Nutrition Diagnosis: [X] Not Applicable    Interventions:   1.  Education Provided on Proper Nutrition   2. Recommend Continue Nutrition Plan of Care     Monitoring & Evaluation:   [X] Weights   [X] PO Intake   [X] Skin Integrity   [X] Follow Up (Per Protocol)  [X] Tolerance to Diet Prescription   [X] Other: Labs     Registered Dietitian/Nutritionist Remains Available.  Delbert Love RDN    Pager # [phone]  Phone# [phone]
Nutrition Follow Up Note  Hospital Course   (Per Electronic Medical Record)    Source:  Patient [X]  Medical Record [X]      Diet:   Soft (Dysphagia 3-Soft & Bite Sized) Diet w/ Thin Liquids - Diet Consistency Downgraded on 8/7   Diet Downgraded to Per Patient Request - Pt Believes it Helps w/ His Digestion of Food/Stomach Pain/Bloating  Recommend Add Low Fiber Diet to Diet Ordered  Nutrition Education Provided on Low Fiber Diet  Tolerates Diet Consistency Well  No Chewing/Swallowing Difficulties  No Recent Nausea, Vomiting (as Per Patient) & Has Colostomy  Consumes % of Meals (as Per Documentation) - States Good PO Intake/Appetite   on Ensure Enlive 8oz PO BID (Provides 700kcal & 40grams of Protein)   Patient Takes Nutrition Supplement Well    Enteral/Parenteral Nutrition: Not Applicable    Current Weight: 162lb on 8/2  Obtain New Weight  Obtain Weights Weekly     Pertinent Medications: MEDICATIONS  (STANDING):  calcium carbonate 1250 mG  + Vitamin D (OsCal 500 + D) 1 Tablet(s) Oral daily  chlorhexidine 4% Liquid 1 Application(s) Topical two times a day  gabapentin 200 milliGRAM(s) Oral every 8 hours  levothyroxine 137 MICROGram(s) Oral daily  metroNIDAZOLE    Tablet 500 milliGRAM(s) Oral every 8 hours  pantoprazole    Tablet 40 milliGRAM(s) Oral before breakfast  potassium chloride    Tablet ER 20 milliEquivalent(s) Oral every 2 hours  senna 2 Tablet(s) Oral at bedtime  vancomycin  IVPB 1250 milliGRAM(s) IV Intermittent every 12 hours    MEDICATIONS  (PRN):  acetaminophen   Tablet .. 650 milliGRAM(s) Oral every 6 hours PRN Temp greater or equal to 38C (100.4F), Mild Pain (1 - 3)  melatonin 3 milliGRAM(s) Oral at bedtime PRN Insomnia  polyethylene glycol 3350 17 Gram(s) Oral daily PRN Constipation  simethicone 80 milliGRAM(s) Chew two times a day PRN Upset Stomach  traMADol 50 milliGRAM(s) Oral every 6 hours PRN Severe Pain (7 - 10)  traMADol 25 milliGRAM(s) Oral every 6 hours PRN Moderate Pain (4 - 6)    Pertinent Labs:  08-11 Na138 mmol/L Glu 112 mg/dL<H> K+ 3.3 mmol/L<L> Cr  0.88 mg/dL BUN 12 mg/dL 08-11 Alb 2.4 g/dL<L>    Skin: No Pressure Ulcers     Edema: None Noted (as Per Documentation)     Estimated Needs:   [X] No Change Since Previous Assessment    Previous Nutrition Diagnosis:   Severe Malnutrition     Nutrition Diagnosis is [X] Ongoing - Continues on Nutrition Supplement & Patient Takes Nutrition Supplement Well    New Nutrition Diagnosis: [X] Not Applicable    Interventions:   1. Nutrition Education Provided on Low Fiber Diet  2. Recommend Continue Nutrition Plan of Care     Monitoring & Evaluation:   [X] Weights   [X] PO Intake   [X] Skin Integrity   [X] Follow Up (Per Protocol)  [X] Tolerance to Diet Prescription   [X] Other: Labs     Registered Dietitian/Nutritionist Remains Available.  Delbert Love RDN    Pager # [phone]  Phone# [phone]

## 2021-08-25 PROCEDURE — 99232 SBSQ HOSP IP/OBS MODERATE 35: CPT

## 2021-08-25 RX ADMIN — Medication 500 MILLIGRAM(S): at 21:44

## 2021-08-25 RX ADMIN — PANTOPRAZOLE SODIUM 40 MILLIGRAM(S): 20 TABLET, DELAYED RELEASE ORAL at 05:59

## 2021-08-25 RX ADMIN — GABAPENTIN 200 MILLIGRAM(S): 400 CAPSULE ORAL at 05:58

## 2021-08-25 RX ADMIN — GABAPENTIN 200 MILLIGRAM(S): 400 CAPSULE ORAL at 21:44

## 2021-08-25 RX ADMIN — GABAPENTIN 200 MILLIGRAM(S): 400 CAPSULE ORAL at 14:48

## 2021-08-25 RX ADMIN — APIXABAN 5 MILLIGRAM(S): 2.5 TABLET, FILM COATED ORAL at 18:06

## 2021-08-25 RX ADMIN — SIMETHICONE 80 MILLIGRAM(S): 80 TABLET, CHEWABLE ORAL at 12:26

## 2021-08-25 RX ADMIN — Medication 1 TABLET(S): at 13:09

## 2021-08-25 RX ADMIN — Medication 137 MICROGRAM(S): at 05:58

## 2021-08-25 RX ADMIN — CHLORHEXIDINE GLUCONATE 1 APPLICATION(S): 213 SOLUTION TOPICAL at 18:07

## 2021-08-25 RX ADMIN — Medication 166.67 MILLIGRAM(S): at 18:06

## 2021-08-25 RX ADMIN — Medication 500 MILLIGRAM(S): at 05:58

## 2021-08-25 RX ADMIN — CHLORHEXIDINE GLUCONATE 1 APPLICATION(S): 213 SOLUTION TOPICAL at 05:57

## 2021-08-25 RX ADMIN — Medication 166.67 MILLIGRAM(S): at 05:55

## 2021-08-25 RX ADMIN — APIXABAN 5 MILLIGRAM(S): 2.5 TABLET, FILM COATED ORAL at 05:57

## 2021-08-25 RX ADMIN — Medication 500 MILLIGRAM(S): at 14:47

## 2021-08-25 NOTE — PROGRESS NOTE ADULT - ASSESSMENT
61 year old male planned resection for sacral mass. He is s/p sacrectomy with L4-pelvis fusion. Post-operative course was complicated by bilateral PE, MRSA wound infection s/p diversion colostomy.     Continue rehab program    #MRSA INFECTION  -Continue IV abx - Vancomycin IV and Flagyl PO; ends 9/7/21. ID in. Afebrile, improved leukocytosis.   -Contact isolation  - SEDRICK drains removed by Dr Cevallos on 8/11 and 8/24   -Last vanco trough 8/4: 13.1, repeat vanco trough in AM - 17.8 (8/20)  -8/22 SEDRICK drain output 25ml - Monitor  - Infusion nurse  evaluate patient today     #Pain management: -Gabapentin, tylenol prn,  tramadol prn    # PE- tolerating Eliquis 8/11,    Hypothyroidism:- Synthroid 137 mcg daily    #s/p Ileus, now s/p Colostomy    #: now on [facility] as outpatient   -  #GI ppx  - Protonix for GI ppx while on Eliquis  - probiotic yoghurt with meals  - nutrition input appreciated      #Bowel Regimen:- Senna nightly    #Anxiety:- neuropsych following prn     #Discharge planning  - Home in am with home care  Colostomy, [facility] teaching on going

## 2021-08-25 NOTE — PROGRESS NOTE ADULT - SUBJECTIVE AND OBJECTIVE BOX
( x ) No complaints (  ) Complains of:     T(F): 98.4 (08-25-21 @ 08:43), Max: 98.4 (08-25-21 @ 08:43)  HR: 94 (08-25-21 @ 08:43) (94 - 96)  BP: 102/71 (08-25-21 @ 08:43) (102/71 - 120/76)  RR: 16 (08-25-21 @ 08:43) (16 - 18)  SpO2: 95% (08-25-21 @ 08:43) (95% - 96%)        Wound Location 1:  left side back incision SEDRICK drain, suture not securing drain, serosanguious drainage scant from around drain tubing, drain milked, no obstruction noted, drain removed based on out puts of 55 ml, 30 ml, 25 ml, 5 ml, and 15 ml each day last five days.  Dry sterile dressing placed                                   Procedure Performed:  (  )Yes     (x  )No, SEDRICK drain was removed  Name of Procedure:  (  )debridement    (  )I&D    (  )Other:  (  )partial thickness     (  )full thickness     (  )subcutaneous     (  )muscle/tendon     (  )bone  (  )sharp     (  )surgical

## 2021-08-25 NOTE — PROGRESS NOTE ADULT - REASON FOR ADMISSION
s/p sacral surgery
Acute Rehab
s/p scarectomy, and L4 pelvis fusion
sacral mass, L4-sacral fusion
s/p chondroma resection
s/p chondroma resection
s/p sacral surgery
sacral mass, L4-sacral fusion
Acute Rehab
s/p scarectomy, and L4 pelvis fusion
s/p chondroma resection w/abscess
s/p sacral surgery

## 2021-08-25 NOTE — PROGRESS NOTE ADULT - ASSESSMENT
Decreased drain out; therefore, final SEDRICK drain removed.  Patient has discharge pending  Patient to follow up with operative team from [facility]

## 2021-08-26 ENCOUNTER — TRANSCRIPTION ENCOUNTER (OUTPATIENT)
Age: 61
End: 2021-08-26

## 2021-08-26 VITALS
TEMPERATURE: 99 F | SYSTOLIC BLOOD PRESSURE: 129 MMHG | DIASTOLIC BLOOD PRESSURE: 70 MMHG | RESPIRATION RATE: 16 BRPM | HEART RATE: 89 BPM | OXYGEN SATURATION: 97 %

## 2021-08-26 LAB
ANION GAP SERPL CALC-SCNC: 8 MMOL/L — SIGNIFICANT CHANGE UP (ref 5–17)
BASOPHILS # BLD AUTO: 0.06 K/UL — SIGNIFICANT CHANGE UP (ref 0–0.2)
BASOPHILS NFR BLD AUTO: 0.8 % — SIGNIFICANT CHANGE UP (ref 0–2)
BUN SERPL-MCNC: 10 MG/DL — SIGNIFICANT CHANGE UP (ref 7–23)
CALCIUM SERPL-MCNC: 8.5 MG/DL — SIGNIFICANT CHANGE UP (ref 8.4–10.5)
CHLORIDE SERPL-SCNC: 106 MMOL/L — SIGNIFICANT CHANGE UP (ref 96–108)
CO2 SERPL-SCNC: 28 MMOL/L — SIGNIFICANT CHANGE UP (ref 22–31)
CREAT SERPL-MCNC: 1.01 MG/DL — SIGNIFICANT CHANGE UP (ref 0.5–1.3)
EOSINOPHIL # BLD AUTO: 0.15 K/UL — SIGNIFICANT CHANGE UP (ref 0–0.5)
EOSINOPHIL NFR BLD AUTO: 2 % — SIGNIFICANT CHANGE UP (ref 0–6)
GLUCOSE SERPL-MCNC: 137 MG/DL — HIGH (ref 70–99)
HCT VFR BLD CALC: 34.1 % — LOW (ref 39–50)
HGB BLD-MCNC: 10.9 G/DL — LOW (ref 13–17)
IMM GRANULOCYTES NFR BLD AUTO: 0.4 % — SIGNIFICANT CHANGE UP (ref 0–1.5)
LYMPHOCYTES # BLD AUTO: 1.1 K/UL — SIGNIFICANT CHANGE UP (ref 1–3.3)
LYMPHOCYTES # BLD AUTO: 14.3 % — SIGNIFICANT CHANGE UP (ref 13–44)
MCHC RBC-ENTMCNC: 27.5 PG — SIGNIFICANT CHANGE UP (ref 27–34)
MCHC RBC-ENTMCNC: 32 GM/DL — SIGNIFICANT CHANGE UP (ref 32–36)
MCV RBC AUTO: 86.1 FL — SIGNIFICANT CHANGE UP (ref 80–100)
MONOCYTES # BLD AUTO: 0.9 K/UL — SIGNIFICANT CHANGE UP (ref 0–0.9)
MONOCYTES NFR BLD AUTO: 11.7 % — SIGNIFICANT CHANGE UP (ref 2–14)
NEUTROPHILS # BLD AUTO: 5.43 K/UL — SIGNIFICANT CHANGE UP (ref 1.8–7.4)
NEUTROPHILS NFR BLD AUTO: 70.8 % — SIGNIFICANT CHANGE UP (ref 43–77)
NRBC # BLD: 0 /100 WBCS — SIGNIFICANT CHANGE UP (ref 0–0)
PLATELET # BLD AUTO: 321 K/UL — SIGNIFICANT CHANGE UP (ref 150–400)
POTASSIUM SERPL-MCNC: 3.6 MMOL/L — SIGNIFICANT CHANGE UP (ref 3.5–5.3)
POTASSIUM SERPL-SCNC: 3.6 MMOL/L — SIGNIFICANT CHANGE UP (ref 3.5–5.3)
RBC # BLD: 3.96 M/UL — LOW (ref 4.2–5.8)
RBC # FLD: 14.6 % — HIGH (ref 10.3–14.5)
SODIUM SERPL-SCNC: 142 MMOL/L — SIGNIFICANT CHANGE UP (ref 135–145)
WBC # BLD: 7.67 K/UL — SIGNIFICANT CHANGE UP (ref 3.8–10.5)
WBC # FLD AUTO: 7.67 K/UL — SIGNIFICANT CHANGE UP (ref 3.8–10.5)

## 2021-08-26 PROCEDURE — 97110 THERAPEUTIC EXERCISES: CPT

## 2021-08-26 PROCEDURE — 82607 VITAMIN B-12: CPT

## 2021-08-26 PROCEDURE — 82746 ASSAY OF FOLIC ACID SERUM: CPT

## 2021-08-26 PROCEDURE — 99232 SBSQ HOSP IP/OBS MODERATE 35: CPT

## 2021-08-26 PROCEDURE — 97530 THERAPEUTIC ACTIVITIES: CPT

## 2021-08-26 PROCEDURE — 92507 TX SP LANG VOICE COMM INDIV: CPT

## 2021-08-26 PROCEDURE — 85025 COMPLETE CBC W/AUTO DIFF WBC: CPT

## 2021-08-26 PROCEDURE — 97163 PT EVAL HIGH COMPLEX 45 MIN: CPT

## 2021-08-26 PROCEDURE — U0005: CPT

## 2021-08-26 PROCEDURE — 85027 COMPLETE CBC AUTOMATED: CPT

## 2021-08-26 PROCEDURE — 86803 HEPATITIS C AB TEST: CPT

## 2021-08-26 PROCEDURE — 92523 SPEECH SOUND LANG COMPREHEN: CPT

## 2021-08-26 PROCEDURE — 86769 SARS-COV-2 COVID-19 ANTIBODY: CPT

## 2021-08-26 PROCEDURE — 80202 ASSAY OF VANCOMYCIN: CPT

## 2021-08-26 PROCEDURE — 97167 OT EVAL HIGH COMPLEX 60 MIN: CPT

## 2021-08-26 PROCEDURE — 99238 HOSP IP/OBS DSCHRG MGMT 30/<: CPT

## 2021-08-26 PROCEDURE — 80053 COMPREHEN METABOLIC PANEL: CPT

## 2021-08-26 PROCEDURE — 82728 ASSAY OF FERRITIN: CPT

## 2021-08-26 PROCEDURE — 80048 BASIC METABOLIC PNL TOTAL CA: CPT

## 2021-08-26 PROCEDURE — 92610 EVALUATE SWALLOWING FUNCTION: CPT

## 2021-08-26 PROCEDURE — 85045 AUTOMATED RETICULOCYTE COUNT: CPT

## 2021-08-26 PROCEDURE — 71045 X-RAY EXAM CHEST 1 VIEW: CPT

## 2021-08-26 PROCEDURE — 36415 COLL VENOUS BLD VENIPUNCTURE: CPT

## 2021-08-26 PROCEDURE — 97112 NEUROMUSCULAR REEDUCATION: CPT

## 2021-08-26 PROCEDURE — 97116 GAIT TRAINING THERAPY: CPT

## 2021-08-26 PROCEDURE — 83550 IRON BINDING TEST: CPT

## 2021-08-26 PROCEDURE — 83540 ASSAY OF IRON: CPT

## 2021-08-26 PROCEDURE — U0003: CPT

## 2021-08-26 PROCEDURE — 97535 SELF CARE MNGMENT TRAINING: CPT

## 2021-08-26 RX ADMIN — Medication 500 MILLIGRAM(S): at 06:07

## 2021-08-26 RX ADMIN — GABAPENTIN 200 MILLIGRAM(S): 400 CAPSULE ORAL at 06:07

## 2021-08-26 RX ADMIN — Medication 1 TABLET(S): at 12:33

## 2021-08-26 RX ADMIN — CHLORHEXIDINE GLUCONATE 1 APPLICATION(S): 213 SOLUTION TOPICAL at 06:08

## 2021-08-26 RX ADMIN — GABAPENTIN 200 MILLIGRAM(S): 400 CAPSULE ORAL at 13:43

## 2021-08-26 RX ADMIN — PANTOPRAZOLE SODIUM 40 MILLIGRAM(S): 20 TABLET, DELAYED RELEASE ORAL at 06:07

## 2021-08-26 RX ADMIN — Medication 500 MILLIGRAM(S): at 13:44

## 2021-08-26 RX ADMIN — APIXABAN 5 MILLIGRAM(S): 2.5 TABLET, FILM COATED ORAL at 06:07

## 2021-08-26 RX ADMIN — Medication 166.67 MILLIGRAM(S): at 06:08

## 2021-08-26 RX ADMIN — Medication 137 MICROGRAM(S): at 06:07

## 2021-08-26 NOTE — PROGRESS NOTE ADULT - PROBLEM SELECTOR PLAN 3
- bilateral subsegmental PE  - therapeutic Lovenox 70mg bid
- bilateral subsegmental PE  - continue eliquis
- bilateral subsegmental PE  - therapeutic Lovenox 70mg bid
- bilateral subsegmental PE  - continue eliquis

## 2021-08-26 NOTE — PROGRESS NOTE ADULT - PROBLEM SELECTOR PROBLEM 3
Pulmonary thromboembolism

## 2021-08-26 NOTE — PROGRESS NOTE ADULT - PROBLEM SELECTOR PROBLEM 5
Vitamin D deficiency

## 2021-08-26 NOTE — DISCHARGE NOTE NURSING/CASE MANAGEMENT/SOCIAL WORK - NSDCPEFALRISK_GEN_ALL_CORE
For information on Fall & injury Prevention, visit [email or web address]

## 2021-08-26 NOTE — PROGRESS NOTE ADULT - SUBJECTIVE AND OBJECTIVE BOX
HPI:  Pt is a 60 y/o M with PMHx of thyroid cancer status post total thyroidectomy in 2010 and radioactive iodine treatment in 2011, hypogonadism, vitamin D deficiency, and osteopenia, with chronic constipation, right buttock and right scrotal pain and numbness. 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 The chordoma resulted in perineal numbness and overflow incontinence and he was admitted 7/7/21 for staged resection.   On 7/7, he underwent Plastic Surgery and General Surgery assisted vertical rectus abdominal myocutaneous flap harvest with transperitoneal ligation of internal iliac artery and vein. He is s/p en bloc sacrectomy with L4-pelvis fusion; EBL 4.5L status post 8 units PRBC, 6 units FFP, 1 pack platelets and 5L crystalloid; 7/8. Extubated. On 7/9  patient developed hypoxia and respiratory distress, desaturation. CPAP at night.  CTA revealed bilateral sub segmental PE. No right heart strain & Bibasilar and left lingular consolidative opacities. Started on Heparin gtt and transitioned to therapeutic lovenox on 8/1. Course further c/b ileus requiring gastric decompression. Pt underwent echo to check for RV per pulm, and it was grossly unremarkable.  Wound washout with plastics and duc pus noted, entire wound reopened on 7/26/21 and placement of 2 drains. Incontinent of stool. Cultures growing MRSA, enterococcus and bacteroides. ID following. Continue vancomycin and flagyl. S/P Diversion Colostomy to keep sacral incision clean on 7/28/21. Barnett for urinary incontinence and to keep sacral wound clean. Pt requires 6 week course of antibiotics until 9/7/21. PICC line in place. LE doppler neg for DVT. Patient tolerating regular diet with fully functioning colostomy. Evaluated by Physical Therapy and PM&R and was recommended for Acute rehab. Pt was medically stable on 8/2/21 and was transferred to [facility].     SUBJECTIVE / INTERVAL HPI: Patient seen and examined at bedside. He is doing well this morning. Second SEDRICK drain was removed yesterday by plastic surgery. He is ready for discharge today and has no new complaints.      REVIEW OF SYSTEMS:    CONSTITUTIONAL: No fevers or chills  EYES/ENT: No visual changes;  No vertigo or throat pain   NECK: No pain or stiffness  RESPIRATORY: No cough, wheezing, or shortness of breath  CARDIOVASCULAR: No chest pain or palpitations  GASTROINTESTINAL: No abdominal or epigastric pain. No nausea or vomiting. No diarrhea or constipation. +Colostomy  GENITOURINARY: No dysuria, frequency or hematuria. +Barnett  NEUROLOGICAL: No numbness or weakness  SKIN: No itching, rashes, +Back incision      VITAL SIGNS:  Vital Signs Last 24 Hrs  T(C): 37.2 (25 Aug 2021 20:30), Max: 37.2 (25 Aug 2021 20:30)  T(F): 99 (25 Aug 2021 20:30), Max: 99 (25 Aug 2021 20:30)  HR: 83 (25 Aug 2021 20:30) (83 - 83)  BP: 136/77 (25 Aug 2021 20:30) (136/77 - 136/77)  BP(mean): --  RR: 16 (25 Aug 2021 20:30) (16 - 16)  SpO2: 98% (25 Aug 2021 20:30) (98% - 98%)    PHYSICAL EXAM:    General: NAD, comfortably lying in bed  HEENT: NC/AT; PERRL, anicteric sclera; MMM  Neck: supple  Cardiovascular: +S1/S2, RRR  Respiratory: no respiratory distress, no wheezing  Gastrointestinal: soft, NT/ND; +colostomy intact  Extremities: warm, well perfused; no edema, clubbing or cyanosis. +RUE PICC  Neurological: AAOx3; no focal deficits, motor and sensory exams intact  Skin: Drains removed x2, back incision intact      MEDICATIONS:  MEDICATIONS  (STANDING):  apixaban 5 milliGRAM(s) Oral every 12 hours  calcium carbonate 1250 mG  + Vitamin D (OsCal 500 + D) 1 Tablet(s) Oral daily  chlorhexidine 4% Liquid 1 Application(s) Topical two times a day  gabapentin 200 milliGRAM(s) Oral every 8 hours  levothyroxine 137 MICROGram(s) Oral daily  metroNIDAZOLE    Tablet 500 milliGRAM(s) Oral every 8 hours  pantoprazole    Tablet 40 milliGRAM(s) Oral before breakfast  senna 2 Tablet(s) Oral at bedtime  vancomycin  IVPB 1250 milliGRAM(s) IV Intermittent every 12 hours    MEDICATIONS  (PRN):  acetaminophen   Tablet .. 650 milliGRAM(s) Oral every 6 hours PRN Temp greater or equal to 38C (100.4F), Mild Pain (1 - 3)  melatonin 3 milliGRAM(s) Oral at bedtime PRN Insomnia  polyethylene glycol 3350 17 Gram(s) Oral daily PRN Constipation  simethicone 80 milliGRAM(s) Chew two times a day PRN Upset Stomach      ALLERGIES:  Allergies    No Known Allergies    Intolerances        LABS:                        10.9   7.67  )-----------( 321      ( 26 Aug 2021 07:30 )             34.1     08-26    142  |  106  |  10  ----------------------------<  137<H>  3.6   |  28  |  1.01    Ca    8.5      26 Aug 2021 07:30          CAPILLARY BLOOD GLUCOSE          RADIOLOGY & ADDITIONAL TESTS: Reviewed.

## 2021-08-26 NOTE — PROGRESS NOTE ADULT - PROBLEM SELECTOR PLAN 5
- Calcium/Vit D3 supplement

## 2021-08-26 NOTE — PROGRESS NOTE ADULT - PROBLEM SELECTOR PROBLEM 7
Thrombocytosis
Urinary retention
Thrombocytosis
Urinary retention
Thrombocytosis
Thrombocytosis

## 2021-08-26 NOTE — PROGRESS NOTE ADULT - PROBLEM SELECTOR PLAN 1
Debility  - s/p sacrectomy with L4-pelvis fusion  - wound care  - Comprehensive rehab program  - pain control and bowel regimen as per primary team  - Barnett for incontinence and to help keep wound clean
Debility  - s/p sacrectomy with L4-pelvis fusion  - wound care  - Comprehensive rehab program  - pain control and bowel regimen as per primary team  - Barnett for incontinence and to help keep wound clean
Patient with post-operative PE, s/p resection of chordoma. Changed from LMWH to Eliquis anticoagulation.
Patient with post-operative PE, s/p resection of chordoma. Currently receiving therapeutic dosing LMWH. Ok to transition to oral anticoagulant from hematology viewpoint, such as Eliquis, if ok with surgical team.  Patient continues with rehab.
Patient with post-operative PE, s/p resection of chordoma. Changed from LMWH to Eliquis anticoagulation-tolerating thus far.
Debility  - s/p sacrectomy with L4-pelvis fusion  - wound care  - Comprehensive rehab program  - pain control and bowel regimen as per primary team  - Barnett for incontinence and to help keep wound clean  - remove when appropriate
Debility  - s/p sacrectomy with L4-pelvis fusion  - wound care  - Comprehensive rehab program  - pain control and bowel regimen as per primary team  - Barnett for incontinence and to help keep wound clean
Debility  - s/p sacrectomy with L4-pelvis fusion  - wound care  - Comprehensive rehab program  - pain control and bowel regimen as per primary team  - Barnett for incontinence and to help keep wound clean
Debility  - s/p sacrectomy with L4-pelvis fusion  - wound care  - Comprehensive rehab program  - pain control and bowel regimen as per primary team  - Barnett for incontinence and to help keep wound clean  - remove when appropriate
Debility  - s/p sacrectomy with L4-pelvis fusion  - wound care  - Comprehensive rehab program  - pain control and bowel regimen as per primary team  - Barnett for incontinence and to help keep wound clean  - remove when appropriate
Debility  - s/p sacrectomy with L4-pelvis fusion  - wound care  - Comprehensive rehab program  - pain control and bowel regimen as per primary team  - Barnett for incontinence and to help keep wound clean

## 2021-08-26 NOTE — PROGRESS NOTE ADULT - ATTENDING COMMENTS
Doing well.  Pt would likely benefit from voiding trial;  being consulted for input on this matter.  Pt will need to learn to manage colostomy.  Team conference'ed today.  Pt making progress.  DME reviewed.  Pt would benefit from a hospital bed at home for positioning (e.g., for self-care re: colostomy management and possible self intermittent catheterization of bladder at home; as well as turns using bed rails), pressure relief (with low air loss mattress), and for ease of transfers into and out of bed.  Family training of pt's father is being planned by team.
Patient doing well, tolerating rehab
Pt participating in rehabilitation program and making good functional progress.  Back wound still with moderate serosanguinous drainage into the one remaining SEDRICK drain, but fortunately no leakage around the SEDRICK entry point as there was yesterday.  Will need to continue to monitor in house.  Anticipate d/c home next week if medically stable.
.
Patient seen at bedside this am  No new issues overnight  Denies any new pain     Patient scheduled for dc home today with home care services.   Follow up with surgeon, urologist discussed   Stable for discharge home
Patient stable to continue rehab.
Rehab Attending- Patient seen and examined by me- Case discussed with resident Dr Lim, above note reviewed by me with modifications made    CO feeling of bloatedness in afternoon after lunch  not nauseous no vomiting  pain controlled  received senna last night- stool in bag  will add miralax prn  already on simethicone PRN  to continue intensive rehab program
With ongoing serosanguinous drainage into the SEDRICK drain, about 50 mls from early AM to 2PM, some intermittent serosanguinous drainage around the drain as well.  Site examined and benign, likely additional drainage resulting from increased overall activity involving her back.  Will continue to monitor.  Will d/w surgery if it persists or worsens.  Doing well otherwise.  Now competent with managing his colostomy.  Will need training for self-management of Barnett.
.
Patient seen at bedside  Resting in bed after am therapies.  States abdominal discomfort from the food that he is eating. Denies cramping, nausea or emesis.   + Colostomy with some formed stool and liquid content.   Patient now also with tony per urology recommendations    2. Labs reviewed    3. On IV Vanco and PO flagyl to complete course of abx . Has RUE PICC line    4.DC planning on going for home this week  Patient is being trained on colostomy care and tony care
.
Patient seen during OT session  In good spirits. Looking forward to going home  States abdominal discomfort improved  Denies nausea  Appetite improved    2. DC planning on going for home this week with home care services   Patient to be instructed on tony care- Will fu as outpatient for TOV     3. Continue rehab program

## 2021-08-26 NOTE — PROGRESS NOTE ADULT - PROBLEM SELECTOR PLAN 2
Patient had post-op anemia requiring transfusions. Chronic disease component as well. No overt bleeding noted clinically at present. Hemoglobin currently stable.
MRSA/Enterococcus/Bacteroides wound infection  - Vancomycin 1250mg bid until 9/7/21  - vanc trough 17.8 on 8/20  - Flagyl 500mg q8hrs until 9/7/21  - s/p diversion colostomy to keep sacral incision clean  - Barnett present for urinary incontinence and to keep sacral wound clear
Patient had post-op anemia requiring transfusions. Chronic disease component as well. Hemoglobin currently stable. Continue to monitor CBC, clinical status.    Will no longer actively follow. Please re-call if further questions for us.  Thank you.
Patient had post-op anemia requiring transfusions. No overt bleeding noted clinically at present. Check baseline iron studies, retic, B12/folate.
MRSA/Enterococcus/Bacteroides wound infection  - Vancomycin 1250mg bid until 9/7/21  - vanc trough 17.8 on 8/20  - Flagyl 500mg q8hrs until 9/7/21  - s/p diversion colostomy to keep sacral incision clean  - Barnett present for urinary incontinence and to keep sacral wound clear
MRSA/Enterococcus/Bacteroides wound infection  - Vancomycin 1250mg bid until 9/7/21  - Flagyl 500mg q8hrs until 9/7/21  - s/p diversion colostomy to keep sacral incision clean  - Barnett present for urinary incontinence and to keep sacral wound clear

## 2021-08-26 NOTE — PROGRESS NOTE ADULT - NUTRITIONAL ASSESSMENT
This patient has been assessed with a concern for Malnutrition and has been determined to have a diagnosis/diagnoses of Severe protein-calorie malnutrition.    This patient is being managed with:   Diet Soft-  Supplement Feeding Modality:  Oral  Ensure Enlive Cans or Servings Per Day:  1       Frequency:  Two Times a day  Entered: Aug  7 2021  3:38PM    
This patient has been assessed with a concern for Malnutrition and has been determined to have a diagnosis/diagnoses of Severe protein-calorie malnutrition.    This patient is being managed with:   Diet Soft-  Supplement Feeding Modality:  Oral  Ensure Enlive Cans or Servings Per Day:  1       Frequency:  Two Times a day  Entered: Aug  7 2021  3:38PM    
This patient has been assessed with a concern for Malnutrition and has been determined to have a diagnosis/diagnoses of Severe protein-calorie malnutrition.    This patient is being managed with:   Diet Regular-  Fiber/Residue Restricted  Supplement Feeding Modality:  Oral  Ensure Enlive Cans or Servings Per Day:  1       Frequency:  Two Times a day  Entered: Aug 12 2021 11:23AM    
This patient has been assessed with a concern for Malnutrition and has been determined to have a diagnosis/diagnoses of Severe protein-calorie malnutrition.    This patient is being managed with:   Diet Regular-  Supplement Feeding Modality:  Oral  Ensure Enlive Cans or Servings Per Day:  1       Frequency:  Two Times a day  Entered: Aug  3 2021  1:19PM    Diet Regular-  Entered: Aug  2 2021  6:45PM    The following pending diet order is being considered for treatment of Severe protein-calorie malnutrition:null
This patient has been assessed with a concern for Malnutrition and has been determined to have a diagnosis/diagnoses of Severe protein-calorie malnutrition.    This patient is being managed with:   Diet Soft-  Supplement Feeding Modality:  Oral  Ensure Enlive Cans or Servings Per Day:  1       Frequency:  Two Times a day  Entered: Aug  7 2021  3:38PM    
This patient has been assessed with a concern for Malnutrition and has been determined to have a diagnosis/diagnoses of Severe protein-calorie malnutrition.    This patient is being managed with:   Diet Low Fiber-  Supplement Feeding Modality:  Oral  Ensure Enlive Cans or Servings Per Day:  1       Frequency:  Daily  Entered: Aug 24 2021 11:28AM    
This patient has been assessed with a concern for Malnutrition and has been determined to have a diagnosis/diagnoses of Severe protein-calorie malnutrition.    This patient is being managed with:   Diet Regular-  Fiber/Residue Restricted  Supplement Feeding Modality:  Oral  Ensure Enlive Cans or Servings Per Day:  1       Frequency:  Two Times a day  Entered: Aug 12 2021 11:23AM    
This patient has been assessed with a concern for Malnutrition and has been determined to have a diagnosis/diagnoses of Severe protein-calorie malnutrition.    This patient is being managed with:   Diet Regular-  Supplement Feeding Modality:  Oral  Ensure Enlive Cans or Servings Per Day:  1       Frequency:  Two Times a day  Entered: Aug  3 2021  1:19PM    
This patient has been assessed with a concern for Malnutrition and has been determined to have a diagnosis/diagnoses of Severe protein-calorie malnutrition.    This patient is being managed with:   Diet Regular-  Supplement Feeding Modality:  Oral  Ensure Enlive Cans or Servings Per Day:  1       Frequency:  Two Times a day  Entered: Aug  3 2021  1:19PM    
This patient has been assessed with a concern for Malnutrition and has been determined to have a diagnosis/diagnoses of Severe protein-calorie malnutrition.    This patient is being managed with:   Diet Soft-  Supplement Feeding Modality:  Oral  Ensure Enlive Cans or Servings Per Day:  1       Frequency:  Two Times a day  Entered: Aug  7 2021  3:38PM    
This patient has been assessed with a concern for Malnutrition and has been determined to have a diagnosis/diagnoses of Severe protein-calorie malnutrition.    This patient is being managed with:   Diet Soft-  Supplement Feeding Modality:  Oral  Ensure Enlive Cans or Servings Per Day:  1       Frequency:  Two Times a day  Entered: Aug  7 2021  3:38PM    
This patient has been assessed with a concern for Malnutrition and has been determined to have a diagnosis/diagnoses of Severe protein-calorie malnutrition.    This patient is being managed with:   Diet Low Fiber-  Supplement Feeding Modality:  Oral  Ensure Enlive Cans or Servings Per Day:  1       Frequency:  Daily  Entered: Aug 24 2021 11:28AM    
This patient has been assessed with a concern for Malnutrition and has been determined to have a diagnosis/diagnoses of Severe protein-calorie malnutrition.    This patient is being managed with:   Diet Regular-  Fiber/Residue Restricted  Supplement Feeding Modality:  Oral  Ensure Enlive Cans or Servings Per Day:  1       Frequency:  Two Times a day  Entered: Aug 12 2021 11:23AM    
This patient has been assessed with a concern for Malnutrition and has been determined to have a diagnosis/diagnoses of Severe protein-calorie malnutrition.    This patient is being managed with:   Diet Regular-  Supplement Feeding Modality:  Oral  Ensure Enlive Cans or Servings Per Day:  1       Frequency:  Two Times a day  Entered: Aug  3 2021  1:19PM    
This patient has been assessed with a concern for Malnutrition and has been determined to have a diagnosis/diagnoses of Severe protein-calorie malnutrition.    This patient is being managed with:   Diet Regular-  Fiber/Residue Restricted  Supplement Feeding Modality:  Oral  Ensure Enlive Cans or Servings Per Day:  1       Frequency:  Two Times a day  Entered: Aug 12 2021 11:23AM    
This patient has been assessed with a concern for Malnutrition and has been determined to have a diagnosis/diagnoses of Severe protein-calorie malnutrition.    This patient is being managed with:   Diet Regular-  Fiber/Residue Restricted  Supplement Feeding Modality:  Oral  Ensure Enlive Cans or Servings Per Day:  1       Frequency:  Two Times a day  Entered: Aug 12 2021 11:23AM    
This patient has been assessed with a concern for Malnutrition and has been determined to have a diagnosis/diagnoses of Severe protein-calorie malnutrition.    This patient is being managed with:   Diet Soft-  Supplement Feeding Modality:  Oral  Ensure Enlive Cans or Servings Per Day:  1       Frequency:  Two Times a day  Entered: Aug  7 2021  3:38PM    
This patient has been assessed with a concern for Malnutrition and has been determined to have a diagnosis/diagnoses of Severe protein-calorie malnutrition.    This patient is being managed with:   Diet Regular-  Fiber/Residue Restricted  Supplement Feeding Modality:  Oral  Ensure Enlive Cans or Servings Per Day:  1       Frequency:  Two Times a day  Entered: Aug 12 2021 11:23AM    
This patient has been assessed with a concern for Malnutrition and has been determined to have a diagnosis/diagnoses of Severe protein-calorie malnutrition.    This patient is being managed with:   Diet Soft-  Supplement Feeding Modality:  Oral  Ensure Enlive Cans or Servings Per Day:  1       Frequency:  Two Times a day  Entered: Aug  7 2021  3:38PM    
This patient has been assessed with a concern for Malnutrition and has been determined to have a diagnosis/diagnoses of Severe protein-calorie malnutrition.    This patient is being managed with:   Diet Regular-  Fiber/Residue Restricted  Supplement Feeding Modality:  Oral  Ensure Enlive Cans or Servings Per Day:  1       Frequency:  Two Times a day  Entered: Aug 12 2021 11:23AM    
This patient has been assessed with a concern for Malnutrition and has been determined to have a diagnosis/diagnoses of Severe protein-calorie malnutrition.    This patient is being managed with:   Diet Regular-  Fiber/Residue Restricted  Supplement Feeding Modality:  Oral  Ensure Enlive Cans or Servings Per Day:  1       Frequency:  Two Times a day  Entered: Aug 12 2021 11:23AM

## 2021-08-26 NOTE — PROGRESS NOTE ADULT - PROBLEM SELECTOR PLAN 6
- likely of chronic disease  - H/H stable  - continue to monitor
- marthaley of chronic disease  - H/H stable  - continue to monitor
- H/H stable  - continue to monitor
- H/H stable  - continue to monitor

## 2021-08-26 NOTE — PROGRESS NOTE ADULT - SUBJECTIVE AND OBJECTIVE BOX
Patient is a 61y old  Male who presents with a chief complaint of Acute Rehab (25 Aug 2021 18:22)    Patient seen and examined at bedside. No events overnight. Patient a little upset this morning- wants colostomy bag changed. Other than that, no acute complaints. Family education provided yesterday about tony and IV abx, patient looking forward to going home today. SEDRICK drain removed by plastics yesterday. Denies chest pain, palpitations, SOB, abd pain.    ALLERGIES:  No Known Allergies    MEDICATIONS  (STANDING):  apixaban 5 milliGRAM(s) Oral every 12 hours  calcium carbonate 1250 mG  + Vitamin D (OsCal 500 + D) 1 Tablet(s) Oral daily  chlorhexidine 4% Liquid 1 Application(s) Topical two times a day  gabapentin 200 milliGRAM(s) Oral every 8 hours  levothyroxine 137 MICROGram(s) Oral daily  metroNIDAZOLE    Tablet 500 milliGRAM(s) Oral every 8 hours  pantoprazole    Tablet 40 milliGRAM(s) Oral before breakfast  senna 2 Tablet(s) Oral at bedtime  vancomycin  IVPB 1250 milliGRAM(s) IV Intermittent every 12 hours    MEDICATIONS  (PRN):  acetaminophen   Tablet .. 650 milliGRAM(s) Oral every 6 hours PRN Temp greater or equal to 38C (100.4F), Mild Pain (1 - 3)  melatonin 3 milliGRAM(s) Oral at bedtime PRN Insomnia  polyethylene glycol 3350 17 Gram(s) Oral daily PRN Constipation  simethicone 80 milliGRAM(s) Chew two times a day PRN Upset Stomach    Vital Signs Last 24 Hrs  T(F): 99 (25 Aug 2021 20:30), Max: 99 (25 Aug 2021 20:30)  HR: 83 (25 Aug 2021 20:30) (83 - 83)  BP: 136/77 (25 Aug 2021 20:30) (136/77 - 136/77)  RR: 16 (25 Aug 2021 20:30) (16 - 16)  SpO2: 98% (25 Aug 2021 20:30) (98% - 98%)  I&O's Summary    26 Aug 2021 07:01  -  26 Aug 2021 14:10  --------------------------------------------------------  IN: 0 mL / OUT: 1450 mL / NET: -1450 mL      PHYSICAL EXAM:  GENERAL: NAD, well-groomed, well-developed  HEAD:  Atraumatic, Normocephalic  EYES: PEERL, conjunctiva and sclera clear  ENMT: Moist mucous membranes, Good dentition, no thrush  CHEST/LUNG: Clear to auscultation bilaterally, good air entry, non-labored breathing  HEART: RRR; S1/S2, No murmur  ABDOMEN: Soft, Nontender, Nondistended; Bowel sounds present, colostomy bag in place with feces in bag, pink stoma  EXTREMITIES: No calf tenderness, No cyanosis, No edema, RUE with PICC line  SKIN: Warm, Intact  PSYCH: Normal mood, Normal affect  NERVOUS SYSTEM:  A/O x3, Good concentration    LABS:                        10.9   7.67  )-----------( 321      ( 26 Aug 2021 07:30 )             34.1     08-26    142  |  106  |  10  ----------------------------<  137  3.6   |  28  |  1.01    Ca    8.5      26 Aug 2021 07:30      eGFR if Non African American: 80 mL/min/1.73M2 (08-26-21 @ 07:30)  eGFR if : 93 mL/min/1.73M2 (08-26-21 @ 07:30)                                  COVID-19 PCR: NotDetec (08-02-21 @ 20:45)  COVID-19 PCR: NotDetec (08-02-21 @ 13:00)      RADIOLOGY & ADDITIONAL TESTS:    Care Discussed with Consultants/Other Providers: discussed with Dr. Atkins

## 2021-08-26 NOTE — PROGRESS NOTE ADULT - ASSESSMENT
61 year old male planned resection for sacral mass. He is s/p sacrectomy with L4-pelvis fusion. Post-operative course was complicated by bilateral PE, MRSA wound infection s/p diversion colostomy.     Continue rehab program    #MRSA INFECTION  -Continue IV abx - Vancomycin IV and Flagyl PO; ends 9/7/21. ID in. Afebrile, improved leukocytosis.   -Contact isolation  -Right SEDRICK removed by Dr Cevallos on 8/11. Discussed w/Dr. Peraza on 8/10-ok to remove if less than 30cc drainage per shift.  -Last vanco trough 8/4: 13.1, repeat vanco trough in AM - 17.8 (8/20)  -8/22 SEDRICK drain output 25ml - Monitor - Removed on 8/25  - Infusion nurse to evaluated patient    #Pain management:   -Gabapentin, tylenol prn,  tramadol prn    # PE  - tolerating Eliquis 8/11, no bleeding reported. Close watch of HH.  -evaluated by heme  - plan discussed with primary Plastics and Surgical team on 8/10: Dr Peraza and Dr Robin-Strickland. Ok for PO Eliquis to begin/no contraindication.     #Hypothyroidism  - Synthroid 137 mcg daily    #s/p Ileus, now s/p Colostomy  -Monitor output daily, much less bloating, bag w/formed brown stool.   - ostomy care-abd sites healing well, pt not distended    #  -  consulted regarding Barnett and if we can do a TOV or potentially have him straight cath at home  - Urology recommended home with Barnett and outpatient urological follow up    #GI ppx  - Protonix for GI ppx while on Eliquis  - probiotic yoghurt with meals  - nutrition input appreciated      #Bowel Regimen  - Senna nightly    #Anxiety  - neuropsych following  - pt refused Rx interventions at this time.     #Discharge planning  - EDOD: 8/26 to community with elderly father, home services (IV Abx)  - DME (hosp bed): Pt will require the use of a hospital bed at home. Due to his wounds, the hospital bed will be able to provide pressure relief as well as the ability to turn and adjust. Pt will also need to be in more upright positions to change the colostomy bag and change his Barnett catheter.  Pt requires frequent changes in body position to alleviate pain in ways not feasible by ordinary bed.

## 2021-08-26 NOTE — PROGRESS NOTE ADULT - PROBLEM SELECTOR PLAN 4
- continue Synthroid

## 2021-08-26 NOTE — DISCHARGE NOTE NURSING/CASE MANAGEMENT/SOCIAL WORK - PATIENT PORTAL LINK FT
You can access the FollowMyHealth Patient Portal offered by [facility] by registering at the following website: [email or web address]. By joining [employer]’s FollowMyHealth portal, you will also be able to view your health information using other applications (apps) compatible with our system.

## 2021-08-26 NOTE — PROGRESS NOTE ADULT - PROBLEM SELECTOR PROBLEM 1
Sacral mass
Pulmonary thromboembolism
Sacral mass

## 2021-08-26 NOTE — PROGRESS NOTE ADULT - PROBLEM SELECTOR PROBLEM 2
Anemia
Anemia
Bacteroides infection
Anemia
Bacteroides infection

## 2021-08-26 NOTE — PROGRESS NOTE ADULT - PROBLEM SELECTOR PROBLEM 6
Normocytic anemia

## 2021-08-26 NOTE — PROGRESS NOTE ADULT - PROVIDER SPECIALTY LIST ADULT
Physiatry
Plastic Surgery
Rehab Medicine
Heme/Onc
Physiatry
Rehab Medicine
Hospitalist
Plastic Surgery
Rehab Medicine
Heme/Onc
Hospitalist
Hospitalist
Rehab Medicine
Heme/Onc
Physiatry
Physiatry
Hospitalist
Physiatry
Hospitalist

## 2021-09-02 ENCOUNTER — APPOINTMENT (OUTPATIENT)
Dept: NEUROSURGERY | Facility: CLINIC | Age: 61
End: 2021-09-02
Payer: MEDICAID

## 2021-09-02 ENCOUNTER — RESULT REVIEW (OUTPATIENT)
Age: 61
End: 2021-09-02

## 2021-09-02 VITALS — DIASTOLIC BLOOD PRESSURE: 86 MMHG | SYSTOLIC BLOOD PRESSURE: 122 MMHG | HEART RATE: 88 BPM | OXYGEN SATURATION: 100 %

## 2021-09-02 VITALS — HEIGHT: 65 IN | WEIGHT: 161 LBS | BODY MASS INDEX: 26.82 KG/M2

## 2021-09-02 PROCEDURE — 99024 POSTOP FOLLOW-UP VISIT: CPT

## 2021-09-02 RX ORDER — GABAPENTIN 100 MG
100 TABLET ORAL
Refills: 0 | Status: ACTIVE | COMMUNITY

## 2021-09-02 RX ORDER — PANTOPRAZOLE 40 MG/1
40 TABLET, DELAYED RELEASE ORAL
Refills: 0 | Status: ACTIVE | COMMUNITY

## 2021-09-07 ENCOUNTER — APPOINTMENT (OUTPATIENT)
Dept: PLASTIC SURGERY | Facility: CLINIC | Age: 61
End: 2021-09-07
Payer: MEDICAID

## 2021-09-07 VITALS
OXYGEN SATURATION: 96 % | WEIGHT: 161 LBS | BODY MASS INDEX: 26.82 KG/M2 | DIASTOLIC BLOOD PRESSURE: 83 MMHG | HEART RATE: 86 BPM | SYSTOLIC BLOOD PRESSURE: 161 MMHG | HEIGHT: 65 IN

## 2021-09-07 PROCEDURE — 99024 POSTOP FOLLOW-UP VISIT: CPT

## 2021-09-07 NOTE — REASON FOR VISIT
[Post Op: _________] : a [unfilled] post op visit [Family Member] : family member [FreeTextEntry1] : DOS 07/26/21 s/p  back closure. pt is doing well with time.

## 2021-09-07 NOTE — PHYSICAL EXAM
[NI] : Normal [de-identified] : back incision c/d/i healing well no erythema no sign of infection stitches removed

## 2021-09-07 NOTE — HISTORY OF PRESENT ILLNESS
[FreeTextEntry1] : 61-year-old male status post sacrectomy with rectus muscle reconstruction.  Postoperative course has been complicated by infection and debridement and washout.  Patient presents today for follow-up visit. Patient is doing well no fever no chills no drainage pain well-controlled

## 2021-09-15 ENCOUNTER — NON-APPOINTMENT (OUTPATIENT)
Age: 61
End: 2021-09-15

## 2021-09-16 ENCOUNTER — TRANSCRIPTION ENCOUNTER (OUTPATIENT)
Age: 61
End: 2021-09-16

## 2021-09-20 ENCOUNTER — INPATIENT (INPATIENT)
Facility: HOSPITAL | Age: 61
LOS: 7 days | Discharge: HOME HEALTH | DRG: 809 | End: 2021-09-28
Attending: INTERNAL MEDICINE | Admitting: INTERNAL MEDICINE
Payer: MEDICAID

## 2021-09-20 VITALS
OXYGEN SATURATION: 100 % | SYSTOLIC BLOOD PRESSURE: 120 MMHG | TEMPERATURE: 98 F | RESPIRATION RATE: 16 BRPM | WEIGHT: 160.94 LBS | DIASTOLIC BLOOD PRESSURE: 84 MMHG | HEART RATE: 99 BPM | HEIGHT: 65 IN

## 2021-09-20 DIAGNOSIS — Z93.3 COLOSTOMY STATUS: ICD-10-CM

## 2021-09-20 DIAGNOSIS — N39.0 URINARY TRACT INFECTION, SITE NOT SPECIFIED: ICD-10-CM

## 2021-09-20 DIAGNOSIS — R10.9 UNSPECIFIED ABDOMINAL PAIN: ICD-10-CM

## 2021-09-20 DIAGNOSIS — R82.90 UNSPECIFIED ABNORMAL FINDINGS IN URINE: ICD-10-CM

## 2021-09-20 DIAGNOSIS — Z97.8 PRESENCE OF OTHER SPECIFIED DEVICES: ICD-10-CM

## 2021-09-20 DIAGNOSIS — C73 MALIGNANT NEOPLASM OF THYROID GLAND: ICD-10-CM

## 2021-09-20 DIAGNOSIS — Z98.890 OTHER SPECIFIED POSTPROCEDURAL STATES: Chronic | ICD-10-CM

## 2021-09-20 DIAGNOSIS — E89.0 POSTPROCEDURAL HYPOTHYROIDISM: Chronic | ICD-10-CM

## 2021-09-20 DIAGNOSIS — C41.2 MALIGNANT NEOPLASM OF VERTEBRAL COLUMN: ICD-10-CM

## 2021-09-20 DIAGNOSIS — I26.99 OTHER PULMONARY EMBOLISM WITHOUT ACUTE COR PULMONALE: ICD-10-CM

## 2021-09-20 LAB
ALBUMIN SERPL ELPH-MCNC: 4.2 G/DL — SIGNIFICANT CHANGE UP (ref 3.3–5)
ALP SERPL-CCNC: 72 U/L — SIGNIFICANT CHANGE UP (ref 40–120)
ALT FLD-CCNC: 18 U/L — SIGNIFICANT CHANGE UP (ref 10–45)
ANION GAP SERPL CALC-SCNC: 20 MMOL/L — HIGH (ref 5–17)
APPEARANCE UR: ABNORMAL
AST SERPL-CCNC: 19 U/L — SIGNIFICANT CHANGE UP (ref 10–40)
BACTERIA # UR AUTO: ABNORMAL
BASE EXCESS BLDV CALC-SCNC: -2 MMOL/L — SIGNIFICANT CHANGE UP (ref -2–2)
BASE EXCESS BLDV CALC-SCNC: 0.7 MMOL/L — SIGNIFICANT CHANGE UP (ref -2–2)
BASOPHILS # BLD AUTO: 0.11 K/UL — SIGNIFICANT CHANGE UP (ref 0–0.2)
BASOPHILS NFR BLD AUTO: 1 % — SIGNIFICANT CHANGE UP (ref 0–2)
BILIRUB SERPL-MCNC: 1.4 MG/DL — HIGH (ref 0.2–1.2)
BILIRUB UR-MCNC: NEGATIVE — SIGNIFICANT CHANGE UP
BUN SERPL-MCNC: 15 MG/DL — SIGNIFICANT CHANGE UP (ref 7–23)
CA-I SERPL-SCNC: 1.19 MMOL/L — SIGNIFICANT CHANGE UP (ref 1.15–1.33)
CA-I SERPL-SCNC: 1.21 MMOL/L — SIGNIFICANT CHANGE UP (ref 1.15–1.33)
CALCIUM SERPL-MCNC: 10.1 MG/DL — SIGNIFICANT CHANGE UP (ref 8.4–10.5)
CHLORIDE BLDV-SCNC: 103 MMOL/L — SIGNIFICANT CHANGE UP (ref 96–108)
CHLORIDE BLDV-SCNC: 104 MMOL/L — SIGNIFICANT CHANGE UP (ref 96–108)
CHLORIDE SERPL-SCNC: 100 MMOL/L — SIGNIFICANT CHANGE UP (ref 96–108)
CO2 BLDV-SCNC: 23 MMOL/L — SIGNIFICANT CHANGE UP (ref 22–26)
CO2 BLDV-SCNC: 25 MMOL/L — SIGNIFICANT CHANGE UP (ref 22–26)
CO2 SERPL-SCNC: 18 MMOL/L — LOW (ref 22–31)
COLOR SPEC: YELLOW — SIGNIFICANT CHANGE UP
CREAT SERPL-MCNC: 0.84 MG/DL — SIGNIFICANT CHANGE UP (ref 0.5–1.3)
DIFF PNL FLD: ABNORMAL
EOSINOPHIL # BLD AUTO: 0.11 K/UL — SIGNIFICANT CHANGE UP (ref 0–0.5)
EOSINOPHIL NFR BLD AUTO: 1 % — SIGNIFICANT CHANGE UP (ref 0–6)
EPI CELLS # UR: 1 /HPF — SIGNIFICANT CHANGE UP
GAS PNL BLDV: 136 MMOL/L — SIGNIFICANT CHANGE UP (ref 136–145)
GAS PNL BLDV: 137 MMOL/L — SIGNIFICANT CHANGE UP (ref 136–145)
GAS PNL BLDV: SIGNIFICANT CHANGE UP
GLUCOSE BLDV-MCNC: 81 MG/DL — SIGNIFICANT CHANGE UP (ref 70–99)
GLUCOSE BLDV-MCNC: 84 MG/DL — SIGNIFICANT CHANGE UP (ref 70–99)
GLUCOSE SERPL-MCNC: 78 MG/DL — SIGNIFICANT CHANGE UP (ref 70–99)
GLUCOSE UR QL: NEGATIVE — SIGNIFICANT CHANGE UP
HCO3 BLDV-SCNC: 22 MMOL/L — SIGNIFICANT CHANGE UP (ref 22–29)
HCO3 BLDV-SCNC: 24 MMOL/L — SIGNIFICANT CHANGE UP (ref 22–29)
HCT VFR BLD CALC: 31.4 % — LOW (ref 39–50)
HCT VFR BLDA CALC: 26 % — LOW (ref 39–51)
HCT VFR BLDA CALC: 32 % — LOW (ref 39–51)
HGB BLD CALC-MCNC: 10.6 G/DL — LOW (ref 12.6–17.4)
HGB BLD CALC-MCNC: 8.6 G/DL — LOW (ref 12.6–17.4)
HGB BLD-MCNC: 10.1 G/DL — LOW (ref 13–17)
HYALINE CASTS # UR AUTO: 17 /LPF — HIGH (ref 0–2)
IMM GRANULOCYTES NFR BLD AUTO: 0.8 % — SIGNIFICANT CHANGE UP (ref 0–1.5)
KETONES UR-MCNC: NEGATIVE — SIGNIFICANT CHANGE UP
LACTATE BLDV-MCNC: 1 MMOL/L — SIGNIFICANT CHANGE UP (ref 0.7–2)
LACTATE BLDV-MCNC: 2.9 MMOL/L — HIGH (ref 0.7–2)
LEUKOCYTE ESTERASE UR-ACNC: ABNORMAL
LYMPHOCYTES # BLD AUTO: 2.69 K/UL — SIGNIFICANT CHANGE UP (ref 1–3.3)
LYMPHOCYTES # BLD AUTO: 25.2 % — SIGNIFICANT CHANGE UP (ref 13–44)
MCHC RBC-ENTMCNC: 26.3 PG — LOW (ref 27–34)
MCHC RBC-ENTMCNC: 32.2 GM/DL — SIGNIFICANT CHANGE UP (ref 32–36)
MCV RBC AUTO: 81.8 FL — SIGNIFICANT CHANGE UP (ref 80–100)
MONOCYTES # BLD AUTO: 1.37 K/UL — HIGH (ref 0–0.9)
MONOCYTES NFR BLD AUTO: 12.9 % — SIGNIFICANT CHANGE UP (ref 2–14)
NEUTROPHILS # BLD AUTO: 6.3 K/UL — SIGNIFICANT CHANGE UP (ref 1.8–7.4)
NEUTROPHILS NFR BLD AUTO: 59.1 % — SIGNIFICANT CHANGE UP (ref 43–77)
NITRITE UR-MCNC: NEGATIVE — SIGNIFICANT CHANGE UP
NRBC # BLD: 0 /100 WBCS — SIGNIFICANT CHANGE UP (ref 0–0)
PCO2 BLDV: 32 MMHG — LOW (ref 42–55)
PCO2 BLDV: 34 MMHG — LOW (ref 42–55)
PH BLDV: 7.42 — SIGNIFICANT CHANGE UP (ref 7.32–7.43)
PH BLDV: 7.48 — HIGH (ref 7.32–7.43)
PH UR: 7.5 — SIGNIFICANT CHANGE UP (ref 5–8)
PLATELET # BLD AUTO: 448 K/UL — HIGH (ref 150–400)
PO2 BLDV: 16 MMHG — LOW (ref 25–45)
PO2 BLDV: 23 MMHG — LOW (ref 25–45)
POTASSIUM BLDV-SCNC: 3.8 MMOL/L — SIGNIFICANT CHANGE UP (ref 3.5–5.1)
POTASSIUM BLDV-SCNC: 4.2 MMOL/L — SIGNIFICANT CHANGE UP (ref 3.5–5.1)
POTASSIUM SERPL-MCNC: 3.9 MMOL/L — SIGNIFICANT CHANGE UP (ref 3.5–5.3)
POTASSIUM SERPL-SCNC: 3.9 MMOL/L — SIGNIFICANT CHANGE UP (ref 3.5–5.3)
PROT SERPL-MCNC: 7.8 G/DL — SIGNIFICANT CHANGE UP (ref 6–8.3)
PROT UR-MCNC: ABNORMAL
RBC # BLD: 3.84 M/UL — LOW (ref 4.2–5.8)
RBC # FLD: 15 % — HIGH (ref 10.3–14.5)
RBC CASTS # UR COMP ASSIST: 42 /HPF — HIGH (ref 0–4)
SAO2 % BLDV: 23.4 % — LOW (ref 67–88)
SAO2 % BLDV: 33.5 % — LOW (ref 67–88)
SARS-COV-2 RNA SPEC QL NAA+PROBE: SIGNIFICANT CHANGE UP
SODIUM SERPL-SCNC: 138 MMOL/L — SIGNIFICANT CHANGE UP (ref 135–145)
SP GR SPEC: 1.02 — SIGNIFICANT CHANGE UP (ref 1.01–1.02)
UROBILINOGEN FLD QL: NEGATIVE — SIGNIFICANT CHANGE UP
WBC # BLD: 10.66 K/UL — HIGH (ref 3.8–10.5)
WBC # FLD AUTO: 10.66 K/UL — HIGH (ref 3.8–10.5)
WBC UR QL: 124 /HPF — HIGH (ref 0–5)

## 2021-09-20 PROCEDURE — 99285 EMERGENCY DEPT VISIT HI MDM: CPT

## 2021-09-20 PROCEDURE — 74177 CT ABD & PELVIS W/CONTRAST: CPT | Mod: 26,MA

## 2021-09-20 PROCEDURE — 93010 ELECTROCARDIOGRAM REPORT: CPT

## 2021-09-20 RX ORDER — APIXABAN 2.5 MG/1
5 TABLET, FILM COATED ORAL EVERY 12 HOURS
Refills: 0 | Status: DISCONTINUED | OUTPATIENT
Start: 2021-09-20 | End: 2021-09-28

## 2021-09-20 RX ORDER — ONDANSETRON 8 MG/1
4 TABLET, FILM COATED ORAL ONCE
Refills: 0 | Status: COMPLETED | OUTPATIENT
Start: 2021-09-20 | End: 2021-09-20

## 2021-09-20 RX ORDER — SODIUM CHLORIDE 9 MG/ML
1000 INJECTION INTRAMUSCULAR; INTRAVENOUS; SUBCUTANEOUS
Refills: 0 | Status: DISCONTINUED | OUTPATIENT
Start: 2021-09-20 | End: 2021-09-22

## 2021-09-20 RX ORDER — APIXABAN 2.5 MG/1
1 TABLET, FILM COATED ORAL
Qty: 60 | Refills: 0

## 2021-09-20 RX ORDER — ALFUZOSIN HYDROCHLORIDE 10 MG/1
10 TABLET, EXTENDED RELEASE ORAL AT BEDTIME
Refills: 0 | Status: DISCONTINUED | OUTPATIENT
Start: 2021-09-20 | End: 2021-09-28

## 2021-09-20 RX ORDER — CHOLECALCIFEROL (VITAMIN D3) 125 MCG
1 CAPSULE ORAL
Qty: 0 | Refills: 0 | DISCHARGE

## 2021-09-20 RX ORDER — CHOLECALCIFEROL (VITAMIN D3) 125 MCG
0 CAPSULE ORAL
Qty: 0 | Refills: 0 | DISCHARGE

## 2021-09-20 RX ORDER — FENOFIBRATE,MICRONIZED 130 MG
1 CAPSULE ORAL
Qty: 0 | Refills: 0 | DISCHARGE

## 2021-09-20 RX ORDER — FENOFIBRATE,MICRONIZED 130 MG
145 CAPSULE ORAL DAILY
Refills: 0 | Status: DISCONTINUED | OUTPATIENT
Start: 2021-09-20 | End: 2021-09-21

## 2021-09-20 RX ORDER — FAMOTIDINE 10 MG/ML
20 INJECTION INTRAVENOUS ONCE
Refills: 0 | Status: COMPLETED | OUTPATIENT
Start: 2021-09-20 | End: 2021-09-20

## 2021-09-20 RX ORDER — GABAPENTIN 400 MG/1
200 CAPSULE ORAL EVERY 8 HOURS
Refills: 0 | Status: DISCONTINUED | OUTPATIENT
Start: 2021-09-20 | End: 2021-09-28

## 2021-09-20 RX ORDER — LEVOTHYROXINE SODIUM 125 MCG
137 TABLET ORAL DAILY
Refills: 0 | Status: DISCONTINUED | OUTPATIENT
Start: 2021-09-20 | End: 2021-09-28

## 2021-09-20 RX ORDER — CEFTRIAXONE 500 MG/1
1000 INJECTION, POWDER, FOR SOLUTION INTRAMUSCULAR; INTRAVENOUS ONCE
Refills: 0 | Status: COMPLETED | OUTPATIENT
Start: 2021-09-20 | End: 2021-09-20

## 2021-09-20 RX ORDER — GABAPENTIN 400 MG/1
2 CAPSULE ORAL
Qty: 180 | Refills: 0

## 2021-09-20 RX ORDER — ALFUZOSIN HYDROCHLORIDE 10 MG/1
1 TABLET, EXTENDED RELEASE ORAL
Qty: 0 | Refills: 0 | DISCHARGE

## 2021-09-20 RX ORDER — SODIUM CHLORIDE 9 MG/ML
1000 INJECTION INTRAMUSCULAR; INTRAVENOUS; SUBCUTANEOUS ONCE
Refills: 0 | Status: COMPLETED | OUTPATIENT
Start: 2021-09-20 | End: 2021-09-20

## 2021-09-20 RX ORDER — LEVOTHYROXINE SODIUM 125 MCG
1 TABLET ORAL
Qty: 30 | Refills: 0

## 2021-09-20 RX ADMIN — ALFUZOSIN HYDROCHLORIDE 10 MILLIGRAM(S): 10 TABLET, EXTENDED RELEASE ORAL at 22:06

## 2021-09-20 RX ADMIN — GABAPENTIN 200 MILLIGRAM(S): 400 CAPSULE ORAL at 22:06

## 2021-09-20 RX ADMIN — SODIUM CHLORIDE 1000 MILLILITER(S): 9 INJECTION INTRAMUSCULAR; INTRAVENOUS; SUBCUTANEOUS at 11:43

## 2021-09-20 RX ADMIN — ONDANSETRON 4 MILLIGRAM(S): 8 TABLET, FILM COATED ORAL at 11:42

## 2021-09-20 RX ADMIN — CEFTRIAXONE 100 MILLIGRAM(S): 500 INJECTION, POWDER, FOR SOLUTION INTRAMUSCULAR; INTRAVENOUS at 15:43

## 2021-09-20 RX ADMIN — SODIUM CHLORIDE 75 MILLILITER(S): 9 INJECTION INTRAMUSCULAR; INTRAVENOUS; SUBCUTANEOUS at 16:01

## 2021-09-20 RX ADMIN — FAMOTIDINE 20 MILLIGRAM(S): 10 INJECTION INTRAVENOUS at 11:42

## 2021-09-20 NOTE — ED PROVIDER NOTE - OBJECTIVE STATEMENT
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 Pt underwent echo to check for RV per pulm, and it was grossly unremarkable.  Wound washout with plastics and duc pus noted, entire wound reopened on 7/26/21 and placement of 2 drains. Incontinent of stool. Cultures growing MRSA, enterococcus and bacteroides. ID following. Continue vancomycin and flagyl. S/P Diversion Colostomy to keep sacral incision clean on 7/28/21. Tony for urinary incontinence and to keep sacral wound clean. Pt requires 6 week course of antibiotics until 9/7/21. PICC line in place. LE doppler neg for DVT. Patient tolerating regular diet with fully functioning colostomy. Evaluated by Physical Therapy and PM&R and was recommended for Acute rehab. Pt was medically stable on 8/2/21 and was transferred to [facility].     Patient was stable upon rehab admission to  Inpatient Rehabilitation Facility. Admitted with gait instability, ADL, and functional impairments.     Rehab Course significant for removal of both of his SEDRICK drains by Plastics. Pt did well with colostomy and Tony and he was continued on his antibiotics, which will continue until 9/7/21. All other medical co-morbidities were stable. Patient tolerated course of inpatient PT/OT/SLP rehab with significant functional improvements and met rehab goals prior to discharge. Patient was medically cleared on 8/26 for discharged to home.    Patient will follow up with the following:   Neurosurgery  General Surgery  Infectious Disease    On the day of discharge, the patient was seen and examined. Symptoms improved. Vital signs are stable. Labs and imaging reviewed. Patient is medically optimized and hemodynamically stable. Return precautions discussed, medication teach back done, and importance of physician followup emphasized. The patient verbalized understanding. 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 Patient reported he has been retching no actual vomit. Patient has generalized weaness. Good urinary output from tony and pt has been hydrating. Patient CP, SOB, hematuria, back pain, headache, fever. Patient lives with 90 year old father

## 2021-09-20 NOTE — H&P ADULT - HISTORY OF PRESENT ILLNESS
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 Patient reported he has been retching no actual vomit. Patient has generalized weakness .

## 2021-09-20 NOTE — ED PROVIDER NOTE - ATTENDING CONTRIBUTION TO CARE
RGUJRAL 62yo male hx listed presents from home with generalized weakness, nausea, mild vomiting and decreased output from colostomy. Denies any cough, URI, f/c, SOB. Denies abdominal pain. States he is not able to tolerate PO w decreased outpt making him 'uncomfortable'. Pt is currently not on any antibiotics.   On exam, Patient is awake, alert and oriented x 3.  Patient is tearful due his course.   Neck is supple  Lungs are CTA B/L,+S1S2 no murmurs,  Abdomen:Soft nd/nt+bs no rebound or guarding. + colostomy w minimal outpt.   Extremity no edema or calf tender.  Skin with no rash.  Neuro CN3-12 intact. Strength 5/5 in upper and lower extremities. Nml Sensation.  Check labs, CT to eval for obstruction, IVF and re eval.

## 2021-09-20 NOTE — ED ADULT TRIAGE NOTE - CHIEF COMPLAINT QUOTE
colostomy not draining no stool colostomy not draining no stool/ pt has hx of MRSA for a wound on his back

## 2021-09-20 NOTE — CONSULT NOTE ADULT - PROBLEM SELECTOR RECOMMENDATION 9
-CT negative  -no fever  -?colostomy issues  -not septic  -monitor temps and WBC  -no collection in spine area   -check lipase  -monitor LFTs - t. bili elevated slightly

## 2021-09-20 NOTE — CONSULT NOTE ADULT - PROBLEM SELECTOR RECOMMENDATION 2
-has chronic tony  -doubt UTI otherwise  -no hydronephrosis on CT   -hold off abx given above  -s/p 1 dose CTX already  -urine cx sent

## 2021-09-20 NOTE — H&P ADULT - ASSESSMENT
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 Patient reported he has been retching no actual vomit. Patient has generalized weakness .

## 2021-09-21 ENCOUNTER — TRANSCRIPTION ENCOUNTER (OUTPATIENT)
Age: 61
End: 2021-09-21

## 2021-09-21 LAB
ANION GAP SERPL CALC-SCNC: 14 MMOL/L — SIGNIFICANT CHANGE UP (ref 5–17)
BLD GP AB SCN SERPL QL: POSITIVE — SIGNIFICANT CHANGE UP
BUN SERPL-MCNC: 11 MG/DL — SIGNIFICANT CHANGE UP (ref 7–23)
CALCIUM SERPL-MCNC: 9.3 MG/DL — SIGNIFICANT CHANGE UP (ref 8.4–10.5)
CHLORIDE SERPL-SCNC: 103 MMOL/L — SIGNIFICANT CHANGE UP (ref 96–108)
CO2 SERPL-SCNC: 20 MMOL/L — LOW (ref 22–31)
COVID-19 SPIKE DOMAIN AB INTERP: POSITIVE
COVID-19 SPIKE DOMAIN ANTIBODY RESULT: >250 U/ML — HIGH
CREAT SERPL-MCNC: 0.88 MG/DL — SIGNIFICANT CHANGE UP (ref 0.5–1.3)
CULTURE RESULTS: SIGNIFICANT CHANGE UP
DAT C3-SP REAG RBC QL: POSITIVE — SIGNIFICANT CHANGE UP
ELUATE ANTIBODY 1: SIGNIFICANT CHANGE UP
GLUCOSE SERPL-MCNC: 85 MG/DL — SIGNIFICANT CHANGE UP (ref 70–99)
HCT VFR BLD CALC: 24.9 % — LOW (ref 39–50)
HCT VFR BLD CALC: 26.8 % — LOW (ref 39–50)
HGB BLD-MCNC: 8.1 G/DL — LOW (ref 13–17)
HGB BLD-MCNC: 8.5 G/DL — LOW (ref 13–17)
MCHC RBC-ENTMCNC: 26.8 PG — LOW (ref 27–34)
MCHC RBC-ENTMCNC: 26.9 PG — LOW (ref 27–34)
MCHC RBC-ENTMCNC: 31.7 GM/DL — LOW (ref 32–36)
MCHC RBC-ENTMCNC: 32.5 GM/DL — SIGNIFICANT CHANGE UP (ref 32–36)
MCV RBC AUTO: 82.5 FL — SIGNIFICANT CHANGE UP (ref 80–100)
MCV RBC AUTO: 84.8 FL — SIGNIFICANT CHANGE UP (ref 80–100)
NRBC # BLD: 0 /100 WBCS — SIGNIFICANT CHANGE UP (ref 0–0)
NRBC # BLD: 0 /100 WBCS — SIGNIFICANT CHANGE UP (ref 0–0)
PLATELET # BLD AUTO: 399 K/UL — SIGNIFICANT CHANGE UP (ref 150–400)
PLATELET # BLD AUTO: 403 K/UL — HIGH (ref 150–400)
POTASSIUM SERPL-MCNC: 4.2 MMOL/L — SIGNIFICANT CHANGE UP (ref 3.5–5.3)
POTASSIUM SERPL-SCNC: 4.2 MMOL/L — SIGNIFICANT CHANGE UP (ref 3.5–5.3)
RBC # BLD: 3.02 M/UL — LOW (ref 4.2–5.8)
RBC # BLD: 3.16 M/UL — LOW (ref 4.2–5.8)
RBC # FLD: 15 % — HIGH (ref 10.3–14.5)
RBC # FLD: 15.1 % — HIGH (ref 10.3–14.5)
RH IG SCN BLD-IMP: POSITIVE — SIGNIFICANT CHANGE UP
SARS-COV-2 IGG+IGM SERPL QL IA: >250 U/ML — HIGH
SARS-COV-2 IGG+IGM SERPL QL IA: POSITIVE
SODIUM SERPL-SCNC: 137 MMOL/L — SIGNIFICANT CHANGE UP (ref 135–145)
SPECIMEN SOURCE: SIGNIFICANT CHANGE UP
WBC # BLD: 10.46 K/UL — SIGNIFICANT CHANGE UP (ref 3.8–10.5)
WBC # BLD: 9.72 K/UL — SIGNIFICANT CHANGE UP (ref 3.8–10.5)
WBC # FLD AUTO: 10.46 K/UL — SIGNIFICANT CHANGE UP (ref 3.8–10.5)
WBC # FLD AUTO: 9.72 K/UL — SIGNIFICANT CHANGE UP (ref 3.8–10.5)

## 2021-09-21 PROCEDURE — 86077 PHYS BLOOD BANK SERV XMATCH: CPT

## 2021-09-21 RX ORDER — INFLUENZA VIRUS VACCINE 15; 15; 15; 15 UG/.5ML; UG/.5ML; UG/.5ML; UG/.5ML
0.5 SUSPENSION INTRAMUSCULAR ONCE
Refills: 0 | Status: DISCONTINUED | OUTPATIENT
Start: 2021-09-21 | End: 2021-09-28

## 2021-09-21 RX ORDER — CEFTRIAXONE 500 MG/1
1000 INJECTION, POWDER, FOR SOLUTION INTRAMUSCULAR; INTRAVENOUS EVERY 24 HOURS
Refills: 0 | Status: DISCONTINUED | OUTPATIENT
Start: 2021-09-21 | End: 2021-09-22

## 2021-09-21 RX ADMIN — GABAPENTIN 200 MILLIGRAM(S): 400 CAPSULE ORAL at 05:35

## 2021-09-21 RX ADMIN — GABAPENTIN 200 MILLIGRAM(S): 400 CAPSULE ORAL at 21:08

## 2021-09-21 RX ADMIN — ALFUZOSIN HYDROCHLORIDE 10 MILLIGRAM(S): 10 TABLET, EXTENDED RELEASE ORAL at 21:12

## 2021-09-21 RX ADMIN — APIXABAN 5 MILLIGRAM(S): 2.5 TABLET, FILM COATED ORAL at 19:14

## 2021-09-21 RX ADMIN — CEFTRIAXONE 100 MILLIGRAM(S): 500 INJECTION, POWDER, FOR SOLUTION INTRAMUSCULAR; INTRAVENOUS at 12:48

## 2021-09-21 RX ADMIN — APIXABAN 5 MILLIGRAM(S): 2.5 TABLET, FILM COATED ORAL at 05:36

## 2021-09-21 RX ADMIN — GABAPENTIN 200 MILLIGRAM(S): 400 CAPSULE ORAL at 12:50

## 2021-09-21 RX ADMIN — Medication 137 MICROGRAM(S): at 05:35

## 2021-09-21 RX ADMIN — SODIUM CHLORIDE 75 MILLILITER(S): 9 INJECTION INTRAMUSCULAR; INTRAVENOUS; SUBCUTANEOUS at 21:13

## 2021-09-21 NOTE — PATIENT PROFILE ADULT - NSPROSPHOSPCHAPLAINYN_GEN_A_NUR
Addendum entered and electronically signed by SOHAM ZHANG PA-C  07/29/19 20:15:










Discharge





- Discharge


Clinical Impression: 


 HPV (human papilloma virus) anogenital infection





Condition: Stable


Disposition: HOME, SELF-CARE


Additional Instructions: 


Maintain fluid intake


Proper hygienic technique


Keep the skin clean


Safe sexual practices with condoms everytime


Tylenol/ibuprofen as needed


F/u with your PCM/OBGYN in 3-5 days for a recheck


Return to the ED with any development of HA/fever, trouble with vision, eye 

redness, worsening pain, urethral discharge, urinary retention, blood in the 

urine, flank pain, abdominal pain, n/v, Chest Pain, shortness of breath, joint 

pains, trouble breathing, or any other worsening/concerning symptoms as needed 

otherwise.


Prescriptions: 


Lidocaine HCl [Xylocaine] 1 gm TP QID PRN #35 oint..gm.


 PRN Reason: 


Forms:  Return to Work


Referrals: 


[facility] [Provider Group] - Follow up in 3-5 days





Original Note:








HPI





- HPI


Time Seen by Provider: 07/29/19 19:55


Pain Level: 3


Notes: 





Patient is a 38-year-old female no significant past medical history who presents

complaining of possible hemorrhoid to her anus that is been present for 2 

months.  Patient states that on occasion she will have some bleeding.  Patient 

states that she does have some discomfort associated with them.  She is able to 

eat and drink without difficulty.  She is urinating normally.  She has not 

noticed any vaginal discharge, odor, or bleeding.  Denies any headache, fever, 

neck pain, URI, sore throat, chest pain, palpitations, syncope, cough, shortness

of breath, wheeze, dyspnea, abdominal pain, nausea/vomiting/diarrhea, urinary 

retention, dysuria, hematuria.





- ROS


Systems Reviewed and Negative: Yes All other systems reviewed and negative





- REPRODUCTIVE


Reproductive: DENIES: Pregnant:





Past Medical History





- Social History


Smoking Status: Unknown if Ever Smoked


Family History: Reviewed & Not Pertinent





- Past Medical History


Cardiac Medical History: 


   Denies: Hx Atrial Fibrillation, Hx Coronary Artery Disease, Hx Heart Attack, 

Hx Hypertension


Pulmonary Medical History: Reports: Hx Pneumonia - HX OF


   Denies: Hx Asthma, Hx Bronchitis, Hx COPD, Hx Tuberculosis


Neurological Medical History: Denies: Hx Cerebrovascular Accident, Hx Seizures


Renal/ Medical History: Denies: Hx Peritoneal Dialysis


Musculoskeletal Medical History: Denies Hx Arthritis


Past Surgical History: Reports: Hx Cholecystectomy.  Denies: Hx Pacemaker





- Immunizations


Hx Diphtheria, Pertussis, Tetanus Vaccination: Yes - 5/2012





Vertical Provider Document





- CONSTITUTIONAL


Agree With Documented VS: Yes


Notes: 





PHYSICAL EXAMINATION:





GENERAL: Well-appearing, well-nourished and in no acute distress.





LUNGS: Breath sounds clear to auscultation bilaterally and equal.  No wheezes 

rales or rhonchi.





HEART: Regular rate and rhythm without murmurs, rubs, gallops.





ABDOMEN: Soft, nontender, nondistended abdomen.  No guarding, no rebound. Normal

bowel sounds present.  No CVA tenderness bilaterally.





Rectal: (performed with female NP Adriano):  Her anus is covered in papillomas 

(HPV) as well as areas around her vagina/labia.  No active bleeding.  No 

evidence of hemorrhoids.





Musculoskeletal: FROM to passive/active. Strength 5+/5. 





Extremities:  No cyanosis, clubbing, or edema b/l.  Peripheral pulses 2+.  

Capillary refill less than 3 seconds.





NEUROLOGICAL: Normal speech, normal gait. 





PSYCH: Normal mood, normal affect.





SKIN: see above





- INFECTION CONTROL


TRAVEL OUTSIDE OF THE [place] IN LAST 30 DAYS: No





Course





- Re-evaluation


Re-evalutation: 





07/29/19 19:59


Patient is an afebrile, well-hydrated, 38-year-old female who presents with HPV.

 Vitals are acceptable without significant tachycardia, tachypnea, or hypoxia.  

PE is otherwise unremarkable.  Patient is nontoxic-appearing and is able to 

tolerate p.o. without difficulty.  Abdomen is soft and nontender.  Low suspicion

for any thrombosed hemorrhoids, necrotizing fasciitis, SJS, SSS, drug reaction, 

sepsis, meningitis, syphilis, Lyme disease, Mayfield spotted fever, or other 

systemic emergent condition at this time.  Patient aware that condition can 

change from initial presentation and she needs to monitor symptoms closely and 

seek medical attention with any acute changes. Reviewed HPV and its 

transmission.  Recheck with OB/GYN.  Consider consult with dermatology.  Return 

to the ED with any other worsening/concerning symptoms as reviewed.  Patient is 

in agreement.  





- Vital Signs


Vital signs: 


                                        











Temp Pulse Resp BP Pulse Ox


 


 98.2 F   91   16   123/77   98 


 


 07/29/19 18:44  07/29/19 18:44  07/29/19 18:44  07/29/19 18:44  07/29/19 18:44














Discharge





- Discharge


Clinical Impression: 


 HPV (human papilloma virus) anogenital infection





Condition: Stable


Disposition: HOME, SELF-CARE


Additional Instructions: 


Maintain fluid intake


Proper hygienic technique


Keep the skin clean


Safe sexual practices with condoms everytime


Tylenol/ibuprofen as needed


Check in with the health department this week for further testing if warranted


Your chlamydia/gonorrhea tests are pending and you will be notified if positive 

results; you may call in 1 day for the results as well


F/u with your PCM/OBGYN in 3-5 days for a recheck


Return to the ED with any development of HA/fever, trouble with vision, eye 

redness, worsening pain, urethral discharge, urinary retention, blood in the 

urine, flank pain, abdominal pain, n/v, Chest Pain, shortness of breath, joint 

pains, trouble breathing, or any other worsening/concerning symptoms as needed 

otherwise.


Prescriptions: 


Lidocaine HCl [Xylocaine] 1 gm TP QID PRN #35 oint..gm.


 PRN Reason: 


Referrals: 


[facility] [Provider Group] - Follow up in 3-5 days
no

## 2021-09-21 NOTE — PATIENT PROFILE ADULT - NSASFALLNEEDSASSIST_GEN_A_NUR
Reason For Visit:  Chief Complaint   Patient presents with   â¢ Follow-up     Pt is here for a follow up pt is having pain due to fibro 5/10 and migraines constant 7/10 denies er visits,falls   â¢ Pain   â¢ Medication Management     Pt needs a refill on methadone ld was today am and fiorcet ld couple days ago       HPI:  Patient is here for follow up visit and pain management. She is reporting that her headache is severe and present today in bilateral temples. She has suffered from Migraine headache for a long time in addition to her Fibromyalgia. We discussed trying Aimovig for her migraine headache with all pertinent information provided. She does not have allergy to biologics medications ( monoclonal antibodies). I will order the medication for her with the hope that her insurance will cover the medication. Review of Systems   Constitutional: Negative. HENT: Negative. Eyes: Negative. Respiratory: Negative. Cardiovascular: Negative. Gastrointestinal: Negative. Endocrine: Negative. Genitourinary: Negative. Musculoskeletal: Positive for arthralgias and back pain. Skin: Negative. Allergic/Immunologic: Negative. Neurological: Positive for headaches. Hematological: Negative. Psychiatric/Behavioral: Negative. Allergies: Allergies   Allergen Reactions   â¢ Cat Dander Other (See Comments)     unknown     â¢ Dog Dander Other (See Comments)     unknown     â¢ Dust PRURITUS   â¢ No Name Available Other (See Comments)     No Known Drug Allergies  Other - melons: throat gets itchy. â¢ Pollen Other (See Comments)     unknown     â¢ Ragweed Other (See Comments)     unknown         Current Meds:  Current Outpatient Medications   Medication Sig Dispense Refill   â¢ POTASSIUM CHLORIDE PO      â¢ [START ON 3/12/2021] butalbital-acetaminophen-caffeine-codeine (FIORICET WITH CODEINE) -76-30 MG per capsule Do not start before March 12, 2021.  Take 2 to 3 tabs daily as needed x 30 days qty 70. 70 capsule 1   â¢ [START ON 4/11/2021] methaDONE (DOLOPHINE) 5 MG tablet Take 1 tablet by mouth 3 times daily. Do not start before April 11, 2021. 90 tablet 0   â¢ gabapentin (NEURONTIN) 100 MG capsule 1-2 tabs po bid for nerve pain. Watch for drowsiness 60 capsule 2   â¢ buPROPion (WELLBUTRIN XL) 150 MG 24 hr tablet TK 1 T PO QAM     â¢ naLOXone (NARCAN) 4 MG/0.1ML nasal spray Call 911. [place] the content of 1 device into 1 nostril. May repeat with 2nd device in alternate nostril if no response in 2-3 minutes. 2 each 1   â¢ albuterol (PROAIR HFA) 108 (90 Base) MCG/ACT inhaler Inhale 2 puffs into the lungs. â¢ clonazePAM (KLONOPIN) 1 MG tablet   5   â¢ hydrochlorothiazide (HYDRODIURIL) 12.5 MG tablet Take 25 mg by mouth daily. â¢ ibuprofen 200 MG capsule      â¢ metoPROLOL succinate (TOPROL-XL) 25 MG 24 hr tablet Take 25 mg by mouth daily. â¢ Erenumab-aooe (Aimovig) 70 MG/ML Solution Auto-injector Inject 70 mg into the skin every 28 days. 1.12 mL 1     No current facility-administered medications for this visit. Medical History:  Past Medical History:   Diagnosis Date   â¢ Admission for therapeutic drug monitoring    â¢ Arthropathy of multiple sites    â¢ Closed displaced fracture of right clavicle, initial encounter    â¢ Controlled substance agreement signed    â¢ Fibromyalgia    â¢ Leg swelling    â¢ Migraine    â¢ Tendinitis of thumb        Surgical History:  History reviewed. No pertinent surgical history. Family History:  History reviewed. No pertinent family history. Social History:  Social History     Tobacco Use   â¢ Smoking status: Former Smoker   â¢ Smokeless tobacco: Never Used   Substance Use Topics   â¢ Alcohol use: Not on file   â¢ Drug use: Not on file       Vitals:  Vitals:    03/08/21 1025   BP: 136/72   Resp: 16   Temp: 97.3 Â°F (36.3 Â°C)       Physical Exam   Constitutional: She is oriented to person, place, and time. HENT:   Head: Normocephalic and atraumatic.        Mouth/Throat: Oropharynx is clear and moist.   Eyes: Pupils are equal, round, and reactive to light. EOM are normal. Right eye exhibits no discharge. Left eye exhibits no discharge. Neck: Normal range of motion. Neck supple. No tracheal deviation present. Abdominal: She exhibits no distension. There is no abdominal tenderness. There is no rebound and no guarding. Musculoskeletal: Normal range of motion. General: No tenderness (bilateral temporal regions.), deformity or edema. Neurological: She is alert and oriented to person, place, and time. She has normal reflexes. No cranial nerve deficit. She exhibits normal muscle tone. Coordination normal.   Skin: Skin is warm and dry. No rash noted. No erythema. Psychiatric: Her behavior is normal.       Assessment:  Problem List Items Addressed This Visit        Nervous    Chronic migraine - Primary    Fibromyalgia       Musculoskeletal    Arthropathy of multiple sites          Impression:    PLAN:  1) Order Aimovig 70 mg/ml IM q 28 days. Increase physical activity to get better. Checked [facility]. Answered patient's questions regarding treatment plan. Counseled on alcohol usage and health risks. Counseled on tobacco education. Medical compliance discussed and risks reviewed. Patient education completed on disease process, etiology & prognosis Patient counseled on decreasing narcotic use. Patient counseled on opioid addiction and complications.       Raphael Mendez MD yes

## 2021-09-21 NOTE — PHYSICAL THERAPY INITIAL EVALUATION ADULT - PERTINENT HX OF CURRENT PROBLEM, REHAB EVAL
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

## 2021-09-21 NOTE — PHYSICAL THERAPY INITIAL EVALUATION ADULT - ADDITIONAL COMMENTS
Pt lives with father in private home with 5 steps to enter and main floor living set up since last admission. PTA, pt independent in functional mobility with use of RW. Pt reports currently receiving Home PT/OT services.

## 2021-09-21 NOTE — PHYSICAL THERAPY INITIAL EVALUATION ADULT - ORIENTATION, REHAB EVAL
Xeljaronz Pregnancy And Lactation Text: This medication is Pregnancy Category D and is not considered safe during pregnancy.  The risk during breast feeding is also uncertain. oriented to person, place, time and situation

## 2021-09-21 NOTE — PROGRESS NOTE ADULT - ASSESSMENT
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 Patient reported he has been retching no actual vomit. Patient has generalized weakness .     Problem/Plan - 1:  ·  Problem: Abdominal pain with vomiting.   ·  Plan: CT scan noted . Symptoms resolved.   IVF and eating now.      Problem/Plan - 2:  ·  Problem: Chordoma.   ·  Plan: S/P surgery . Outpt follow up.     Problem/Plan - 3:  ·  Problem: Chronic indwelling Tony catheter.   ·  Plan: ID consulted as doubt UTI . IV Abxs.   Has apt with Urologist on Friday.     Problem/Plan - 4:  ·  Problem: Pulmonary embolism.   ·  Plan: On AC so continuing.     Problem/Plan - 5:  ·  Problem: S/P colostomy.   ·  Plan: Continue ostomy care.     Problem/Plan - 6:  ·  Problem: Thyroid cancer.   ·  Plan: S/P surgery and HERRING and now on replacement.      Disposition : DC planning pending final urine C/S.

## 2021-09-21 NOTE — DISCHARGE NOTE NURSING/CASE MANAGEMENT/SOCIAL WORK - PATIENT PORTAL LINK FT
You can access the FollowMyHealth Patient Portal offered by [facility] by registering at the following website: [email or web address]. By joining [facility]’s FollowMyHealth portal, you will also be able to view your health information using other applications (apps) compatible with our system.

## 2021-09-21 NOTE — PHYSICAL THERAPY INITIAL EVALUATION ADULT - PRECAUTIONS/LIMITATIONS, REHAB EVAL
Pt has chronic tony (catheter changed within last month) now presenting to the ED N/V, diffuse abdominal pain and poor colostomy output in last two days. Patient reported he has been retching no actual vomit. Patient has generalized weakness .

## 2021-09-21 NOTE — PROGRESS NOTE ADULT - SUBJECTIVE AND OBJECTIVE BOX
Date of Service  : 21     INTERVAL HPI/OVERNIGHT EVENTS: I feel fine. Father in room.   Vital Signs Last 24 Hrs  T(C): 37.2 (21 Sep 2021 20:43), Max: 37.2 (21 Sep 2021 20:43)  T(F): 98.9 (21 Sep 2021 20:43), Max: 98.9 (21 Sep 2021 20:43)  HR: 86 (21 Sep 2021 20:43) (71 - 86)  BP: 115/67 (21 Sep 2021 20:43) (115/67 - 166/81)  BP(mean): --  RR: 18 (21 Sep 2021 20:43) (18 - 18)  SpO2: 98% (21 Sep 2021 20:43) (98% - 100%)  I&O's Summary    20 Sep 2021 07:01  -  21 Sep 2021 07:00  --------------------------------------------------------  IN: 0 mL / OUT: 1200 mL / NET: -1200 mL    21 Sep 2021 07:01  -  21 Sep 2021 20:51  --------------------------------------------------------  IN: 440 mL / OUT: 601 mL / NET: -161 mL      MEDICATIONS  (STANDING):  alfuzosin 10 milliGRAM(s) Oral at bedtime  apixaban 5 milliGRAM(s) Oral every 12 hours  cefTRIAXone   IVPB 1000 milliGRAM(s) IV Intermittent every 24 hours  gabapentin 200 milliGRAM(s) Oral every 8 hours  influenza   Vaccine 0.5 milliLiter(s) IntraMuscular once  levothyroxine 137 MICROGram(s) Oral daily  sodium chloride 0.9%. 1000 milliLiter(s) (75 mL/Hr) IV Continuous <Continuous>    MEDICATIONS  (PRN):    LABS:                        8.1    9.72  )-----------( 403      ( 21 Sep 2021 11:02 )             24.9         137  |  103  |  11  ----------------------------<  85  4.2   |  20<L>  |  0.88    Ca    9.3      21 Sep 2021 05:38  Phos  3.5       Mg     2.2         TPro  7.8  /  Alb  4.2  /  TBili  1.4<H>  /  DBili  x   /  AST  19  /  ALT  18  /  AlkPhos  72        Urinalysis Basic - ( 20 Sep 2021 14:12 )    Color: Yellow / Appearance: Slightly Turbid / S.018 / pH: x  Gluc: x / Ketone: Negative  / Bili: Negative / Urobili: Negative   Blood: x / Protein: 30 mg/dL / Nitrite: Negative   Leuk Esterase: Large / RBC: 42 /hpf /  /HPF   Sq Epi: x / Non Sq Epi: 1 /hpf / Bacteria: Many      CAPILLARY BLOOD GLUCOSE            Urinalysis Basic - ( 20 Sep 2021 14:12 )    Color: Yellow / Appearance: Slightly Turbid / S.018 / pH: x  Gluc: x / Ketone: Negative  / Bili: Negative / Urobili: Negative   Blood: x / Protein: 30 mg/dL / Nitrite: Negative   Leuk Esterase: Large / RBC: 42 /hpf /  /HPF   Sq Epi: x / Non Sq Epi: 1 /hpf / Bacteria: Many      REVIEW OF SYSTEMS:  CONSTITUTIONAL: No fever, weight loss, or fatigue  EYES: No eye pain, visual disturbances, or discharge  ENMT:  No difficulty hearing, tinnitus, vertigo; No sinus or throat pain  NECK: No pain or stiffness  RESPIRATORY: No cough, wheezing, chills or hemoptysis; No shortness of breath  CARDIOVASCULAR: No chest pain, palpitations, dizziness, or leg swelling  GASTROINTESTINAL: No abdominal or epigastric pain. No nausea, vomiting, or hematemesis; No diarrhea or constipation. No melena or hematochezia.  GENITOURINARY: No dysuria, frequency, hematuria, or incontinence  NEUROLOGICAL: No headaches, memory loss, loss of strength, numbness, or tremors      RADIOLOGY & ADDITIONAL TESTS:    Consultant(s) Notes Reviewed:  [x ] YES  [ ] NO    PHYSICAL EXAM:  GENERAL: NAD, well-groomed, well-developed, not in any distress ,  HEAD:  Atraumatic, Normocephalic  EYES: EOMI, PERRLA, conjunctiva and sclera clear  ENMT: No tonsillar erythema, exudates, or enlargement; Moist mucous membranes, Good dentition, No lesions  NECK: Supple, No JVD, Normal thyroid  NERVOUS SYSTEM:  Alert & Oriented X3, No focal deficit   CHEST/LUNG: Good air entry bilateral with no  rales, rhonchi, wheezing, or rubs  HEART: Regular rate and rhythm; No murmurs, rubs, or gallops  ABDOMEN: Soft, Nontender, Nondistended; Bowel sounds present, Ostomy+  EXTREMITIES:  2+ Peripheral Pulses, No clubbing, cyanosis, or edema      Care Discussed with Consultants/Other Providers [ x] YES  [ ] NO

## 2021-09-22 LAB
ALBUMIN SERPL ELPH-MCNC: 3.9 G/DL — SIGNIFICANT CHANGE UP (ref 3.3–5)
ALP SERPL-CCNC: 65 U/L — SIGNIFICANT CHANGE UP (ref 40–120)
ALT FLD-CCNC: 14 U/L — SIGNIFICANT CHANGE UP (ref 10–45)
ANION GAP SERPL CALC-SCNC: 16 MMOL/L — SIGNIFICANT CHANGE UP (ref 5–17)
AST SERPL-CCNC: 17 U/L — SIGNIFICANT CHANGE UP (ref 10–40)
BILIRUB DIRECT SERPL-MCNC: 0.2 MG/DL — SIGNIFICANT CHANGE UP (ref 0–0.2)
BILIRUB INDIRECT FLD-MCNC: 0.7 MG/DL — SIGNIFICANT CHANGE UP (ref 0.2–1)
BILIRUB SERPL-MCNC: 0.9 MG/DL — SIGNIFICANT CHANGE UP (ref 0.2–1.2)
BILIRUB SERPL-MCNC: 0.9 MG/DL — SIGNIFICANT CHANGE UP (ref 0.2–1.2)
BUN SERPL-MCNC: 10 MG/DL — SIGNIFICANT CHANGE UP (ref 7–23)
CALCIUM SERPL-MCNC: 9.2 MG/DL — SIGNIFICANT CHANGE UP (ref 8.4–10.5)
CHLORIDE SERPL-SCNC: 102 MMOL/L — SIGNIFICANT CHANGE UP (ref 96–108)
CO2 SERPL-SCNC: 20 MMOL/L — LOW (ref 22–31)
CREAT SERPL-MCNC: 0.79 MG/DL — SIGNIFICANT CHANGE UP (ref 0.5–1.3)
FERRITIN SERPL-MCNC: 851 NG/ML — HIGH (ref 30–400)
FIBRINOGEN PPP-MCNC: 932 MG/DL — HIGH (ref 290–520)
FOLATE SERPL-MCNC: 9.3 NG/ML — SIGNIFICANT CHANGE UP
GLUCOSE SERPL-MCNC: 126 MG/DL — HIGH (ref 70–99)
HAPTOGLOB SERPL-MCNC: 43 MG/DL — SIGNIFICANT CHANGE UP (ref 34–200)
HCT VFR BLD CALC: 22.8 % — LOW (ref 39–50)
HCT VFR BLD CALC: 23.4 % — LOW (ref 39–50)
HGB BLD-MCNC: 7.2 G/DL — LOW (ref 13–17)
HGB BLD-MCNC: 7.7 G/DL — LOW (ref 13–17)
INR BLD: 1.48 RATIO — HIGH (ref 0.88–1.16)
IRON SATN MFR SERPL: 26 % — SIGNIFICANT CHANGE UP (ref 16–55)
IRON SATN MFR SERPL: 52 UG/DL — SIGNIFICANT CHANGE UP (ref 45–165)
LDH SERPL L TO P-CCNC: 234 U/L — SIGNIFICANT CHANGE UP (ref 50–242)
MCHC RBC-ENTMCNC: 26.8 PG — LOW (ref 27–34)
MCHC RBC-ENTMCNC: 27.6 PG — SIGNIFICANT CHANGE UP (ref 27–34)
MCHC RBC-ENTMCNC: 31.6 GM/DL — LOW (ref 32–36)
MCHC RBC-ENTMCNC: 32.9 GM/DL — SIGNIFICANT CHANGE UP (ref 32–36)
MCV RBC AUTO: 83.9 FL — SIGNIFICANT CHANGE UP (ref 80–100)
MCV RBC AUTO: 84.8 FL — SIGNIFICANT CHANGE UP (ref 80–100)
NRBC # BLD: 0 /100 WBCS — SIGNIFICANT CHANGE UP (ref 0–0)
NRBC # BLD: 0 /100 WBCS — SIGNIFICANT CHANGE UP (ref 0–0)
OB PNL STL: NEGATIVE — SIGNIFICANT CHANGE UP
PLATELET # BLD AUTO: 397 K/UL — SIGNIFICANT CHANGE UP (ref 150–400)
PLATELET # BLD AUTO: 408 K/UL — HIGH (ref 150–400)
POTASSIUM SERPL-MCNC: 3.5 MMOL/L — SIGNIFICANT CHANGE UP (ref 3.5–5.3)
POTASSIUM SERPL-SCNC: 3.5 MMOL/L — SIGNIFICANT CHANGE UP (ref 3.5–5.3)
PROT SERPL-MCNC: 7 G/DL — SIGNIFICANT CHANGE UP (ref 6–8.3)
PROTHROM AB SERPL-ACNC: 17.4 SEC — HIGH (ref 10.6–13.6)
RBC # BLD: 2.69 M/UL — LOW (ref 4.2–5.8)
RBC # BLD: 2.79 M/UL — LOW (ref 4.2–5.8)
RBC # BLD: 3.07 M/UL — LOW (ref 4.2–5.8)
RBC # FLD: 15.3 % — HIGH (ref 10.3–14.5)
RBC # FLD: 15.6 % — HIGH (ref 10.3–14.5)
RETICS #: 123.7 K/UL — SIGNIFICANT CHANGE UP (ref 25–125)
RETICS/RBC NFR: 4 % — HIGH (ref 0.5–2.5)
SODIUM SERPL-SCNC: 138 MMOL/L — SIGNIFICANT CHANGE UP (ref 135–145)
T4 AB SER-ACNC: 9.1 UG/DL — SIGNIFICANT CHANGE UP (ref 4.6–12)
T4/T3 UPTAKE INDEX SERPL: 1 TBI — SIGNIFICANT CHANGE UP (ref 0.8–1.3)
THYROGLOB AB FLD IA-ACNC: <20 IU/ML — SIGNIFICANT CHANGE UP
THYROGLOB AB SERPL-ACNC: <20 IU/ML — SIGNIFICANT CHANGE UP
TIBC SERPL-MCNC: 196 UG/DL — LOW (ref 220–430)
TSH SERPL-MCNC: 2.95 UIU/ML — SIGNIFICANT CHANGE UP (ref 0.27–4.2)
UIBC SERPL-MCNC: 145 UG/DL — SIGNIFICANT CHANGE UP (ref 110–370)
VIT B12 SERPL-MCNC: 488 PG/ML — SIGNIFICANT CHANGE UP (ref 232–1245)
WBC # BLD: 10.23 K/UL — SIGNIFICANT CHANGE UP (ref 3.8–10.5)
WBC # BLD: 10.51 K/UL — HIGH (ref 3.8–10.5)
WBC # FLD AUTO: 10.23 K/UL — SIGNIFICANT CHANGE UP (ref 3.8–10.5)
WBC # FLD AUTO: 10.51 K/UL — HIGH (ref 3.8–10.5)

## 2021-09-22 PROCEDURE — 71260 CT THORAX DX C+: CPT | Mod: 26

## 2021-09-22 PROCEDURE — 99223 1ST HOSP IP/OBS HIGH 75: CPT

## 2021-09-22 RX ADMIN — GABAPENTIN 200 MILLIGRAM(S): 400 CAPSULE ORAL at 21:36

## 2021-09-22 RX ADMIN — APIXABAN 5 MILLIGRAM(S): 2.5 TABLET, FILM COATED ORAL at 06:04

## 2021-09-22 RX ADMIN — CEFTRIAXONE 100 MILLIGRAM(S): 500 INJECTION, POWDER, FOR SOLUTION INTRAMUSCULAR; INTRAVENOUS at 09:23

## 2021-09-22 RX ADMIN — GABAPENTIN 200 MILLIGRAM(S): 400 CAPSULE ORAL at 06:04

## 2021-09-22 RX ADMIN — Medication 137 MICROGRAM(S): at 05:32

## 2021-09-22 RX ADMIN — Medication 20 MILLIGRAM(S): at 22:14

## 2021-09-22 RX ADMIN — GABAPENTIN 200 MILLIGRAM(S): 400 CAPSULE ORAL at 13:52

## 2021-09-22 RX ADMIN — ALFUZOSIN HYDROCHLORIDE 10 MILLIGRAM(S): 10 TABLET, EXTENDED RELEASE ORAL at 21:36

## 2021-09-22 RX ADMIN — APIXABAN 5 MILLIGRAM(S): 2.5 TABLET, FILM COATED ORAL at 17:34

## 2021-09-22 RX ADMIN — Medication 50 MILLIGRAM(S): at 22:14

## 2021-09-22 NOTE — PROGRESS NOTE ADULT - SUBJECTIVE AND OBJECTIVE BOX
Date of Service  : 09-22-21     INTERVAL HPI/OVERNIGHT EVENTS: Seen and examined earlier . Concerned about Anemia   Vital Signs Last 24 Hrs  T(C): 37.2 (22 Sep 2021 13:05), Max: 37.2 (21 Sep 2021 20:43)  T(F): 98.9 (22 Sep 2021 13:05), Max: 98.9 (21 Sep 2021 20:43)  HR: 80 (22 Sep 2021 13:05) (75 - 86)  BP: 139/75 (22 Sep 2021 13:05) (106/62 - 139/75)  BP(mean): --  RR: 18 (22 Sep 2021 13:05) (18 - 18)  SpO2: 100% (22 Sep 2021 13:05) (97% - 100%)  I&O's Summary    21 Sep 2021 07:01  -  22 Sep 2021 07:00  --------------------------------------------------------  IN: 440 mL / OUT: 2351 mL / NET: -1911 mL    22 Sep 2021 07:01  -  22 Sep 2021 20:00  --------------------------------------------------------  IN: 480 mL / OUT: 1300 mL / NET: -820 mL      MEDICATIONS  (STANDING):  alfuzosin 10 milliGRAM(s) Oral at bedtime  apixaban 5 milliGRAM(s) Oral every 12 hours  gabapentin 200 milliGRAM(s) Oral every 8 hours  influenza   Vaccine 0.5 milliLiter(s) IntraMuscular once  levothyroxine 137 MICROGram(s) Oral daily  predniSONE   Tablet 70 milliGRAM(s) Oral once    MEDICATIONS  (PRN):    LABS:                        7.7    10.51 )-----------( 408      ( 22 Sep 2021 17:16 )             23.4     09-22    138  |  102  |  10  ----------------------------<  126<H>  3.5   |  20<L>  |  0.79    Ca    9.2      22 Sep 2021 11:13    TPro  7.0  /  Alb  3.9  /  TBili  0.9  /  DBili  0.2  /  AST  17  /  ALT  14  /  AlkPhos  65  09-22    PT/INR - ( 22 Sep 2021 11:12 )   PT: 17.4 sec;   INR: 1.48 ratio             CAPILLARY BLOOD GLUCOSE              REVIEW OF SYSTEMS:  CONSTITUTIONAL: No fever, weight loss, or fatigue  EYES: No eye pain, visual disturbances, or discharge  ENMT:  No difficulty hearing, tinnitus, vertigo; No sinus or throat pain  NECK: No pain or stiffness  RESPIRATORY: No cough, wheezing, chills or hemoptysis; No shortness of breath  CARDIOVASCULAR: No chest pain, palpitations, dizziness, or leg swelling  GASTROINTESTINAL: No abdominal or epigastric pain. No nausea, vomiting, or hematemesis; No diarrhea or constipation. No melena or hematochezia.  GENITOURINARY: No dysuria, frequency, hematuria, or incontinence  NEUROLOGICAL: No headaches, memory loss, loss of strength, numbness, or tremors      RADIOLOGY & ADDITIONAL TESTS:    Consultant(s) Notes Reviewed:  [x ] YES  [ ] NO    PHYSICAL EXAM:  GENERAL: NAD, well-groomed, well-developed ,not in any distress ,  HEAD:  Atraumatic, Normocephalic  EYES: EOMI, PERRLA, conjunctiva and sclera clear  ENMT: No tonsillar erythema, exudates, or enlargement; Moist mucous membranes, Good dentition, No lesions  NECK: Supple, No JVD, Normal thyroid  NERVOUS SYSTEM:  Alert & Oriented X3, No focal deficit   CHEST/LUNG: Good air entry bilateral with no  rales, rhonchi, wheezing, or rubs  HEART: Regular rate and rhythm; No murmurs, rubs, or gallops  ABDOMEN: Soft, Nontender, Nondistended; Bowel sounds present, ostomy +  EXTREMITIES:  2+ Peripheral Pulses, No clubbing, cyanosis, or edema    Care Discussed with Consultants/Other Providers [ x] YES  [ ] NO

## 2021-09-22 NOTE — PROGRESS NOTE ADULT - ASSESSMENT
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 Patient reported he has been retching no actual vomit. Patient has generalized weakness .     Problem/Plan - 1:  ·  Problem: Anemia .   ·  Plan: Hematology help appreciated .    Started on prednisone for Possible Hemolysis .   < from: CT Chest w/ IV Cont (09.22.21 @ 18:11) >  INTERPRETATION:  Scattered bilateral groundglass opacities, predominantly in the left lower and posterior left upper lobes. Bibasilar atelectasis.    < end of copied text >       Problem/Plan - 2:  ·  Problem: Chordoma.   ·  Plan: S/P surgery . Outpt follow up.     Problem/Plan - 3:  ·  Problem: Chronic indwelling Tony catheter.   ·  Plan: ID consulted as doubt UTI . IV Abxs.   Has apt with Urologist on Friday.     Problem/Plan - 4:  ·  Problem: Pulmonary embolism.   ·  Plan: On AC so continuing.     Problem/Plan - 5:  ·  Problem: S/P colostomy.   ·  Plan: Continue ostomy care.     Problem/Plan - 6:  ·  Problem: Thyroid cancer.   ·  Plan: S/P surgery and HERRING and now on replacement.     Problem/Plan - 7:  ·  Problem: Abdominal pain with vomiting.   ·  Plan: CT scan noted . Symptoms resolved.   IVF and eating now.    Disposition : DC planning pending Hematology clearance.

## 2021-09-22 NOTE — CONSULT NOTE ADULT - ATTENDING COMMENTS
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    Hematology consulted given progressively worsening drop in Hgb since admission; work up so far notable for Coomb's positive test suggestive of AIHA, w/ panagglutinin    -peripheral blood smear reviewed; microspherocytes noted, c/w Coomb's positive test; no agglutination noted  -check reticulocyte count w/ next CBC  -Bilirubin normal  -despite presence of Coomb's+ pan-agglutinin, hemolysis labs this morning show haptoglobin of 43 and  (normal).  -Check hemolysis labs daily (haptoglobin, LDH, bilirubin, reticulocyte count)  -Given presence of Coomb's+ test, would recommend checking CT chest w/ IV contrast to rule out lymphoproliferative disease. If patient has evidence of hemolytic anemia and if there is no evidence of lymphoma, tentative plan to treat w/ high dose steroids (prednisone 1 mg/kg QD until Hgb improves)

## 2021-09-22 NOTE — CONSULT NOTE ADULT - CONSULT REQUESTED DATE/TIME
21-Sep-2021 10:33
PATIENT HISTORY:  Nargis Ruelas is a 27 year old female who presents with a chief complaint of a painful left ankle.  The patient relates injuring the left ankle on  while skating.  The patient states that she twisted the left ankle.  The patient relates pain with weight bearing to the left.   The patient relates being seen and treated with ice, elevation, and nonweightbearing with crutches.  The patient denies any numbness to the toes on the left foot.    REVIEW OF SYSTEMS:  Constitutional, HEENT, cardiovascular, pulmonary, GI, , musculoskeletal, neuro, skin, endocrine and psych systems are negative, except as otherwise noted.     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     GDM, class A1 3/25/2020     Tonsillitis         PAST SURGICAL HISTORY:   Past Surgical History:   Procedure Laterality Date      SECTION N/A 2020    Procedure:  SECTION;  Surgeon: Jaqueline Reyes MD;  Location: [facility]     MOUTH SURGERY      wisdom teeth     TONSILLECTOMY          MEDICATIONS:   Current Outpatient Medications:      amphetamine-dextroamphetamine (ADDERALL) 10 MG tablet, Take 1 tablet (10 mg) by mouth 2 times daily, Disp: 60 tablet, Rfl: 0     [START ON 2021] amphetamine-dextroamphetamine (ADDERALL) 10 MG tablet, Take 1 tablet (10 mg) by mouth 2 times daily, Disp: 60 tablet, Rfl: 0     [START ON 2021] amphetamine-dextroamphetamine (ADDERALL) 10 MG tablet, Take 1 tablet (10 mg) by mouth 2 times daily, Disp: 60 tablet, Rfl: 0     busPIRone (BUSPAR) 10 MG tablet, Take 1 tablet (10 mg) by mouth 2 times daily, Disp: 180 tablet, Rfl: 1     famotidine (PEPCID) 20 MG tablet, Take 1 tablet (20 mg) by mouth 2 times daily, Disp: 180 tablet, Rfl: 1     omeprazole (PRILOSEC) 20 MG DR capsule, Take 1 capsule (20 mg) by mouth daily, Disp: 90 capsule, Rfl: 1     oxyCODONE (ROXICODONE) 5 MG 
tablet, Take 1 tablet (5 mg) by mouth every 6 hours as needed for moderate to severe pain, Disp: 6 tablet, Rfl: 0     oxyCODONE (ROXICODONE) 5 MG tablet, Take 1 tablet (5 mg) by mouth every 6 hours as needed for moderate to severe pain, Disp: 6 tablet, Rfl: 0     venlafaxine (EFFEXOR-XR) 75 MG 24 hr capsule, Take 1 capsule (75 mg) by mouth daily, Disp: 90 capsule, Rfl: 1     albuterol (PROAIR HFA/PROVENTIL HFA/VENTOLIN HFA) 108 (90 Base) MCG/ACT inhaler, Inhale 2 puffs into the lungs every 6 hours (Patient not taking: Reported on 8/28/2020), Disp: 1 Inhaler, Rfl: 0     LORazepam (ATIVAN) 0.5 MG tablet, Take 1 tablet (0.5 mg) by mouth every 6 hours as needed for anxiety (Patient not taking: Reported on 3/22/2021), Disp: 10 tablet, Rfl: 0    Current Facility-Administered Medications:      medroxyPROGESTERone (DEPO-PROVERA) injection 150 mg, 150 mg, Intramuscular, Q90 Days, MerJaqueline garza MD, 150 mg at 01/26/21 1139     ALLERGIES:  No Known Allergies     SOCIAL HISTORY:   Social History     Socioeconomic History     Marital status: Single     Spouse name: Not on file     Number of children: Not on file     Years of education: Not on file     Highest education level: Not on file   Occupational History     Not on file   Social Needs     Financial resource strain: Not on file     Food insecurity     Worry: Not on file     Inability: Not on file     Transportation needs     Medical: Not on file     Non-medical: Not on file   Tobacco Use     Smoking status: Former Smoker     Packs/day: 0.20     Smokeless tobacco: Never Used     Tobacco comment: quit with pregnancy   Substance and Sexual Activity     Alcohol use: Not Currently     Alcohol/week: 0.0 standard drinks     Comment: 4 drinks per month-quit with pregnancy     Drug use: No     Sexual activity: Yes     Partners: Male   Lifestyle     Physical activity     Days per week: Not on file     Minutes per session: Not on file     Stress: Not on file   Relationships 
"    Social connections     Talks on phone: Not on file     Gets together: Not on file     Attends [employer] service: Not on file     Active member of club or organization: Not on file     Attends meetings of clubs or organizations: Not on file     Relationship status: Not on file     Intimate partner violence     Fear of current or ex partner: Not on file     Emotionally abused: Not on file     Physically abused: Not on file     Forced sexual activity: Not on file   Other Topics Concern     Parent/sibling w/ CABG, MI or angioplasty before 65F 55M? No   Social History Narrative    ** Merged History Encounter **             FAMILY HISTORY:   Family History   Problem Relation Age of Onset     Diabetes Maternal Grandfather      Coronary Artery Disease Maternal Grandfather      Cerebrovascular Disease Maternal Grandfather      Depression Mother      Anxiety Disorder Mother      Unknown/Adopted Father         unknown      Chronic Obstructive Pulmonary Disease Maternal Grandmother      Coronary Artery Disease Daughter      Other Cancer No family hx of         EXAM:Vitals: /82   Pulse 81   Ht 1.549 m (5' 1\")   Wt 85.7 kg (189 lb)   BMI 35.71 kg/m    BMI= Body mass index is 35.71 kg/m .     General appearance: Patient is alert and fully cooperative with history & exam.  No sign of distress is noted during the visit.     Psychiatric: Affect is pleasant & appropriate.  Patient appears motivated to improve health.     Respiratory: Breathing is regular & unlabored while sitting.     HEENT: Hearing is intact to spoken word.  Speech is clear.  No gross evidence of visual impairment that would impact ambulation.     Dermatologic: Skin is intact with no laceration for fracture blisters.        Vascular: DP & PT pulses are difficult to palpate due to the edema.  CFT and skin temperature is normal to both lower extremities.     Neurologic: Lower extremity sensation is intact to light touch.  No evidence of weakness or "
contracture in the lower extremities.        Musculoskeletal: One notes decreased ankle joint ROM due to swelling and pain on the left.  Point of maximum tenderness located over the  lateral aspect of the left ankle.  One notes no pain with palpation over the medial deltoid ligament on the left.  Moderate edema noted.  Positive ecchymosis noted.      Radiograph evaluation including AP, lateral and mortise views of the left ankle reveals a spiral oblique fracture of the lateral malleolus extending at the level of the ankle mortise proximally and posteriorly.       ASSESSMENT / PLAN:     ICD-10-CM    1. Closed fracture of distal end of left fibula, unspecified fracture morphology, initial encounter  S82.832A Orthopedic [facility] Spine  Referral       I have explained to Nargis  about the conditions.  We discussed the nature of the condition as well as the treatment plan and expected length of recovery.  At this point, there is no indication for surgical intervention at this time.  The patient will continue nonweightbearing with the use of a knee scooter in a cam boot.   The patient was prescribed Percocet and Narcan for pain management of the fracture.  The patient will return in 4 weeks for reevaluation repeat x-rays.    Nargis verbalized agreement with and understanding of the rational for the diagnosis and treatment plan.  All questions were answered to best of my ability and the patient's satisfaction. The patient was advised to contact the clinic with any questions that may arise after the clinic visit.      Disclaimer: This note consists of symbols derived from keyboarding, dictation and/or voice recognition software. As a result, there may be errors in the script that have gone undetected. Please consider this when interpreting information found in this chart.       RHIANNON Watts D.P.M., [email or web address].    
22-Sep-2021 10:00
20-Sep-2021 17:15

## 2021-09-22 NOTE — CONSULT NOTE ADULT - REASON FOR ADMISSION
weakness and not able to eat You can access the FollowMyHealth Patient Portal offered by [facility] by registering at the following website: [email or web address]. By joining [facility]’s FollowMyHealth portal, you will also be able to view your health information using other applications (apps) compatible with our system.

## 2021-09-22 NOTE — PROGRESS NOTE ADULT - SUBJECTIVE AND OBJECTIVE BOX
CC: f/u for n/v and poor po intake    Patient reports; he is afebrile, is no longer experiencing abdominal pain and reports baseline poor appetite.No fever or chills.    REVIEW OF SYSTEMS:  All other review of systems negative (Comprehensive ROS)    Antimicrobials Day #  :day 3  cefTRIAXone   IVPB 1000 milliGRAM(s) IV Intermittent every 24 hours    Other Medications Reviewed  MEDICATIONS  (STANDING):  alfuzosin 10 milliGRAM(s) Oral at bedtime  apixaban 5 milliGRAM(s) Oral every 12 hours  cefTRIAXone   IVPB 1000 milliGRAM(s) IV Intermittent every 24 hours  gabapentin 200 milliGRAM(s) Oral every 8 hours  influenza   Vaccine 0.5 milliLiter(s) IntraMuscular once  levothyroxine 137 MICROGram(s) Oral daily  sodium chloride 0.9%. 1000 milliLiter(s) (75 mL/Hr) IV Continuous <Continuous>    T(F): 98.9 (09-22-21 @ 13:05), Max: 98.9 (09-21-21 @ 20:43)  HR: 80 (09-22-21 @ 13:05)  BP: 139/75 (09-22-21 @ 13:05)  RR: 18 (09-22-21 @ 13:05)  SpO2: 100% (09-22-21 @ 13:05)  Wt(kg): --    PHYSICAL EXAM:  General: alert, no acute distress  Eyes:  anicteric, no conjunctival injection, no discharge  Oropharynx: no lesions or injection 	  Neck: supple, without adenopathy  Lungs: clear to auscultation  Heart: regular rate and rhythm; no murmur, rubs or gallops  Abdomen: soft, nondistended, nontender, without mass or organomegaly  Skin: no lesions  Extremities: no clubbing, cyanosis, or edema  Neurologic: alert, oriented, moves all extremities  Left sided colostomy with formed output and tony with clear urine  LAB RESULTS:                        7.2    10.23 )-----------( 397      ( 22 Sep 2021 06:58 )             22.8     09-22    138  |  102  |  10  ----------------------------<  126<H>  3.5   |  20<L>  |  0.79    Ca    9.2      22 Sep 2021 11:13    TPro  7.0  /  Alb  3.9  /  TBili  0.9  /  DBili  0.2  /  AST  17  /  ALT  14  /  AlkPhos  65  09-22    LIVER FUNCTIONS - ( 22 Sep 2021 11:13 )  Alb: 3.9 g/dL / Pro: 7.0 g/dL / ALK PHOS: 65 U/L / ALT: 14 U/L / AST: 17 U/L / GGT: x             MICROBIOLOGY:  RECENT CULTURES:  09-20 @ 18:34 Clean Catch Clean Catch (Midstream)     >=3 organisms. Probable collection contamination.          RADIOLOGY REVIEWED:  < from: CT Abdomen and Pelvis w/ Oral Cont and w/ IV Cont (09.20.21 @ 14:50) >  CONTRAST/COMPLICATIONS:  IV Contrast: Omnipaque 350  90 cc administered   10 cc discarded  Oral Contrast: None  Complications: None reported    PROCEDURE:  CT of the Abdomen and Pelvis was performed.  Sagittal and coronal reformats were performed.    FINDINGS:  LOWER CHEST: Mild subsegmental atelectasis.    LIVER: Within normal limits.  BILE DUCTS: Normal caliber.  GALLBLADDER: Within normal limits.  SPLEEN: Within normal limits.  PANCREAS: Within normal limits.  ADRENALS: Within normal limits.  KIDNEYS/URETERS: Within normal limits.    BLADDER: Tony catheter.  REPRODUCTIVE ORGANS: Prostate within normal limits.    BOWEL: Status post left lower quadrant colostomy. Intact rectal stump. No bowel obstruction. Appendix within normal limits.  PERITONEUM:No ascites.  VESSELS: Atherosclerotic changes.  RETROPERITONEUM/LYMPH NODES: No lymphadenopathy.  ABDOMINAL WALL: Postsurgical changes.  BONES: Status post partial sacral resection and lumboiliac spinal fusion.    IMPRESSION:  No bowel obstruction.    < end of copied text >

## 2021-09-22 NOTE — PROGRESS NOTE ADULT - ASSESSMENT
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 Patient reported he has been retching no actual vomit. Patient has generalized weakness . I reviewed prior ID notes the patient is known to our service , the patient was treated for six weeks of IV Vancomycin and po Flagyl for sacral wound collection which the patient completed full course and he also followed up in our office. He reports he saw Dr Veras in the office in which he was told that he completed his course of treatment. He also reports that he went to go see surgeon regarding wound.  The wound healed . He is here now because he was having for three to four days of dry heaves, nausea, vomiting and abdominal pain. He reports today he is feeling better. He has a chronic tony and as UA was sent and reported to be abnormal and so he was diagnosed with a UTI . He was given a dose of CTX in the ED .   reviewed vitals here he is afebrile here, wbc wnl .there has been little clinical support for a UTI.He has been afebrile, has a normal wbc and diff, tony has been functional, and most if not all of his symptoms were GI related with a deal of chronicity.I had hoped some of his anorexia and nausea were related to his prior antibiotics.  Plan  1. will stop CTX  2.polymicrobic urine is c/w chronic tony   3, if nausea and anorexia persist he may need additional GI w/u

## 2021-09-22 NOTE — CONSULT NOTE ADULT - SUBJECTIVE AND OBJECTIVE BOX
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 Patient reported he has been retching no actual vomit. Patient has generalized weakness . (20 Sep 2021 17:16)    Hematology consulted given progressively worsening anemia this admission. Patient denies any bleeding at this time; ostomy output has been decreased as above, but denies any blood from ostomy. Denies any fevers or chills; some weight loss, but unable to quantify; endorses decreased appetite; denies lymphadenopathy or night sweats. Reports some ongoing R sided lower extremity numbness following sacrectomy. Denies any skin rashes or lesions.    PAST MEDICAL & SURGICAL HISTORY:  HLD (hyperlipidemia)  Thyroid cancer  2010  History of central serous retinopathy  H/O hypogonadism  H/O vitamin D deficiency  H/O osteopenia  H/O thyroidectomy  Total --2010  H/O colonoscopy    FAMILY HISTORY:      MEDICATIONS  (STANDING):  alfuzosin 10 milliGRAM(s) Oral at bedtime  apixaban 5 milliGRAM(s) Oral every 12 hours  gabapentin 200 milliGRAM(s) Oral every 8 hours  influenza   Vaccine 0.5 milliLiter(s) IntraMuscular once  levothyroxine 137 MICROGram(s) Oral daily  sodium chloride 0.9%. 1000 milliLiter(s) (75 mL/Hr) IV Continuous <Continuous>    MEDICATIONS  (PRN):    Allergies: No Known Allergies / Intolerances    SOCIAL HISTORY: No EtOH, no tobacco    REVIEW OF SYSTEMS: ROS otherwise negative except as above      T(F): 98.9 (09-22-21 @ 13:05), Max: 98.9 (09-21-21 @ 20:43)  HR: 80 (09-22-21 @ 13:05)  BP: 139/75 (09-22-21 @ 13:05)  RR: 18 (09-22-21 @ 13:05)  SpO2: 100% (09-22-21 @ 13:05)  Wt(kg): --    PHYSICAL EXAM:  General: alert, no acute distress  Eyes:  anicteric, no conjunctival injection, no discharge  Oropharynx: no lesions or injection 	  Neck: supple, without adenopathy  Lungs: clear to auscultation; no ecchymoses on back or hematoma  Heart: regular rate and rhythm; no murmur, rubs or gallops  Abdomen: soft, nondistended, nontender, without mass or organomegaly; ostomy in place w/ stool in bag, no blood  Skin: no lesions, no ecchymoses or purpura  Extremities: no clubbing, cyanosis, or edema  Neurologic: alert, oriented, moves all extremities  Left sided colostomy with formed output and Tony with clear urine    LAB RESULTS:                          7.2    10.23 )-----------( 397      ( 22 Sep 2021 06:58 )             22.8       09-22    138  |  102  |  10  ----------------------------<  126<H>  3.5   |  20<L>  |  0.79    Ca    9.2      22 Sep 2021 11:13    TPro  7.0  /  Alb  3.9  /  TBili  0.9  /  DBili  0.2  /  AST  17  /  ALT  14  /  AlkPhos  65  09-22      Haptoglobin, Serum: 43 mg/dL (09-22 @ 15:15)  Lactate Dehydrogenase, Serum: 234 U/L (09-22 @ 11:13)        < from: CT Abdomen and Pelvis w/ Oral Cont and w/ IV Cont (09.20.21 @ 14:50) >    EXAM:  CT ABDOMEN AND PELVIS OC IC                          PROCEDURE DATE:  09/20/2021      INTERPRETATION:  CLINICAL INFORMATION: Abdominal pain. Evaluate for small bowel obstruction.    COMPARISON: CT abdomen pelvis 7/23/2021.    CONTRAST/COMPLICATIONS:  IV Contrast: Omnipaque 350  90 cc administered   10 cc discarded  Oral Contrast: None  Complications: None reported    PROCEDURE:  CT of the Abdomen and Pelvis was performed.  Sagittal and coronal reformats were performed.    FINDINGS:  LOWER CHEST: Mild subsegmental atelectasis.    LIVER: Within normal limits.  BILE DUCTS: Normal caliber.  GALLBLADDER: Within normal limits.  SPLEEN: Within normal limits.  PANCREAS: Within normal limits.  ADRENALS: Within normal limits.  KIDNEYS/URETERS: Within normal limits.    BLADDER: Tony catheter.  REPRODUCTIVE ORGANS: Prostate within normal limits.    BOWEL: Status post left lower quadrant colostomy. Intact rectal stump. No bowel obstruction. Appendix within normal limits.  PERITONEUM:No ascites.  VESSELS: Atherosclerotic changes.  RETROPERITONEUM/LYMPH NODES: No lymphadenopathy.  ABDOMINAL WALL: Postsurgical changes.  BONES: Status post partial sacral resection and lumboiliac spinal fusion.    IMPRESSION:  No bowel obstruction.    --- End of Report ---    ASHLEY BARBOSA MD; Attending Radiologist  This document has been electronically signed. Sep 20 2021  3:26PM    < end of copied text >
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 Patient reported he has been retching no actual vomit. Patient has generalized weakness . I reviewed prior ID notes the patient is known to our service , the patient was treated for six weeks of IV Vancomycin and po Flagyl for sacral wound collection which the patient completed full course and he also followed up in our office. He reports he saw Dr Veras in the office in which he was told that he completed his course of treatment. He also reports that he went to go see surgeon regarding wound.  The wound healed . He is here now because he was having for three to four days of dry heaves, nausea, vomiting and abdominal pain. He reports today he is feeling better. He has a chronic tony and as UA was sent and reported to be abnormal and so he was diagnosed with a UTI . He was given a dose of CTX in the ED .    REVIEW OF SYSTEMS:  All other review of systems negative (Comprehensive ROS)    PAST MEDICAL & SURGICAL HISTORY:  HLD (hyperlipidemia)    Thyroid cancer      History of central serous retinopathy    H/O hypogonadism    H/O vitamin D deficiency    H/O osteopenia    H/O thyroidectomy  Total --    H/O colonoscopy        Allergies    No Known Allergies    Intolerances            FAMILY HISTORY:      SOCIAL HISTORY:  Smoking:     ETOH:     Drug Use:     Single     T(F): 98.4 (21 @ 04:37), Max: 98.4 (21 @ 04:37)  HR: 76 (21 @ 04:37)  BP: 149/77 (21 @ 04:37)  RR: 18 (21 @ 04:37)  SpO2: 98% (21 @ 04:37)  Wt(kg): --    PHYSICAL EXAM:  General: alert, no acute distress  Eyes:  anicteric, no conjunctival injection, no discharge  Oropharynx: no lesions or injection 	  Neck: supple, without adenopathy  Lungs: clear to auscultation  Heart: regular rate and rhythm; no murmur, rubs or gallops  Abdomen: soft, nondistended, nontender, without mass or organomegaly  Skin: no lesions  Extremities: no clubbing, cyanosis, or edema  Neurologic: alert, oriented, moves all extremities    LAB RESULTS:                        8.5    10.46 )-----------( 399      ( 21 Sep 2021 05:38 )             26.8         137  |  103  |  11  ----------------------------<  85  4.2   |  20<L>  |  0.88    Ca    9.3      21 Sep 2021 05:38  Phos  3.5       Mg     2.2         TPro  7.8  /  Alb  4.2  /  TBili  1.4<H>  /  DBili  x   /  AST  19  /  ALT  18  /  AlkPhos  72      LIVER FUNCTIONS - ( 20 Sep 2021 11:57 )  Alb: 4.2 g/dL / Pro: 7.8 g/dL / ALK PHOS: 72 U/L / ALT: 18 U/L / AST: 19 U/L / GGT: x           Urinalysis Basic - ( 20 Sep 2021 14:12 )    Color: Yellow / Appearance: Slightly Turbid / S.018 / pH: x  Gluc: x / Ketone: Negative  / Bili: Negative / Urobili: Negative   Blood: x / Protein: 30 mg/dL / Nitrite: Negative   Leuk Esterase: Large / RBC: 42 /hpf /  /HPF   Sq Epi: x / Non Sq Epi: 1 /hpf / Bacteria: Many        MICROBIOLOGY:  RECENT CULTURES:        RADIOLOGY REVIEWED:      Antimicrobials Day #      Other Medications:  alfuzosin 10 milliGRAM(s) Oral at bedtime  apixaban 5 milliGRAM(s) Oral every 12 hours  fenofibrate Tablet 145 milliGRAM(s) Oral daily  gabapentin 200 milliGRAM(s) Oral every 8 hours  influenza   Vaccine 0.5 milliLiter(s) IntraMuscular once  levothyroxine 137 MICROGram(s) Oral daily  sodium chloride 0.9%. 1000 milliLiter(s) IV Continuous <Continuous>    
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 Patient reported he has been retching no actual vomit. Patient has generalized weakness Good urinary output from tony and pt has been hydrating. Patient CP, SOB, hematuria, back pain, headache, fever.    No fever in ER. Given 1 dose of CTX.    PAST MEDICAL & SURGICAL HISTORY:  HLD (hyperlipidemia)    Thyroid cancer      History of central serous retinopathy    H/O hypogonadism    H/O vitamin D deficiency    H/O osteopenia    H/O thyroidectomy  Total --    H/O colonoscopy        Allergies    No Known Allergies    Intolerances        ANTIMICROBIALS:      MEDICATIONS  (STANDING):  sodium chloride 0.9%. 1000 milliLiter(s) (75 mL/Hr) IV Continuous <Continuous>      Social History  Smoking: former smoker  Etoh: occ ETOH  Drug use: no      FAMILY HISTORY: Father - CAD, Sibling - colon CA      Vital Signs Last 24 Hrs  T(C): 36.6 (20 Sep 2021 15:59), Max: 36.9 (20 Sep 2021 09:56)  T(F): 97.9 (20 Sep 2021 15:59), Max: 98.4 (20 Sep 2021 09:56)  HR: 82 (20 Sep 2021 15:59) (82 - 99)  BP: 145/85 (20 Sep 2021 15:59) (120/84 - 145/85)  BP(mean): --  RR: 18 (20 Sep 2021 15:59) (16 - 18)  SpO2: 99% (20 Sep 2021 15:59) (99% - 100%)    CBC Full  -  ( 20 Sep 2021 11:58 )  WBC Count : 10.66 K/uL  RBC Count : 3.84 M/uL  Hemoglobin : 10.1 g/dL  Hematocrit : 31.4 %  Platelet Count - Automated : 448 K/uL  Mean Cell Volume : 81.8 fl  Mean Cell Hemoglobin : 26.3 pg  Mean Cell Hemoglobin Concentration : 32.2 gm/dL  Auto Neutrophil # : 6.30 K/uL  Auto Lymphocyte # : 2.69 K/uL  Auto Monocyte # : 1.37 K/uL  Auto Eosinophil # : 0.11 K/uL  Auto Basophil # : 0.11 K/uL  Auto Neutrophil % : 59.1 %  Auto Lymphocyte % : 25.2 %  Auto Monocyte % : 12.9 %  Auto Eosinophil % : 1.0 %  Auto Basophil % : 1.0 %        138  |  100  |  15  ----------------------------<  78  3.9   |  18<L>  |  0.84    Ca    10.1      20 Sep 2021 11:57  Phos  3.5       Mg     2.2         TPro  7.8  /  Alb  4.2  /  TBili  1.4<H>  /  DBili  x   /  AST  19  /  ALT  18  /  AlkPhos  72      LIVER FUNCTIONS - ( 20 Sep 2021 11:57 )  Alb: 4.2 g/dL / Pro: 7.8 g/dL / ALK PHOS: 72 U/L / ALT: 18 U/L / AST: 19 U/L / GGT: x           Urinalysis Basic - ( 20 Sep 2021 14:12 )    Color: Yellow / Appearance: Slightly Turbid / S.018 / pH: x  Gluc: x / Ketone: Negative  / Bili: Negative / Urobili: Negative   Blood: x / Protein: 30 mg/dL / Nitrite: Negative   Leuk Esterase: Large / RBC: 42 /hpf /  /HPF   Sq Epi: x / Non Sq Epi: 1 /hpf / Bacteria: Many        MICROBIOLOGY:        RADIOLOGY  < from: CT Abdomen and Pelvis w/ Oral Cont and w/ IV Cont (21 @ 14:50) >  No bowel obstruction.    < end of copied text >

## 2021-09-22 NOTE — CONSULT NOTE ADULT - ASSESSMENT
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    Hematology consulted given progressively worsening drop in Hgb since admission; work up so far notable for Coomb's positive test suggestive of AIHA, w/ panagglutinin    #Normocytic anemia  -CT abdomen and pelvis w/ contrast on 9/20/21 negative for evidence of bleeding; no evidence of abdominal masses / malignany  -peripheral blood smear reviewed; microspherocytes noted, c/w Coomb's positive test; no agglutination noted  -check reticulocyte count w/ next CBC  -check iron studies: ferritin, iron w/ TIBC  -check B12, folate  -Bilirubin normal  -despite presence of Coomb's+ pan-agglutinin, hemolysis labs this morning show haptoglobin of 43 and  (normal), making hemolysis less likely, raising suspicion for possible dilution from admission. Certain medications can also result in false positive Coomb's (e.g. ceftriaxone), but suspicion is low that this is the case given Hgb drop  -Check hemolysis labs daily (haptoglobin, LDH, bilirubin, reticulocyte count)  -Given presence of Coomb's+ test, would recommend checking CT chest w/ IV contrast to rule out lymphoproliferative disease. If patient has evidence of hemolytic anemia and if there is no evidence of lymphoma, tentative plan to treat w/ high dose steroids (prednisone 1 mg/kg QD until Hgb improves)  -if patient has symptomatic anemia, ok to transfuse with PRBCs; if asymptomatic, would be conservative and avoid for now pending work up as above  -no signs of bleeding on exam or on imaging; continue to monitor given that patient is on DOAC for PE  -TSH 2.95; would check free T4    Please call with any questions.    Jeison Mars MD, MPH  Hematology / Oncology Fellow, PGY7  p   
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 Patient reported he has been retching no actual vomit. Patient has generalized weakness . I reviewed prior ID notes the patient is known to our service , the patient was treated for six weeks of IV Vancomycin and po Flagyl for sacral wound collection which the patient completed full course and he also followed up in our office. He reports he saw Dr Veras in the office in which he was told that he completed his course of treatment. He also reports that he went to go see surgeon regarding wound.  The wound healed . He is here now because he was having for three to four days of dry heaves, nausea, vomiting and abdominal pain. He reports today he is feeling better. He has a chronic tony and as UA was sent and reported to be abnormal and so he was diagnosed with a UTI . He was given a dose of CTX in the ED .   reviewed vitals here he is afebrile here, wbc wnl     Plan  Start CTX 1 g IV daily for probable CAUTI  follow up urine cx results   continue to support care per primary medical team  
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

## 2021-09-22 NOTE — CHART NOTE - NSCHARTNOTEFT_GEN_A_CORE
spoke to Hematology fellow on call Dr Amarjit Jimenez regarding CT chest results ; No evidence of lymphoproliferative disease on prelim read. Pt to be started on prednisone 1mg/kg dosing for hemolytic anemia ; first dose ordered stat; Discussed with pt and attending.

## 2021-09-23 LAB
ALBUMIN SERPL ELPH-MCNC: 4 G/DL — SIGNIFICANT CHANGE UP (ref 3.3–5)
ALP SERPL-CCNC: 67 U/L — SIGNIFICANT CHANGE UP (ref 40–120)
ALT FLD-CCNC: 18 U/L — SIGNIFICANT CHANGE UP (ref 10–45)
ANION GAP SERPL CALC-SCNC: 16 MMOL/L — SIGNIFICANT CHANGE UP (ref 5–17)
AST SERPL-CCNC: 18 U/L — SIGNIFICANT CHANGE UP (ref 10–40)
BILIRUB DIRECT SERPL-MCNC: 0.2 MG/DL — SIGNIFICANT CHANGE UP (ref 0–0.2)
BILIRUB INDIRECT FLD-MCNC: 0.9 MG/DL — SIGNIFICANT CHANGE UP (ref 0.2–1)
BILIRUB SERPL-MCNC: 1.1 MG/DL — SIGNIFICANT CHANGE UP (ref 0.2–1.2)
BILIRUB SERPL-MCNC: 1.1 MG/DL — SIGNIFICANT CHANGE UP (ref 0.2–1.2)
BUN SERPL-MCNC: 9 MG/DL — SIGNIFICANT CHANGE UP (ref 7–23)
CALCIUM SERPL-MCNC: 9.8 MG/DL — SIGNIFICANT CHANGE UP (ref 8.4–10.5)
CHLORIDE SERPL-SCNC: 102 MMOL/L — SIGNIFICANT CHANGE UP (ref 96–108)
CO2 SERPL-SCNC: 19 MMOL/L — LOW (ref 22–31)
CREAT SERPL-MCNC: 0.74 MG/DL — SIGNIFICANT CHANGE UP (ref 0.5–1.3)
GLUCOSE SERPL-MCNC: 152 MG/DL — HIGH (ref 70–99)
HAPTOGLOB SERPL-MCNC: <20 MG/DL — LOW (ref 34–200)
HCT VFR BLD CALC: 26.1 % — LOW (ref 39–50)
HGB BLD-MCNC: 8.4 G/DL — LOW (ref 13–17)
LDH SERPL L TO P-CCNC: 259 U/L — HIGH (ref 50–242)
MCHC RBC-ENTMCNC: 27.8 PG — SIGNIFICANT CHANGE UP (ref 27–34)
MCHC RBC-ENTMCNC: 32.2 GM/DL — SIGNIFICANT CHANGE UP (ref 32–36)
MCV RBC AUTO: 86.4 FL — SIGNIFICANT CHANGE UP (ref 80–100)
NRBC # BLD: 0 /100 WBCS — SIGNIFICANT CHANGE UP (ref 0–0)
PLATELET # BLD AUTO: 475 K/UL — HIGH (ref 150–400)
POTASSIUM SERPL-MCNC: 4.1 MMOL/L — SIGNIFICANT CHANGE UP (ref 3.5–5.3)
POTASSIUM SERPL-SCNC: 4.1 MMOL/L — SIGNIFICANT CHANGE UP (ref 3.5–5.3)
PROT SERPL-MCNC: 7.4 G/DL — SIGNIFICANT CHANGE UP (ref 6–8.3)
RBC # BLD: 3.02 M/UL — LOW (ref 4.2–5.8)
RBC # BLD: 3.02 M/UL — LOW (ref 4.2–5.8)
RBC # FLD: 15.9 % — HIGH (ref 10.3–14.5)
RETICS #: 147.7 K/UL — HIGH (ref 25–125)
RETICS/RBC NFR: 4.9 % — HIGH (ref 0.5–2.5)
SODIUM SERPL-SCNC: 137 MMOL/L — SIGNIFICANT CHANGE UP (ref 135–145)
WBC # BLD: 9.82 K/UL — SIGNIFICANT CHANGE UP (ref 3.8–10.5)
WBC # FLD AUTO: 9.82 K/UL — SIGNIFICANT CHANGE UP (ref 3.8–10.5)

## 2021-09-23 PROCEDURE — 99233 SBSQ HOSP IP/OBS HIGH 50: CPT

## 2021-09-23 RX ORDER — PANTOPRAZOLE SODIUM 20 MG/1
40 TABLET, DELAYED RELEASE ORAL DAILY
Refills: 0 | Status: DISCONTINUED | OUTPATIENT
Start: 2021-09-23 | End: 2021-09-28

## 2021-09-23 RX ORDER — ONDANSETRON 8 MG/1
4 TABLET, FILM COATED ORAL EVERY 6 HOURS
Refills: 0 | Status: DISCONTINUED | OUTPATIENT
Start: 2021-09-23 | End: 2021-09-28

## 2021-09-23 RX ADMIN — ALFUZOSIN HYDROCHLORIDE 10 MILLIGRAM(S): 10 TABLET, EXTENDED RELEASE ORAL at 21:30

## 2021-09-23 RX ADMIN — APIXABAN 5 MILLIGRAM(S): 2.5 TABLET, FILM COATED ORAL at 06:30

## 2021-09-23 RX ADMIN — Medication 50 MILLIGRAM(S): at 10:33

## 2021-09-23 RX ADMIN — ONDANSETRON 4 MILLIGRAM(S): 8 TABLET, FILM COATED ORAL at 15:44

## 2021-09-23 RX ADMIN — GABAPENTIN 200 MILLIGRAM(S): 400 CAPSULE ORAL at 21:30

## 2021-09-23 RX ADMIN — GABAPENTIN 200 MILLIGRAM(S): 400 CAPSULE ORAL at 06:30

## 2021-09-23 RX ADMIN — APIXABAN 5 MILLIGRAM(S): 2.5 TABLET, FILM COATED ORAL at 17:33

## 2021-09-23 RX ADMIN — Medication 20 MILLIGRAM(S): at 10:33

## 2021-09-23 RX ADMIN — GABAPENTIN 200 MILLIGRAM(S): 400 CAPSULE ORAL at 13:44

## 2021-09-23 RX ADMIN — PANTOPRAZOLE SODIUM 40 MILLIGRAM(S): 20 TABLET, DELAYED RELEASE ORAL at 15:43

## 2021-09-23 RX ADMIN — Medication 137 MICROGRAM(S): at 05:25

## 2021-09-23 NOTE — PROGRESS NOTE ADULT - ASSESSMENT
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 Patient reported he has been retching no actual vomit. Patient has generalized weakness . I reviewed prior ID notes the patient is known to our service , the patient was treated for six weeks of IV Vancomycin and po Flagyl for sacral wound collection which the patient completed full course and he also followed up in our office. He reports he saw Dr Veras in the office in which he was told that he completed his course of treatment. He also reports that he went to go see surgeon regarding wound.  The wound healed . He is here now because he was having for three to four days of dry heaves, nausea, vomiting and abdominal pain. He reports today he is feeling better. He has a chronic tony and as UA was sent and reported to be abnormal and so he was diagnosed with a UTI . He was given a dose of CTX in the ED .   reviewed vitals here he is afebrile here, wbc wnl  urine cx reviewed and reported as colonizing linda, he complete course empiric abx   no signs of infection     Plan  continue supportive care as per primary care team   signing off re consult us as needed

## 2021-09-23 NOTE — PROGRESS NOTE ADULT - ASSESSMENT
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 Patient reported he has been retching no actual vomit. Patient has generalized weakness .     Problem/Plan - 1:  ·  Problem: Anemia .   ·  Plan: Hematology help appreciated .    Started on prednisone for Possible Hemolysis .   < from: CT Chest w/ IV Cont (09.22.21 @ 18:11) >  INTERPRETATION:  Scattered bilateral groundglass opacities, predominantly in the left lower and posterior left upper lobes. Bibasilar atelectasis.    < end of copied text >       Problem/Plan - 2:  ·  Problem: Chordoma.   ·  Plan: S/P surgery . Outpt follow up.     Problem/Plan - 3:  ·  Problem: Chronic indwelling Tony catheter.   ·  Plan: ID consulted as doubt UTI . IV Abxs.   Has apt with Urologist on Friday.     Problem/Plan - 4:  ·  Problem: Pulmonary embolism.   ·  Plan: On AC so continuing.     Problem/Plan - 5:  ·  Problem: S/P colostomy.   ·  Plan: Continue ostomy care.     Problem/Plan - 6:  ·  Problem: Thyroid cancer.   ·  Plan: S/P surgery and HERRING and now on replacement.     Problem/Plan - 7:  ·  Problem: Abdominal pain with vomiting.   ·  Plan: CT scan noted . Symptoms resolved.   IVF and eating now.    Disposition : DC planning pending Hematology clearance.

## 2021-09-23 NOTE — PROGRESS NOTE ADULT - SUBJECTIVE AND OBJECTIVE BOX
CC: f/u for n/v and poor po intake    Patient reports; he is afebrile, appetite is slightly better     REVIEW OF SYSTEMS:  All other review of systems negative (Comprehensive ROS)      Other Medications Reviewed  MEDICATIONS  (STANDING):  alfuzosin 10 milliGRAM(s) Oral at bedtime  apixaban 5 milliGRAM(s) Oral every 12 hours  cefTRIAXone   IVPB 1000 milliGRAM(s) IV Intermittent every 24 hours  gabapentin 200 milliGRAM(s) Oral every 8 hours  influenza   Vaccine 0.5 milliLiter(s) IntraMuscular once  levothyroxine 137 MICROGram(s) Oral daily  sodium chloride 0.9%. 1000 milliLiter(s) (75 mL/Hr) IV Continuous <Continuous>    Vital Signs Last 24 Hrs  T(C): 37.4 (23 Sep 2021 13:10), Max: 37.4 (23 Sep 2021 13:10)  T(F): 99.4 (23 Sep 2021 13:10), Max: 99.4 (23 Sep 2021 13:10)  HR: 87 (23 Sep 2021 13:10) (79 - 87)  BP: 147/75 (23 Sep 2021 13:10) (123/71 - 147/75)  BP(mean): --  RR: 18 (23 Sep 2021 13:10) (18 - 18)  SpO2: 97% (23 Sep 2021 13:10) (97% - 98%)    PHYSICAL EXAM:  General: alert, no acute distress  Eyes:  anicteric, no conjunctival injection, no discharge  Oropharynx: no lesions or injection 	  Neck: supple, without adenopathy  Lungs: clear to auscultation  Heart: regular rate and rhythm; no murmur, rubs or gallops  Abdomen: soft, nondistended, nontender, without mass or organomegaly  Skin: no lesions  Extremities: no clubbing, cyanosis, or edema  Neurologic: alert, oriented, moves all extremities  Left sided colostomy with formed output and tony with clear urine    LAB RESULTS:                                   8.4    9.82  )-----------( 475      ( 23 Sep 2021 07:01 )             26.1     09-23    137  |  102  |  9   ----------------------------<  152<H>  4.1   |  19<L>  |  0.74    Ca    9.8      23 Sep 2021 07:00    TPro  7.4  /  Alb  4.0  /  TBili  1.1  /  DBili  0.2  /  AST  18  /  ALT  18  /  AlkPhos  67  09-23             MICROBIOLOGY:  RECENT CULTURES:  09-20 @ 18:34 Clean Catch Clean Catch (Midstream)     >=3 organisms. Probable collection contamination.          RADIOLOGY REVIEWED:  < from: CT Abdomen and Pelvis w/ Oral Cont and w/ IV Cont (09.20.21 @ 14:50) >  CONTRAST/COMPLICATIONS:  IV Contrast: Omnipaque 350  90 cc administered   10 cc discarded  Oral Contrast: None  Complications: None reported    PROCEDURE:  CT of the Abdomen and Pelvis was performed.  Sagittal and coronal reformats were performed.    FINDINGS:  LOWER CHEST: Mild subsegmental atelectasis.    LIVER: Within normal limits.  BILE DUCTS: Normal caliber.  GALLBLADDER: Within normal limits.  SPLEEN: Within normal limits.  PANCREAS: Within normal limits.  ADRENALS: Within normal limits.  KIDNEYS/URETERS: Within normal limits.    BLADDER: Tony catheter.  REPRODUCTIVE ORGANS: Prostate within normal limits.    BOWEL: Status post left lower quadrant colostomy. Intact rectal stump. No bowel obstruction. Appendix within normal limits.  PERITONEUM:No ascites.  VESSELS: Atherosclerotic changes.  RETROPERITONEUM/LYMPH NODES: No lymphadenopathy.  ABDOMINAL WALL: Postsurgical changes.  BONES: Status post partial sacral resection and lumboiliac spinal fusion.    IMPRESSION:  No bowel obstruction.    < end of copied text >

## 2021-09-23 NOTE — PROGRESS NOTE ADULT - SUBJECTIVE AND OBJECTIVE BOX
Hematology Oncology Follow-up    INTERVAL HPI/OVERNIGHT EVENTS:  No o/n events, patient resting comfortably. No complaints at this time. Patient specifically denies fever, chills, dizziness, weakness, CP, palpitations, SOB, cough, N/V/D/C, dysuria, changes in bowel movements, LE edema.    Review of Systems:  General: denies fevers/chills, night sweats, malaise, changes in appetite  Head: denies HA  Eyes: denies vision change  ENT: denies oral lesions, rhinorrhea, epistaxys, sore throat, dysphagia  Respiratory: denies cough, shortness of breath, pleurisy  Cardiovascular: denies chest pain, palpitaitons, HTORNTON  Gastrointestinal: denies nausea, vomiting, abdominal pain, constipation, diarrhea, melena, hematochezia  MSK: denies joint pain or muscle pain  Neuro: denies headache, weakness, or parasthesias  Skin: denies rash, petichiae, echymoses  Psych: denies anxiety or sleep disturbances    VITAL SIGNS:  T(F): 99.4 (09-23-21 @ 13:10)  HR: 87 (09-23-21 @ 13:10)  BP: 147/75 (09-23-21 @ 13:10)  RR: 18 (09-23-21 @ 13:10)  SpO2: 97% (09-23-21 @ 13:10)  Wt(kg): --    09-22-21 @ 07:01  -  09-23-21 @ 07:00  --------------------------------------------------------  IN: 480 mL / OUT: 1301 mL / NET: -821 mL    09-23-21 @ 07:01  -  09-23-21 @ 14:06  --------------------------------------------------------  IN: 660 mL / OUT: 800 mL / NET: -140 mL        PHYSICAL EXAM:    Constitutional: AAOx3, NAD  Eyes: PERRL, EOMI, sclera non-icteric  Neck: supple, no masses, no JVD, no lymphadenopathy  Respiratory: CTA b/l, no wheezing, rhonchi, rales, with normal respiratory effort  Cardiovascular: RRR, normal S1S2, no M/R/G  Gastrointestinal: soft, NTND, no masses palpable, BS normal in all four quadrants, no HSM  Extremities:  no edema  MSK: no obvious abnormalities, normal ROM, no lymphadenopathy  Neurological: Grossly intact  Skin: Normal temperature, no rash, no echymoses, no petichiae  Psych: normal affect    MEDICATIONS  (STANDING):  alfuzosin 10 milliGRAM(s) Oral at bedtime  apixaban 5 milliGRAM(s) Oral every 12 hours  gabapentin 200 milliGRAM(s) Oral every 8 hours  influenza   Vaccine 0.5 milliLiter(s) IntraMuscular once  levothyroxine 137 MICROGram(s) Oral daily  predniSONE   Tablet 50 milliGRAM(s) Oral daily  predniSONE   Tablet 20 milliGRAM(s) Oral daily    MEDICATIONS  (PRN):      No Known Allergies      LABS:                        8.4    9.82  )-----------( 475      ( 23 Sep 2021 07:01 )             26.1     09-23    137  |  102  |  9   ----------------------------<  152<H>  4.1   |  19<L>  |  0.74    Ca    9.8      23 Sep 2021 07:00    TPro  7.4  /  Alb  4.0  /  TBili  1.1  /  DBili  0.2  /  AST  18  /  ALT  18  /  AlkPhos  67  09-23    PT/INR - ( 22 Sep 2021 11:12 )   PT: 17.4 sec;   INR: 1.48 ratio          Lactate Dehydrogenase, Serum: 259 U/L (09-23 @ 07:00)  Haptoglobin, Serum: 43 mg/dL (09-22 @ 15:15)        Ferritin, Serum: 851 ng/mL *H* (09-22-21 @ 15:18)  Unsaturated Iron Binding Capacity: 145 ug/dL (09-22-21 @ 15:15)  Iron Total, Serum: 52 ug/dL (09-22-21 @ 15:15)  Iron - Total Binding Capacity.: 196 ug/dL *L* (09-22-21 @ 15:15)  % Saturation, Iron: 26 % (09-22-21 @ 15:15)    Folate, Serum: 9.3 ng/mL (09-22-21 @ 15:18)  Vitamin B12, Serum: 488 pg/mL (09-22-21 @ 15:18)      Bilirubin Direct, Serum: 0.2 (09-23-21 @ 07:00)      RADIOLOGY & ADDITIONAL TESTS:  Studies reviewed. Hematology Oncology Follow-up    INTERVAL HPI/OVERNIGHT EVENTS:  No o/n events, patient resting on bed comfortably. No complaints at this time. Patient specifically denies fever, chills, dizziness, weakness, CP, palpitations, SOB, cough, N/V/D/C, dysuria, changes in bowel movements, LE edema.    Morning Hb 8.4.     Review of Systems:  General: denies fevers/chills, night sweats, malaise, changes in appetite  Head: denies HA  Eyes: denies vision change  ENT: denies oral lesions, rhinorrhea, epistaxys, sore throat, dysphagia  Respiratory: denies cough, shortness of breath, pleurisy  Cardiovascular: denies chest pain, palpitaitons, THORNTON  Gastrointestinal: denies nausea, vomiting, abdominal pain, constipation, diarrhea, melena, hematochezia  MSK: denies joint pain or muscle pain  Neuro: denies headache, weakness, or parasthesias  Skin: denies rash, petichiae, echymoses  Psych: denies anxiety or sleep disturbances    VITAL SIGNS:  T(F): 99.4 (09-23-21 @ 13:10)  HR: 87 (09-23-21 @ 13:10)  BP: 147/75 (09-23-21 @ 13:10)  RR: 18 (09-23-21 @ 13:10)  SpO2: 97% (09-23-21 @ 13:10)  Wt(kg): --    09-22-21 @ 07:01  -  09-23-21 @ 07:00  --------------------------------------------------------  IN: 480 mL / OUT: 1301 mL / NET: -821 mL    09-23-21 @ 07:01  -  09-23-21 @ 14:06  --------------------------------------------------------  IN: 660 mL / OUT: 800 mL / NET: -140 mL        PHYSICAL EXAM:    Constitutional: AAOx3, NAD; responded verbally well   Eyes: PERRL, EOMI, sclera non-icteric  Neck: supple, no masses, no JVD, no lymphadenopathy  Respiratory: CTA b/l, no wheezing, rhonchi, rales,   Cardiovascular: RRR, normal S1S2, no M/R/G  Gastrointestinal: soft, NTND, no masses palpable, BS+;  no HSM; no bleeding on stools shown in the colostomy bag   Extremities:  no edema  MSK: no obvious abnormalities, normal ROM, no lymphadenopathy  Neurological: Grossly intact  Skin: Normal temperature, no rash, no echymoses, no petichiae  Psych: normal affect    MEDICATIONS  (STANDING):  alfuzosin 10 milliGRAM(s) Oral at bedtime  apixaban 5 milliGRAM(s) Oral every 12 hours  gabapentin 200 milliGRAM(s) Oral every 8 hours  influenza   Vaccine 0.5 milliLiter(s) IntraMuscular once  levothyroxine 137 MICROGram(s) Oral daily  predniSONE   Tablet 50 milliGRAM(s) Oral daily  predniSONE   Tablet 20 milliGRAM(s) Oral daily    MEDICATIONS  (PRN):      No Known Allergies      LABS:                        8.4    9.82  )-----------( 475      ( 23 Sep 2021 07:01 )             26.1     09-23    137  |  102  |  9   ----------------------------<  152<H>  4.1   |  19<L>  |  0.74    Ca    9.8      23 Sep 2021 07:00    TPro  7.4  /  Alb  4.0  /  TBili  1.1  /  DBili  0.2  /  AST  18  /  ALT  18  /  AlkPhos  67  09-23    PT/INR - ( 22 Sep 2021 11:12 )   PT: 17.4 sec;   INR: 1.48 ratio          Lactate Dehydrogenase, Serum: 259 U/L (09-23 @ 07:00)  Haptoglobin, Serum: 43 mg/dL (09-22 @ 15:15)        Ferritin, Serum: 851 ng/mL *H* (09-22-21 @ 15:18)  Unsaturated Iron Binding Capacity: 145 ug/dL (09-22-21 @ 15:15)  Iron Total, Serum: 52 ug/dL (09-22-21 @ 15:15)  Iron - Total Binding Capacity.: 196 ug/dL *L* (09-22-21 @ 15:15)  % Saturation, Iron: 26 % (09-22-21 @ 15:15)    Folate, Serum: 9.3 ng/mL (09-22-21 @ 15:18)  Vitamin B12, Serum: 488 pg/mL (09-22-21 @ 15:18)      Bilirubin Direct, Serum: 0.2 (09-23-21 @ 07:00)      RADIOLOGY & ADDITIONAL TESTS:  Studies reviewed.

## 2021-09-23 NOTE — PROGRESS NOTE ADULT - ASSESSMENT
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    Hematology f/u with progressive drop in Hgb since admission; work up so far notable for Coomb's positive test suggestive of AIHA, w/ panagglutinin. Pt started with prednisone 70mg ( based on 1mg/kg) on 9/22/21. Hb this morning 8.4.      #Normocytic anemia  -CT abdomen and pelvis w/ contrast on 9/20/21 negative for evidence of bleeding; no evidence of abdominal masses / malignany  -peripheral blood smear reviewed by hematology attending and fellows; microspherocytes noted, c/w Coomb's positive test; no agglutination noted  -check reticulocyte count: 9/23 4.9; 9/22 4.0  -iron studies: ferritin, iron w/ TIBC on 9/22/21 no evidence of iron deficiency anemia; Vit B12 488 , folate wnl on 9/22;    -despite presence of Coomb's+ pan-agglutinin, hemolysis labs 9/22 show haptoglobin of 43 and  (normal),Bilirubin normal, making active hemolysis less likely, raising suspicion for possible dilution from admission. Certain medications can also result in false positive Coomb's (e.g. ceftriaxone), but suspicion is low that this is the case given Hgb drop  -Check hemolysis labs daily (haptoglobin, LDH, bilirubin, reticulocyte count)  -Given presence of Coomb's+ test, recommended CT chest w/ IV contrast to rule out lymphoproliferative disease. CT chest on 9/22: no evidence of lymphoma. Will continue to treat w/ high dose steroids (prednisone 1 mg/kg QD until Hgb improves) as inpatient for now until Hb improved and stablized.   -Pt will f/u with Dr. Lucero at [facility] after discharge.   -if patient has symptomatic anemia, ok to transfuse with PRBCs; if asymptomatic, would be conservative and avoid for now.   -no signs of bleeding on exam or on imaging; continue to monitor given that patient is on DOAC for PE  -TSH 2.95; would check free T4    Discussed with Attending Dr. Lucero and fellow Dr. Jerad Iverson D.O Ph.D   PGY4 Hem & Onco fellow   Pager [phone]   Please contact with on call fellow after 5pm or on weekends

## 2021-09-23 NOTE — PROGRESS NOTE ADULT - SUBJECTIVE AND OBJECTIVE BOX
Date of Service  : 09-23-21     INTERVAL HPI/OVERNIGHT EVENTS: Feeling better.  Vital Signs Last 24 Hrs  T(C): 37.4 (23 Sep 2021 13:10), Max: 37.4 (23 Sep 2021 13:10)  T(F): 99.4 (23 Sep 2021 13:10), Max: 99.4 (23 Sep 2021 13:10)  HR: 87 (23 Sep 2021 13:10) (79 - 87)  BP: 147/75 (23 Sep 2021 13:10) (123/71 - 147/75)  BP(mean): --  RR: 18 (23 Sep 2021 13:10) (18 - 18)  SpO2: 97% (23 Sep 2021 13:10) (97% - 98%)  I&O's Summary    22 Sep 2021 07:01  -  23 Sep 2021 07:00  --------------------------------------------------------  IN: 480 mL / OUT: 1301 mL / NET: -821 mL    23 Sep 2021 07:01  -  23 Sep 2021 20:10  --------------------------------------------------------  IN: 660 mL / OUT: 800 mL / NET: -140 mL      MEDICATIONS  (STANDING):  alfuzosin 10 milliGRAM(s) Oral at bedtime  apixaban 5 milliGRAM(s) Oral every 12 hours  gabapentin 200 milliGRAM(s) Oral every 8 hours  influenza   Vaccine 0.5 milliLiter(s) IntraMuscular once  levothyroxine 137 MICROGram(s) Oral daily  pantoprazole    Tablet 40 milliGRAM(s) Oral daily  predniSONE   Tablet 50 milliGRAM(s) Oral daily  predniSONE   Tablet 20 milliGRAM(s) Oral daily    MEDICATIONS  (PRN):  ondansetron Injectable 4 milliGRAM(s) IV Push every 6 hours PRN Nausea and/or Vomiting    LABS:                        8.4    9.82  )-----------( 475      ( 23 Sep 2021 07:01 )             26.1     09-23    137  |  102  |  9   ----------------------------<  152<H>  4.1   |  19<L>  |  0.74    Ca    9.8      23 Sep 2021 07:00    TPro  7.4  /  Alb  4.0  /  TBili  1.1  /  DBili  0.2  /  AST  18  /  ALT  18  /  AlkPhos  67  09-23    PT/INR - ( 22 Sep 2021 11:12 )   PT: 17.4 sec;   INR: 1.48 ratio             CAPILLARY BLOOD GLUCOSE              REVIEW OF SYSTEMS:  CONSTITUTIONAL: No fever, weight loss, or fatigue  EYES: No eye pain, visual disturbances, or discharge  ENMT:  No difficulty hearing, tinnitus, vertigo; No sinus or throat pain  NECK: No pain or stiffness  RESPIRATORY: No cough, wheezing, chills or hemoptysis; No shortness of breath  CARDIOVASCULAR: No chest pain, palpitations, dizziness, or leg swelling  GASTROINTESTINAL: No abdominal or epigastric pain. No nausea, vomiting, or hematemesis; No diarrhea or constipation. No melena or hematochezia.  GENITOURINARY: No dysuria, frequency, hematuria, or incontinence  NEUROLOGICAL: No headaches, memory loss, loss of strength, numbness, or tremors  SKIN: No itching, burning, rashes, or lesions   ENDOCRINE: No heat or cold intolerance; No hair loss  MUSCULOSKELETAL: No joint pain or swelling; No muscle, back, or extremity pain  PSYCHIATRIC: No depression, anxiety, mood swings, or difficulty sleeping  HEME/LYMPH: No easy bruising, or bleeding gums  ALLERY AND IMMUNOLOGIC: No hives or eczema    RADIOLOGY & ADDITIONAL TESTS:    Consultant(s) Notes Reviewed:  [x ] YES  [ ] NO    PHYSICAL EXAM:  GENERAL: NAD, well-groomed, well-developed,not in any distress ,  HEAD:  Atraumatic, Normocephalic  EYES: EOMI, PERRLA, conjunctiva and sclera clear  ENMT: No tonsillar erythema, exudates, or enlargement; Moist mucous membranes, Good dentition, No lesions  NECK: Supple, No JVD, Normal thyroid  NERVOUS SYSTEM:  Alert & Oriented X3, No focal deficit   CHEST/LUNG: Good air entry bilateral with no  rales, rhonchi, wheezing, or rubs  HEART: Regular rate and rhythm; No murmurs, rubs, or gallops  ABDOMEN: Soft, Nontender, Nondistended; Bowel sounds present  EXTREMITIES:  2+ Peripheral Pulses, No clubbing, cyanosis, or edema  SKIN: No rashes or lesions    Care Discussed with Consultants/Other Providers [ x] YES  [ ] NO

## 2021-09-24 LAB
ANION GAP SERPL CALC-SCNC: 16 MMOL/L — SIGNIFICANT CHANGE UP (ref 5–17)
BUN SERPL-MCNC: 18 MG/DL — SIGNIFICANT CHANGE UP (ref 7–23)
CALCIUM SERPL-MCNC: 9.6 MG/DL — SIGNIFICANT CHANGE UP (ref 8.4–10.5)
CHLORIDE SERPL-SCNC: 102 MMOL/L — SIGNIFICANT CHANGE UP (ref 96–108)
CO2 SERPL-SCNC: 19 MMOL/L — LOW (ref 22–31)
CREAT SERPL-MCNC: 0.77 MG/DL — SIGNIFICANT CHANGE UP (ref 0.5–1.3)
GLUCOSE SERPL-MCNC: 124 MG/DL — HIGH (ref 70–99)
HAPTOGLOB SERPL-MCNC: <20 MG/DL — LOW (ref 34–200)
HCT VFR BLD CALC: 23.9 % — LOW (ref 39–50)
HGB BLD-MCNC: 7.8 G/DL — LOW (ref 13–17)
LDH SERPL L TO P-CCNC: 245 U/L — HIGH (ref 50–242)
MAGNESIUM SERPL-MCNC: 2.2 MG/DL — SIGNIFICANT CHANGE UP (ref 1.6–2.6)
MCHC RBC-ENTMCNC: 28.4 PG — SIGNIFICANT CHANGE UP (ref 27–34)
MCHC RBC-ENTMCNC: 32.6 GM/DL — SIGNIFICANT CHANGE UP (ref 32–36)
MCV RBC AUTO: 86.9 FL — SIGNIFICANT CHANGE UP (ref 80–100)
NRBC # BLD: 0 /100 WBCS — SIGNIFICANT CHANGE UP (ref 0–0)
PLATELET # BLD AUTO: 481 K/UL — HIGH (ref 150–400)
POTASSIUM SERPL-MCNC: 4 MMOL/L — SIGNIFICANT CHANGE UP (ref 3.5–5.3)
POTASSIUM SERPL-SCNC: 4 MMOL/L — SIGNIFICANT CHANGE UP (ref 3.5–5.3)
RBC # BLD: 2.75 M/UL — LOW (ref 4.2–5.8)
RBC # FLD: 16.9 % — HIGH (ref 10.3–14.5)
SODIUM SERPL-SCNC: 137 MMOL/L — SIGNIFICANT CHANGE UP (ref 135–145)
WBC # BLD: 21.56 K/UL — HIGH (ref 3.8–10.5)
WBC # FLD AUTO: 21.56 K/UL — HIGH (ref 3.8–10.5)

## 2021-09-24 PROCEDURE — 99233 SBSQ HOSP IP/OBS HIGH 50: CPT

## 2021-09-24 RX ORDER — ACETAMINOPHEN 500 MG
650 TABLET ORAL EVERY 6 HOURS
Refills: 0 | Status: DISCONTINUED | OUTPATIENT
Start: 2021-09-24 | End: 2021-09-28

## 2021-09-24 RX ORDER — DIPHENHYDRAMINE HCL 50 MG
25 CAPSULE ORAL EVERY 4 HOURS
Refills: 0 | Status: DISCONTINUED | OUTPATIENT
Start: 2021-09-24 | End: 2021-09-28

## 2021-09-24 RX ORDER — IMMUNE GLOBULIN (HUMAN) 10 G/100ML
60 INJECTION INTRAVENOUS; SUBCUTANEOUS EVERY 24 HOURS
Refills: 0 | Status: COMPLETED | OUTPATIENT
Start: 2021-09-24 | End: 2021-09-25

## 2021-09-24 RX ADMIN — ONDANSETRON 4 MILLIGRAM(S): 8 TABLET, FILM COATED ORAL at 11:37

## 2021-09-24 RX ADMIN — GABAPENTIN 200 MILLIGRAM(S): 400 CAPSULE ORAL at 06:28

## 2021-09-24 RX ADMIN — IMMUNE GLOBULIN (HUMAN) 100 GRAM(S): 10 INJECTION INTRAVENOUS; SUBCUTANEOUS at 20:45

## 2021-09-24 RX ADMIN — Medication 137 MICROGRAM(S): at 05:31

## 2021-09-24 RX ADMIN — Medication 50 MILLIGRAM(S): at 11:36

## 2021-09-24 RX ADMIN — ALFUZOSIN HYDROCHLORIDE 10 MILLIGRAM(S): 10 TABLET, EXTENDED RELEASE ORAL at 21:27

## 2021-09-24 RX ADMIN — APIXABAN 5 MILLIGRAM(S): 2.5 TABLET, FILM COATED ORAL at 06:28

## 2021-09-24 RX ADMIN — PANTOPRAZOLE SODIUM 40 MILLIGRAM(S): 20 TABLET, DELAYED RELEASE ORAL at 11:36

## 2021-09-24 RX ADMIN — GABAPENTIN 200 MILLIGRAM(S): 400 CAPSULE ORAL at 13:29

## 2021-09-24 RX ADMIN — GABAPENTIN 200 MILLIGRAM(S): 400 CAPSULE ORAL at 21:27

## 2021-09-24 RX ADMIN — Medication 20 MILLIGRAM(S): at 11:36

## 2021-09-24 RX ADMIN — APIXABAN 5 MILLIGRAM(S): 2.5 TABLET, FILM COATED ORAL at 17:52

## 2021-09-24 NOTE — PROGRESS NOTE ADULT - ASSESSMENT
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    Hematology f/u with progressive drop in Hgb since admission; work up so far notable for Coomb's positive test suggestive of AIHA, w/ panagglutinin. Pt started with prednisone 70mg ( based on 1mg/kg) on 9/22/21. Hb this morning 7.8. Discussed with pt about starting IVIG 1g/kg for 2 days during the rounding today; pt agrees with the plan.       #Normocytic anemia  -CT abdomen and pelvis w/ contrast on 9/20/21 negative for evidence of bleeding; no evidence of abdominal masses /malignancy  -peripheral blood smear reviewed by hematology attending and fellows; microspherocytes noted, c/w Coomb's positive test; no agglutination noted  -check reticulocyte count: 9/23 4.9; 9/22 4.0  -iron studies: ferritin, iron w/ TIBC on 9/22/21 no evidence of iron deficiency anemia; Vit B12 488 , folate wnl on 9/22;    -despite presence of Coomb's+ pan-agglutinin, hemolysis labs 9/22 show haptoglobin of 43 and  (normal),Bilirubin normal, making active hemolysis less likely, raising suspicion for possible dilution from admission. Certain medications can also result in false positive Coomb's (e.g. ceftriaxone), but suspicion is low that this is the case given Hgb drop  -Check hemolysis labs daily (haptoglobin, LDH, bilirubin, reticulocyte count);   -Given presence of Coomb's+ test, recommended CT chest w/ IV contrast to rule out lymphoproliferative disease. CT chest on 9/22: no evidence of lymphoma. Will continue to treat w/ high dose steroids (prednisone 1 mg/kg QD until Hgb improves) as inpatient for now until Hb improved and stabilized.   - IVIG 1g/kg iv X 2 doses for today and tomorrow (9/24-25).    -Pt will f/u with Dr. Lucero at [facility] after discharge.   -if patient has symptomatic anemia, ok to transfuse with PRBCs; if asymptomatic, would be conservative and avoid for now.   -no signs of bleeding on exam or on imaging; continue to monitor given that patient is on DOAC for PE  -TSH 2.95; free T4 9.1 wnl.     Discussed with Attending Dr. Lucero and fellow Dr. 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    Hematology f/u with progressive drop in Hgb since admission; work up so far notable for Coomb's positive test suggestive of AIHA, w/ panagglutinin. Pt started with prednisone 70mg ( based on 1mg/kg) on 9/22/21. Hb this morning 7.8. Discussed with pt about starting IVIG 1g/kg for 2 days during the rounding today; pt agrees with the plan.       #Normocytic anemia  -CT abdomen and pelvis w/ contrast on 9/20/21 negative for evidence of bleeding; no evidence of abdominal masses /malignancy  -peripheral blood smear reviewed by hematology attending and fellows; microspherocytes noted, c/w Coomb's positive test; no agglutination noted  -check reticulocyte count: 9/23 4.9; 9/22 4.0  -iron studies: ferritin, iron w/ TIBC on 9/22/21 no evidence of iron deficiency anemia; Vit B12 488 , folate wnl on 9/22;    -Coomb's+ pan-agglutinin 9/21, hemolysis labs 9/23 show haptoglobin <20 and  (slightly increased),suspected mild hemolysis even though Bilirubin normal on 9/23 lab.   -Check hemolysis labs daily (haptoglobin, LDH, bilirubin, reticulocyte count);   -Given presence of Coomb's+ test, recommended CT chest w/ IV contrast to rule out lymphoproliferative disease. CT chest on 9/22: no evidence of lymphoma. Will continue to treat w/ high dose steroids (prednisone 1 mg/kg QD until Hgb improves) as inpatient for now until Hb improved and stabilized.   - IVIG 1g/kg iv X 2 doses for today and tomorrow (9/24-25).    -Pt will f/u with Dr. Lucero at [facility] after discharge.   -if patient has symptomatic anemia, ok to transfuse with PRBCs; if asymptomatic, would be conservative and avoid for now.   -no signs of bleeding on exam or on imaging; continue to monitor given that patient is on DOAC for PE  -TSH 2.95; free T4 9.1 wnl.     Discussed with Attending Dr. Lucero and fellow Dr. Jerad Iverson D.O Ph.D   PGY4 Hem & Onco fellow   Pager [phone]   Please contact with on call fellow after 5pm or on weekends

## 2021-09-24 NOTE — OCCUPATIONAL THERAPY INITIAL EVALUATION ADULT - PERTINENT HX OF CURRENT PROBLEM, REHAB EVAL
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

## 2021-09-24 NOTE — PROGRESS NOTE ADULT - SUBJECTIVE AND OBJECTIVE BOX
INTERVAL HPI/OVERNIGHT EVENTS:  Patient S&E at bedside. No o/n events,     VITAL SIGNS:  T(F): 97.8 (09-24-21 @ 13:15)  HR: 75 (09-24-21 @ 13:15)  BP: 150/70 (09-24-21 @ 13:15)  RR: 18 (09-24-21 @ 13:15)  SpO2: 99% (09-24-21 @ 13:15)  Wt(kg): --    PHYSICAL EXAM:    Constitutional: NAD  Eyes: EOMI, sclera non-icteric  Neck: supple  Respiratory: CTAB, no wheezes or crackles   Cardiovascular: RRR  Gastrointestinal: soft, NTND, + BS  Extremities: no cyanosis, clubbing or edema   Neurological: awake and alert      MEDICATIONS  (STANDING):  alfuzosin 10 milliGRAM(s) Oral at bedtime  apixaban 5 milliGRAM(s) Oral every 12 hours  gabapentin 200 milliGRAM(s) Oral every 8 hours  influenza   Vaccine 0.5 milliLiter(s) IntraMuscular once  levothyroxine 137 MICROGram(s) Oral daily  pantoprazole    Tablet 40 milliGRAM(s) Oral daily  predniSONE   Tablet 50 milliGRAM(s) Oral daily  predniSONE   Tablet 20 milliGRAM(s) Oral daily    MEDICATIONS  (PRN):  ondansetron Injectable 4 milliGRAM(s) IV Push every 6 hours PRN Nausea and/or Vomiting      Allergies    No Known Allergies    Intolerances        LABS:                        7.8    21.56 )-----------( 481      ( 24 Sep 2021 07:23 )             23.9     09-24    137  |  102  |  18  ----------------------------<  124<H>  4.0   |  19<L>  |  0.77    Ca    9.6      24 Sep 2021 07:15  Mg     2.2     09-24    TPro  7.4  /  Alb  4.0  /  TBili  1.1  /  DBili  0.2  /  AST  18  /  ALT  18  /  AlkPhos  67  09-23          RADIOLOGY & ADDITIONAL TESTS:  Studies reviewed.

## 2021-09-24 NOTE — PROGRESS NOTE ADULT - ASSESSMENT
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 Patient reported he has been retching no actual vomit. Patient has generalized weakness .     Problem/Plan - 1:  ·  Problem: Hemolytic Anemia .   ·  Plan: Hematology help appreciated .    Started on prednisone and Ig G    < from: CT Chest w/ IV Cont (09.22.21 @ 18:11) >  INTERPRETATION:  Scattered bilateral groundglass opacities, predominantly in the left lower and posterior left upper lobes. Bibasilar atelectasis.    < end of copied text >       Problem/Plan - 2:  ·  Problem: Chordoma.   ·  Plan: S/P surgery . Outpt follow up.     Problem/Plan - 3:  ·  Problem: Chronic indwelling Tony catheter.   ·  Plan: ID consulted as doubt UTI . IV Abxs.   Has apt with Urologist on Friday.     Problem/Plan - 4:  ·  Problem: Pulmonary embolism.   ·  Plan: On AC so continuing.     Problem/Plan - 5:  ·  Problem: S/P colostomy.   ·  Plan: Continue ostomy care.     Problem/Plan - 6:  ·  Problem: Thyroid cancer.   ·  Plan: S/P surgery and HERRING and now on replacement.     Problem/Plan - 7:  ·  Problem: Abdominal pain with vomiting.   ·  Plan: CT scan noted . Symptoms resolved.   IVF and eating now.    Disposition : DC planning pending Hematology clearance.

## 2021-09-24 NOTE — PROGRESS NOTE ADULT - SUBJECTIVE AND OBJECTIVE BOX
Date of Service  : 09-24-21     INTERVAL HPI/OVERNIGHT EVENTS: No new concerns.   Vital Signs Last 24 Hrs  T(C): 36.9 (24 Sep 2021 20:27), Max: 37 (24 Sep 2021 04:47)  T(F): 98.5 (24 Sep 2021 20:27), Max: 98.6 (24 Sep 2021 04:47)  HR: 80 (24 Sep 2021 20:27) (75 - 87)  BP: 132/73 (24 Sep 2021 20:27) (118/68 - 150/70)  BP(mean): --  RR: 18 (24 Sep 2021 20:27) (18 - 18)  SpO2: 97% (24 Sep 2021 20:27) (97% - 99%)  I&O's Summary    23 Sep 2021 07:01  -  24 Sep 2021 07:00  --------------------------------------------------------  IN: 900 mL / OUT: 2801 mL / NET: -1901 mL    24 Sep 2021 07:01  -  24 Sep 2021 21:15  --------------------------------------------------------  IN: 150 mL / OUT: 350 mL / NET: -200 mL      MEDICATIONS  (STANDING):  alfuzosin 10 milliGRAM(s) Oral at bedtime  apixaban 5 milliGRAM(s) Oral every 12 hours  gabapentin 200 milliGRAM(s) Oral every 8 hours  immune   globulin 10% (GAMMAGARD) IVPB 60 Gram(s) IV Intermittent every 24 hours  influenza   Vaccine 0.5 milliLiter(s) IntraMuscular once  levothyroxine 137 MICROGram(s) Oral daily  pantoprazole    Tablet 40 milliGRAM(s) Oral daily  predniSONE   Tablet 50 milliGRAM(s) Oral daily  predniSONE   Tablet 20 milliGRAM(s) Oral daily    MEDICATIONS  (PRN):  acetaminophen   Tablet .. 650 milliGRAM(s) Oral every 6 hours PRN Temp greater or equal to 38C (100.4F), Mild Pain (1 - 3)  diphenhydrAMINE 25 milliGRAM(s) Oral every 4 hours PRN Rash and/or Itching  ondansetron Injectable 4 milliGRAM(s) IV Push every 6 hours PRN Nausea and/or Vomiting    LABS:                        7.8    21.56 )-----------( 481      ( 24 Sep 2021 07:23 )             23.9     09-24    137  |  102  |  18  ----------------------------<  124<H>  4.0   |  19<L>  |  0.77    Ca    9.6      24 Sep 2021 07:15  Mg     2.2     09-24    TPro  7.4  /  Alb  4.0  /  TBili  1.1  /  DBili  0.2  /  AST  18  /  ALT  18  /  AlkPhos  67  09-23            Consultant(s) Notes Reviewed:  [x ] YES  [ ] NO    PHYSICAL EXAM:  GENERAL: NAD, well-groomed, well-developed ,not in any distress ,  HEAD:  Atraumatic, Normocephalic  EYES: EOMI, PERRLA, conjunctiva and sclera clear  ENMT: No tonsillar erythema, exudates, or enlargement; Moist mucous membranes, Good dentition, No lesions  NECK: Supple, No JVD, Normal thyroid  NERVOUS SYSTEM:  Alert & Oriented X3, No focal deficit   CHEST/LUNG: Good air entry bilateral with no  rales, rhonchi, wheezing, or rubs  HEART: Regular rate and rhythm; No murmurs, rubs, or gallops  ABDOMEN: Soft, Nontender, Nondistended; Bowel sounds present, Ostomy +  EXTREMITIES:  2+ Peripheral Pulses, No clubbing, cyanosis, or edema      Care Discussed with Consultants/Other Providers [ x] YES  [ ] NO

## 2021-09-25 LAB
ANION GAP SERPL CALC-SCNC: 15 MMOL/L — SIGNIFICANT CHANGE UP (ref 5–17)
BASOPHILS # BLD AUTO: 0.03 K/UL — SIGNIFICANT CHANGE UP (ref 0–0.2)
BASOPHILS NFR BLD AUTO: 0.2 % — SIGNIFICANT CHANGE UP (ref 0–2)
BILIRUB DIRECT SERPL-MCNC: 0.1 MG/DL — SIGNIFICANT CHANGE UP (ref 0–0.2)
BILIRUB INDIRECT FLD-MCNC: 0.5 MG/DL — SIGNIFICANT CHANGE UP (ref 0.2–1)
BILIRUB SERPL-MCNC: 0.6 MG/DL — SIGNIFICANT CHANGE UP (ref 0.2–1.2)
BUN SERPL-MCNC: 23 MG/DL — SIGNIFICANT CHANGE UP (ref 7–23)
CALCIUM SERPL-MCNC: 9.3 MG/DL — SIGNIFICANT CHANGE UP (ref 8.4–10.5)
CHLORIDE SERPL-SCNC: 101 MMOL/L — SIGNIFICANT CHANGE UP (ref 96–108)
CO2 SERPL-SCNC: 21 MMOL/L — LOW (ref 22–31)
CREAT SERPL-MCNC: 0.79 MG/DL — SIGNIFICANT CHANGE UP (ref 0.5–1.3)
EOSINOPHIL # BLD AUTO: 0.01 K/UL — SIGNIFICANT CHANGE UP (ref 0–0.5)
EOSINOPHIL NFR BLD AUTO: 0.1 % — SIGNIFICANT CHANGE UP (ref 0–6)
GLUCOSE SERPL-MCNC: 74 MG/DL — SIGNIFICANT CHANGE UP (ref 70–99)
HAPTOGLOB SERPL-MCNC: <20 MG/DL — LOW (ref 34–200)
HCT VFR BLD CALC: 23.2 % — LOW (ref 39–50)
HGB BLD-MCNC: 7.6 G/DL — LOW (ref 13–17)
IMM GRANULOCYTES NFR BLD AUTO: 2.1 % — HIGH (ref 0–1.5)
LDH SERPL L TO P-CCNC: 500 U/L — HIGH (ref 50–242)
LYMPHOCYTES # BLD AUTO: 17.6 % — SIGNIFICANT CHANGE UP (ref 13–44)
LYMPHOCYTES # BLD AUTO: 2.62 K/UL — SIGNIFICANT CHANGE UP (ref 1–3.3)
MCHC RBC-ENTMCNC: 28.9 PG — SIGNIFICANT CHANGE UP (ref 27–34)
MCHC RBC-ENTMCNC: 32.8 GM/DL — SIGNIFICANT CHANGE UP (ref 32–36)
MCV RBC AUTO: 88.2 FL — SIGNIFICANT CHANGE UP (ref 80–100)
MONOCYTES # BLD AUTO: 1.26 K/UL — HIGH (ref 0–0.9)
MONOCYTES NFR BLD AUTO: 8.5 % — SIGNIFICANT CHANGE UP (ref 2–14)
NEUTROPHILS # BLD AUTO: 10.65 K/UL — HIGH (ref 1.8–7.4)
NEUTROPHILS NFR BLD AUTO: 71.5 % — SIGNIFICANT CHANGE UP (ref 43–77)
NRBC # BLD: 0 /100 WBCS — SIGNIFICANT CHANGE UP (ref 0–0)
PLATELET # BLD AUTO: 423 K/UL — HIGH (ref 150–400)
POTASSIUM SERPL-MCNC: 3.4 MMOL/L — LOW (ref 3.5–5.3)
POTASSIUM SERPL-SCNC: 3.4 MMOL/L — LOW (ref 3.5–5.3)
RBC # BLD: 2.63 M/UL — LOW (ref 4.2–5.8)
RBC # FLD: 18.3 % — HIGH (ref 10.3–14.5)
RETICS #: 183.8 K/UL — HIGH (ref 25–125)
RETICS/RBC NFR: 7.1 % — HIGH (ref 0.5–2.5)
SODIUM SERPL-SCNC: 137 MMOL/L — SIGNIFICANT CHANGE UP (ref 135–145)
WBC # BLD: 14.89 K/UL — HIGH (ref 3.8–10.5)
WBC # FLD AUTO: 14.89 K/UL — HIGH (ref 3.8–10.5)

## 2021-09-25 PROCEDURE — 99232 SBSQ HOSP IP/OBS MODERATE 35: CPT | Mod: GC

## 2021-09-25 RX ORDER — POTASSIUM CHLORIDE 20 MEQ
20 PACKET (EA) ORAL
Refills: 0 | Status: DISCONTINUED | OUTPATIENT
Start: 2021-09-25 | End: 2021-09-25

## 2021-09-25 RX ORDER — POTASSIUM CHLORIDE 20 MEQ
20 PACKET (EA) ORAL
Refills: 0 | Status: COMPLETED | OUTPATIENT
Start: 2021-09-25 | End: 2021-09-25

## 2021-09-25 RX ADMIN — GABAPENTIN 200 MILLIGRAM(S): 400 CAPSULE ORAL at 06:08

## 2021-09-25 RX ADMIN — Medication 137 MICROGRAM(S): at 05:16

## 2021-09-25 RX ADMIN — IMMUNE GLOBULIN (HUMAN) 100 GRAM(S): 10 INJECTION INTRAVENOUS; SUBCUTANEOUS at 19:57

## 2021-09-25 RX ADMIN — Medication 20 MILLIEQUIVALENT(S): at 14:05

## 2021-09-25 RX ADMIN — Medication 20 MILLIEQUIVALENT(S): at 16:02

## 2021-09-25 RX ADMIN — GABAPENTIN 200 MILLIGRAM(S): 400 CAPSULE ORAL at 14:06

## 2021-09-25 RX ADMIN — ALFUZOSIN HYDROCHLORIDE 10 MILLIGRAM(S): 10 TABLET, EXTENDED RELEASE ORAL at 21:21

## 2021-09-25 RX ADMIN — Medication 20 MILLIEQUIVALENT(S): at 12:01

## 2021-09-25 RX ADMIN — Medication 50 MILLIGRAM(S): at 09:54

## 2021-09-25 RX ADMIN — APIXABAN 5 MILLIGRAM(S): 2.5 TABLET, FILM COATED ORAL at 06:08

## 2021-09-25 RX ADMIN — APIXABAN 5 MILLIGRAM(S): 2.5 TABLET, FILM COATED ORAL at 18:17

## 2021-09-25 RX ADMIN — Medication 20 MILLIGRAM(S): at 09:54

## 2021-09-25 RX ADMIN — PANTOPRAZOLE SODIUM 40 MILLIGRAM(S): 20 TABLET, DELAYED RELEASE ORAL at 11:33

## 2021-09-25 RX ADMIN — GABAPENTIN 200 MILLIGRAM(S): 400 CAPSULE ORAL at 21:21

## 2021-09-25 NOTE — PROGRESS NOTE ADULT - ASSESSMENT
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    Hematology f/u with progressive drop in Hgb since admission; work up so far notable for Coomb's positive test suggestive of AIHA, w/ panagglutinin. Pt started with prednisone 70mg ( based on 1mg/kg) on 9/22/21; IVIG 1g/kg for 2 days (9/24 and today 9/25). Hb this morning 7.6       #Normocytic anemia  -CT abdomen and pelvis w/ contrast on 9/20/21 negative for evidence of bleeding; no evidence of abdominal masses /malignancy  -peripheral blood smear reviewed by hematology attending and fellows; microspherocytes noted, c/w Coomb's positive test; no agglutination noted  -check reticulocyte count: 9/23 4.9; 9/22 4.0; 9/25 7.1  -iron studies: ferritin, iron w/ TIBC on 9/22/21 no evidence of iron deficiency anemia; Vit B12 488 , folate wnl on 9/22;    -Coomb's+ pan-agglutinin 9/21, hemolysis labs 9/23 show haptoglobin <20 and  (slightly increased),suspected mild hemolysis even though Bilirubin normal on 9/23 lab.   -Check hemolysis labs daily (haptoglobin, LDH, bilirubin, reticulocyte count); this morning lab ; pending haptoglobin; will f/u closely.   -Given presence of Coomb's+ test, recommended CT chest w/ IV contrast to rule out lymphoproliferative disease. CT chest on 9/22: no evidence of lymphoma. Will continue to treat w/ high dose steroids (prednisone 1 mg/kg QD until Hgb improves) as inpatient for now until Hb improved and stabilized.   - IVIG 1g/kg iv X 2 doses (9/24-25). denied transfusion reactions.   -Pt will f/u with Dr. Lucero at [facility] after discharge.   -if patient has symptomatic anemia, ok to transfuse with PRBCs; if asymptomatic, would be conservative and avoid for now.   -no signs of bleeding on exam or on imaging; continue to monitor given that patient is on DOAC for PE    Discussed with Attending Dr. Debbie Iverson D.O Ph.D   PGY4 Hem & Onco fellow   Pager [phone]   Please contact with on call fellow after 5pm or on weekends

## 2021-09-25 NOTE — PROGRESS NOTE ADULT - SUBJECTIVE AND OBJECTIVE BOX
Date of Service  : 09-25-21 @ 14:51    INTERVAL HPI/OVERNIGHT EVENTS: I feel fine.   Vital Signs Last 24 Hrs  T(C): 37.1 (25 Sep 2021 12:29), Max: 37.1 (25 Sep 2021 04:35)  T(F): 98.7 (25 Sep 2021 12:29), Max: 98.7 (25 Sep 2021 04:35)  HR: 76 (25 Sep 2021 12:29) (76 - 86)  BP: 155/77 (25 Sep 2021 12:29) (125/68 - 155/77)  BP(mean): --  RR: 18 (25 Sep 2021 12:29) (18 - 18)  SpO2: 99% (25 Sep 2021 12:29) (96% - 99%)  I&O's Summary    24 Sep 2021 07:01  -  25 Sep 2021 07:00  --------------------------------------------------------  IN: 990 mL / OUT: 1150 mL / NET: -160 mL    25 Sep 2021 07:01  -  25 Sep 2021 14:51  --------------------------------------------------------  IN: 720 mL / OUT: 1000 mL / NET: -280 mL      MEDICATIONS  (STANDING):  alfuzosin 10 milliGRAM(s) Oral at bedtime  apixaban 5 milliGRAM(s) Oral every 12 hours  gabapentin 200 milliGRAM(s) Oral every 8 hours  immune   globulin 10% (GAMMAGARD) IVPB 60 Gram(s) IV Intermittent every 24 hours  influenza   Vaccine 0.5 milliLiter(s) IntraMuscular once  levothyroxine 137 MICROGram(s) Oral daily  pantoprazole    Tablet 40 milliGRAM(s) Oral daily  potassium chloride   Solution 20 milliEquivalent(s) Oral every 2 hours  predniSONE   Tablet 50 milliGRAM(s) Oral daily  predniSONE   Tablet 20 milliGRAM(s) Oral daily    MEDICATIONS  (PRN):  acetaminophen   Tablet .. 650 milliGRAM(s) Oral every 6 hours PRN Temp greater or equal to 38C (100.4F), Mild Pain (1 - 3)  diphenhydrAMINE 25 milliGRAM(s) Oral every 4 hours PRN Rash and/or Itching  ondansetron Injectable 4 milliGRAM(s) IV Push every 6 hours PRN Nausea and/or Vomiting    LABS:                        7.6    14.89 )-----------( 423      ( 25 Sep 2021 08:37 )             23.2     09-25    137  |  101  |  23  ----------------------------<  74  3.4<L>   |  21<L>  |  0.79    Ca    9.3      25 Sep 2021 08:37  Mg     2.2     09-24    TPro  x   /  Alb  x   /  TBili  0.6  /  DBili  0.1  /  AST  x   /  ALT  x   /  AlkPhos  x   09-25        CAPILLARY BLOOD GLUCOSE              REVIEW OF SYSTEMS:  CONSTITUTIONAL: No fever, weight loss, or fatigue  EYES: No eye pain, visual disturbances, or discharge  ENMT:  No difficulty hearing, tinnitus, vertigo; No sinus or throat pain  NECK: No pain or stiffness  RESPIRATORY: No cough, wheezing, chills or hemoptysis; No shortness of breath  CARDIOVASCULAR: No chest pain, palpitations, dizziness, or leg swelling  GASTROINTESTINAL: No abdominal or epigastric pain. No nausea, vomiting, or hematemesis; No diarrhea or constipation. No melena or hematochezia.  GENITOURINARY: No dysuria, frequency, hematuria, or incontinence  NEUROLOGICAL: No headaches, memory loss, loss of strength, numbness, or tremors      Consultant(s) Notes Reviewed:  [x ] YES  [ ] NO    PHYSICAL EXAM:  GENERAL: NAD, well-groomed, well-developed, not in any distress ,  HEAD:  Atraumatic, Normocephalic  EYES: EOMI, PERRLA, conjunctiva and sclera clear  ENMT: No tonsillar erythema, exudates, or enlargement; Moist mucous membranes, Good dentition, No lesions  NECK: Supple, No JVD, Normal thyroid  NERVOUS SYSTEM:  Alert & Oriented X3, No focal deficit   CHEST/LUNG: Good air entry bilateral with no  rales, rhonchi, wheezing, or rubs  HEART: Regular rate and rhythm; No murmurs, rubs, or gallops  ABDOMEN: Soft, Nontender, Nondistended; Bowel sounds present. OstomY+  EXTREMITIES:  2+ Peripheral Pulses, No clubbing, cyanosis, or edema  SKIN: No rashes or lesions    Care Discussed with Consultants/Other Providers [ x] YES  [ ] NO

## 2021-09-25 NOTE — PROGRESS NOTE ADULT - SUBJECTIVE AND OBJECTIVE BOX
INTERVAL HPI/OVERNIGHT EVENTS:  Patient S&E at bedside. No o/n events. Pt reported that he received IVIG last night. Denied headache, chills/fever, SOB, chest pain, hematuria, skin lesion/rashes. Reported that he has been walking around in the unit without SOB.      VITAL SIGNS:  T(F): 98.7 (09-25-21 @ 12:29)  HR: 76 (09-25-21 @ 12:29)  BP: 155/77 (09-25-21 @ 12:29)  RR: 18 (09-25-21 @ 12:29)  SpO2: 99% (09-25-21 @ 12:29)  Wt(kg): --    PHYSICAL EXAM:    Constitutional: NAD  Eyes: EOMI, sclera non-icteric  Neck: supple  Respiratory: CTAB, no wheezes or crackles   Cardiovascular: RRR  Gastrointestinal: soft, NTND, + BS  Extremities: no cyanosis, clubbing or edema   Neurological: awake and alert      MEDICATIONS  (STANDING):  alfuzosin 10 milliGRAM(s) Oral at bedtime  apixaban 5 milliGRAM(s) Oral every 12 hours  gabapentin 200 milliGRAM(s) Oral every 8 hours  immune   globulin 10% (GAMMAGARD) IVPB 60 Gram(s) IV Intermittent every 24 hours  influenza   Vaccine 0.5 milliLiter(s) IntraMuscular once  levothyroxine 137 MICROGram(s) Oral daily  pantoprazole    Tablet 40 milliGRAM(s) Oral daily  predniSONE   Tablet 50 milliGRAM(s) Oral daily  predniSONE   Tablet 20 milliGRAM(s) Oral daily    MEDICATIONS  (PRN):  acetaminophen   Tablet .. 650 milliGRAM(s) Oral every 6 hours PRN Temp greater or equal to 38C (100.4F), Mild Pain (1 - 3)  diphenhydrAMINE 25 milliGRAM(s) Oral every 4 hours PRN Rash and/or Itching  ondansetron Injectable 4 milliGRAM(s) IV Push every 6 hours PRN Nausea and/or Vomiting      Allergies    No Known Allergies    Intolerances        LABS:                        7.6    14.89 )-----------( 423      ( 25 Sep 2021 08:37 )             23.2     09-25    137  |  101  |  23  ----------------------------<  74  3.4<L>   |  21<L>  |  0.79    Ca    9.3      25 Sep 2021 08:37  Mg     2.2     09-24    TPro  x   /  Alb  x   /  TBili  0.6  /  DBili  0.1  /  AST  x   /  ALT  x   /  AlkPhos  x   09-25          RADIOLOGY & ADDITIONAL TESTS:  Studies reviewed.

## 2021-09-26 LAB
ANION GAP SERPL CALC-SCNC: 12 MMOL/L — SIGNIFICANT CHANGE UP (ref 5–17)
BILIRUB DIRECT SERPL-MCNC: 0.1 MG/DL — SIGNIFICANT CHANGE UP (ref 0–0.2)
BILIRUB INDIRECT FLD-MCNC: 0.6 MG/DL — SIGNIFICANT CHANGE UP (ref 0.2–1)
BILIRUB SERPL-MCNC: 0.7 MG/DL — SIGNIFICANT CHANGE UP (ref 0.2–1.2)
BUN SERPL-MCNC: 16 MG/DL — SIGNIFICANT CHANGE UP (ref 7–23)
CALCIUM SERPL-MCNC: 9.4 MG/DL — SIGNIFICANT CHANGE UP (ref 8.4–10.5)
CHLORIDE SERPL-SCNC: 100 MMOL/L — SIGNIFICANT CHANGE UP (ref 96–108)
CO2 SERPL-SCNC: 21 MMOL/L — LOW (ref 22–31)
CREAT SERPL-MCNC: 0.69 MG/DL — SIGNIFICANT CHANGE UP (ref 0.5–1.3)
GLUCOSE SERPL-MCNC: 75 MG/DL — SIGNIFICANT CHANGE UP (ref 70–99)
HAPTOGLOB SERPL-MCNC: 24 MG/DL — LOW (ref 34–200)
HCT VFR BLD CALC: 25.6 % — LOW (ref 39–50)
HGB BLD-MCNC: 8.6 G/DL — LOW (ref 13–17)
LDH SERPL L TO P-CCNC: 227 U/L — SIGNIFICANT CHANGE UP (ref 50–242)
MCHC RBC-ENTMCNC: 30.2 PG — SIGNIFICANT CHANGE UP (ref 27–34)
MCHC RBC-ENTMCNC: 33.6 GM/DL — SIGNIFICANT CHANGE UP (ref 32–36)
MCV RBC AUTO: 89.8 FL — SIGNIFICANT CHANGE UP (ref 80–100)
NRBC # BLD: 0 /100 WBCS — SIGNIFICANT CHANGE UP (ref 0–0)
PLATELET # BLD AUTO: 438 K/UL — HIGH (ref 150–400)
POTASSIUM SERPL-MCNC: 3.7 MMOL/L — SIGNIFICANT CHANGE UP (ref 3.5–5.3)
POTASSIUM SERPL-SCNC: 3.7 MMOL/L — SIGNIFICANT CHANGE UP (ref 3.5–5.3)
RBC # BLD: 2.85 M/UL — LOW (ref 4.2–5.8)
RBC # BLD: 2.96 M/UL — LOW (ref 4.2–5.8)
RBC # FLD: 18.9 % — HIGH (ref 10.3–14.5)
RETICS #: 250.8 K/UL — HIGH (ref 25–125)
RETICS/RBC NFR: 8.6 % — HIGH (ref 0.5–2.5)
SODIUM SERPL-SCNC: 133 MMOL/L — LOW (ref 135–145)
WBC # BLD: 14.31 K/UL — HIGH (ref 3.8–10.5)
WBC # FLD AUTO: 14.31 K/UL — HIGH (ref 3.8–10.5)

## 2021-09-26 PROCEDURE — 99233 SBSQ HOSP IP/OBS HIGH 50: CPT | Mod: GC

## 2021-09-26 RX ADMIN — Medication 50 MILLIGRAM(S): at 10:52

## 2021-09-26 RX ADMIN — GABAPENTIN 200 MILLIGRAM(S): 400 CAPSULE ORAL at 13:15

## 2021-09-26 RX ADMIN — APIXABAN 5 MILLIGRAM(S): 2.5 TABLET, FILM COATED ORAL at 18:18

## 2021-09-26 RX ADMIN — APIXABAN 5 MILLIGRAM(S): 2.5 TABLET, FILM COATED ORAL at 06:39

## 2021-09-26 RX ADMIN — Medication 20 MILLIGRAM(S): at 10:52

## 2021-09-26 RX ADMIN — Medication 137 MICROGRAM(S): at 06:40

## 2021-09-26 RX ADMIN — GABAPENTIN 200 MILLIGRAM(S): 400 CAPSULE ORAL at 21:06

## 2021-09-26 RX ADMIN — ALFUZOSIN HYDROCHLORIDE 10 MILLIGRAM(S): 10 TABLET, EXTENDED RELEASE ORAL at 21:06

## 2021-09-26 RX ADMIN — PANTOPRAZOLE SODIUM 40 MILLIGRAM(S): 20 TABLET, DELAYED RELEASE ORAL at 11:58

## 2021-09-26 RX ADMIN — GABAPENTIN 200 MILLIGRAM(S): 400 CAPSULE ORAL at 06:39

## 2021-09-26 NOTE — PROGRESS NOTE ADULT - SUBJECTIVE AND OBJECTIVE BOX
Date of Service  : 09-26-21 @ 15:33    INTERVAL HPI/OVERNIGHT EVENTS: Seen and examined with father in room . I feel much better.   Vital Signs Last 24 Hrs  T(C): 37 (26 Sep 2021 12:12), Max: 37.2 (25 Sep 2021 20:35)  T(F): 98.6 (26 Sep 2021 12:12), Max: 98.9 (25 Sep 2021 20:35)  HR: 77 (26 Sep 2021 12:12) (70 - 80)  BP: 137/75 (26 Sep 2021 12:12) (125/73 - 165/82)  BP(mean): --  RR: 18 (26 Sep 2021 12:12) (18 - 18)  SpO2: 99% (26 Sep 2021 12:12) (98% - 99%)  I&O's Summary    25 Sep 2021 07:01  -  26 Sep 2021 07:00  --------------------------------------------------------  IN: 1080 mL / OUT: 3751 mL / NET: -2671 mL    26 Sep 2021 07:01  -  26 Sep 2021 15:33  --------------------------------------------------------  IN: 480 mL / OUT: 350 mL / NET: 130 mL      MEDICATIONS  (STANDING):  alfuzosin 10 milliGRAM(s) Oral at bedtime  apixaban 5 milliGRAM(s) Oral every 12 hours  gabapentin 200 milliGRAM(s) Oral every 8 hours  influenza   Vaccine 0.5 milliLiter(s) IntraMuscular once  levothyroxine 137 MICROGram(s) Oral daily  pantoprazole    Tablet 40 milliGRAM(s) Oral daily  predniSONE   Tablet 50 milliGRAM(s) Oral daily  predniSONE   Tablet 20 milliGRAM(s) Oral daily    MEDICATIONS  (PRN):  acetaminophen   Tablet .. 650 milliGRAM(s) Oral every 6 hours PRN Temp greater or equal to 38C (100.4F), Mild Pain (1 - 3)  diphenhydrAMINE 25 milliGRAM(s) Oral every 4 hours PRN Rash and/or Itching  ondansetron Injectable 4 milliGRAM(s) IV Push every 6 hours PRN Nausea and/or Vomiting    LABS:                        8.6    14.31 )-----------( 438      ( 26 Sep 2021 06:52 )             25.6     09-26    133<L>  |  100  |  16  ----------------------------<  75  3.7   |  21<L>  |  0.69    Ca    9.4      26 Sep 2021 06:48    TPro  x   /  Alb  x   /  TBili  0.7  /  DBili  0.1  /  AST  x   /  ALT  x   /  AlkPhos  x   09-26        CAPILLARY BLOOD GLUCOSE              REVIEW OF SYSTEMS:  CONSTITUTIONAL: No fever, weight loss, or fatigue  EYES: No eye pain, visual disturbances, or discharge  ENMT:  No difficulty hearing, tinnitus, vertigo; No sinus or throat pain  NECK: No pain or stiffness  RESPIRATORY: No cough, wheezing, chills or hemoptysis; No shortness of breath  CARDIOVASCULAR: No chest pain, palpitations, dizziness, or leg swelling  GASTROINTESTINAL: No abdominal or epigastric pain. No nausea, vomiting, or hematemesis; No diarrhea or constipation. No melena or hematochezia.  GENITOURINARY: No dysuria, frequency, hematuria, or incontinence  NEUROLOGICAL: No headaches, memory loss, loss of strength, numbness, or tremors      Consultant(s) Notes Reviewed:  [x ] YES  [ ] NO    PHYSICAL EXAM:  GENERAL: NAD, well-groomed, well-developed,not in any distress ,  HEAD:  Atraumatic, Normocephalic  EYES: EOMI, PERRLA, conjunctiva and sclera clear  ENMT: No tonsillar erythema, exudates, or enlargement; Moist mucous membranes, Good dentition, No lesions  NECK: Supple, No JVD, Normal thyroid  NERVOUS SYSTEM:  Alert & Oriented X3, No focal deficit   CHEST/LUNG: Good air entry bilateral with no  rales, rhonchi, wheezing, or rubs  HEART: Regular rate and rhythm; No murmurs, rubs, or gallops  ABDOMEN: Soft, Nontender, Nondistended; Bowel sounds present. Ostomy   EXTREMITIES:  2+ Peripheral Pulses, No clubbing, cyanosis, or edema      Care Discussed with Consultants/Other Providers [ x] YES  [ ] NO

## 2021-09-26 NOTE — PROGRESS NOTE ADULT - SUBJECTIVE AND OBJECTIVE BOX
Patient is a 61y old  Male who presents with a chief complaint of weakness and not able to eat (26 Sep 2021 15:33)      Subjective: feels fine, denies weakness      Vital Signs Last 24 Hrs  T(C): 37 (26 Sep 2021 12:12), Max: 37.2 (25 Sep 2021 20:35)  T(F): 98.6 (26 Sep 2021 12:12), Max: 98.9 (25 Sep 2021 20:35)  HR: 77 (26 Sep 2021 12:12) (70 - 80)  BP: 137/75 (26 Sep 2021 12:12) (125/73 - 165/82)  BP(mean): --  RR: 18 (26 Sep 2021 12:12) (18 - 18)  SpO2: 99% (26 Sep 2021 12:12) (98% - 99%)    PHYSICAL EXAM  General: adult in NAD  HEENT: clear oropharynx, anicteric sclera, pink conjunctiva  Neck: supple  CV: normal S1/S2 with no murmur rubs or gallops  Lungs: positive air movement b/l ant lungs,clear to auscultation, no wheezes, no rales  Abdomen: soft non-tender non-distended, no hepatosplenomegaly  Ext: no clubbing cyanosis or edema  Skin: no rashes and no petechiae  Neuro: alert and oriented X 4, no focal deficits    MEDICATIONS  (STANDING):  alfuzosin 10 milliGRAM(s) Oral at bedtime  apixaban 5 milliGRAM(s) Oral every 12 hours  gabapentin 200 milliGRAM(s) Oral every 8 hours  influenza   Vaccine 0.5 milliLiter(s) IntraMuscular once  levothyroxine 137 MICROGram(s) Oral daily  pantoprazole    Tablet 40 milliGRAM(s) Oral daily  predniSONE   Tablet 50 milliGRAM(s) Oral daily  predniSONE   Tablet 20 milliGRAM(s) Oral daily    MEDICATIONS  (PRN):  acetaminophen   Tablet .. 650 milliGRAM(s) Oral every 6 hours PRN Temp greater or equal to 38C (100.4F), Mild Pain (1 - 3)  diphenhydrAMINE 25 milliGRAM(s) Oral every 4 hours PRN Rash and/or Itching  ondansetron Injectable 4 milliGRAM(s) IV Push every 6 hours PRN Nausea and/or Vomiting      LABS:                          8.6    14.31 )-----------( 438      ( 26 Sep 2021 06:52 )             25.6         Mean Cell Volume : 89.8 fl  Mean Cell Hemoglobin : 30.2 pg  Mean Cell Hemoglobin Concentration : 33.6 gm/dL  Auto Neutrophil # : x  Auto Lymphocyte # : x  Auto Monocyte # : x  Auto Eosinophil # : x  Auto Basophil # : x  Auto Neutrophil % : x  Auto Lymphocyte % : x  Auto Monocyte % : x  Auto Eosinophil % : x  Auto Basophil % : x      Serial CBC's  09-26 @ 06:52  Hct-25.6 / Hgb-8.6 / Plat-438 / RBC-2.85 / WBC-14.31  Serial CBC's  09-25 @ 08:37  Hct-23.2 / Hgb-7.6 / Plat-423 / RBC-2.63 / WBC-14.89  Serial CBC's  09-24 @ 07:23  Hct-23.9 / Hgb-7.8 / Plat-481 / RBC-2.75 / WBC-21.56  Serial CBC's  09-23 @ 07:01  Hct-26.1 / Hgb-8.4 / Plat-475 / RBC-3.02 / WBC-9.82  Serial CBC's  09-22 @ 17:16  Hct-23.4 / Hgb-7.7 / Plat-408 / RBC-2.79 / WBC-10.51      09-26    133<L>  |  100  |  16  ----------------------------<  75  3.7   |  21<L>  |  0.69    Ca    9.4      26 Sep 2021 06:48    TPro  x   /  Alb  x   /  TBili  0.7  /  DBili  0.1  /  AST  x   /  ALT  x   /  AlkPhos  x   09-26          Reticulocyte Percent: 8.6 % (09-26 @ 06:52)  Reticulocyte Percent: 7.1 % (09-25 @ 08:37)  Reticulocyte Percent: 4.9 % (09-23 @ 07:01)  Ferritin, Serum: 851 ng/mL (09-22 @ 15:18)  Folate, Serum: 9.3 ng/mL (09-22 @ 15:18)  Vitamin B12, Serum: 488 pg/mL (09-22 @ 15:18)  Iron - Total Binding Capacity.: 196 ug/dL (09-22 @ 15:15)  Reticulocyte Percent: 4.0 % (09-22 @ 11:12)                          BLOOD SMEAR INTERPRETATION:       RADIOLOGY & ADDITIONAL STUDIES:

## 2021-09-26 NOTE — PROGRESS NOTE ADULT - ASSESSMENT
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 Patient reported he has been retching no actual vomit. Patient has generalized weakness .     Problem/Plan - 1:  ·  Problem: Hemolytic Anemia .   ·  Plan: Hematology help appreciated .    Started on prednisone and Ig G    < from: CT Chest w/ IV Cont (09.22.21 @ 18:11) >  INTERPRETATION:  Scattered bilateral groundglass opacities, predominantly in the left lower and posterior left upper lobes. Bibasilar atelectasis.    < end of copied text >  Monitoring daily labs for hemolysis .     Problem/Plan - 2:  ·  Problem: Chordoma.   ·  Plan: S/P surgery . Outpt follow up.     Problem/Plan - 3:  ·  Problem: Chronic indwelling Tony catheter.   ·  Plan: ID consulted as doubt UTI . IV Abxs.   Has apt with Urologist on Friday.     Problem/Plan - 4:  ·  Problem: Pulmonary embolism.   ·  Plan: On AC so continuing.     Problem/Plan - 5:  ·  Problem: S/P colostomy.   ·  Plan: Continue ostomy care.     Problem/Plan - 6:  ·  Problem: Thyroid cancer.   ·  Plan: S/P surgery and HERRING and now on replacement.     Problem/Plan - 7:  ·  Problem: Abdominal pain with vomiting.   ·  Plan: CT scan noted . Symptoms resolved.   IVF and eating now.    Disposition : DC planning pending Hematology clearance.

## 2021-09-26 NOTE — PROGRESS NOTE ADULT - ASSESSMENT
AIHA on on steroids, and status post IVIG    hemoglobin trending up to 8.6 today   continue to monitor H/H, lysis labs  d/c when hgb>=9.0

## 2021-09-27 ENCOUNTER — APPOINTMENT (OUTPATIENT)
Dept: PLASTIC SURGERY | Facility: CLINIC | Age: 61
End: 2021-09-27

## 2021-09-27 ENCOUNTER — APPOINTMENT (OUTPATIENT)
Dept: SURGICAL ONCOLOGY | Facility: CLINIC | Age: 61
End: 2021-09-27

## 2021-09-27 LAB
ANION GAP SERPL CALC-SCNC: 15 MMOL/L — SIGNIFICANT CHANGE UP (ref 5–17)
BILIRUB DIRECT SERPL-MCNC: 0.1 MG/DL — SIGNIFICANT CHANGE UP (ref 0–0.2)
BILIRUB INDIRECT FLD-MCNC: 0.5 MG/DL — SIGNIFICANT CHANGE UP (ref 0.2–1)
BILIRUB SERPL-MCNC: 0.6 MG/DL — SIGNIFICANT CHANGE UP (ref 0.2–1.2)
BUN SERPL-MCNC: 18 MG/DL — SIGNIFICANT CHANGE UP (ref 7–23)
CALCIUM SERPL-MCNC: 9 MG/DL — SIGNIFICANT CHANGE UP (ref 8.4–10.5)
CHLORIDE SERPL-SCNC: 99 MMOL/L — SIGNIFICANT CHANGE UP (ref 96–108)
CO2 SERPL-SCNC: 21 MMOL/L — LOW (ref 22–31)
CREAT SERPL-MCNC: 0.69 MG/DL — SIGNIFICANT CHANGE UP (ref 0.5–1.3)
GLUCOSE SERPL-MCNC: 75 MG/DL — SIGNIFICANT CHANGE UP (ref 70–99)
HAPTOGLOB SERPL-MCNC: 28 MG/DL — LOW (ref 34–200)
HCT VFR BLD CALC: 24.2 % — LOW (ref 39–50)
HCT VFR BLD CALC: 24.5 % — LOW (ref 39–50)
HGB BLD-MCNC: 8 G/DL — LOW (ref 13–17)
HGB BLD-MCNC: 8.2 G/DL — LOW (ref 13–17)
LDH SERPL L TO P-CCNC: 201 U/L — SIGNIFICANT CHANGE UP (ref 50–242)
MCHC RBC-ENTMCNC: 28 PG — SIGNIFICANT CHANGE UP (ref 27–34)
MCHC RBC-ENTMCNC: 28.3 PG — SIGNIFICANT CHANGE UP (ref 27–34)
MCHC RBC-ENTMCNC: 32.7 GM/DL — SIGNIFICANT CHANGE UP (ref 32–36)
MCHC RBC-ENTMCNC: 33.9 GM/DL — SIGNIFICANT CHANGE UP (ref 32–36)
MCV RBC AUTO: 83.4 FL — SIGNIFICANT CHANGE UP (ref 80–100)
MCV RBC AUTO: 85.7 FL — SIGNIFICANT CHANGE UP (ref 80–100)
NRBC # BLD: 0 /100 WBCS — SIGNIFICANT CHANGE UP (ref 0–0)
NRBC # BLD: 0 /100 WBCS — SIGNIFICANT CHANGE UP (ref 0–0)
PLATELET # BLD AUTO: 416 K/UL — HIGH (ref 150–400)
PLATELET # BLD AUTO: 427 K/UL — HIGH (ref 150–400)
POTASSIUM SERPL-MCNC: 3.7 MMOL/L — SIGNIFICANT CHANGE UP (ref 3.5–5.3)
POTASSIUM SERPL-SCNC: 3.7 MMOL/L — SIGNIFICANT CHANGE UP (ref 3.5–5.3)
RBC # BLD: 2.86 M/UL — LOW (ref 4.2–5.8)
RBC # BLD: 2.86 M/UL — LOW (ref 4.2–5.8)
RBC # BLD: 2.9 M/UL — LOW (ref 4.2–5.8)
RBC # FLD: 17.3 % — HIGH (ref 10.3–14.5)
RBC # FLD: 18 % — HIGH (ref 10.3–14.5)
RETICS #: 233.7 K/UL — HIGH (ref 25–125)
RETICS/RBC NFR: 8.2 % — HIGH (ref 0.5–2.5)
SODIUM SERPL-SCNC: 135 MMOL/L — SIGNIFICANT CHANGE UP (ref 135–145)
WBC # BLD: 13.4 K/UL — HIGH (ref 3.8–10.5)
WBC # BLD: 14.92 K/UL — HIGH (ref 3.8–10.5)
WBC # FLD AUTO: 13.4 K/UL — HIGH (ref 3.8–10.5)
WBC # FLD AUTO: 14.92 K/UL — HIGH (ref 3.8–10.5)

## 2021-09-27 PROCEDURE — 99232 SBSQ HOSP IP/OBS MODERATE 35: CPT | Mod: GC

## 2021-09-27 RX ADMIN — Medication 50 MILLIGRAM(S): at 10:52

## 2021-09-27 RX ADMIN — GABAPENTIN 200 MILLIGRAM(S): 400 CAPSULE ORAL at 21:34

## 2021-09-27 RX ADMIN — Medication 137 MICROGRAM(S): at 05:11

## 2021-09-27 RX ADMIN — GABAPENTIN 200 MILLIGRAM(S): 400 CAPSULE ORAL at 15:12

## 2021-09-27 RX ADMIN — GABAPENTIN 200 MILLIGRAM(S): 400 CAPSULE ORAL at 06:32

## 2021-09-27 RX ADMIN — APIXABAN 5 MILLIGRAM(S): 2.5 TABLET, FILM COATED ORAL at 06:32

## 2021-09-27 RX ADMIN — ALFUZOSIN HYDROCHLORIDE 10 MILLIGRAM(S): 10 TABLET, EXTENDED RELEASE ORAL at 21:34

## 2021-09-27 RX ADMIN — PANTOPRAZOLE SODIUM 40 MILLIGRAM(S): 20 TABLET, DELAYED RELEASE ORAL at 12:50

## 2021-09-27 RX ADMIN — Medication 20 MILLIGRAM(S): at 10:52

## 2021-09-27 RX ADMIN — APIXABAN 5 MILLIGRAM(S): 2.5 TABLET, FILM COATED ORAL at 17:43

## 2021-09-27 NOTE — PROGRESS NOTE ADULT - ASSESSMENT
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 Patient reported he has been retching no actual vomit. Patient has generalized weakness .     Problem/Plan - 1:  ·  Problem: Auto immune Hemolytic Anemia .   ·  Plan: Hematology help appreciated .    Started on prednisone and Ig G    < from: CT Chest w/ IV Cont (09.22.21 @ 18:11) >  INTERPRETATION:  Scattered bilateral groundglass opacities, predominantly in the left lower and posterior left upper lobes. Bibasilar atelectasis.    < end of copied text >  Monitoring daily labs for hemolysis .     Problem/Plan - 2:  ·  Problem: Chordoma.   ·  Plan: S/P surgery . Outpt follow up.     Problem/Plan - 3:  ·  Problem: Chronic indwelling Tony catheter.   ·  Plan: ID consulted as doubt UTI . IV Abxs.   Has apt with Urologist on Friday.     Problem/Plan - 4:  ·  Problem: Pulmonary embolism.   ·  Plan: On AC so continuing.     Problem/Plan - 5:  ·  Problem: S/P colostomy.   ·  Plan: Continue ostomy care.     Problem/Plan - 6:  ·  Problem: Thyroid cancer.   ·  Plan: S/P surgery and HERRING and now on replacement.     Problem/Plan - 7:  ·  Problem: Abdominal pain with vomiting.   ·  Plan: CT scan noted . Symptoms resolved.   IVF and eating now.    Disposition : DC planning once  Hgb close to 9

## 2021-09-27 NOTE — PROGRESS NOTE ADULT - SUBJECTIVE AND OBJECTIVE BOX
Date of Service  : 09-27-21 @ 17:56    INTERVAL HPI/OVERNIGHT EVENTS: Seen and examined earlier today .  had dizzy spell.   Vital Signs Last 24 Hrs  T(C): 36.9 (27 Sep 2021 11:50), Max: 37.2 (26 Sep 2021 21:28)  T(F): 98.4 (27 Sep 2021 11:50), Max: 99 (26 Sep 2021 21:28)  HR: 73 (27 Sep 2021 11:50) (73 - 90)  BP: 139/74 (27 Sep 2021 11:50) (102/67 - 155/87)  BP(mean): --  RR: 18 (27 Sep 2021 11:50) (18 - 18)  SpO2: 97% (27 Sep 2021 11:50) (95% - 100%)  I&O's Summary    26 Sep 2021 07:01  -  27 Sep 2021 07:00  --------------------------------------------------------  IN: 700 mL / OUT: 1950 mL / NET: -1250 mL    27 Sep 2021 07:01  -  27 Sep 2021 17:56  --------------------------------------------------------  IN: 720 mL / OUT: 700 mL / NET: 20 mL      MEDICATIONS  (STANDING):  alfuzosin 10 milliGRAM(s) Oral at bedtime  apixaban 5 milliGRAM(s) Oral every 12 hours  gabapentin 200 milliGRAM(s) Oral every 8 hours  influenza   Vaccine 0.5 milliLiter(s) IntraMuscular once  levothyroxine 137 MICROGram(s) Oral daily  pantoprazole    Tablet 40 milliGRAM(s) Oral daily  predniSONE   Tablet 50 milliGRAM(s) Oral daily  predniSONE   Tablet 20 milliGRAM(s) Oral daily    MEDICATIONS  (PRN):  acetaminophen   Tablet .. 650 milliGRAM(s) Oral every 6 hours PRN Temp greater or equal to 38C (100.4F), Mild Pain (1 - 3)  diphenhydrAMINE 25 milliGRAM(s) Oral every 4 hours PRN Rash and/or Itching  ondansetron Injectable 4 milliGRAM(s) IV Push every 6 hours PRN Nausea and/or Vomiting    LABS:                        8.0    14.92 )-----------( 416      ( 27 Sep 2021 06:48 )             24.5     09-27    135  |  99  |  18  ----------------------------<  75  3.7   |  21<L>  |  0.69    Ca    9.0      27 Sep 2021 07:37    TPro  x   /  Alb  x   /  TBili  0.6  /  DBili  0.1  /  AST  x   /  ALT  x   /  AlkPhos  x   09-27        CAPILLARY BLOOD GLUCOSE              REVIEW OF SYSTEMS:  CONSTITUTIONAL: No fever, weight loss, or fatigue  EYES: No eye pain, visual disturbances, or discharge  ENMT:  No difficulty hearing, tinnitus, vertigo; No sinus or throat pain  NECK: No pain or stiffness  RESPIRATORY: No cough, wheezing, chills or hemoptysis; No shortness of breath  CARDIOVASCULAR: No chest pain, palpitations, dizziness, or leg swelling  GASTROINTESTINAL: No abdominal or epigastric pain. No nausea, vomiting, or hematemesis; No diarrhea or constipation. No melena or hematochezia.  GENITOURINARY: No dysuria, frequency, hematuria, or incontinence  NEUROLOGICAL: No headaches, memory loss, loss of strength, numbness, or tremors      Consultant(s) Notes Reviewed:  [x ] YES  [ ] NO    PHYSICAL EXAM:  GENERAL: NAD, well-groomed, well-developed, not in any distress ,  HEAD:  Atraumatic, Normocephalic  EYES: EOMI, PERRLA, conjunctiva and sclera clear  ENMT: No tonsillar erythema, exudates, or enlargement; Moist mucous membranes, Good dentition, No lesions  NECK: Supple, No JVD, Normal thyroid  NERVOUS SYSTEM:  Alert & Oriented X3, No focal deficit   CHEST/LUNG: Good air entry bilateral with no  rales, rhonchi, wheezing, or rubs  HEART: Regular rate and rhythm; No murmurs, rubs, or gallops  ABDOMEN: Soft, Nontender, Nondistended; Bowel sounds present, ostomy +  EXTREMITIES:  2+ Peripheral Pulses, No clubbing, cyanosis, or edema  SKIN: No rashes or lesions    Care Discussed with Consultants/Other Providers [ x] YES  [ ] NO

## 2021-09-27 NOTE — PROVIDER CONTACT NOTE (OTHER) - ASSESSMENT
Patient is A&Ox4, complains of feeling dizzy after walking back from the bathroom. Face is pale. BP is 102/67, HR 90.
Blood bank request 2 Type and Screens (contacted by Chasidy). Patient vitals WDL and asymptomatic at the moment.

## 2021-09-27 NOTE — PROGRESS NOTE ADULT - SUBJECTIVE AND OBJECTIVE BOX
INTERVAL HPI/OVERNIGHT EVENTS:  Patient S&E at bedside. No o/n events,     VITAL SIGNS:  T(F): 98.4 (09-27-21 @ 11:50)  HR: 73 (09-27-21 @ 11:50)  BP: 139/74 (09-27-21 @ 11:50)  RR: 18 (09-27-21 @ 11:50)  SpO2: 97% (09-27-21 @ 11:50)  Wt(kg): --    PHYSICAL EXAM:    Constitutional: NAD  Eyes: EOMI, sclera non-icteric  Neck: supple  Respiratory: CTAB, no wheezes or crackles   Cardiovascular: RRR  Gastrointestinal: soft, NTND, + BS  Extremities: no cyanosis, clubbing or edema   Neurological: awake and alert      MEDICATIONS  (STANDING):  alfuzosin 10 milliGRAM(s) Oral at bedtime  apixaban 5 milliGRAM(s) Oral every 12 hours  gabapentin 200 milliGRAM(s) Oral every 8 hours  influenza   Vaccine 0.5 milliLiter(s) IntraMuscular once  levothyroxine 137 MICROGram(s) Oral daily  pantoprazole    Tablet 40 milliGRAM(s) Oral daily  predniSONE   Tablet 50 milliGRAM(s) Oral daily  predniSONE   Tablet 20 milliGRAM(s) Oral daily    MEDICATIONS  (PRN):  acetaminophen   Tablet .. 650 milliGRAM(s) Oral every 6 hours PRN Temp greater or equal to 38C (100.4F), Mild Pain (1 - 3)  diphenhydrAMINE 25 milliGRAM(s) Oral every 4 hours PRN Rash and/or Itching  ondansetron Injectable 4 milliGRAM(s) IV Push every 6 hours PRN Nausea and/or Vomiting      Allergies    No Known Allergies    Intolerances        LABS:                        8.0    14.92 )-----------( 416      ( 27 Sep 2021 06:48 )             24.5     09-27    135  |  99  |  18  ----------------------------<  75  3.7   |  21<L>  |  0.69    Ca    9.0      27 Sep 2021 07:37    TPro  x   /  Alb  x   /  TBili  0.6  /  DBili  0.1  /  AST  x   /  ALT  x   /  AlkPhos  x   09-27          RADIOLOGY & ADDITIONAL TESTS:  Studies reviewed.   INTERVAL HPI/OVERNIGHT EVENTS:  Patient S&E at bedside. No o/n events. Ambulating. States feeling better.     VITAL SIGNS:  T(F): 98.4 (09-27-21 @ 11:50)  HR: 73 (09-27-21 @ 11:50)  BP: 139/74 (09-27-21 @ 11:50)  RR: 18 (09-27-21 @ 11:50)  SpO2: 97% (09-27-21 @ 11:50)  Wt(kg): --    PHYSICAL EXAM:    Constitutional: NAD  Eyes: EOMI, sclera non-icteric  Neck: supple  Respiratory: CTAB, no wheezes or crackles   Cardiovascular: RRR  Gastrointestinal: soft, NTND, + BS  Extremities: no cyanosis, clubbing or edema   Neurological: awake and alert      MEDICATIONS  (STANDING):  alfuzosin 10 milliGRAM(s) Oral at bedtime  apixaban 5 milliGRAM(s) Oral every 12 hours  gabapentin 200 milliGRAM(s) Oral every 8 hours  influenza   Vaccine 0.5 milliLiter(s) IntraMuscular once  levothyroxine 137 MICROGram(s) Oral daily  pantoprazole    Tablet 40 milliGRAM(s) Oral daily  predniSONE   Tablet 50 milliGRAM(s) Oral daily  predniSONE   Tablet 20 milliGRAM(s) Oral daily    MEDICATIONS  (PRN):  acetaminophen   Tablet .. 650 milliGRAM(s) Oral every 6 hours PRN Temp greater or equal to 38C (100.4F), Mild Pain (1 - 3)  diphenhydrAMINE 25 milliGRAM(s) Oral every 4 hours PRN Rash and/or Itching  ondansetron Injectable 4 milliGRAM(s) IV Push every 6 hours PRN Nausea and/or Vomiting      Allergies    No Known Allergies    Intolerances        LABS:                        8.0    14.92 )-----------( 416      ( 27 Sep 2021 06:48 )             24.5     09-27    135  |  99  |  18  ----------------------------<  75  3.7   |  21<L>  |  0.69    Ca    9.0      27 Sep 2021 07:37    TPro  x   /  Alb  x   /  TBili  0.6  /  DBili  0.1  /  AST  x   /  ALT  x   /  AlkPhos  x   09-27          RADIOLOGY & ADDITIONAL TESTS:  Studies reviewed.

## 2021-09-27 NOTE — PROGRESS NOTE ADULT - ATTENDING COMMENTS
AIHA on steroids and IVIG  stable hemoglobin  will continue to monitor lysis labs
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    Hematology consulted given progressively worsening drop in Hgb since admission; work up so far notable for Coomb's positive test suggestive of AIHA, w/ panagglutinin    -peripheral blood smear reviewed; microspherocytes noted, c/w Coomb's positive test; no agglutination noted  -check reticulocyte count w/ next CBC  -Bilirubin normal  -despite presence of Coomb's+ pan-agglutinin, haptoglonin is less than 20.   -Check hemolysis labs daily (haptoglobin, LDH, bilirubin, reticulocyte count)  9/22/21-CT scan of the chest/ abdomen and pelvis  was negative for lymphadenopathy.      Continue  w/ high dose steroids (prednisone 1 mg/kg QD until Hgb improves). Will add IVIG 1g/kg daily x 2 days.   Start Pantoprazole for GERD with steroids. Zofran 4 mg q 8 hrs prn for nausea and vomiting.
61-yr-old man with multiple medical condition seen in follow up for AIHA that has now resolved. He received IVIG, on Prednisone and RTXN. H/H is stable. Discharge planning once stable up-trending is noted. Follow up as outpatient.
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    Hematology consulted given progressively worsening drop in Hgb since admission; work up so far notable for Coomb's positive test suggestive of AIHA, w/ panagglutinin    -peripheral blood smear reviewed; microspherocytes noted, c/w Coomb's positive test; no agglutination noted  -check reticulocyte count w/ next CBC  -Bilirubin normal  -despite presence of Coomb's+ pan-agglutinin, hemolysis labs this morning show haptoglobin of 43 and  (normal).  -Check hemolysis labs daily (haptoglobin, LDH, bilirubin, reticulocyte count)  9/22/21-CT scan of the chest/ abdomen and pelvis  was negative for lymphadenopathy.      Continue  w/ high dose steroids (prednisone 1 mg/kg QD until Hgb improves).  Start Pantoprazole for GERD with steroids. Zofran 4 mg q 8 hrs prn for nausea and vomiting.

## 2021-09-27 NOTE — PROGRESS NOTE ADULT - ASSESSMENT
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    Hematology f/u with progressive drop in Hgb since admission; work up so far notable for Coomb's positive test suggestive of AIHA, w/ panagglutinin. Pt started with prednisone 70mg ( based on 1mg/kg) on 9/22/21; IVIG 1g/kg for 2 days (9/24 and today 9/25). Hb this morning 7.6       #Normocytic anemia  -CT abdomen and pelvis w/ contrast on 9/20/21 negative for evidence of bleeding; no evidence of abdominal masses /malignancy  -peripheral blood smear reviewed by hematology attending and fellows; microspherocytes noted, c/w Coomb's positive test; no agglutination noted  -check reticulocyte count: 9/23 4.9; 9/22 4.0; 9/25 7.1  -iron studies: ferritin, iron w/ TIBC on 9/22/21 no evidence of iron deficiency anemia; Vit B12 488 , folate wnl on 9/22;    -Coomb's+ pan-agglutinin 9/21, hemolysis labs 9/23 show haptoglobin <20 and  (slightly increased),suspected mild hemolysis even though Bilirubin normal on 9/23 lab.   -Check hemolysis labs daily (haptoglobin, LDH, bilirubin, reticulocyte count); this morning lab ; pending haptoglobin; will f/u closely.   -Given presence of Coomb's+ test, recommended CT chest w/ IV contrast to rule out lymphoproliferative disease. CT chest on 9/22: no evidence of lymphoma. Will continue to treat w/ high dose steroids (prednisone 1 mg/kg QD until Hgb improves) as inpatient for now until Hb improved and stabilized.   - IVIG 1g/kg iv X 2 doses (9/24-25). denied transfusion reactions.   -Pt will f/u with Dr. Lucero at [facility] after discharge.   -if patient has symptomatic anemia, ok to transfuse with PRBCs; if asymptomatic, would be conservative and avoid for now.   -no signs of bleeding on exam or on imaging; continue to monitor given that patient is on DOAC for PE    Discussed with Attending Dr. 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    Hematology f/u with progressive drop in Hgb since admission; work up so far notable for Coomb's positive test suggestive of AIHA, w/ panagglutinin. Pt started with prednisone 70mg ( based on 1mg/kg) on 9/22/21; IVIG 1g/kg for 2 days (9/24 and today 9/25). Hb this morning 7.6       #Normocytic anemia  -CT abdomen and pelvis w/ contrast on 9/20/21 negative for evidence of bleeding; no evidence of abdominal masses /malignancy  -peripheral blood smear reviewed by hematology attending and fellows; microspherocytes noted, c/w Coomb's positive test; no agglutination noted  -check reticulocyte count: 9/23 4.9; 9/22 4.0; 9/25 7.1, 9/27 8.0  -iron studies: ferritin, iron w/ TIBC on 9/22/21 no evidence of iron deficiency anemia; Vit B12 488 , folate wnl on 9/22;    -Coomb's+ pan-agglutinin 9/21, hemolysis labs 9/23 show haptoglobin <20 and  (slightly increased),suspected mild hemolysis even though Bilirubin normal on 9/23 lab.   -Check hemolysis labs daily (haptoglobin, LDH, bilirubin, reticulocyte count); this morning lab ; pending haptoglobin; will f/u closely.   -Given presence of Coomb's+ test, recommended CT chest w/ IV contrast to rule out lymphoproliferative disease. CT chest on 9/22: no evidence of lymphoma. Will continue to treat w/ high dose steroids (prednisone 1 mg/kg QD until Hgb improves) as inpatient for now until Hb improved and stabilized.   - IVIG 1g/kg iv X 2 doses (9/24-25). denied transfusion reactions.   -Pt will f/u with Dr. Lucero at [facility] after discharge.   -if patient has symptomatic anemia, ok to transfuse with PRBCs; if asymptomatic, would be conservative and avoid for now.   -no signs of bleeding on exam or on imaging; continue to monitor given that patient is on DOAC for PE  - once hemoglobin approaches 9 and stable can be discharged to follow up outpatient     Jacinto Rangel MD   Hematology/Oncology Fellow    Pager [phone]  After 5pm and on weekends page on call fellow

## 2021-09-28 ENCOUNTER — NON-APPOINTMENT (OUTPATIENT)
Age: 61
End: 2021-09-28

## 2021-09-28 ENCOUNTER — TRANSCRIPTION ENCOUNTER (OUTPATIENT)
Age: 61
End: 2021-09-28

## 2021-09-28 VITALS
OXYGEN SATURATION: 96 % | DIASTOLIC BLOOD PRESSURE: 74 MMHG | HEART RATE: 86 BPM | TEMPERATURE: 98 F | SYSTOLIC BLOOD PRESSURE: 121 MMHG | RESPIRATION RATE: 18 BRPM

## 2021-09-28 LAB
BILIRUB DIRECT SERPL-MCNC: 0.1 MG/DL — SIGNIFICANT CHANGE UP (ref 0–0.2)
BILIRUB INDIRECT FLD-MCNC: 0.7 MG/DL — SIGNIFICANT CHANGE UP (ref 0.2–1)
BILIRUB SERPL-MCNC: 0.8 MG/DL — SIGNIFICANT CHANGE UP (ref 0.2–1.2)
HAPTOGLOB SERPL-MCNC: 32 MG/DL — LOW (ref 34–200)
HCT VFR BLD CALC: 24.2 % — LOW (ref 39–50)
HGB BLD-MCNC: 8.1 G/DL — LOW (ref 13–17)
LDH SERPL L TO P-CCNC: 216 U/L — SIGNIFICANT CHANGE UP (ref 50–242)
MCHC RBC-ENTMCNC: 28.3 PG — SIGNIFICANT CHANGE UP (ref 27–34)
MCHC RBC-ENTMCNC: 33.5 GM/DL — SIGNIFICANT CHANGE UP (ref 32–36)
MCV RBC AUTO: 84.6 FL — SIGNIFICANT CHANGE UP (ref 80–100)
NRBC # BLD: 0 /100 WBCS — SIGNIFICANT CHANGE UP (ref 0–0)
PLATELET # BLD AUTO: 388 K/UL — SIGNIFICANT CHANGE UP (ref 150–400)
RBC # BLD: 2.86 M/UL — LOW (ref 4.2–5.8)
RBC # BLD: 2.86 M/UL — LOW (ref 4.2–5.8)
RBC # FLD: 18.4 % — HIGH (ref 10.3–14.5)
RETICS #: 270.8 K/UL — HIGH (ref 25–125)
RETICS/RBC NFR: 9.5 % — HIGH (ref 0.5–2.5)
WBC # BLD: 15.35 K/UL — HIGH (ref 3.8–10.5)
WBC # FLD AUTO: 15.35 K/UL — HIGH (ref 3.8–10.5)

## 2021-09-28 PROCEDURE — 86860 RBC ANTIBODY ELUTION: CPT

## 2021-09-28 PROCEDURE — 87086 URINE CULTURE/COLONY COUNT: CPT

## 2021-09-28 PROCEDURE — 86880 COOMBS TEST DIRECT: CPT

## 2021-09-28 PROCEDURE — 93005 ELECTROCARDIOGRAM TRACING: CPT

## 2021-09-28 PROCEDURE — 71260 CT THORAX DX C+: CPT

## 2021-09-28 PROCEDURE — 82728 ASSAY OF FERRITIN: CPT

## 2021-09-28 PROCEDURE — 86870 RBC ANTIBODY IDENTIFICATION: CPT

## 2021-09-28 PROCEDURE — 84479 ASSAY OF THYROID (T3 OR T4): CPT

## 2021-09-28 PROCEDURE — 83690 ASSAY OF LIPASE: CPT

## 2021-09-28 PROCEDURE — 86901 BLOOD TYPING SEROLOGIC RH(D): CPT

## 2021-09-28 PROCEDURE — 84132 ASSAY OF SERUM POTASSIUM: CPT

## 2021-09-28 PROCEDURE — 96361 HYDRATE IV INFUSION ADD-ON: CPT

## 2021-09-28 PROCEDURE — 84436 ASSAY OF TOTAL THYROXINE: CPT

## 2021-09-28 PROCEDURE — 86900 BLOOD TYPING SEROLOGIC ABO: CPT

## 2021-09-28 PROCEDURE — 83010 ASSAY OF HAPTOGLOBIN QUANT: CPT

## 2021-09-28 PROCEDURE — 82247 BILIRUBIN TOTAL: CPT

## 2021-09-28 PROCEDURE — 83540 ASSAY OF IRON: CPT

## 2021-09-28 PROCEDURE — 96375 TX/PRO/DX INJ NEW DRUG ADDON: CPT

## 2021-09-28 PROCEDURE — U0005: CPT

## 2021-09-28 PROCEDURE — 85610 PROTHROMBIN TIME: CPT

## 2021-09-28 PROCEDURE — 84295 ASSAY OF SERUM SODIUM: CPT

## 2021-09-28 PROCEDURE — 83615 LACTATE (LD) (LDH) ENZYME: CPT

## 2021-09-28 PROCEDURE — 99285 EMERGENCY DEPT VISIT HI MDM: CPT | Mod: 25

## 2021-09-28 PROCEDURE — 97165 OT EVAL LOW COMPLEX 30 MIN: CPT

## 2021-09-28 PROCEDURE — 82947 ASSAY GLUCOSE BLOOD QUANT: CPT

## 2021-09-28 PROCEDURE — 82803 BLOOD GASES ANY COMBINATION: CPT

## 2021-09-28 PROCEDURE — 82330 ASSAY OF CALCIUM: CPT

## 2021-09-28 PROCEDURE — 86850 RBC ANTIBODY SCREEN: CPT

## 2021-09-28 PROCEDURE — 82746 ASSAY OF FOLIC ACID SERUM: CPT

## 2021-09-28 PROCEDURE — 84443 ASSAY THYROID STIM HORMONE: CPT

## 2021-09-28 PROCEDURE — 84100 ASSAY OF PHOSPHORUS: CPT

## 2021-09-28 PROCEDURE — 97161 PT EVAL LOW COMPLEX 20 MIN: CPT

## 2021-09-28 PROCEDURE — 83605 ASSAY OF LACTIC ACID: CPT

## 2021-09-28 PROCEDURE — 83550 IRON BINDING TEST: CPT

## 2021-09-28 PROCEDURE — 97116 GAIT TRAINING THERAPY: CPT

## 2021-09-28 PROCEDURE — 82248 BILIRUBIN DIRECT: CPT

## 2021-09-28 PROCEDURE — 85018 HEMOGLOBIN: CPT

## 2021-09-28 PROCEDURE — 96374 THER/PROPH/DIAG INJ IV PUSH: CPT

## 2021-09-28 PROCEDURE — 97110 THERAPEUTIC EXERCISES: CPT

## 2021-09-28 PROCEDURE — 85025 COMPLETE CBC W/AUTO DIFF WBC: CPT

## 2021-09-28 PROCEDURE — 80048 BASIC METABOLIC PNL TOTAL CA: CPT

## 2021-09-28 PROCEDURE — 74177 CT ABD & PELVIS W/CONTRAST: CPT | Mod: MA

## 2021-09-28 PROCEDURE — 86922 COMPATIBILITY TEST ANTIGLOB: CPT

## 2021-09-28 PROCEDURE — 82607 VITAMIN B-12: CPT

## 2021-09-28 PROCEDURE — 85014 HEMATOCRIT: CPT

## 2021-09-28 PROCEDURE — 85384 FIBRINOGEN ACTIVITY: CPT

## 2021-09-28 PROCEDURE — 86769 SARS-COV-2 COVID-19 ANTIBODY: CPT

## 2021-09-28 PROCEDURE — 81001 URINALYSIS AUTO W/SCOPE: CPT

## 2021-09-28 PROCEDURE — 85027 COMPLETE CBC AUTOMATED: CPT

## 2021-09-28 PROCEDURE — 85045 AUTOMATED RETICULOCYTE COUNT: CPT

## 2021-09-28 PROCEDURE — 82272 OCCULT BLD FECES 1-3 TESTS: CPT

## 2021-09-28 PROCEDURE — U0003: CPT

## 2021-09-28 PROCEDURE — 82435 ASSAY OF BLOOD CHLORIDE: CPT

## 2021-09-28 PROCEDURE — 80053 COMPREHEN METABOLIC PANEL: CPT

## 2021-09-28 PROCEDURE — 83735 ASSAY OF MAGNESIUM: CPT

## 2021-09-28 RX ORDER — CHLORHEXIDINE GLUCONATE 213 G/1000ML
1 SOLUTION TOPICAL DAILY
Refills: 0 | Status: DISCONTINUED | OUTPATIENT
Start: 2021-09-28 | End: 2021-09-28

## 2021-09-28 RX ORDER — ACETAMINOPHEN 500 MG
2 TABLET ORAL
Qty: 0 | Refills: 0 | DISCHARGE
Start: 2021-09-28

## 2021-09-28 RX ORDER — PANTOPRAZOLE SODIUM 20 MG/1
1 TABLET, DELAYED RELEASE ORAL
Qty: 30 | Refills: 0
Start: 2021-09-28 | End: 2021-10-27

## 2021-09-28 RX ADMIN — Medication 20 MILLIGRAM(S): at 11:14

## 2021-09-28 RX ADMIN — PANTOPRAZOLE SODIUM 40 MILLIGRAM(S): 20 TABLET, DELAYED RELEASE ORAL at 13:56

## 2021-09-28 RX ADMIN — APIXABAN 5 MILLIGRAM(S): 2.5 TABLET, FILM COATED ORAL at 06:04

## 2021-09-28 RX ADMIN — CHLORHEXIDINE GLUCONATE 1 APPLICATION(S): 213 SOLUTION TOPICAL at 13:57

## 2021-09-28 RX ADMIN — Medication 50 MILLIGRAM(S): at 11:14

## 2021-09-28 RX ADMIN — GABAPENTIN 200 MILLIGRAM(S): 400 CAPSULE ORAL at 13:57

## 2021-09-28 RX ADMIN — GABAPENTIN 200 MILLIGRAM(S): 400 CAPSULE ORAL at 06:04

## 2021-09-28 RX ADMIN — Medication 137 MICROGRAM(S): at 06:04

## 2021-09-28 NOTE — PROGRESS NOTE ADULT - REASON FOR ADMISSION
weakness and not able to eat

## 2021-09-28 NOTE — PROGRESS NOTE ADULT - ASSESSMENT
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 Patient reported he has been retching no actual vomit. Patient has generalized weakness .     Problem/Plan - 1:  ·  Problem: Auto immune Hemolytic Anemia .   ·  Plan: Hematology help appreciated .    Started on prednisone and Ig G    < from: CT Chest w/ IV Cont (09.22.21 @ 18:11) >  INTERPRETATION:  Scattered bilateral groundglass opacities, predominantly in the left lower and posterior left upper lobes. Bibasilar atelectasis.    < end of copied text >  Monitoring daily labs for hemolysis .HH stable.      Problem/Plan - 2:  ·  Problem: Chordoma.   ·  Plan: S/P surgery . Outpt follow up.     Problem/Plan - 3:  ·  Problem: Chronic indwelling Tony catheter.   ·  Plan: ID consulted as doubt UTI . IV Abxs.   Has apt with Urologist on Friday.     Problem/Plan - 4:  ·  Problem: Pulmonary embolism.   ·  Plan: On AC so continuing.     Problem/Plan - 5:  ·  Problem: S/P colostomy.   ·  Plan: Continue ostomy care.     Problem/Plan - 6:  ·  Problem: Thyroid cancer.   ·  Plan: S/P surgery and HERRING and now on replacement.     Problem/Plan - 7:  ·  Problem: Abdominal pain with vomiting.   ·  Plan: CT scan noted . Symptoms resolved.   IVF and eating now.    Disposition : DC planning pending Hematology clearance .

## 2021-09-28 NOTE — PROGRESS NOTE ADULT - SUBJECTIVE AND OBJECTIVE BOX
Date of Service  : 09-28-21 @ 11:17    INTERVAL HPI/OVERNIGHT EVENTS: I feel fine so want to go home.   Vital Signs Last 24 Hrs  T(C): 37.3 (28 Sep 2021 04:59), Max: 37.3 (28 Sep 2021 04:59)  T(F): 99.1 (28 Sep 2021 04:59), Max: 99.1 (28 Sep 2021 04:59)  HR: 81 (28 Sep 2021 04:59) (73 - 81)  BP: 144/75 (28 Sep 2021 04:59) (124/72 - 144/75)  BP(mean): --  RR: 18 (28 Sep 2021 04:59) (18 - 18)  SpO2: 98% (28 Sep 2021 04:59) (97% - 98%)  I&O's Summary    27 Sep 2021 07:01  -  28 Sep 2021 07:00  --------------------------------------------------------  IN: 720 mL / OUT: 1200 mL / NET: -480 mL      MEDICATIONS  (STANDING):  alfuzosin 10 milliGRAM(s) Oral at bedtime  apixaban 5 milliGRAM(s) Oral every 12 hours  chlorhexidine 2% Cloths 1 Application(s) Topical daily  gabapentin 200 milliGRAM(s) Oral every 8 hours  influenza   Vaccine 0.5 milliLiter(s) IntraMuscular once  levothyroxine 137 MICROGram(s) Oral daily  pantoprazole    Tablet 40 milliGRAM(s) Oral daily  predniSONE   Tablet 50 milliGRAM(s) Oral daily  predniSONE   Tablet 20 milliGRAM(s) Oral daily    MEDICATIONS  (PRN):  acetaminophen   Tablet .. 650 milliGRAM(s) Oral every 6 hours PRN Temp greater or equal to 38C (100.4F), Mild Pain (1 - 3)  diphenhydrAMINE 25 milliGRAM(s) Oral every 4 hours PRN Rash and/or Itching  ondansetron Injectable 4 milliGRAM(s) IV Push every 6 hours PRN Nausea and/or Vomiting    LABS:                        8.1    15.35 )-----------( 388      ( 28 Sep 2021 06:53 )             24.2     09-27    135  |  99  |  18  ----------------------------<  75  3.7   |  21<L>  |  0.69    Ca    9.0      27 Sep 2021 07:37    TPro  x   /  Alb  x   /  TBili  0.8  /  DBili  0.1  /  AST  x   /  ALT  x   /  AlkPhos  x   09-28        CAPILLARY BLOOD GLUCOSE              REVIEW OF SYSTEMS:  CONSTITUTIONAL: No fever, weight loss, or fatigue  EYES: No eye pain, visual disturbances, or discharge  ENMT:  No difficulty hearing, tinnitus, vertigo; No sinus or throat pain  NECK: No pain or stiffness  RESPIRATORY: No cough, wheezing, chills or hemoptysis; No shortness of breath  CARDIOVASCULAR: No chest pain, palpitations, dizziness, or leg swelling  GASTROINTESTINAL: No abdominal or epigastric pain. No nausea, vomiting, or hematemesis; No diarrhea or constipation. No melena or hematochezia.  GENITOURINARY: No dysuria, frequency, hematuria, or incontinence  NEUROLOGICAL: No headaches, memory loss, loss of strength, numbness, or tremors      Consultant(s) Notes Reviewed:  [x ] YES  [ ] NO    PHYSICAL EXAM:  GENERAL: NAD, well-groomed, well-developed,not in any distress ,  HEAD:  Atraumatic, Normocephalic  EYES: EOMI, PERRLA, conjunctiva and sclera clear  ENMT: No tonsillar erythema, exudates, or enlargement; Moist mucous membranes, Good dentition, No lesions  NECK: Supple, No JVD, Normal thyroid  NERVOUS SYSTEM:  Alert & Oriented X3, No focal deficit   CHEST/LUNG: Good air entry bilateral with no  rales, rhonchi, wheezing, or rubs  HEART: Regular rate and rhythm; No murmurs, rubs, or gallops  ABDOMEN: Soft, Nontender, Nondistended; Bowel sounds present, Ostomy +  EXTREMITIES:  2+ Peripheral Pulses, No clubbing, cyanosis, or edema  SKIN: No rashes or lesions    Care Discussed with Consultants/Other Providers [ x] YES  [ ] NO

## 2021-09-28 NOTE — DISCHARGE NOTE PROVIDER - NSDCFUADDAPPT_GEN_ALL_CORE_FT
Please follow up with your appointment at the [facility] for repeat lab work and ongoing management. Please call [phone].

## 2021-09-28 NOTE — PROGRESS NOTE ADULT - PROVIDER SPECIALTY LIST ADULT
Internal Medicine
Heme/Onc
Internal Medicine
Heme/Onc
Infectious Disease
Infectious Disease
Internal Medicine
Internal Medicine

## 2021-09-28 NOTE — DISCHARGE NOTE PROVIDER - CARE PROVIDER_API CALL
Maria Victoria Lucero; MSc)  Medical Oncology  [address] [place] [address], [place] [address]  Phone: [phone]  Fax: [phone]  Follow Up Time: 1 week

## 2021-09-28 NOTE — DISCHARGE NOTE PROVIDER - HOSPITAL COURSE
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 Patient reported he has been retching no actual vomit. Patient has generalized weakness. \    Hematology consulted for progressive anemia since admission. Workup notable for Coomb's positive test suggestive of Autoimmune Hemolytic Anemia with panagglutinin.   Pt started with prednisone 70mg ( based on 1mg/kg) on 9/22/21; IVIG 1g/kg for 2 days (9/24 and today 9/25). Hb this morning 7.6       #Normocytic anemia  -CT abdomen and pelvis w/ contrast on 9/20/21 negative for evidence of bleeding; no evidence of abdominal masses /malignancy  -peripheral blood smear reviewed by hematology attending and fellows; microspherocytes noted, c/w Coomb's positive test; no agglutination noted  -check reticulocyte count: 9/23 4.9; 9/22 4.0; 9/25 7.1, 9/27 8.0  -iron studies: ferritin, iron w/ TIBC on 9/22/21 no evidence of iron deficiency anemia; Vit B12 488 , folate wnl on 9/22;    -Coomb's+ pan-agglutinin 9/21, hemolysis labs 9/23 show haptoglobin <20 and  (slightly increased),suspected mild hemolysis even though Bilirubin normal on 9/23 lab.   -Check hemolysis labs daily (haptoglobin, LDH, bilirubin, reticulocyte count); this morning lab ; pending haptoglobin; will f/u closely.   -Given presence of Coomb's+ test, recommended CT chest w/ IV contrast to rule out lymphoproliferative disease. CT chest on 9/22: no evidence of lymphoma.  Will continue to treat w/ high dose steroids (prednisone 1 mg/kg QD until Hgb improves) as inpatient for now until Hb improved and stabilized.   - s/p IVIG 1g/kg iv X 2 doses (9/24-25). denied transfusion reactions.   -Pt will f/u with Dr. Lucero at [facility] after discharge.   -no signs of bleeding on exam or on imaging; continue to monitor given that patient is on DOAC for PE  - Pt medically cleared for discharge home with follow up hematology despite Hgb: 8.1

## 2021-09-28 NOTE — DISCHARGE NOTE PROVIDER - NSDCMRMEDTOKEN_GEN_ALL_CORE_FT
acetaminophen 325 mg oral tablet: 2 tab(s) orally every 6 hours, As needed, Temp greater or equal to 38C (100.4F), Mild Pain (1 - 3)  alfuzosin 10 mg oral tablet, extended release: 1 tab(s) orally once a day  apixaban 5 mg oral tablet: 1 tab(s) orally every 12 hours  Calcium 600+D oral tablet: 1 tab(s) orally once a day  note: per pt, crushed  gabapentin 100 mg oral capsule: 2 cap(s) orally every 8 hours  levothyroxine 137 mcg (0.137 mg) oral tablet: 1 tab(s) orally once a day  note: per pt, dose increased from 125mcg daily during last admission  pantoprazole 40 mg oral delayed release tablet: 1 tab(s) orally once a day  predniSONE 20 mg oral tablet: 1 tab(s) orally once a day  predniSONE 50 mg oral tablet: 1 tab(s) orally once a day = 70mg   TriCor 134 mg oral capsule: 1 cap(s) orally once a day  note: per pt, currently on hold  Vitamin D3 2000 intl units (50 mcg) oral tablet: 1 tab(s) orally once a day

## 2021-09-28 NOTE — DISCHARGE NOTE PROVIDER - NSRESEARCHGRANT_OVERRIDEREC_GEN_A_CORE
Diverticulitis    Some people get pouches along the wall of the colon as they get older. The pouches, called diverticuli, usually cause no symptoms. If the pouches become blocked, you can get an infection. This infection is called diverticulitis. It causes pain in your lower abdomen and fever. If not treated, it can become a serious condition, causing an abscess to form inside the pouch. The abscess may block the intestinal tract even or rupture, spreading infection throughout the abdomen.  When treatment is started early, oral antibiotics alone may be enough to cure diverticulitis. This method is tried first. But, if you don't improve or if your condition gets worse while you are trying oral antibiotics, you may need to be admitted to the hospital for IV antibiotics. Severe cases may require surgery.  Home care  The following guidelines will help you care for yourself at home:  · During the acute illness, rest and follow a low-fiber diet. Include foods like:  ¨ Flake cereal, mashed potatoes, pancakes, waffles, pasta, white bread, rice, applesauce, bananas, eggs, meat, fish, poultry, tofu, and cooked vegetables  · Take antibiotics exactly as the doctor says. Don't miss any doses or stop taking the medication, even if you feel better.  · Monitor your temperature and report any rising temperatures to your doctor.  Preventing future attacks  Once you have had an episode of diverticulitis, you are at risk for having it again. After you have recovered from this episode, you may be able to reduce your risk by eating a high-fiber diet (20-35 gm/day of fiber). This cleans out the colon pouches that already exist and prevents new ones from forming. Foods high in fiber include fresh fruits and edible peelings, raw or lightly cooked vegetables, whole grain cereals and breads, dried beans and peas, and bran.  Other steps that can help prevent future attacks include:  · Take your medications, such as antibiotics, as the doctor  says.  · Drink 6 to 8 glasses of water every day, unless directed otherwise.  · Use a heating pad or hot water bottle to reduce abdominal cramping or pain.  · Begin an exercise program. Ask your doctor how to get started. You can benefit from simple activities such as walking or gardening.  · Treat diarrhea with a bland diet. Start with liquids only; then slowly add fiber over time.  · Watch for changes in your bowel movements (constipation to diarrhea).  · Get plenty of rest and sleep.  Follow-up care  Follow up with your doctor as advised or sooner if you are not getting better in the next 2 days.  When to seek medical care  Get prompt medical attention if any of the following occur:  · Fever of 100.4°F (38°C) or higher, or as directed by your health care provider  · Repeated vomiting or swelling of the abdomen  · Weakness, dizziness, light-headedness  · Pain in your abdomen that gets worse, severe, or spreads to your back  · Pain that moves to the right lower abdomen  · Rectal bleeding (stools that are red, black or maroon color)  · Unexpected vaginal bleeding  © 5052-3669 [employer]. [address] [place] [address]. All rights reserved. This information is not intended as a substitute for professional medical care. Always follow your healthcare professional's instructions.         IMPROVE-DD Application Not Available

## 2021-09-30 ENCOUNTER — RESULT REVIEW (OUTPATIENT)
Age: 61
End: 2021-09-30

## 2021-09-30 ENCOUNTER — APPOINTMENT (OUTPATIENT)
Dept: SURGICAL ONCOLOGY | Facility: CLINIC | Age: 61
End: 2021-09-30
Payer: MEDICAID

## 2021-09-30 ENCOUNTER — OUTPATIENT (OUTPATIENT)
Dept: OUTPATIENT SERVICES | Facility: HOSPITAL | Age: 61
LOS: 1 days | Discharge: HOME | End: 2021-09-30

## 2021-09-30 ENCOUNTER — APPOINTMENT (OUTPATIENT)
Dept: HEMATOLOGY ONCOLOGY | Facility: CLINIC | Age: 61
End: 2021-09-30

## 2021-09-30 VITALS
SYSTOLIC BLOOD PRESSURE: 131 MMHG | WEIGHT: 161 LBS | OXYGEN SATURATION: 100 % | BODY MASS INDEX: 26.82 KG/M2 | HEART RATE: 91 BPM | HEIGHT: 65 IN | DIASTOLIC BLOOD PRESSURE: 77 MMHG

## 2021-09-30 DIAGNOSIS — D64.9 ANEMIA, UNSPECIFIED: ICD-10-CM

## 2021-09-30 DIAGNOSIS — Z90.49 ACQUIRED ABSENCE OF OTHER SPECIFIED PARTS OF DIGESTIVE TRACT: ICD-10-CM

## 2021-09-30 DIAGNOSIS — E89.0 POSTPROCEDURAL HYPOTHYROIDISM: Chronic | ICD-10-CM

## 2021-09-30 DIAGNOSIS — Z98.890 OTHER SPECIFIED POSTPROCEDURAL STATES: Chronic | ICD-10-CM

## 2021-09-30 LAB
BASOPHILS # BLD AUTO: 0.03 K/UL — SIGNIFICANT CHANGE UP (ref 0–0.2)
BASOPHILS NFR BLD AUTO: 0.2 % — SIGNIFICANT CHANGE UP (ref 0–2)
EOSINOPHIL # BLD AUTO: 0.02 K/UL — SIGNIFICANT CHANGE UP (ref 0–0.5)
EOSINOPHIL NFR BLD AUTO: 0.1 % — SIGNIFICANT CHANGE UP (ref 0–6)
HCT VFR BLD CALC: 25.5 % — LOW (ref 39–50)
HGB BLD-MCNC: 8.7 G/DL — LOW (ref 13–17)
IMM GRANULOCYTES NFR BLD AUTO: 1.5 % — SIGNIFICANT CHANGE UP (ref 0–1.5)
LYMPHOCYTES # BLD AUTO: 1.62 K/UL — SIGNIFICANT CHANGE UP (ref 1–3.3)
LYMPHOCYTES # BLD AUTO: 10 % — LOW (ref 13–44)
MCHC RBC-ENTMCNC: 28.8 PG — SIGNIFICANT CHANGE UP (ref 27–34)
MCHC RBC-ENTMCNC: 34.1 G/DL — SIGNIFICANT CHANGE UP (ref 32–36)
MCV RBC AUTO: 84.4 FL — SIGNIFICANT CHANGE UP (ref 80–100)
MONOCYTES # BLD AUTO: 1.31 K/UL — HIGH (ref 0–0.9)
MONOCYTES NFR BLD AUTO: 8 % — SIGNIFICANT CHANGE UP (ref 2–14)
NEUTROPHILS # BLD AUTO: 13.06 K/UL — HIGH (ref 1.8–7.4)
NEUTROPHILS NFR BLD AUTO: 80.2 % — HIGH (ref 43–77)
NRBC # BLD: 0 /100 WBCS — SIGNIFICANT CHANGE UP (ref 0–0)
PLATELET # BLD AUTO: 373 K/UL — SIGNIFICANT CHANGE UP (ref 150–400)
RBC # BLD: 3.02 M/UL — LOW (ref 4.2–5.8)
RBC # FLD: 19.5 % — HIGH (ref 10.3–14.5)
WBC # BLD: 16.28 K/UL — HIGH (ref 3.8–10.5)
WBC # FLD AUTO: 16.28 K/UL — HIGH (ref 3.8–10.5)

## 2021-09-30 PROCEDURE — 99024 POSTOP FOLLOW-UP VISIT: CPT

## 2021-09-30 NOTE — HISTORY OF PRESENT ILLNESS
[de-identified] : Luis Jackson is a 61 year old male who presents today for an initial post operative visit. He is s/p colon resection, formation of end sigmoid colostomy, ureterolysis, lysis of adhesions to release internal hernia on 7/28/21. Final pathology revealed segment of colon with no significant diagnostic alterations.\par PMH: Sacral cancer, HLD, DVT, and thyroid cancer. \par Family history: Breast cancer in mother and colon cancer in brother

## 2021-09-30 NOTE — REASON FOR VISIT
[Post-Op] : a post-op for [FreeTextEntry2] : s/p colon resection, formation of end sigmoid colostomy, ureterolysis, lysis of adhesions to release internal hernia on 7/28/21\par

## 2021-09-30 NOTE — ASSESSMENT
[FreeTextEntry1] : IMP: 61 year old male present for a s/p sacral  resection, formation of end sigmoid colostomy \par \par PLAN: \par RTO PRN \par

## 2021-10-01 ENCOUNTER — OUTPATIENT (OUTPATIENT)
Dept: OUTPATIENT SERVICES | Facility: HOSPITAL | Age: 61
LOS: 1 days | End: 2021-10-01
Payer: MEDICAID

## 2021-10-01 DIAGNOSIS — E89.0 POSTPROCEDURAL HYPOTHYROIDISM: Chronic | ICD-10-CM

## 2021-10-01 DIAGNOSIS — Z98.890 OTHER SPECIFIED POSTPROCEDURAL STATES: Chronic | ICD-10-CM

## 2021-10-04 ENCOUNTER — EMERGENCY (EMERGENCY)
Facility: HOSPITAL | Age: 61
LOS: 1 days | Discharge: HOME | End: 2021-10-04
Attending: EMERGENCY MEDICINE
Payer: COMMERCIAL

## 2021-10-04 VITALS
HEART RATE: 79 BPM | WEIGHT: 151.02 LBS | OXYGEN SATURATION: 97 % | DIASTOLIC BLOOD PRESSURE: 71 MMHG | HEIGHT: 65 IN | RESPIRATION RATE: 18 BRPM | TEMPERATURE: 98 F | SYSTOLIC BLOOD PRESSURE: 122 MMHG

## 2021-10-04 VITALS
RESPIRATION RATE: 16 BRPM | OXYGEN SATURATION: 99 % | SYSTOLIC BLOOD PRESSURE: 153 MMHG | TEMPERATURE: 98 F | DIASTOLIC BLOOD PRESSURE: 68 MMHG | HEART RATE: 80 BPM

## 2021-10-04 DIAGNOSIS — Z98.890 OTHER SPECIFIED POSTPROCEDURAL STATES: Chronic | ICD-10-CM

## 2021-10-04 DIAGNOSIS — E89.0 POSTPROCEDURAL HYPOTHYROIDISM: Chronic | ICD-10-CM

## 2021-10-04 LAB
APPEARANCE UR: CLEAR — SIGNIFICANT CHANGE UP
BACTERIA # UR AUTO: NEGATIVE — SIGNIFICANT CHANGE UP
BILIRUB UR-MCNC: NEGATIVE — SIGNIFICANT CHANGE UP
COD CRY URNS QL: ABNORMAL
COLOR SPEC: YELLOW — SIGNIFICANT CHANGE UP
COMMENT - URINE: SIGNIFICANT CHANGE UP
DIFF PNL FLD: ABNORMAL
EPI CELLS # UR: 1 /HPF — SIGNIFICANT CHANGE UP
GLUCOSE UR QL: NEGATIVE — SIGNIFICANT CHANGE UP
HYALINE CASTS # UR AUTO: 2 /LPF — SIGNIFICANT CHANGE UP (ref 0–2)
KETONES UR-MCNC: NEGATIVE — SIGNIFICANT CHANGE UP
LEUKOCYTE ESTERASE UR-ACNC: ABNORMAL
NITRITE UR-MCNC: NEGATIVE — SIGNIFICANT CHANGE UP
PH UR: 5.5 — SIGNIFICANT CHANGE UP (ref 5–8)
PROT UR-MCNC: SIGNIFICANT CHANGE UP
RBC CASTS # UR COMP ASSIST: 76 /HPF — HIGH (ref 0–4)
SP GR SPEC: 1.02 — SIGNIFICANT CHANGE UP (ref 1.01–1.02)
UROBILINOGEN FLD QL: NEGATIVE — SIGNIFICANT CHANGE UP
WBC UR QL: 48 /HPF — HIGH (ref 0–5)

## 2021-10-04 PROCEDURE — 99283 EMERGENCY DEPT VISIT LOW MDM: CPT

## 2021-10-04 PROCEDURE — 99284 EMERGENCY DEPT VISIT MOD MDM: CPT | Mod: 25

## 2021-10-04 PROCEDURE — 87086 URINE CULTURE/COLONY COUNT: CPT

## 2021-10-04 PROCEDURE — 81001 URINALYSIS AUTO W/SCOPE: CPT

## 2021-10-04 RX ORDER — CEPHALEXIN 500 MG
1 CAPSULE ORAL
Qty: 28 | Refills: 0
Start: 2021-10-04 | End: 2021-10-10

## 2021-10-04 RX ORDER — CEPHALEXIN 500 MG
250 CAPSULE ORAL ONCE
Refills: 0 | Status: COMPLETED | OUTPATIENT
Start: 2021-10-04 | End: 2021-10-04

## 2021-10-04 RX ADMIN — Medication 250 MILLIGRAM(S): at 17:31

## 2021-10-04 NOTE — ED PROVIDER NOTE - NS ED ROS FT
Constitutional: no fevers, chills  HEENT: no HA, vision changes, rhinorrhea, sore throat  Cardiac: no chest pain, palpitations  Respiratory: no SOB, cough or hemoptysis  GI: no n/v/d/c, abd pain, bloody or dark stools  : no dysuria, frequency, or hematuria  MSK: no joint pain, neck pain or back pain  Skin: no rashes, jaundice, pruritis  Neuro: no numbness/tingling, weakness, unsteady gait  Psych: no depression or suicidal thoughts

## 2021-10-04 NOTE — ED PROVIDER NOTE - PHYSICAL EXAMINATION
General: non-toxic, NAD  HEENT: NCAT, PERRL  Cardiac: RRR, no murmurs, 2+ peripheral pulses  Resp: CTAB  Abdomen: soft, non-distended, bowel sounds present, no ttp, no rebound or guarding. no organomegaly  Extremities: no peripheral edema, calf tenderness, or leg size discrepancies  Skin: no rashes  Neuro: AAOx4, 5+motor, sensation grossly intact  Psych: mood and affect appropriate General: non-toxic, NAD  HEENT: NCAT, PERRL  Cardiac: RRR, no murmurs, 2+ peripheral pulses  Resp: CTAB  Abdomen: soft, non-distended, bowel sounds present, no ttp, no rebound or guarding. no organomegaly  : new tony placed, old tony had significant biofilm buildup. 1000mL output in first 10 minutes.   Extremities: mild peripheral edema unchanged from baseline, no calf tenderness, or leg size discrepancies  Skin: no rashes  Psych: mood and affect appropriate

## 2021-10-04 NOTE — ED ADULT NURSE REASSESSMENT NOTE - NS ED NURSE REASSESS COMMENT FT1
Barnett catheter placed using sterile technique. Second RN present to confirm sterility. Draining to gravity. Secured w/ stat lock. Pt tolerated procedure well. Will cont to monitor.

## 2021-10-04 NOTE — ED PROVIDER NOTE - CARE PLAN
1 Principal Discharge DX:	Problem with Barnett catheter   Principal Discharge DX:	Problem with Barnett catheter  Secondary Diagnosis:	Urinary retention

## 2021-10-04 NOTE — ED PROVIDER NOTE - CLINICAL SUMMARY MEDICAL DECISION MAKING FREE TEXT BOX
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 UA and monitor for post-obstructive diuresis. 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 UA and monitor for post-obstructive diuresis.    Dr. Murphy (Attending Physician)

## 2021-10-04 NOTE — ED PROVIDER NOTE - OBJECTIVE STATEMENT
h/o cordoma s/p resection in July with chronic indwelling tony last changed 9/20 presents with lack of output that he noticed when he woke up this am with an empty tony bag. Denies fever/chills, abd pain, dysuria, back pain, change in gait. Feels well.

## 2021-10-04 NOTE — ED PROVIDER NOTE - NSFOLLOWUPINSTRUCTIONS_ED_ALL_ED_FT
You were seen in the Emergency Department for tony obstruction.     1) Advance activity as tolerated.   2) Continue all previously prescribed medications as directed.    3) Follow up with your primary care physician in 24-48 hours - take copies of your results.    4) Return to the Emergency Department for worsening or persistent symptoms, and/or ANY NEW OR CONCERNING SYMPTOMS. You were seen in the Emergency Department for tony obstruction. Follow-up with Urology in 2 days as scheduled.    1) Advance activity as tolerated.   2) Continue all previously prescribed medications as directed.    3) Follow up with your primary care physician in 24-48 hours - take copies of your results.    4) Return to the Emergency Department for worsening or persistent symptoms, and/or ANY NEW OR CONCERNING SYMPTOMS. You were seen in the Emergency Department for tony obstruction. Follow-up with Urology in 2 days as scheduled.    1) Advance activity as tolerated.   2) Continue all previously prescribed medications as directed.    3) Follow up with your primary care physician in 24-48 hours - take copies of your results.    4) Return to the Emergency Department for worsening or persistent symptoms, and/or ANY NEW OR CONCERNING SYMPTOMS.    Cephalexin was sent to your pharmacy. Please pick it up and take every 6 hours. Your first dose was given in ER. Please have urology order a home kit for self-catheterization.

## 2021-10-04 NOTE — ED PROVIDER NOTE - PATIENT PORTAL LINK FT
You can access the FollowMyHealth Patient Portal offered by [facility] by registering at the following website: [email or web address]. By joining [facility]’s FollowMyHealth portal, you will also be able to view your health information using other applications (apps) compatible with our system.

## 2021-10-04 NOTE — ED PROVIDER NOTE - ATTENDING CONTRIBUTION TO CARE
Dr. Murphy (Attending Physician)  I performed a history and physical exam of the patient and discussed their management with the resident. I reviewed the resident's note and agree with the documented findings and plan of care. My medical decision making and observations are found above.

## 2021-10-04 NOTE — ED PROVIDER NOTE - PROGRESS NOTE DETAILS
UA shows.... will dc UA shows large leukocyte esterase, RBCs, and few calcium oxylate crystals. will dc with keflex pending culture results and continue to follow up with urology - scheduled later this week.

## 2021-10-04 NOTE — ED ADULT NURSE NOTE - OBJECTIVE STATEMENT
62 y/o male arrives to the ER complaining of no urine output.  PMH of   Pt is A&Ox4, speaking coherently. Pt states having no urine output in Tony catheter since last night, additionally Tony catheter is leaking from the meatus. tony was changed on sept /20, before last night had no problems with the Tony.  Pt denies SOB, chest pain, dizziness, N/V/D,  fevers, chills. On assessment airway is patent, breathing spontaneously and unlabored. Skin is dry, warm and color appropriate to race.  Abdomen is soft, no distended, no tender. Full ROM in all extremities.  Patient undressed and placed into gown, side rails up with bed locked and in lowest position for safety. call bell within reach. [place] provided. Comfort and safety provided. Educated not to ambulate without assistance. will continue to reassess. 62 y/o male arrives to the ER complaining of no urine output.  PMH of Cordoma. Pt is A&Ox4, speaking coherently. Pt states having no urine output in Barnett catheter since last night, additionally Barnett catheter is leaking from the meatus. Barnett was changed on sept /20, before last night had no problems with the Barnett.  Pt denies SOB, chest pain, dizziness, N/V/D,  fevers, chills. On assessment airway is patent, breathing spontaneously and unlabored. Skin is dry, warm and color appropriate to race.  Abdomen is soft, no distended, no tender. Full ROM in all extremities.  Patient undressed and placed into gown, side rails up with bed locked and in lowest position for safety. call bell within reach. Mar[place] provided. Comfort and safety provided. Educated not to ambulate without assistance. will continue to reassess.

## 2021-10-05 ENCOUNTER — OUTPATIENT (OUTPATIENT)
Dept: OUTPATIENT SERVICES | Facility: HOSPITAL | Age: 61
LOS: 1 days | Discharge: HOME | End: 2021-10-05

## 2021-10-05 ENCOUNTER — APPOINTMENT (OUTPATIENT)
Dept: PLASTIC SURGERY | Facility: CLINIC | Age: 61
End: 2021-10-05
Payer: MEDICAID

## 2021-10-05 DIAGNOSIS — D64.9 ANEMIA, UNSPECIFIED: ICD-10-CM

## 2021-10-05 DIAGNOSIS — E89.0 POSTPROCEDURAL HYPOTHYROIDISM: Chronic | ICD-10-CM

## 2021-10-05 DIAGNOSIS — Z98.890 OTHER SPECIFIED POSTPROCEDURAL STATES: Chronic | ICD-10-CM

## 2021-10-05 LAB
CULTURE RESULTS: SIGNIFICANT CHANGE UP
SPECIMEN SOURCE: SIGNIFICANT CHANGE UP

## 2021-10-05 PROCEDURE — 99024 POSTOP FOLLOW-UP VISIT: CPT

## 2021-10-07 ENCOUNTER — OUTPATIENT (OUTPATIENT)
Dept: OUTPATIENT SERVICES | Facility: HOSPITAL | Age: 61
LOS: 1 days | End: 2021-10-07
Payer: COMMERCIAL

## 2021-10-07 ENCOUNTER — RESULT REVIEW (OUTPATIENT)
Age: 61
End: 2021-10-07

## 2021-10-07 ENCOUNTER — APPOINTMENT (OUTPATIENT)
Dept: HEMATOLOGY ONCOLOGY | Facility: CLINIC | Age: 61
End: 2021-10-07
Payer: MEDICAID

## 2021-10-07 VITALS
DIASTOLIC BLOOD PRESSURE: 74 MMHG | OXYGEN SATURATION: 100 % | SYSTOLIC BLOOD PRESSURE: 126 MMHG | HEART RATE: 79 BPM | HEIGHT: 65 IN | BODY MASS INDEX: 25.99 KG/M2 | WEIGHT: 156 LBS

## 2021-10-07 DIAGNOSIS — D64.9 ANEMIA, UNSPECIFIED: ICD-10-CM

## 2021-10-07 DIAGNOSIS — Z98.890 OTHER SPECIFIED POSTPROCEDURAL STATES: Chronic | ICD-10-CM

## 2021-10-07 DIAGNOSIS — E89.0 POSTPROCEDURAL HYPOTHYROIDISM: Chronic | ICD-10-CM

## 2021-10-07 LAB
ALLERGY+IMMUNOLOGY DIAG STUDY NOTE: SIGNIFICANT CHANGE UP
BASOPHILS # BLD AUTO: 0.1 K/UL — SIGNIFICANT CHANGE UP (ref 0–0.2)
BASOPHILS NFR BLD AUTO: 0.6 % — SIGNIFICANT CHANGE UP (ref 0–2)
DAT IGG-SP REAG RBC-IMP: ABNORMAL
DIR ANTIGLOB POLYSPECIFIC INTERPRETATION: ABNORMAL
EOSINOPHIL # BLD AUTO: 0 K/UL — SIGNIFICANT CHANGE UP (ref 0–0.5)
EOSINOPHIL NFR BLD AUTO: 0.2 % — SIGNIFICANT CHANGE UP (ref 0–6)
HCT VFR BLD CALC: 33.8 % — LOW (ref 39–50)
HGB BLD-MCNC: 10.7 G/DL — LOW (ref 13–17)
IAT COMP-SP REAG SERPL QL: ABNORMAL
LYMPHOCYTES # BLD AUTO: 1.1 K/UL — SIGNIFICANT CHANGE UP (ref 1–3.3)
LYMPHOCYTES # BLD AUTO: 7.8 % — LOW (ref 13–44)
MCHC RBC-ENTMCNC: 29.1 PG — SIGNIFICANT CHANGE UP (ref 27–34)
MCHC RBC-ENTMCNC: 31.6 G/DL — LOW (ref 32–36)
MCV RBC AUTO: 92 FL — SIGNIFICANT CHANGE UP (ref 80–100)
MONOCYTES # BLD AUTO: 0.6 K/UL — SIGNIFICANT CHANGE UP (ref 0–0.9)
MONOCYTES NFR BLD AUTO: 4.2 % — SIGNIFICANT CHANGE UP (ref 2–14)
NEUTROPHILS # BLD AUTO: 11.9 K/UL — HIGH (ref 1.8–7.4)
NEUTROPHILS NFR BLD AUTO: 87.2 % — HIGH (ref 43–77)
PLATELET # BLD AUTO: 307 K/UL — SIGNIFICANT CHANGE UP (ref 150–400)
RBC # BLD: 3.67 M/UL — LOW (ref 4.2–5.8)
RBC # FLD: 17.9 % — HIGH (ref 10.3–14.5)
WBC # BLD: 13.7 K/UL — HIGH (ref 3.8–10.5)
WBC # FLD AUTO: 13.7 K/UL — HIGH (ref 3.8–10.5)

## 2021-10-07 PROCEDURE — 86860 RBC ANTIBODY ELUTION: CPT

## 2021-10-07 PROCEDURE — 0001U RBC DNA HEA 35 AG 11 BLD GRP: CPT

## 2021-10-07 PROCEDURE — 86077 PHYS BLOOD BANK SERV XMATCH: CPT

## 2021-10-07 PROCEDURE — 86880 COOMBS TEST DIRECT: CPT

## 2021-10-07 PROCEDURE — 99205 OFFICE O/P NEW HI 60 MIN: CPT

## 2021-10-07 PROCEDURE — 86870 RBC ANTIBODY IDENTIFICATION: CPT

## 2021-10-07 PROCEDURE — 36415 COLL VENOUS BLD VENIPUNCTURE: CPT

## 2021-10-07 RX ORDER — APIXABAN 5 MG/1
5 TABLET, FILM COATED ORAL
Qty: 60 | Refills: 3 | Status: ACTIVE | COMMUNITY
Start: 1900-01-01 | End: 1900-01-01

## 2021-10-07 NOTE — REVIEW OF SYSTEMS
[Joint Pain] : joint pain [Fever] : no fever [Chills] : no chills [Night Sweats] : no night sweats [Vision Problems] : no vision problems [Dysphagia] : no dysphagia [Odynophagia] : no odynophagia [Chest Pain] : no chest pain [Lower Ext Edema] : no lower extremity edema [Shortness Of Breath] : no shortness of breath [Abdominal Pain] : no abdominal pain [Vomiting] : no vomiting [Muscle Weakness] : no muscle weakness [Easy Bleeding] : no tendency for easy bleeding [Easy Bruising] : no tendency for easy bruising [Swollen Glands] : no swollen glands

## 2021-10-07 NOTE — HISTORY OF PRESENT ILLNESS
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 Imaging at that time did not show lymphadenopathy. The patient was treated with steroids and IVIG. He was discharged on a steroid taper.  [de-identified] : The patient is currently on 70 mg of prednisone, which he has been on since he was discharged. Since discharge, he states that he was feeling better. The patient endorses lightheadedness and dizziness that is unchanged. He denies chest pain, SOB, nausea, vomiting, changes in skin color, itchness, rash, new jpoint pain. The patient has chronic pain in the sacral area since his surgery and swelling in his leg since his surgery.

## 2021-10-07 NOTE — RESULTS/DATA
[FreeTextEntry1] : 9/20/21\par CT abdomen/pelvis\par LIVER: Within normal limits.\par BILE DUCTS: Normal caliber.\par GALLBLADDER: Within normal limits.\par SPLEEN: Within normal limits.\par PANCREAS: Within normal limits.\par ADRENALS: Within normal limits.\par KIDNEYS/URETERS: Within normal limits.\par \par BLADDER: Barnett catheter.\par REPRODUCTIVE ORGANS: Prostate within normal limits.\par \par BOWEL: Status post left lower quadrant colostomy. Intact rectal stump. No bowel obstruction. Appendix within normal limits.\par PERITONEUM: No ascites.\par VESSELS: Atherosclerotic changes.\par RETROPERITONEUM/LYMPH NODES: No lymphadenopathy.\par ABDOMINAL WALL: Postsurgical changes.\par BONES: Status post partial sacral resection and lumboiliac spinal fusion.\par \par \par 9/22/21\par \par CT chest \par IMPRESSION:\par \par No lymphadenopathy.\par \par Nonspecific patchy groundglass opacities scattered throughout the posterior left upper and left lower lobes, new since 7/10/21.\par \par \par \par \par

## 2021-10-07 NOTE — ASSESSMENT
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 CT imaging at that time did not reveal lymphadenopathy concerning for hematologic malignancy. The patient denies a history of autoimmune disease. \par \par The patient is currently on prednisone 70 mg daily. CBC today shows improvement in CBC. Will taper by 10 mg q weekly. Labs again in 2 weeks and reassess. \par \par # Warm autoimmune hemolytic anemia \par - called patient regarding improvement in hgb\par - continue prednisone taper, decrease by 10 mg weekly. \par - follow up in 1 month \par - Follow up reticulocyte count, justin, haptoglobin, and SPEP\par \par # Chordoma\par - following with neurosurgery

## 2021-10-07 NOTE — PHYSICAL EXAM
[Thin] : thin [Normal] : normal appearance, no rash, nodules, vesicles, ulcers, erythema [de-identified] : Patient with ostomy; no blood. [de-identified] : has tony catheter draining yellow urine.

## 2021-10-08 LAB
ALBUMIN SERPL ELPH-MCNC: 4.1 G/DL
ALP BLD-CCNC: 53 U/L
ALT SERPL-CCNC: 35 U/L
ANION GAP SERPL CALC-SCNC: 12 MMOL/L
AST SERPL-CCNC: 23 U/L
BILIRUB SERPL-MCNC: 0.4 MG/DL
BUN SERPL-MCNC: 26 MG/DL
CALCIUM SERPL-MCNC: 9.2 MG/DL
CHLORIDE SERPL-SCNC: 103 MMOL/L
CO2 SERPL-SCNC: 24 MMOL/L
CREAT SERPL-MCNC: 0.77 MG/DL
GLUCOSE SERPL-MCNC: 108 MG/DL
HAPTOGLOB SERPL-MCNC: 49 MG/DL
LDH SERPL-CCNC: 197 U/L
POTASSIUM SERPL-SCNC: 4 MMOL/L
PROT SERPL-MCNC: 7.6 G/DL
RBC # BLD: 3.57 M/UL
RETICS # AUTO: 6.3 %
RETICS AGGREG/RBC NFR: 223.5 K/UL
SODIUM SERPL-SCNC: 139 MMOL/L

## 2021-10-09 LAB — HUMAN ERYTHROCYTE ANTIGEN PANEL RESULT: SIGNIFICANT CHANGE UP

## 2021-10-12 LAB
ALBUMIN MFR SERPL ELPH: 52.4 %
ALBUMIN SERPL-MCNC: 4 G/DL
ALBUMIN/GLOB SERPL: 1.1 RATIO
ALPHA1 GLOB MFR SERPL ELPH: 4.4 %
ALPHA1 GLOB SERPL ELPH-MCNC: 0.3 G/DL
ALPHA2 GLOB MFR SERPL ELPH: 7.6 %
ALPHA2 GLOB SERPL ELPH-MCNC: 0.6 G/DL
B-GLOBULIN MFR SERPL ELPH: 10.2 %
B-GLOBULIN SERPL ELPH-MCNC: 0.8 G/DL
DEPRECATED KAPPA LC FREE/LAMBDA SER: 0.93 RATIO
GAMMA GLOB FLD ELPH-MCNC: 1.9 G/DL
GAMMA GLOB MFR SERPL ELPH: 25.4 %
IGA SER QL IEP: 186 MG/DL
IGG SER QL IEP: 1999 MG/DL
IGM SER QL IEP: 142 MG/DL
INTERPRETATION SERPL IEP-IMP: NORMAL
KAPPA LC CSF-MCNC: 1.03 MG/DL
KAPPA LC SERPL-MCNC: 0.96 MG/DL
M PROTEIN SPEC IFE-MCNC: NORMAL
PROT SERPL-MCNC: 7.6 G/DL
PROT SERPL-MCNC: 7.6 G/DL

## 2021-10-15 ENCOUNTER — TRANSCRIPTION ENCOUNTER (OUTPATIENT)
Age: 61
End: 2021-10-15

## 2021-10-19 NOTE — PHYSICAL EXAM
[NI] : Normal [de-identified] : back incision c/d/i healing well no erythema no sign of infection stitches removed

## 2021-10-21 ENCOUNTER — RESULT REVIEW (OUTPATIENT)
Age: 61
End: 2021-10-21

## 2021-10-21 ENCOUNTER — APPOINTMENT (OUTPATIENT)
Dept: HEMATOLOGY ONCOLOGY | Facility: CLINIC | Age: 61
End: 2021-10-21

## 2021-10-21 LAB
BASOPHILS # BLD AUTO: 0.1 K/UL — SIGNIFICANT CHANGE UP (ref 0–0.2)
BASOPHILS NFR BLD AUTO: 1.2 % — SIGNIFICANT CHANGE UP (ref 0–2)
EOSINOPHIL # BLD AUTO: 0.3 K/UL — SIGNIFICANT CHANGE UP (ref 0–0.5)
EOSINOPHIL NFR BLD AUTO: 2.3 % — SIGNIFICANT CHANGE UP (ref 0–6)
HCT VFR BLD CALC: 35 % — LOW (ref 39–50)
HGB BLD-MCNC: 11.1 G/DL — LOW (ref 13–17)
LYMPHOCYTES # BLD AUTO: 0.9 K/UL — LOW (ref 1–3.3)
LYMPHOCYTES # BLD AUTO: 7.2 % — LOW (ref 13–44)
MCHC RBC-ENTMCNC: 29.1 PG — SIGNIFICANT CHANGE UP (ref 27–34)
MCHC RBC-ENTMCNC: 31.6 G/DL — LOW (ref 32–36)
MCV RBC AUTO: 92 FL — SIGNIFICANT CHANGE UP (ref 80–100)
MONOCYTES # BLD AUTO: 0.5 K/UL — SIGNIFICANT CHANGE UP (ref 0–0.9)
MONOCYTES NFR BLD AUTO: 4.3 % — SIGNIFICANT CHANGE UP (ref 2–14)
NEUTROPHILS # BLD AUTO: 10.7 K/UL — HIGH (ref 1.8–7.4)
NEUTROPHILS NFR BLD AUTO: 85 % — HIGH (ref 43–77)
PLATELET # BLD AUTO: 250 K/UL — SIGNIFICANT CHANGE UP (ref 150–400)
RBC # BLD: 3.8 M/UL — LOW (ref 4.2–5.8)
RBC # FLD: 15.3 % — HIGH (ref 10.3–14.5)
WBC # BLD: 12.6 K/UL — HIGH (ref 3.8–10.5)
WBC # FLD AUTO: 12.6 K/UL — HIGH (ref 3.8–10.5)

## 2021-10-22 LAB
ALBUMIN SERPL ELPH-MCNC: 3.6 G/DL
ALP BLD-CCNC: 50 U/L
ALT SERPL-CCNC: 17 U/L
ANION GAP SERPL CALC-SCNC: 13 MMOL/L
AST SERPL-CCNC: 13 U/L
BILIRUB SERPL-MCNC: 0.4 MG/DL
BUN SERPL-MCNC: 31 MG/DL
CALCIUM SERPL-MCNC: 8.9 MG/DL
CHLORIDE SERPL-SCNC: 105 MMOL/L
CO2 SERPL-SCNC: 22 MMOL/L
CREAT SERPL-MCNC: 0.89 MG/DL
GLUCOSE SERPL-MCNC: 120 MG/DL
HAPTOGLOB SERPL-MCNC: 81 MG/DL
POTASSIUM SERPL-SCNC: 3.6 MMOL/L
PROT SERPL-MCNC: 6.1 G/DL
SODIUM SERPL-SCNC: 140 MMOL/L

## 2021-10-28 DIAGNOSIS — Z71.89 OTHER SPECIFIED COUNSELING: ICD-10-CM

## 2021-11-01 ENCOUNTER — OUTPATIENT (OUTPATIENT)
Dept: OUTPATIENT SERVICES | Facility: HOSPITAL | Age: 61
LOS: 1 days | Discharge: HOME | End: 2021-11-01

## 2021-11-01 DIAGNOSIS — Z98.890 OTHER SPECIFIED POSTPROCEDURAL STATES: Chronic | ICD-10-CM

## 2021-11-01 DIAGNOSIS — D64.9 ANEMIA, UNSPECIFIED: ICD-10-CM

## 2021-11-01 DIAGNOSIS — E89.0 POSTPROCEDURAL HYPOTHYROIDISM: Chronic | ICD-10-CM

## 2021-11-05 ENCOUNTER — RESULT REVIEW (OUTPATIENT)
Age: 61
End: 2021-11-05

## 2021-11-05 ENCOUNTER — APPOINTMENT (OUTPATIENT)
Dept: HEMATOLOGY ONCOLOGY | Facility: CLINIC | Age: 61
End: 2021-11-05
Payer: MEDICAID

## 2021-11-05 VITALS
OXYGEN SATURATION: 100 % | SYSTOLIC BLOOD PRESSURE: 137 MMHG | HEIGHT: 65 IN | BODY MASS INDEX: 26.49 KG/M2 | HEART RATE: 79 BPM | DIASTOLIC BLOOD PRESSURE: 75 MMHG | WEIGHT: 159 LBS

## 2021-11-05 LAB
BASOPHILS # BLD AUTO: 0.04 K/UL
BASOPHILS # BLD AUTO: 0.1 K/UL — SIGNIFICANT CHANGE UP (ref 0–0.2)
BASOPHILS NFR BLD AUTO: 0.3 %
BASOPHILS NFR BLD AUTO: 0.8 % — SIGNIFICANT CHANGE UP (ref 0–2)
EOSINOPHIL # BLD AUTO: 0 K/UL — SIGNIFICANT CHANGE UP (ref 0–0.5)
EOSINOPHIL # BLD AUTO: 0.06 K/UL
EOSINOPHIL NFR BLD AUTO: 0.3 % — SIGNIFICANT CHANGE UP (ref 0–6)
EOSINOPHIL NFR BLD AUTO: 0.4 %
HCT VFR BLD CALC: 40.5 %
HCT VFR BLD CALC: 42 % — SIGNIFICANT CHANGE UP (ref 39–50)
HGB BLD-MCNC: 12.7 G/DL
HGB BLD-MCNC: 13.1 G/DL — SIGNIFICANT CHANGE UP (ref 13–17)
IMM GRANULOCYTES NFR BLD AUTO: 0.5 %
LYMPHOCYTES # BLD AUTO: 25.8 % — SIGNIFICANT CHANGE UP (ref 13–44)
LYMPHOCYTES # BLD AUTO: 3.6 K/UL — HIGH (ref 1–3.3)
LYMPHOCYTES # BLD AUTO: 3.71 K/UL
LYMPHOCYTES NFR BLD AUTO: 26.7 %
MAN DIFF?: NORMAL
MCHC RBC-ENTMCNC: 28.9 PG — SIGNIFICANT CHANGE UP (ref 27–34)
MCHC RBC-ENTMCNC: 29.1 PG
MCHC RBC-ENTMCNC: 31.3 G/DL — LOW (ref 32–36)
MCHC RBC-ENTMCNC: 31.4 GM/DL
MCV RBC AUTO: 92.3 FL — SIGNIFICANT CHANGE UP (ref 80–100)
MCV RBC AUTO: 92.9 FL
MONOCYTES # BLD AUTO: 1 K/UL — HIGH (ref 0–0.9)
MONOCYTES # BLD AUTO: 1.15 K/UL
MONOCYTES NFR BLD AUTO: 7.4 % — SIGNIFICANT CHANGE UP (ref 2–14)
MONOCYTES NFR BLD AUTO: 8.3 %
NEUTROPHILS # BLD AUTO: 8.87 K/UL
NEUTROPHILS # BLD AUTO: 9.2 K/UL — HIGH (ref 1.8–7.4)
NEUTROPHILS NFR BLD AUTO: 63.8 %
NEUTROPHILS NFR BLD AUTO: 65.7 % — SIGNIFICANT CHANGE UP (ref 43–77)
PLATELET # BLD AUTO: 297 K/UL — SIGNIFICANT CHANGE UP (ref 150–400)
PLATELET # BLD AUTO: 317 K/UL
RBC # BLD: 4.36 M/UL
RBC # BLD: 4.36 M/UL
RBC # BLD: 4.55 M/UL — SIGNIFICANT CHANGE UP (ref 4.2–5.8)
RBC # FLD: 14.4 % — SIGNIFICANT CHANGE UP (ref 10.3–14.5)
RBC # FLD: 15.3 %
RETICS # AUTO: 1.9 %
RETICS AGGREG/RBC NFR: 82.8 K/UL
WBC # BLD: 13.9 K/UL — HIGH (ref 3.8–10.5)
WBC # FLD AUTO: 13.9 K/UL
WBC # FLD AUTO: 13.9 K/UL — HIGH (ref 3.8–10.5)

## 2021-11-05 PROCEDURE — 99214 OFFICE O/P EST MOD 30 MIN: CPT

## 2021-11-05 NOTE — PHYSICAL EXAM
[Thin] : thin [Normal] : normal appearance, no rash, nodules, vesicles, ulcers, erythema [de-identified] : Patient with ostomy; no blood. [de-identified] : has tony catheter draining yellow urine.

## 2021-11-05 NOTE — HISTORY OF PRESENT ILLNESS
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 Imaging at that time did not show lymphadenopathy. The patient was treated with steroids and IVIG. He was discharged on a steroid taper.  [de-identified] : The patient has tapered steroids to 40 mg daily. He denies worsening SOB, chest pain, nausea, vomiting, abdominal pain, but has had increasing tenesmus. He has informed his neurosurgeon, and will undergo reimaging in the near future.

## 2021-11-05 NOTE — ASSESSMENT
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 CT imaging at that time did not reveal lymphadenopathy concerning for hematologic malignancy. The patient denies a history of autoimmune disease. \par \par The patient is currently on prednisone 70 mg daily. CBC today shows improvement in CBC. Will taper by 10 mg q weekly. Labs again in 2 weeks and reassess. \par \par # Warm autoimmune hemolytic anemia \par - continue prednisone taper, decrease by 10 mg weekly. \par - follow up in 1 month \par - CBC and CMP today \par \par # Chordoma\par - following with neurosurgery

## 2021-11-08 LAB
ALBUMIN SERPL ELPH-MCNC: 4.2 G/DL
ALP BLD-CCNC: 62 U/L
ALT SERPL-CCNC: 19 U/L
ANION GAP SERPL CALC-SCNC: 17 MMOL/L
AST SERPL-CCNC: 18 U/L
BILIRUB SERPL-MCNC: 0.2 MG/DL
BUN SERPL-MCNC: 29 MG/DL
CALCIUM SERPL-MCNC: 9.8 MG/DL
CHLORIDE SERPL-SCNC: 105 MMOL/L
CO2 SERPL-SCNC: 24 MMOL/L
CREAT SERPL-MCNC: 0.89 MG/DL
GLUCOSE SERPL-MCNC: 76 MG/DL
POTASSIUM SERPL-SCNC: 4.1 MMOL/L
PROT SERPL-MCNC: 6.9 G/DL
SODIUM SERPL-SCNC: 146 MMOL/L

## 2021-11-09 ENCOUNTER — LABORATORY RESULT (OUTPATIENT)
Age: 61
End: 2021-11-09

## 2021-11-09 ENCOUNTER — APPOINTMENT (OUTPATIENT)
Dept: ENDOCRINOLOGY | Facility: CLINIC | Age: 61
End: 2021-11-09
Payer: MEDICAID

## 2021-11-09 VITALS
OXYGEN SATURATION: 99 % | WEIGHT: 159 LBS | SYSTOLIC BLOOD PRESSURE: 140 MMHG | BODY MASS INDEX: 26.49 KG/M2 | HEART RATE: 86 BPM | DIASTOLIC BLOOD PRESSURE: 80 MMHG | TEMPERATURE: 97.2 F | HEIGHT: 65 IN

## 2021-11-09 PROCEDURE — 99214 OFFICE O/P EST MOD 30 MIN: CPT

## 2021-11-10 NOTE — HISTORY OF PRESENT ILLNESS
[FreeTextEntry1] : cc: thyroid cancer\par \par 60 year old man with papillary thyroid cancer (well differentiated, 1.6 cm, isthmus, with positive margins) post thyroidectomy in 2010 and radioactive iodine in 2011, had mild increase in Tg on last blood tests, with neg thyrogen stimulated Dominguez scan and BARBIE on ultarsound. Was subsequently diagnosed with sacral mass, chordoma earlier this year, underwent  sacrectomy with L4-pelvis fusion 7/2021, with complicated post-op course. Now walking again, with cane, has colostomy, self catheterizes 4 times daily  Recently diagnosed with hemolytic anemia, now on steroid taper.\par \par \par Thyroid cancer:  He has not noted any change in his neck; reports no dyspnea or dysphagia.  Taking levothyroxine daily separate from food and vitamins.  \par \par Prediabetes: - Has lost about 20 pounds during chordoma treatment. \par \par Osteopenia:  No recent falls or fractures, drinking protein drink with supplemental vitamins\par \par \par Also with hypogonadism, has declined testosterone replacement \par \par \par \par \par \par \par \par

## 2021-11-10 NOTE — ASSESSMENT
[FreeTextEntry1] : 61 year old man with papillary thyroid cancer (well differentiated, 1.6 cm, isthmus, with positive margins) post thyroidectomy in 2010 and radioactive iodine in 2011, with measurable tg on last blood tests, neg HERRING scan and ultrasound\par    - check TFTs, tg, adjust  levothyroxine dose as needed ( on steroids)\par \par \par  Osteopenia - \par   - Bone density stable on last DXA, repeat in 1-2 years.  \par  - Continue calcium and vitamin D supplementation.\par \par \par Prediabetes - has lost weight.  Repeat hba1c\par \par Hypogonadism - does not want testosterone tx at this time\par \par f/u  1 year

## 2021-11-10 NOTE — PHYSICAL EXAM
[Alert] : alert [Healthy Appearance] : healthy appearance [No Acute Distress] : no acute distress [Normal Sclera/Conjunctiva] : normal sclera/conjunctiva [No Proptosis] : no proptosis [No Neck Mass] : no neck mass was observed [No LAD] : no lymphadenopathy [Well Healed Scar] : well healed scar [No Respiratory Distress] : no respiratory distress [Clear to Auscultation] : lungs were clear to auscultation bilaterally [Normal S1, S2] : normal S1 and S2 [Regular Rhythm] : with a regular rhythm [Normal Bowel Sounds] : normal bowel sounds [Not Tender] : non-tender [Soft] : abdomen soft [No Involuntary Movements] : no involuntary movements were seen [Normal Reflexes] : deep tendon reflexes were 2+ and symmetric [No Tremors] : no tremors [Normal Affect] : the affect was normal [Normal Mood] : the mood was normal [Kyphosis] : no kyphosis present [de-identified] : +colostomy

## 2021-11-12 LAB
25(OH)D3 SERPL-MCNC: 31.2 NG/ML
ESTIMATED AVERAGE GLUCOSE: 82 MG/DL
HBA1C MFR BLD HPLC: 4.5 %
T3RU NFR SERPL: 0.8 TBI
T4 SERPL-MCNC: 9.7 UG/DL
THYROGLOB AB SERPL-ACNC: <20 IU/ML
THYROGLOB SERPL-MCNC: <0.2 NG/ML
TSH SERPL-ACNC: 0.25 UIU/ML

## 2021-11-15 ENCOUNTER — APPOINTMENT (OUTPATIENT)
Dept: SURGICAL ONCOLOGY | Facility: CLINIC | Age: 61
End: 2021-11-15
Payer: MEDICAID

## 2021-11-15 VITALS
TEMPERATURE: 97.1 F | DIASTOLIC BLOOD PRESSURE: 82 MMHG | HEART RATE: 88 BPM | RESPIRATION RATE: 16 BRPM | WEIGHT: 159 LBS | HEIGHT: 65 IN | OXYGEN SATURATION: 98 % | SYSTOLIC BLOOD PRESSURE: 156 MMHG | BODY MASS INDEX: 26.49 KG/M2

## 2021-11-15 DIAGNOSIS — R19.8 OTHER SPECIFIED SYMPTOMS AND SIGNS INVOLVING THE DIGESTIVE SYSTEM AND ABDOMEN: ICD-10-CM

## 2021-11-15 PROCEDURE — 99214 OFFICE O/P EST MOD 30 MIN: CPT

## 2021-11-15 NOTE — PHYSICAL EXAM
[Normal] : supple, no neck mass and thyroid not enlarged [Normal] : oriented to person, place and time, with appropriate affect [de-identified] : colostomy is pink and functional,  +stool, +flatus  [FreeTextEntry1] : Rectal exams shows no stool in vault and no masses

## 2021-11-15 NOTE — HISTORY OF PRESENT ILLNESS
[de-identified] : Luis Jackson is a 61 year old male who presents today for a follow up visit. \par \par He is s/p colon resection, formation of end sigmoid colostomy, ureterolysis, lysis of adhesions to release internal hernia on 7/28/21. Final pathology revealed segment of colon with no significant diagnostic alterations.\par \par Today he is with c/o rectal pressure x 1 month.  He states the rectal pressure is worse when sitting down or lying flat.  States his colostomy is functioning well however the stool can be thick at times.  Denies rectal bleeding or mucous.  Has urinary retention/incontinence and self catherizes 4x/day. Denies abdominal pain, nausea, vomiting or changes in appetite. \par \par Recently diagnosed with hemolytic anemia, now on steroid taper under the care of Dr. Joiner. \par \par PMH: Sacral cancer, HLD, DVT, and thyroid cancer. \par Family history: Breast cancer in mother and colon cancer in brother

## 2021-11-15 NOTE — ASSESSMENT
[FreeTextEntry1] : IMP: \par 61 year old male present for a s/p sacral  resection, formation of end sigmoid colostomy \par Rectal pressure \par \par \par PLAN: \par RTO PRN \par

## 2021-11-15 NOTE — REASON FOR VISIT
[Follow-Up Visit] : a follow-up visit for [FreeTextEntry2] : s/p colon resection, formation of end sigmoid colostomy, ureterolysis, lysis of adhesions to release internal hernia on 7/28/21\par

## 2021-11-26 ENCOUNTER — OUTPATIENT (OUTPATIENT)
Dept: OUTPATIENT SERVICES | Facility: HOSPITAL | Age: 61
LOS: 1 days | End: 2021-11-26
Payer: COMMERCIAL

## 2021-11-26 ENCOUNTER — APPOINTMENT (OUTPATIENT)
Dept: MRI IMAGING | Facility: CLINIC | Age: 61
End: 2021-11-26
Payer: MEDICAID

## 2021-11-26 DIAGNOSIS — Z98.890 OTHER SPECIFIED POSTPROCEDURAL STATES: Chronic | ICD-10-CM

## 2021-11-26 DIAGNOSIS — E89.0 POSTPROCEDURAL HYPOTHYROIDISM: Chronic | ICD-10-CM

## 2021-11-26 DIAGNOSIS — C41.4 MALIGNANT NEOPLASM OF PELVIC BONES, SACRUM AND COCCYX: ICD-10-CM

## 2021-11-26 PROCEDURE — 72158 MRI LUMBAR SPINE W/O & W/DYE: CPT

## 2021-11-26 PROCEDURE — 72158 MRI LUMBAR SPINE W/O & W/DYE: CPT | Mod: 26

## 2021-11-26 PROCEDURE — A9585: CPT

## 2021-12-01 ENCOUNTER — OUTPATIENT (OUTPATIENT)
Dept: OUTPATIENT SERVICES | Facility: HOSPITAL | Age: 61
LOS: 1 days | Discharge: HOME | End: 2021-12-01

## 2021-12-01 DIAGNOSIS — Z98.890 OTHER SPECIFIED POSTPROCEDURAL STATES: Chronic | ICD-10-CM

## 2021-12-01 DIAGNOSIS — D64.9 ANEMIA, UNSPECIFIED: ICD-10-CM

## 2021-12-01 DIAGNOSIS — E89.0 POSTPROCEDURAL HYPOTHYROIDISM: Chronic | ICD-10-CM

## 2021-12-01 PROCEDURE — G9005: CPT

## 2021-12-03 ENCOUNTER — RESULT REVIEW (OUTPATIENT)
Age: 61
End: 2021-12-03

## 2021-12-03 ENCOUNTER — APPOINTMENT (OUTPATIENT)
Dept: HEMATOLOGY ONCOLOGY | Facility: CLINIC | Age: 61
End: 2021-12-03
Payer: MEDICAID

## 2021-12-03 VITALS
HEIGHT: 65 IN | DIASTOLIC BLOOD PRESSURE: 82 MMHG | HEART RATE: 85 BPM | OXYGEN SATURATION: 95 % | SYSTOLIC BLOOD PRESSURE: 122 MMHG | BODY MASS INDEX: 27.16 KG/M2 | WEIGHT: 163.04 LBS

## 2021-12-03 LAB
BASOPHILS # BLD AUTO: 0.1 K/UL — SIGNIFICANT CHANGE UP (ref 0–0.2)
BASOPHILS NFR BLD AUTO: 1.1 % — SIGNIFICANT CHANGE UP (ref 0–2)
EOSINOPHIL # BLD AUTO: 0.2 K/UL — SIGNIFICANT CHANGE UP (ref 0–0.5)
EOSINOPHIL NFR BLD AUTO: 2 % — SIGNIFICANT CHANGE UP (ref 0–6)
HCT VFR BLD CALC: 41.7 % — SIGNIFICANT CHANGE UP (ref 39–50)
HGB BLD-MCNC: 13.3 G/DL — SIGNIFICANT CHANGE UP (ref 13–17)
LYMPHOCYTES # BLD AUTO: 19.6 % — SIGNIFICANT CHANGE UP (ref 13–44)
LYMPHOCYTES # BLD AUTO: 2.1 K/UL — SIGNIFICANT CHANGE UP (ref 1–3.3)
MCHC RBC-ENTMCNC: 27.9 PG — SIGNIFICANT CHANGE UP (ref 27–34)
MCHC RBC-ENTMCNC: 32 G/DL — SIGNIFICANT CHANGE UP (ref 32–36)
MCV RBC AUTO: 87.4 FL — SIGNIFICANT CHANGE UP (ref 80–100)
MONOCYTES # BLD AUTO: 0.9 K/UL — SIGNIFICANT CHANGE UP (ref 0–0.9)
MONOCYTES NFR BLD AUTO: 8.3 % — SIGNIFICANT CHANGE UP (ref 2–14)
NEUTROPHILS # BLD AUTO: 7.5 K/UL — HIGH (ref 1.8–7.4)
NEUTROPHILS NFR BLD AUTO: 69 % — SIGNIFICANT CHANGE UP (ref 43–77)
PLATELET # BLD AUTO: 284 K/UL — SIGNIFICANT CHANGE UP (ref 150–400)
RBC # BLD: 4.77 M/UL — SIGNIFICANT CHANGE UP (ref 4.2–5.8)
RBC # FLD: 13.5 % — SIGNIFICANT CHANGE UP (ref 10.3–14.5)
WBC # BLD: 10.9 K/UL — HIGH (ref 3.8–10.5)
WBC # FLD AUTO: 10.9 K/UL — HIGH (ref 3.8–10.5)

## 2021-12-03 PROCEDURE — 99214 OFFICE O/P EST MOD 30 MIN: CPT

## 2021-12-03 NOTE — PHYSICAL EXAM
[Thin] : thin [Normal] : normal appearance, no rash, nodules, vesicles, ulcers, erythema [de-identified] : Patient with ostomy; no blood. [de-identified] : has tony catheter draining yellow urine.

## 2021-12-03 NOTE — REVIEW OF SYSTEMS
[Fever] : no fever [Chills] : no chills [Night Sweats] : no night sweats [Vision Problems] : no vision problems [Dysphagia] : no dysphagia [Odynophagia] : no odynophagia [Chest Pain] : no chest pain [Lower Ext Edema] : no lower extremity edema [Shortness Of Breath] : no shortness of breath [Abdominal Pain] : no abdominal pain [Vomiting] : no vomiting [Joint Pain] : joint pain [Muscle Weakness] : no muscle weakness [Easy Bleeding] : no tendency for easy bleeding [Easy Bruising] : no tendency for easy bruising [Swollen Glands] : no swollen glands

## 2021-12-03 NOTE — ASSESSMENT
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 CT imaging at that time did not reveal lymphadenopathy concerning for hematologic malignancy. The patient denies a history of autoimmune disease. \par The patient was started on steroid taper that he recently completed with improvement in anemia. \par \par # Warm autoimmune hemolytic anemia \par - Patient to follow up with PCP next week to repeat CBC. Can follow up with monthly CBC to confirm that anemia is not worsening. Patient wishes to follow up with his PCP for this, but will schedule a follow up at [facility] if his PCP is not able to follow a CBC monthly for now. \par \par # leukocytosis\par - stable, likely secondary to steroids vs. chronic inflammatory process \par  - downtrending after stopping steroids. \par \par # Chordoma\par - following with neurosurgery \par \par # papillary thyroid cancer\par - following with Dr. Neely

## 2021-12-03 NOTE — HISTORY OF PRESENT ILLNESS
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 Imaging at that time did not show lymphadenopathy. The patient was treated with steroids and IVIG. He was discharged on a steroid taper, which he completed on 11/29/21.   [de-identified] : He denies worsening SOB, chest pain, nausea, vomiting, abdominal pain, but has had increasing tenesmus that is now somewhat improved.

## 2021-12-06 ENCOUNTER — APPOINTMENT (OUTPATIENT)
Dept: NEUROSURGERY | Facility: CLINIC | Age: 61
End: 2021-12-06
Payer: MEDICAID

## 2021-12-06 VITALS
BODY MASS INDEX: 27.16 KG/M2 | HEIGHT: 65 IN | WEIGHT: 163 LBS | OXYGEN SATURATION: 96 % | DIASTOLIC BLOOD PRESSURE: 75 MMHG | SYSTOLIC BLOOD PRESSURE: 126 MMHG | HEART RATE: 92 BPM

## 2021-12-06 LAB
BASOPHILS # BLD AUTO: 0.05 K/UL
BASOPHILS NFR BLD AUTO: 0.5 %
EOSINOPHIL # BLD AUTO: 0.21 K/UL
EOSINOPHIL NFR BLD AUTO: 2 %
HCT VFR BLD CALC: 39.7 %
HGB BLD-MCNC: 12.4 G/DL
IMM GRANULOCYTES NFR BLD AUTO: 0.6 %
LYMPHOCYTES # BLD AUTO: 2.11 K/UL
LYMPHOCYTES NFR BLD AUTO: 20.1 %
MAN DIFF?: NORMAL
MCHC RBC-ENTMCNC: 27.8 PG
MCHC RBC-ENTMCNC: 31.2 GM/DL
MCV RBC AUTO: 89 FL
MONOCYTES # BLD AUTO: 0.91 K/UL
MONOCYTES NFR BLD AUTO: 8.7 %
NEUTROPHILS # BLD AUTO: 7.18 K/UL
NEUTROPHILS NFR BLD AUTO: 68.1 %
PLATELET # BLD AUTO: 306 K/UL
RBC # BLD: 4.46 M/UL
RBC # BLD: 4.46 M/UL
RBC # FLD: 14.2 %
RETICS # AUTO: 1.1 %
RETICS AGGREG/RBC NFR: 50.8 K/UL
WBC # FLD AUTO: 10.52 K/UL

## 2021-12-06 PROCEDURE — 99215 OFFICE O/P EST HI 40 MIN: CPT

## 2021-12-06 RX ORDER — NAPROXEN SODIUM 220 MG
TABLET ORAL
Refills: 0 | Status: DISCONTINUED | COMMUNITY
End: 2021-12-06

## 2021-12-06 RX ORDER — PREDNISONE 50 MG/1
TABLET ORAL
Refills: 0 | Status: DISCONTINUED | COMMUNITY
End: 2021-12-06

## 2021-12-06 RX ORDER — MULTIVITAMIN
TABLET ORAL
Refills: 0 | Status: ACTIVE | COMMUNITY
Start: 2021-12-06

## 2021-12-06 RX ORDER — ATORVASTATIN CALCIUM 10 MG/1
10 TABLET, FILM COATED ORAL
Refills: 0 | Status: ACTIVE | COMMUNITY

## 2021-12-06 RX ORDER — METRONIDAZOLE 500 MG/1
500 TABLET ORAL
Refills: 0 | Status: DISCONTINUED | COMMUNITY
End: 2021-12-06

## 2021-12-06 RX ORDER — PREDNISONE 20 MG/1
20 TABLET ORAL DAILY
Qty: 30 | Refills: 0 | Status: DISCONTINUED | COMMUNITY
Start: 2021-10-07 | End: 2021-12-06

## 2021-12-06 RX ORDER — VANCOMYCIN HYDROCHLORIDE 1 G/20ML
1 INJECTION, POWDER, LYOPHILIZED, FOR SOLUTION INTRAVENOUS
Refills: 0 | Status: DISCONTINUED | COMMUNITY
End: 2021-12-06

## 2021-12-06 RX ORDER — CEPHALEXIN 500 MG/1
500 CAPSULE ORAL
Refills: 0 | Status: DISCONTINUED | COMMUNITY
End: 2021-12-06

## 2021-12-06 RX ORDER — PREDNISONE 50 MG/1
50 TABLET ORAL DAILY
Qty: 30 | Refills: 0 | Status: DISCONTINUED | COMMUNITY
Start: 2021-10-07 | End: 2021-12-06

## 2021-12-06 RX ORDER — ALFUZOSIN HYDROCHLORIDE 10 MG/1
TABLET, EXTENDED RELEASE ORAL
Refills: 0 | Status: DISCONTINUED | COMMUNITY
End: 2021-12-06

## 2021-12-16 NOTE — HISTORY OF PRESENT ILLNESS
[FreeTextEntry1] : Mr. Jackson is a pleasant gentleman with a very large sacral tumor. We had a long discussion with this patient about the need for complete en_bloc resection of the sacral chordoma. Because of the size, this will be done in two stages with the first stage being the anterior dissection of the tumor and ligation of vessels and also placement of a VRAM flap, so that we could use this to help close the wound in the back when we remove the large tumor on stage 2. \par \par On 7/1/21 he underwent stage 1 which again was anterior tumor dissection and ligation of vessels.\par \par On 7/8/21 he underwent stage 2: total en bloc sacrectomy from S1 down to the coccyx for sacral chordoma.\par  Difficulty modifier, Neurolysis of left S1 root greater than 10 cm and neurolysis of right L5 root greater than 10 cm.\par  Stabilization of lumbosacral junction with arthrodesis using allograft bone from L4 to L5 to S1 to the pelvis.\par  Placement of bilateral pedicle screws at L4, L5 and left S1.Placement of bilateral pelvic screws, two on each side.Vertebroplasty, two fenestrated screws at L4 and L5. Complex plastic surgery closure with pulling through a VRAM flap by Dr. Peraza. Use of stereotactic navigation to plan tumor resection. Right internal hemipelvectomy with resection of medial ilium with the specimen. L5 and S1 laminectomies.\par \par Pt had a subsequent hospital admission on 8/2 with ileus and wound wash out done. Pt has RUE PICC and is getting IV antibiotics at home with home wound care following patient. He is ambulating with walker assistance. RLE ankle flexor 3/5 LLE 5/5. He denies fever. He states that he still has a tony catheter intact and will follow up with urology next week.\par \par Pt is emotional and very grateful that he is recovering well.  Today he is smiling more, states that he gained about 6 pounds and feels well overall.  Reports phantom sensation in sacral area.  He straight catheterizes 4 x day and has a colostomy.  States that he prefers to keep the colostomy over wearing a diaper.\par

## 2021-12-16 NOTE — ASSESSMENT
[FreeTextEntry1] : IMPRESSION:\par 1. Pt is recovering gradually s/p 7/1/21 stage 1 and 7/8/21 stage 2: total en bloc sacrectomy from S1 down to the coccyx for sacral chordoma.\par \par \par \par PLAN:\par 1. CT PELVIS NO CONTRAST

## 2021-12-16 NOTE — PHYSICAL EXAM
[General Appearance - Alert] : alert [General Appearance - In No Acute Distress] : in no acute distress [General Appearance - Well Nourished] : well nourished [General Appearance - Well-Appearing] : healthy appearing [Oriented To Time, Place, And Person] : oriented to person, place, and time [Person] : oriented to person [Place] : oriented to place [Time] : oriented to time [Sclera] : the sclera and conjunctiva were normal [PERRL With Normal Accommodation] : pupils were equal in size, round, reactive to light, with normal accommodation [Extraocular Movements] : extraocular movements were intact [Outer Ear] : the ears and nose were normal in appearance [Neck Appearance] : the appearance of the neck was normal [Exaggerated Use Of Accessory Muscles For Inspiration] : no accessory muscle use [Heart Rate And Rhythm] : heart rate was normal and rhythm regular [Involuntary Movements] : no involuntary movements were seen [Skin Color & Pigmentation] : normal skin color and pigmentation [] : no rash [FreeTextEntry1] : ambulates with cane assist

## 2021-12-16 NOTE — ASSESSMENT
[FreeTextEntry1] : IMPRESSION:\par 1. Pt is recovering gradually s/p 7/1/21 stage 1 and  7/8/21 stage 2: total en bloc sacrectomy from S1 down to the coccyx for sacral chordoma.\par \par \par PLAN:\par 1. F/U with urology and plastic surgery as scheduled.\par 2. Continue iv antibiotics and ID as scheduled.\par 3. MRI LS w/wo and x-rays of LS 10/2021\par

## 2021-12-16 NOTE — HISTORY OF PRESENT ILLNESS
[FreeTextEntry1] : Mr. Jackson is a pleasant gentleman with a very large sacral tumor.  We had a long discussion with this patient about the need for complete enbloc resection of the sacral chordoma.  Because of the size, this will be done in two stages with the  first stage being the anterior dissection of the tumor and ligation of vessels and also placement of a VRAM flap, so that we could use this to help close the wound in the back when we remove the large tumor on stage  2.  \par \par On 7/1/21 he underwent stage 1 which again was anterior tumor dissection and ligation of vessels.\par \par On 7/8/21 he underwent stage 2: total en bloc sacrectomy from S1 down to the coccyx for sacral chordoma.\par  Difficulty modifier, Neurolysis of left S1 root greater than 10 cm and neurolysis of right L5 root greater than 10 cm.\par  Stabilization of lumbosacral junction with arthrodesis using allograft bone from L4 to L5 to S1 to the pelvis.\par  Placement of bilateral pedicle screws at L4, L5 and left S1.Placement of bilateral pelvic screws, two on each side.Vertebroplasty, two fenestrated screws at L4 and L5. Complex plastic surgery closure with pulling through a VRAM flap by Dr. Peraza. Use of stereotactic navigation to plan tumor resection.  Right internal hemipelvectomy with resection of medial ilium with the specimen. L5 and S1 laminectomies.\par \par Pt had a subsequent hospital admission on 8/2 with ileus and wound wash out done. Pt has RUE PICC and is getting IV antibiotics at home with home wound care following patient.  He is ambulating with walker assistance.  RLE ankle flexor 3/5 LLE 5/5.  He denies fever.  He states that he still has a tony catheter intact and will follow up with urology next week.\par \par Pt is emotional and very grateful that he is recovering well.\par \par \par \par \par \par

## 2021-12-16 NOTE — PHYSICAL EXAM
[General Appearance - Alert] : alert [General Appearance - In No Acute Distress] : in no acute distress [General Appearance - Well Nourished] : well nourished [General Appearance - Well-Appearing] : healthy appearing [No Drainage] : without drainage [Oriented To Time, Place, And Person] : oriented to person, place, and time [Person] : oriented to person [Place] : oriented to place [Time] : oriented to time [2] : S1 ankle flexors 2/5 [5] : S1 ankle flexors 5/5 [] : no respiratory distress [Respiration, Rhythm And Depth] : normal respiratory rhythm and effort [Exaggerated Use Of Accessory Muscles For Inspiration] : no accessory muscle use [Heart Rate And Rhythm] : heart rate was normal and rhythm regular [FreeTextEntry1] : ambulating with walker assist

## 2021-12-20 ENCOUNTER — NON-APPOINTMENT (OUTPATIENT)
Age: 61
End: 2021-12-20

## 2022-02-01 NOTE — PROGRESS NOTE ADULT - SUBJECTIVE AND OBJECTIVE BOX
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 The chordoma resulted in perineal numbness and overflow incontinence and he was admitted 7/7/21 for staged resection. On 7/7, he underwent Plastic Surgery and General Surgery assisted vertical rectus abdominal myocutaneous flap harvest with transperitoneal ligation of internal iliac artery and vein. He underwent en bloc sacrectomy on 7/8.     HOSPITAL COURSE:  7/7: Stage 1 - VRAM harvest, iliac artery and vein ligation  7/8: Stage 2 - sacrectomy with L4-pelvis fusion; EBL 4.5L status post 8 units PRBC, 6 units FFP, 1 pack platelets and 5L crystalloid; post-op status post 1 pack platelets  7/10: CTA chest: b/l upper lobe segmental PEs  7/11: Nausea/vomiting. NGT placed to low wall suction with immediate 1.2L bilious output.  7/17: advancing diet to full liquid    24 HOUR EVENTS:   Hi-flow weaned off.     REVIEW OF SYSTEMS: [] Unable to Assess due to neurologic exam   [x] All ROS addressed below are non-contributory, except:  Neuro: [ ] Headache [ x] Back pain [x ] Numbness [ ] Weakness [ ] Ataxia [ ] Dizziness [ ] Aphasia [ ] Dysarthria [ ] Visual disturbance  Resp: [x] Shortness of breath/dyspnea [ ] Orthopnea [ ] Cough  CV: [ ] Chest pain [ ] Palpitation [ ] Lightheadedness [ ] Syncope  Renal: [ ] Thirst [ ] Edema  GI: [ ] Nausea [ ] Emesis [x] Abdominal/incisional pain [ ] Constipation [ ] Diarrhea  Hem: [ ] Hematemesis [ ] Bright red blood per rectum  ID: [ ] Fever [ ] Chills [ ] Dysuria  ENT: [ ] Rhinorrhea    VITALS/DATA/ORDERS: [x] Reviewed    EXAMINATION:   Gen: Cooperative  HEENT: Moist mucosa  CV: Mild tachy but regular  Lungs: Decreased BS at bases but good air entry, no stridor  Abd: hypoactive bowel sounds, distended  Ext: Diffuse edema; RLE edema>LLE  Neuro: Awake, alert, fully oriented, follows, BUE no drift full strength, BLE full strength except for R PF 4/5 DF 4-/5    SUMMARY:   61 year-old man with history of thyroid cancer status post total thyroidectomy in 2010 and radioactive iodine treatment in 2011, hypogonadism, vitamin D deficiency, and osteopenia diagnosed with sacral  mass found on work-up for back pain since August 2020 which was 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 The chordoma resulted in perineal numbness and overflow incontinence and he was admitted 7/7/21 for staged resection. On 7/7, he underwent Plastic Surgery and General Surgery assisted vertical rectus abdominal myocutaneous flap harvest with transperitoneal ligation of internal iliac artery and vein. He underwent en bloc sacrectomy on 7/8.     HOSPITAL COURSE:  7/7: Stage 1 - VRAM harvest, iliac artery and vein ligation  7/8: Stage 2 - sacrectomy with L4-pelvis fusion; EBL 4.5L status post 8 units PRBC, 6 units FFP, 1 pack platelets and 5L crystalloid; post-op status post 1 pack platelets  7/10: CTA chest: b/l upper lobe segmental PEs  7/11: Nausea/vomiting. NGT placed to low wall suction with immediate 1.2L bilious output.  7/17: advancing diet to full liquid    24 HOUR EVENTS:   Hi-flow weaned off.     REVIEW OF SYSTEMS: [] Unable to Assess due to neurologic exam   [x] All ROS addressed below are non-contributory, except:  Neuro: [ ] Headache [ x] Back pain [x ] Numbness [ ] Weakness [ ] Ataxia [ ] Dizziness [ ] Aphasia [ ] Dysarthria [ ] Visual disturbance  Resp: [x] Shortness of breath/dyspnea [ ] Orthopnea [ ] Cough  CV: [ ] Chest pain [ ] Palpitation [ ] Lightheadedness [ ] Syncope  Renal: [ ] Thirst [ ] Edema  GI: [ ] Nausea [ ] Emesis [x] Abdominal/incisional pain [ ] Constipation [ ] Diarrhea  Hem: [ ] Hematemesis [ ] Bright red blood per rectum  ID: [ ] Fever [ ] Chills [ ] Dysuria  ENT: [ ] Rhinorrhea    VITALS/DATA/ORDERS: [x] Reviewed    EXAMINATION:   Gen: Cooperative  HEENT: Moist mucosa  CV: Regular  Lungs: Decreased BS at bases but good air entry, no stridor  Abd: Hypoactive bowel sounds, distended - but improved  Ext: Diffuse edema; RLE edema>LLE  Neuro: Awake, alert, fully oriented, follows, BUE no drift full strength, BLE full strength except for R DF/PF 4+/5    [id number]

## 2022-03-01 ENCOUNTER — TRANSCRIPTION ENCOUNTER (OUTPATIENT)
Age: 62
End: 2022-03-01

## 2022-03-07 ENCOUNTER — RESULT REVIEW (OUTPATIENT)
Age: 62
End: 2022-03-07

## 2022-03-09 ENCOUNTER — APPOINTMENT (OUTPATIENT)
Dept: CT IMAGING | Facility: CLINIC | Age: 62
End: 2022-03-09
Payer: MEDICAID

## 2022-03-09 ENCOUNTER — APPOINTMENT (OUTPATIENT)
Dept: MRI IMAGING | Facility: CLINIC | Age: 62
End: 2022-03-09
Payer: MEDICAID

## 2022-03-09 ENCOUNTER — RESULT REVIEW (OUTPATIENT)
Age: 62
End: 2022-03-09

## 2022-03-09 ENCOUNTER — OUTPATIENT (OUTPATIENT)
Dept: OUTPATIENT SERVICES | Facility: HOSPITAL | Age: 62
LOS: 1 days | End: 2022-03-09
Payer: COMMERCIAL

## 2022-03-09 ENCOUNTER — APPOINTMENT (OUTPATIENT)
Dept: RADIOLOGY | Facility: CLINIC | Age: 62
End: 2022-03-09
Payer: MEDICAID

## 2022-03-09 DIAGNOSIS — Z98.890 OTHER SPECIFIED POSTPROCEDURAL STATES: Chronic | ICD-10-CM

## 2022-03-09 DIAGNOSIS — Z00.8 ENCOUNTER FOR OTHER GENERAL EXAMINATION: ICD-10-CM

## 2022-03-09 DIAGNOSIS — E89.0 POSTPROCEDURAL HYPOTHYROIDISM: Chronic | ICD-10-CM

## 2022-03-09 PROCEDURE — 72131 CT LUMBAR SPINE W/O DYE: CPT | Mod: 26

## 2022-03-09 PROCEDURE — 76376 3D RENDER W/INTRP POSTPROCES: CPT | Mod: 26

## 2022-03-09 PROCEDURE — A9585: CPT

## 2022-03-09 PROCEDURE — 72158 MRI LUMBAR SPINE W/O & W/DYE: CPT

## 2022-03-09 PROCEDURE — 72158 MRI LUMBAR SPINE W/O & W/DYE: CPT | Mod: 26

## 2022-03-09 PROCEDURE — 72100 X-RAY EXAM L-S SPINE 2/3 VWS: CPT

## 2022-03-09 PROCEDURE — 72131 CT LUMBAR SPINE W/O DYE: CPT

## 2022-03-09 PROCEDURE — 72100 X-RAY EXAM L-S SPINE 2/3 VWS: CPT | Mod: 26

## 2022-03-09 PROCEDURE — 76376 3D RENDER W/INTRP POSTPROCES: CPT

## 2022-03-21 ENCOUNTER — NON-APPOINTMENT (OUTPATIENT)
Age: 62
End: 2022-03-21

## 2022-03-21 ENCOUNTER — TRANSCRIPTION ENCOUNTER (OUTPATIENT)
Age: 62
End: 2022-03-21

## 2022-03-28 ENCOUNTER — OUTPATIENT (OUTPATIENT)
Dept: OUTPATIENT SERVICES | Facility: HOSPITAL | Age: 62
LOS: 1 days | End: 2022-03-28
Payer: COMMERCIAL

## 2022-03-28 ENCOUNTER — APPOINTMENT (OUTPATIENT)
Dept: MRI IMAGING | Facility: CLINIC | Age: 62
End: 2022-03-28
Payer: MEDICAID

## 2022-03-28 DIAGNOSIS — Z00.8 ENCOUNTER FOR OTHER GENERAL EXAMINATION: ICD-10-CM

## 2022-03-28 DIAGNOSIS — C41.4 MALIGNANT NEOPLASM OF PELVIC BONES, SACRUM AND COCCYX: ICD-10-CM

## 2022-03-28 DIAGNOSIS — Z98.890 OTHER SPECIFIED POSTPROCEDURAL STATES: Chronic | ICD-10-CM

## 2022-03-28 DIAGNOSIS — E89.0 POSTPROCEDURAL HYPOTHYROIDISM: Chronic | ICD-10-CM

## 2022-03-28 PROCEDURE — 72197 MRI PELVIS W/O & W/DYE: CPT

## 2022-03-28 PROCEDURE — 72197 MRI PELVIS W/O & W/DYE: CPT | Mod: 26

## 2022-03-28 PROCEDURE — A9585: CPT

## 2022-03-31 ENCOUNTER — APPOINTMENT (OUTPATIENT)
Dept: NEUROSURGERY | Facility: CLINIC | Age: 62
End: 2022-03-31
Payer: MEDICAID

## 2022-03-31 VITALS
OXYGEN SATURATION: 99 % | BODY MASS INDEX: 27.16 KG/M2 | SYSTOLIC BLOOD PRESSURE: 143 MMHG | HEART RATE: 69 BPM | HEIGHT: 65 IN | WEIGHT: 163 LBS | TEMPERATURE: 97.7 F | DIASTOLIC BLOOD PRESSURE: 84 MMHG

## 2022-03-31 DIAGNOSIS — Z87.891 PERSONAL HISTORY OF NICOTINE DEPENDENCE: ICD-10-CM

## 2022-03-31 PROCEDURE — 99215 OFFICE O/P EST HI 40 MIN: CPT

## 2022-03-31 RX ORDER — FENOFIBRATE 134 MG/1
134 CAPSULE ORAL
Refills: 0 | Status: ACTIVE | COMMUNITY
Start: 2022-03-31

## 2022-03-31 RX ORDER — LEVOTHYROXINE SODIUM 100 UG/1
100 CAPSULE ORAL
Refills: 0 | Status: ACTIVE | COMMUNITY
Start: 2022-03-31

## 2022-04-07 NOTE — HISTORY OF PRESENT ILLNESS
[FreeTextEntry1] : Mr. Jackson is a pleasant gentleman with a very large sacral tumor.\par \par On 7/1/21 he underwent stage 1 which again was anterior tumor dissection and ligation of vessels.\par \par On 7/8/21 he underwent stage 2: total en bloc sacrectomy from S1 down to the coccyx for sacral chordoma.\par  Difficulty modifier, Neurolysis of left S1 root greater than 10 cm and neurolysis of right L5 root greater than 10 cm.\par  Stabilization of lumbosacral junction with arthrodesis using allograft bone from L4 to L5 to S1 to the pelvis.\par  Placement of bilateral pedicle screws at L4, L5 and left S1.Placement of bilateral pelvic screws, two on each side.Vertebroplasty, two fenestrated screws at L4 and L5. Complex plastic surgery closure with pulling through a VRAM flap by Dr. Peraza. Use of stereotactic navigation to plan tumor resection. Right internal hemipelvectomy with resection of medial ilium with the specimen. L5 and S1 laminectomies.\par \par Pt had a subsequent hospital admission on 8/2 with ileus and wound wash out done. Pt has RUE PICC and is getting IV antibiotics at home with home wound care following patient. He is ambulating with walker assistance. RLE ankle flexor 3/5 LLE 5/5. He denies fever. He states that he still has a tony catheter intact and will follow up with urology next week.\par \par Pt presents for a follow up visit today to discuss the results of recent CT and MRI of lumbar/pelvis.  \par \par Consulted with Dr. Moore.  There is a little fluid by the left SI joint but doesn't look infected and is smaller compared with 3/9/22\par \par

## 2022-04-07 NOTE — PHYSICAL EXAM
[General Appearance - Alert] : alert [General Appearance - In No Acute Distress] : in no acute distress [General Appearance - Well Nourished] : well nourished [General Appearance - Well-Appearing] : healthy appearing [Oriented To Time, Place, And Person] : oriented to person, place, and time [Affect] : the affect was normal [Memory Recent] : recent memory was not impaired [Person] : oriented to person [Place] : oriented to place [Time] : oriented to time [Sclera] : the sclera and conjunctiva were normal [PERRL With Normal Accommodation] : pupils were equal in size, round, reactive to light, with normal accommodation [Extraocular Movements] : extraocular movements were intact [Outer Ear] : the ears and nose were normal in appearance [Neck Appearance] : the appearance of the neck was normal [Respiration, Rhythm And Depth] : normal respiratory rhythm and effort [Exaggerated Use Of Accessory Muscles For Inspiration] : no accessory muscle use [Heart Rate And Rhythm] : heart rate was normal and rhythm regular [Involuntary Movements] : no involuntary movements were seen [Skin Color & Pigmentation] : normal skin color and pigmentation [] : no rash [FreeTextEntry8] : ambulates with cane assistance [FreeTextEntry1] : ambulates with cane assistance

## 2022-04-07 NOTE — ASSESSMENT
[FreeTextEntry1] : IMPRESSION:\par 1. Pt is recovering gradually s/p 7/1/21 stage 1 and 7/8/21 stage 2: total en bloc sacrectomy from S1 down to the coccyx for sacral chordoma.\par 2. Pt is in a cheerful mood today and expressed that he would prefer to change a colostomy bag that he is able to see and manage instead of changing diapers and dealing with fecal incontinence.\par \par \par \par PLAN:\par 1. MRI LUMBAR SPINE AND PELVIS W/WO - JUNE 2022.\par 2. CT CHEST WO CONTRAST JUNE 2022\par 3. X-RAY LUMBAR SPINE AP/LAT 6/2022

## 2022-05-09 ENCOUNTER — APPOINTMENT (OUTPATIENT)
Dept: ENDOCRINOLOGY | Facility: CLINIC | Age: 62
End: 2022-05-09
Payer: MEDICAID

## 2022-05-09 VITALS
BODY MASS INDEX: 29.02 KG/M2 | HEIGHT: 64 IN | TEMPERATURE: 97.5 F | SYSTOLIC BLOOD PRESSURE: 130 MMHG | WEIGHT: 170 LBS | DIASTOLIC BLOOD PRESSURE: 72 MMHG | OXYGEN SATURATION: 99 % | HEART RATE: 71 BPM

## 2022-05-09 PROCEDURE — 99214 OFFICE O/P EST MOD 30 MIN: CPT

## 2022-05-09 NOTE — PHYSICAL EXAM
[Alert] : alert [Healthy Appearance] : healthy appearance [No Acute Distress] : no acute distress [Normal Sclera/Conjunctiva] : normal sclera/conjunctiva [No Proptosis] : no proptosis [No Neck Mass] : no neck mass was observed [No LAD] : no lymphadenopathy [Well Healed Scar] : well healed scar [No Respiratory Distress] : no respiratory distress [Clear to Auscultation] : lungs were clear to auscultation bilaterally [Normal S1, S2] : normal S1 and S2 [Regular Rhythm] : with a regular rhythm [Normal Bowel Sounds] : normal bowel sounds [Soft] : abdomen soft [Kyphosis] : no kyphosis present [No Involuntary Movements] : no involuntary movements were seen [Normal Reflexes] : deep tendon reflexes were 2+ and symmetric [No Tremors] : no tremors [Normal Affect] : the affect was normal [Normal Mood] : the mood was normal [de-identified] : right le tr edema

## 2022-05-09 NOTE — HISTORY OF PRESENT ILLNESS
[FreeTextEntry1] : cc: thyroid cancer\par \par 62 year old man with papillary thyroid cancer (well differentiated, 1.6 cm, isthmus, with positive margins) post thyroidectomy in 2010 and radioactive iodine in 2011, had mild increase in Tg 11/2020, with neg thyrogen stimulated Dominguez scan and BARBIE on ultrasound. Was subsequently diagnosed with sacral mass, chordoma now post  sacrectomy with L4-pelvis fusion 7/2021, with complicated post-op course.  Walking with cane, has colostomy, self catheterizes 4 times daily \par \par Off steroids for hemolytic anemia, which has resolved.\par \par \par Thyroid cancer:  Last tg undetectable,  TSH was suppressed and levothyroxine dose was decreased about 2 months ago.  No palpitations or tremor   He has not noted any change in his neck; reports no dyspnea or dysphagia.  Taking levothyroxine 100 mcirograms daily separate from food and vitamins.  \par \par Prediabetes: - Has regained some weight since his surgery.  Walking daily.  Wants to start cycling (trying to get adult tricycle)\par \par Osteopenia:  No recent falls or fractures, taking supplemental vitamins (calcium 600 and vitamin D 2000 and mvi)\par \par \par Also with hypogonadism, has declined testosterone replacement \par \par \par \par \par \par \par \par

## 2022-05-09 NOTE — ASSESSMENT
[FreeTextEntry1] : 62 year old man with papillary thyroid cancer (well differentiated, 1.6 cm, isthmus, with positive margins) post thyroidectomy in 2010 and radioactive iodine in 2011, with excellent response.  TSH dose adjusted 2 months ago\par    - Biochemically euthyroid on blood tests last week.  Continue current dose of levothyroxine\par  - Check Tg\par \par \par  Osteopenia - \par   - Bone density stable on last DXA, repeat in ~ 1 year.  \par  - Continue calcium and vitamin D supplementation.\par \par \par Prediabetes - Encouraged pt to continue with exercise.  Check hba1c\par \par Hypogonadism - does not want testosterone tx at this time\par \par f/u  1 year

## 2022-05-13 LAB
ESTIMATED AVERAGE GLUCOSE: 120 MG/DL
HBA1C MFR BLD HPLC: 5.8 %
THYROGLOB AB SERPL-ACNC: <20 IU/ML
THYROGLOB SERPL-MCNC: <0.2 NG/ML

## 2022-06-14 ENCOUNTER — RESULT REVIEW (OUTPATIENT)
Age: 62
End: 2022-06-14

## 2022-06-17 ENCOUNTER — APPOINTMENT (OUTPATIENT)
Dept: RADIOLOGY | Facility: CLINIC | Age: 62
End: 2022-06-17
Payer: MEDICAID

## 2022-06-17 ENCOUNTER — RESULT REVIEW (OUTPATIENT)
Age: 62
End: 2022-06-17

## 2022-06-17 ENCOUNTER — OUTPATIENT (OUTPATIENT)
Dept: OUTPATIENT SERVICES | Facility: HOSPITAL | Age: 62
LOS: 1 days | End: 2022-06-17
Payer: COMMERCIAL

## 2022-06-17 DIAGNOSIS — Z98.890 OTHER SPECIFIED POSTPROCEDURAL STATES: Chronic | ICD-10-CM

## 2022-06-17 DIAGNOSIS — Z00.8 ENCOUNTER FOR OTHER GENERAL EXAMINATION: ICD-10-CM

## 2022-06-17 DIAGNOSIS — E89.0 POSTPROCEDURAL HYPOTHYROIDISM: Chronic | ICD-10-CM

## 2022-06-17 DIAGNOSIS — C41.4 MALIGNANT NEOPLASM OF PELVIC BONES, SACRUM AND COCCYX: ICD-10-CM

## 2022-06-17 PROCEDURE — 72100 X-RAY EXAM L-S SPINE 2/3 VWS: CPT

## 2022-06-17 PROCEDURE — 72100 X-RAY EXAM L-S SPINE 2/3 VWS: CPT | Mod: 26

## 2022-06-22 ENCOUNTER — OUTPATIENT (OUTPATIENT)
Dept: OUTPATIENT SERVICES | Facility: HOSPITAL | Age: 62
LOS: 1 days | Discharge: HOME | End: 2022-06-22

## 2022-06-22 DIAGNOSIS — D64.9 ANEMIA, UNSPECIFIED: ICD-10-CM

## 2022-06-22 DIAGNOSIS — Z98.890 OTHER SPECIFIED POSTPROCEDURAL STATES: Chronic | ICD-10-CM

## 2022-06-22 DIAGNOSIS — E89.0 POSTPROCEDURAL HYPOTHYROIDISM: Chronic | ICD-10-CM

## 2022-06-23 ENCOUNTER — RESULT REVIEW (OUTPATIENT)
Age: 62
End: 2022-06-23

## 2022-06-23 ENCOUNTER — APPOINTMENT (OUTPATIENT)
Dept: HEMATOLOGY ONCOLOGY | Facility: CLINIC | Age: 62
End: 2022-06-23
Payer: MEDICAID

## 2022-06-23 VITALS
DIASTOLIC BLOOD PRESSURE: 83 MMHG | HEIGHT: 64 IN | SYSTOLIC BLOOD PRESSURE: 146 MMHG | HEART RATE: 70 BPM | OXYGEN SATURATION: 96 %

## 2022-06-23 LAB
BASOPHILS # BLD AUTO: 0.1 K/UL — SIGNIFICANT CHANGE UP (ref 0–0.2)
BASOPHILS NFR BLD AUTO: 1.2 % — SIGNIFICANT CHANGE UP (ref 0–2)
EOSINOPHIL # BLD AUTO: 0.4 K/UL — SIGNIFICANT CHANGE UP (ref 0–0.5)
EOSINOPHIL NFR BLD AUTO: 4.1 % — SIGNIFICANT CHANGE UP (ref 0–6)
HCT VFR BLD CALC: 44.7 % — SIGNIFICANT CHANGE UP (ref 39–50)
HGB BLD-MCNC: 14.2 G/DL — SIGNIFICANT CHANGE UP (ref 13–17)
LYMPHOCYTES # BLD AUTO: 1.7 K/UL — SIGNIFICANT CHANGE UP (ref 1–3.3)
LYMPHOCYTES # BLD AUTO: 19.9 % — SIGNIFICANT CHANGE UP (ref 13–44)
MCHC RBC-ENTMCNC: 26.2 PG — LOW (ref 27–34)
MCHC RBC-ENTMCNC: 31.7 G/DL — LOW (ref 32–36)
MCV RBC AUTO: 82.6 FL — SIGNIFICANT CHANGE UP (ref 80–100)
MONOCYTES # BLD AUTO: 0.8 K/UL — SIGNIFICANT CHANGE UP (ref 0–0.9)
MONOCYTES NFR BLD AUTO: 8.9 % — SIGNIFICANT CHANGE UP (ref 2–14)
NEUTROPHILS # BLD AUTO: 5.7 K/UL — SIGNIFICANT CHANGE UP (ref 1.8–7.4)
NEUTROPHILS NFR BLD AUTO: 65.9 % — SIGNIFICANT CHANGE UP (ref 43–77)
PLATELET # BLD AUTO: 339 K/UL — SIGNIFICANT CHANGE UP (ref 150–400)
RBC # BLD: 5.4 M/UL — SIGNIFICANT CHANGE UP (ref 4.2–5.8)
RBC # FLD: 14.3 % — SIGNIFICANT CHANGE UP (ref 10.3–14.5)
WBC # BLD: 8.6 K/UL — SIGNIFICANT CHANGE UP (ref 3.8–10.5)
WBC # FLD AUTO: 8.6 K/UL — SIGNIFICANT CHANGE UP (ref 3.8–10.5)

## 2022-06-23 PROCEDURE — 99214 OFFICE O/P EST MOD 30 MIN: CPT

## 2022-06-24 ENCOUNTER — OUTPATIENT (OUTPATIENT)
Dept: OUTPATIENT SERVICES | Facility: HOSPITAL | Age: 62
LOS: 1 days | End: 2022-06-24
Payer: COMMERCIAL

## 2022-06-24 ENCOUNTER — APPOINTMENT (OUTPATIENT)
Dept: MRI IMAGING | Facility: CLINIC | Age: 62
End: 2022-06-24

## 2022-06-24 DIAGNOSIS — Z98.890 OTHER SPECIFIED POSTPROCEDURAL STATES: Chronic | ICD-10-CM

## 2022-06-24 DIAGNOSIS — C41.4 MALIGNANT NEOPLASM OF PELVIC BONES, SACRUM AND COCCYX: ICD-10-CM

## 2022-06-24 DIAGNOSIS — E89.0 POSTPROCEDURAL HYPOTHYROIDISM: Chronic | ICD-10-CM

## 2022-06-24 LAB
ALBUMIN SERPL ELPH-MCNC: 4.9 G/DL
ALP BLD-CCNC: 67 U/L
ALT SERPL-CCNC: 17 U/L
ANION GAP SERPL CALC-SCNC: 13 MMOL/L
AST SERPL-CCNC: 26 U/L
BILIRUB SERPL-MCNC: 0.4 MG/DL
BUN SERPL-MCNC: 28 MG/DL
CALCIUM SERPL-MCNC: 9.9 MG/DL
CHLORIDE SERPL-SCNC: 104 MMOL/L
CO2 SERPL-SCNC: 24 MMOL/L
CREAT SERPL-MCNC: 1.08 MG/DL
EGFR: 78 ML/MIN/1.73M2
GLUCOSE SERPL-MCNC: 96 MG/DL
POTASSIUM SERPL-SCNC: 4.4 MMOL/L
PROT SERPL-MCNC: 7.6 G/DL
SODIUM SERPL-SCNC: 142 MMOL/L

## 2022-06-24 PROCEDURE — 72158 MRI LUMBAR SPINE W/O & W/DYE: CPT | Mod: 26

## 2022-06-24 PROCEDURE — A9585: CPT

## 2022-06-24 PROCEDURE — 72158 MRI LUMBAR SPINE W/O & W/DYE: CPT

## 2022-06-24 PROCEDURE — 72197 MRI PELVIS W/O & W/DYE: CPT | Mod: 26

## 2022-06-24 PROCEDURE — 72197 MRI PELVIS W/O & W/DYE: CPT

## 2022-07-01 ENCOUNTER — OUTPATIENT (OUTPATIENT)
Dept: OUTPATIENT SERVICES | Facility: HOSPITAL | Age: 62
LOS: 1 days | End: 2022-07-01
Payer: COMMERCIAL

## 2022-07-01 ENCOUNTER — APPOINTMENT (OUTPATIENT)
Dept: CT IMAGING | Facility: CLINIC | Age: 62
End: 2022-07-01

## 2022-07-01 ENCOUNTER — RESULT REVIEW (OUTPATIENT)
Age: 62
End: 2022-07-01

## 2022-07-01 DIAGNOSIS — Z00.8 ENCOUNTER FOR OTHER GENERAL EXAMINATION: ICD-10-CM

## 2022-07-01 DIAGNOSIS — E89.0 POSTPROCEDURAL HYPOTHYROIDISM: Chronic | ICD-10-CM

## 2022-07-01 DIAGNOSIS — C41.4 MALIGNANT NEOPLASM OF PELVIC BONES, SACRUM AND COCCYX: ICD-10-CM

## 2022-07-01 DIAGNOSIS — Z98.890 OTHER SPECIFIED POSTPROCEDURAL STATES: Chronic | ICD-10-CM

## 2022-07-01 PROCEDURE — 71250 CT THORAX DX C-: CPT

## 2022-07-01 PROCEDURE — 71250 CT THORAX DX C-: CPT | Mod: 26

## 2022-07-07 ENCOUNTER — APPOINTMENT (OUTPATIENT)
Dept: NEUROSURGERY | Facility: CLINIC | Age: 62
End: 2022-07-07

## 2022-07-07 VITALS
WEIGHT: 170 LBS | DIASTOLIC BLOOD PRESSURE: 78 MMHG | HEIGHT: 63 IN | OXYGEN SATURATION: 98 % | HEART RATE: 75 BPM | SYSTOLIC BLOOD PRESSURE: 132 MMHG | BODY MASS INDEX: 30.12 KG/M2 | TEMPERATURE: 97 F

## 2022-07-07 PROCEDURE — 99214 OFFICE O/P EST MOD 30 MIN: CPT

## 2022-07-07 NOTE — ASSESSMENT
[FreeTextEntry1] : IMPRESSION:\par 1. Pt is recovering gradually s/p 7/1/21 stage 1 and 7/8/21 stage 2: total en bloc sacrectomy from S1 down to the coccyx for sacral chordoma.\par 2. Imagines reviewed: No tumor recurrence\par 3. He is with low back pain with leg weakness\par \par Plan:\par - MRI lumbar and pelvis in 3 months\par - May do OV or TEB after MRI's \par - He will benefit from further physical therapy to improve weakness in his legs and address his low back pain \par \par I,  Dr. Lucho Mcallister  evaluated the patient with the nurse practitioner Jose Brito and established the plan of care. I personally discussed this patient with the nurse practitioner at the time of the visit. I agreed with the assessment and plan as written, unless noted below.\par

## 2022-07-07 NOTE — HISTORY OF PRESENT ILLNESS
[FreeTextEntry1] : Mr. Jackson is a pleasant gentleman with a very large sacral chordoma\par \par On 7/1/21 he underwent stage 1 which again was anterior tumor dissection and ligation of vessels.\par \par On 7/8/21 he underwent stage 2: total en bloc sacrectomy from S1 down to the coccyx for sacral chordoma.\par  Difficulty modifier, Neurolysis of left S1 root greater than 10 cm and neurolysis of right L5 root greater than 10 cm.\par  Stabilization of lumbosacral junction with arthrodesis using allograft bone from L4 to L5 to S1 to the pelvis.\par  Placement of bilateral pedicle screws at L4, L5 and left S1.Placement of bilateral pelvic screws, two on each side.Vertebroplasty, two fenestrated screws at L4 and L5. Complex plastic surgery closure with pulling through a VRAM flap by Dr. Peraza. Use of stereotactic navigation to plan tumor resection. Right internal hemipelvectomy with resection of medial ilium with the specimen. L5 and S1 laminectomies.\par \par Pt had a subsequent hospital admission on 8/2 with ileus and wound wash out done. Pt has RUE PICC and is getting IV antibiotics at home with home wound care following patient. He is ambulating with walker assistance. RLE ankle flexor 3/5 LLE 5/5. He denies fever. He states that he still has a tony catheter intact and will follow up with urology next week.\par \par He was last seen on 3/31/22 and was recovering well from the surgery. He returns today to discuss recent imaging results. MRI lumbar spine done on 6/14/22 showed resolution of previously seen signal abnormality to the left SI joint with residual enhancement edema; unchanged marrow edema and no evidence of tumor recurrence. Xray showed no hardware difficulty. \par \par Today, he reports feeling well. Pain has improved but still with weakness in his legs and numbness in his right leg. Mostly is at his ankle but at times, it radiates up to his knee. \par \par

## 2022-08-10 NOTE — ASSESSMENT
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 CT imaging at that time did not reveal lymphadenopathy concerning for hematologic malignancy. The patient denies a history of autoimmune disease. \par The patient was started on steroid taper that he recently completed with resolution of his anemia. \par \par \par # DVT \par - distal, but unprovoked and a 2nd episode of VTE\par - continue apixaban for now, BID, likely indefinitely as the patient is at high risk for recurrent DVT. \par - Will fax records to [phone] Dr. Octaviano Hernandez \par \par # Chordoma\par - following with neurosurgery \par \par # papillary thyroid cancer\par - following with Dr. Neely \par \par # Factor V heterozygosity (see labs 7/13/22)

## 2022-08-10 NOTE — HISTORY OF PRESENT ILLNESS
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 Imaging at that time did not show lymphadenopathy. The patient was treated with steroids and IVIG. He was discharged on a steroid taper, which he completed on 11/29/21.  \par \par The patient was taken off of eliquis in 5/2022. He was found to have a right LE DVT on 5/2022, which was found when he developed acute leg pain. The patient was restarted on apixaban on 6/8/22 [de-identified] : He denies worsening SOB, chest pain, nausea, vomiting, abdominal pain. He has persistent lower right extremity pain at the calf/behind the knee, but improved since starting on anticoagulation. He denies bleeding including hematochezia, melena, hematemesis, hemoptysis.

## 2022-08-10 NOTE — PHYSICAL EXAM
[Thin] : thin [Normal] : normal appearance, no rash, nodules, vesicles, ulcers, erythema [de-identified] : Patient with ostomy; no blood. [de-identified] : has tony catheter draining yellow urine.

## 2022-08-15 ENCOUNTER — RX RENEWAL (OUTPATIENT)
Age: 62
End: 2022-08-15

## 2022-08-22 ENCOUNTER — NON-APPOINTMENT (OUTPATIENT)
Age: 62
End: 2022-08-22

## 2022-09-28 NOTE — PROGRESS NOTE ADULT - SUBJECTIVE AND OBJECTIVE BOX
Patient is a 61y old  Male who presents with a chief complaint of Debility due to sacral mass removal (sacrectomy) (03 Aug 2021 11:43)       SUBJECTIVE: Patient was seen during therapy session at the gym.  he c/o feeling bloated and having lots of gas from stoma. he still feels winded with activity but slowly improving. we discussed getting vascular/hematology on board regarding changing his PE treatment from INJ to PO. He agrees to plan.  He remains on contact isolation   He denies HA/CP/palpitations, fever, chills  + Barnett  + colostomy  + SEDRICK drain    PHYSICAL EXAM  Constitutional - NAD, Comfortable  Chest - breathing comfortably  Abdomen - has colostomy+ Abd incision has healed well, + SEDRICK drain  Extremities - No C/C/E, No calf tenderness     Vital Signs Last 24 Hrs  T(C): 36.9 (09 Aug 2021 10:01), Max: 37 (08 Aug 2021 19:45)  T(F): 98.4 (09 Aug 2021 10:01), Max: 98.6 (08 Aug 2021 19:45)  HR: 90 (09 Aug 2021 10:01) (80 - 92)  BP: 119/71 (09 Aug 2021 10:01) (119/71 - 135/79)  BP(mean): --  RR: 17 (09 Aug 2021 10:01) (17 - 18)  SpO2: 99% (09 Aug 2021 10:01) (99% - 100%)      MEDICATIONS  (STANDING):  calcium carbonate 1250 mG  + Vitamin D (OsCal 500 + D) 1 Tablet(s) Oral daily  chlorhexidine 4% Liquid 1 Application(s) Topical two times a day  enoxaparin Injectable 70 milliGRAM(s) SubCutaneous two times a day  gabapentin 200 milliGRAM(s) Oral every 8 hours  levothyroxine 137 MICROGram(s) Oral daily  metroNIDAZOLE    Tablet 500 milliGRAM(s) Oral every 8 hours  pantoprazole    Tablet 40 milliGRAM(s) Oral before breakfast  senna 2 Tablet(s) Oral at bedtime  vancomycin  IVPB 1250 milliGRAM(s) IV Intermittent every 12 hours    MEDICATIONS  (PRN):  acetaminophen   Tablet .. 650 milliGRAM(s) Oral every 6 hours PRN Temp greater or equal to 38C (100.4F), Mild Pain (1 - 3)  melatonin 3 milliGRAM(s) Oral at bedtime PRN Insomnia  polyethylene glycol 3350 17 Gram(s) Oral daily PRN Constipation  simethicone 80 milliGRAM(s) Chew two times a day PRN Upset Stomach  traMADol 25 milliGRAM(s) Oral every 6 hours PRN Moderate Pain (4 - 6)  traMADol 50 milliGRAM(s) Oral every 6 hours PRN Severe Pain (7 - 10)       Size Of Lesion In Cm (Optional): 2 X Size Of Lesion In Cm (Optional): 0 Detail Level: Detailed

## 2022-10-07 ENCOUNTER — APPOINTMENT (OUTPATIENT)
Dept: MRI IMAGING | Facility: CLINIC | Age: 62
End: 2022-10-07

## 2022-10-07 ENCOUNTER — RESULT REVIEW (OUTPATIENT)
Age: 62
End: 2022-10-07

## 2022-10-07 ENCOUNTER — OUTPATIENT (OUTPATIENT)
Dept: OUTPATIENT SERVICES | Facility: HOSPITAL | Age: 62
LOS: 1 days | End: 2022-10-07
Payer: COMMERCIAL

## 2022-10-07 DIAGNOSIS — Z98.890 OTHER SPECIFIED POSTPROCEDURAL STATES: Chronic | ICD-10-CM

## 2022-10-07 DIAGNOSIS — E89.0 POSTPROCEDURAL HYPOTHYROIDISM: Chronic | ICD-10-CM

## 2022-10-07 DIAGNOSIS — Z00.8 ENCOUNTER FOR OTHER GENERAL EXAMINATION: ICD-10-CM

## 2022-10-07 PROCEDURE — A9585: CPT

## 2022-10-07 PROCEDURE — 72158 MRI LUMBAR SPINE W/O & W/DYE: CPT | Mod: 26

## 2022-10-07 PROCEDURE — 72197 MRI PELVIS W/O & W/DYE: CPT

## 2022-10-07 PROCEDURE — 72197 MRI PELVIS W/O & W/DYE: CPT | Mod: 26

## 2022-10-07 PROCEDURE — 72158 MRI LUMBAR SPINE W/O & W/DYE: CPT

## 2022-10-27 ENCOUNTER — APPOINTMENT (OUTPATIENT)
Dept: NEUROSURGERY | Facility: CLINIC | Age: 62
End: 2022-10-27

## 2022-10-27 VITALS
BODY MASS INDEX: 30.12 KG/M2 | WEIGHT: 170 LBS | TEMPERATURE: 97.3 F | OXYGEN SATURATION: 96 % | DIASTOLIC BLOOD PRESSURE: 83 MMHG | HEIGHT: 63 IN | SYSTOLIC BLOOD PRESSURE: 143 MMHG | HEART RATE: 71 BPM

## 2022-10-27 DIAGNOSIS — C41.4 MALIGNANT NEOPLASM OF PELVIC BONES, SACRUM AND COCCYX: ICD-10-CM

## 2022-10-27 PROCEDURE — 99213 OFFICE O/P EST LOW 20 MIN: CPT

## 2022-10-28 PROBLEM — C41.4 CHORDOMA OF SACRUM: Status: ACTIVE | Noted: 2021-08-11

## 2022-10-28 NOTE — REVIEW OF SYSTEMS
[As Noted in HPI] : as noted in HPI
Patient in ED with mother secondary to concern for diarrhea with intermittent abd cramping over the past several days.  Patient non toxic in ED, benign physical exam - abd without any reproducible pain, soft.  Labs reviewed.  UA and blood work without signs of dehydration or infection.  Patient and mother aware of all results and I did speak with them regarding CT imaging of abd/pel at this time.  Through shared decision making, we have determined patient will be discharged without any imaging at this time.  They are given strict follow up/return to ED precautions and mother is aware I am unable to fully evaluate for colitis and other causes of symptoms at this time.  Given patient's benign exam and work up, he is thought to be stable for prompt outpatient pediatrician follow up in 1-2 days for re evaluation and will return immediately to ED should symptoms worsen or if he has any concern prior to the recommended follow up.  Patient and mother aware of plan and verbalize their understanding.  Will discharge home at this  time.

## 2022-10-31 NOTE — HISTORY OF PRESENT ILLNESS
[FreeTextEntry1] : Mr. Jackson is a pleasant gentleman with a very large sacral tumor.\par \par On 7/1/21 he underwent stage 1 which again was anterior tumor dissection and ligation of vessels.\par \par On 7/8/21 he underwent stage 2: total en bloc sacrectomy from S1 down to the coccyx for sacral chordoma.\par  Difficulty modifier, Neurolysis of left S1 root greater than 10 cm and neurolysis of right L5 root greater than 10 cm.\par  Stabilization of lumbosacral junction with arthrodesis using allograft bone from L4 to L5 to S1 to the pelvis.\par  Placement of bilateral pedicle screws at L4, L5 and left S1.Placement of bilateral pelvic screws, two on each side.Vertebroplasty, two fenestrated screws at L4 and L5. Complex plastic surgery closure with pulling through a VRAM flap by Dr. Peraza. Use of stereotactic navigation to plan tumor resection. Right internal hemipelvectomy with resection of medial ilium with the specimen. L5 and S1 laminectomies.\par \par Pt presents for a follow up visit today to discuss the results of  MRI of lumbar/pelvis.  \par \par He comes in with his father. He is ambulating with a cane. His balance is improving.\par

## 2022-10-31 NOTE — ASSESSMENT
[FreeTextEntry1] : IMPRESSION:\par 1. Pt is recovering gradually s/p 7/1/21 stage 1 and 7/8/21 stage 2: total en bloc sacrectomy from S1 down to the coccyx for sacral chordoma.\par 2. Imagines reviewed: No tumor recurrence\par \par Plan:\par - MRI lumbar and pelvis in 6 months\par - May  [facility] after MRI's completed.\par - He will benefit from further physical therapy to improve weakness in his legs and address his low back pain \par \par \par

## 2022-11-07 ENCOUNTER — APPOINTMENT (OUTPATIENT)
Dept: ENDOCRINOLOGY | Facility: CLINIC | Age: 62
End: 2022-11-07

## 2022-11-07 VITALS — TEMPERATURE: 97.2 F | WEIGHT: 175 LBS | BODY MASS INDEX: 30.62 KG/M2 | HEIGHT: 63.5 IN

## 2022-11-07 VITALS — DIASTOLIC BLOOD PRESSURE: 70 MMHG | OXYGEN SATURATION: 98 % | HEART RATE: 74 BPM | SYSTOLIC BLOOD PRESSURE: 128 MMHG

## 2022-11-07 DIAGNOSIS — M85.89 OTHER SPECIFIED DISORDERS OF BONE DENSITY AND STRUCTURE, MULTIPLE SITES: ICD-10-CM

## 2022-11-07 DIAGNOSIS — Z87.39 PERSONAL HISTORY OF OTHER DISEASES OF THE MUSCULOSKELETAL SYSTEM AND CONNECTIVE TISSUE: ICD-10-CM

## 2022-11-07 PROCEDURE — 77080 DXA BONE DENSITY AXIAL: CPT

## 2022-11-07 PROCEDURE — ZZZZZ: CPT

## 2022-11-07 PROCEDURE — 99214 OFFICE O/P EST MOD 30 MIN: CPT | Mod: 25

## 2022-11-07 NOTE — PHYSICAL EXAM
[Alert] : alert [Healthy Appearance] : healthy appearance [No Acute Distress] : no acute distress [Normal Sclera/Conjunctiva] : normal sclera/conjunctiva [No Proptosis] : no proptosis [No Neck Mass] : no neck mass was observed [No LAD] : no lymphadenopathy [Well Healed Scar] : well healed scar [No Respiratory Distress] : no respiratory distress [Clear to Auscultation] : lungs were clear to auscultation bilaterally [Normal S1, S2] : normal S1 and S2 [Regular Rhythm] : with a regular rhythm [No Edema] : no peripheral edema [Normal Bowel Sounds] : normal bowel sounds [Not Tender] : non-tender [Soft] : abdomen soft [No Spinal Tenderness] : no spinal tenderness [No Involuntary Movements] : no involuntary movements were seen [Normal Strength/Tone] : muscle strength and tone were normal [Normal Reflexes] : deep tendon reflexes were 2+ and symmetric [No Tremors] : no tremors [Normal Affect] : the affect was normal [Normal Mood] : the mood was normal [Kyphosis] : no kyphosis present

## 2022-11-07 NOTE — DATA REVIEWED
[FreeTextEntry1] : 8/2022\par cr 1.19\par A1c 5.5%\par \par \par 5/4/22\par TSH 0.51\par FT4 1.6\par \par

## 2022-11-07 NOTE — ASSESSMENT
[FreeTextEntry1] : 62 year old man with papillary thyroid cancer (well differentiated, 1.6 cm, isthmus, with positive margins) post thyroidectomy in 2010 and radioactive iodine in 2011, with excellent response.  \par  - CHeck TFTs and tg, adjust levothyroxine dose as needed.  Clinically euthyroid\par \par \par  Osteoporosis - \par   - DXA performed today, pt with significant decrease in bone density in hip, now with osteoporosis. Recommend medical therapy.  Discussed risks and benefits of alendronate, zoledronic acid, denosumab.  Will proceed with alendronate.\par  - repeat dxa in one year\par  - Continue calcium and vitamin D supplementation.\par  - send results to Dr Octaviano Hernandez\par \par \par Prediabetes - Encouraged pt to continue with exercise.  \par \par Hypogonadism - does not want testosterone tx at this time\par \par f/u  6 months

## 2022-11-07 NOTE — HISTORY OF PRESENT ILLNESS
[FreeTextEntry1] : cc: thyroid cancer\par \par 62 year old man with papillary thyroid cancer (well differentiated, 1.6 cm, isthmus, with positive margins) post thyroidectomy in 2010 and radioactive iodine in 2011, had mild increase in Tg 11/2020, with negative thyrogen stimulated Dominguez scan and BARBIE on ultrasound. Was subsequently diagnosed with sacral mass, chordoma now post  sacrectomy with L4-pelvis fusion 7/2021, with complicated post-op course.  Walking with cane, has colostomy, self catheterizes 4 times daily \par \par Thyroid cancer:  Last tg undetectable,   He reports no change in his neck; no dyspnea or dysphagia.  Taking levothyroxine 100 micrograms daily separate from food and vitamins.  \par \par Prediabetes: -Walking 2x daily.  And cycling\par \par Osteopenia:  No recent falls or fractures, taking calcium 600 and vitamin D 2000 and mvi\par \par \par Also with hypogonadism, has declined testosterone replacement \par \par \par \par \par \par \par \par

## 2022-11-09 LAB
T4 FREE SERPL-MCNC: 1.5 NG/DL
THYROGLOB AB SERPL-ACNC: <20 IU/ML
THYROGLOB SERPL-MCNC: <0.2 NG/ML
TSH SERPL-ACNC: 0.69 UIU/ML

## 2022-11-29 ENCOUNTER — NON-APPOINTMENT (OUTPATIENT)
Age: 62
End: 2022-11-29

## 2022-12-19 NOTE — CHART NOTE - NSCHARTNOTEFT_GEN_A_CORE
Antibody Interpretation: Direct Antiglobulin Test (DONNA): positive;  Eluate: panreactive    Patient is a 61 year old male h/o cordoma s/p resection in July with chronic indwelling tony last changed 9/20 presents with lack of output that he noticed when he woke up this am with an empty tony bag.  Blood sample received demonstrates that the patiend is blood type AB Rh(D) positive. Direct Antiglobulin Test (DONNA) is positive for anti-IgG. Eluate is panreactive, consistent with patient's history of warm autoantibody. Recommended artificial tears to use: 1 drop 4x a day in both eyes.

## 2023-02-19 NOTE — H&P PST ADULT - CONSTITUTIONAL
The right coronary artery was selectively engaged and injected. Multiple views of the injected vessel were taken. detailed exam

## 2023-03-21 ENCOUNTER — RESULT REVIEW (OUTPATIENT)
Age: 63
End: 2023-03-21

## 2023-04-01 ENCOUNTER — APPOINTMENT (OUTPATIENT)
Dept: MRI IMAGING | Facility: CLINIC | Age: 63
End: 2023-04-01

## 2023-04-10 ENCOUNTER — APPOINTMENT (OUTPATIENT)
Dept: MRI IMAGING | Facility: CLINIC | Age: 63
End: 2023-04-10
Payer: MEDICAID

## 2023-04-10 PROCEDURE — A9585: CPT

## 2023-04-10 PROCEDURE — 72197 MRI PELVIS W/O & W/DYE: CPT

## 2023-04-10 PROCEDURE — 72158 MRI LUMBAR SPINE W/O & W/DYE: CPT

## 2023-05-04 ENCOUNTER — APPOINTMENT (OUTPATIENT)
Dept: NEUROSURGERY | Facility: CLINIC | Age: 63
End: 2023-05-04
Payer: MEDICAID

## 2023-05-04 VITALS
HEART RATE: 74 BPM | BODY MASS INDEX: 30.62 KG/M2 | SYSTOLIC BLOOD PRESSURE: 140 MMHG | OXYGEN SATURATION: 95 % | HEIGHT: 63.5 IN | WEIGHT: 175 LBS | DIASTOLIC BLOOD PRESSURE: 80 MMHG

## 2023-05-04 DIAGNOSIS — M53.3 SACROCOCCYGEAL DISORDERS, NOT ELSEWHERE CLASSIFIED: ICD-10-CM

## 2023-05-04 PROCEDURE — 99213 OFFICE O/P EST LOW 20 MIN: CPT

## 2023-05-05 NOTE — REASON FOR VISIT
[Follow-Up: _____] : a [unfilled] follow-up visit [Parent] : parent [FreeTextEntry1] : Very pleasant 63 year old male who is approximately 2 years s/p 2 stage tumor resection (sacral tumor) followed by a washout 4 weeks after surgery for incisional wound.\par \par He has had new imaging done and presents today to review this imaging.\par \par Patient comes in today accompanied by his father. He is walking with a cane and progressing well. He feels that physical therapy is beneficial to his healing.\par \par Patient continues to self cath he states that he is still trying to work out a system that works well for him. \par He reports that he does have occasional stress overflow. \par \par He remains with a colostomy and is doing well with that.

## 2023-05-05 NOTE — HISTORY OF PRESENT ILLNESS
[FreeTextEntry1] : 10/27/2022:\par \par Mr. Jackson is a pleasant 63 year old gentleman with a very large sacral tumor.\par \par On 7/1/21 he underwent stage 1 which again was anterior tumor dissection and ligation of vessels.\par \par On 7/8/21 he underwent stage 2: total en bloc sacrectomy from S1 down to the coccyx for sacral chordoma.\par  Difficulty modifier, Neurolysis of left S1 root greater than 10 cm and neurolysis of right L5 root greater than 10 cm.\par  Stabilization of lumbosacral junction with arthrodesis using allograft bone from L4 to L5 to S1 to the pelvis.\par  Placement of bilateral pedicle screws at L4, L5 and left S1.Placement of bilateral pelvic screws, two on each side.Vertebroplasty, two fenestrated screws at L4 and L5. Complex plastic surgery closure with pulling through a VRAM flap by Dr. Peraza. Use of stereotactic navigation to plan tumor resection. Right internal hemipelvectomy with resection of medial ilium with the specimen. L5 and S1 laminectomies.\par \par Pt presents for a follow up visit today to discuss the results of  MRI of lumbar/pelvis.  \par \par He comes in with his father. He is ambulating with a cane. His balance is improving.\par

## 2023-05-05 NOTE — ASSESSMENT
[FreeTextEntry1] : Impression:\par \par Patient is doing well s/p resection of sacral chordoma. He is managing with his colostomy. He continues to work on self catheterizing and he continues to go to physical therapy.\par He has had a new MRI lumbar spine and is here to review images.\par \par Discussion:\par \par A long discussion occurred regarding his recent imaging. There is no evidence of recurrence. \par Will isra recheck imaging yearly.\par \par \par Plan:\par \par 1) New physical therapy script\par \par 2) MRI with and with and without contrast lumbar spine in July 2024\par \par 3) CT chest no contrast 7/2024\par \par 4) F/u after imaging

## 2023-05-08 ENCOUNTER — APPOINTMENT (OUTPATIENT)
Dept: ENDOCRINOLOGY | Facility: CLINIC | Age: 63
End: 2023-05-08
Payer: MEDICAID

## 2023-05-08 VITALS
HEIGHT: 63 IN | OXYGEN SATURATION: 98 % | DIASTOLIC BLOOD PRESSURE: 70 MMHG | HEART RATE: 80 BPM | BODY MASS INDEX: 32.78 KG/M2 | SYSTOLIC BLOOD PRESSURE: 130 MMHG | WEIGHT: 185 LBS

## 2023-05-08 PROCEDURE — 99214 OFFICE O/P EST MOD 30 MIN: CPT

## 2023-05-08 NOTE — HISTORY OF PRESENT ILLNESS
[FreeTextEntry1] : cc: thyroid cancer\par \par 63 year old man with papillary thyroid cancer (well differentiated, 1.6 cm, isthmus, with positive margins) post thyroidectomy in 2010 and radioactive iodine in 2011, had mild increase in Tg 11/2020, with negative thyrogen stimulated Dominguez scan and BARBIE on ultrasound. Was subsequently diagnosed with sacral mass, chordoma now post  sacrectomy with L4-pelvis fusion 7/2021, with complicated post-op course.  Walking with cane, has colostomy, and self catheterizes. \par \par Thyroid cancer:  Last tg undetectable,   He has not noted any change in his neck; reports no dyspnea or dysphagia.  Taking levothyroxine 100 micrograms daily separate from food and vitamins.  \par \par Prediabetes: -Walking 2x daily.  And cycling and shooting hoops\par \par Osteoporosis: Started alendronate 11/2022 (diagnosed with osteoporosis at that time).  Has regular dental care, no thigh pain.   No recent falls or fractures, taking calcium 600 and vitamin D 2000 and mvi\par \par \par Also with hypogonadism, has declined testosterone replacement \par \par

## 2023-05-08 NOTE — PHYSICAL EXAM
[Alert] : alert [No Acute Distress] : no acute distress [Normal Sclera/Conjunctiva] : normal sclera/conjunctiva [No Proptosis] : no proptosis [No Neck Mass] : no neck mass was observed [No LAD] : no lymphadenopathy [Well Healed Scar] : well healed scar [No Respiratory Distress] : no respiratory distress [No Accessory Muscle Use] : no accessory muscle use [Clear to Auscultation] : lungs were clear to auscultation bilaterally [Normal S1, S2] : normal S1 and S2 [Regular Rhythm] : with a regular rhythm [No Edema] : no peripheral edema [Normal Bowel Sounds] : normal bowel sounds [Not Tender] : non-tender [Soft] : abdomen soft [No Spinal Tenderness] : no spinal tenderness [No Involuntary Movements] : no involuntary movements were seen [Normal Strength/Tone] : muscle strength and tone were normal [Normal Reflexes] : deep tendon reflexes were 2+ and symmetric [No Tremors] : no tremors [Normal Affect] : the affect was normal [Normal Mood] : the mood was normal [Kyphosis] : no kyphosis present

## 2023-05-08 NOTE — ASSESSMENT
[FreeTextEntry1] : 63 year old man with papillary thyroid cancer (well differentiated, 1.6 cm, isthmus, with positive margins) post thyroidectomy in 2010 and radioactive iodine in 2011, with excellent response.  \par  - TSH at goal, check Tg,  continue levothyroxine \par \par \par  Osteoporosis - \par   - ON alendronate since 11/2022\par  - repeat dxa in 6 months\par  - Continue calcium and vitamin D supplementation.\par \par \par Prediabetes - Encouraged pt to continue with exercise.  Recent A1c normal\par \par Hypogonadism - does not want testosterone tx at this time\par \par f/u  6 months

## 2023-05-08 NOTE — DATA REVIEWED
[FreeTextEntry1] : 5/2023\par LDL 55\par egf 84\par 'a1c 5.5\par TSH 0.76\par FT4 1.3\par \par 8/2022\par cr 1.19\par A1c 5.5%\par \par \par 5/4/22\par TSH 0.51\par FT4 1.6\par \par

## 2023-05-09 ENCOUNTER — NON-APPOINTMENT (OUTPATIENT)
Age: 63
End: 2023-05-09

## 2023-05-09 LAB
THYROGLOB AB SERPL-ACNC: <20 IU/ML
THYROGLOB SERPL-MCNC: <0.2 NG/ML

## 2023-09-03 NOTE — PROGRESS NOTE ADULT - PROBLEM SELECTOR PLAN 7
Hospitalist Progress      PATIENT NAME:Nichole Lopez  DATE OF SERVICE:9/3/2023      Subjective- patient is seen today, shortness of breath is much better.  Currently on 2 L of nasal cannula oxygen therapy.  Repeat chest x-ray shows improvement in the pulmonary edema.    ROS negative except for above    Scheduled Meds:   azithromycin  500 mg Intravenous Q24H    enoxparin  40 mg Subcutaneous Daily    furosemide (LASIX) injection  40 mg Intravenous Q8H    [START ON 9/4/2023] losartan  25 mg Oral Daily    metoprolol tartrate  25 mg Oral BID    nicotine  1 patch Transdermal Daily    potassium chloride  10 mEq Oral Daily    risperiDONE  2 mg Oral BID    sodium chloride 0.9%  10 mL Intravenous Q8H     Continuous Infusions:  PRN Meds:.sodium chloride 0.9%, acetaminophen, albuterol-ipratropium, guaiFENesin 100 mg/5 ml, labetalol, ondansetron    VITALS:  Temp:  [98.2 °F (36.8 °C)-99.7 °F (37.6 °C)] 98.5 °F (36.9 °C)  Pulse:  [] 102  Resp:  [18-20] 20  SpO2:  [92 %-99 %] 92 %  BP: ()/(61-87) 100/61    Intake/Output Summary (Last 24 hours) at 9/3/2023 1320  Last data filed at 9/3/2023 0800  Gross per 24 hour   Intake 829.18 ml   Output 4100 ml   Net -3270.82 ml         [unfilled]    On Exam;    General appearance - no acute distress, awake & alert   HEENT - head atraumatic, PERRL , scleral anicteric,mucous membrane moist  Neck- no jvd, carotid bruits, lymphadenopathy, thromegaly  Pulm-equal chest expansion, normal percussion note, coarse to auscultation bilaterally, reduced breath sounds  Heart -  Regular rate and rhythm, normal S1, S2  Abdomen - soft, nontender, nondistended,no organomegaly, bowel sounds present.  Extremities - trace edema, no cyanosis, no clubbing  Neurologic - Awake, alert and oriented x3,moves all extremities.  Musculoskeletal - normal ROM of major joints  Skin - no rash, normal skin turgor      LABORATORY:    CBC:   Lab Results   Component Value Date    WBC 9.05 09/03/2023    WBC 5.5  "03/30/2021    RBC 2.54 (L) 09/03/2023    HGB 8.4 (L) 09/03/2023    HGB 14.1 03/30/2021    HCT 27.6 (L) 09/03/2023    HCT 42.4 03/30/2021     09/03/2023     03/30/2021     CMP:   Lab Results   Component Value Date     09/03/2023    K 3.6 09/03/2023    CO2 32 (H) 09/03/2023    BUN 7.0 09/03/2023    CREATININE 0.80 09/03/2023    CALCIUM 8.1 (L) 09/03/2023    ALKPHOS 99 09/03/2023    AST 10 09/03/2023    ALT 8 09/03/2023    ALBUMIN 3.1 (L) 09/03/2023    BILITOT 0.9 09/03/2023     Cardiac markers:   Lab Results   Component Value Date    BNP 61.4 09/02/2023       Radiology:  Micro:  No components found for: "BLOODCX", "SPUTUMCX", "URINECX"    Radiology:  [unfilled]          ASSESSMENT AND PLAN:  1. Acute hypoxic respiratory failure secondary to CHF with acute exacerbation, less likely concurrent pneumonia.  Resolving.  Currently on 2 L of nasal cannula oxygen therapy.   CT of the chest negative for PE but positive for pulmonary edema.  Repeat chest x-ray shows improvement in the pulmonary the     2. CHF with acute exacerbation; unspecified this is systolic or diastolic dysfunction, (BNP was normal, which is not uncommon for obese patient), continue IV Lasix and metoprolol Monitor input and output.  Follow up 2D echocardiogram.     3. Acute pulmonary edema secondary to CHF with acute exacerbation, less likely related to pneumonia.  Discontinue IV Zosyn today.  May discontinue azithromycin tomorrow if  the  cultures are negative and procalcitonin level is normal.      4. Hypertensive urgency; resolved.  Blood pressure relatively low, discontinue losartan and metoprolol.      5. Morbid obesity; low-calorie diet has been advised.     6. Nicotine addiction; patient has been counseled on need to quit cigarette smoking, counseling was done for about 3 minutes.     7. Psychiatric disorder; continue risperidone.      8. Macrocytic anemia; obtain folic acid level vitamin B12 level and ferritin level.  Follow up " repeat CBC in the morning.     9. Maintain the patient on DVT prophylaxis by way of Lovenox       Plans discussed in detail with patient and all questions answered. Discussed with RN caring for patient      Disposition- follow up 2D echocardiogram.  For eventual discharge home pending clinical progress.  Probably in the next 2 days     This note was completed using voice recognition software and transcription errors may occur.           Signed by   Javier Borrero MD  9/3/2023,   1:20 PM              levothyroxine recently increased for TSH of 14. recheck week of 8/8.

## 2023-09-26 ENCOUNTER — OUTPATIENT (OUTPATIENT)
Dept: OUTPATIENT SERVICES | Facility: HOSPITAL | Age: 63
LOS: 1 days | Discharge: HOME | End: 2023-09-26

## 2023-09-26 DIAGNOSIS — E89.0 POSTPROCEDURAL HYPOTHYROIDISM: Chronic | ICD-10-CM

## 2023-09-26 DIAGNOSIS — D64.9 ANEMIA, UNSPECIFIED: ICD-10-CM

## 2023-09-26 DIAGNOSIS — Z98.890 OTHER SPECIFIED POSTPROCEDURAL STATES: Chronic | ICD-10-CM

## 2023-09-29 ENCOUNTER — RESULT REVIEW (OUTPATIENT)
Age: 63
End: 2023-09-29

## 2023-09-29 ENCOUNTER — APPOINTMENT (OUTPATIENT)
Dept: HEMATOLOGY ONCOLOGY | Facility: CLINIC | Age: 63
End: 2023-09-29
Payer: MEDICAID

## 2023-09-29 VITALS
DIASTOLIC BLOOD PRESSURE: 77 MMHG | HEART RATE: 67 BPM | OXYGEN SATURATION: 96 % | BODY MASS INDEX: 32.34 KG/M2 | HEIGHT: 63 IN | WEIGHT: 182.54 LBS | SYSTOLIC BLOOD PRESSURE: 139 MMHG

## 2023-09-29 DIAGNOSIS — Z86.2 PERSONAL HISTORY OF DISEASES OF THE BLOOD AND BLOOD-FORMING ORGANS AND CERTAIN DISORDERS INVOLVING THE IMMUNE MECHANISM: ICD-10-CM

## 2023-09-29 DIAGNOSIS — Z86.69 PERSONAL HISTORY OF OTHER DISEASES OF THE NERVOUS SYSTEM AND SENSE ORGANS: ICD-10-CM

## 2023-09-29 DIAGNOSIS — D59.11 WARM AUTOIMMUNE HEMOLYTIC ANEMIA: ICD-10-CM

## 2023-09-29 LAB
BASOPHILS # BLD AUTO: 0.2 K/UL — SIGNIFICANT CHANGE UP (ref 0–0.2)
BASOPHILS NFR BLD AUTO: 2.3 % — HIGH (ref 0–2)
EOSINOPHIL # BLD AUTO: 0.2 K/UL — SIGNIFICANT CHANGE UP (ref 0–0.5)
EOSINOPHIL NFR BLD AUTO: 2.3 % — SIGNIFICANT CHANGE UP (ref 0–6)
HCT VFR BLD CALC: 45.6 % — SIGNIFICANT CHANGE UP (ref 39–50)
HGB BLD-MCNC: 14.9 G/DL — SIGNIFICANT CHANGE UP (ref 13–17)
LYMPHOCYTES # BLD AUTO: 2.2 K/UL — SIGNIFICANT CHANGE UP (ref 1–3.3)
LYMPHOCYTES # BLD AUTO: 25.3 % — SIGNIFICANT CHANGE UP (ref 13–44)
MCHC RBC-ENTMCNC: 27.8 PG — SIGNIFICANT CHANGE UP (ref 27–34)
MCHC RBC-ENTMCNC: 32.7 G/DL — SIGNIFICANT CHANGE UP (ref 32–36)
MCV RBC AUTO: 85.2 FL — SIGNIFICANT CHANGE UP (ref 80–100)
MONOCYTES # BLD AUTO: 0.7 K/UL — SIGNIFICANT CHANGE UP (ref 0–0.9)
MONOCYTES NFR BLD AUTO: 7.9 % — SIGNIFICANT CHANGE UP (ref 2–14)
NEUTROPHILS # BLD AUTO: 5.4 K/UL — SIGNIFICANT CHANGE UP (ref 1.8–7.4)
NEUTROPHILS NFR BLD AUTO: 62.2 % — SIGNIFICANT CHANGE UP (ref 43–77)
PLATELET # BLD AUTO: 282 K/UL — SIGNIFICANT CHANGE UP (ref 150–400)
RBC # BLD: 5.36 M/UL — SIGNIFICANT CHANGE UP (ref 4.2–5.8)
RBC # FLD: 12.3 % — SIGNIFICANT CHANGE UP (ref 10.3–14.5)
WBC # BLD: 8.6 K/UL — SIGNIFICANT CHANGE UP (ref 3.8–10.5)
WBC # FLD AUTO: 8.6 K/UL — SIGNIFICANT CHANGE UP (ref 3.8–10.5)

## 2023-09-29 PROCEDURE — 99215 OFFICE O/P EST HI 40 MIN: CPT

## 2023-10-25 LAB
B2 GLYCOPROT1 IGA SERPL IA-ACNC: 14 SAU
B2 GLYCOPROT1 IGG SER-ACNC: <5 SGU
B2 GLYCOPROT1 IGM SER-ACNC: <5 SMU
CARDIOLIPIN IGM SER-MCNC: 5.8 MPL
CARDIOLIPIN IGM SER-MCNC: <5 GPL
DEPRECATED CARDIOLIPIN IGA SER: 13 APL
PTR INTERP: NORMAL

## 2023-11-20 ENCOUNTER — APPOINTMENT (OUTPATIENT)
Dept: ENDOCRINOLOGY | Facility: CLINIC | Age: 63
End: 2023-11-20
Payer: MEDICAID

## 2023-11-20 VITALS
WEIGHT: 180 LBS | SYSTOLIC BLOOD PRESSURE: 120 MMHG | HEART RATE: 74 BPM | DIASTOLIC BLOOD PRESSURE: 74 MMHG | BODY MASS INDEX: 31.11 KG/M2 | OXYGEN SATURATION: 97 % | HEIGHT: 63.9 IN

## 2023-11-20 DIAGNOSIS — M81.8 OTHER OSTEOPOROSIS W/OUT CURRENT PATHOLOGICAL FRACTURE: ICD-10-CM

## 2023-11-20 DIAGNOSIS — C73 MALIGNANT NEOPLASM OF THYROID GLAND: ICD-10-CM

## 2023-11-20 DIAGNOSIS — E89.0 POSTPROCEDURAL HYPOTHYROIDISM: ICD-10-CM

## 2023-11-20 DIAGNOSIS — E29.1 TESTICULAR HYPOFUNCTION: ICD-10-CM

## 2023-11-20 DIAGNOSIS — R73.03 PREDIABETES.: ICD-10-CM

## 2023-11-20 PROCEDURE — 77080 DXA BONE DENSITY AXIAL: CPT

## 2023-11-20 PROCEDURE — ZZZZZ: CPT

## 2023-11-20 PROCEDURE — 99214 OFFICE O/P EST MOD 30 MIN: CPT | Mod: 25

## 2023-11-22 LAB
T4 FREE SERPL-MCNC: 1.4 NG/DL
THYROGLOB AB SERPL-ACNC: <20 IU/ML
THYROGLOB SERPL-MCNC: <0.2 NG/ML
TSH SERPL-ACNC: 0.78 UIU/ML

## 2023-11-30 NOTE — PROGRESS NOTE ADULT - PROBLEM SELECTOR PLAN 7
- resolved
- likely reactive  - continue to monitor
- as per , recommend home with tony and outpatient urological follow up
- resolved
- as per , recommend home with tony and outpatient urological follow up  - family education already provided
- likely reactive  - continue to monitor
- resolved
- likely reactive  - continue to monitor
4 = No assist / stand by assistance

## 2023-12-25 NOTE — PROGRESS NOTE ADULT - SUBJECTIVE AND OBJECTIVE BOX
HPI:  Pt is a 62 y/o M with PMHx of thyroid cancer status post total thyroidectomy in 2010 and radioactive iodine treatment in 2011, hypogonadism, vitamin D deficiency, and osteopenia, with chronic constipation, right buttock and right scrotal pain and numbness. 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 The chordoma resulted in perineal numbness and overflow incontinence and he was admitted 7/7/21 for staged resection.   On 7/7, he underwent Plastic Surgery and General Surgery assisted vertical rectus abdominal myocutaneous flap harvest with transperitoneal ligation of internal iliac artery and vein. He is s/p en bloc sacrectomy with L4-pelvis fusion; EBL 4.5L status post 8 units PRBC, 6 units FFP, 1 pack platelets and 5L crystalloid; 7/8. Extubated. On 7/9  patient developed hypoxia and respiratory distress, desaturation. CPAP at night.  CTA revealed bilateral sub segmental PE. No right heart strain & Bibasilar and left lingular consolidative opacities. Started on Heparin gtt and transitioned to therapeutic lovenox on 8/1. Course further c/b ileus requiring gastric decompression. Pt underwent echo to check for RV per pulm, and it was grossly unremarkable.  Wound washout with plastics and duc pus noted, entire wound reopened on 7/26/21 and placement of 2 drains. Incontinent of stool. Cultures growing MRSA, enterococcus and bacteroides. ID following. Continue vancomycin and flagyl. S/P Diversion Colostomy to keep sacral incision clean on 7/28/21. Barnett for urinary incontinence and to keep sacral wound clean. Pt requires 6 week course of antibiotics until 9/7/21. PICC line in place. LE doppler neg for DVT. Patient tolerating regular diet with fully functioning colostomy. Evaluated by Physical Therapy and PM&R and was recommended for Acute rehab. Pt was medically stable on 8/2/21 and was transferred to [facility].     SUBJECTIVE / INTERVAL HPI: Patient seen and examined at  bedside  Seated in chair  Reports fair night  Has some discomfort in abdomen - No nausea or emesis     REVIEW OF SYSTEMS:    CONSTITUTIONAL: No weakness,  EYES/ENT: No visual changes;    RESPIRATORY: No cough,   CARDIOVASCULAR: No chest pain or palpitations  GASTROINTESTINAL: +occasional bloating. + colostomy   GENITOURINARY: + Barnett  NEUROLOGICAL: No numbness or weakness  SKIN: No itching, rashes    Vital Signs Last 24 Hrs  T(C): 36.9 (25 Aug 2021 08:43), Max: 36.9 (25 Aug 2021 08:43)  T(F): 98.4 (25 Aug 2021 08:43), Max: 98.4 (25 Aug 2021 08:43)  HR: 94 (25 Aug 2021 08:43) (94 - 96)  BP: 102/71 (25 Aug 2021 08:43) (102/71 - 120/76)  BP(mean): --  RR: 16 (25 Aug 2021 08:43) (16 - 18)  SpO2: 95% (25 Aug 2021 08:43) (95% - 96%)    PHYSICAL EXAM:    General: NAD, sitting up comfortably in OT  Cardiovascular: well perfused   Respiratory: breathing comfortably, no respiratory distress  Gastrointestinal: soft, NT/ND, +Colostomy intact  Extremities: no edema,  Ext: RUE PICC +     MEDICATIONS  (STANDING):  apixaban 5 milliGRAM(s) Oral every 12 hours  calcium carbonate 1250 mG  + Vitamin D (OsCal 500 + D) 1 Tablet(s) Oral daily  chlorhexidine 4% Liquid 1 Application(s) Topical two times a day  gabapentin 200 milliGRAM(s) Oral every 8 hours  levothyroxine 137 MICROGram(s) Oral daily  metroNIDAZOLE    Tablet 500 milliGRAM(s) Oral every 8 hours  pantoprazole    Tablet 40 milliGRAM(s) Oral before breakfast  senna 2 Tablet(s) Oral at bedtime  vancomycin  IVPB 1250 milliGRAM(s) IV Intermittent every 12 hours    MEDICATIONS  (PRN):  acetaminophen   Tablet .. 650 milliGRAM(s) Oral every 6 hours PRN Temp greater or equal to 38C (100.4F), Mild Pain (1 - 3)  melatonin 3 milliGRAM(s) Oral at bedtime PRN Insomnia  polyethylene glycol 3350 17 Gram(s) Oral daily PRN Constipation  simethicone 80 milliGRAM(s) Chew two times a day PRN Upset Stomach        LABS:                        12.4   8.18  )-----------( 405      ( 23 Aug 2021 09:00 )             38.5     08-23    134<L>  |  99  |  10  ----------------------------<  164<H>  3.6   |  24  |  1.10    Ca    9.0      23 Aug 2021 09:00           Admission

## 2024-01-23 ENCOUNTER — NON-APPOINTMENT (OUTPATIENT)
Age: 64
End: 2024-01-23

## 2024-01-29 ENCOUNTER — APPOINTMENT (OUTPATIENT)
Dept: HEMATOLOGY ONCOLOGY | Facility: CLINIC | Age: 64
End: 2024-01-29

## 2024-01-31 ENCOUNTER — OUTPATIENT (OUTPATIENT)
Dept: OUTPATIENT SERVICES | Facility: HOSPITAL | Age: 64
LOS: 1 days | Discharge: HOME | End: 2024-01-31

## 2024-01-31 DIAGNOSIS — Z98.890 OTHER SPECIFIED POSTPROCEDURAL STATES: Chronic | ICD-10-CM

## 2024-01-31 DIAGNOSIS — E89.0 POSTPROCEDURAL HYPOTHYROIDISM: Chronic | ICD-10-CM

## 2024-01-31 DIAGNOSIS — D64.9 ANEMIA, UNSPECIFIED: ICD-10-CM

## 2024-02-21 ENCOUNTER — APPOINTMENT (OUTPATIENT)
Dept: HEMATOLOGY ONCOLOGY | Facility: CLINIC | Age: 64
End: 2024-02-21
Payer: MEDICAID

## 2024-02-21 ENCOUNTER — RESULT REVIEW (OUTPATIENT)
Age: 64
End: 2024-02-21

## 2024-02-21 VITALS
HEART RATE: 76 BPM | SYSTOLIC BLOOD PRESSURE: 109 MMHG | OXYGEN SATURATION: 96 % | HEIGHT: 64 IN | BODY MASS INDEX: 30.73 KG/M2 | WEIGHT: 180 LBS | DIASTOLIC BLOOD PRESSURE: 69 MMHG

## 2024-02-21 DIAGNOSIS — D68.51 ACTIVATED PROTEIN C RESISTANCE: ICD-10-CM

## 2024-02-21 DIAGNOSIS — I26.99 OTHER PULMONARY EMBOLISM W/OUT ACUTE COR PULMONALE: ICD-10-CM

## 2024-02-21 DIAGNOSIS — I82.409 ACUTE EMBOLISM AND THROMBOSIS OF UNSPECIFIED DEEP VEINS OF UNSPECIFIED LOWER EXTREMITY: ICD-10-CM

## 2024-02-21 LAB
BASOPHILS # BLD AUTO: 0.1 K/UL — SIGNIFICANT CHANGE UP (ref 0–0.2)
BASOPHILS NFR BLD AUTO: 1.7 % — SIGNIFICANT CHANGE UP (ref 0–2)
EOSINOPHIL # BLD AUTO: 0.2 K/UL — SIGNIFICANT CHANGE UP (ref 0–0.5)
EOSINOPHIL NFR BLD AUTO: 3.3 % — SIGNIFICANT CHANGE UP (ref 0–6)
HCT VFR BLD CALC: 44.4 % — SIGNIFICANT CHANGE UP (ref 39–50)
HGB BLD-MCNC: 14.9 G/DL — SIGNIFICANT CHANGE UP (ref 13–17)
LYMPHOCYTES # BLD AUTO: 1.7 K/UL — SIGNIFICANT CHANGE UP (ref 1–3.3)
LYMPHOCYTES # BLD AUTO: 27.4 % — SIGNIFICANT CHANGE UP (ref 13–44)
MCHC RBC-ENTMCNC: 28.6 PG — SIGNIFICANT CHANGE UP (ref 27–34)
MCHC RBC-ENTMCNC: 33.6 G/DL — SIGNIFICANT CHANGE UP (ref 32–36)
MCV RBC AUTO: 85.1 FL — SIGNIFICANT CHANGE UP (ref 80–100)
MONOCYTES # BLD AUTO: 0.5 K/UL — SIGNIFICANT CHANGE UP (ref 0–0.9)
MONOCYTES NFR BLD AUTO: 7.4 % — SIGNIFICANT CHANGE UP (ref 2–14)
NEUTROPHILS # BLD AUTO: 3.7 K/UL — SIGNIFICANT CHANGE UP (ref 1.8–7.4)
NEUTROPHILS NFR BLD AUTO: 60.2 % — SIGNIFICANT CHANGE UP (ref 43–77)
PLATELET # BLD AUTO: 247 K/UL — SIGNIFICANT CHANGE UP (ref 150–400)
RBC # BLD: 5.22 M/UL — SIGNIFICANT CHANGE UP (ref 4.2–5.8)
RBC # FLD: 12.9 % — SIGNIFICANT CHANGE UP (ref 10.3–14.5)
WBC # BLD: 6.2 K/UL — SIGNIFICANT CHANGE UP (ref 3.8–10.5)
WBC # FLD AUTO: 6.2 K/UL — SIGNIFICANT CHANGE UP (ref 3.8–10.5)

## 2024-02-21 PROCEDURE — 99214 OFFICE O/P EST MOD 30 MIN: CPT

## 2024-02-22 LAB
ALBUMIN SERPL ELPH-MCNC: 4.7 G/DL
ALP BLD-CCNC: 41 U/L
ALT SERPL-CCNC: 23 U/L
ANION GAP SERPL CALC-SCNC: 15 MMOL/L
AST SERPL-CCNC: 32 U/L
BILIRUB SERPL-MCNC: 0.5 MG/DL
BUN SERPL-MCNC: 24 MG/DL
CALCIUM SERPL-MCNC: 9.4 MG/DL
CHLORIDE SERPL-SCNC: 106 MMOL/L
CO2 SERPL-SCNC: 22 MMOL/L
CREAT SERPL-MCNC: 1.04 MG/DL
EGFR: 81 ML/MIN/1.73M2
GLUCOSE SERPL-MCNC: 112 MG/DL
POTASSIUM SERPL-SCNC: 4.4 MMOL/L
PROT SERPL-MCNC: 6.9 G/DL
SODIUM SERPL-SCNC: 143 MMOL/L

## 2024-02-27 PROBLEM — D68.51 HETEROZYGOUS FACTOR V LEIDEN MUTATION: Status: ACTIVE | Noted: 2024-02-27

## 2024-02-27 PROBLEM — I26.99 PULMONARY EMBOLISM: Status: ACTIVE | Noted: 2021-10-07

## 2024-02-27 PROBLEM — D68.51 FACTOR V LEIDEN MUTATION: Status: ACTIVE | Noted: 2024-02-27

## 2024-02-27 PROBLEM — I82.409 DVT, LOWER EXTREMITY: Status: ACTIVE | Noted: 2022-06-23

## 2024-02-27 NOTE — ASSESSMENT
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 CT imaging at that time did not reveal lymphadenopathy concerning for hematologic malignancy.  1. DVT/PE-  Found to have a heterozygous mutation of the factor V leiden gene.  Sub segmental PE in July 2021 s/p major spinal surgery with infection and prolonged immobilization, started on Apixaban. 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    Plan for long term anticoagulation with eliquis as he has had 2 episodes of recurrent DVT, + heterozygous mutation of the factor V leiden gene.   2. AIHA 9/2021-  Diagnosed 9/2021, warm IgG/C3 antibody.  Treated with steroids/IVIG.  No relapse, his Hg remains normal.  3. Chordoma - following with neurosurgery  Planned for imaging with MRI of his lumbar spine, CT chest in July 2024.  4. papillary thyroid cancer - following with Dr. Neely

## 2024-02-27 NOTE — REVIEW OF SYSTEMS
[Negative] : Heme/Lymph [Fever] : no fever [Chills] : no chills [Night Sweats] : no night sweats [Fatigue] : no fatigue [Chest Pain] : no chest pain [Shortness Of Breath] : no shortness of breath [Lower Ext Edema] : no lower extremity edema [Abdominal Pain] : no abdominal pain [Cough] : no cough [Vomiting] : no vomiting [Fainting] : no fainting [Dizziness] : no dizziness [Easy Bruising] : no tendency for easy bruising [Easy Bleeding] : no tendency for easy bleeding [FreeTextEntry9] : occasional LBP

## 2024-02-27 NOTE — PHYSICAL EXAM
[Normal] : clear to auscultation bilaterally, no dullness, no wheezing [de-identified] : RRR, normal S1S2 [de-identified] : no pitting edema [de-identified] : Patient with ostomy; soft, normoactive bowel sounds [de-identified] : A & O x 4 [de-identified] : no rash

## 2024-02-27 NOTE — REASON FOR VISIT
[Follow-Up Visit] : a follow-up visit for [Family Member] : family member [FreeTextEntry2] : History PE/DVT, heterozygous mutation of factor V leiden

## 2024-02-27 NOTE — HISTORY OF PRESENT ILLNESS
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 Imaging at that time did not show lymphadenopathy. The patient was treated with steroids and IVIG. He was discharged on a steroid taper, which he completed on 11/29/21.    The patient was taken off of eliquis in 5/2022. He was found to have a right LE DVT on 6/8/22 in his right popliteal/posterior tibial vein- found when he developed acute leg pain. The patient was restarted on apixaban on 6/8/22.  During these events he had prolonged immobilization and inflammatory episodes secondary to infection.  He did have lab evaluation for Factor V Leiden mutation documenting heterozygosity on 7/13/22.     [de-identified] : He is here with his father today.  Overall he is feeling well, no bleeding issues.  He has a colostomy, self catheterizes wihtout any bleeding.  He is ambulatory, mobile.

## 2024-02-27 NOTE — RESULTS/DATA
[FreeTextEntry1] : 9/20/21 CT abdomen/pelvis LIVER: Within normal limits. BILE DUCTS: Normal caliber. GALLBLADDER: Within normal limits. SPLEEN: Within normal limits. PANCREAS: Within normal limits. ADRENALS: Within normal limits. KIDNEYS/URETERS: Within normal limits.  BLADDER: Barnett catheter. REPRODUCTIVE ORGANS: Prostate within normal limits.  BOWEL: Status post left lower quadrant colostomy. Intact rectal stump. No bowel obstruction. Appendix within normal limits. PERITONEUM: No ascites. VESSELS: Atherosclerotic changes. RETROPERITONEUM/LYMPH NODES: No lymphadenopathy. ABDOMINAL WALL: Postsurgical changes. BONES: Status post partial sacral resection and lumboiliac spinal fusion.   9/22/21  CT chest  IMPRESSION:  No lymphadenopathy.  Nonspecific patchy groundglass opacities scattered throughout the posterior left upper and left lower lobes, new since 7/10/21.

## 2024-05-17 RX ORDER — ALENDRONATE SODIUM 70 MG/1
70 TABLET ORAL
Qty: 12 | Refills: 3 | Status: ACTIVE | COMMUNITY
Start: 2022-11-07 | End: 1900-01-01

## 2024-07-01 ENCOUNTER — OUTPATIENT (OUTPATIENT)
Dept: OUTPATIENT SERVICES | Facility: HOSPITAL | Age: 64
LOS: 1 days | End: 2024-07-01
Payer: COMMERCIAL

## 2024-07-01 ENCOUNTER — APPOINTMENT (OUTPATIENT)
Dept: MRI IMAGING | Facility: CLINIC | Age: 64
End: 2024-07-01
Payer: MEDICAID

## 2024-07-01 ENCOUNTER — APPOINTMENT (OUTPATIENT)
Dept: CT IMAGING | Facility: CLINIC | Age: 64
End: 2024-07-01
Payer: MEDICAID

## 2024-07-01 DIAGNOSIS — Z98.890 OTHER SPECIFIED POSTPROCEDURAL STATES: Chronic | ICD-10-CM

## 2024-07-01 DIAGNOSIS — Z00.8 ENCOUNTER FOR OTHER GENERAL EXAMINATION: ICD-10-CM

## 2024-07-01 DIAGNOSIS — E89.0 POSTPROCEDURAL HYPOTHYROIDISM: Chronic | ICD-10-CM

## 2024-07-01 PROCEDURE — 71250 CT THORAX DX C-: CPT | Mod: 26

## 2024-07-01 PROCEDURE — 71250 CT THORAX DX C-: CPT

## 2024-07-01 PROCEDURE — 72158 MRI LUMBAR SPINE W/O & W/DYE: CPT | Mod: 26

## 2024-07-01 PROCEDURE — A9585: CPT

## 2024-07-01 PROCEDURE — 72158 MRI LUMBAR SPINE W/O & W/DYE: CPT

## 2024-07-11 ENCOUNTER — APPOINTMENT (OUTPATIENT)
Dept: NEUROSURGERY | Facility: CLINIC | Age: 64
End: 2024-07-11
Payer: MEDICAID

## 2024-07-11 DIAGNOSIS — C41.4 MALIGNANT NEOPLASM OF PELVIC BONES, SACRUM AND COCCYX: ICD-10-CM

## 2024-07-11 DIAGNOSIS — Z87.891 PERSONAL HISTORY OF NICOTINE DEPENDENCE: ICD-10-CM

## 2024-07-11 PROCEDURE — 99442: CPT

## 2024-08-18 ENCOUNTER — OUTPATIENT (OUTPATIENT)
Dept: OUTPATIENT SERVICES | Facility: HOSPITAL | Age: 64
LOS: 1 days | Discharge: HOME | End: 2024-08-18

## 2024-08-18 DIAGNOSIS — E89.0 POSTPROCEDURAL HYPOTHYROIDISM: Chronic | ICD-10-CM

## 2024-08-18 DIAGNOSIS — Z98.890 OTHER SPECIFIED POSTPROCEDURAL STATES: Chronic | ICD-10-CM

## 2024-08-18 DIAGNOSIS — D64.9 ANEMIA, UNSPECIFIED: ICD-10-CM

## 2024-08-19 NOTE — PROGRESS NOTE ADULT - ASSESSMENT
08/19/24 0837   Appointment Info   Signing Clinician's Name / Credentials (PT) Savannah Manzano, GELA   Living Environment   People in Home   (renter)   Home Accessibility stairs within home   Number of Stairs, Main Entrance   (ramp)   Number of Stairs, Within Home, Primary greater than 10 stairs  (17)   Living Environment Comments renter in lower level   Self-Care   Usual Activity Tolerance excellent   Current Activity Tolerance moderate   Equipment Currently Used at Home cane, straight;walker, rolling   Activity/Exercise/Self-Care Comment elevated toilets, seunybbobby   General Information   Onset of Illness/Injury or Date of Surgery 08/17/24   Referring Physician Rashard Barajas MD   Patient/Family Therapy Goals Statement (PT) feels she needs TCU, has to do steps to bedroom no option to stay on main level, reports was in the ER Fri due to dehydration   Pertinent History of Current Problem (include personal factors and/or comorbidities that impact the POC) fall, L femoral neck Fx, s/p L hip hemiarthroplasty, bipolar   Existing Precautions/Restrictions 90 degree hip flexion;fall   Weight-Bearing Status - LLE weight-bearing as tolerated   Cognition   Affect/Mental Status (Cognition) WNL   Orientation Status (Cognition) oriented x 4   Pain Assessment   Patient Currently in Pain Yes, see Vital Sign flowsheet  (reports no pain at rest)   Range of Motion (ROM)   Range of Motion ROM deficits secondary to surgical procedure;ROM deficits secondary to pain   Bed Mobility   Comment, (Bed Mobility) S supine to sit moving slowly, min A L LE   Transfers   Comment, (Transfers) S sit to stand from EOB, FWW, WBAT   Gait/Stairs (Locomotion)   Rains Level (Gait) supervision   Assistive Device (Gait) walker, front-wheeled   Distance in Feet (Gait) 10   Pattern (Gait) step-to   Deviations/Abnormal Patterns (Gait) nataly decreased;stride length decreased   Clinical Impression   Criteria for Skilled Therapeutic Intervention Yes,  Mr. Jackson is a 62 yo M with PMHx of thyroid cancer s/p total thyroidectomy (2010), HLD, Vit D deficiency, Osteopenia, and chordoma, who is now s/p Sacral sacrectomy and VRAM flap harvest (07/07, 07/08).    -Please place pt in lateral position and put as little pressure on Sacral wound as possible   -Keep superficial drain in place until output is <30cc for 24 hr, prior to d/c hemovac will be switched to regular bulb   -Abdominal and sacral dressings changed by PRS AM of 07/14, and [facility], again by PRS on 7/20  -RN may change inferior aspect of incision with saline wet to dry gauze dressing as needed if soils 2/2 stool incontinence   -Minimal SS leakage surrounding drain insertion sites at back, gauze placed under tegaderm by PRS, will continue to monitor  -rest of care per primary team   -PRS will continue to follow       Reinier Medrano MD, PhD  Plastic Surgery Resident, PGY3  [facility]: [phone]  [facility]: [id number]  Available on Microsoft Teams    treatment indicated   PT Diagnosis (PT) impaired functional mobility   Influenced by the following impairments pain   Functional limitations due to impairments impaired gait   Clinical Presentation (PT Evaluation Complexity) stable   Clinical Presentation Rationale clinical judgement   Clinical Decision Making (Complexity) low complexity   Planned Therapy Interventions (PT) home program guidelines;risk factor education;progressive activity/exercise;transfer training;strengthening;stair training;patient/family education;gait training   Risk & Benefits of therapy have been explained evaluation/treatment results reviewed;care plan/treatment goals reviewed;risks/benefits reviewed;current/potential barriers reviewed;participants voiced agreement with care plan;patient   PT Total Evaluation Time   PT Eval, Low Complexity Minutes (04005) 10   Physical Therapy Goals   PT Frequency Daily   PT Predicted Duration/Target Date for Goal Attainment 08/24/24   PT Goals Gait;Stairs;Transfers   PT: Transfers Modified independent;Sit to/from stand;Assistive device   PT: Gait Modified independent;Rolling walker;100 feet   PT: Stairs Modified independent;Greater than 10 stairs   Interventions   Interventions Quick Adds Therapeutic Activity   Therapeutic Activity   Therapeutic Activities: dynamic activities to improve functional performance Minutes (49063) 10   Symptoms Noted During/After Treatment Increased pain   Treatment Detail/Skilled Intervention education on no hip flexion greater than 90 and gave handout with examples, edu on not needing to keep ABD pillow in but can use a pillow if prefers something there, edu on WBAT, cues for reaching back to chair to lower self to sitting, edu on walking in room with staff trying to get more strength and mobility as pt was dizzy when up with PT, /71 once sat in chair.   PT Discharge Planning   PT Plan daily- gait, stairs (17), L MIKE no flex >90   PT Discharge Recommendation (DC Rec)  home with assist;home with home care physical therapy;Transitional Care Facility   PT Rationale for DC Rec pt prefers TCU, today would rec TCU but if progresses over next 1-2 days and had help with laundry, groceries, cooking could be able to return home, pt also would need to do a flight of stairs   PT Brief overview of current status CGA to SBA with walker, declined to walk in alcocer, reports dizziness and weakness   PT Equipment Needed at Discharge   (has AE)

## 2024-08-26 DIAGNOSIS — I26.99 OTHER PULMONARY EMBOLISM W/OUT ACUTE COR PULMONALE: ICD-10-CM

## 2024-08-28 ENCOUNTER — RESULT REVIEW (OUTPATIENT)
Age: 64
End: 2024-08-28

## 2024-08-28 ENCOUNTER — APPOINTMENT (OUTPATIENT)
Dept: HEMATOLOGY ONCOLOGY | Facility: CLINIC | Age: 64
End: 2024-08-28

## 2024-08-28 VITALS
HEART RATE: 76 BPM | BODY MASS INDEX: 29.66 KG/M2 | SYSTOLIC BLOOD PRESSURE: 144 MMHG | WEIGHT: 178.02 LBS | OXYGEN SATURATION: 95 % | RESPIRATION RATE: 16 BRPM | HEIGHT: 65 IN | TEMPERATURE: 97.7 F | DIASTOLIC BLOOD PRESSURE: 79 MMHG

## 2024-08-28 LAB
BASOPHILS # BLD AUTO: 0.1 K/UL — SIGNIFICANT CHANGE UP (ref 0–0.2)
BASOPHILS NFR BLD AUTO: 1.2 % — SIGNIFICANT CHANGE UP (ref 0–2)
EOSINOPHIL # BLD AUTO: 0.3 K/UL — SIGNIFICANT CHANGE UP (ref 0–0.5)
EOSINOPHIL NFR BLD AUTO: 3.7 % — SIGNIFICANT CHANGE UP (ref 0–6)
HCT VFR BLD CALC: 47.6 % — SIGNIFICANT CHANGE UP (ref 39–50)
HGB BLD-MCNC: 15.6 G/DL — SIGNIFICANT CHANGE UP (ref 13–17)
LYMPHOCYTES # BLD AUTO: 1.9 K/UL — SIGNIFICANT CHANGE UP (ref 1–3.3)
LYMPHOCYTES # BLD AUTO: 26.8 % — SIGNIFICANT CHANGE UP (ref 13–44)
MCHC RBC-ENTMCNC: 28.6 PG — SIGNIFICANT CHANGE UP (ref 27–34)
MCHC RBC-ENTMCNC: 32.8 G/DL — SIGNIFICANT CHANGE UP (ref 32–36)
MCV RBC AUTO: 87.3 FL — SIGNIFICANT CHANGE UP (ref 80–100)
MONOCYTES # BLD AUTO: 0.4 K/UL — SIGNIFICANT CHANGE UP (ref 0–0.9)
MONOCYTES NFR BLD AUTO: 5.6 % — SIGNIFICANT CHANGE UP (ref 2–14)
NEUTROPHILS # BLD AUTO: 4.5 K/UL — SIGNIFICANT CHANGE UP (ref 1.8–7.4)
NEUTROPHILS NFR BLD AUTO: 62.7 % — SIGNIFICANT CHANGE UP (ref 43–77)
PLATELET # BLD AUTO: 265 K/UL — SIGNIFICANT CHANGE UP (ref 150–400)
RBC # BLD: 5.26 M/UL — SIGNIFICANT CHANGE UP (ref 4.2–5.8)
RBC # BLD: 5.45 M/UL — SIGNIFICANT CHANGE UP (ref 4.2–5.8)
RBC # FLD: 12.2 % — SIGNIFICANT CHANGE UP (ref 10.3–14.5)
RETICS #: 73.6 K/UL — SIGNIFICANT CHANGE UP (ref 25–125)
RETICS/RBC NFR: 1.4 % — SIGNIFICANT CHANGE UP (ref 0.5–2.5)
WBC # BLD: 7.2 K/UL — SIGNIFICANT CHANGE UP (ref 3.8–10.5)
WBC # FLD AUTO: 7.2 K/UL — SIGNIFICANT CHANGE UP (ref 3.8–10.5)

## 2024-08-28 PROCEDURE — 99214 OFFICE O/P EST MOD 30 MIN: CPT

## 2024-08-28 NOTE — HISTORY OF PRESENT ILLNESS
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 Imaging at that time did not show lymphadenopathy. The patient was treated with steroids and IVIG. He was discharged on a steroid taper, which he completed on 11/29/21.    The patient was taken off of eliquis in 5/2022. He was found to have a right LE DVT on 6/8/22 in his right popliteal/posterior tibial vein- found when he developed acute leg pain. The patient was restarted on apixaban on 6/8/22.  During these events he had prolonged immobilization and inflammatory episodes secondary to infection.  He did have lab evaluation for Factor V Leiden mutation documenting heterozygosity on 7/13/22.     [de-identified] : He is here for a follow up.  here with his father today.  Overall he is feeling well, no bleeding issues.  He has a colostomy, self catheterizes wihtout any bleeding.  He is ambulatory, mobile.

## 2024-08-28 NOTE — REVIEW OF SYSTEMS
[Negative] : Heme/Lymph [Fever] : no fever [Chills] : no chills [Night Sweats] : no night sweats [Fatigue] : no fatigue [Chest Pain] : no chest pain [Lower Ext Edema] : no lower extremity edema [Shortness Of Breath] : no shortness of breath [Cough] : no cough [Abdominal Pain] : no abdominal pain [Vomiting] : no vomiting [Dizziness] : no dizziness [Fainting] : no fainting [Easy Bleeding] : no tendency for easy bleeding [Easy Bruising] : no tendency for easy bruising [FreeTextEntry9] : occasional LBP

## 2024-08-28 NOTE — ASSESSMENT
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 CT imaging at that time did not reveal lymphadenopathy concerning for hematologic malignancy.  1. DVT/PE-  Found to have a heterozygous mutation of the factor V leiden gene.  Sub segmental PE in July 2021 s/p major spinal surgery with infection and prolonged immobilization, started on Apixaban. 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    Plan for long term anticoagulation with eliquis as he has had 2 episodes of recurrent DVT, + heterozygous mutation of the factor V leiden gene.   2. AIHA 9/2021-  Diagnosed 9/2021, warm IgG/C3 antibody.  Treated with steroids/IVIG.  No relapse, his Hg remains normal.  3. Chordoma - following with neurosurgery  Planned for imaging with MRI of his lumbar spine, CT chest in July 2024.  4. papillary thyroid cancer - following with Dr. Neely

## 2024-09-03 LAB
ALBUMIN SERPL ELPH-MCNC: 4.7 G/DL
ALP BLD-CCNC: 38 U/L
ALT SERPL-CCNC: 27 U/L
ANION GAP SERPL CALC-SCNC: 14 MMOL/L
AST SERPL-CCNC: 34 U/L
BILIRUB SERPL-MCNC: 0.4 MG/DL
BUN SERPL-MCNC: 24 MG/DL
CALCIUM SERPL-MCNC: 9.9 MG/DL
CHLORIDE SERPL-SCNC: 104 MMOL/L
CO2 SERPL-SCNC: 25 MMOL/L
CREAT SERPL-MCNC: 1.27 MG/DL
EGFR: 63 ML/MIN/1.73M2
GLUCOSE SERPL-MCNC: 144 MG/DL
HAPTOGLOB SERPL-MCNC: 92 MG/DL
LDH SERPL-CCNC: 236 U/L
POTASSIUM SERPL-SCNC: 4.2 MMOL/L
PROT SERPL-MCNC: 7.4 G/DL
SODIUM SERPL-SCNC: 144 MMOL/L

## 2024-11-18 ENCOUNTER — APPOINTMENT (OUTPATIENT)
Dept: ENDOCRINOLOGY | Facility: CLINIC | Age: 64
End: 2024-11-18
Payer: MEDICAID

## 2024-11-18 VITALS
WEIGHT: 180 LBS | BODY MASS INDEX: 29.99 KG/M2 | OXYGEN SATURATION: 98 % | HEIGHT: 65 IN | DIASTOLIC BLOOD PRESSURE: 64 MMHG | HEART RATE: 70 BPM | SYSTOLIC BLOOD PRESSURE: 120 MMHG

## 2024-11-18 DIAGNOSIS — R73.03 PREDIABETES.: ICD-10-CM

## 2024-11-18 DIAGNOSIS — M81.8 OTHER OSTEOPOROSIS W/OUT CURRENT PATHOLOGICAL FRACTURE: ICD-10-CM

## 2024-11-18 DIAGNOSIS — E29.1 TESTICULAR HYPOFUNCTION: ICD-10-CM

## 2024-11-18 DIAGNOSIS — E89.0 POSTPROCEDURAL HYPOTHYROIDISM: ICD-10-CM

## 2024-11-18 DIAGNOSIS — C73 MALIGNANT NEOPLASM OF THYROID GLAND: ICD-10-CM

## 2024-11-18 PROCEDURE — G2211 COMPLEX E/M VISIT ADD ON: CPT | Mod: NC

## 2024-11-18 PROCEDURE — 99214 OFFICE O/P EST MOD 30 MIN: CPT

## 2024-11-19 LAB
ALBUMIN SERPL ELPH-MCNC: 4.5 G/DL
ALP BLD-CCNC: 36 U/L
ALT SERPL-CCNC: 26 U/L
ANION GAP SERPL CALC-SCNC: 13 MMOL/L
AST SERPL-CCNC: 30 U/L
BILIRUB SERPL-MCNC: 0.4 MG/DL
BUN SERPL-MCNC: 24 MG/DL
CALCIUM SERPL-MCNC: 9.7 MG/DL
CHLORIDE SERPL-SCNC: 103 MMOL/L
CO2 SERPL-SCNC: 26 MMOL/L
CREAT SERPL-MCNC: 1.06 MG/DL
EGFR: 79 ML/MIN/1.73M2
GLUCOSE SERPL-MCNC: 94 MG/DL
POTASSIUM SERPL-SCNC: 4.2 MMOL/L
PROT SERPL-MCNC: 7.3 G/DL
SODIUM SERPL-SCNC: 141 MMOL/L
T4 FREE SERPL-MCNC: 1.6 NG/DL
THYROGLOB AB SERPL-ACNC: <15 IU/ML
THYROGLOB SERPL-MCNC: 0.16 NG/ML
TSH SERPL-ACNC: 0.77 UIU/ML

## 2025-01-15 ENCOUNTER — OUTPATIENT (OUTPATIENT)
Dept: OUTPATIENT SERVICES | Facility: HOSPITAL | Age: 65
LOS: 1 days | Discharge: HOME | End: 2025-01-15

## 2025-01-15 DIAGNOSIS — Z98.890 OTHER SPECIFIED POSTPROCEDURAL STATES: Chronic | ICD-10-CM

## 2025-01-15 DIAGNOSIS — E89.0 POSTPROCEDURAL HYPOTHYROIDISM: Chronic | ICD-10-CM

## 2025-01-15 DIAGNOSIS — D64.9 ANEMIA, UNSPECIFIED: ICD-10-CM

## 2025-01-22 ENCOUNTER — RESULT REVIEW (OUTPATIENT)
Age: 65
End: 2025-01-22

## 2025-01-22 ENCOUNTER — APPOINTMENT (OUTPATIENT)
Dept: HEMATOLOGY ONCOLOGY | Facility: CLINIC | Age: 65
End: 2025-01-22
Payer: MEDICAID

## 2025-01-22 VITALS
WEIGHT: 177.38 LBS | TEMPERATURE: 97.3 F | SYSTOLIC BLOOD PRESSURE: 125 MMHG | HEIGHT: 65 IN | OXYGEN SATURATION: 95 % | BODY MASS INDEX: 29.55 KG/M2 | HEART RATE: 70 BPM | DIASTOLIC BLOOD PRESSURE: 73 MMHG

## 2025-01-22 LAB
ALBUMIN SERPL ELPH-MCNC: 4.9 G/DL
ALP BLD-CCNC: 35 U/L
ALT SERPL-CCNC: 28 U/L
ANION GAP SERPL CALC-SCNC: 13 MMOL/L
AST SERPL-CCNC: 30 U/L
BASOPHILS # BLD AUTO: 0.1 K/UL — SIGNIFICANT CHANGE UP (ref 0–0.2)
BASOPHILS NFR BLD AUTO: 1.3 % — SIGNIFICANT CHANGE UP (ref 0–2)
BILIRUB SERPL-MCNC: 0.4 MG/DL
BUN SERPL-MCNC: 30 MG/DL
CALCIUM SERPL-MCNC: 9.7 MG/DL
CHLORIDE SERPL-SCNC: 104 MMOL/L
CO2 SERPL-SCNC: 24 MMOL/L
CREAT SERPL-MCNC: 1.05 MG/DL
EGFR: 80 ML/MIN/1.73M2
EOSINOPHIL # BLD AUTO: 0.2 K/UL — SIGNIFICANT CHANGE UP (ref 0–0.5)
EOSINOPHIL NFR BLD AUTO: 2.8 % — SIGNIFICANT CHANGE UP (ref 0–6)
GLUCOSE SERPL-MCNC: 98 MG/DL
HCT VFR BLD CALC: 44.8 % — SIGNIFICANT CHANGE UP (ref 39–50)
HGB BLD-MCNC: 15.6 G/DL — SIGNIFICANT CHANGE UP (ref 13–17)
LYMPHOCYTES # BLD AUTO: 1.8 K/UL — SIGNIFICANT CHANGE UP (ref 1–3.3)
LYMPHOCYTES # BLD AUTO: 26.5 % — SIGNIFICANT CHANGE UP (ref 13–44)
MCHC RBC-ENTMCNC: 28.6 PG — SIGNIFICANT CHANGE UP (ref 27–34)
MCHC RBC-ENTMCNC: 34.8 G/DL — SIGNIFICANT CHANGE UP (ref 32–36)
MCV RBC AUTO: 82.2 FL — SIGNIFICANT CHANGE UP (ref 80–100)
MONOCYTES # BLD AUTO: 0.6 K/UL — SIGNIFICANT CHANGE UP (ref 0–0.9)
MONOCYTES NFR BLD AUTO: 8.4 % — SIGNIFICANT CHANGE UP (ref 2–14)
NEUTROPHILS # BLD AUTO: 4.1 K/UL — SIGNIFICANT CHANGE UP (ref 1.8–7.4)
NEUTROPHILS NFR BLD AUTO: 61 % — SIGNIFICANT CHANGE UP (ref 43–77)
PLATELET # BLD AUTO: 264 K/UL — SIGNIFICANT CHANGE UP (ref 150–400)
POTASSIUM SERPL-SCNC: 4.7 MMOL/L
PROT SERPL-MCNC: 7.5 G/DL
RBC # BLD: 5.46 M/UL — SIGNIFICANT CHANGE UP (ref 4.2–5.8)
RBC # FLD: 12.2 % — SIGNIFICANT CHANGE UP (ref 10.3–14.5)
SODIUM SERPL-SCNC: 141 MMOL/L
WBC # BLD: 6.8 K/UL — SIGNIFICANT CHANGE UP (ref 3.8–10.5)
WBC # FLD AUTO: 6.8 K/UL — SIGNIFICANT CHANGE UP (ref 3.8–10.5)

## 2025-01-22 PROCEDURE — 99213 OFFICE O/P EST LOW 20 MIN: CPT

## 2025-05-02 RX ORDER — GABAPENTIN 100 MG/1
100 CAPSULE ORAL 3 TIMES DAILY
Qty: 90 | Refills: 2 | Status: DISCONTINUED | COMMUNITY
Start: 2025-05-02 | End: 2025-05-02

## 2025-05-02 RX ORDER — GABAPENTIN 100 MG/1
100 CAPSULE ORAL 3 TIMES DAILY
Qty: 180 | Refills: 1 | Status: ACTIVE | COMMUNITY
Start: 2025-05-02 | End: 1900-01-01

## 2025-06-11 NOTE — PROGRESS NOTE ADULT - PROBLEM/PLAN-1
DISPLAY PLAN FREE TEXT
on the discharge service for the patient. I have reviewed and made amendments to the documentation where necessary.
DISPLAY PLAN FREE TEXT

## 2025-07-01 ENCOUNTER — OUTPATIENT (OUTPATIENT)
Dept: OUTPATIENT SERVICES | Facility: HOSPITAL | Age: 65
LOS: 1 days | End: 2025-07-01
Payer: COMMERCIAL

## 2025-07-01 ENCOUNTER — APPOINTMENT (OUTPATIENT)
Dept: MRI IMAGING | Facility: CLINIC | Age: 65
End: 2025-07-01
Payer: MEDICAID

## 2025-07-01 ENCOUNTER — APPOINTMENT (OUTPATIENT)
Dept: CT IMAGING | Facility: CLINIC | Age: 65
End: 2025-07-01
Payer: MEDICAID

## 2025-07-01 DIAGNOSIS — C41.4 MALIGNANT NEOPLASM OF PELVIC BONES, SACRUM AND COCCYX: ICD-10-CM

## 2025-07-01 DIAGNOSIS — Z98.890 OTHER SPECIFIED POSTPROCEDURAL STATES: Chronic | ICD-10-CM

## 2025-07-01 DIAGNOSIS — E89.0 POSTPROCEDURAL HYPOTHYROIDISM: Chronic | ICD-10-CM

## 2025-07-01 PROCEDURE — 72196 MRI PELVIS W/DYE: CPT | Mod: 26

## 2025-07-01 PROCEDURE — 72196 MRI PELVIS W/DYE: CPT

## 2025-07-01 PROCEDURE — 72158 MRI LUMBAR SPINE W/O & W/DYE: CPT | Mod: 26

## 2025-07-01 PROCEDURE — 71250 CT THORAX DX C-: CPT

## 2025-07-01 PROCEDURE — 71250 CT THORAX DX C-: CPT | Mod: 26

## 2025-07-01 PROCEDURE — A9585: CPT

## 2025-07-01 PROCEDURE — 72158 MRI LUMBAR SPINE W/O & W/DYE: CPT

## 2025-07-08 ENCOUNTER — OUTPATIENT (OUTPATIENT)
Dept: OUTPATIENT SERVICES | Facility: HOSPITAL | Age: 65
LOS: 1 days | Discharge: HOME | End: 2025-07-08

## 2025-07-08 DIAGNOSIS — D64.9 ANEMIA, UNSPECIFIED: ICD-10-CM

## 2025-07-08 DIAGNOSIS — Z98.890 OTHER SPECIFIED POSTPROCEDURAL STATES: Chronic | ICD-10-CM

## 2025-07-08 DIAGNOSIS — E89.0 POSTPROCEDURAL HYPOTHYROIDISM: Chronic | ICD-10-CM

## 2025-07-12 ENCOUNTER — NON-APPOINTMENT (OUTPATIENT)
Age: 65
End: 2025-07-12

## 2025-07-14 ENCOUNTER — APPOINTMENT (OUTPATIENT)
Dept: HEMATOLOGY ONCOLOGY | Facility: CLINIC | Age: 65
End: 2025-07-14
Payer: MEDICAID

## 2025-07-14 ENCOUNTER — RESULT REVIEW (OUTPATIENT)
Age: 65
End: 2025-07-14

## 2025-07-14 VITALS
WEIGHT: 180.5 LBS | HEART RATE: 66 BPM | OXYGEN SATURATION: 94 % | HEIGHT: 65 IN | DIASTOLIC BLOOD PRESSURE: 76 MMHG | SYSTOLIC BLOOD PRESSURE: 135 MMHG | BODY MASS INDEX: 30.07 KG/M2 | TEMPERATURE: 97.5 F

## 2025-07-14 PROBLEM — R91.1 LUNG NODULE: Status: ACTIVE | Noted: 2025-07-14

## 2025-07-14 LAB
BASOPHILS # BLD AUTO: 0.04 K/UL — SIGNIFICANT CHANGE UP (ref 0–0.2)
BASOPHILS NFR BLD AUTO: 0.5 % — SIGNIFICANT CHANGE UP (ref 0–2)
EOSINOPHIL # BLD AUTO: 0.26 K/UL — SIGNIFICANT CHANGE UP (ref 0–0.5)
EOSINOPHIL NFR BLD AUTO: 3.5 % — SIGNIFICANT CHANGE UP (ref 0–6)
HCT VFR BLD CALC: 41.6 % — SIGNIFICANT CHANGE UP (ref 39–50)
HGB BLD-MCNC: 13.8 G/DL — SIGNIFICANT CHANGE UP (ref 13–17)
IMM GRANULOCYTES # BLD AUTO: 0.02 K/UL — SIGNIFICANT CHANGE UP (ref 0–0.07)
IMM GRANULOCYTES NFR BLD AUTO: 0.3 % — SIGNIFICANT CHANGE UP (ref 0–0.9)
LYMPHOCYTES # BLD AUTO: 1.56 K/UL — SIGNIFICANT CHANGE UP (ref 1–3.3)
LYMPHOCYTES NFR BLD AUTO: 21.3 % — SIGNIFICANT CHANGE UP (ref 13–44)
MCHC RBC-ENTMCNC: 28.2 PG — SIGNIFICANT CHANGE UP (ref 27–34)
MCHC RBC-ENTMCNC: 33.2 G/DL — SIGNIFICANT CHANGE UP (ref 32–36)
MCV RBC AUTO: 85.1 FL — SIGNIFICANT CHANGE UP (ref 80–100)
MONOCYTES # BLD AUTO: 0.66 K/UL — SIGNIFICANT CHANGE UP (ref 0–0.9)
MONOCYTES NFR BLD AUTO: 9 % — SIGNIFICANT CHANGE UP (ref 2–14)
NEUTROPHILS # BLD AUTO: 4.79 K/UL — SIGNIFICANT CHANGE UP (ref 1.8–7.4)
NEUTROPHILS NFR BLD AUTO: 65.4 % — SIGNIFICANT CHANGE UP (ref 43–77)
NRBC # BLD AUTO: 0 K/UL — SIGNIFICANT CHANGE UP (ref 0–0)
NRBC # FLD: 0 K/UL — SIGNIFICANT CHANGE UP (ref 0–0)
NRBC BLD AUTO-RTO: 0 /100 WBCS — SIGNIFICANT CHANGE UP (ref 0–0)
PLATELET # BLD AUTO: 222 K/UL — SIGNIFICANT CHANGE UP (ref 150–400)
PMV BLD: 10.3 FL — SIGNIFICANT CHANGE UP (ref 7–13)
RBC # BLD: 4.89 M/UL — SIGNIFICANT CHANGE UP (ref 4.2–5.8)
RBC # FLD: 13 % — SIGNIFICANT CHANGE UP (ref 10.3–14.5)
WBC # BLD: 7.33 K/UL — SIGNIFICANT CHANGE UP (ref 3.8–10.5)
WBC # FLD AUTO: 7.33 K/UL — SIGNIFICANT CHANGE UP (ref 3.8–10.5)

## 2025-07-14 PROCEDURE — 99214 OFFICE O/P EST MOD 30 MIN: CPT

## 2025-07-15 LAB
ALBUMIN SERPL ELPH-MCNC: 4.4 G/DL
ALP BLD-CCNC: 31 U/L
ALT SERPL-CCNC: 28 U/L
ANION GAP SERPL CALC-SCNC: 13 MMOL/L
AST SERPL-CCNC: 32 U/L
BILIRUB SERPL-MCNC: 0.5 MG/DL
BUN SERPL-MCNC: 22 MG/DL
CALCIUM SERPL-MCNC: 9.1 MG/DL
CHLORIDE SERPL-SCNC: 106 MMOL/L
CO2 SERPL-SCNC: 23 MMOL/L
CREAT SERPL-MCNC: 1.16 MG/DL
EGFRCR SERPLBLD CKD-EPI 2021: 70 ML/MIN/1.73M2
GLUCOSE SERPL-MCNC: 85 MG/DL
LDH SERPL-CCNC: 206 U/L
POTASSIUM SERPL-SCNC: 4.6 MMOL/L
PROT SERPL-MCNC: 7.1 G/DL
SODIUM SERPL-SCNC: 143 MMOL/L

## 2025-07-24 ENCOUNTER — APPOINTMENT (OUTPATIENT)
Dept: NEUROSURGERY | Facility: CLINIC | Age: 65
End: 2025-07-24
Payer: MEDICAID

## 2025-07-24 DIAGNOSIS — R91.1 SOLITARY PULMONARY NODULE: ICD-10-CM

## 2025-07-24 DIAGNOSIS — C41.4 MALIGNANT NEOPLASM OF PELVIC BONES, SACRUM AND COCCYX: ICD-10-CM

## 2025-07-24 PROCEDURE — 99214 OFFICE O/P EST MOD 30 MIN: CPT | Mod: 93

## 2025-09-12 ENCOUNTER — NON-APPOINTMENT (OUTPATIENT)
Age: 65
End: 2025-09-12